# Patient Record
Sex: FEMALE | Race: WHITE | NOT HISPANIC OR LATINO | Employment: OTHER | URBAN - NONMETROPOLITAN AREA
[De-identification: names, ages, dates, MRNs, and addresses within clinical notes are randomized per-mention and may not be internally consistent; named-entity substitution may affect disease eponyms.]

---

## 2018-08-29 ENCOUNTER — APPOINTMENT (OUTPATIENT)
Dept: GENERAL RADIOLOGY | Facility: HOSPITAL | Age: 43
End: 2018-08-29

## 2018-08-29 ENCOUNTER — HOSPITAL ENCOUNTER (EMERGENCY)
Facility: HOSPITAL | Age: 43
Discharge: HOME | End: 2018-08-29
Attending: EMERGENCY MEDICINE | Admitting: NURSE PRACTITIONER

## 2018-08-29 ENCOUNTER — APPOINTMENT (OUTPATIENT)
Dept: CT IMAGING | Facility: HOSPITAL | Age: 43
End: 2018-08-29

## 2018-08-29 VITALS
BODY MASS INDEX: 43.71 KG/M2 | TEMPERATURE: 98.1 F | HEIGHT: 66 IN | RESPIRATION RATE: 16 BRPM | WEIGHT: 272 LBS | HEART RATE: 55 BPM | SYSTOLIC BLOOD PRESSURE: 103 MMHG | DIASTOLIC BLOOD PRESSURE: 55 MMHG | OXYGEN SATURATION: 97 %

## 2018-08-29 DIAGNOSIS — R56.9 SEIZURE (HCC): Primary | ICD-10-CM

## 2018-08-29 DIAGNOSIS — G43.909 MIGRAINE WITHOUT STATUS MIGRAINOSUS, NOT INTRACTABLE, UNSPECIFIED MIGRAINE TYPE: ICD-10-CM

## 2018-08-29 LAB
ALBUMIN SERPL-MCNC: 3.7 G/DL (ref 3.4–4.8)
ALBUMIN/GLOB SERPL: 1.1 G/DL (ref 1.1–1.8)
ALP SERPL-CCNC: 59 U/L (ref 38–126)
ALT SERPL W P-5'-P-CCNC: 26 U/L (ref 9–52)
AMPHET+METHAMPHET UR QL: NEGATIVE
ANION GAP SERPL CALCULATED.3IONS-SCNC: 6 MMOL/L (ref 5–15)
APAP SERPL-MCNC: <10 MCG/ML (ref 10–30)
AST SERPL-CCNC: 28 U/L (ref 14–36)
BACTERIA UR QL AUTO: ABNORMAL /HPF
BARBITURATES UR QL SCN: NEGATIVE
BASOPHILS # BLD AUTO: 0.01 10*3/MM3 (ref 0–0.2)
BASOPHILS NFR BLD AUTO: 0.1 % (ref 0–2)
BENZODIAZ UR QL SCN: NEGATIVE
BILIRUB SERPL-MCNC: 0.3 MG/DL (ref 0.2–1.3)
BILIRUB UR QL STRIP: NEGATIVE
BUN BLD-MCNC: 15 MG/DL (ref 7–21)
BUN/CREAT SERPL: 20.3 (ref 7–25)
CALCIUM SPEC-SCNC: 9.1 MG/DL (ref 8.4–10.2)
CANNABINOIDS SERPL QL: NEGATIVE
CHLORIDE SERPL-SCNC: 106 MMOL/L (ref 95–110)
CLARITY UR: CLEAR
CO2 SERPL-SCNC: 27 MMOL/L (ref 22–31)
COCAINE UR QL: NEGATIVE
COLOR UR: YELLOW
CREAT BLD-MCNC: 0.74 MG/DL (ref 0.5–1)
DEPRECATED RDW RBC AUTO: 44.5 FL (ref 36.4–46.3)
EOSINOPHIL # BLD AUTO: 0.24 10*3/MM3 (ref 0–0.7)
EOSINOPHIL NFR BLD AUTO: 3.2 % (ref 0–7)
ERYTHROCYTE [DISTWIDTH] IN BLOOD BY AUTOMATED COUNT: 14.3 % (ref 11.5–14.5)
GFR SERPL CREATININE-BSD FRML MDRD: 85 ML/MIN/1.73 (ref 58–135)
GLOBULIN UR ELPH-MCNC: 3.5 GM/DL (ref 2.3–3.5)
GLUCOSE BLD-MCNC: 113 MG/DL (ref 60–100)
GLUCOSE UR STRIP-MCNC: NEGATIVE MG/DL
HCT VFR BLD AUTO: 35.6 % (ref 35–45)
HGB BLD-MCNC: 11.9 G/DL (ref 12–15.5)
HGB UR QL STRIP.AUTO: NEGATIVE
HYALINE CASTS UR QL AUTO: ABNORMAL /LPF
IMM GRANULOCYTES # BLD: 0.02 10*3/MM3 (ref 0–0.02)
IMM GRANULOCYTES NFR BLD: 0.3 % (ref 0–0.5)
KETONES UR QL STRIP: NEGATIVE
LEUKOCYTE ESTERASE UR QL STRIP.AUTO: NEGATIVE
LYMPHOCYTES # BLD AUTO: 2.89 10*3/MM3 (ref 0.6–4.2)
LYMPHOCYTES NFR BLD AUTO: 38.5 % (ref 10–50)
MCH RBC QN AUTO: 28.5 PG (ref 26.5–34)
MCHC RBC AUTO-ENTMCNC: 33.4 G/DL (ref 31.4–36)
MCV RBC AUTO: 85.2 FL (ref 80–98)
METHADONE UR QL SCN: NEGATIVE
MONOCYTES # BLD AUTO: 0.37 10*3/MM3 (ref 0–0.9)
MONOCYTES NFR BLD AUTO: 4.9 % (ref 0–12)
NEUTROPHILS # BLD AUTO: 3.97 10*3/MM3 (ref 2–8.6)
NEUTROPHILS NFR BLD AUTO: 53 % (ref 37–80)
NITRITE UR QL STRIP: POSITIVE
OPIATES UR QL: NEGATIVE
OXYCODONE UR QL SCN: NEGATIVE
PH UR STRIP.AUTO: 6.5 [PH] (ref 5–9)
PLATELET # BLD AUTO: 158 10*3/MM3 (ref 150–450)
PMV BLD AUTO: 10.7 FL (ref 8–12)
POTASSIUM BLD-SCNC: 3.8 MMOL/L (ref 3.5–5.1)
PROT SERPL-MCNC: 7.2 G/DL (ref 6.3–8.6)
PROT UR QL STRIP: NEGATIVE
RBC # BLD AUTO: 4.18 10*6/MM3 (ref 3.77–5.16)
RBC # UR: ABNORMAL /HPF
REF LAB TEST METHOD: ABNORMAL
SALICYLATES SERPL-MCNC: <1 MG/DL (ref 10–20)
SODIUM BLD-SCNC: 139 MMOL/L (ref 137–145)
SP GR UR STRIP: 1.01 (ref 1–1.03)
SQUAMOUS #/AREA URNS HPF: ABNORMAL /HPF
UROBILINOGEN UR QL STRIP: ABNORMAL
WBC NRBC COR # BLD: 7.5 10*3/MM3 (ref 3.2–9.8)
WBC UR QL AUTO: ABNORMAL /HPF
WHOLE BLOOD HOLD SPECIMEN: NORMAL

## 2018-08-29 PROCEDURE — 80307 DRUG TEST PRSMV CHEM ANLYZR: CPT | Performed by: NURSE PRACTITIONER

## 2018-08-29 PROCEDURE — 25010000002 ONDANSETRON PER 1 MG: Performed by: NURSE PRACTITIONER

## 2018-08-29 PROCEDURE — 93010 ELECTROCARDIOGRAM REPORT: CPT | Performed by: INTERNAL MEDICINE

## 2018-08-29 PROCEDURE — 25010000002 DEXAMETHASONE PER 1 MG: Performed by: NURSE PRACTITIONER

## 2018-08-29 PROCEDURE — 71046 X-RAY EXAM CHEST 2 VIEWS: CPT

## 2018-08-29 PROCEDURE — 70450 CT HEAD/BRAIN W/O DYE: CPT

## 2018-08-29 PROCEDURE — 85025 COMPLETE CBC W/AUTO DIFF WBC: CPT | Performed by: NURSE PRACTITIONER

## 2018-08-29 PROCEDURE — 73080 X-RAY EXAM OF ELBOW: CPT

## 2018-08-29 PROCEDURE — 96365 THER/PROPH/DIAG IV INF INIT: CPT

## 2018-08-29 PROCEDURE — 93005 ELECTROCARDIOGRAM TRACING: CPT | Performed by: NURSE PRACTITIONER

## 2018-08-29 PROCEDURE — 80053 COMPREHEN METABOLIC PANEL: CPT | Performed by: NURSE PRACTITIONER

## 2018-08-29 PROCEDURE — 96375 TX/PRO/DX INJ NEW DRUG ADDON: CPT

## 2018-08-29 PROCEDURE — 25010000002 LEVETIRACETAM IN NACL 0.82% 500 MG/100ML SOLUTION: Performed by: NURSE PRACTITIONER

## 2018-08-29 PROCEDURE — 99285 EMERGENCY DEPT VISIT HI MDM: CPT

## 2018-08-29 PROCEDURE — 80177 DRUG SCRN QUAN LEVETIRACETAM: CPT | Performed by: NURSE PRACTITIONER

## 2018-08-29 PROCEDURE — 96366 THER/PROPH/DIAG IV INF ADDON: CPT

## 2018-08-29 PROCEDURE — 25010000002 KETOROLAC TROMETHAMINE PER 15 MG: Performed by: NURSE PRACTITIONER

## 2018-08-29 PROCEDURE — 81001 URINALYSIS AUTO W/SCOPE: CPT | Performed by: NURSE PRACTITIONER

## 2018-08-29 RX ORDER — DEXAMETHASONE SODIUM PHOSPHATE 10 MG/ML
10 INJECTION INTRAMUSCULAR; INTRAVENOUS ONCE
Status: COMPLETED | OUTPATIENT
Start: 2018-08-29 | End: 2018-08-29

## 2018-08-29 RX ORDER — HYDROCHLOROTHIAZIDE 25 MG/1
25 TABLET ORAL DAILY
COMMUNITY
End: 2018-09-19 | Stop reason: SDUPTHER

## 2018-08-29 RX ORDER — METOPROLOL TARTRATE 50 MG/1
50 TABLET, FILM COATED ORAL 2 TIMES DAILY
COMMUNITY
End: 2018-09-19 | Stop reason: SDUPTHER

## 2018-08-29 RX ORDER — LEVETIRACETAM 5 MG/ML
500 INJECTION INTRAVASCULAR ONCE
Status: COMPLETED | OUTPATIENT
Start: 2018-08-29 | End: 2018-08-29

## 2018-08-29 RX ORDER — ACETAMINOPHEN 325 MG/1
650 TABLET ORAL ONCE
Status: COMPLETED | OUTPATIENT
Start: 2018-08-29 | End: 2018-08-29

## 2018-08-29 RX ORDER — LEVETIRACETAM 100 MG/ML
500 SOLUTION ORAL ONCE
Status: DISCONTINUED | OUTPATIENT
Start: 2018-08-29 | End: 2018-08-29

## 2018-08-29 RX ORDER — ONDANSETRON 2 MG/ML
4 INJECTION INTRAMUSCULAR; INTRAVENOUS ONCE
Status: COMPLETED | OUTPATIENT
Start: 2018-08-29 | End: 2018-08-29

## 2018-08-29 RX ORDER — ONDANSETRON 4 MG/1
4 TABLET, ORALLY DISINTEGRATING ORAL EVERY 6 HOURS PRN
Qty: 30 TABLET | Refills: 0 | Status: SHIPPED | OUTPATIENT
Start: 2018-08-29 | End: 2018-09-19

## 2018-08-29 RX ORDER — KETOROLAC TROMETHAMINE 30 MG/ML
30 INJECTION, SOLUTION INTRAMUSCULAR; INTRAVENOUS ONCE
Status: COMPLETED | OUTPATIENT
Start: 2018-08-29 | End: 2018-08-29

## 2018-08-29 RX ORDER — LISINOPRIL 20 MG/1
20 TABLET ORAL DAILY
COMMUNITY
End: 2018-09-19 | Stop reason: SDUPTHER

## 2018-08-29 RX ORDER — LEVETIRACETAM 500 MG/1
500 TABLET ORAL 2 TIMES DAILY
COMMUNITY
End: 2018-09-19 | Stop reason: SDUPTHER

## 2018-08-29 RX ADMIN — SODIUM CHLORIDE 1000 ML: 900 INJECTION, SOLUTION INTRAVENOUS at 15:01

## 2018-08-29 RX ADMIN — ONDANSETRON 4 MG: 2 INJECTION INTRAMUSCULAR; INTRAVENOUS at 15:04

## 2018-08-29 RX ADMIN — LEVETIRACETAM 500 MG: 5 INJECTION INTRAVENOUS at 15:10

## 2018-08-29 RX ADMIN — DEXAMETHASONE SODIUM PHOSPHATE 10 MG: 10 INJECTION, SOLUTION INTRAMUSCULAR; INTRAVENOUS at 15:02

## 2018-08-29 RX ADMIN — KETOROLAC TROMETHAMINE 30 MG: 30 INJECTION, SOLUTION INTRAMUSCULAR; INTRAVENOUS at 16:56

## 2018-08-29 RX ADMIN — ACETAMINOPHEN 650 MG: 325 TABLET ORAL at 16:59

## 2018-09-04 LAB — LEVETIRACETAM SERPL-MCNC: 15.7 UG/ML (ref 10–40)

## 2018-09-18 NOTE — PROGRESS NOTES
Subjective   Sudhakar Saul is a 42 y.o. female.     Problem List  1. Seizure Disorder  2. History of drug use of crystal meth, opiod abuse ([place])   3. Morbid Obesity BMI >40  4. Essential hypertension   5. Tobacco user   6. CAD?   7. Hep C positive   8. Chronic back pain   9. Hiatal hernia   10. Memory problems/Neuropathy     PShx  -appendectomy  -cholecystectomy  -total hysterectomy secondary to endometrial carcinoma  -lymph gland removal in abdomen   - 9 lipomas removed.    -lumpectomy on left breast   -L4 dissectomy   -right hand carpal tunnel surgery   -teeth removal   -liver biopsy     SocialHx  -tobacco user-  Smoke 1-3 ppd. For >10 years   --no alcohol use  -history of illicit drug use. Has been clean for 4 months  -  -no children  -currently unemployed used to work for [employer] in [place], and [employer]  -mother - MI, obesity, Arthritis  -father -Serious bone and joint problems     Pt is 41 yo female who I am seeing for the first time. She is here to establish.  She has not seen a PCP in [facility]. She is orginally from [place] and moved here a few weeks ago. She has been living on and off in [facility]. She is a recovering drug addict and was doing crystal meth and opoids, and heroin She has been clean for almost 4 months.  She currently is part of an outpatient program for drug addiction. She meets with them 3 times a week. She has random drug screening. She sees counseling.  She lives with mother and currently she is unemployed and seeking disability.  She started having siezures about 2 years ago that became more frequent after she overdose on metamphetamine and heroin. She was a drug dealer   She was recently at [facility] ER on 8/29/18. Prior to ER visit she was having had blurred vision and then remembered being on the floor. Per witness she landed on right arm. She also had numbness in left fingers with tingling and carmpaing.  She was confused after episode.  She took lithium for seizure. She is seeing Neurologist in [place] and her last seizure was a gee ago.  She currently lives in a [facility]'s shelter with history of drug use. Labs were drawn and pts labs showed low levels of salicytlate and acetaminophen.  CMP showed normal renal and liver function with elevated glucose level. Levetiracetam level was normal. UA showed 4+ bacterial with 3-5 WBC. Urine drug screen was normal. Chest x-ray - CT of brain and elbow x-ray were all unremarkable.  Pt also has history of hypertension and takes lisinopril 20 mg PO q dominguez along with lopressor 50 mg PO BID and HCTZ 25 mg P Oq daily.  For seizures she is on keppra 500 mg PO BID. She has had extensive surgeries         Seizures    This is a chronic problem. The current episode started more than 1 week ago. Associated symptoms include sleepiness, confusion, headaches and visual disturbances. Pertinent negatives include no neck stiffness, no sore throat, no chest pain, no cough, no nausea, no vomiting, no diarrhea and no muscle weakness.   Obesity   This is a chronic problem. The current episode started more than 1 year ago. The problem occurs constantly. The problem has been unchanged. Associated symptoms include arthralgias, fatigue, headaches, numbness and weakness. Pertinent negatives include no abdominal pain, anorexia, change in bowel habit, chest pain, chills, congestion, coughing, fever, joint swelling, myalgias, nausea, neck pain, rash, sore throat, swollen glands, urinary symptoms, vertigo, visual change or vomiting. Nothing aggravates the symptoms. She has tried nothing for the symptoms.   Hypertension   This is a chronic problem. The current episode started more than 1 year ago. The problem is uncontrolled. Associated symptoms include headaches. Pertinent negatives include no anxiety, blurred vision, chest pain, malaise/fatigue, neck pain, orthopnea, palpitations, peripheral edema, PND or shortness of breath. Risk factors  for coronary artery disease include obesity, sedentary lifestyle, smoking/tobacco exposure and stress. Past treatments include ACE inhibitors, beta blockers and diuretics. There are no compliance problems.  There is no history of angina, kidney disease, CAD/MI, CVA, heart failure, left ventricular hypertrophy, PVD or retinopathy. There is no history of chronic renal disease, coarctation of the aorta, hyperaldosteronism, hypercortisolism, hyperparathyroidism, a hypertension causing med, pheochromocytoma, renovascular disease, sleep apnea or a thyroid problem.              The following portions of the patient's history were reviewed and updated as appropriate: allergies, current medications, past family history, past medical history, past social history, past surgical history and problem list.  Patient Active Problem List   Diagnosis   • Annual physical exam   • General medical examination   • Encounter for screening for diabetes mellitus   • Encounter for screening for cardiovascular disorders   • Encounter for screening for endocrine disorder   • Seizure disorder (CMS/HCC)   • Morbid obesity (CMS/HCC)   • Tobacco user   • Tobacco abuse counseling   • Multiple joint pain   • Dysuria   • Encounter for screening for HIV   • History of positive hepatitis C   • Essential hypertension     Current Outpatient Prescriptions on File Prior to Visit   Medication Sig Dispense Refill   • [DISCONTINUED] hydrochlorothiazide (HYDRODIURIL) 25 MG tablet Take 25 mg by mouth Daily.     • [DISCONTINUED] levETIRAcetam (KEPPRA) 500 MG tablet Take 500 mg by mouth 2 (Two) Times a Day.     • [DISCONTINUED] lisinopril (PRINIVIL,ZESTRIL) 20 MG tablet Take 20 mg by mouth Daily.     • [DISCONTINUED] metoprolol tartrate (LOPRESSOR) 50 MG tablet Take 50 mg by mouth 2 (Two) Times a Day.     • [DISCONTINUED] ondansetron ODT (ZOFRAN-ODT) 4 MG disintegrating tablet Take 1 tablet by mouth Every 6 (Six) Hours As Needed for Nausea or Vomiting. 30 tablet  "0     No current facility-administered medications on file prior to visit.        Review of Systems   Constitutional: Positive for activity change and fatigue. Negative for chills, fever and malaise/fatigue.   HENT: Negative for congestion and sore throat.    Eyes: Positive for visual disturbance. Negative for blurred vision.   Respiratory: Negative.  Negative for cough and shortness of breath.    Cardiovascular: Negative.  Negative for chest pain, palpitations, orthopnea and PND.   Gastrointestinal: Negative.  Negative for abdominal pain, anorexia, change in bowel habit, diarrhea, nausea and vomiting.   Endocrine: Negative.    Genitourinary: Positive for dysuria.   Musculoskeletal: Positive for arthralgias. Negative for joint swelling, myalgias and neck pain.   Skin: Negative.  Negative for rash.   Allergic/Immunologic: Negative.    Neurological: Positive for seizures, weakness, numbness and confusion. Negative for vertigo.   Hematological: Negative.    Psychiatric/Behavioral: Positive for decreased concentration. The patient is nervous/anxious.        Objective    /82   Pulse 86   Temp 99.7 °F (37.6 °C)   Ht 167.6 cm (66\")   Wt 119 kg (263 lb 6.4 oz)   SpO2 98%   BMI 42.51 kg/m²       Admission on 08/29/2018, Discharged on 08/29/2018   Component Date Value Ref Range Status   • Glucose 08/29/2018 113* 60 - 100 mg/dL Final   • BUN 08/29/2018 15  7 - 21 mg/dL Final   • Creatinine 08/29/2018 0.74  0.50 - 1.00 mg/dL Final   • Sodium 08/29/2018 139  137 - 145 mmol/L Final   • Potassium 08/29/2018 3.8  3.5 - 5.1 mmol/L Final   • Chloride 08/29/2018 106  95 - 110 mmol/L Final   • CO2 08/29/2018 27.0  22.0 - 31.0 mmol/L Final   • Calcium 08/29/2018 9.1  8.4 - 10.2 mg/dL Final   • Total Protein 08/29/2018 7.2  6.3 - 8.6 g/dL Final   • Albumin 08/29/2018 3.70  3.40 - 4.80 g/dL Final   • ALT (SGPT) 08/29/2018 26  9 - 52 U/L Final   • AST (SGOT) 08/29/2018 28  14 - 36 U/L Final   • Alkaline Phosphatase 08/29/2018 " 59  38 - 126 U/L Final   • Total Bilirubin 08/29/2018 0.3  0.2 - 1.3 mg/dL Final   • eGFR Non African Amer 08/29/2018 85  58 - 135 mL/min/1.73 Final   • Globulin 08/29/2018 3.5  2.3 - 3.5 gm/dL Final   • A/G Ratio 08/29/2018 1.1  1.1 - 1.8 g/dL Final   • BUN/Creatinine Ratio 08/29/2018 20.3  7.0 - 25.0 Final   • Anion Gap 08/29/2018 6.0  5.0 - 15.0 mmol/L Final   • Color, UA 08/29/2018 Yellow  Yellow, Straw, Dark Yellow, Cristina Final   • Appearance, UA 08/29/2018 Clear  Clear Final   • pH, UA 08/29/2018 6.5  5.0 - 9.0 Final   • Specific Gravity, UA 08/29/2018 1.010  1.003 - 1.030 Final   • Glucose, UA 08/29/2018 Negative  Negative Final   • Ketones, UA 08/29/2018 Negative  Negative Final   • Bilirubin, UA 08/29/2018 Negative  Negative Final   • Blood, UA 08/29/2018 Negative  Negative Final   • Protein, UA 08/29/2018 Negative  Negative Final   • Leuk Esterase, UA 08/29/2018 Negative  Negative Final   • Nitrite, UA 08/29/2018 Positive* Negative Final   • Urobilinogen, UA 08/29/2018 0.2 E.U./dL  0.2 - 1.0 E.U./dL Final   • Amphetamine Screen, Urine 08/29/2018 Negative  Negative Final   • Barbiturates Screen, Urine 08/29/2018 Negative  Negative Final   • Benzodiazepine Screen, Urine 08/29/2018 Negative  Negative Final   • Cocaine Screen, Urine 08/29/2018 Negative  Negative Final   • Methadone Screen, Urine 08/29/2018 Negative  Negative Final   • Opiate Screen 08/29/2018 Negative  Negative Final   • Oxycodone Screen, Urine 08/29/2018 Negative  Negative Final   • THC, Screen, Urine 08/29/2018 Negative  Negative Final   • Salicylate 08/29/2018 <1.0* 10.0 - 20.0 mg/dL Final   • Acetaminophen 08/29/2018 <10.0* 10.0 - 30.0 mcg/mL Final   • WBC 08/29/2018 7.50  3.20 - 9.80 10*3/mm3 Final   • RBC 08/29/2018 4.18  3.77 - 5.16 10*6/mm3 Final   • Hemoglobin 08/29/2018 11.9* 12.0 - 15.5 g/dL Final   • Hematocrit 08/29/2018 35.6  35.0 - 45.0 % Final   • MCV 08/29/2018 85.2  80.0 - 98.0 fL Final   • MCH 08/29/2018 28.5  26.5 - 34.0 pg  Final   • MCHC 08/29/2018 33.4  31.4 - 36.0 g/dL Final   • RDW 08/29/2018 14.3  11.5 - 14.5 % Final   • RDW-SD 08/29/2018 44.5  36.4 - 46.3 fl Final   • MPV 08/29/2018 10.7  8.0 - 12.0 fL Final   • Platelets 08/29/2018 158  150 - 450 10*3/mm3 Final   • Neutrophil % 08/29/2018 53.0  37.0 - 80.0 % Final   • Lymphocyte % 08/29/2018 38.5  10.0 - 50.0 % Final   • Monocyte % 08/29/2018 4.9  0.0 - 12.0 % Final   • Eosinophil % 08/29/2018 3.2  0.0 - 7.0 % Final   • Basophil % 08/29/2018 0.1  0.0 - 2.0 % Final   • Immature Grans % 08/29/2018 0.3  0.0 - 0.5 % Final   • Neutrophils, Absolute 08/29/2018 3.97  2.00 - 8.60 10*3/mm3 Final   • Lymphocytes, Absolute 08/29/2018 2.89  0.60 - 4.20 10*3/mm3 Final   • Monocytes, Absolute 08/29/2018 0.37  0.00 - 0.90 10*3/mm3 Final   • Eosinophils, Absolute 08/29/2018 0.24  0.00 - 0.70 10*3/mm3 Final   • Basophils, Absolute 08/29/2018 0.01  0.00 - 0.20 10*3/mm3 Final   • Immature Grans, Absolute 08/29/2018 0.02  0.00 - 0.02 10*3/mm3 Final   • Levetiracetam 08/29/2018 15.7  10.0 - 40.0 ug/mL Final   • RBC, UA 08/29/2018 None Seen  None Seen /HPF Final   • WBC, UA 08/29/2018 3-5  None Seen, 0-2, 3-5 /HPF Final   • Bacteria, UA 08/29/2018 4+* None Seen /HPF Final   • Squamous Epithelial Cells, UA 08/29/2018 3-5* None Seen, 0-2 /HPF Final   • Hyaline Casts, UA 08/29/2018 None Seen  None Seen /LPF Final   • Methodology 08/29/2018 Automated Microscopy   Final   • Extra Tube 08/29/2018 hold for add-on   Final    Auto resulted     Procedure: XR ELBOW 3+ VW RIGHT     Indication:  Persistent pain after recent fall       Technique: Three views .      Prior Relevant Exam: None     Small osteophyte tip of the olecranon. No acute bony abnormality.  Joint spaces intact. Mineralization normal.         IMPRESSION:  CONCLUSION:     1. Mild chronic changes without acute bony abnormality      Electronically signed by:  Finn Haynes MD  8/29/2018 4:41 PM CDT  Workstation: [id number]    Noncontrast CT  examination of the brain.     INDICATION:  Right lower extremity weakness. Seizure.      Technique: Axial, coronal, sagittal, without contrast. This exam  was performed according to our departmental dose-optimization  program which includes automated exposure control, adjustment of  the mA and/or kV according to patient size and/or use of  iterative reconstruction technique.        Prior relevant examination: None.        Limitations: None     Brain parenchyma appears within normal limits. Ventricles are  within normal limits in size. Normal gray-white matter  differentiation. No evidence of abnormal mass or calcification is  seen. No evidence of acute hemorrhage is noted.     The mastoid air cells are well aerated bilaterally.      The visualized paranasal sinuses appear well aerated bilaterally.     Calvarium intact.        IMPRESSION:  CONCLUSION:     1. Normal brain parenchyma for age    EXAM:          Radiograph(s), Chest   VIEWS:    PA / Lat ; 2       DATE/TIME:  8/29/2018 2:08 PM CDT                INDICATION:   seizure    COMPARISON:  CXR: none             FINDINGS:             - lines/tubes:    none     - cardiac:         size within normal limits         - mediastinum: contour within normal limits         - lungs:         no focal air space process, pulmonary  interstitial edema, nodule(s)/mass             - pleura:         no evidence of  fluid                  - osseous:         unremarkable for age                  - misc.:       IMPRESSION:  CONCLUSION:        1. No evidence of an active cardiopulmonary process.                                                  Electronically signed by:  SHIRLEY Tiawri MD  8/29/2018 2:09  PM CDT Workstation: [id number]    Status:  Final result   Visible to patient:  No (Not Released)    Ref Range & Units 3wk ago   Amphetamine Screen, Urine Negative Negative    Barbiturates Screen, Urine Negative Negative    Benzodiazepine Screen, Urine Negative Negative    Cocaine  Screen, Urine Negative Negative    Methadone Screen, Urine Negative Negative    Opiate Screen Negative Negative    Oxycodone Screen, Urine Negative Negative    THC, Screen, Urine Negative Negative    Resulting Agency  [facility]   Narrative     Negative Thresholds For Drugs Screened in Urine:     Amphetamines          500 ng/ml  Barbiturates          200 ng/ml  Benzodiazepines       200 ng/ml  Cocaine               150 ng/ml  Methadone             300 ng/mL  Opiates               300 ng/mL  Oxycodone             100 ng/mL  THC                   20 ng/mL    The normal value for all drugs tested is negative. This report includes final unconfirmed screening results.  A positive result by this assay can be, at your request, sent to the Reference Lab for confirmation by gas chromatography. Unconfirmed results must not be used for non-medical purposes, such as employment or legal testing. Clinical consideration should be applied to any drug of abuse test result, particularly when unconfirmed results are used.      Specimen Collected: 08/29/18 15:18 Last Resulted: 08/29/18 15:59                Physical Exam   Constitutional: She is oriented to person, place, and time. She appears well-developed and well-nourished. No distress.   HENT:   Head: Normocephalic and atraumatic.   Right Ear: External ear normal.   Left Ear: External ear normal.   Eyes: Conjunctivae and EOM are normal. Left eye exhibits no discharge. No scleral icterus.   Neck: Normal range of motion. Neck supple. No JVD present. No tracheal deviation present. No thyromegaly present.   Cardiovascular: Normal rate, regular rhythm and normal heart sounds.    Pulmonary/Chest: Effort normal. No respiratory distress. She has no wheezes.   Decrease breath sounds.     Abdominal: Soft. She exhibits no distension and no mass. There is no tenderness. There is no guarding.   Obese abdomen    Musculoskeletal: Normal range of motion. She exhibits tenderness. She exhibits no  edema or deformity.   Lymphadenopathy:     She has no cervical adenopathy.   Neurological: She is alert and oriented to person, place, and time. She displays normal reflexes. No cranial nerve deficit. Coordination normal.   Skin: Skin is warm and dry. No rash noted. She is not diaphoretic. No erythema. No pallor.   Psychiatric: She has a normal mood and affect. Her behavior is normal.   Nursing note and vitals reviewed.        Assessment/Plan   Problems Addressed this Visit        Cardiovascular and Mediastinum    Essential hypertension    Relevant Medications    lisinopril (PRINIVIL,ZESTRIL) 20 MG tablet    metoprolol tartrate (LOPRESSOR) 50 MG tablet    hydrochlorothiazide (HYDRODIURIL) 25 MG tablet    Other Relevant Orders    CBC Auto Differential    Comprehensive Metabolic Panel       Digestive    Morbid obesity (CMS/HCC)    History of positive hepatitis C    Relevant Orders    Hepatitis Panel, Acute    Hepatitis C RNA, Quantitative, PCR (graph)    Hepatitis C Antibody    Hepatitis C Genotype       Nervous and Auditory    Seizure disorder (CMS/HCC) - Primary    Relevant Medications    levETIRAcetam (KEPPRA) 500 MG tablet    Other Relevant Orders    Ambulatory Referral to Neurology (Completed)    Levetiracetam Level (Keppra)    Multiple joint pain    Dysuria    Relevant Orders    Urinalysis With Microscopic - Urine, Clean Catch       Other    Annual physical exam    General medical examination    Relevant Orders    CBC Auto Differential    Comprehensive Metabolic Panel    Hemoglobin A1c    Lipid Panel    TSH    T4, Free    T3, Free    Vitamin D 25 Hydroxy    Hepatitis Panel, Acute    Hepatitis C RNA, Quantitative, PCR (graph)    Hepatitis C Antibody    Hepatitis C Genotype    Encounter for screening for diabetes mellitus    Relevant Orders    Hemoglobin A1c    Vitamin D 25 Hydroxy    Encounter for screening for cardiovascular disorders    Relevant Orders    Lipid Panel    Encounter for screening for endocrine  disorder    Relevant Orders    TSH    T4, Free    T3, Free    Tobacco user    Tobacco abuse counseling    Encounter for screening for HIV    Relevant Orders    HIV-1 & HIV-2 Antibodies      -will get cbc, cmp, lipid panel ,hga1c, thyroid studies and Vitamin D levels  -history of Hep C will get Hepatitis panel Hep C RNA and genotype. Consider Gastroenterology referral if needed  -HIV screening test  -dysuria-  Has had history of UTI will get UA and if abnormal will start on abx.  Also get urine culture   -consider Cardiology referral  -consider PUlmonary referral   -multiple joint pain -- naproxen 500 mg PO BID   -discuss preventative health on next visit  -for multiple joint pain - pt cannot take opoids. Will start on naproxen 500 mg twice a day. Drug information provided  -morbid obesity -counseled on weight loss, diet and exercise. Diet information provided.  Also bariatric weight loss surgery information provided  -annual physical exam today  -seizure disorder -currently on Keppra  500 mg PO BID. Will refer to Dr. Contreras (Neurologist) in [place]   -counseled pt to quit smoking >5 minutes.  Gave tobacco cessation material   -hypertension - elevated today. Continue on lisinopril 20 mg Po q dominguez, metoprolol 50 mg PO BID and HCTZ 25 mg PO q daily.    -advised pt to be safe and call with any questions or concerns. All questions addressed today  -recheck in 2 weeks for BP recheck and labwork        This document has been electronically signed by Jermaine Ferro MD on September 19, 2018 8:41 AM

## 2018-09-19 ENCOUNTER — OFFICE VISIT (OUTPATIENT)
Dept: FAMILY MEDICINE CLINIC | Facility: CLINIC | Age: 43
End: 2018-09-19

## 2018-09-19 ENCOUNTER — LAB (OUTPATIENT)
Dept: LAB | Facility: HOSPITAL | Age: 43
End: 2018-09-19

## 2018-09-19 VITALS
WEIGHT: 263.4 LBS | TEMPERATURE: 99.7 F | HEART RATE: 86 BPM | OXYGEN SATURATION: 98 % | DIASTOLIC BLOOD PRESSURE: 82 MMHG | SYSTOLIC BLOOD PRESSURE: 136 MMHG | HEIGHT: 66 IN | BODY MASS INDEX: 42.33 KG/M2

## 2018-09-19 DIAGNOSIS — Z13.6 ENCOUNTER FOR SCREENING FOR CARDIOVASCULAR DISORDERS: ICD-10-CM

## 2018-09-19 DIAGNOSIS — G40.909 SEIZURE DISORDER (HCC): ICD-10-CM

## 2018-09-19 DIAGNOSIS — Z13.1 ENCOUNTER FOR SCREENING FOR DIABETES MELLITUS: ICD-10-CM

## 2018-09-19 DIAGNOSIS — I10 ESSENTIAL HYPERTENSION: ICD-10-CM

## 2018-09-19 DIAGNOSIS — Z86.19 HISTORY OF POSITIVE HEPATITIS C: ICD-10-CM

## 2018-09-19 DIAGNOSIS — Z00.00 GENERAL MEDICAL EXAMINATION: ICD-10-CM

## 2018-09-19 DIAGNOSIS — M25.50 MULTIPLE JOINT PAIN: ICD-10-CM

## 2018-09-19 DIAGNOSIS — Z13.29 ENCOUNTER FOR SCREENING FOR ENDOCRINE DISORDER: ICD-10-CM

## 2018-09-19 DIAGNOSIS — Z11.4 ENCOUNTER FOR SCREENING FOR HIV: ICD-10-CM

## 2018-09-19 DIAGNOSIS — R30.0 DYSURIA: ICD-10-CM

## 2018-09-19 DIAGNOSIS — Z00.00 ANNUAL PHYSICAL EXAM: ICD-10-CM

## 2018-09-19 DIAGNOSIS — E66.01 MORBID OBESITY (HCC): ICD-10-CM

## 2018-09-19 DIAGNOSIS — Z71.6 TOBACCO ABUSE COUNSELING: ICD-10-CM

## 2018-09-19 DIAGNOSIS — Z72.0 TOBACCO USER: ICD-10-CM

## 2018-09-19 DIAGNOSIS — R30.0 DYSURIA: Primary | ICD-10-CM

## 2018-09-19 LAB
25(OH)D3 SERPL-MCNC: 24.2 NG/ML (ref 30–100)
ALBUMIN SERPL-MCNC: 4 G/DL (ref 3.4–4.8)
ALBUMIN/GLOB SERPL: 1.1 G/DL (ref 1.1–1.8)
ALP SERPL-CCNC: 86 U/L (ref 38–126)
ALT SERPL W P-5'-P-CCNC: 33 U/L (ref 9–52)
ANION GAP SERPL CALCULATED.3IONS-SCNC: 11 MMOL/L (ref 5–15)
AST SERPL-CCNC: 29 U/L (ref 14–36)
BASOPHILS # BLD AUTO: 0.02 10*3/MM3 (ref 0–0.2)
BASOPHILS NFR BLD AUTO: 0.3 % (ref 0–2)
BILIRUB SERPL-MCNC: 0.3 MG/DL (ref 0.2–1.3)
BILIRUB UR QL STRIP: NEGATIVE
BUN BLD-MCNC: 12 MG/DL (ref 7–21)
BUN/CREAT SERPL: 22.2 (ref 7–25)
CALCIUM SPEC-SCNC: 9.4 MG/DL (ref 8.4–10.2)
CHLORIDE SERPL-SCNC: 106 MMOL/L (ref 95–110)
CLARITY UR: ABNORMAL
CO2 SERPL-SCNC: 20 MMOL/L (ref 22–31)
COLOR UR: YELLOW
CREAT BLD-MCNC: 0.54 MG/DL (ref 0.5–1)
DEPRECATED RDW RBC AUTO: 45.7 FL (ref 36.4–46.3)
EOSINOPHIL # BLD AUTO: 0.15 10*3/MM3 (ref 0–0.7)
EOSINOPHIL NFR BLD AUTO: 2.2 % (ref 0–7)
ERYTHROCYTE [DISTWIDTH] IN BLOOD BY AUTOMATED COUNT: 14.1 % (ref 11.5–14.5)
GFR SERPL CREATININE-BSD FRML MDRD: 123 ML/MIN/1.73 (ref 58–135)
GLOBULIN UR ELPH-MCNC: 3.5 GM/DL (ref 2.3–3.5)
GLUCOSE BLD-MCNC: 195 MG/DL (ref 60–100)
GLUCOSE UR STRIP-MCNC: NEGATIVE MG/DL
HBA1C MFR BLD: 7.1 % (ref 4–5.6)
HCT VFR BLD AUTO: 38.9 % (ref 35–45)
HGB BLD-MCNC: 12.7 G/DL (ref 12–15.5)
HGB UR QL STRIP.AUTO: NEGATIVE
HIV1+2 AB SER QL: NEGATIVE
IMM GRANULOCYTES # BLD: 0.02 10*3/MM3 (ref 0–0.02)
IMM GRANULOCYTES NFR BLD: 0.3 % (ref 0–0.5)
KETONES UR QL STRIP: ABNORMAL
LEUKOCYTE ESTERASE UR QL STRIP.AUTO: NEGATIVE
LYMPHOCYTES # BLD AUTO: 2.5 10*3/MM3 (ref 0.6–4.2)
LYMPHOCYTES NFR BLD AUTO: 37.3 % (ref 10–50)
MCH RBC QN AUTO: 28.8 PG (ref 26.5–34)
MCHC RBC AUTO-ENTMCNC: 32.6 G/DL (ref 31.4–36)
MCV RBC AUTO: 88.2 FL (ref 80–98)
MONOCYTES # BLD AUTO: 0.29 10*3/MM3 (ref 0–0.9)
MONOCYTES NFR BLD AUTO: 4.3 % (ref 0–12)
NEUTROPHILS # BLD AUTO: 3.72 10*3/MM3 (ref 2–8.6)
NEUTROPHILS NFR BLD AUTO: 55.6 % (ref 37–80)
NITRITE UR QL STRIP: NEGATIVE
PH UR STRIP.AUTO: 6 [PH] (ref 5–8)
PLATELET # BLD AUTO: 184 10*3/MM3 (ref 150–450)
PMV BLD AUTO: 11.3 FL (ref 8–12)
POTASSIUM BLD-SCNC: 4.4 MMOL/L (ref 3.5–5.1)
PROT SERPL-MCNC: 7.5 G/DL (ref 6.3–8.6)
PROT UR QL STRIP: ABNORMAL
RBC # BLD AUTO: 4.41 10*6/MM3 (ref 3.77–5.16)
SODIUM BLD-SCNC: 137 MMOL/L (ref 137–145)
SP GR UR STRIP: 1 (ref 1–1.03)
T4 FREE SERPL-MCNC: 1.16 NG/DL (ref 0.78–2.19)
TSH SERPL DL<=0.05 MIU/L-ACNC: 1.77 MIU/ML (ref 0.46–4.68)
UROBILINOGEN UR QL STRIP: ABNORMAL
WBC NRBC COR # BLD: 6.7 10*3/MM3 (ref 3.2–9.8)

## 2018-09-19 PROCEDURE — 84443 ASSAY THYROID STIM HORMONE: CPT

## 2018-09-19 PROCEDURE — 80307 DRUG TEST PRSMV CHEM ANLYZR: CPT

## 2018-09-19 PROCEDURE — 80053 COMPREHEN METABOLIC PANEL: CPT

## 2018-09-19 PROCEDURE — 81015 MICROSCOPIC EXAM OF URINE: CPT

## 2018-09-19 PROCEDURE — G0432 EIA HIV-1/HIV-2 SCREEN: HCPCS

## 2018-09-19 PROCEDURE — 80074 ACUTE HEPATITIS PANEL: CPT

## 2018-09-19 PROCEDURE — 82306 VITAMIN D 25 HYDROXY: CPT

## 2018-09-19 PROCEDURE — 84439 ASSAY OF FREE THYROXINE: CPT

## 2018-09-19 PROCEDURE — 83036 HEMOGLOBIN GLYCOSYLATED A1C: CPT

## 2018-09-19 PROCEDURE — 80177 DRUG SCRN QUAN LEVETIRACETAM: CPT

## 2018-09-19 PROCEDURE — 99214 OFFICE O/P EST MOD 30 MIN: CPT | Performed by: FAMILY MEDICINE

## 2018-09-19 PROCEDURE — 84481 FREE ASSAY (FT-3): CPT

## 2018-09-19 PROCEDURE — 87902 NFCT AGT GNTYP ALYS HEP C: CPT

## 2018-09-19 PROCEDURE — 87522 HEPATITIS C REVRS TRNSCRPJ: CPT

## 2018-09-19 PROCEDURE — 85025 COMPLETE CBC W/AUTO DIFF WBC: CPT

## 2018-09-19 RX ORDER — NAPROXEN 500 MG/1
500 TABLET ORAL 2 TIMES DAILY WITH MEALS
Qty: 60 TABLET | Refills: 3 | Status: SHIPPED | OUTPATIENT
Start: 2018-09-19 | End: 2018-12-13 | Stop reason: SDUPTHER

## 2018-09-19 RX ORDER — HYDROCHLOROTHIAZIDE 25 MG/1
25 TABLET ORAL DAILY
Qty: 30 TABLET | Refills: 3 | Status: SHIPPED | OUTPATIENT
Start: 2018-09-19 | End: 2018-12-13

## 2018-09-19 RX ORDER — LISINOPRIL 20 MG/1
20 TABLET ORAL DAILY
Qty: 30 TABLET | Refills: 3 | Status: SHIPPED | OUTPATIENT
Start: 2018-09-19 | End: 2018-12-13

## 2018-09-19 RX ORDER — METOPROLOL TARTRATE 50 MG/1
50 TABLET, FILM COATED ORAL EVERY 12 HOURS SCHEDULED
Qty: 60 TABLET | Refills: 3 | Status: SHIPPED | OUTPATIENT
Start: 2018-09-19 | End: 2018-12-13

## 2018-09-19 RX ORDER — LEVETIRACETAM 500 MG/1
500 TABLET ORAL EVERY 12 HOURS SCHEDULED
Qty: 60 TABLET | Refills: 3 | Status: SHIPPED | OUTPATIENT
Start: 2018-09-19 | End: 2018-12-13

## 2018-09-19 NOTE — PATIENT INSTRUCTIONS
Diet Following Bariatric Surgery  The bariatric diet is designed to provide fluids and nourishment while promoting weight loss and healing after bariatric surgery. The diet is divided into 3 stages. Progress to each stage of the diet with your health care provider’s approval.  What do I need to know about my diet following bariatric surgery?  Your surgeon may have individual guidelines for you about specific foods or the progression of your diet. Follow your surgeon's guidelines. You will follow these general guidelines during all stages of your diet:  · Eat at set times.  · Allow 30-45 minutes for each meal.  · Take small bites. Chew your food until it is almost a liquid before swallowing it. Try setting down your utensils between bites to help yourself eat slower or make an “eat slowly” reminder sign.  · Do not drink liquids for 30 minutes before meals and for 30 minutes after meals.  · Drink between meals.  · Stop eating when you are full. If you feel tightness or pressure in your chest, that means you are full. Wait 30 minutes before you try to eat again.  · Take a chewable multivitamin daily in addition to other supplements as directed by your health care provider.  · Sip at least 48-64 oz of liquid, preferably water, each day.  · Stay away from concentrated sweets with more than 10 g of sugar per serving.  · Protein is a very important part of the diet. Have protein with every meal when possible. Try eating your protein food first.    Stage 1 bariatric diet  Stage 1 will begin after surgery and last until about 2 weeks after surgery or as directed by your health care provider. You will be on clear liquids immediately after surgery. After your health care provider approves, you will move to full liquids. You will eat at scheduled times during the day (for example at 8 AM, 12 noon, or 5 PM). You will also take a liquid protein supplement as recommended by your dietitian. Your dietitian will let you know how much  and how often you can eat.  Diet Guidelines  · Limit intake to ¼ cup of solid foods and ½ cup of beverages per meal.  · You will need at least 60-80 g of protein daily or as determined by your dietitian. Guidelines for choosing a liquid protein supplement include:  ? At least 15 g of protein per 8 oz serving.  ? Less than 20 g total carbohydrate per 8 oz serving.  ? Less than 5 g fat per 8 oz serving.  · Drink at least 48 oz (1440 mL) of fluid daily, which includes your protein supplement.  · To get more protein, you can add 1 Tbsp non-fat dry milk powder to each ¼ cup skim milk.  · Avoid carbonated beverages, caffeine, alcohol, and concentrated sweets such as sugar, cakes, and cookies.  · Take a chewable multivitamin complete with iron. Discuss additional supplements with your health care provider or dietitian.  Beverages (½ cup total per meal)  · Decaffeinated coffee or tea.  · Drinks with less than 25 g of sugar per serving.  · Diet or sugar-free drinks.  · Powdered drinks.  · Sugar-free iced tea.  · Broth.  · Skim milk or lactose-free milk.  · Unsweetened, plain soy milk.  · Sugar-free gelatin dessert or frozen ice pops.  Full Liquid Foods (¼ cup total per meal)  · Protein-rich liquids (limit added protein powder to 25-30 g per serving).  · Low-fat cream soup or soup that has been blended.  · Artificially sweetened yogurt.  · Sugar-free pudding.  · Blended low-fat cottage cheese.  · Plain yogurt or Greek yogurt (low-fat).  · Unsweetened applesauce.  · Hot wheat cereal, cream of rice, grits.  Stage 2 bariatric diet (soft or pureed diet)  Stage 2 starts about 2 weeks after surgery and lasts until about 4 weeks after surgery. During this stage, you will eat soft, moist, ground, diced, or pureed foods in small meals, 3-6 times a day. Focus on protein-rich foods. You will also drink a liquid protein supplement between meals 2 times a day. After a week of soft protein foods, you can begin to add other soft foods in  addition to soft proteins. You should meet with your dietitian at this stage to begin preparation for Stage 3 of the bariatric diet.  Diet Guidelines  · Meals should not exceed ¾-1 cup total per meal.  · You will need to blend solid foods to the consistency of applesauce.  · Choose low-fat foods. Low-fat foods are foods with less than 5 g of fat per serving.  · Include a protein with every meal and snack. Eat the protein food first. Try to eat 60-80 g of protein per day when possible.  · Choose grains made from white or refined grain. Choices should have no more than 2 g of fiber per serving.  · Continue to eat mindfully and slowly and always listen to your body.  · Staying hydrated is very important during this stage. Full liquids from Stage 1 may be used for a meal or snack replacement.  · Slowly add other soft foods to your diet. Examples of soft foods that can be added to your diet are listed in the following section.  Soft Protein Foods  · Well-cooked beans and lentils.  · Eggs (scrambled, soft-boiled).  · Tofu and other soft soy products (tempeh or bean veggie burgers).  · Fish.  · Lean poultry, well cooked and soft. Can also try baby food chicken or turkey.  · Ground meats.  · Low-fat cottage cheese.  · Hummus.  · Fat-free or low-fat yogurt.  · Gravy and light mayonnaise (to help moisten).  Other Soft Foods  · Soft fruit. This includes soft canned fruit in light syrup or natural juice, bananas, melons, peaches, pears, and strawberries.  · Well-cooked vegetables.  · Toast or crackers. Make sure these become soft by chewing them at least 20 times.  · Hot wheat cereal.  · Unflavored oatmeal.  · Baby food or toddler fruits and vegetables.  · Chicken or vegetable broth.  · Blended fruit smoothies.  Stage 3 bariatric diet (regular diet)  This stage starts about 6-8 weeks after surgery and will continue to promote weight loss. You will be allowed to eat foods of various textures. Ask your dietitian to assist you in  meal planning and additional behavioral strategies to make this final stage a long-term success.  Diet Guidelines  · Meals should not exceed ¾-1 cup. As you heal and advance, you may be able to eat a little more with each meal. Always listen to your body.  · Your diet should include foods from all the food groups.  · Slowly add recommended foods to your diet. See the following section for a list of recommended foods.  · Eat only at your chosen meal times.  · When you feel full, stop eating.  · Carbohydrates should be limited. Eat no more than 30 g per meal or 130 g per day. There are about 15-20 g of carbohydrates in 1 piece of bread or a medium piece of fruit.  · Stay hydrated. Drink at least 48-64 oz of noncarbonated, zero-calorie fluid per day. Water is the best choice.  · At first, avoid hard-to-digest foods like popcorn, nuts, celery, seeds, and the white parts of citrus fruits. With time you may be able to eat these foods.  · Take any vitamin supplements as directed by your health care provider.  · Do not fast or skip meals. If you are having a hard time eating, talk to your health care provider or dietitian.  What foods can I eat in stage 3?  Grains  Choose whole grains when possible; aim to get half of your total grains as whole grains. These include whole wheat breads, crackers, and pastas. Cold or hot cereals with no added sugars. Rice (brown or white).  Vegetables  Choose a variety of vegetables. All are allowed.  Fruits  Choose a variety of fruits. All are allowed.  Meat and Other Protein Foods  Choose lean sources of protein such as poultry, fish, and eggs. You may need to cook meats to tender at first. Smooth nut butters. Beans.  Dairy  Choose low-fat or nonfat dairy items (such as cheese, milk, and yogurt).  Beverages  Decaffeinated coffee. Caffeine-free tea. Sugar-free soft drinks without caffeine. Limit alcohol.  Condiments  All are acceptable. Choose low-fat and low-sodium when possible.  Sweets  and Desserts  Low-fat, low-sugar options. As part of a healthy diet, everyone should limit added sugars.  Fat and Oil  Choose healthy fats such as olive oil, canola oil, and avocados.  The items listed above may not be a complete list of recommended foods or beverages. Contact your dietitian for more options.  This information is not intended to replace advice given to you by your health care provider. Make sure you discuss any questions you have with your health care provider.  Document Released: 06/23/2004 Document Revised: 05/25/2017 Document Reviewed: 10/22/2014  [employer] Interactive Patient Education © 2018 [employer]  Bariatric Surgery, Care After  Refer to this sheet in the next few weeks. These instructions provide you with information on caring for yourself after your procedure. Your health care provider may also give you more specific instructions. Your treatment has been planned according to current medical practices, but problems sometimes occur. Call your health care provider if you have any problems or questions after your procedure.  What can I expect after the procedure?  After your procedure, it is typical to have the following sensations:  · Soreness.  · Sluggishness.  · Tiredness.  · Moodiness.  · Chilliness.    It is not unusual to have dry skin and some hair loss after bariatric surgery.  Follow these instructions at home:  · Do not drink alcohol, use public transportation, or sign important papers for at least 1 day following surgery.  · Do not resume physical activities or drive until directed by your surgeon.  · Avoid lifting anything over 10 pounds (4.5 kg) for 6 weeks following surgery, or until approved by your surgeon.  · Only take over-the-counter or prescription medicines for pain, discomfort, or fever as directed by your surgeon.  · Resume your diet as directed by your surgeon. You will resume eating with liquids and pureed foods, then soft foods, and progress to a more normal diet  over time. Follow your surgeon or dietitian’s guidelines for what and how much to eat and drink. You will need to eat slowly, so as not to cause discomfort and vomiting.  · Use showers for bathing as directed by your surgeon.  · Change dressings as directed by your surgeon.  · Schedule a follow-up appointment with your surgeon as directed.  Where to find more information:  American Society for Bariatric Surgery: www.asbs.org  Weight-control Information Network (WIN): eva.niddk.nih.gov  Contact a health care provider if:  · There is redness, swelling, or increasing pain in the wound.  · There is pus coming from the wound.  · There is drainage from the wound lasting longer than 1 day.  · An unexplained oral temperature above 102°F (38.9°C) develops.  · You notice a foul smell coming from the wound or dressing.  · There is a breaking open of the wound (edges not staying together) after stitches have been removed.  · You notice increasing pain in the shoulder-strap areas.  · You develop episodes of dizziness or faint while standing.  · You develop shortness of breath.  · You develop persistent nausea or vomiting.  · Your soreness seems to be getting worse rather than better.  Get help right away if:  · You develop a rash.  · You have difficulty breathing.  · You develop, or feel you are developing, any reaction or side effects to medicines.  This information is not intended to replace advice given to you by your health care provider. Make sure you discuss any questions you have with your health care provider.  Document Released: 12/18/2006 Document Revised: 05/31/2017 Document Reviewed: 06/18/2014  [employer] Interactive Patient Education © 2017 [employer]    Bariatric Surgery Information  Bariatric surgery, also called weight loss surgery, is a procedure that helps you lose weight. You may consider or your health care provider may suggest bariatric surgery if:  · You are severely obese and have been unable to lose  weight through diet and exercise.  · You have health problems related to obesity, such as:  ? Type 2 diabetes.  ? Heart disease.  ? Lung disease.    How does bariatric surgery help me lose weight?  Bariatric surgery helps you lose weight by decreasing how much food your body absorbs. This is done by closing off part of your stomach to make it smaller. This restricts the amount of food your stomach can hold. Bariatric surgery can also change your body’s regular digestive process, so that food bypasses the parts of your body that absorb calories and nutrients.  If you decide to have bariatric surgery, it is important to continue to eat a healthy diet and exercise regularly after the surgery.  What are the different kinds of bariatric surgery?  There are two kinds of bariatric surgeries:  · Restrictive surgeries make your stomach smaller. They do not change your digestive process. The smaller the size of your new stomach, the less food you can eat. There are different types of restrictive surgeries.  · Malabsorptive surgeries both make your stomach smaller and alter your digestive process so that your body processes less calories and nutrients. These are the most common kind of bariatric surgery. There are different types of malabsorptive surgeries.    What are the different types of restrictive surgery?  Adjustable Gastric Banding  In this procedure, an inflatable band is placed around your stomach near the upper end. This makes the passageway for food into the rest of your stomach much smaller. The band can be adjusted, making it tighter or looser, by filling it with salt solution. Your surgeon can adjust the band based on how are you feeling and how much weight you are losing. The band can be removed in the future.  Vertical Banded Gastroplasty  In this procedure, staples are used to separate your stomach into two parts, a small upper pouch and a bigger lower pouch. This decreases how much food you can eat.  Sleeve  Gastrectomy  In this procedure, your stomach is made smaller. This is done by surgically removing a large part of your stomach. When your stomach is smaller, you feel full more quickly and reduce how much you eat.  What are the different types of malabsorptive surgery?  Grayson-en-Y Gastric Bypass (RGB)  This is the most common weight loss surgery. In this procedure, a small stomach pouch is created in the upper part of your stomach. Next, this small stomach pouch is attached directly to the middle part of your small intestine. The farther down your small intestine the new connection is made, the fewer calories and nutrients you will absorb.  Biliopancreatic Diversion with Duodenal Switch (BPD/DS)  This is a multi-step procedure. In this procedure, a large part of your stomach is removed, making your stomach smaller. Next, this smaller stomach is attached to the lower part of your small intestine. Like the RGB surgery, you absorb fewer calories and nutrients the farther down your small intestine the attachment is made.  What are the risks of bariatric surgery?  As with any surgical procedure, each type of bariatric surgery has its own risks. These risks also depend on your age, your overall health, and any other medical conditions you may have. When deciding on bariatric surgery, it is very important to:  · Talk to your health care provider and choose the surgery that is best for you.  · Ask your health care provider about specific risks for the surgery you choose.    Where to find more information:  · American Society for Metabolic & Bariatric Surgery: www.asmbs.org  · Weight-control Information Network (WIN): win.niddk.nih.gov  This information is not intended to replace advice given to you by your health care provider. Make sure you discuss any questions you have with your health care provider.  Document Released: 12/18/2006 Document Revised: 05/25/2017 Document Reviewed: 06/18/2014  [employer] Interactive Patient  Education © 2017 [employer]    Exercising to Lose Weight  Exercising can help you to lose weight. In order to lose weight through exercise, you need to do vigorous-intensity exercise. You can tell that you are exercising with vigorous intensity if you are breathing very hard and fast and cannot hold a conversation while exercising.  Moderate-intensity exercise helps to maintain your current weight. You can tell that you are exercising at a moderate level if you have a higher heart rate and faster breathing, but you are still able to hold a conversation.  How often should I exercise?  Choose an activity that you enjoy and set realistic goals. Your health care provider can help you to make an activity plan that works for you. Exercise regularly as directed by your health care provider. This may include:  · Doing resistance training twice each week, such as:  ? Push-ups.  ? Sit-ups.  ? Lifting weights.  ? Using resistance bands.  · Doing a given intensity of exercise for a given amount of time. Choose from these options:  ? 150 minutes of moderate-intensity exercise every week.  ? 75 minutes of vigorous-intensity exercise every week.  ? A mix of moderate-intensity and vigorous-intensity exercise every week.    Children, pregnant women, people who are out of shape, people who are overweight, and older adults may need to consult a health care provider for individual recommendations. If you have any sort of medical condition, be sure to consult your health care provider before starting a new exercise program.  What are some activities that can help me to lose weight?  · Walking at a rate of at least 4.5 miles an hour.  · Jogging or running at a rate of 5 miles per hour.  · Biking at a rate of at least 10 miles per hour.  · Lap swimming.  · Roller-skating or in-line skating.  · Cross-country skiing.  · Vigorous competitive sports, such as football, basketball, and soccer.  · Jumping rope.  · Aerobic dancing.  How can I  be more active in my day-to-day activities?  · Use the stairs instead of the elevator.  · Take a walk during your lunch break.  · If you drive, park your car farther away from work or school.  · If you take public transportation, get off one stop early and walk the rest of the way.  · Make all of your phone calls while standing up and walking around.  · Get up, stretch, and walk around every 30 minutes throughout the day.  What guidelines should I follow while exercising?  · Do not exercise so much that you hurt yourself, feel dizzy, or get very short of breath.  · Consult your health care provider prior to starting a new exercise program.  · Wear comfortable clothes and shoes with good support.  · Drink plenty of water while you exercise to prevent dehydration or heat stroke. Body water is lost during exercise and must be replaced.  · Work out until you breathe faster and your heart beats faster.  This information is not intended to replace advice given to you by your health care provider. Make sure you discuss any questions you have with your health care provider.  Document Released: 01/20/2012 Document Revised: 05/25/2017 Document Reviewed: 05/21/2015  [email or web address] Interactive Patient Education © 2018 [email or web address]    Steps to Quit Smoking  Smoking tobacco can be bad for your health. It can also affect almost every organ in your body. Smoking puts you and people around you at risk for many serious long-lasting (chronic) diseases. Quitting smoking is hard, but it is one of the best things that you can do for your health. It is never too late to quit.  What are the benefits of quitting smoking?  When you quit smoking, you lower your risk for getting serious diseases and conditions. They can include:  · Lung cancer or lung disease.  · Heart disease.  · Stroke.  · Heart attack.  · Not being able to have children (infertility).  · Weak bones (osteoporosis) and broken bones (fractures).    If you have coughing, wheezing,  and shortness of breath, those symptoms may get better when you quit. You may also get sick less often. If you are pregnant, quitting smoking can help to lower your chances of having a baby of low birth weight.  What can I do to help me quit smoking?  Talk with your doctor about what can help you quit smoking. Some things you can do (strategies) include:  · Quitting smoking totally, instead of slowly cutting back how much you smoke over a period of time.  · Going to in-person counseling. You are more likely to quit if you go to many counseling sessions.  · Using resources and support systems, such as:  ? Online chats with a counselor.  ? Phone quitlines.  ? Printed self-help materials.  ? Support groups or group counseling.  ? Text messaging programs.  ? Mobile phone apps or applications.  · Taking medicines. Some of these medicines may have nicotine in them. If you are pregnant or breastfeeding, do not take any medicines to quit smoking unless your doctor says it is okay. Talk with your doctor about counseling or other things that can help you.    Talk with your doctor about using more than one strategy at the same time, such as taking medicines while you are also going to in-person counseling. This can help make quitting easier.  What things can I do to make it easier to quit?  Quitting smoking might feel very hard at first, but there is a lot that you can do to make it easier. Take these steps:  · Talk to your family and friends. Ask them to support and encourage you.  · Call phone quitlines, reach out to support groups, or work with a counselor.  · Ask people who smoke to not smoke around you.  · Avoid places that make you want (trigger) to smoke, such as:  ? Bars.  ? Parties.  ? Smoke-break areas at work.  · Spend time with people who do not smoke.  · Lower the stress in your life. Stress can make you want to smoke. Try these things to help your stress:  ? Getting regular exercise.  ? Deep-breathing  exercises.  ? Yoga.  ? Meditating.  ? Doing a body scan. To do this, close your eyes, focus on one area of your body at a time from head to toe, and notice which parts of your body are tense. Try to relax the muscles in those areas.  · Download or buy apps on your mobile phone or tablet that can help you stick to your quit plan. There are many free apps, such as QuitGuide from the CDC (Centers for Disease Control and Prevention). You can find more support from smokefree.gov and other websites.    This information is not intended to replace advice given to you by your health care provider. Make sure you discuss any questions you have with your health care provider.  Document Released: 10/14/2010 Document Revised: 08/15/2017 Document Reviewed: 05/03/2016  [employer] Interactive Patient Education © 2018 [employer]

## 2018-09-20 LAB
AMPHET+METHAMPHET UR QL: NEGATIVE
BACTERIA UR QL AUTO: ABNORMAL /HPF
BARBITURATES UR QL SCN: NEGATIVE
BENZODIAZ UR QL SCN: NEGATIVE
CANNABINOIDS SERPL QL: NEGATIVE
COCAINE UR QL: NEGATIVE
HYALINE CASTS UR QL AUTO: ABNORMAL /LPF
METHADONE UR QL SCN: NEGATIVE
OPIATES UR QL: NEGATIVE
OXYCODONE UR QL SCN: NEGATIVE
RBC # UR: ABNORMAL /HPF
REF LAB TEST METHOD: ABNORMAL
SQUAMOUS #/AREA URNS HPF: ABNORMAL /HPF
WBC UR QL AUTO: ABNORMAL /HPF

## 2018-09-21 LAB
HAV IGM SERPL QL IA: NEGATIVE
HBV CORE IGM SERPL QL IA: NEGATIVE
HBV SURFACE AG SERPL QL IA: NEGATIVE
HCV AB SER DONR QL: REACTIVE
HCV RNA SERPL NAA+PROBE-ACNC: NORMAL IU/ML
T3FREE SERPL-MCNC: 3 PG/ML (ref 2–4.4)
TEST INFORMATION: NORMAL

## 2018-09-24 LAB
HCV GENTYP SERPL NAA+PROBE: NORMAL
LEVETIRACETAM SERPL-MCNC: NORMAL UG/ML (ref 10–40)
Lab: NORMAL

## 2018-09-25 ENCOUNTER — TELEPHONE (OUTPATIENT)
Dept: FAMILY MEDICINE CLINIC | Facility: CLINIC | Age: 43
End: 2018-09-25

## 2018-09-25 LAB
ARTICHOKE IGE QN: 99 MG/DL (ref 1–129)
CHOLEST SERPL-MCNC: 183 MG/DL (ref 0–199)
HDLC SERPL-MCNC: 72 MG/DL (ref 60–200)
LDLC/HDLC SERPL: 1.33 {RATIO} (ref 0–3.22)
TRIGL SERPL-MCNC: 76 MG/DL (ref 20–199)

## 2018-09-25 PROCEDURE — 80061 LIPID PANEL: CPT

## 2018-09-25 PROCEDURE — 36415 COLL VENOUS BLD VENIPUNCTURE: CPT

## 2018-09-25 RX ORDER — ERGOCALCIFEROL 1.25 MG/1
50000 CAPSULE ORAL WEEKLY
Qty: 4 CAPSULE | Refills: 3 | OUTPATIENT
Start: 2018-09-25

## 2018-10-01 NOTE — PROGRESS NOTES
Subjective   Sudhakar Saul is a 42 y.o. female.     Problem List  1. Seizure Disorder  2. History of drug use of crystal meth, opiod abuse ([place])   3. Morbid Obesity BMI >40  4. Essential hypertension   5. Tobacco user   6. CAD?   7. Hep C positive/chronic hep C  8. Chronic back pain   9. Hiatal hernia   10. Memory problems/Neuropathy   11. Diabetes type 2  12. Vitamin D deficiency  13. ASCVD risk high       PShx  -appendectomy  -cholecystectomy  -total hysterectomy secondary to endometrial carcinoma  -lymph gland removal in abdomen   - 9 lipomas removed.    -lumpectomy on left breast   -L4 dissectomy   -right hand carpal tunnel surgery   -teeth removal   -liver biopsy     SocialHx  -tobacco user-  Smoke 1-3 ppd. For >10 years   --no alcohol use  -history of illicit drug use. Has been clean for 4 months  -  -no children  -currently unemployed used to work for [employer] in [place], and [employer],      FamilyHx  -mother - MI, obesity, Arthritis  -father -Serious bone and joint problems     Pt is 43 yo female who I am seeing for the first time. She is here to establish.  She has not seen a PCP in [facility]. She is orginally from [place] and moved here a few weeks ago. She has been living on and off in [facility]. She is a recovering drug addict and was doing crystal meth and opoids, and heroin She has been clean for almost 4 months.  She currently is part of an outpatient program for drug addiction. She meets with them 3 times a week. She has random drug screening. She sees counseling.  She lives with mother and currently she is unemployed and seeking disability.  She started having siezures about 2 years ago that became more frequent after she overdose on metamphetamine and heroin. She was a drug dealer   She was recently at [facility] ER on 8/29/18. Prior to ER visit she was having had blurred vision and then remembered being on the floor. Per witness she landed on right arm. She also had  numbness in left fingers with tingling and carmpaing.  She was confused after episode. She took lithium for seizure. She is seeing Neurologist in [place] and her last seizure was a gee ago.  She currently lives in a women's shelter with history of drug use. Labs were drawn and pts labs showed low levels of salicytlate and acetaminophen.  CMP showed normal renal and liver function with elevated glucose level. Levetiracetam level was normal. UA showed 4+ bacterial with 3-5 WBC. Urine drug screen was normal. Chest x-ray - CT of brain and elbow x-ray were all unremarkable.  Pt also has history of hypertension and takes lisinopril 20 mg PO q dominguez along with lopressor 50 mg PO BID and HCTZ 25 mg P Oq daily.  For seizures she is on keppra 500 mg PO BID. She has had extensive surgeries     10/3/18 pt is here for recheck and followup. Since last visit pt has had labwork that showed hga1c at 7.1 and pt is no on medication. Thyroid studies were nornmal Vitamin D was low. Hepatitis panel showed antibodies for Hep C but Hep C RNA and genotype were negative. She also was negative for HIV 1-2. Her keppra level was not at goal CMP showed glucose at 195 with CR at 0.54 and GFR at 123. Liver enzymes were stable.. She was on metformin before but did not like side effects Her main issue today is smoking and dyspnea on exertion that is getting worse. She was smoking 3 ppd and is now down to 5-6 cigarettes a day. She is willing to try nicotine patch. She has not had PFTs. Or been diagnosed with COPD. Her other issue is she wants to have weight loss surgery. And would like a referral         Seizures    This is a chronic problem. The current episode started more than 1 week ago. Associated symptoms include sleepiness, confusion, headaches and visual disturbances. Pertinent negatives include no neck stiffness, no sore throat, no chest pain, no cough, no nausea, no vomiting, no diarrhea and no muscle weakness.   Obesity   This is a  chronic problem. The current episode started more than 1 year ago. The problem occurs constantly. The problem has been unchanged. Associated symptoms include arthralgias, fatigue, headaches, numbness and weakness. Pertinent negatives include no abdominal pain, anorexia, change in bowel habit, chest pain, chills, congestion, coughing, fever, joint swelling, myalgias, nausea, neck pain, rash, sore throat, swollen glands, urinary symptoms, vertigo, visual change or vomiting. Nothing aggravates the symptoms. She has tried nothing for the symptoms.   Hypertension   This is a chronic problem. The current episode started more than 1 year ago. The problem is uncontrolled. Associated symptoms include headaches. Pertinent negatives include no anxiety, blurred vision, chest pain, malaise/fatigue, neck pain, orthopnea, palpitations, peripheral edema, PND or shortness of breath. Risk factors for coronary artery disease include obesity, sedentary lifestyle, smoking/tobacco exposure and stress. Past treatments include ACE inhibitors, beta blockers and diuretics. There are no compliance problems.  There is no history of angina, kidney disease, CAD/MI, CVA, heart failure, left ventricular hypertrophy, PVD or retinopathy. There is no history of chronic renal disease, coarctation of the aorta, hyperaldosteronism, hypercortisolism, hyperparathyroidism, a hypertension causing med, pheochromocytoma, renovascular disease, sleep apnea or a thyroid problem.   Diabetes   She presents for her initial diabetic visit. She has type 2 diabetes mellitus. Her disease course has been stable. Hypoglycemia symptoms include confusion, headaches, nervousness/anxiousness, seizures and sleepiness. Associated symptoms include fatigue and weakness. Pertinent negatives for diabetes include no blurred vision, no chest pain and no visual change. Pertinent negatives for diabetic complications include no CVA, PVD or retinopathy. Risk factors for coronary artery  disease include dyslipidemia, obesity and post-menopausal. She has not had a previous visit with a dietitian. There is no change in her home blood glucose trend. An ACE inhibitor/angiotensin II receptor blocker is not being taken. She does not see a podiatrist.Eye exam is not current.   Shortness of Breath   This is a chronic problem. The current episode started more than 1 year ago. The problem occurs constantly. The problem has been gradually worsening. Associated symptoms include headaches. Pertinent negatives include no abdominal pain, chest pain, claudication, coryza, ear pain, fever, hemoptysis, leg pain, leg swelling, neck pain, orthopnea, PND, rash, sore throat, sputum production, swollen glands, syncope, vomiting or wheezing. Nothing aggravates the symptoms. The patient has no known risk factors for DVT/PE. She has tried nothing for the symptoms. The treatment provided no relief. There is no history of allergies, aspirin allergies, asthma, bronchiolitis, CAD, chronic lung disease, COPD, DVT, a heart failure, PE, pneumonia or a recent surgery.              The following portions of the patient's history were reviewed and updated as appropriate: allergies, current medications, past family history, past medical history, past social history, past surgical history and problem list.  Patient Active Problem List   Diagnosis   • Annual physical exam   • General medical examination   • Encounter for screening for diabetes mellitus   • Encounter for screening for cardiovascular disorders   • Encounter for screening for endocrine disorder   • Seizure disorder (CMS/HCC)   • Morbid obesity (CMS/HCC)   • Tobacco user   • Tobacco abuse counseling   • Multiple joint pain   • Dysuria   • Encounter for screening for HIV   • History of positive hepatitis C   • Essential hypertension   • Uncontrolled type 2 diabetes mellitus with hyperglycemia (CMS/[facility])   • Vitamin D deficiency   • Urinary tract infection without hematuria   •  "Chronic hepatitis C without hepatic coma (CMS/HCC)   • Dyspnea     Current Outpatient Prescriptions on File Prior to Visit   Medication Sig Dispense Refill   • hydrochlorothiazide (HYDRODIURIL) 25 MG tablet Take 1 tablet by mouth Daily. 30 tablet 3   • levETIRAcetam (KEPPRA) 500 MG tablet Take 1 tablet by mouth Every 12 (Twelve) Hours. 60 tablet 3   • lisinopril (PRINIVIL,ZESTRIL) 20 MG tablet Take 1 tablet by mouth Daily. 30 tablet 3   • metoprolol tartrate (LOPRESSOR) 50 MG tablet Take 1 tablet by mouth Every 12 (Twelve) Hours. 60 tablet 3   • naproxen (NAPROSYN) 500 MG tablet Take 1 tablet by mouth 2 (Two) Times a Day With Meals. 60 tablet 3     No current facility-administered medications on file prior to visit.        Review of Systems   Constitutional: Positive for activity change and fatigue. Negative for chills, fever and malaise/fatigue.   HENT: Negative for congestion, ear pain and sore throat.    Eyes: Positive for visual disturbance. Negative for blurred vision.   Respiratory: Negative.  Negative for cough, hemoptysis, sputum production, shortness of breath and wheezing.    Cardiovascular: Negative.  Negative for chest pain, palpitations, orthopnea, claudication, leg swelling, syncope and PND.   Gastrointestinal: Negative.  Negative for abdominal pain, anorexia, change in bowel habit, diarrhea, nausea and vomiting.   Endocrine: Negative.    Genitourinary: Positive for dysuria and frequency.   Musculoskeletal: Positive for arthralgias. Negative for joint swelling, myalgias and neck pain.   Skin: Negative.  Negative for rash.   Allergic/Immunologic: Negative.    Neurological: Positive for seizures, weakness, numbness and confusion. Negative for vertigo.   Hematological: Negative.    Psychiatric/Behavioral: Positive for decreased concentration. The patient is nervous/anxious.        Objective    /68   Pulse 73   Temp 99.5 °F (37.5 °C)   Ht 167.6 cm (66\")   Wt 125 kg (274 lb 12.8 oz)   SpO2 97%   " BMI 44.35 kg/m²         Lab on 09/19/2018   Component Date Value Ref Range Status   • WBC 09/19/2018 6.70  3.20 - 9.80 10*3/mm3 Final   • RBC 09/19/2018 4.41  3.77 - 5.16 10*6/mm3 Final   • Hemoglobin 09/19/2018 12.7  12.0 - 15.5 g/dL Final   • Hematocrit 09/19/2018 38.9  35.0 - 45.0 % Final   • MCV 09/19/2018 88.2  80.0 - 98.0 fL Final   • MCH 09/19/2018 28.8  26.5 - 34.0 pg Final   • MCHC 09/19/2018 32.6  31.4 - 36.0 g/dL Final   • RDW 09/19/2018 14.1  11.5 - 14.5 % Final   • RDW-SD 09/19/2018 45.7  36.4 - 46.3 fl Final   • MPV 09/19/2018 11.3  8.0 - 12.0 fL Final   • Platelets 09/19/2018 184  150 - 450 10*3/mm3 Final   • Neutrophil % 09/19/2018 55.6  37.0 - 80.0 % Final   • Lymphocyte % 09/19/2018 37.3  10.0 - 50.0 % Final   • Monocyte % 09/19/2018 4.3  0.0 - 12.0 % Final   • Eosinophil % 09/19/2018 2.2  0.0 - 7.0 % Final   • Basophil % 09/19/2018 0.3  0.0 - 2.0 % Final   • Immature Grans % 09/19/2018 0.3  0.0 - 0.5 % Final   • Neutrophils, Absolute 09/19/2018 3.72  2.00 - 8.60 10*3/mm3 Final   • Lymphocytes, Absolute 09/19/2018 2.50  0.60 - 4.20 10*3/mm3 Final   • Monocytes, Absolute 09/19/2018 0.29  0.00 - 0.90 10*3/mm3 Final   • Eosinophils, Absolute 09/19/2018 0.15  0.00 - 0.70 10*3/mm3 Final   • Basophils, Absolute 09/19/2018 0.02  0.00 - 0.20 10*3/mm3 Final   • Immature Grans, Absolute 09/19/2018 0.02  0.00 - 0.02 10*3/mm3 Final   • Glucose 09/19/2018 195* 60 - 100 mg/dL Final   • BUN 09/19/2018 12  7 - 21 mg/dL Final   • Creatinine 09/19/2018 0.54  0.50 - 1.00 mg/dL Final   • Sodium 09/19/2018 137  137 - 145 mmol/L Final   • Potassium 09/19/2018 4.4  3.5 - 5.1 mmol/L Final   • Chloride 09/19/2018 106  95 - 110 mmol/L Final   • CO2 09/19/2018 20.0* 22.0 - 31.0 mmol/L Final   • Calcium 09/19/2018 9.4  8.4 - 10.2 mg/dL Final   • Total Protein 09/19/2018 7.5  6.3 - 8.6 g/dL Final   • Albumin 09/19/2018 4.00  3.40 - 4.80 g/dL Final   • ALT (SGPT) 09/19/2018 33  9 - 52 U/L Final   • AST (SGOT) 09/19/2018 29  14  - 36 U/L Final   • Alkaline Phosphatase 09/19/2018 86  38 - 126 U/L Final   • Total Bilirubin 09/19/2018 0.3  0.2 - 1.3 mg/dL Final   • eGFR Non African Amer 09/19/2018 123  58 - 135 mL/min/1.73 Final   • Globulin 09/19/2018 3.5  2.3 - 3.5 gm/dL Final   • A/G Ratio 09/19/2018 1.1  1.1 - 1.8 g/dL Final   • BUN/Creatinine Ratio 09/19/2018 22.2  7.0 - 25.0 Final   • Anion Gap 09/19/2018 11.0  5.0 - 15.0 mmol/L Final   • Hemoglobin A1C 09/19/2018 7.1* 4 - 5.6 % Final   • TSH 09/19/2018 1.770  0.460 - 4.680 mIU/mL Final   • Free T4 09/19/2018 1.16  0.78 - 2.19 ng/dL Final   • T3, Free 09/19/2018 3.0  2.0 - 4.4 pg/mL Final   • 25 Hydroxy, Vitamin D 09/19/2018 24.2* 30.0 - 100.0 ng/ml Final   • Hepatitis C Ab 09/19/2018 Reactive* Negative Final   • Hep A IgM 09/19/2018 Negative  Negative Final   • Hep B C IgM 09/19/2018 Negative  Negative Final   • Hepatitis B Surface Ag 09/19/2018 Negative  Negative Final   • Hepatitis C Quantitation 09/19/2018 HCV Not Detected  IU/mL Final   • Test Information 09/19/2018 Comment   Final    The quantitative range of this assay is 15 IU/mL to 100 million IU/mL.   • Please note 09/19/2018 Comment   Final    This test was developed and its performance characteristics determined  by [facility].  It has not been cleared or approved by the U.S. Food and  Drug Administration.  The FDA has determined that such clearance or approval is not  necessary. This test is used for clinical purposes.  It should not be  regarded as investigational or for research.   • Hepatitis C Genotype 09/19/2018 Comment   Final    Specimen has insufficient hepatitis C virus RNA to obtain genotyping  results. This genotyping assay should only be used for known HCV  positive patients with HCV RNA levels above 1000 IU/mL.   • Levetiracetam 09/19/2018 None Detected  10.0 - 40.0 ug/mL Final   • Amphetamine Screen, Urine 09/19/2018 Negative  Negative Final   • Barbiturates Screen, Urine 09/19/2018 Negative  Negative Final   •  Benzodiazepine Screen, Urine 09/19/2018 Negative  Negative Final   • Cocaine Screen, Urine 09/19/2018 Negative  Negative Final   • Methadone Screen, Urine 09/19/2018 Negative  Negative Final   • Opiate Screen 09/19/2018 Negative  Negative Final   • Oxycodone Screen, Urine 09/19/2018 Negative  Negative Final   • THC, Screen, Urine 09/19/2018 Negative  Negative Final   • HIV-1/ HIV-2 09/19/2018 Negative  Negative Final   • Color, UA 09/19/2018 Yellow  Yellow, Straw Final   • Appearance, UA 09/19/2018 Cloudy* Clear Final   • pH, UA 09/19/2018 6.0  5.0 - 8.0 Final   • Specific Gravity, UA 09/19/2018 1.005  1.005 - 1.030 Final   • Glucose, UA 09/19/2018 Negative  Negative Final   • Ketones, UA 09/19/2018 Trace* Negative Final   • Bilirubin, UA 09/19/2018 Negative  Negative Final   • Blood, UA 09/19/2018 Negative  Negative Final   • Protein, UA 09/19/2018 30 mg/dL (1+)* Negative Final   • Leuk Esterase, UA 09/19/2018 Negative  Negative Final   • Nitrite, UA 09/19/2018 Negative  Negative Final   • Urobilinogen, UA 09/19/2018 0.2 E.U./dL  0.2 - 1.0 E.U./dL Final   • RBC, UA 09/19/2018 0-2* None Seen /HPF Final   • WBC, UA 09/19/2018 6-12* None Seen, 0-2, 3-5 /HPF Final   • Bacteria, UA 09/19/2018 4+* None Seen /HPF Final   • Squamous Epithelial Cells, UA 09/19/2018 None Seen  None Seen, 0-2 /HPF Final   • Hyaline Casts, UA 09/19/2018 None Seen  None Seen /LPF Final   • Methodology 09/19/2018 Automated Microscopy   Final   • Total Cholesterol 09/25/2018 183  0 - 199 mg/dL Final   • Triglycerides 09/25/2018 76  20 - 199 mg/dL Final   • HDL Cholesterol 09/25/2018 72  60 - 200 mg/dL Final   • LDL Cholesterol  09/25/2018 99  1 - 129 mg/dL Final   • LDL/HDL Ratio 09/25/2018 1.33  0.00 - 3.22 Final     Procedure: XR ELBOW 3+ VW RIGHT     Indication:  Persistent pain after recent fall       Technique: Three views .      Prior Relevant Exam: None     Small osteophyte tip of the olecranon. No acute bony abnormality.  Joint spaces  intact. Mineralization normal.         IMPRESSION:  CONCLUSION:     1. Mild chronic changes without acute bony abnormality      Electronically signed by:  Finn Haynes MD  8/29/2018 4:41 PM CDT  Workstation: [id number]    Noncontrast CT examination of the brain.     INDICATION:  Right lower extremity weakness. Seizure.      Technique: Axial, coronal, sagittal, without contrast. This exam  was performed according to our departmental dose-optimization  program which includes automated exposure control, adjustment of  the mA and/or kV according to patient size and/or use of  iterative reconstruction technique.        Prior relevant examination: None.        Limitations: None     Brain parenchyma appears within normal limits. Ventricles are  within normal limits in size. Normal gray-white matter  differentiation. No evidence of abnormal mass or calcification is  seen. No evidence of acute hemorrhage is noted.     The mastoid air cells are well aerated bilaterally.      The visualized paranasal sinuses appear well aerated bilaterally.     Calvarium intact.        IMPRESSION:  CONCLUSION:     1. Normal brain parenchyma for age    EXAM:          Radiograph(s), Chest   VIEWS:    PA / Lat ; 2       DATE/TIME:  8/29/2018 2:08 PM CDT                INDICATION:   seizure    COMPARISON:  CXR: none             FINDINGS:             - lines/tubes:    none     - cardiac:         size within normal limits         - mediastinum: contour within normal limits         - lungs:         no focal air space process, pulmonary  interstitial edema, nodule(s)/mass             - pleura:         no evidence of  fluid                  - osseous:         unremarkable for age                  - misc.:       IMPRESSION:  CONCLUSION:        1. No evidence of an active cardiopulmonary process.                                       Date: 9/19/2018 Department: [facility] DRAW STATION Released By: Kumar  Azra Aguero Authorizing: Jermaine Ferro MD   Procedure Abnormality Status   HIV-1 & HIV-2 Antibodies Normal Final result     Ref Range & Units 2wk ago    Hepatitis C Quantitation IU/mL HCV Not Detected    Test Information  Comment    Comment: The quantitative range of this assay is 15 IU/mL to 100 million IU/mL.   Resulting [facility]   Narrative     Performed at:  [address] [place] [address]  [id number]  : Travis Hart MD, Phone:  [phone]        Ref Range & Units 2wk ago    Hepatitis C Ab Negative Reactive     Hep A IgM Negative Negative    Hep B C IgM Negative Negative    Hepatitis B Surface Ag Negative Negative    Resulting Agency  [facility]          Hepatitis C Genotype   Order: [id number]   Status:  Final result   Visible to patient:  No (Not Released) Dx:  History of positive hepatitis C; Gene...   Component 2wk ago   Please note Comment    Comment: This test was developed and its performance characteristics determined   by [employer]  It has not been cleared or approved by the U.S. Food and   Drug Administration.   The FDA has determined that such clearance or approval is not   necessary. This test is used for clinical purposes.  It should not be   regarded as investigational or for research.   Hepatitis C Genotype Comment    Comment: Specimen has insufficient hepatitis C virus RNA to obtain genotyping   results. This genotyping assay should only be used for known HCV   positive patients with HCV RNA levels above 1000 IU/mL.   Resulting [facility]   Narrative     Performed at:  [address] [place] [address]  [id number]  : Travis Hart MD, Phone:  [phone]      Specimen Collected: 09/19/18 09:01 Last Resulted: 09/24/18 16:10                          Electronically signed by:  SHIRLEY Tiwari MD  8/29/2018 2:09  PM CDT Workstation: [id number]    Status:  Final result   Visible to patient:  No (Not Released)     Ref Range & Units 3wk ago   Amphetamine Screen, Urine Negative Negative    Barbiturates Screen, Urine Negative Negative    Benzodiazepine Screen, Urine Negative Negative    Cocaine Screen, Urine Negative Negative    Methadone Screen, Urine Negative Negative    Opiate Screen Negative Negative    Oxycodone Screen, Urine Negative Negative    THC, Screen, Urine Negative Negative    Resulting Agency  [facility]   Narrative     Negative Thresholds For Drugs Screened in Urine:     Amphetamines          500 ng/ml  Barbiturates          200 ng/ml  Benzodiazepines       200 ng/ml  Cocaine               150 ng/ml  Methadone             300 ng/mL  Opiates               300 ng/mL  Oxycodone             100 ng/mL  THC                   20 ng/mL    The normal value for all drugs tested is negative. This report includes final unconfirmed screening results.  A positive result by this assay can be, at your request, sent to the Reference Lab for confirmation by gas chromatography. Unconfirmed results must not be used for non-medical purposes, such as employment or legal testing. Clinical consideration should be applied to any drug of abuse test result, particularly when unconfirmed results are used.      Specimen Collected: 08/29/18 15:18 Last Resulted: 08/29/18 15:59              Study Result        EXAM:          Radiograph(s), Chest   VIEWS:    PA / Lat ; 2       DATE/TIME:  8/29/2018 2:08 PM CDT                INDICATION:   seizure    COMPARISON:  CXR: none             FINDINGS:             - lines/tubes:    none     - cardiac:         size within normal limits         - mediastinum: contour within normal limits         - lungs:         no focal air space process, pulmonary  interstitial edema, nodule(s)/mass             - pleura:         no evidence of  fluid                  - osseous:         unremarkable for age                  - misc.:       IMPRESSION:  CONCLUSION:        1. No evidence of an active cardiopulmonary process.                                                   Electronically signed by:  SHIRLEY Tiwari MD  8/29/2018 2:09  PM CDT Workstation: [id number]       Physical Exam   Constitutional: She is oriented to person, place, and time. She appears well-developed and well-nourished. No distress.   HENT:   Head: Normocephalic and atraumatic.   Right Ear: External ear normal.   Left Ear: External ear normal.   Eyes: Conjunctivae and EOM are normal. Left eye exhibits no discharge. No scleral icterus.   Neck: Normal range of motion. Neck supple. No JVD present. No tracheal deviation present. No thyromegaly present.   Cardiovascular: Normal rate, regular rhythm and normal heart sounds.    Pulmonary/Chest: Effort normal. No respiratory distress. She has no wheezes.   Decrease breath sounds.     Abdominal: Soft. She exhibits no distension and no mass. There is no tenderness. There is no guarding.   Obese abdomen    Musculoskeletal: Normal range of motion. She exhibits tenderness. She exhibits no edema or deformity.   Lymphadenopathy:     She has no cervical adenopathy.   Neurological: She is alert and oriented to person, place, and time. She displays normal reflexes. No cranial nerve deficit. Coordination normal.   Skin: Skin is warm and dry. No rash noted. She is not diaphoretic. No erythema. No pallor.   Psychiatric: She has a normal mood and affect. Her behavior is normal.   Nursing note and vitals reviewed.        Assessment/Plan   Problems Addressed this Visit        Cardiovascular and Mediastinum    Essential hypertension       Respiratory    Dyspnea    Relevant Orders    Ambulatory Referral to Pulmonology       Digestive    Morbid obesity (CMS/HCC)    Relevant Orders    Ambulatory Referral to Bariatric Surgery (Completed)    History of positive hepatitis C    Relevant Orders    Ambulatory Referral to Gastroenterology    Vitamin D deficiency    Chronic hepatitis C without hepatic coma (CMS/HCC)    Relevant Orders    Ambulatory  Referral to Gastroenterology       Endocrine    Uncontrolled type 2 diabetes mellitus with hyperglycemia (CMS/HCC) - Primary    Relevant Medications    SITagliptin (JANUVIA) 100 MG tablet    Other Relevant Orders    Ambulatory Referral to Bariatric Surgery (Completed)       Nervous and Auditory    Seizure disorder (CMS/HCC)    Dysuria       Genitourinary    Urinary tract infection without hematuria    Relevant Medications    nitrofurantoin, macrocrystal-monohydrate, (MACROBID) 100 MG capsule       Other    Tobacco user    Relevant Orders    Ambulatory Referral to Pulmonology    Tobacco abuse counseling      -went over test results today   -history of Hep C will get Hepatitis panel Hep C RNA and genotype negative .Gastroenterology referral. Consultation appreicated  -HIV screening test was negative  -dysuria-  Has had history of UTI will get UA and if abnormal will start on abx.  Also get urine culture   -for dsypnea- referral to Dr. Will (Pulmonologsit) for PFTs. Consultation appreciated. Recent chest x-ray negative. Will start on albuterol inhaler PRN.Advised pt to stop smoking. Suspect COPD   -multiple joint pain -- naproxen 500 mg PO BID   -for multiple joint pain - pt cannot take opoids. Will start on naproxen 500 mg twice a day. Drug information provided  -morbid obesity -counseled on weight loss, diet and exercise. Diet information provided.  Also bariatric weight loss surgery information provided. Will refer to Bariatric surgery. Dr. Benavides. Consultation appreciated. Pt may need to see [facility] and behavioral health and weight checks for 6 mnonths straight  -hyperlipdiemia- simvastatin 20 mg PO qhs. ASCVD risk high. Recommend to take with coenzyme Q10   -for UTI- macrobid 100 mg every 12 hours for 7 days.   -annual physical exam today  -seizure disorder -currently on Keppra  500 mg PO BID. Will refer to Dr. Contreras (Neurologist) in [place]   -counseled pt to quit smoking >5 minutes.  Gave tobacco  cessation material   -hypertension - at goal today Continue on lisinopril 20 mg Po q dominguez, metoprolol 50 mg PO BID and HCTZ 25 mg PO q daily.    -advised pt to be safe and call with any questions or concerns. All questions addressed today  -recheck in 2   weeks for BP recheck and labwork        This document has been electronically signed by Jermaine Ferro MD on October 3, 2018 3:40 PM

## 2018-10-03 ENCOUNTER — OFFICE VISIT (OUTPATIENT)
Dept: FAMILY MEDICINE CLINIC | Facility: CLINIC | Age: 43
End: 2018-10-03

## 2018-10-03 VITALS
DIASTOLIC BLOOD PRESSURE: 68 MMHG | TEMPERATURE: 99.5 F | SYSTOLIC BLOOD PRESSURE: 118 MMHG | OXYGEN SATURATION: 97 % | BODY MASS INDEX: 44.16 KG/M2 | HEART RATE: 73 BPM | WEIGHT: 274.8 LBS | HEIGHT: 66 IN

## 2018-10-03 DIAGNOSIS — Z86.19 HISTORY OF POSITIVE HEPATITIS C: ICD-10-CM

## 2018-10-03 DIAGNOSIS — I10 ESSENTIAL HYPERTENSION: ICD-10-CM

## 2018-10-03 DIAGNOSIS — B18.2 CHRONIC HEPATITIS C WITHOUT HEPATIC COMA (HCC): ICD-10-CM

## 2018-10-03 DIAGNOSIS — E66.01 MORBID OBESITY (HCC): ICD-10-CM

## 2018-10-03 DIAGNOSIS — G40.909 SEIZURE DISORDER (HCC): ICD-10-CM

## 2018-10-03 DIAGNOSIS — E55.9 VITAMIN D DEFICIENCY: ICD-10-CM

## 2018-10-03 DIAGNOSIS — N39.0 URINARY TRACT INFECTION WITHOUT HEMATURIA, SITE UNSPECIFIED: ICD-10-CM

## 2018-10-03 DIAGNOSIS — Z72.0 TOBACCO USER: ICD-10-CM

## 2018-10-03 DIAGNOSIS — R30.0 DYSURIA: ICD-10-CM

## 2018-10-03 DIAGNOSIS — Z71.6 TOBACCO ABUSE COUNSELING: ICD-10-CM

## 2018-10-03 DIAGNOSIS — E11.65 UNCONTROLLED TYPE 2 DIABETES MELLITUS WITH HYPERGLYCEMIA (HCC): Primary | ICD-10-CM

## 2018-10-03 DIAGNOSIS — R06.00 DYSPNEA, UNSPECIFIED TYPE: ICD-10-CM

## 2018-10-03 PROCEDURE — 99214 OFFICE O/P EST MOD 30 MIN: CPT | Performed by: FAMILY MEDICINE

## 2018-10-03 RX ORDER — SIMVASTATIN 20 MG
20 TABLET ORAL NIGHTLY
Qty: 30 TABLET | Refills: 3 | Status: SHIPPED | OUTPATIENT
Start: 2018-10-03 | End: 2018-12-13 | Stop reason: SDUPTHER

## 2018-10-03 RX ORDER — ERGOCALCIFEROL 1.25 MG/1
50000 CAPSULE ORAL
Qty: 4 CAPSULE | Refills: 3 | Status: SHIPPED | OUTPATIENT
Start: 2018-10-03 | End: 2018-12-13 | Stop reason: SDUPTHER

## 2018-10-03 RX ORDER — ALBUTEROL SULFATE 90 UG/1
2 AEROSOL, METERED RESPIRATORY (INHALATION) EVERY 6 HOURS PRN
Qty: 1 INHALER | Refills: 3 | Status: SHIPPED | OUTPATIENT
Start: 2018-10-03 | End: 2018-12-13 | Stop reason: SDUPTHER

## 2018-10-03 RX ORDER — NICOTINE 21 MG/24HR
1 PATCH, TRANSDERMAL 24 HOURS TRANSDERMAL EVERY 24 HOURS
Qty: 30 PATCH | Refills: 3 | Status: SHIPPED | OUTPATIENT
Start: 2018-10-03 | End: 2019-01-15

## 2018-10-03 RX ORDER — NITROFURANTOIN 25; 75 MG/1; MG/1
100 CAPSULE ORAL EVERY 12 HOURS SCHEDULED
Qty: 14 CAPSULE | Refills: 0 | Status: SHIPPED | OUTPATIENT
Start: 2018-10-03 | End: 2018-12-13

## 2018-10-03 NOTE — PATIENT INSTRUCTIONS
Start januvia 100 mg daily  Start vitanin D once a week  -start albuterol inhaler every 4 hours as needed. Will send to lung doctor  -will send to stomach doctor for possible EGD and hep C.  Start on prilosec 40 mg daily.  Take on empty stomach  -will send to weight loss surgeon  -recheck in 2 weeks   -start on statin medication simvastatin 20 mg at bedtime. Recommend taking coenzyme Q10 over the counter for heart support and reduce medication side effect      Metformin tablets  What is this medicine?  METFORMIN (met FOR min) is used to treat type 2 diabetes. It helps to control blood sugar. Treatment is combined with diet and exercise. This medicine can be used alone or with other medicines for diabetes.  This medicine may be used for other purposes; ask your health care provider or pharmacist if you have questions.  COMMON BRAND NAME(S): Glucophage  What should I tell my health care provider before I take this medicine?  They need to know if you have any of these conditions:  -anemia  -dehydration  -heart disease  -frequently drink alcohol-containing beverages  -kidney disease  -liver disease  -polycystic ovary syndrome  -serious infection or injury  -vomiting  -an unusual or allergic reaction to metformin, other medicines, foods, dyes, or preservatives  -pregnant or trying to get pregnant  -breast-feeding  How should I use this medicine?  Take this medicine by mouth with a glass of water. Follow the directions on the prescription label. Take this medicine with food. Take your medicine at regular intervals. Do not take your medicine more often than directed. Do not stop taking except on your doctor's advice.  Talk to your pediatrician regarding the use of this medicine in children. While this drug may be prescribed for children as young as 10 years of age for selected conditions, precautions do apply.  Overdosage: If you think you have taken too much of this medicine contact a poison control center or  emergency room at once.  NOTE: This medicine is only for you. Do not share this medicine with others.  What if I miss a dose?  If you miss a dose, take it as soon as you can. If it is almost time for your next dose, take only that dose. Do not take double or extra doses.  What may interact with this medicine?  Do not take this medicine with any of the following medications:  -dofetilide  -certain contrast medicines given before X-rays, CT scans, MRI, or other procedures  This medicine may also interact with the following medications:  -acetazolamide  -certain antiviral medicines for HIV or AIDS or for hepatitis, like adefovir, dolutegravir, emtricitabine, entecavir, lamivudine, paritaprevir, or tenofovir  -cimetidine  -cobicistat  -crizotinib  -dichlorphenamide  -digoxin  -diuretics  -female hormones, like estrogens or progestins and birth control pills  -glycopyrrolate  -isoniazid  -lamotrigine  -medicines for blood pressure, heart disease, irregular heart beat  -memantine  -midodrine  -methazolamide  -morphine  -niacin  -phenothiazines like chlorpromazine, mesoridazine, prochlorperazine, thioridazine  -phenytoin  -procainamide  -propantheline  -quinidine  -quinine  -ranitidine  -ranolazine  -steroid medicines like prednisone or cortisone  -stimulant medicines for attention disorders, weight loss, or to stay awake  -thyroid medicines  -topiramate  -trimethoprim  -trospium  -vancomycin  -vandetanib  -zonisamide  This list may not describe all possible interactions. Give your health care provider a list of all the medicines, herbs, non-prescription drugs, or dietary supplements you use. Also tell them if you smoke, drink alcohol, or use illegal drugs. Some items may interact with your medicine.  What should I watch for while using this medicine?  Visit your doctor or health care professional for regular checks on your progress.  A test called the HbA1C (A1C) will be monitored. This is a simple blood test. It measures  your blood sugar control over the last 2 to 3 months. You will receive this test every 3 to 6 months.  Learn how to check your blood sugar. Learn the symptoms of low and high blood sugar and how to manage them.  Always carry a quick-source of sugar with you in case you have symptoms of low blood sugar. Examples include hard sugar candy or glucose tablets. Make sure others know that you can choke if you eat or drink when you develop serious symptoms of low blood sugar, such as seizures or unconsciousness. They must get medical help at once.  Tell your doctor or health care professional if you have high blood sugar. You might need to change the dose of your medicine. If you are sick or exercising more than usual, you might need to change the dose of your medicine.  Do not skip meals. Ask your doctor or health care professional if you should avoid alcohol. Many nonprescription cough and cold products contain sugar or alcohol. These can affect blood sugar.  This medicine may cause ovulation in premenopausal women who do not have regular monthly periods. This may increase your chances of becoming pregnant. You should not take this medicine if you become pregnant or think you may be pregnant. Talk with your doctor or health care professional about your birth control options while taking this medicine. Contact your doctor or health care professional right away if think you are pregnant.  If you are going to need surgery, a MRI, CT scan, or other procedure, tell your doctor that you are taking this medicine. You may need to stop taking this medicine before the procedure.  Wear a medical ID bracelet or chain, and carry a card that describes your disease and details of your medicine and dosage times.  What side effects may I notice from receiving this medicine?  Side effects that you should report to your doctor or health care professional as soon as possible:  -allergic reactions like skin rash, itching or hives, swelling of  the face, lips, or tongue  -breathing problems  -feeling faint or lightheaded, falls  -muscle aches or pains  -signs and symptoms of low blood sugar such as feeling anxious, confusion, dizziness, increased hunger, unusually weak or tired, sweating, shakiness, cold, irritable, headache, blurred vision, fast heartbeat, loss of consciousness  -slow or irregular heartbeat  -unusual stomach pain or discomfort  -unusually tired or weak  Side effects that usually do not require medical attention (report to your doctor or health care professional if they continue or are bothersome):  -diarrhea  -headache  -heartburn  -metallic taste in mouth  -nausea  -stomach gas, upset  This list may not describe all possible side effects. Call your doctor for medical advice about side effects. You may report side effects to FDA at 3-752-FDA-9777.  Where should I keep my medicine?  Keep out of the reach of children.  Store at room temperature between 15 and 30 degrees C (59 and 86 degrees F). Protect from moisture and light. Throw away any unused medicine after the expiration date.  NOTE: This sheet is a summary. It may not cover all possible information. If you have questions about this medicine, talk to your doctor, pharmacist, or health care provider.  © 2018 [employer] (2017-06-28 15:34:19)    Diabetes Mellitus and Skin Care  Diabetes (diabetes mellitus) can lead to health problems over time, including skin problems. People with diabetes have a higher risk for many types of skin complications. This is because having poorly controlled blood sugar (glucose) levels can:  · Damage nerves and blood vessels. This can result in decreased feeling in your legs and feet, which means you may not notice minor skin injuries that could lead to serious problems.  · Reduce blood flow (circulation), which makes wounds heal more slowly and increases your risk of infection.  · Cause areas of skin to become thick or discolored.    What are  some common skin conditions that affect people with diabetes?  Diabetes often causes dry skin. It can also cause the skin on the feet to get thinner, break more easily, and heal more slowly. There are certain skin conditions that commonly affect people who have diabetes, such as:  · Bacterial skin infections, such as styes, boils, infected hair follicles, and infections of the skin around the nails.  · Fungal skin infections. These are most common in areas where skin rubs together, such as in the armpits or under the breasts.  · Open sores, especially on the feet.  · Tissue death (gangrene). This can happen on your feet if a serious infection does not heal properly. Gangrene can cause the need for a foot or leg to be surgically removed (amputated).    Diabetes can also cause the skin to change. You may develop:  · Dark, velvety markings on the skin that usually appear on the face, neck, armpits, inner thighs, and groin (acanthosis nigricans). This typically affects people of -American and American- descent.  · Red, raised, scar-like tissue that may itch, feel painful, or develop into a wound (necrobiosis lipoidica).  · Blisters on feet, toes, hands, or fingers.  · Thickened, wax-like areas of skin that usually occur on the hands, forehead, or toes (digital sclerosis).  · Brown or red ring-shaped or half-ring-shaped patches of skin on the ears or fingers (disseminated granuloma).  · Pea-shaped yellow bumps that may be itchy and surrounded by a red ring (eruptive xanthomatosis). This usually affects the arms, feet, buttocks, and the top of the hands.  · Round, discolored patches of tan skin that do not hurt or itch (diabetic dermopathy). These may look like age spots.    What do I need to know about itchy skin?  It is common for people with diabetes to have itchy skin caused by dryness. Frequent high blood glucose levels can cause itchiness, and poor circulation and certain skin infections can make dry,  itchy skin worse. If you have itchy skin that is red or covered in a rash, this could be a sign of an allergic reaction to a medicine.  If you have a rash or if your skin is very itchy, contact your health care provider. You may need help to manage your diabetes better, or you may need treatment for an infection.  How can I prevent skin breakdown?  When you have diabetes and you get a badly infected ulcer or sore that does not heal, your skin can break down, especially if you have poor circulation or are on bed rest. To prevent skin breakdown:  · Keep your skin clean and dry. Wash your skin often. Do not use hot water.  · Do not use any products that contain nicotine or tobacco, such as cigarettes and e-cigarettes. Smoking affects the body’s ability to heal. If you need help quitting, ask your health care provider.  · Check your skin every day for cuts, bruises, redness, blisters, or sores, especially on your feet. Tell your health care provider about any cuts, wounds, or sores you have, especially if they are healing slowly.  · If you are on bed rest, try to change positions often.    What else do I need to know about taking care of my skin?    · To relieve dry skin and itching:  ? Limit baths and showers to 5-10 minutes.  ? Bathe with lukewarm water instead of hot water.  ? Use mild soap and gentle skin cleansers. Do not use soap that is perfumed or harsh or dries your skin.  ? Put on lotion as soon as you finish bathing.  · Make sure that your health care provider performs a visual foot exam at every medical visit.  · Schedule a foot exam with your health care provider once every year. This exam includes an inspection of the structure and skin of your feet.  · If you get a skin injury, such as a cut, blister, or sore, check the area every day for signs of infection. Check for:  ? More redness, swelling, or pain.  ? More fluid or blood.  ? Warmth.  ? Pus or a bad smell.  Contact a health care provider if:  · You  develop a cut or sore, especially on your feet.  · You develop signs of infection after a skin injury.  · Your blood glucose level is higher than 240 mg/dL (13.3 mmol/L) for 2 days in a row.  · You have itchy skin that develops redness or a rash.  · You have discolored areas of skin.  · You have areas where your skin is changing, such as thickening or appearing shiny.  This information is not intended to replace advice given to you by your health care provider. Make sure you discuss any questions you have with your health care provider.  Document Released: 05/30/2017 Document Revised: 07/07/2017 Document Reviewed: 05/30/2017  [employer] Interactive Patient Education © 2018 [employer]    Diabetes Mellitus and Sick Day Management  Blood sugar (glucose) can be difficult to control when you are sick. Common illnesses that can cause problems for people with diabetes (diabetes mellitus) include colds, fever, flu (influenza), nausea, vomiting, and diarrhea. These illnesses can cause stress and loss of body fluids (dehydration), and those issues can cause blood glucose levels to increase. Because of this, it is very important to take your insulin and diabetes medicines and eat some form of carbohydrate when you are sick.  You should make a plan for days when you are sick (sick day plan) as part of your diabetes management plan. You and your health care provider should make this plan in advance. The following guidelines are intended to help you manage an illness that lasts for about 24 hours or less. Your health care provider may also give you more specific instructions.  What do I need to do to manage my blood glucose?  · Check your blood glucose every 2-4 hours, or as often as told by your health care provider.  · Know your sick day treatment goals. Your target blood glucose levels may be different when you are sick.  · If you use insulin, take your usual dose.  ? If your blood glucose continues to be too high, you may  need to take an additional insulin dose as told by your health care provider.  · If you use oral diabetes medicine, you may need to stop taking it if you are not able to eat or drink normally. Ask your health care provider about whether you need to stop taking these medicines while you are sick.  · If you use injectable hormone medicines other than insulin to control your diabetes, ask your health care provider about whether you need to stop taking these medicines while you are sick.  What else can I do to manage my diabetes when I am sick?  Check your ketones  · If you have type 1 diabetes, check your urine ketones every 4 hours.  · If you have type 2 diabetes, check your urine ketones as often as told by your health care provider.  Drink fluids  · Drink enough fluid to keep your urine clear or pale yellow. This is especially important if you have a fever, vomiting, or diarrhea. Those symptoms can lead to dehydration.  · Follow any instructions from your health care provider about beverages to avoid.  ? Do not drink alcohol, caffeine, or drinks that contain a lot of sugar.  Take medicines as directed  · Take-over-the-counter and prescription medicines only as told by your health care provider.  · Check medicine labels for added sugars. Some medicines may contain sugar or types of sugars that can raise your blood glucose level.  What foods can I eat when I am sick?  You need to eat some form of carbohydrates when you are sick. You should eat 45-50 grams (45-50 g) of carbohydrates every 3-4 hours until you feel better.  All of the food choices below contain about 15 g of carbohydrates. Plan ahead and keep some of these foods around so you have them if you get sick.  · 4-6 oz (120-177 mL) carbonated beverage that contains sugar, such as regular (not diet) soda. You may be able to drink carbonated beverages more easily if you open the beverage and let it sit at room temperature for a few minutes before drinking.  · ½  of a twin frozen ice pop.  · 4 oz (120 g) regular gelatin.  · 4 oz (120 mL) fruit juice.  · 4 oz (120 g) ice cream or frozen yogurt.  · 2 oz (60 g) sherbet.  · 8 oz (240 mL) clear broth or soup.  · 4 oz (120 g) regular custard.  · 4 oz (120 g) regular pudding.  · 8 oz (240 g) plain yogurt.  · 1 slice bread or toast.  · 6 saltine crackers.  · 5 vanilla wafers.    Questions to ask your health care provider  Consider asking the following questions so you know what to do on days when you are sick:  · Should I adjust my diabetes medicines?  · How often do I need to check my blood glucose?  · What supplies do I need to manage my diabetes at home when I am sick?  · What number can I call if I have questions?  · What foods and drinks should I avoid?    Contact a health care provider if:  · You develop symptoms of diabetic ketoacidosis, such as:  ? Fatigue.  ? Weight loss.  ? Excessive thirst.  ? Light-headedness.  ? Fruity or sweet-smelling breath.  ? Excessive urination.  ? Vision changes.  ? Confusion or irritability.  ? Nausea.  ? Vomiting.  ? Rapid breathing.  ? Pain in the abdomen.  ? Feeling flushed.  · You are unable to drink fluids without vomiting.  · You have any of the following for more than 6 hours:  ? Nausea.  ? Vomiting.  ? Diarrhea.  · Your blood glucose is at or above 240 mg/dL (13.3 mmol/L), even after you take an additional insulin dose.  · You have a change in how you think, feel, or act (mental status).  · You develop another serious illness.  · You have been sick or have had a fever for 2 days or longer and you are not getting better.  Get help right away if:  · Your blood glucose is lower than 54 mg/dL (3.0 mmol/L).  · You have difficulty breathing.  · You have moderate or high ketone levels in your urine.  · You used emergency glucagon to treat low blood glucose.  Summary  · Blood sugar (glucose) can be difficult to control when you are sick. Common illnesses that can cause problems for people with  diabetes (diabetes mellitus) include colds, fever, flu (influenza), nausea, vomiting, and diarrhea.  · Illnesses can cause stress and loss of body fluids (dehydration), and those issues can cause blood glucose levels to increase.  · Make a plan for days when you are sick (sick day plan) as part of your diabetes management plan. You and your health care provider should make this plan in advance.  · It is very important to take your insulin and diabetes medicines and to eat some form of carbohydrate when you are sick.  · Contact your health care provider if have problems managing your blood glucose levels when you are sick, or if you have been sick or had a fever for 2 days or longer and are not getting better.  This information is not intended to replace advice given to you by your health care provider. Make sure you discuss any questions you have with your health care provider.  Document Released: 12/20/2004 Document Revised: 09/15/2017 Document Reviewed: 09/15/2017  [employer] Interactive Patient Education © 2018 [employer]    Diabetes Mellitus and Nutrition  When you have diabetes (diabetes mellitus), it is very important to have healthy eating habits because your blood sugar (glucose) levels are greatly affected by what you eat and drink. Eating healthy foods in the appropriate amounts, at about the same times every day, can help you:  · Control your blood glucose.  · Lower your risk of heart disease.  · Improve your blood pressure.  · Reach or maintain a healthy weight.    Every person with diabetes is different, and each person has different needs for a meal plan. Your health care provider may recommend that you work with a diet and nutrition specialist (dietitian) to make a meal plan that is best for you. Your meal plan may vary depending on factors such as:  · The calories you need.  · The medicines you take.  · Your weight.  · Your blood glucose, blood pressure, and cholesterol levels.  · Your activity  level.  · Other health conditions you have, such as heart or kidney disease.    How do carbohydrates affect me?  Carbohydrates affect your blood glucose level more than any other type of food. Eating carbohydrates naturally increases the amount of glucose in your blood. Carbohydrate counting is a method for keeping track of how many carbohydrates you eat. Counting carbohydrates is important to keep your blood glucose at a healthy level, especially if you use insulin or take certain oral diabetes medicines.  It is important to know how many carbohydrates you can safely have in each meal. This is different for every person. Your dietitian can help you calculate how many carbohydrates you should have at each meal and for snack.  Foods that contain carbohydrates include:  · Bread, cereal, rice, pasta, and crackers.  · Potatoes and corn.  · Peas, beans, and lentils.  · Milk and yogurt.  · Fruit and juice.  · Desserts, such as cakes, cookies, ice cream, and candy.    How does alcohol affect me?  Alcohol can cause a sudden decrease in blood glucose (hypoglycemia), especially if you use insulin or take certain oral diabetes medicines. Hypoglycemia can be a life-threatening condition. Symptoms of hypoglycemia (sleepiness, dizziness, and confusion) are similar to symptoms of having too much alcohol.  If your health care provider says that alcohol is safe for you, follow these guidelines:  · Limit alcohol intake to no more than 1 drink per day for nonpregnant women and 2 drinks per day for men. One drink equals 12 oz of beer, 5 oz of wine, or 1½ oz of hard liquor.  · Do not drink on an empty stomach.  · Keep yourself hydrated with water, diet soda, or unsweetened iced tea.  · Keep in mind that regular soda, juice, and other mixers may contain a lot of sugar and must be counted as carbohydrates.    What are tips for following this plan?  Reading food labels  · Start by checking the serving size on the label. The amount of  "calories, carbohydrates, fats, and other nutrients listed on the label are based on one serving of the food. Many foods contain more than one serving per package.  · Check the total grams (g) of carbohydrates in one serving. You can calculate the number of servings of carbohydrates in one serving by dividing the total carbohydrates by 15. For example, if a food has 30 g of total carbohydrates, it would be equal to 2 servings of carbohydrates.  · Check the number of grams (g) of saturated and trans fats in one serving. Choose foods that have low or no amount of these fats.  · Check the number of milligrams (mg) of sodium in one serving. Most people should limit total sodium intake to less than 2,300 mg per day.  · Always check the nutrition information of foods labeled as \"low-fat\" or \"nonfat\". These foods may be higher in added sugar or refined carbohydrates and should be avoided.  · Talk to your dietitian to identify your daily goals for nutrients listed on the label.  Shopping  · Avoid buying canned, premade, or processed foods. These foods tend to be high in fat, sodium, and added sugar.  · Shop around the outside edge of the grocery store. This includes fresh fruits and vegetables, bulk grains, fresh meats, and fresh dairy.  Cooking  · Use low-heat cooking methods, such as baking, instead of high-heat cooking methods like deep frying.  · Cook using healthy oils, such as olive, canola, or sunflower oil.  · Avoid cooking with butter, cream, or high-fat meats.  Meal planning  · Eat meals and snacks regularly, preferably at the same times every day. Avoid going long periods of time without eating.  · Eat foods high in fiber, such as fresh fruits, vegetables, beans, and whole grains. Talk to your dietitian about how many servings of carbohydrates you can eat at each meal.  · Eat 4-6 ounces of lean protein each day, such as lean meat, chicken, fish, eggs, or tofu. 1 ounce is equal to 1 ounce of meat, chicken, or fish, " 1 egg, or 1/4 cup of tofu.  · Eat some foods each day that contain healthy fats, such as avocado, nuts, seeds, and fish.  Lifestyle    · Check your blood glucose regularly.  · Exercise at least 30 minutes 5 or more days each week, or as told by your health care provider.  · Take medicines as told by your health care provider.  · Do not use any products that contain nicotine or tobacco, such as cigarettes and e-cigarettes. If you need help quitting, ask your health care provider.  · Work with a counselor or diabetes educator to identify strategies to manage stress and any emotional and social challenges.  What are some questions to ask my health care provider?  · Do I need to meet with a diabetes educator?  · Do I need to meet with a dietitian?  · What number can I call if I have questions?  · When are the best times to check my blood glucose?  Where to find more information:  · American Diabetes Association: diabetes.org/food-and-fitness/food  · Academy of Nutrition and Dietetics: www.eatright.org/resources/health/diseases-and-conditions/diabetes  · National [place] of Diabetes and Digestive and Kidney Diseases (NIH): www.niddk.nih.gov/health-information/diabetes/overview/diet-eating-physical-activity  Summary  · A healthy meal plan will help you control your blood glucose and maintain a healthy lifestyle.  · Working with a diet and nutrition specialist (dietitian) can help you make a meal plan that is best for you.  · Keep in mind that carbohydrates and alcohol have immediate effects on your blood glucose levels. It is important to count carbohydrates and to use alcohol carefully.  This information is not intended to replace advice given to you by your health care provider. Make sure you discuss any questions you have with your health care provider.  Document Released: 09/14/2006 Document Revised: 01/22/2018 Document Reviewed: 01/22/2018  [employer] Interactive Patient Education © 2018 [employer]    Diabetes and  Foot Care  Diabetes may cause you to have problems because of poor blood supply (circulation) to your feet and legs. This may cause the skin on your feet to become thinner, break easier, and heal more slowly. Your skin may become dry, and the skin may peel and crack. You may also have nerve damage in your legs and feet causing decreased feeling in them. You may not notice minor injuries to your feet that could lead to infections or more serious problems. Taking care of your feet is one of the most important things you can do for yourself.  Follow these instructions at home:  · Wear shoes at all times, even in the house. Do not go barefoot. Bare feet are easily injured.  · Check your feet daily for blisters, cuts, and redness. If you cannot see the bottom of your feet, use a mirror or ask someone for help.  · Wash your feet with warm water (do not use hot water) and mild soap. Then pat your feet and the areas between your toes until they are completely dry. Do not soak your feet as this can dry your skin.  · Apply a moisturizing lotion or petroleum jelly (that does not contain alcohol and is unscented) to the skin on your feet and to dry, brittle toenails. Do not apply lotion between your toes.  · Trim your toenails straight across. Do not dig under them or around the cuticle. File the edges of your nails with an emery board or nail file.  · Do not cut corns or calluses or try to remove them with medicine.  · Wear clean socks or stockings every day. Make sure they are not too tight. Do not wear knee-high stockings since they may decrease blood flow to your legs.  · Wear shoes that fit properly and have enough cushioning. To break in new shoes, wear them for just a few hours a day. This prevents you from injuring your feet. Always look in your shoes before you put them on to be sure there are no objects inside.  · Do not cross your legs. This may decrease the blood flow to your feet.  · If you find a minor scrape,  cut, or break in the skin on your feet, keep it and the skin around it clean and dry. These areas may be cleansed with mild soap and water. Do not cleanse the area with peroxide, alcohol, or iodine.  · When you remove an adhesive bandage, be sure not to damage the skin around it.  · If you have a wound, look at it several times a day to make sure it is healing.  · Do not use heating pads or hot water bottles. They may burn your skin. If you have lost feeling in your feet or legs, you may not know it is happening until it is too late.  · Make sure your health care provider performs a complete foot exam at least annually or more often if you have foot problems. Report any cuts, sores, or bruises to your health care provider immediately.  Contact a health care provider if:  · You have an injury that is not healing.  · You have cuts or breaks in the skin.  · You have an ingrown nail.  · You notice redness on your legs or feet.  · You feel burning or tingling in your legs or feet.  · You have pain or cramps in your legs and feet.  · Your legs or feet are numb.  · Your feet always feel cold.  Get help right away if:  · There is increasing redness, swelling, or pain in or around a wound.  · There is a red line that goes up your leg.  · Pus is coming from a wound.  · You develop a fever or as directed by your health care provider.  · You notice a bad smell coming from an ulcer or wound.  This information is not intended to replace advice given to you by your health care provider. Make sure you discuss any questions you have with your health care provider.  Document Released: 12/15/2001 Document Revised: 05/25/2017 Document Reviewed: 05/27/2014  [employer] Interactive Patient Education © 2017 [employer]    Coping With Diabetes  Diabetes (type 1 diabetes mellitus or type 2 diabetes mellitus) is a condition in which the body does not have enough of a hormone called insulin, or the body does not respond properly to insulin.  "Normally, insulin allows sugars (glucose) to enter cells in the body. The cells use glucose for energy. With diabetes, extra glucose builds up in the blood instead of going into cells, which results in high blood glucose (hyperglycemia).  How to manage lifestyle changes  Managing diabetes includes medical treatments as well as lifestyle changes. If diabetes is not managed well, serious physical and emotional complications can occur. Taking good care of yourself means that you are responsible for:  · Monitoring glucose regularly.  · Eating a healthy diet.  · Exercising regularly.  · Meeting with health care providers.  · Taking medicines as directed.    Some people may feel a lot of stress about managing their diabetes. This is known as emotional distress, and it is very common. Living with diabetes can place you at risk for emotional distress, depression, or anxiety. These disorders can be confusing and can make diabetes management more difficult.  How to recognize stress  Emotional distress  Symptoms of emotional distress include:  · Anger about having a diagnosis of diabetes.  · Fear or frustration about your diagnosis and the changes you need to make to manage the condition.  · Being overly worried about the care that you need or the cost of the care you need.  · Feeling like you caused your condition by doing something wrong.  · Fear of unpredictable situations, like low or high blood glucose.  · Feeling judged by your health care providers.  · Feeling very alone with the disease.  · Getting too tired or \"burned out\" with the demands of daily care.    Depression  Having diabetes means that you are at a higher risk for depression. Having depression also means that you are at a higher risk for diabetes. Your health care provider may test (screen) you for symptoms of depression. It is important to recognize depression symptoms and to start treatment for it soon after it is diagnosed. The following are some symptoms " of depression:  · Loss of interest in things that you used to enjoy.  · Trouble sleeping, or often waking up early and not being able to get back to sleep.  · A change in appetite.  · Feeling tired most of the day.  · Feeling nervous and anxious.  · Feeling guilty and worrying that you are a burden to others.  · Feeling depressed more often than you do not feel that way.  · Thoughts of hurting yourself or feeling that you want to die.    If you have any of these symptoms for 2 weeks or longer, reach out to a health care provider.  Where to find support  · Ask your health care provider to recommend a therapist who understands both depression and diabetes.  · Search for information and support from the American Diabetes Association: www.diabetes.org  · Find a certified diabetes educator and make an appointment through American Association of Diabetes Educators: www.diabeteseducator.org  Follow these instructions at home:  Managing emotional distress  The following are some ways to manage emotional distress:  · Talk with your health care provider or certified diabetes educator. Consider working with a counselor or therapist.  · Learn as much as you can about diabetes and its treatment. Meet with a certified diabetes educator or take a class to learn how to manage your condition.  · Keep a journal of your thoughts and concerns.  · Accept that some things are out of your control.  · Talk with other people who have diabetes. It can help to talk with others about the emotional distress that you feel.  · Find ways to manage stress that work for you. These may include art or music therapy, exercise, meditation, and hobbies.  · Seek support from spiritual leaders, family, and friends.    General instructions  · Follow your diabetes management plan.  · Keep all follow-up visits as told by your health care provider. This is important.  Get help right away if:  · You have thoughts about hurting yourself or others.  If you ever  feel like you may hurt yourself or others, or have thoughts about taking your own life, get help right away. You can go to your nearest emergency department or call:  · Your local emergency services (911 in the [place]  · A suicide crisis helpline, such as the National Suicide Prevention Lifeline at 1-829.410.2751. This is open 24 hours a day.    Summary  · Diabetes (type 1 diabetes mellitus or type 2 diabetes mellitus) is a condition in which the body does not have enough of a hormone called insulin, or the body does not respond properly to insulin.  · Living with diabetes puts you at risk for medical issues, and it also puts you at risk for emotional issues such as emotional distress, depression, and anxiety.  · Recognizing the symptoms of emotional distress and depression may help you avoid problems with your diabetes control. It is important to start treatment for emotional distress and depression soon after they are diagnosed.  · Having diabetes means that you are at a higher risk for depression. Ask your health care provider to recommend a therapist who understands both depression and diabetes.  · If you experience symptoms of emotional distress or depression, it is important to discuss this with your health care provider, certified diabetes educator, or therapist.  This information is not intended to replace advice given to you by your health care provider. Make sure you discuss any questions you have with your health care provider.  Document Released: 05/03/2018 Document Revised: 05/03/2018 Document Reviewed: 05/03/2018  [employer] Interactive Patient Education © 2018 [employer]  Sitagliptin oral tablet  What is this medicine?  SITAGLIPTIN (sit a GLIP tin) helps to treat type 2 diabetes. It helps to control blood sugar. Treatment is combined with diet and exercise.  This medicine may be used for other purposes; ask your health care provider or pharmacist if you have questions.  COMMON BRAND NAME(S):  Januvia  What should I tell my health care provider before I take this medicine?  They need to know if you have any of these conditions:  -diabetic ketoacidosis  -kidney disease  -pancreatitis  -previous swelling of the tongue, face, or lips with difficulty breathing, difficulty swallowing, hoarseness, or tightening of the throat  -type 1 diabetes  -an unusual or allergic reaction to sitagliptin, other medicines, foods, dyes, or preservatives  -pregnant or trying to get pregnant  -breast-feeding  How should I use this medicine?  Take this medicine by mouth with a glass of water. Follow the directions on the prescription label. You can take it with or without food. Do not cut, crush or chew this medicine. Take your dose at the same time each day. Do not take more often than directed. Do not stop taking except on your doctor's advice.  A special MedGuide will be given to you by the pharmacist with each prescription and refill. Be sure to read this information carefully each time.  Talk to your pediatrician regarding the use of this medicine in children. Special care may be needed.  Overdosage: If you think you have taken too much of this medicine contact a poison control center or emergency room at once.  NOTE: This medicine is only for you. Do not share this medicine with others.  What if I miss a dose?  If you miss a dose, take it as soon as you can. If it is almost time for your next dose, take only that dose. Do not take double or extra doses.  What may interact with this medicine?  Do not take this medicine with any of the following medications:  -gatifloxacin  This medicine may also interact with the following medications:  -alcohol  -digoxin  -insulin  -sulfonylureas like glimepiride, glipizide, glyburide  This list may not describe all possible interactions. Give your health care provider a list of all the medicines, herbs, non-prescription drugs, or dietary supplements you use. Also tell them if you smoke,  drink alcohol, or use illegal drugs. Some items may interact with your medicine.  What should I watch for while using this medicine?  Visit your doctor or health care professional for regular checks on your progress.  A test called the HbA1C (A1C) will be monitored. This is a simple blood test. It measures your blood sugar control over the last 2 to 3 months. You will receive this test every 3 to 6 months.  Learn how to check your blood sugar. Learn the symptoms of low and high blood sugar and how to manage them.  Always carry a quick-source of sugar with you in case you have symptoms of low blood sugar. Examples include hard sugar candy or glucose tablets. Make sure others know that you can choke if you eat or drink when you develop serious symptoms of low blood sugar, such as seizures or unconsciousness. They must get medical help at once.  Tell your doctor or health care professional if you have high blood sugar. You might need to change the dose of your medicine. If you are sick or exercising more than usual, you might need to change the dose of your medicine.  Do not skip meals. Ask your doctor or health care professional if you should avoid alcohol. Many nonprescription cough and cold products contain sugar or alcohol. These can affect blood sugar.  Wear a medical ID bracelet or chain, and carry a card that describes your disease and details of your medicine and dosage times.  What side effects may I notice from receiving this medicine?  Side effects that you should report to your doctor or health care professional as soon as possible:  -allergic reactions like skin rash, itching or hives, swelling of the face, lips, or tongue  -breathing problems  -general ill feeling or flu-like symptoms  -joint pain  -loss of appetite  -redness, blistering, peeling or loosening of the skin, including inside the mouth  -signs and symptoms of low blood sugar such as feeling anxious, confusion, dizziness, increased hunger,  unusually weak or tired, sweating, shakiness, cold, irritable, headache, blurred vision, fast heartbeat, loss of consciousness  -unusual stomach pain or discomfort  -vomiting  Side effects that usually do not require medical attention (report to your doctor or health care professional if they continue or are bothersome):  -diarrhea  -headache  -sore throat  -stomach upset  -stuffy or runny nose  This list may not describe all possible side effects. Call your doctor for medical advice about side effects. You may report side effects to FDA at 0-430-JYC-6371.  Where should I keep my medicine?  Keep out of the reach of children.  Store at room temperature between 15 and 30 degrees C (59 and 86 degrees F). Throw away any unused medicine after the expiration date.  NOTE: This sheet is a summary. It may not cover all possible information. If you have questions about this medicine, talk to your doctor, pharmacist, or health care provider.  © 2018 [employer] (2017-06-16 14:23:55)  Bariatric Surgery, Care After  Refer to this sheet in the next few weeks. These instructions provide you with information on caring for yourself after your procedure. Your health care provider may also give you more specific instructions. Your treatment has been planned according to current medical practices, but problems sometimes occur. Call your health care provider if you have any problems or questions after your procedure.  What can I expect after the procedure?  After your procedure, it is typical to have the following sensations:  · Soreness.  · Sluggishness.  · Tiredness.  · Moodiness.  · Chilliness.    It is not unusual to have dry skin and some hair loss after bariatric surgery.  Follow these instructions at home:  · Do not drink alcohol, use public transportation, or sign important papers for at least 1 day following surgery.  · Do not resume physical activities or drive until directed by your surgeon.  · Avoid lifting  anything over 10 pounds (4.5 kg) for 6 weeks following surgery, or until approved by your surgeon.  · Only take over-the-counter or prescription medicines for pain, discomfort, or fever as directed by your surgeon.  · Resume your diet as directed by your surgeon. You will resume eating with liquids and pureed foods, then soft foods, and progress to a more normal diet over time. Follow your surgeon or dietitian’s guidelines for what and how much to eat and drink. You will need to eat slowly, so as not to cause discomfort and vomiting.  · Use showers for bathing as directed by your surgeon.  · Change dressings as directed by your surgeon.  · Schedule a follow-up appointment with your surgeon as directed.  Where to find more information:  American Society for Bariatric Surgery: www.asbs.org  Weight-control Information Network (WIN): eva.niddk.nih.gov  Contact a health care provider if:  · There is redness, swelling, or increasing pain in the wound.  · There is pus coming from the wound.  · There is drainage from the wound lasting longer than 1 day.  · An unexplained oral temperature above 102°F (38.9°C) develops.  · You notice a foul smell coming from the wound or dressing.  · There is a breaking open of the wound (edges not staying together) after stitches have been removed.  · You notice increasing pain in the shoulder-strap areas.  · You develop episodes of dizziness or faint while standing.  · You develop shortness of breath.  · You develop persistent nausea or vomiting.  · Your soreness seems to be getting worse rather than better.  Get help right away if:  · You develop a rash.  · You have difficulty breathing.  · You develop, or feel you are developing, any reaction or side effects to medicines.  This information is not intended to replace advice given to you by your health care provider. Make sure you discuss any questions you have with your health care provider.  Document Released: 12/18/2006 Document Revised:  05/31/2017 Document Reviewed: 06/18/2014  [employer] Interactive Patient Education © 2017 [employer]  Diet Following Bariatric Surgery  The bariatric diet is designed to provide fluids and nourishment while promoting weight loss and healing after bariatric surgery. The diet is divided into 3 stages. Progress to each stage of the diet with your health care provider’s approval.  What do I need to know about my diet following bariatric surgery?  Your surgeon may have individual guidelines for you about specific foods or the progression of your diet. Follow your surgeon's guidelines. You will follow these general guidelines during all stages of your diet:  · Eat at set times.  · Allow 30-45 minutes for each meal.  · Take small bites. Chew your food until it is almost a liquid before swallowing it. Try setting down your utensils between bites to help yourself eat slower or make an “eat slowly” reminder sign.  · Do not drink liquids for 30 minutes before meals and for 30 minutes after meals.  · Drink between meals.  · Stop eating when you are full. If you feel tightness or pressure in your chest, that means you are full. Wait 30 minutes before you try to eat again.  · Take a chewable multivitamin daily in addition to other supplements as directed by your health care provider.  · Sip at least 48-64 oz of liquid, preferably water, each day.  · Stay away from concentrated sweets with more than 10 g of sugar per serving.  · Protein is a very important part of the diet. Have protein with every meal when possible. Try eating your protein food first.    Stage 1 bariatric diet  Stage 1 will begin after surgery and last until about 2 weeks after surgery or as directed by your health care provider. You will be on clear liquids immediately after surgery. After your health care provider approves, you will move to full liquids. You will eat at scheduled times during the day (for example at 8 AM, 12 noon, or 5 PM). You will also take  a liquid protein supplement as recommended by your dietitian. Your dietitian will let you know how much and how often you can eat.  Diet Guidelines  · Limit intake to ¼ cup of solid foods and ½ cup of beverages per meal.  · You will need at least 60-80 g of protein daily or as determined by your dietitian. Guidelines for choosing a liquid protein supplement include:  ? At least 15 g of protein per 8 oz serving.  ? Less than 20 g total carbohydrate per 8 oz serving.  ? Less than 5 g fat per 8 oz serving.  · Drink at least 48 oz (1440 mL) of fluid daily, which includes your protein supplement.  · To get more protein, you can add 1 Tbsp non-fat dry milk powder to each ¼ cup skim milk.  · Avoid carbonated beverages, caffeine, alcohol, and concentrated sweets such as sugar, cakes, and cookies.  · Take a chewable multivitamin complete with iron. Discuss additional supplements with your health care provider or dietitian.  Beverages (½ cup total per meal)  · Decaffeinated coffee or tea.  · Drinks with less than 25 g of sugar per serving.  · Diet or sugar-free drinks.  · Powdered drinks.  · Sugar-free iced tea.  · Broth.  · Skim milk or lactose-free milk.  · Unsweetened, plain soy milk.  · Sugar-free gelatin dessert or frozen ice pops.  Full Liquid Foods (¼ cup total per meal)  · Protein-rich liquids (limit added protein powder to 25-30 g per serving).  · Low-fat cream soup or soup that has been blended.  · Artificially sweetened yogurt.  · Sugar-free pudding.  · Blended low-fat cottage cheese.  · Plain yogurt or Greek yogurt (low-fat).  · Unsweetened applesauce.  · Hot wheat cereal, cream of rice, grits.  Stage 2 bariatric diet (soft or pureed diet)  Stage 2 starts about 2 weeks after surgery and lasts until about 4 weeks after surgery. During this stage, you will eat soft, moist, ground, diced, or pureed foods in small meals, 3-6 times a day. Focus on protein-rich foods. You will also drink a liquid protein supplement  between meals 2 times a day. After a week of soft protein foods, you can begin to add other soft foods in addition to soft proteins. You should meet with your dietitian at this stage to begin preparation for Stage 3 of the bariatric diet.  Diet Guidelines  · Meals should not exceed ¾-1 cup total per meal.  · You will need to blend solid foods to the consistency of applesauce.  · Choose low-fat foods. Low-fat foods are foods with less than 5 g of fat per serving.  · Include a protein with every meal and snack. Eat the protein food first. Try to eat 60-80 g of protein per day when possible.  · Choose grains made from white or refined grain. Choices should have no more than 2 g of fiber per serving.  · Continue to eat mindfully and slowly and always listen to your body.  · Staying hydrated is very important during this stage. Full liquids from Stage 1 may be used for a meal or snack replacement.  · Slowly add other soft foods to your diet. Examples of soft foods that can be added to your diet are listed in the following section.  Soft Protein Foods  · Well-cooked beans and lentils.  · Eggs (scrambled, soft-boiled).  · Tofu and other soft soy products (tempeh or bean veggie burgers).  · Fish.  · Lean poultry, well cooked and soft. Can also try baby food chicken or turkey.  · Ground meats.  · Low-fat cottage cheese.  · Hummus.  · Fat-free or low-fat yogurt.  · Gravy and light mayonnaise (to help moisten).  Other Soft Foods  · Soft fruit. This includes soft canned fruit in light syrup or natural juice, bananas, melons, peaches, pears, and strawberries.  · Well-cooked vegetables.  · Toast or crackers. Make sure these become soft by chewing them at least 20 times.  · Hot wheat cereal.  · Unflavored oatmeal.  · Baby food or toddler fruits and vegetables.  · Chicken or vegetable broth.  · Blended fruit smoothies.  Stage 3 bariatric diet (regular diet)  This stage starts about 6-8 weeks after surgery and will continue to  promote weight loss. You will be allowed to eat foods of various textures. Ask your dietitian to assist you in meal planning and additional behavioral strategies to make this final stage a long-term success.  Diet Guidelines  · Meals should not exceed ¾-1 cup. As you heal and advance, you may be able to eat a little more with each meal. Always listen to your body.  · Your diet should include foods from all the food groups.  · Slowly add recommended foods to your diet. See the following section for a list of recommended foods.  · Eat only at your chosen meal times.  · When you feel full, stop eating.  · Carbohydrates should be limited. Eat no more than 30 g per meal or 130 g per day. There are about 15-20 g of carbohydrates in 1 piece of bread or a medium piece of fruit.  · Stay hydrated. Drink at least 48-64 oz of noncarbonated, zero-calorie fluid per day. Water is the best choice.  · At first, avoid hard-to-digest foods like popcorn, nuts, celery, seeds, and the white parts of citrus fruits. With time you may be able to eat these foods.  · Take any vitamin supplements as directed by your health care provider.  · Do not fast or skip meals. If you are having a hard time eating, talk to your health care provider or dietitian.  What foods can I eat in stage 3?  Grains  Choose whole grains when possible; aim to get half of your total grains as whole grains. These include whole wheat breads, crackers, and pastas. Cold or hot cereals with no added sugars. Rice (brown or white).  Vegetables  Choose a variety of vegetables. All are allowed.  Fruits  Choose a variety of fruits. All are allowed.  Meat and Other Protein Foods  Choose lean sources of protein such as poultry, fish, and eggs. You may need to cook meats to tender at first. Smooth nut butters. Beans.  Dairy  Choose low-fat or nonfat dairy items (such as cheese, milk, and yogurt).  Beverages  Decaffeinated coffee. Caffeine-free tea. Sugar-free soft drinks without  caffeine. Limit alcohol.  Condiments  All are acceptable. Choose low-fat and low-sodium when possible.  Sweets and Desserts  Low-fat, low-sugar options. As part of a healthy diet, everyone should limit added sugars.  Fat and Oil  Choose healthy fats such as olive oil, canola oil, and avocados.  The items listed above may not be a complete list of recommended foods or beverages. Contact your dietitian for more options.  This information is not intended to replace advice given to you by your health care provider. Make sure you discuss any questions you have with your health care provider.  Document Released: 06/23/2004 Document Revised: 05/25/2017 Document Reviewed: 10/22/2014  [employer] Interactive Patient Education © 2018 Elsevier Inc.    Bariatric Surgery Information  Bariatric surgery, also called weight loss surgery, is a procedure that helps you lose weight. You may consider or your health care provider may suggest bariatric surgery if:  · You are severely obese and have been unable to lose weight through diet and exercise.  · You have health problems related to obesity, such as:  ? Type 2 diabetes.  ? Heart disease.  ? Lung disease.    How does bariatric surgery help me lose weight?  Bariatric surgery helps you lose weight by decreasing how much food your body absorbs. This is done by closing off part of your stomach to make it smaller. This restricts the amount of food your stomach can hold. Bariatric surgery can also change your body’s regular digestive process, so that food bypasses the parts of your body that absorb calories and nutrients.  If you decide to have bariatric surgery, it is important to continue to eat a healthy diet and exercise regularly after the surgery.  What are the different kinds of bariatric surgery?  There are two kinds of bariatric surgeries:  · Restrictive surgeries make your stomach smaller. They do not change your digestive process. The smaller the size of your new stomach, the  less food you can eat. There are different types of restrictive surgeries.  · Malabsorptive surgeries both make your stomach smaller and alter your digestive process so that your body processes less calories and nutrients. These are the most common kind of bariatric surgery. There are different types of malabsorptive surgeries.    What are the different types of restrictive surgery?  Adjustable Gastric Banding  In this procedure, an inflatable band is placed around your stomach near the upper end. This makes the passageway for food into the rest of your stomach much smaller. The band can be adjusted, making it tighter or looser, by filling it with salt solution. Your surgeon can adjust the band based on how are you feeling and how much weight you are losing. The band can be removed in the future.  Vertical Banded Gastroplasty  In this procedure, staples are used to separate your stomach into two parts, a small upper pouch and a bigger lower pouch. This decreases how much food you can eat.  Sleeve Gastrectomy  In this procedure, your stomach is made smaller. This is done by surgically removing a large part of your stomach. When your stomach is smaller, you feel full more quickly and reduce how much you eat.  What are the different types of malabsorptive surgery?  Grayson-en-Y Gastric Bypass (RGB)  This is the most common weight loss surgery. In this procedure, a small stomach pouch is created in the upper part of your stomach. Next, this small stomach pouch is attached directly to the middle part of your small intestine. The farther down your small intestine the new connection is made, the fewer calories and nutrients you will absorb.  Biliopancreatic Diversion with Duodenal Switch (BPD/DS)  This is a multi-step procedure. In this procedure, a large part of your stomach is removed, making your stomach smaller. Next, this smaller stomach is attached to the lower part of your small intestine. Like the RGB surgery, you  absorb fewer calories and nutrients the farther down your small intestine the attachment is made.  What are the risks of bariatric surgery?  As with any surgical procedure, each type of bariatric surgery has its own risks. These risks also depend on your age, your overall health, and any other medical conditions you may have. When deciding on bariatric surgery, it is very important to:  · Talk to your health care provider and choose the surgery that is best for you.  · Ask your health care provider about specific risks for the surgery you choose.    Where to find more information:  · American Society for Metabolic & Bariatric Surgery: www.asmbs.org  · Weight-control Information Network (WIN): win.niddk.nih.gov  This information is not intended to replace advice given to you by your health care provider. Make sure you discuss any questions you have with your health care provider.  Document Released: 12/18/2006 Document Revised: 05/25/2017 Document Reviewed: 06/18/2014  [employer] Interactive Patient Education © 2017 Elsevier Inc.  Nitrofurantoin tablets or capsules  What is this medicine?  NITROFURANTOIN (shad powers) is an antibiotic. It is used to treat urinary tract infections.  This medicine may be used for other purposes; ask your health care provider or pharmacist if you have questions.  COMMON BRAND NAME(S): Macrobid, Macrodantin, Urotoin  What should I tell my health care provider before I take this medicine?  They need to know if you have any of these conditions:  -anemia  -diabetes  -glucose-6-phosphate dehydrogenase deficiency  -kidney disease  -liver disease  -lung disease  -other chronic illness  -an unusual or allergic reaction to nitrofurantoin, other antibiotics, other medicines, foods, dyes or preservatives  -pregnant or trying to get pregnant  -breast-feeding  How should I use this medicine?  Take this medicine by mouth with a glass of water. Follow the directions on the prescription label.  Take this medicine with food or milk. Take your doses at regular intervals. Do not take your medicine more often than directed. Do not stop taking except on your doctor's advice.  Talk to your pediatrician regarding the use of this medicine in children. While this drug may be prescribed for selected conditions, precautions do apply.  Overdosage: If you think you have taken too much of this medicine contact a poison control center or emergency room at once.  NOTE: This medicine is only for you. Do not share this medicine with others.  What if I miss a dose?  If you miss a dose, take it as soon as you can. If it is almost time for your next dose, take only that dose. Do not take double or extra doses.  What may interact with this medicine?  -antacids containing magnesium trisilicate  -probenecid  -quinolone antibiotics like ciprofloxacin, lomefloxacin, norfloxacin and ofloxacin  -sulfinpyrazone  This list may not describe all possible interactions. Give your health care provider a list of all the medicines, herbs, non-prescription drugs, or dietary supplements you use. Also tell them if you smoke, drink alcohol, or use illegal drugs. Some items may interact with your medicine.  What should I watch for while using this medicine?  Tell your doctor or health care professional if your symptoms do not improve or if you get new symptoms. Drink several glasses of water a day. If you are taking this medicine for a long time, visit your doctor for regular checks on your progress.  If you are diabetic, you may get a false positive result for sugar in your urine with certain brands of urine tests. Check with your doctor.  What side effects may I notice from receiving this medicine?  Side effects that you should report to your doctor or health care professional as soon as possible:  -allergic reactions like skin rash or hives, swelling of the face, lips, or tongue  -chest pain  -cough  -difficulty breathing  -dizziness,  drowsiness  -fever or infection  -joint aches or pains  -pale or blue-tinted skin  -redness, blistering, peeling or loosening of the skin, including inside the mouth  -tingling, burning, pain, or numbness in hands or feet  -unusual bleeding or bruising  -unusually weak or tired  -yellowing of eyes or skin  Side effects that usually do not require medical attention (report to your doctor or health care professional if they continue or are bothersome):  -dark urine  -diarrhea  -headache  -loss of appetite  -nausea or vomiting  -temporary hair loss  This list may not describe all possible side effects. Call your doctor for medical advice about side effects. You may report side effects to FDA at 5-035-FDA-7152.  Where should I keep my medicine?  Keep out of the reach of children.  Store at room temperature between 15 and 30 degrees C (59 and 86 degrees F). Protect from light. Throw away any unused medicine after the expiration date.  NOTE: This sheet is a summary. It may not cover all possible information. If you have questions about this medicine, talk to your doctor, pharmacist, or health care provider.  © 2018 Elsevier/Gold Standard (2009-07-08 15:56:47)

## 2018-10-22 ENCOUNTER — PROCEDURE VISIT (OUTPATIENT)
Dept: PULMONOLOGY | Facility: CLINIC | Age: 43
End: 2018-10-22

## 2018-10-22 ENCOUNTER — OFFICE VISIT (OUTPATIENT)
Dept: PULMONOLOGY | Facility: CLINIC | Age: 43
End: 2018-10-22

## 2018-10-22 VITALS
HEIGHT: 66 IN | BODY MASS INDEX: 45.62 KG/M2 | WEIGHT: 283.9 LBS | DIASTOLIC BLOOD PRESSURE: 96 MMHG | HEART RATE: 73 BPM | SYSTOLIC BLOOD PRESSURE: 168 MMHG | OXYGEN SATURATION: 97 %

## 2018-10-22 DIAGNOSIS — E66.01 MORBID OBESITY (HCC): ICD-10-CM

## 2018-10-22 DIAGNOSIS — J44.9 CHRONIC OBSTRUCTIVE PULMONARY DISEASE, UNSPECIFIED COPD TYPE (HCC): ICD-10-CM

## 2018-10-22 DIAGNOSIS — F17.210 CIGARETTE NICOTINE DEPENDENCE, UNCOMPLICATED: ICD-10-CM

## 2018-10-22 DIAGNOSIS — G47.33 OSA (OBSTRUCTIVE SLEEP APNEA): ICD-10-CM

## 2018-10-22 DIAGNOSIS — R06.09 DYSPNEA ON EXERTION: Primary | ICD-10-CM

## 2018-10-22 PROBLEM — Z72.0 TOBACCO USER: Status: RESOLVED | Noted: 2018-09-19 | Resolved: 2018-10-22

## 2018-10-22 PROCEDURE — 99406 BEHAV CHNG SMOKING 3-10 MIN: CPT | Performed by: INTERNAL MEDICINE

## 2018-10-22 PROCEDURE — 94729 DIFFUSING CAPACITY: CPT | Performed by: INTERNAL MEDICINE

## 2018-10-22 PROCEDURE — 94060 EVALUATION OF WHEEZING: CPT | Performed by: INTERNAL MEDICINE

## 2018-10-22 PROCEDURE — 94727 GAS DIL/WSHOT DETER LNG VOL: CPT | Performed by: INTERNAL MEDICINE

## 2018-10-22 PROCEDURE — 99204 OFFICE O/P NEW MOD 45 MIN: CPT | Performed by: INTERNAL MEDICINE

## 2018-10-22 RX ORDER — SULFAMETHOXAZOLE AND TRIMETHOPRIM 800; 160 MG/1; MG/1
TABLET ORAL
COMMUNITY
Start: 2018-10-10 | End: 2018-12-13

## 2018-10-22 NOTE — PROGRESS NOTES
Pulmonary Consultation    Jermaine Ferro MD,    Thank you for asking me to see Sudhakar Saul for   Chief Complaint   Patient presents with   • Shortness of Breath     JOYCELYN MADISON   .    Subjective     History of Present Illness  Sudhakar Saul is a 42 y.o. female with a PMH significant for tobacco use, morbid obesity, JACQUE, HTN, T2DM, and chronic Hep C who presents for evaluation of dyspnea. Pt states she was referred by her PCP as he thought she may have COPD due to decreased BS, MONTES, and chest tightness. She also complains of cough which she attributes to smoking. Pt is trying to cut back on smoking and wants to try a NRT. 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 Pt states she gets dyspneic with minimal exertion and feels like she cannot exhale completely. She reports she was diagnosed with JACQUE 5yrs ago but she cannot tolerate a CPAP. Pt takes albuterol prn, and her use averages 3x/d with some benefit. She states she was on an inhaler when she was younger but she cannot recall the name. Pt reports she has always had an obesity problem, previously weighing over 600lbs but she lost into the 200s while incarcerated. She has gained over 80lbs back in the past 1.5yrs. Pt does admit to chest tightness in her lower ribs, mostly on the left. She also reports nonproductive cough and some tightness in her jaw which occurs after exertion. Pt is scheduled to see cardiology. She is smoking <0.5ppd down from 3ppd for the past 30yrs. 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 Her maternal grandparents had lung disease, and her paternal grandfather had lung cancer (smoker, ). Her mother has JACQUE, and she has several paternal aunts with COPD.     Review of Systems: History obtained from chart review and the patient.  Review of Systems   Constitutional: Positive for fatigue and unexpected weight change.         Snoring, witnessed apneas   HENT: Positive for congestion, hearing loss and postnasal drip.    Eyes: Positive for visual disturbance.   Respiratory: Positive for cough and shortness of breath. Negative for wheezing.    Cardiovascular: Positive for chest pain and leg swelling.   Gastrointestinal: Positive for abdominal pain and nausea.        Heartburn   Genitourinary: Positive for frequency.   Musculoskeletal: Positive for arthralgias and back pain.   Neurological: Positive for seizures, weakness and headaches.   Psychiatric/Behavioral: Positive for dysphoric mood. The patient is nervous/anxious.      As described in the HPI. Otherwise, remainder of ROS (14 systems) were negative.    Patient Active Problem List   Diagnosis   • Annual physical exam   • General medical examination   • Encounter for screening for diabetes mellitus   • Encounter for screening for cardiovascular disorders   • Encounter for screening for endocrine disorder   • Seizure disorder (CMS/HCC)   • Morbid obesity (CMS/HCC)   • Tobacco abuse counseling   • Multiple joint pain   • Dysuria   • Encounter for screening for HIV   • History of positive hepatitis C   • Essential hypertension   • Uncontrolled type 2 diabetes mellitus with hyperglycemia (CMS/HCC)   • Vitamin D deficiency   • Urinary tract infection without hematuria   • Chronic hepatitis C without hepatic coma (CMS/HCC)   • Dyspnea   • Cigarette nicotine dependence, uncomplicated   • Chronic obstructive pulmonary disease (CMS/HCC)   • JACQUE (obstructive sleep apnea)         Current Outpatient Prescriptions:   •  albuterol (PROVENTIL HFA;VENTOLIN HFA) 108 (90 Base) MCG/ACT inhaler, Inhale 2 puffs Every 6 (Six) Hours As Needed for Wheezing or Shortness of Air., Disp: 1 inhaler, Rfl: 3  •  hydrochlorothiazide (HYDRODIURIL) 25 MG tablet, Take 1 tablet by mouth Daily., Disp: 30 tablet, Rfl: 3  •  levETIRAcetam (KEPPRA) 500 MG tablet, Take 1 tablet by mouth Every 12 (Twelve) Hours., Disp: 60  tablet, Rfl: 3  •  lisinopril (PRINIVIL,ZESTRIL) 20 MG tablet, Take 1 tablet by mouth Daily., Disp: 30 tablet, Rfl: 3  •  metoprolol tartrate (LOPRESSOR) 50 MG tablet, Take 1 tablet by mouth Every 12 (Twelve) Hours., Disp: 60 tablet, Rfl: 3  •  naproxen (NAPROSYN) 500 MG tablet, Take 1 tablet by mouth 2 (Two) Times a Day With Meals., Disp: 60 tablet, Rfl: 3  •  simvastatin (ZOCOR) 20 MG tablet, Take 1 tablet by mouth Every Night., Disp: 30 tablet, Rfl: 3  •  sulfamethoxazole-trimethoprim (BACTRIM DS,SEPTRA DS) 800-160 MG per tablet, , Disp: , Rfl:   •  vitamin D (ERGOCALCIFEROL) 04845 units capsule capsule, Take 1 capsule by mouth Every 7 (Seven) Days., Disp: 4 capsule, Rfl: 3  •  nicotine (NICODERM CQ) 21 MG/24HR patch, Place 1 patch on the skin as directed by provider Daily., Disp: 30 patch, Rfl: 3  •  nitrofurantoin, macrocrystal-monohydrate, (MACROBID) 100 MG capsule, Take 1 capsule by mouth Every 12 (Twelve) Hours., Disp: 14 capsule, Rfl: 0  •  SITagliptin (JANUVIA) 100 MG tablet, Take 1 tablet by mouth Daily., Disp: 30 tablet, Rfl: 3  •  tiotropium bromide monohydrate (SPIRIVA RESPIMAT) 2.5 MCG/ACT aerosol solution inhaler, Inhale 2 puffs Daily., Disp: 1 inhaler, Rfl: 11    Past Medical History:   Diagnosis Date   • CAD (coronary artery disease)    • Degeneration macular    • Depression    • Diabetes mellitus (CMS/HCC)    • Hepatitis c    • Hypertension    • Migraine    • Multiple personality disorder (CMS/HCC)    • Neuropathy    • Paranoid schizophrenia (CMS/HCC)    • Seizures (CMS/HCC)      Past Surgical History:   Procedure Laterality Date   • APPENDECTOMY     • BACK SURGERY     • BREAST SURGERY     • CARPAL TUNNEL RELEASE      right hand   • CHOLECYSTECTOMY     • DENTAL PROCEDURE     • HYSTERECTOMY     • LIPOMA EXCISION      9   • LIVER BIOPSY       Social History     Social History   • Marital status:      Social History Main Topics   • Smoking status: Current Every Day Smoker     Packs/day: 0.50  "    Types: Cigarettes   • Alcohol use No   • Drug use: No     Other Topics Concern   • Not on file     History reviewed. No pertinent family history.       Objective     Blood pressure 168/96, pulse 73, height 167.6 cm (66\"), weight 129 kg (283 lb 14.4 oz), SpO2 97 %.  Physical Exam   Constitutional: She is oriented to person, place, and time. Vital signs are normal. She appears well-developed and well-nourished.   obese   HENT:   Head: Normocephalic and atraumatic.   Nose: Nose normal.   Mouth/Throat: Uvula is midline, oropharynx is clear and moist and mucous membranes are normal.   Mallampati 4   Eyes: Pupils are equal, round, and reactive to light. Conjunctivae, EOM and lids are normal.   Neck: Trachea normal and normal range of motion. No tracheal tenderness present. No thyroid mass present.   Cardiovascular: Normal rate, regular rhythm and normal heart sounds.  PMI is not displaced.  Exam reveals no gallop.    No murmur heard.  Pulmonary/Chest: Effort normal and breath sounds normal. No respiratory distress. She has no decreased breath sounds. She has no wheezes. She has no rhonchi. She exhibits no tenderness and no deformity.   Abdominal: Soft. Normal appearance and bowel sounds are normal. There is no hepatomegaly. There is tenderness in the left upper quadrant.   Musculoskeletal:   Normal gait, no extremity edema     Vascular Status -  Her right foot exhibits no edema. Her left foot exhibits no edema.  Lymphadenopathy:        Head (right side): No submandibular adenopathy present.        Head (left side): No submandibular adenopathy present.     She has no cervical adenopathy.        Right: No supraclavicular adenopathy present.        Left: No supraclavicular adenopathy present.   Neurological: She is alert and oriented to person, place, and time.   Skin: Skin is warm and dry. No rash noted. No cyanosis. Nails show no clubbing.   Psychiatric: She has a normal mood and affect. Her speech is normal and " behavior is normal. Judgment normal.   Nursing note and vitals reviewed.      PFTs: 10/22/18 (independently reviewed and interpreted by me)  Ratio 84  FVC 3.27/84%  FEV1 2.76/88%  TLC 4.47/83%  DLCO 24.32/90%  Variable effort.  Normal PFTs.  No bronchodilator response.  No comparative data available.    Radiology (independently reviewed and interpreted by me): CXR 8/29/18 showed NACPD       Assessment/Plan     Sudhakar was seen today for shortness of breath.    Diagnoses and all orders for this visit:    Dyspnea on exertion  -     Full Pulmonary Function Test With Bronchodilator    Chronic obstructive pulmonary disease, unspecified COPD type (CMS/[facility])  -     tiotropium bromide monohydrate (SPIRIVA RESPIMAT) 2.5 MCG/ACT aerosol solution inhaler; Inhale 2 puffs Daily.    JACQUE (obstructive sleep apnea)    Morbid obesity (CMS/[facility])    Cigarette nicotine dependence, uncomplicated         Discussion/ Recommendations:   While PFTs were suboptimal, there is no evidence of obstruction present.  Recent chest imaging was also unremarkable.  Given the patient's extensive smoking history, I think she likely does have some early COPD.  She is already on albuterol and using it daily with benefit so I think it is time to escalate to a long-acting bronchodilator as well.  Also, she does continue to smoke but has cut back to the family is committed to complete cessation.  I do think that some of her dyspnea is also due to her significant weight gain as well as deconditioning and counseled the patient that bronchodilators will not change her dyspnea from this affect. She does have several risk factors for cardiac disease and I think it is reasonable for her to continue with plans for a cardiology evaluation.  Unfortunately, she does not tolerate CPAP even though I think she would benefit from its use.    -Start Spiriva Respimat 2 puffs daily.  Sample provided and instructed on use.  -Continue albuterol as needed for dyspnea or wheeze.  -I  advised Sudahkar of the risks of continuing to use tobacco, and I provided her with tobacco cessation educational materials in the After Visit Summary.  Recommended that she try one of the nicotine replacement products over-the-counter.  During this visit, I spent 4 minutes counseling the patient regarding tobacco cessation.  -Consider repeat sleep apnea evaluation for PAP desensitization.  -Cardiology evaluation as planned.  -Recommended efforts at weight loss through diet and exercise.  -Encouraged her to get the flu vaccine soon.      Patient's Body mass index is 45.82 kg/m². BMI is above normal parameters. Recommendations include: exercise counseling.           Return in about 4 weeks (around 11/19/2018) for Recheck COPD.      Thank you for allowing me to participate in the care of Sudhakar Saul. Please do not hesitate to contact me with any questions.         This document has been electronically signed by Debo Will MD on October 22, 2018 11:26 AM      Dictated using Dragon

## 2018-12-13 ENCOUNTER — OFFICE VISIT (OUTPATIENT)
Dept: FAMILY MEDICINE CLINIC | Facility: CLINIC | Age: 43
End: 2018-12-13

## 2018-12-13 VITALS
HEART RATE: 110 BPM | TEMPERATURE: 99.1 F | OXYGEN SATURATION: 97 % | BODY MASS INDEX: 45.29 KG/M2 | SYSTOLIC BLOOD PRESSURE: 152 MMHG | HEIGHT: 66 IN | DIASTOLIC BLOOD PRESSURE: 100 MMHG | WEIGHT: 281.8 LBS

## 2018-12-13 DIAGNOSIS — M25.50 MULTIPLE JOINT PAIN: ICD-10-CM

## 2018-12-13 DIAGNOSIS — Z23 NEED FOR VACCINATION: Primary | ICD-10-CM

## 2018-12-13 DIAGNOSIS — Z71.6 TOBACCO ABUSE COUNSELING: ICD-10-CM

## 2018-12-13 DIAGNOSIS — E11.65 UNCONTROLLED TYPE 2 DIABETES MELLITUS WITH HYPERGLYCEMIA (HCC): ICD-10-CM

## 2018-12-13 DIAGNOSIS — Z01.00 ENCOUNTER FOR EYE EXAM: ICD-10-CM

## 2018-12-13 DIAGNOSIS — Z86.19 HISTORY OF POSITIVE HEPATITIS C: ICD-10-CM

## 2018-12-13 DIAGNOSIS — I10 ESSENTIAL HYPERTENSION: ICD-10-CM

## 2018-12-13 DIAGNOSIS — J44.9 CHRONIC OBSTRUCTIVE PULMONARY DISEASE, UNSPECIFIED COPD TYPE (HCC): ICD-10-CM

## 2018-12-13 DIAGNOSIS — B18.2 CHRONIC HEPATITIS C WITHOUT HEPATIC COMA (HCC): ICD-10-CM

## 2018-12-13 DIAGNOSIS — G40.909 SEIZURE DISORDER (HCC): ICD-10-CM

## 2018-12-13 PROCEDURE — 90471 IMMUNIZATION ADMIN: CPT | Performed by: FAMILY MEDICINE

## 2018-12-13 PROCEDURE — 99214 OFFICE O/P EST MOD 30 MIN: CPT | Performed by: FAMILY MEDICINE

## 2018-12-13 PROCEDURE — 90732 PPSV23 VACC 2 YRS+ SUBQ/IM: CPT | Performed by: FAMILY MEDICINE

## 2018-12-13 RX ORDER — OMEPRAZOLE 40 MG/1
40 CAPSULE, DELAYED RELEASE ORAL DAILY
Qty: 30 CAPSULE | Refills: 3 | Status: SHIPPED | OUTPATIENT
Start: 2018-12-13 | End: 2019-02-26

## 2018-12-13 RX ORDER — LEVETIRACETAM 1000 MG/1
1000 TABLET ORAL 2 TIMES DAILY
Qty: 60 TABLET | Refills: 3 | Status: SHIPPED | OUTPATIENT
Start: 2018-12-13 | End: 2019-07-07 | Stop reason: SDUPTHER

## 2018-12-13 RX ORDER — LISINOPRIL 40 MG/1
40 TABLET ORAL DAILY
Qty: 30 TABLET | Refills: 3 | Status: SHIPPED | OUTPATIENT
Start: 2018-12-13 | End: 2019-07-26 | Stop reason: SDUPTHER

## 2018-12-13 RX ORDER — SIMVASTATIN 20 MG
20 TABLET ORAL NIGHTLY
Qty: 30 TABLET | Refills: 3 | Status: SHIPPED | OUTPATIENT
Start: 2018-12-13 | End: 2019-07-26 | Stop reason: SDUPTHER

## 2018-12-13 RX ORDER — METOPROLOL TARTRATE 50 MG/1
50 TABLET, FILM COATED ORAL EVERY 12 HOURS SCHEDULED
Qty: 60 TABLET | Refills: 3 | Status: SHIPPED | OUTPATIENT
Start: 2018-12-13 | End: 2019-07-26 | Stop reason: SDUPTHER

## 2018-12-13 RX ORDER — ALBUTEROL SULFATE 90 UG/1
2 AEROSOL, METERED RESPIRATORY (INHALATION) EVERY 6 HOURS PRN
Qty: 1 INHALER | Refills: 3 | Status: SHIPPED | OUTPATIENT
Start: 2018-12-13 | End: 2019-07-26 | Stop reason: SDUPTHER

## 2018-12-13 RX ORDER — ERGOCALCIFEROL 1.25 MG/1
50000 CAPSULE ORAL
Qty: 4 CAPSULE | Refills: 3 | Status: SHIPPED | OUTPATIENT
Start: 2018-12-13 | End: 2019-07-26 | Stop reason: SDUPTHER

## 2018-12-13 RX ORDER — NAPROXEN 500 MG/1
500 TABLET ORAL 2 TIMES DAILY WITH MEALS
Qty: 60 TABLET | Refills: 3 | Status: SHIPPED | OUTPATIENT
Start: 2018-12-13 | End: 2019-07-26

## 2018-12-13 NOTE — PROGRESS NOTES
Subjective   Sudhakar Saul is a 43 y.o. female.     Problem List  1. Seizure Disorder  2. History of drug use of crystal meth, opiod abuse ([place])   3. Morbid Obesity BMI >40  4. Essential hypertension   5. Tobacco user   6. CAD?   7. Hep C positive/chronic hep C  8. Chronic back pain   9. Hiatal hernia   10. Memory problems/Neuropathy   11. Diabetes type 2  12. Vitamin D deficiency  13. ASCVD risk high  14 .Early COPD       PShx  -appendectomy  -cholecystectomy  -total hysterectomy secondary to endometrial carcinoma  -lymph gland removal in abdomen   - 9 lipomas removed.    -lumpectomy on left breast   -L4 dissectomy   -right hand carpal tunnel surgery   -teeth removal   -liver biopsy     SocialHx  -tobacco user-  Smoke 1-3 ppd. For >10 years   --no alcohol use  -history of illicit drug use. Has been clean for 4 months  -  -no children  -currently unemployed used to work for [employer], [employer] in [place], and [employer],      FamilyHx  -mother - MI, obesity, Arthritis  -father -Serious bone and joint problems     Pt is 41 yo female who I am seeing for the first time. She is here to establish.  She has not seen a PCP in [facility]. She is orginally from [place] and moved here a few weeks ago. She has been living on and off in [facility]. She is a recovering drug addict and was doing crystal meth and opoids, and heroin She has been clean for almost 4 months.  She currently is part of an outpatient program for drug addiction. She meets with them 3 times a week. She has random drug screening. She sees counseling.  She lives with mother and currently she is unemployed and seeking disability.  She started having siezures about 2 years ago that became more frequent after she overdose on metamphetamine and heroin. She was a drug dealer   She was recently at [facility] ER on 8/29/18. Prior to ER visit she was having had blurred vision and then remembered being on the floor. Per witness she landed on right arm.  She also had numbness in left fingers with tingling and carmpaing.  She was confused after episode. She took lithium for seizure. She is seeing Neurologist in [place] and her last seizure was a gee ago.  She currently lives in a women's shelter with history of drug use. Labs were drawn and pts labs showed low levels of salicytlate and acetaminophen.  CMP showed normal renal and liver function with elevated glucose level. Levetiracetam level was normal. UA showed 4+ bacterial with 3-5 WBC. Urine drug screen was normal. Chest x-ray - CT of brain and elbow x-ray were all unremarkable.  Pt also has history of hypertension and takes lisinopril 20 mg PO q dominguez along with lopressor 50 mg PO BID and HCTZ 25 mg P Oq daily.  For seizures she is on keppra 500 mg PO BID. She has had extensive surgeries     10/3/18 pt is here for recheck and followup. Since last visit pt has had labwork that showed hga1c at 7.1 and pt is no on medication. Thyroid studies were nornmal Vitamin D was low. Hepatitis panel showed antibodies for Hep C but Hep C RNA and genotype were negative. She also was negative for HIV 1-2. Her keppra level was not at goal CMP showed glucose at 195 with CR at 0.54 and GFR at 123. Liver enzymes were stable.. She was on metformin before but did not like side effects Her main issue today is smoking and dyspnea on exertion that is getting worse. She was smoking 3 ppd and is now down to 5-6 cigarettes a day. She is willing to try nicotine patch. She has not had PFTs. Or been diagnosed with COPD. Her other issue is she wants to have weight loss surgery. And would like a referral     12/13/18 pt is here for recheck and followup. She was recently at [facility] For headaches and trauma and CT of head was negative after suffereing a seizure for a couple of minutes at home. It was witnessed She was found to jerking and eyes were rolling  She also had x-ray of left hand and wrist which was negative. Her sugar was found to be >500.  She also has a history of DM type 2, Chronic hepatitis C. She has been stressed lately due to recent death in the family .She has not followed up with her Consultations. She was referred to Gastroenterologist for chronic Hep C. Neurologist for Seizure disorder. She has seen Pulmonologist for early stages of COPD and she is on spiriva which is helipng with breathing. ? She is down on smoking to 1/2 ppd.   She has only had 1 seizure since last episode.  Her BP is elevated today. She noticed her sugars started getting high in the 500s for the past few weeks. She states she has not been eating much carbs.  Her last hga1c was 7.1. She has not been taking her metformin and her januvia due to insurance issues         Seizures    This is a chronic problem. The current episode started more than 1 week ago. The problem has been gradually worsening. Associated symptoms include sleepiness, confusion, headaches and visual disturbances. Pertinent negatives include no neck stiffness, no sore throat, no chest pain, no cough, no nausea, no vomiting, no diarrhea and no muscle weakness.   Obesity   This is a chronic problem. The current episode started more than 1 year ago. The problem occurs constantly. The problem has been unchanged. Associated symptoms include arthralgias, fatigue, headaches, numbness and weakness. Pertinent negatives include no abdominal pain, anorexia, change in bowel habit, chest pain, chills, congestion, coughing, fever, joint swelling, myalgias, nausea, neck pain, rash, sore throat, swollen glands, urinary symptoms, vertigo, visual change or vomiting. Nothing aggravates the symptoms. She has tried nothing for the symptoms.   Hypertension   This is a chronic problem. The current episode started more than 1 year ago. The problem is uncontrolled. Associated symptoms include headaches. Pertinent negatives include no anxiety, blurred vision, chest pain, malaise/fatigue, neck pain, orthopnea, palpitations,  peripheral edema, PND or shortness of breath. Risk factors for coronary artery disease include obesity, sedentary lifestyle, smoking/tobacco exposure and stress. Past treatments include ACE inhibitors, beta blockers and diuretics. There are no compliance problems.  There is no history of angina, kidney disease, CAD/MI, CVA, heart failure, left ventricular hypertrophy, PVD or retinopathy. There is no history of chronic renal disease, coarctation of the aorta, hyperaldosteronism, hypercortisolism, hyperparathyroidism, a hypertension causing med, pheochromocytoma, renovascular disease, sleep apnea or a thyroid problem.   Diabetes   She presents for her initial diabetic visit. She has type 2 diabetes mellitus. Her disease course has been worsening. Hypoglycemia symptoms include confusion, headaches, nervousness/anxiousness, seizures and sleepiness. Associated symptoms include fatigue and weakness. Pertinent negatives for diabetes include no blurred vision, no chest pain and no visual change. Pertinent negatives for diabetic complications include no CVA, PVD or retinopathy. Risk factors for coronary artery disease include dyslipidemia, obesity and post-menopausal. She has not had a previous visit with a dietitian. There is no change in her home blood glucose trend. An ACE inhibitor/angiotensin II receptor blocker is not being taken. She does not see a podiatrist.Eye exam is not current.   Shortness of Breath   This is a chronic problem. The current episode started more than 1 year ago. The problem occurs constantly. The problem has been gradually worsening. Associated symptoms include headaches. Pertinent negatives include no abdominal pain, chest pain, claudication, coryza, ear pain, fever, hemoptysis, leg pain, leg swelling, neck pain, orthopnea, PND, rash, sore throat, sputum production, swollen glands, syncope, vomiting or wheezing. Nothing aggravates the symptoms. The patient has no known risk factors for DVT/PE.  She has tried nothing for the symptoms. The treatment provided no relief. There is no history of allergies, aspirin allergies, asthma, bronchiolitis, CAD, chronic lung disease, COPD, DVT, a heart failure, PE, pneumonia or a recent surgery.              The following portions of the patient's history were reviewed and updated as appropriate: allergies, current medications, past family history, past medical history, past social history, past surgical history and problem list.  Patient Active Problem List   Diagnosis   • Annual physical exam   • General medical examination   • Encounter for screening for diabetes mellitus   • Encounter for screening for cardiovascular disorders   • Encounter for screening for endocrine disorder   • Seizure disorder (CMS/HCC)   • Morbid obesity (CMS/[facility])   • Tobacco abuse counseling   • Multiple joint pain   • Dysuria   • Encounter for screening for HIV   • History of positive hepatitis C   • Essential hypertension   • Uncontrolled type 2 diabetes mellitus with hyperglycemia (CMS/[facility])   • Vitamin D deficiency   • Urinary tract infection without hematuria   • Chronic hepatitis C without hepatic coma (CMS/[facility])   • Dyspnea   • Cigarette nicotine dependence, uncomplicated   • Chronic obstructive pulmonary disease (CMS/[facility])   • JACQUE (obstructive sleep apnea)     Current Outpatient Medications on File Prior to Visit   Medication Sig Dispense Refill   • nicotine (NICODERM CQ) 21 MG/24HR patch Place 1 patch on the skin as directed by provider Daily. 30 patch 3   • [DISCONTINUED] albuterol (PROVENTIL HFA;VENTOLIN HFA) 108 (90 Base) MCG/ACT inhaler Inhale 2 puffs Every 6 (Six) Hours As Needed for Wheezing or Shortness of Air. 1 inhaler 3   • [DISCONTINUED] hydrochlorothiazide (HYDRODIURIL) 25 MG tablet Take 1 tablet by mouth Daily. 30 tablet 3   • [DISCONTINUED] levETIRAcetam (KEPPRA) 500 MG tablet Take 1 tablet by mouth Every 12 (Twelve) Hours. 60 tablet 3   • [DISCONTINUED] lisinopril  (PRINIVIL,ZESTRIL) 20 MG tablet Take 1 tablet by mouth Daily. 30 tablet 3   • [DISCONTINUED] metoprolol tartrate (LOPRESSOR) 50 MG tablet Take 1 tablet by mouth Every 12 (Twelve) Hours. 60 tablet 3   • [DISCONTINUED] naproxen (NAPROSYN) 500 MG tablet Take 1 tablet by mouth 2 (Two) Times a Day With Meals. 60 tablet 3   • [DISCONTINUED] nitrofurantoin, macrocrystal-monohydrate, (MACROBID) 100 MG capsule Take 1 capsule by mouth Every 12 (Twelve) Hours. 14 capsule 0   • [DISCONTINUED] simvastatin (ZOCOR) 20 MG tablet Take 1 tablet by mouth Every Night. 30 tablet 3   • [DISCONTINUED] SITagliptin (JANUVIA) 100 MG tablet Take 1 tablet by mouth Daily. 30 tablet 3   • [DISCONTINUED] sulfamethoxazole-trimethoprim (BACTRIM DS,SEPTRA DS) 800-160 MG per tablet      • [DISCONTINUED] tiotropium bromide monohydrate (SPIRIVA RESPIMAT) 2.5 MCG/ACT aerosol solution inhaler Inhale 2 puffs Daily. 1 inhaler 11   • [DISCONTINUED] vitamin D (ERGOCALCIFEROL) 90800 units capsule capsule Take 1 capsule by mouth Every 7 (Seven) Days. 4 capsule 3     No current facility-administered medications on file prior to visit.        Review of Systems   Constitutional: Positive for activity change and fatigue. Negative for chills, fever and malaise/fatigue.   HENT: Negative for congestion, ear pain and sore throat.    Eyes: Positive for visual disturbance. Negative for blurred vision.   Respiratory: Negative.  Negative for cough, hemoptysis, sputum production, shortness of breath and wheezing.    Cardiovascular: Negative.  Negative for chest pain, palpitations, orthopnea, claudication, leg swelling, syncope and PND.   Gastrointestinal: Negative.  Negative for abdominal pain, anorexia, change in bowel habit, diarrhea, nausea and vomiting.   Endocrine: Negative.    Genitourinary: Positive for dysuria and frequency.   Musculoskeletal: Positive for arthralgias. Negative for joint swelling, myalgias and neck pain.   Skin: Negative.  Negative for rash.  "  Allergic/Immunologic: Negative.    Neurological: Positive for seizures, weakness, numbness and confusion. Negative for vertigo.   Hematological: Negative.    Psychiatric/Behavioral: Positive for decreased concentration. The patient is nervous/anxious.        Objective    /100   Pulse 110   Temp 99.1 °F (37.3 °C)   Ht 167.6 cm (66\")   Wt 128 kg (281 lb 12.8 oz)   SpO2 97%   BMI 45.48 kg/m²      /100   Pulse 110   Temp 99.1 °F (37.3 °C)   Ht 167.6 cm (66\")   Wt 128 kg (281 lb 12.8 oz)   SpO2 97%   BMI 45.48 kg/m²         Lab on 09/19/2018   Component Date Value Ref Range Status   • WBC 09/19/2018 6.70  3.20 - 9.80 10*3/mm3 Final   • RBC 09/19/2018 4.41  3.77 - 5.16 10*6/mm3 Final   • Hemoglobin 09/19/2018 12.7  12.0 - 15.5 g/dL Final   • Hematocrit 09/19/2018 38.9  35.0 - 45.0 % Final   • MCV 09/19/2018 88.2  80.0 - 98.0 fL Final   • MCH 09/19/2018 28.8  26.5 - 34.0 pg Final   • MCHC 09/19/2018 32.6  31.4 - 36.0 g/dL Final   • RDW 09/19/2018 14.1  11.5 - 14.5 % Final   • RDW-SD 09/19/2018 45.7  36.4 - 46.3 fl Final   • MPV 09/19/2018 11.3  8.0 - 12.0 fL Final   • Platelets 09/19/2018 184  150 - 450 10*3/mm3 Final   • Neutrophil % 09/19/2018 55.6  37.0 - 80.0 % Final   • Lymphocyte % 09/19/2018 37.3  10.0 - 50.0 % Final   • Monocyte % 09/19/2018 4.3  0.0 - 12.0 % Final   • Eosinophil % 09/19/2018 2.2  0.0 - 7.0 % Final   • Basophil % 09/19/2018 0.3  0.0 - 2.0 % Final   • Immature Grans % 09/19/2018 0.3  0.0 - 0.5 % Final   • Neutrophils, Absolute 09/19/2018 3.72  2.00 - 8.60 10*3/mm3 Final   • Lymphocytes, Absolute 09/19/2018 2.50  0.60 - 4.20 10*3/mm3 Final   • Monocytes, Absolute 09/19/2018 0.29  0.00 - 0.90 10*3/mm3 Final   • Eosinophils, Absolute 09/19/2018 0.15  0.00 - 0.70 10*3/mm3 Final   • Basophils, Absolute 09/19/2018 0.02  0.00 - 0.20 10*3/mm3 Final   • Immature Grans, Absolute 09/19/2018 0.02  0.00 - 0.02 10*3/mm3 Final   • Glucose 09/19/2018 195* 60 - 100 mg/dL Final   • BUN " 09/19/2018 12  7 - 21 mg/dL Final   • Creatinine 09/19/2018 0.54  0.50 - 1.00 mg/dL Final   • Sodium 09/19/2018 137  137 - 145 mmol/L Final   • Potassium 09/19/2018 4.4  3.5 - 5.1 mmol/L Final   • Chloride 09/19/2018 106  95 - 110 mmol/L Final   • CO2 09/19/2018 20.0* 22.0 - 31.0 mmol/L Final   • Calcium 09/19/2018 9.4  8.4 - 10.2 mg/dL Final   • Total Protein 09/19/2018 7.5  6.3 - 8.6 g/dL Final   • Albumin 09/19/2018 4.00  3.40 - 4.80 g/dL Final   • ALT (SGPT) 09/19/2018 33  9 - 52 U/L Final   • AST (SGOT) 09/19/2018 29  14 - 36 U/L Final   • Alkaline Phosphatase 09/19/2018 86  38 - 126 U/L Final   • Total Bilirubin 09/19/2018 0.3  0.2 - 1.3 mg/dL Final   • eGFR Non African Amer 09/19/2018 123  58 - 135 mL/min/1.73 Final   • Globulin 09/19/2018 3.5  2.3 - 3.5 gm/dL Final   • A/G Ratio 09/19/2018 1.1  1.1 - 1.8 g/dL Final   • BUN/Creatinine Ratio 09/19/2018 22.2  7.0 - 25.0 Final   • Anion Gap 09/19/2018 11.0  5.0 - 15.0 mmol/L Final   • Hemoglobin A1C 09/19/2018 7.1* 4 - 5.6 % Final   • TSH 09/19/2018 1.770  0.460 - 4.680 mIU/mL Final   • Free T4 09/19/2018 1.16  0.78 - 2.19 ng/dL Final   • T3, Free 09/19/2018 3.0  2.0 - 4.4 pg/mL Final   • 25 Hydroxy, Vitamin D 09/19/2018 24.2* 30.0 - 100.0 ng/ml Final   • Hepatitis C Ab 09/19/2018 Reactive* Negative Final   • Hep A IgM 09/19/2018 Negative  Negative Final   • Hep B C IgM 09/19/2018 Negative  Negative Final   • Hepatitis B Surface Ag 09/19/2018 Negative  Negative Final   • Hepatitis C Quantitation 09/19/2018 HCV Not Detected  IU/mL Final   • Test Information 09/19/2018 Comment   Final    The quantitative range of this assay is 15 IU/mL to 100 million IU/mL.   • Please note 09/19/2018 Comment   Final    This test was developed and its performance characteristics determined  by [facility].  It has not been cleared or approved by the U.S. Food and  Drug Administration.  The FDA has determined that such clearance or approval is not  necessary. This test is used for  clinical purposes.  It should not be  regarded as investigational or for research.   • Hepatitis C Genotype 09/19/2018 Comment   Final    Specimen has insufficient hepatitis C virus RNA to obtain genotyping  results. This genotyping assay should only be used for known HCV  positive patients with HCV RNA levels above 1000 IU/mL.   • Levetiracetam 09/19/2018 None Detected  10.0 - 40.0 ug/mL Final   • Amphetamine Screen, Urine 09/19/2018 Negative  Negative Final   • Barbiturates Screen, Urine 09/19/2018 Negative  Negative Final   • Benzodiazepine Screen, Urine 09/19/2018 Negative  Negative Final   • Cocaine Screen, Urine 09/19/2018 Negative  Negative Final   • Methadone Screen, Urine 09/19/2018 Negative  Negative Final   • Opiate Screen 09/19/2018 Negative  Negative Final   • Oxycodone Screen, Urine 09/19/2018 Negative  Negative Final   • THC, Screen, Urine 09/19/2018 Negative  Negative Final   • HIV-1/ HIV-2 09/19/2018 Negative  Negative Final   • Color, UA 09/19/2018 Yellow  Yellow, Straw Final   • Appearance, UA 09/19/2018 Cloudy* Clear Final   • pH, UA 09/19/2018 6.0  5.0 - 8.0 Final   • Specific Gravity, UA 09/19/2018 1.005  1.005 - 1.030 Final   • Glucose, UA 09/19/2018 Negative  Negative Final   • Ketones, UA 09/19/2018 Trace* Negative Final   • Bilirubin, UA 09/19/2018 Negative  Negative Final   • Blood, UA 09/19/2018 Negative  Negative Final   • Protein, UA 09/19/2018 30 mg/dL (1+)* Negative Final   • Leuk Esterase, UA 09/19/2018 Negative  Negative Final   • Nitrite, UA 09/19/2018 Negative  Negative Final   • Urobilinogen, UA 09/19/2018 0.2 E.U./dL  0.2 - 1.0 E.U./dL Final   • RBC, UA 09/19/2018 0-2* None Seen /HPF Final   • WBC, UA 09/19/2018 6-12* None Seen, 0-2, 3-5 /HPF Final   • Bacteria, UA 09/19/2018 4+* None Seen /HPF Final   • Squamous Epithelial Cells, UA 09/19/2018 None Seen  None Seen, 0-2 /HPF Final   • Hyaline Casts, UA 09/19/2018 None Seen  None Seen /LPF Final   • Methodology 09/19/2018  Automated Microscopy   Final   • Total Cholesterol 09/25/2018 183  0 - 199 mg/dL Final   • Triglycerides 09/25/2018 76  20 - 199 mg/dL Final   • HDL Cholesterol 09/25/2018 72  60 - 200 mg/dL Final   • LDL Cholesterol  09/25/2018 99  1 - 129 mg/dL Final   • LDL/HDL Ratio 09/25/2018 1.33  0.00 - 3.22 Final     Procedure: XR ELBOW 3+ VW RIGHT     Indication:  Persistent pain after recent fall       Technique: Three views .      Prior Relevant Exam: None     Small osteophyte tip of the olecranon. No acute bony abnormality.  Joint spaces intact. Mineralization normal.         IMPRESSION:  CONCLUSION:     1. Mild chronic changes without acute bony abnormality      Electronically signed by:  Finn Haynes MD  8/29/2018 4:41 PM CDT  Workstation: [id number]    Noncontrast CT examination of the brain.     INDICATION:  Right lower extremity weakness. Seizure.      Technique: Axial, coronal, sagittal, without contrast. This exam  was performed according to our departmental dose-optimization  program which includes automated exposure control, adjustment of  the mA and/or kV according to patient size and/or use of  iterative reconstruction technique.        Prior relevant examination: None.        Limitations: None     Brain parenchyma appears within normal limits. Ventricles are  within normal limits in size. Normal gray-white matter  differentiation. No evidence of abnormal mass or calcification is  seen. No evidence of acute hemorrhage is noted.     The mastoid air cells are well aerated bilaterally.      The visualized paranasal sinuses appear well aerated bilaterally.     Calvarium intact.        IMPRESSION:  CONCLUSION:     1. Normal brain parenchyma for age    EXAM:          Radiograph(s), Chest   VIEWS:    PA / Lat ; 2       DATE/TIME:  8/29/2018 2:08 PM CDT                INDICATION:   seizure    COMPARISON:  CXR: none             FINDINGS:             - lines/tubes:    none     - cardiac:         size within normal  limits         - mediastinum: contour within normal limits         - lungs:         no focal air space process, pulmonary  interstitial edema, nodule(s)/mass             - pleura:         no evidence of  fluid                  - osseous:         unremarkable for age                  - misc.:       IMPRESSION:  CONCLUSION:        1. No evidence of an active cardiopulmonary process.                                       Date: 9/19/2018 Department: [facility] DRAW STATION Released By: Azra Heath Authorizing: Jermaine Ferro MD   Procedure Abnormality Status   HIV-1 & HIV-2 Antibodies Normal Final result     Ref Range & Units 2wk ago    Hepatitis C Quantitation IU/mL HCV Not Detected    Test Information  Comment    Comment: The quantitative range of this assay is 15 IU/mL to 100 million IU/mL.   Resulting Agency  [facility]   Narrative     Performed at:   - [address]  [id number]  : Travis Hart MD, Phone:  [phone]        Ref Range & Units 2wk ago    Hepatitis C Ab Negative Reactive     Hep A IgM Negative Negative    Hep B C IgM Negative Negative    Hepatitis B Surface Ag Negative Negative    Resulting [facility]          Hepatitis C Genotype   Order: [id number]   Status:  Final result   Visible to patient:  No (Not Released) Dx:  History of positive hepatitis C; Gene...   Component 2wk ago   Please note Comment    Comment: This test was developed and its performance characteristics determined   by [facility].  It has not been cleared or approved by the U.S. Food and   Drug Administration.   The FDA has determined that such clearance or approval is not   necessary. This test is used for clinical purposes.  It should not be   regarded as investigational or for research.   Hepatitis C Genotype Comment    Comment: Specimen has insufficient hepatitis C virus RNA to obtain genotyping   results. This genotyping  assay should only be used for known HCV   positive patients with HCV RNA levels above 1000 IU/mL.   Resulting Agency [email or web address]   Narrative     Performed at:  01 - [facility] [address]  [id number]  : Travis Hart MD, Phone:  [phone]      Specimen Collected: 09/19/18 09:01 Last Resulted: 09/24/18 16:10                          Electronically signed by:  SHIRLEY Tiwari MD  8/29/2018 2:09  PM CDT Workstation: [id number]    Status:  Final result   Visible to patient:  No (Not Released)    Ref Range & Units 3wk ago   Amphetamine Screen, Urine Negative Negative    Barbiturates Screen, Urine Negative Negative    Benzodiazepine Screen, Urine Negative Negative    Cocaine Screen, Urine Negative Negative    Methadone Screen, Urine Negative Negative    Opiate Screen Negative Negative    Oxycodone Screen, Urine Negative Negative    THC, Screen, Urine Negative Negative    Resulting [facility]   Narrative     Negative Thresholds For Drugs Screened in Urine:     Amphetamines          500 ng/ml  Barbiturates          200 ng/ml  Benzodiazepines       200 ng/ml  Cocaine               150 ng/ml  Methadone             300 ng/mL  Opiates               300 ng/mL  Oxycodone             100 ng/mL  THC                   20 ng/mL    The normal value for all drugs tested is negative. This report includes final unconfirmed screening results.  A positive result by this assay can be, at your request, sent to the Reference Lab for confirmation by gas chromatography. Unconfirmed results must not be used for non-medical purposes, such as employment or legal testing. Clinical consideration should be applied to any drug of abuse test result, particularly when unconfirmed results are used.      Specimen Collected: 08/29/18 15:18 Last Resulted: 08/29/18 15:59              Study Result        EXAM:          Radiograph(s), Chest   VIEWS:    PA / Lat ; 2       DATE/TIME:  8/29/2018 2:08 PM CDT                 INDICATION:   seizure    COMPARISON:  CXR: none             FINDINGS:             - lines/tubes:    none     - cardiac:         size within normal limits         - mediastinum: contour within normal limits         - lungs:         no focal air space process, pulmonary  interstitial edema, nodule(s)/mass             - pleura:         no evidence of  fluid                  - osseous:         unremarkable for age                  - misc.:       IMPRESSION:  CONCLUSION:        1. No evidence of an active cardiopulmonary process.                                                  Electronically signed by:  SHIRLEY Tiwari MD  8/29/2018 2:09  PM CDT Workstation: [id number]       Physical Exam   Constitutional: She is oriented to person, place, and time. She appears well-developed and well-nourished. No distress.   HENT:   Head: Normocephalic and atraumatic.   Right Ear: External ear normal.   Left Ear: External ear normal.   Eyes: Conjunctivae and EOM are normal. Left eye exhibits no discharge. No scleral icterus.   Neck: Normal range of motion. Neck supple. No JVD present. No tracheal deviation present. No thyromegaly present.   Cardiovascular: Normal rate, regular rhythm and normal heart sounds.   Pulmonary/Chest: Effort normal. No respiratory distress. She has no wheezes.   Decrease breath sounds.     Abdominal: Soft. She exhibits no distension and no mass. There is no tenderness. There is no guarding.   Obese abdomen    Musculoskeletal: Normal range of motion. She exhibits tenderness. She exhibits no edema or deformity.   Lymphadenopathy:     She has no cervical adenopathy.   Neurological: She is alert and oriented to person, place, and time. She displays normal reflexes. No cranial nerve deficit. Coordination normal.   Skin: Skin is warm and dry. No rash noted. She is not diaphoretic. No erythema. No pallor.   Psychiatric: She has a normal mood and affect. Her behavior is normal.   Nursing note and vitals  reviewed.        Assessment/Plan   Problems Addressed this Visit        Cardiovascular and Mediastinum    Essential hypertension    Relevant Medications    lisinopril (PRINIVIL,ZESTRIL) 40 MG tablet    metoprolol tartrate (LOPRESSOR) 50 MG tablet       Respiratory    Chronic obstructive pulmonary disease (CMS/HCC)    Relevant Medications    albuterol 108 (90 Base) MCG/ACT inhaler    tiotropium bromide monohydrate (SPIRIVA RESPIMAT) 2.5 MCG/ACT aerosol solution inhaler       Digestive    History of positive hepatitis C    Chronic hepatitis C without hepatic coma (CMS/HCC)       Endocrine    Uncontrolled type 2 diabetes mellitus with hyperglycemia (CMS/HCC)    Relevant Medications    Insulin Glargine (LANTUS SOLOSTAR) 100 UNIT/ML injection pen    metFORMIN (GLUCOPHAGE) 500 MG tablet    insulin aspart (NOVOLOG FLEXPEN) 100 UNIT/ML solution pen-injector sc pen       Nervous and Auditory    Seizure disorder (CMS/HCC) - Primary    Relevant Medications    levETIRAcetam (KEPPRA) 1000 MG tablet    Multiple joint pain       Other    Tobacco abuse counseling      -went over test results today   -history of Hep C will get Hepatitis panel Hep C RNA and genotype negative .Gastroenterology referral. Consultation appreiciated appt coming up soon   -pneumonia 23 vaccination today   -HIV screening test was negative  -for dsypnea- referred to Dr. Will (Pulmonologsit) for PFTs. Consultation appreciated. Recent chest x-ray negative. Will start on albuterol inhaler PRN.Advised pt to stop smoking. She is on spirva now for early COPD  -multiple joint pain -- naproxen 500 mg PO BID   -for multiple joint pain - pt cannot take opoids. Will start on naproxen 500 mg twice a day. Drug information provided  -morbid obesity -counseled on weight loss, diet and exercise. Diet information provided.  Also bariatric weight loss surgery information provided. Will refer to Bariatric surgery. Dr. Benavides. Consultation appreciated. Pt may need to see  CArdiology and behavioral health and weight checks for 6 mnonths straight  -hyperlipdiemia- simvastatin 20 mg PO qhs. ASCVD risk high. Recommend to take with coenzyme Q10   -for DM type 2- will restart metformin 500 mg twice a day. Pt will go back on Lantus 20 units at bedtime. She will try tapering up dose by 2-3 units every 3 days until morning sugars at goal. At 80 -130.  Also will start on novolog short acting. Will try 2 units per 15 garms of carbs.  Gave carb counting book information today.  Consider jardiance or rulicity depending on hga1c.  Wrote precription for testing strips and lancets today   -pt has labwork pending  -pnuemonia 23 vaccination   -annual physical exam today  -seizure disorder -currently on Keppra  500 mg PO BID. Will refer to Dr. Okeefe.  Services appreciated. She missed appt.  Will go up on Keppra from 500 to 1000 mg twice a day   -counseled pt to quit smoking >5 minutes.  Gave tobacco cessation material   -hypertension - at goal today Continue on lisinopril but will go up on dosage from 20 to 40 mg dialy.  mg Po q dominguez, metoprolol 50 mg PO BID . Stop HCTZ for now. Consider Norvasc or calcium channel blocker if not improving   -advised pt to be safe and call with any questions or concerns. All questions addressed today  -recheck in 2   weeks for BP recheck and labwork  -will fill out disability form today.          This document has been electronically signed by Jermaine Ferro MD on December 13, 2018 2:57 PM

## 2018-12-13 NOTE — PATIENT INSTRUCTIONS
Start on Lanuts 25 units at bedtime.      Can go up by Lantus 2-3 units every 3 days until morning sugars at goal Goal in morning .  2 hours after meal <180  Record and keep track    Try novolog or humalog short acting insulin before meals. Try 1 units per 7 grams of carbs or 2 units per 15 games of carbs. For example if you eat 60 grams of carbs in a meal  -  60/15 = 4 x 2 units = 8 units.      Stop januvia    Go back on metfromin 500 mg twice a day.      Recheck with another PCP in about 2-4 weeks    Consider Jardiance or trulicity or similar if medication is not helping and hga1c is out of control     Go up on lisionpril from 20 to 40 mg daily    Continue lopressor 50 mg twice a day    Go up on keppra from 500 to 1000 mg twice a day    Call Dr. Okeefe (Neurologist) for appt     Recheck with another PCP in 2-4 weeks     Get labwork soon

## 2018-12-14 RX ORDER — INSULIN GLARGINE 100 [IU]/ML
INJECTION, SOLUTION SUBCUTANEOUS
Qty: 3 ML | Refills: 6 | Status: SHIPPED | OUTPATIENT
Start: 2018-12-14 | End: 2019-01-15 | Stop reason: SDUPTHER

## 2019-01-15 ENCOUNTER — LAB (OUTPATIENT)
Dept: LAB | Facility: HOSPITAL | Age: 44
End: 2019-01-15

## 2019-01-15 ENCOUNTER — OFFICE VISIT (OUTPATIENT)
Dept: GASTROENTEROLOGY | Facility: CLINIC | Age: 44
End: 2019-01-15

## 2019-01-15 ENCOUNTER — OFFICE VISIT (OUTPATIENT)
Dept: FAMILY MEDICINE CLINIC | Facility: CLINIC | Age: 44
End: 2019-01-15

## 2019-01-15 VITALS
HEIGHT: 66 IN | HEART RATE: 75 BPM | WEIGHT: 278 LBS | DIASTOLIC BLOOD PRESSURE: 70 MMHG | BODY MASS INDEX: 44.68 KG/M2 | SYSTOLIC BLOOD PRESSURE: 128 MMHG

## 2019-01-15 VITALS
TEMPERATURE: 98.6 F | DIASTOLIC BLOOD PRESSURE: 70 MMHG | BODY MASS INDEX: 44.86 KG/M2 | HEIGHT: 66 IN | HEART RATE: 82 BPM | OXYGEN SATURATION: 98 % | WEIGHT: 279.1 LBS | SYSTOLIC BLOOD PRESSURE: 122 MMHG

## 2019-01-15 DIAGNOSIS — I10 ESSENTIAL HYPERTENSION: Primary | ICD-10-CM

## 2019-01-15 DIAGNOSIS — E11.65 UNCONTROLLED TYPE 2 DIABETES MELLITUS WITH HYPERGLYCEMIA (HCC): ICD-10-CM

## 2019-01-15 DIAGNOSIS — R10.12 LEFT UPPER QUADRANT PAIN: ICD-10-CM

## 2019-01-15 DIAGNOSIS — R63.5 WEIGHT GAIN, ABNORMAL: ICD-10-CM

## 2019-01-15 DIAGNOSIS — E55.9 VITAMIN D DEFICIENCY: ICD-10-CM

## 2019-01-15 DIAGNOSIS — I10 ESSENTIAL HYPERTENSION: ICD-10-CM

## 2019-01-15 DIAGNOSIS — K74.00 HEPATIC FIBROSIS: ICD-10-CM

## 2019-01-15 DIAGNOSIS — K21.00 GASTROESOPHAGEAL REFLUX DISEASE WITH ESOPHAGITIS: Primary | ICD-10-CM

## 2019-01-15 DIAGNOSIS — J01.41 ACUTE RECURRENT PANSINUSITIS: ICD-10-CM

## 2019-01-15 DIAGNOSIS — R11.2 NAUSEA AND VOMITING, INTRACTABILITY OF VOMITING NOT SPECIFIED, UNSPECIFIED VOMITING TYPE: ICD-10-CM

## 2019-01-15 DIAGNOSIS — R19.4 CHANGE IN BOWEL HABITS: ICD-10-CM

## 2019-01-15 LAB
ALBUMIN SERPL-MCNC: 4.1 G/DL (ref 3.4–4.8)
ALBUMIN/GLOB SERPL: 1.4 G/DL (ref 1.1–1.8)
ALP SERPL-CCNC: 101 U/L (ref 38–126)
ALT SERPL W P-5'-P-CCNC: 26 U/L (ref 9–52)
ANION GAP SERPL CALCULATED.3IONS-SCNC: 12 MMOL/L (ref 5–15)
AST SERPL-CCNC: 25 U/L (ref 14–36)
BASOPHILS # BLD AUTO: 0.04 10*3/MM3 (ref 0–0.2)
BASOPHILS NFR BLD AUTO: 0.5 % (ref 0–2)
BILIRUB SERPL-MCNC: 0.3 MG/DL (ref 0.2–1.3)
BUN BLD-MCNC: 16 MG/DL (ref 7–21)
BUN/CREAT SERPL: 24.6 (ref 7–25)
CALCIUM SPEC-SCNC: 9.5 MG/DL (ref 8.4–10.2)
CHLORIDE SERPL-SCNC: 99 MMOL/L (ref 95–110)
CO2 SERPL-SCNC: 19 MMOL/L (ref 22–31)
CREAT BLD-MCNC: 0.65 MG/DL (ref 0.5–1)
DEPRECATED RDW RBC AUTO: 40.1 FL (ref 36.4–46.3)
EOSINOPHIL # BLD AUTO: 0.14 10*3/MM3 (ref 0–0.7)
EOSINOPHIL NFR BLD AUTO: 1.6 % (ref 0–7)
ERYTHROCYTE [DISTWIDTH] IN BLOOD BY AUTOMATED COUNT: 12.6 % (ref 11.5–14.5)
GFR SERPL CREATININE-BSD FRML MDRD: 99 ML/MIN/1.73 (ref 58–135)
GLOBULIN UR ELPH-MCNC: 3 GM/DL (ref 2.3–3.5)
GLUCOSE BLD-MCNC: 571 MG/DL (ref 60–100)
HCT VFR BLD AUTO: 43.5 % (ref 35–45)
HGB BLD-MCNC: 14.5 G/DL (ref 12–15.5)
IMM GRANULOCYTES # BLD AUTO: 0.04 10*3/MM3 (ref 0–0.02)
IMM GRANULOCYTES NFR BLD AUTO: 0.5 % (ref 0–0.5)
LYMPHOCYTES # BLD AUTO: 3.08 10*3/MM3 (ref 0.6–4.2)
LYMPHOCYTES NFR BLD AUTO: 35 % (ref 10–50)
MCH RBC QN AUTO: 28.5 PG (ref 26.5–34)
MCHC RBC AUTO-ENTMCNC: 33.3 G/DL (ref 31.4–36)
MCV RBC AUTO: 85.6 FL (ref 80–98)
MONOCYTES # BLD AUTO: 0.43 10*3/MM3 (ref 0–0.9)
MONOCYTES NFR BLD AUTO: 4.9 % (ref 0–12)
NEUTROPHILS # BLD AUTO: 5.06 10*3/MM3 (ref 2–8.6)
NEUTROPHILS NFR BLD AUTO: 57.5 % (ref 37–80)
PLATELET # BLD AUTO: 204 10*3/MM3 (ref 150–450)
PMV BLD AUTO: 10.6 FL (ref 8–12)
POTASSIUM BLD-SCNC: 4.7 MMOL/L (ref 3.5–5.1)
PROT SERPL-MCNC: 7.1 G/DL (ref 6.3–8.6)
RBC # BLD AUTO: 5.08 10*6/MM3 (ref 3.77–5.16)
SODIUM BLD-SCNC: 130 MMOL/L (ref 137–145)
WBC NRBC COR # BLD: 8.79 10*3/MM3 (ref 3.2–9.8)

## 2019-01-15 PROCEDURE — 83036 HEMOGLOBIN GLYCOSYLATED A1C: CPT

## 2019-01-15 PROCEDURE — 80053 COMPREHEN METABOLIC PANEL: CPT

## 2019-01-15 PROCEDURE — 99214 OFFICE O/P EST MOD 30 MIN: CPT | Performed by: FAMILY MEDICINE

## 2019-01-15 PROCEDURE — 99203 OFFICE O/P NEW LOW 30 MIN: CPT | Performed by: PHYSICIAN ASSISTANT

## 2019-01-15 PROCEDURE — 85025 COMPLETE CBC W/AUTO DIFF WBC: CPT

## 2019-01-15 PROCEDURE — 82306 VITAMIN D 25 HYDROXY: CPT

## 2019-01-15 RX ORDER — DEXTROSE AND SODIUM CHLORIDE 5; .45 G/100ML; G/100ML
30 INJECTION, SOLUTION INTRAVENOUS CONTINUOUS PRN
Status: CANCELLED | OUTPATIENT
Start: 2019-02-08

## 2019-01-15 RX ORDER — SIMETHICONE 80 MG
80 TABLET,CHEWABLE ORAL EVERY 6 HOURS PRN
Qty: 60 TABLET | Refills: 1 | Status: SHIPPED | OUTPATIENT
Start: 2019-01-15 | End: 2019-07-26

## 2019-01-15 RX ORDER — CEPHALEXIN 500 MG/1
500 CAPSULE ORAL 3 TIMES DAILY
Qty: 21 CAPSULE | Refills: 0 | Status: SHIPPED | OUTPATIENT
Start: 2019-01-15 | End: 2019-01-29

## 2019-01-15 RX ORDER — PREDNISONE 10 MG/1
10 TABLET ORAL SEE ADMIN INSTRUCTIONS
Qty: 17 TABLET | Refills: 0 | Status: SHIPPED | OUTPATIENT
Start: 2019-01-15 | End: 2019-01-29

## 2019-01-15 RX ORDER — FLUCONAZOLE 100 MG/1
100 TABLET ORAL EVERY OTHER DAY
Qty: 3 TABLET | Refills: 1 | Status: SHIPPED | OUTPATIENT
Start: 2019-01-15 | End: 2019-01-29

## 2019-01-15 RX ORDER — INSULIN GLARGINE 100 [IU]/ML
INJECTION, SOLUTION SUBCUTANEOUS
Qty: 18 ML | Refills: 6 | Status: SHIPPED | OUTPATIENT
Start: 2019-01-15 | End: 2019-05-17 | Stop reason: SDUPTHER

## 2019-01-15 NOTE — PATIENT INSTRUCTIONS

## 2019-01-15 NOTE — PROGRESS NOTES
Subjective Pt of Dr. Ferro covering for vacation.  Sudhakar Saul is a 43 y.o. morbidly obese white female with Sz disorder, ID DMII, Schizophrenia, MPD, CAD, Pt presents for exam, eval and tx of acute health problem.     ' Have sinus infection x 2 week, headache, sinus pressure, low grade fever.  Blood sugars have been up, out of Insulin as only received 1 insulin pen, usually using 15 u with meals. PP above 200. Up to 38 on LA, but have not been taking daily as only received 1 pen of it also'.    Seizures    This is a chronic problem. The current episode started more than 1 week ago. The problem has been gradually worsening. Associated symptoms include sleepiness, confusion, headaches and visual disturbances. Pertinent negatives include no neck stiffness, no sore throat, no chest pain, no cough, no nausea, no vomiting, no diarrhea and no muscle weakness.   Obesity   This is a chronic problem. The current episode started more than 1 year ago. The problem occurs constantly. The problem has been unchanged. Associated symptoms include arthralgias, fatigue, headaches, numbness and weakness. Pertinent negatives include no abdominal pain, anorexia, change in bowel habit, chest pain, chills, congestion, coughing, fever, joint swelling, myalgias, nausea, neck pain, rash, sore throat, swollen glands, urinary symptoms, vertigo, visual change or vomiting. Nothing aggravates the symptoms. She has tried nothing for the symptoms.   Hypertension   This is a chronic problem. The current episode started more than 1 year ago. The problem is uncontrolled. Associated symptoms include headaches. Pertinent negatives include no anxiety, blurred vision, chest pain, malaise/fatigue, neck pain, orthopnea, palpitations, peripheral edema, PND or shortness of breath. Risk factors for coronary artery disease include obesity, sedentary lifestyle, smoking/tobacco exposure and stress. Past treatments include ACE inhibitors, beta blockers and  diuretics. There are no compliance problems.  There is no history of angina, kidney disease, CAD/MI, CVA, heart failure, left ventricular hypertrophy, PVD or retinopathy. There is no history of chronic renal disease, coarctation of the aorta, hyperaldosteronism, hypercortisolism, hyperparathyroidism, a hypertension causing med, pheochromocytoma, renovascular disease, sleep apnea or a thyroid problem.   Diabetes   She presents for her initial diabetic visit. She has type 2 diabetes mellitus. Her disease course has been worsening. Hypoglycemia symptoms include confusion, headaches, nervousness/anxiousness, seizures and sleepiness. Associated symptoms include fatigue and weakness. Pertinent negatives for diabetes include no blurred vision, no chest pain and no visual change. Pertinent negatives for diabetic complications include no CVA, PVD or retinopathy. Risk factors for coronary artery disease include dyslipidemia, obesity and post-menopausal. She has not had a previous visit with a dietitian. There is no change in her home blood glucose trend. An ACE inhibitor/angiotensin II receptor blocker is not being taken. She does not see a podiatrist.Eye exam is not current.   Shortness of Breath   This is a chronic problem. The current episode started more than 1 year ago. The problem occurs constantly. The problem has been gradually worsening. Associated symptoms include headaches. Pertinent negatives include no abdominal pain, chest pain, claudication, coryza, ear pain, fever, hemoptysis, leg pain, leg swelling, neck pain, orthopnea, PND, rash, sore throat, sputum production, swollen glands, syncope, vomiting or wheezing. Nothing aggravates the symptoms. The patient has no known risk factors for DVT/PE. She has tried nothing for the symptoms. The treatment provided no relief. There is no history of allergies, aspirin allergies, asthma, bronchiolitis, CAD, chronic lung disease, COPD, DVT, a heart failure, PE, pneumonia or  a recent surgery.   Sinusitis   This is a recurrent problem. The current episode started 1 to 4 weeks ago. The problem has been gradually worsening since onset. The maximum temperature recorded prior to her arrival was 100.4 - 100.9 F. The fever has been present for 3 to 4 days. Her pain is at a severity of 6/10. The pain is moderate. Associated symptoms include headaches and sinus pressure. Pertinent negatives include no chills, congestion, coughing, ear pain, neck pain, shortness of breath, sore throat or swollen glands. Past treatments include acetaminophen. The treatment provided no relief.        The following portions of the patient's history were reviewed and updated as appropriate: allergies, current medications, past family history, past medical history, past social history, past surgical history and problem list.    Review of Systems   Constitutional: Positive for fatigue. Negative for chills, fever and malaise/fatigue.   HENT: Positive for sinus pressure. Negative for congestion, ear pain and sore throat.    Eyes: Positive for visual disturbance. Negative for blurred vision.   Respiratory: Negative for cough, hemoptysis, sputum production, shortness of breath and wheezing.    Cardiovascular: Negative for chest pain, palpitations, orthopnea, claudication, leg swelling, syncope and PND.   Gastrointestinal: Negative for abdominal pain, anorexia, change in bowel habit, diarrhea, nausea and vomiting.   Musculoskeletal: Positive for arthralgias. Negative for joint swelling, myalgias and neck pain.   Skin: Negative for rash.   Neurological: Positive for seizures, weakness, numbness and headaches. Negative for vertigo.   Psychiatric/Behavioral: Positive for confusion. The patient is nervous/anxious.        Objective   Physical Exam   Constitutional: She is oriented to person, place, and time. She appears well-developed and well-nourished.   HENT:   Head: Normocephalic and atraumatic.   Right Ear: External ear  normal.   Left Ear: External ear normal.   Nose: Nose normal.   Mouth/Throat: Oropharynx is clear and moist.   Tan PND   Eyes: Conjunctivae and EOM are normal. Pupils are equal, round, and reactive to light.   Neck: Normal range of motion. Neck supple.   Cardiovascular: Normal rate, regular rhythm and normal heart sounds.   No murmur heard.  Pulmonary/Chest: Effort normal and breath sounds normal. She has no wheezes.   Abdominal: Soft. Bowel sounds are normal. She exhibits no distension. There is no tenderness.   Musculoskeletal: Normal range of motion.   Neurological: She is alert and oriented to person, place, and time. She displays normal reflexes. No cranial nerve deficit or sensory deficit. She exhibits normal muscle tone. Coordination normal.   Skin: Skin is warm and dry.   Psychiatric: She has a normal mood and affect. Her speech is normal and behavior is normal. Cognition and memory are normal.   Nursing note and vitals reviewed.      Assessment/Plan   Sudhakar was seen today for diabetes, abdominal pain and wrist pain.    Diagnoses and all orders for this visit:    Essential hypertension    Uncontrolled type 2 diabetes mellitus with hyperglycemia (CMS/HCC)    Acute recurrent pansinusitis    Other orders  -     cephalexin (KEFLEX) 500 MG capsule; Take 1 capsule by mouth 3 (Three) Times a Day.  -     predniSONE (DELTASONE) 10 MG tablet; Take 1 tablet by mouth See Admin Instructions. Take 1 Tab Tid x3 days, Then 1 Tab Bid x 2 days Then 1 Tab QD x4 days,then D/C  -     fluconazole (DIFLUCAN) 100 MG tablet; Take 1 tablet by mouth Every Other Day.  -     simethicone (GAS-X) 80 MG chewable tablet; Chew 1 tablet Every 6 (Six) Hours As Needed for Flatulence.  -     Insulin Lispro (HUMALOG) 100 UNIT/ML solution pen-injector; Inject 30 Units under the skin into the appropriate area as directed 3 (Three) Times a Day. 10 to 30 u with meals counting carbs.  -     Insulin Glargine (BASAGLAR KWIKPEN) 100 UNIT/ML injection  pen; Titrate up by 2 u each day until a.m. FSBS 100-150 at 38 may go up to 60      Discussed exam, health problems, meds, indications, tx plan Sinusitis. Discussed Tx plan uncontrolled DM. Insulin Rx sent to Pharmacy Pt will  today. Pt will have labs drawn.        This document has been electronically signed by Gary Sanchez MD   1615 1-15 -2019  Late entry  Lab called with Blood alert 571.  Called pt with lab 571, Pt has picked up SA & LA Insulin, checked FSBS, taking Insulin, and FSBS is already coming down. Will continue to monitor. Expect increase insulin need  with infection ,and Steroid burst. Pt voices understanding , will call, F/U prn.          This document has been electronically signed by Gary Sanchez MD

## 2019-01-15 NOTE — PROGRESS NOTES
Chief Complaint   Patient presents with   • Hepatitis C     Ref. Dr. Kasie LAN PROCEDURE ORDERED: EGDw/dilation, COLON gerd, n/v, +FH colon cancer, +FH polyps    Subjective    Sudhakar Saul is a 43 y.o. female. she is being seen for consultation today at the request of Dr. Ferro.    History of Present Illness    This 43-year-old disabled female was sent for consultation for hepatitis C, abdominal pain, history of hiatal hernia by Dr. Ferro who saw the patient on 10/03/2018 with seizure disorder and recent drug treatment. The patient states she was told many years ago that she had hepatitis C after donating blood. She had had a cholecystectomy in the past. She also states she had a liver biopsy done at [facility]. She is not certain of the results. After the patient left we were able to get a copy of her pathology dated 03/17/2000. She had a cholecystectomy with multiple stones. Liver biopsy showed rare focal portal inflammation and 1+ steatosis. She thinks she had a colonoscopy more than 10 years ago and had polyps removed. She could not give me more details about that.     The patient presents with 3 out of 10 left upper quadrant pain. She states that she has constant heartburn despite taking Prilosec 40 mg daily. She has nausea, but no vomiting. She has occasional dysphagia with food seeming to hang substernally. Anytime she eats she has swelling and bloating. Lately she has been more constipated. She has a sharp cramping pain in the left upper quadrant and a tearing ripping pain in the abdomen. She denies blood or mucus in her stool. No unusual colors. She states she had weighed up to 506 pounds. She had lost down to 195 pounds using drugs. She went to rehab and [facility] and gained back from a poor diet over the past 6 months to her current weight.    Laboratories on 09/19/2018, urine drug screen was negative, Keppra level was not detected. Negative HIV. HCV genotype was insufficient.  HCV RNA by PCR quantitative was not detected. Hepatitis diagnostic panel showed positive hepatitis C antibody. The rest of the panel was negative. Vitamin D was 24.2, normal TSH, T3 and T4. A1c was 7.1%. CMP showed a glucose of 195, CO2 of 20, otherwise normal. CBC normal. Urinalysis showed trace abnormalities. Cholesterol panel was fairly was fairly normal on 09/25/2018.    The patient has been a 3-pack a day smoker for over 10 years, strongly encouraged to quit. She denied significant alcohol use. She admits to illicit substance use in the past. She had an overdose in 2015. She has had issues with mental health, neuropathy, degenerative bone disease, high blood pressure, seizure disorder, diabetes ,heart disease, kidney problems, anxiety. She has had an appendectomy, cholecystectomy, liver biopsy, hysterectomy, lipomas removed, dental surgery, carpal tunnel release on the right, lumbar discectomy.    Family history of diabetes, heart disease, ulcers, unknown cancer, stroke, gallstones, hypertension, kidney disease, nervous problems, and allergies. Grandfather had colon cancer. Father age 68 in poor health. Mother age 67 in fair health. She does have Malone's esophagus. She is . She was previously  2 times. No siblings. She lists no children.    ASSESSMENT/PLAN: Patient with nausea, vomiting, dysphagia, constipation with abdominal pain with apparent history of polyps, recent studies do not show hepatitis C, but I did suggest repeating her studies. Suspect she does at least have a fatty liver and I suggested an HCV RNA by PCR quantitative, LFTs, ALFREDO FibroSure. I would get an ultrasound of the abdomen to look at the liver and kidneys. Recommend EGD with possible dilatation for possible peptic stricture with her nausea and severe GERD. I also recommend a colonoscopy for the abdominal pain and history of colon polyps. We will see her in followup after the above, further pending clinical course and the  results of the above.    Thank you very much, Dr. Ferro, for this consultation and for allowing us to participate in the care of your patient. We will keep you informed.       The following portions of the patient's history were reviewed and updated as appropriate:   Past Medical History:   Diagnosis Date   • CAD (coronary artery disease)    • Degeneration macular    • Depression    • Diabetes mellitus (CMS/HCC)    • Hepatitis c    • Hypertension    • Migraine    • Multiple personality disorder (CMS/HCC)    • Neuropathy    • Paranoid schizophrenia (CMS/HCC)    • Seizures (CMS/HCC)      Past Surgical History:   Procedure Laterality Date   • APPENDECTOMY     • BACK SURGERY     • BREAST SURGERY     • CARPAL TUNNEL RELEASE      right hand   • CHOLECYSTECTOMY     • COLONOSCOPY     • DENTAL PROCEDURE     • HYSTERECTOMY     • LIPOMA EXCISION      9   • LIVER BIOPSY     • UPPER GASTROINTESTINAL ENDOSCOPY       Family History   Problem Relation Age of Onset   • Hypertension Other    • Diabetes Other    • Stroke Other    • Cancer Other    • Kidney disease Other    • Lung disease Other    • Other Other    • Malone's esophagus Other    • Colon polyps Other    • Heart disease Other    • Ulcers Other    • Cholelithiasis Other    • Allergy (severe) Other      OB History     No data available        Allergies   Allergen Reactions   • Betadine [Povidone Iodine] Anaphylaxis   • Contrast Dye Anaphylaxis   • Iodine Anaphylaxis   • Shellfish-Derived Products Anaphylaxis   • Adhesive Tape Unknown (See Comments)     Blister     • Altace [Ramipril] Unknown (See Comments)     Sleepy     • Lipitor [Atorvastatin] Unknown (See Comments)     Flu like symptoms     • Procardia [Nifedipine] Unknown (See Comments)     Syncope       Social History     Socioeconomic History   • Marital status:      Spouse name: Not on file   • Number of children: Not on file   • Years of education: Not on file   • Highest education level: Not on file    Tobacco Use   • Smoking status: Current Every Day Smoker     Packs/day: 0.25     Types: Cigarettes   • Smokeless tobacco: Never Used   Substance and Sexual Activity   • Alcohol use: No   • Drug use: No       Current Outpatient Medications:   •  albuterol 108 (90 Base) MCG/ACT inhaler, Inhale 2 puffs Every 6 (Six) Hours As Needed for Wheezing or Shortness of Air., Disp: 1 inhaler, Rfl: 3  •  cephalexin (KEFLEX) 500 MG capsule, Take 1 capsule by mouth 3 (Three) Times a Day., Disp: 21 capsule, Rfl: 0  •  fluconazole (DIFLUCAN) 100 MG tablet, Take 1 tablet by mouth Every Other Day., Disp: 3 tablet, Rfl: 1  •  Insulin Glargine (BASAGLAR KWIKPEN) 100 UNIT/ML injection pen, Titrate up by 2 u each day until a.m. FSBS 100-150 at 38 may go up to 60, Disp: 18 mL, Rfl: 6  •  Insulin Lispro (HUMALOG) 100 UNIT/ML solution pen-injector, Inject 30 Units under the skin into the appropriate area as directed 3 (Three) Times a Day. 10 to 30 u with meals counting carbs., Disp: 15 mL, Rfl: 6  •  levETIRAcetam (KEPPRA) 1000 MG tablet, Take 1 tablet by mouth 2 (Two) Times a Day., Disp: 60 tablet, Rfl: 3  •  lisinopril (PRINIVIL,ZESTRIL) 40 MG tablet, Take 1 tablet by mouth Daily., Disp: 30 tablet, Rfl: 3  •  metFORMIN (GLUCOPHAGE) 500 MG tablet, Take 1 tablet by mouth 2 (Two) Times a Day With Meals., Disp: 60 tablet, Rfl: 3  •  metoprolol tartrate (LOPRESSOR) 50 MG tablet, Take 1 tablet by mouth Every 12 (Twelve) Hours., Disp: 60 tablet, Rfl: 3  •  naproxen (NAPROSYN) 500 MG tablet, Take 1 tablet by mouth 2 (Two) Times a Day With Meals., Disp: 60 tablet, Rfl: 3  •  omeprazole (priLOSEC) 40 MG capsule, Take 1 capsule by mouth Daily., Disp: 30 capsule, Rfl: 3  •  predniSONE (DELTASONE) 10 MG tablet, Take 1 tablet by mouth See Admin Instructions. Take 1 Tab Tid x3 days, Then 1 Tab Bid x 2 days Then 1 Tab QD x4 days,then D/C, Disp: 17 tablet, Rfl: 0  •  simethicone (GAS-X) 80 MG chewable tablet, Chew 1 tablet Every 6 (Six) Hours As Needed  "for Flatulence., Disp: 60 tablet, Rfl: 1  •  simvastatin (ZOCOR) 20 MG tablet, Take 1 tablet by mouth Every Night., Disp: 30 tablet, Rfl: 3  •  tiotropium bromide monohydrate (SPIRIVA RESPIMAT) 2.5 MCG/ACT aerosol solution inhaler, Inhale 2 puffs Daily., Disp: 1 inhaler, Rfl: 11  •  vitamin D (ERGOCALCIFEROL) 18005 units capsule capsule, Take 1 capsule by mouth Every 7 (Seven) Days., Disp: 4 capsule, Rfl: 3  •  polyethylene glycol (GoLYTELY) 236 g solution, As directed, Disp: 4000 mL, Rfl: 0  Review of Systems  Review of Systems   Constitutional: Positive for unexpected weight change.   HENT: Positive for trouble swallowing.    Gastrointestinal: Positive for abdominal distention, abdominal pain, constipation, diarrhea, nausea and vomiting. Negative for anal bleeding, blood in stool and rectal pain.   Musculoskeletal: Positive for arthralgias and gait problem.   All other systems reviewed and are negative.         Objective    /70 (BP Location: Right arm)   Pulse 75   Ht 167.6 cm (66\")   Wt 126 kg (278 lb)   BMI 44.87 kg/m²   Physical Exam   Constitutional: She is oriented to person, place, and time. She appears well-developed and well-nourished. No distress.   HENT:   Head: Normocephalic and atraumatic.   Eyes: EOM are normal. Pupils are equal, round, and reactive to light.   Neck: Normal range of motion.   Cardiovascular: Normal rate, regular rhythm and normal heart sounds.   Pulmonary/Chest: Effort normal and breath sounds normal.   Abdominal: Soft. Bowel sounds are normal. She exhibits distension. She exhibits no shifting dullness, no abdominal bruit, no ascites and no mass. There is no hepatosplenomegaly. There is tenderness. There is no rigidity, no rebound, no guarding and no CVA tenderness. No hernia. Hernia confirmed negative in the ventral area.   Obese, scar, mild   Musculoskeletal: Normal range of motion.   Neurological: She is alert and oriented to person, place, and time.   Skin: Skin is warm " and dry.   Psychiatric: She has a normal mood and affect. Her behavior is normal. Judgment and thought content normal.   Nursing note and vitals reviewed.    Assessment/Plan      1. Gastroesophageal reflux disease with esophagitis    2. Left upper quadrant pain    3. Nausea and vomiting, intractability of vomiting not specified, unspecified vomiting type    4. Weight gain, abnormal    5. Change in bowel habits    6. Hepatic fibrosis    .   Sudhakar was seen today for hepatitis c.    Diagnoses and all orders for this visit:    Gastroesophageal reflux disease with esophagitis  -     Case Request; Standing  -     dextrose 5 % and sodium chloride 0.45 % infusion; Infuse 30 mL/hr into a venous catheter Continuous As Needed (Start Prior to Procedure).  -     Case Request  -     Hepatitis C RNA, Quantitative, PCR (graph)  -     Hepatic Function Panel  -     ALFREDO Fibrosure    Left upper quadrant pain  -     Case Request; Standing  -     dextrose 5 % and sodium chloride 0.45 % infusion; Infuse 30 mL/hr into a venous catheter Continuous As Needed (Start Prior to Procedure).  -     Case Request  -     Hepatitis C RNA, Quantitative, PCR (graph)  -     Hepatic Function Panel  -     ALFREDO Fibrosure    Nausea and vomiting, intractability of vomiting not specified, unspecified vomiting type  -     Case Request; Standing  -     dextrose 5 % and sodium chloride 0.45 % infusion; Infuse 30 mL/hr into a venous catheter Continuous As Needed (Start Prior to Procedure).  -     Case Request  -     Hepatitis C RNA, Quantitative, PCR (graph)  -     Hepatic Function Panel  -     ALFREDO Fibrosure    Weight gain, abnormal  -     Case Request; Standing  -     dextrose 5 % and sodium chloride 0.45 % infusion; Infuse 30 mL/hr into a venous catheter Continuous As Needed (Start Prior to Procedure).  -     Case Request  -     Hepatitis C RNA, Quantitative, PCR (graph)  -     Hepatic Function Panel  -     ALFREDO Fibrosure    Change in bowel habits  -     Case  Request; Standing  -     dextrose 5 % and sodium chloride 0.45 % infusion; Infuse 30 mL/hr into a venous catheter Continuous As Needed (Start Prior to Procedure).  -     Case Request  -     Hepatitis C RNA, Quantitative, PCR (graph)  -     Hepatic Function Panel  -     ALFREDO Fibrosure    Hepatic fibrosis  -     Case Request; Standing  -     dextrose 5 % and sodium chloride 0.45 % infusion; Infuse 30 mL/hr into a venous catheter Continuous As Needed (Start Prior to Procedure).  -     Case Request  -     Hepatitis C RNA, Quantitative, PCR (graph)  -     Hepatic Function Panel  -     ALFREDO Fibrosure    Other orders  -     Follow Anesthesia Guidelines / Standing Orders; Future  -     Obtain Informed Consent; Standing  -     POC Glucose Once; Standing  -     polyethylene glycol (GoLYTELY) 236 g solution; As directed        Orders placed during this encounter include:  Orders Placed This Encounter   Procedures   • Hepatitis C RNA, Quantitative, PCR (graph)   • Hepatic Function Panel   • ALFREDO Fibrosure   • Follow Anesthesia Guidelines / Standing Orders     Standing Status:   Future       Medications prescribed:  New Medications Ordered This Visit   Medications   • polyethylene glycol (GoLYTELY) 236 g solution     Sig: As directed     Dispense:  4000 mL     Refill:  0       Requested Prescriptions     Signed Prescriptions Disp Refills   • polyethylene glycol (GoLYTELY) 236 g solution 4000 mL 0     Sig: As directed       Review and/or summary of lab tests, radiology, procedures, medications. Review and summary of old records and obtaining of history. The risks and benefits of my recommendations, as well as other treatment options were discussed with the patient today. Questions were answered.    Follow-up: Return if symptoms worsen or fail to improve, for After the above.     ESOPHAGOGASTRODUODENOSCOPY--poss dilation (N/A), COLONOSCOPY (N/A)      This document has been electronically signed by Jermaine Menendez PA-C on January  21, 2019 4:18 PM      Results for orders placed or performed in visit on 01/18/19   MicroAlbumin, Urine, Random - Urine, Clean Catch   Result Value Ref Range    Microalbumin, Urine 3.0 mg/L   Lipid Panel   Result Value Ref Range    Total Cholesterol 173 0 - 199 mg/dL    Triglycerides 164 20 - 199 mg/dL    HDL Cholesterol 46 (L) 60 - 200 mg/dL    LDL Cholesterol  109 1 - 129 mg/dL    LDL/HDL Ratio 2.05 0.00 - 3.22   Results for orders placed or performed in visit on 01/15/19   Hepatitis C RNA, Quantitative, PCR (graph)   Result Value Ref Range    Hepatitis C Quantitation HCV Not Detected IU/mL    Test Information Comment    Hepatic Function Panel   Result Value Ref Range    Total Protein 6.9 6.3 - 8.6 g/dL    Albumin 4.00 3.40 - 4.80 g/dL    ALT (SGPT) 31 9 - 52 U/L    AST (SGOT) 25 14 - 36 U/L    Alkaline Phosphatase 91 38 - 126 U/L    Total Bilirubin 0.3 0.2 - 1.3 mg/dL    Bilirubin, Direct 0.0 0.0 - 0.3 mg/dL    Bilirubin, Indirect 0.4 0.0 - 1.1 mg/dL   Results for orders placed or performed in visit on 01/15/19   CBC Auto Differential   Result Value Ref Range    WBC 8.79 3.20 - 9.80 10*3/mm3    RBC 5.08 3.77 - 5.16 10*6/mm3    Hemoglobin 14.5 12.0 - 15.5 g/dL    Hematocrit 43.5 35.0 - 45.0 %    MCV 85.6 80.0 - 98.0 fL    MCH 28.5 26.5 - 34.0 pg    MCHC 33.3 31.4 - 36.0 g/dL    RDW 12.6 11.5 - 14.5 %    RDW-SD 40.1 36.4 - 46.3 fl    MPV 10.6 8.0 - 12.0 fL    Platelets 204 150 - 450 10*3/mm3    Neutrophil % 57.5 37.0 - 80.0 %    Lymphocyte % 35.0 10.0 - 50.0 %    Monocyte % 4.9 0.0 - 12.0 %    Eosinophil % 1.6 0.0 - 7.0 %    Basophil % 0.5 0.0 - 2.0 %    Immature Grans % 0.5 0.0 - 0.5 %    Neutrophils, Absolute 5.06 2.00 - 8.60 10*3/mm3    Lymphocytes, Absolute 3.08 0.60 - 4.20 10*3/mm3    Monocytes, Absolute 0.43 0.00 - 0.90 10*3/mm3    Eosinophils, Absolute 0.14 0.00 - 0.70 10*3/mm3    Basophils, Absolute 0.04 0.00 - 0.20 10*3/mm3    Immature Grans, Absolute 0.04 (H) 0.00 - 0.02 10*3/mm3   Vitamin D 25 Hydroxy    Result Value Ref Range    25 Hydroxy, Vitamin D 38.5 30.0 - 100.0 ng/ml   Hemoglobin A1c   Result Value Ref Range    Hemoglobin A1C 14.3 (H) 4 - 5.6 %   Comprehensive Metabolic Panel   Result Value Ref Range    Glucose 571 (C) 60 - 100 mg/dL    BUN 16 7 - 21 mg/dL    Creatinine 0.65 0.50 - 1.00 mg/dL    Sodium 130 (L) 137 - 145 mmol/L    Potassium 4.7 3.5 - 5.1 mmol/L    Chloride 99 95 - 110 mmol/L    CO2 19.0 (L) 22.0 - 31.0 mmol/L    Calcium 9.5 8.4 - 10.2 mg/dL    Total Protein 7.1 6.3 - 8.6 g/dL    Albumin 4.10 3.40 - 4.80 g/dL    ALT (SGPT) 26 9 - 52 U/L    AST (SGOT) 25 14 - 36 U/L    Alkaline Phosphatase 101 38 - 126 U/L    Total Bilirubin 0.3 0.2 - 1.3 mg/dL    eGFR Non  Amer 99 58 - 135 mL/min/1.73    Globulin 3.0 2.3 - 3.5 gm/dL    A/G Ratio 1.4 1.1 - 1.8 g/dL    BUN/Creatinine Ratio 24.6 7.0 - 25.0    Anion Gap 12.0 5.0 - 15.0 mmol/L   Results for orders placed or performed in visit on 09/19/18   HIV-1 & HIV-2 Antibodies   Result Value Ref Range    HIV-1/ HIV-2 Negative Negative   Urinalysis, Microscopic Only - Urine, Clean Catch   Result Value Ref Range    RBC, UA 0-2 (A) None Seen /HPF    WBC, UA 6-12 (A) None Seen, 0-2, 3-5 /HPF    Bacteria, UA 4+ (A) None Seen /HPF    Squamous Epithelial Cells, UA None Seen None Seen, 0-2 /HPF    Hyaline Casts, UA None Seen None Seen /LPF    Methodology Automated Microscopy    CBC Auto Differential   Result Value Ref Range    WBC 6.70 3.20 - 9.80 10*3/mm3    RBC 4.41 3.77 - 5.16 10*6/mm3    Hemoglobin 12.7 12.0 - 15.5 g/dL    Hematocrit 38.9 35.0 - 45.0 %    MCV 88.2 80.0 - 98.0 fL    MCH 28.8 26.5 - 34.0 pg    MCHC 32.6 31.4 - 36.0 g/dL    RDW 14.1 11.5 - 14.5 %    RDW-SD 45.7 36.4 - 46.3 fl    MPV 11.3 8.0 - 12.0 fL    Platelets 184 150 - 450 10*3/mm3    Neutrophil % 55.6 37.0 - 80.0 %    Lymphocyte % 37.3 10.0 - 50.0 %    Monocyte % 4.3 0.0 - 12.0 %    Eosinophil % 2.2 0.0 - 7.0 %    Basophil % 0.3 0.0 - 2.0 %    Immature Grans % 0.3 0.0 - 0.5 %     Neutrophils, Absolute 3.72 2.00 - 8.60 10*3/mm3    Lymphocytes, Absolute 2.50 0.60 - 4.20 10*3/mm3    Monocytes, Absolute 0.29 0.00 - 0.90 10*3/mm3    Eosinophils, Absolute 0.15 0.00 - 0.70 10*3/mm3    Basophils, Absolute 0.02 0.00 - 0.20 10*3/mm3    Immature Grans, Absolute 0.02 0.00 - 0.02 10*3/mm3   Urine Drug Screen - Urine, Clean Catch   Result Value Ref Range    Amphetamine Screen, Urine Negative Negative    Barbiturates Screen, Urine Negative Negative    Benzodiazepine Screen, Urine Negative Negative    Cocaine Screen, Urine Negative Negative    Methadone Screen, Urine Negative Negative    Opiate Screen Negative Negative    Oxycodone Screen, Urine Negative Negative    THC, Screen, Urine Negative Negative   Hepatitis Panel, Acute   Result Value Ref Range    Hepatitis C Ab Reactive (A) Negative    Hep A IgM Negative Negative    Hep B C IgM Negative Negative    Hepatitis B Surface Ag Negative Negative     *Note: Due to a large number of results and/or encounters for the requested time period, some results have not been displayed. A complete set of results can be found in Results Review.       Some portions of this note have been dictated using voice recognition software and may contain errors and/or omissions.

## 2019-01-16 LAB
25(OH)D3 SERPL-MCNC: 38.5 NG/ML (ref 30–100)
HBA1C MFR BLD: 14.3 % (ref 4–5.6)

## 2019-01-18 ENCOUNTER — LAB (OUTPATIENT)
Dept: LAB | Facility: HOSPITAL | Age: 44
End: 2019-01-18

## 2019-01-18 DIAGNOSIS — E78.5 HYPERLIPIDEMIA, UNSPECIFIED HYPERLIPIDEMIA TYPE: ICD-10-CM

## 2019-01-18 DIAGNOSIS — E11.65 UNCONTROLLED TYPE 2 DIABETES MELLITUS WITH HYPERGLYCEMIA (HCC): ICD-10-CM

## 2019-01-18 LAB
ALBUMIN SERPL-MCNC: 4 G/DL (ref 3.4–4.8)
ALBUMIN UR-MCNC: 3 MG/L
ALP SERPL-CCNC: 91 U/L (ref 38–126)
ALT SERPL W P-5'-P-CCNC: 31 U/L (ref 9–52)
ARTICHOKE IGE QN: 109 MG/DL (ref 1–129)
AST SERPL-CCNC: 25 U/L (ref 14–36)
BILIRUB CONJ SERPL-MCNC: 0 MG/DL (ref 0–0.3)
BILIRUB INDIRECT SERPL-MCNC: 0.4 MG/DL (ref 0–1.1)
BILIRUB SERPL-MCNC: 0.3 MG/DL (ref 0.2–1.3)
CHOLEST SERPL-MCNC: 173 MG/DL (ref 0–199)
HDLC SERPL-MCNC: 46 MG/DL (ref 60–200)
LDLC/HDLC SERPL: 2.05 {RATIO} (ref 0–3.22)
PROT SERPL-MCNC: 6.9 G/DL (ref 6.3–8.6)
TRIGL SERPL-MCNC: 164 MG/DL (ref 20–199)

## 2019-01-18 PROCEDURE — 83010 ASSAY OF HAPTOGLOBIN QUANT: CPT | Performed by: PHYSICIAN ASSISTANT

## 2019-01-18 PROCEDURE — 82172 ASSAY OF APOLIPOPROTEIN: CPT | Performed by: PHYSICIAN ASSISTANT

## 2019-01-18 PROCEDURE — 80061 LIPID PANEL: CPT

## 2019-01-18 PROCEDURE — 84450 TRANSFERASE (AST) (SGOT): CPT | Performed by: PHYSICIAN ASSISTANT

## 2019-01-18 PROCEDURE — 80076 HEPATIC FUNCTION PANEL: CPT | Performed by: PHYSICIAN ASSISTANT

## 2019-01-18 PROCEDURE — 83883 ASSAY NEPHELOMETRY NOT SPEC: CPT | Performed by: PHYSICIAN ASSISTANT

## 2019-01-18 PROCEDURE — 84478 ASSAY OF TRIGLYCERIDES: CPT | Performed by: PHYSICIAN ASSISTANT

## 2019-01-18 PROCEDURE — 82977 ASSAY OF GGT: CPT | Performed by: PHYSICIAN ASSISTANT

## 2019-01-18 PROCEDURE — 87522 HEPATITIS C REVRS TRNSCRPJ: CPT | Performed by: PHYSICIAN ASSISTANT

## 2019-01-18 PROCEDURE — 82043 UR ALBUMIN QUANTITATIVE: CPT

## 2019-01-18 PROCEDURE — 82947 ASSAY GLUCOSE BLOOD QUANT: CPT | Performed by: PHYSICIAN ASSISTANT

## 2019-01-18 PROCEDURE — 84460 ALANINE AMINO (ALT) (SGPT): CPT | Performed by: PHYSICIAN ASSISTANT

## 2019-01-18 PROCEDURE — 82247 BILIRUBIN TOTAL: CPT | Performed by: PHYSICIAN ASSISTANT

## 2019-01-18 PROCEDURE — 82465 ASSAY BLD/SERUM CHOLESTEROL: CPT | Performed by: PHYSICIAN ASSISTANT

## 2019-01-21 LAB
HCV RNA SERPL NAA+PROBE-ACNC: NORMAL IU/ML
TEST INFORMATION: NORMAL

## 2019-01-22 LAB
A2 MACROGLOB SERPL-MCNC: 244 MG/DL (ref 110–276)
ALT SERPL W P-5'-P-CCNC: 28 IU/L (ref 0–40)
APO A-I SERPL-MCNC: 140 MG/DL (ref 116–209)
AST SERPL W P-5'-P-CCNC: 26 IU/L (ref 0–40)
BILIRUB SERPL-MCNC: 0.2 MG/DL (ref 0–1.2)
CHOLEST SERPL-MCNC: 171 MG/DL (ref 100–199)
FIBROSIS SCORING:: ABNORMAL
FIBROSIS STAGE SERPL QL: ABNORMAL
GGT SERPL-CCNC: 36 IU/L (ref 0–60)
GLUCOSE SERPL-MCNC: 337 MG/DL (ref 65–99)
HAPTOGLOB SERPL-MCNC: 326 MG/DL (ref 34–200)
INTERPRETATIONS: (REFERENCE): ABNORMAL
LABORATORY COMMENT REPORT: ABNORMAL
LIMITATIONS: (REFERENCE): ABNORMAL
LIVER FIBR SCORE SERPL CALC.FIBROSURE: 0.08 (ref 0–0.21)
NASH GRADE (REFERENCE): ABNORMAL
NASH SCORE (REFERENCE): 0.75
NASH SCORING (REFERENCE): ABNORMAL
STEATOSIS GRADE (REFERENCE): ABNORMAL
STEATOSIS GRADING (REFERENCE): ABNORMAL
STEATOSIS SCORE (REFERENCE): 0.78 (ref 0–0.3)
TRIGL SERPL-MCNC: 174 MG/DL (ref 0–149)
WEIGHT: (REFERENCE): 279 LBS

## 2019-01-29 ENCOUNTER — OFFICE VISIT (OUTPATIENT)
Dept: FAMILY MEDICINE CLINIC | Facility: CLINIC | Age: 44
End: 2019-01-29

## 2019-01-29 VITALS
HEART RATE: 115 BPM | BODY MASS INDEX: 45.58 KG/M2 | TEMPERATURE: 98.6 F | WEIGHT: 283.6 LBS | DIASTOLIC BLOOD PRESSURE: 80 MMHG | SYSTOLIC BLOOD PRESSURE: 138 MMHG | OXYGEN SATURATION: 97 % | HEIGHT: 66 IN

## 2019-01-29 DIAGNOSIS — Z91.199 NON-COMPLIANCE: ICD-10-CM

## 2019-01-29 DIAGNOSIS — E11.65 UNCONTROLLED TYPE 2 DIABETES MELLITUS WITH HYPERGLYCEMIA (HCC): ICD-10-CM

## 2019-01-29 DIAGNOSIS — G40.909 SEIZURE DISORDER (HCC): ICD-10-CM

## 2019-01-29 DIAGNOSIS — E66.01 MORBID OBESITY (HCC): ICD-10-CM

## 2019-01-29 DIAGNOSIS — I10 ESSENTIAL HYPERTENSION: Primary | ICD-10-CM

## 2019-01-29 PROCEDURE — 99214 OFFICE O/P EST MOD 30 MIN: CPT | Performed by: FAMILY MEDICINE

## 2019-02-02 PROBLEM — Z91.199 NON-COMPLIANCE: Status: ACTIVE | Noted: 2019-02-02

## 2019-02-08 ENCOUNTER — HOSPITAL ENCOUNTER (OUTPATIENT)
Facility: HOSPITAL | Age: 44
Setting detail: HOSPITAL OUTPATIENT SURGERY
Discharge: HOME | End: 2019-02-08
Attending: INTERNAL MEDICINE | Admitting: INTERNAL MEDICINE

## 2019-02-08 ENCOUNTER — ANESTHESIA (OUTPATIENT)
Dept: GASTROENTEROLOGY | Facility: HOSPITAL | Age: 44
End: 2019-02-08

## 2019-02-08 ENCOUNTER — ANESTHESIA EVENT (OUTPATIENT)
Dept: GASTROENTEROLOGY | Facility: HOSPITAL | Age: 44
End: 2019-02-08

## 2019-02-08 VITALS
HEART RATE: 77 BPM | RESPIRATION RATE: 18 BRPM | TEMPERATURE: 96.7 F | OXYGEN SATURATION: 95 % | HEIGHT: 66 IN | DIASTOLIC BLOOD PRESSURE: 78 MMHG | SYSTOLIC BLOOD PRESSURE: 125 MMHG | BODY MASS INDEX: 45.64 KG/M2 | WEIGHT: 284 LBS

## 2019-02-08 DIAGNOSIS — R10.12 LEFT UPPER QUADRANT PAIN: ICD-10-CM

## 2019-02-08 DIAGNOSIS — R63.5 WEIGHT GAIN, ABNORMAL: ICD-10-CM

## 2019-02-08 DIAGNOSIS — K74.00 HEPATIC FIBROSIS: ICD-10-CM

## 2019-02-08 DIAGNOSIS — K21.00 GASTROESOPHAGEAL REFLUX DISEASE WITH ESOPHAGITIS: ICD-10-CM

## 2019-02-08 DIAGNOSIS — R19.4 CHANGE IN BOWEL HABITS: ICD-10-CM

## 2019-02-08 DIAGNOSIS — R11.2 NAUSEA AND VOMITING, INTRACTABILITY OF VOMITING NOT SPECIFIED, UNSPECIFIED VOMITING TYPE: ICD-10-CM

## 2019-02-08 LAB — GLUCOSE BLDC GLUCOMTR-MCNC: 260 MG/DL (ref 70–130)

## 2019-02-08 PROCEDURE — 88305 TISSUE EXAM BY PATHOLOGIST: CPT | Performed by: PATHOLOGY

## 2019-02-08 PROCEDURE — 45385 COLONOSCOPY W/LESION REMOVAL: CPT | Performed by: INTERNAL MEDICINE

## 2019-02-08 PROCEDURE — 88305 TISSUE EXAM BY PATHOLOGIST: CPT | Performed by: INTERNAL MEDICINE

## 2019-02-08 PROCEDURE — 82962 GLUCOSE BLOOD TEST: CPT

## 2019-02-08 PROCEDURE — 25010000002 MIDAZOLAM PER 1 MG: Performed by: NURSE ANESTHETIST, CERTIFIED REGISTERED

## 2019-02-08 PROCEDURE — 43239 EGD BIOPSY SINGLE/MULTIPLE: CPT | Performed by: INTERNAL MEDICINE

## 2019-02-08 PROCEDURE — 25010000002 PROPOFOL 10 MG/ML EMULSION: Performed by: NURSE ANESTHETIST, CERTIFIED REGISTERED

## 2019-02-08 RX ORDER — PROPOFOL 10 MG/ML
VIAL (ML) INTRAVENOUS AS NEEDED
Status: DISCONTINUED | OUTPATIENT
Start: 2019-02-08 | End: 2019-02-08 | Stop reason: SURG

## 2019-02-08 RX ORDER — SODIUM CHLORIDE 9 MG/ML
20 INJECTION, SOLUTION INTRAVENOUS CONTINUOUS
Status: DISCONTINUED | OUTPATIENT
Start: 2019-02-08 | End: 2019-02-08 | Stop reason: HOSPADM

## 2019-02-08 RX ORDER — DEXTROSE AND SODIUM CHLORIDE 5; .45 G/100ML; G/100ML
30 INJECTION, SOLUTION INTRAVENOUS CONTINUOUS PRN
Status: DISCONTINUED | OUTPATIENT
Start: 2019-02-08 | End: 2019-02-08

## 2019-02-08 RX ORDER — MIDAZOLAM HYDROCHLORIDE 1 MG/ML
INJECTION INTRAMUSCULAR; INTRAVENOUS AS NEEDED
Status: DISCONTINUED | OUTPATIENT
Start: 2019-02-08 | End: 2019-02-08 | Stop reason: SURG

## 2019-02-08 RX ORDER — LIDOCAINE HYDROCHLORIDE 10 MG/ML
INJECTION, SOLUTION INFILTRATION; PERINEURAL AS NEEDED
Status: DISCONTINUED | OUTPATIENT
Start: 2019-02-08 | End: 2019-02-08 | Stop reason: SURG

## 2019-02-08 RX ADMIN — PROPOFOL 150 MG: 10 INJECTION, EMULSION INTRAVENOUS at 13:54

## 2019-02-08 RX ADMIN — PROPOFOL 50 MG: 10 INJECTION, EMULSION INTRAVENOUS at 13:57

## 2019-02-08 RX ADMIN — SODIUM CHLORIDE 20 ML/HR: 9 INJECTION, SOLUTION INTRAVENOUS at 12:44

## 2019-02-08 RX ADMIN — LIDOCAINE HYDROCHLORIDE 100 MG: 10 INJECTION, SOLUTION INFILTRATION; PERINEURAL at 13:54

## 2019-02-08 RX ADMIN — PROPOFOL 50 MG: 10 INJECTION, EMULSION INTRAVENOUS at 14:09

## 2019-02-08 RX ADMIN — PROPOFOL 30 MG: 10 INJECTION, EMULSION INTRAVENOUS at 14:13

## 2019-02-08 RX ADMIN — MIDAZOLAM HYDROCHLORIDE 2 MG: 2 INJECTION, SOLUTION INTRAMUSCULAR; INTRAVENOUS at 13:48

## 2019-02-08 RX ADMIN — PROPOFOL 40 MG: 10 INJECTION, EMULSION INTRAVENOUS at 14:01

## 2019-02-08 RX ADMIN — PROPOFOL 40 MG: 10 INJECTION, EMULSION INTRAVENOUS at 14:06

## 2019-02-08 NOTE — ANESTHESIA POSTPROCEDURE EVALUATION
Patient: Sudhakar Saul    Procedure Summary     Date:  02/08/19 Room / Location:  [facility] ENDOSCOPY 1 / [facility] ENDOSCOPY    Anesthesia Start:  1348 Anesthesia Stop:  1417    Procedures:       ESOPHAGOGASTRODUODENOSCOPY--poss dilation (N/A )      COLONOSCOPY (N/A ) Diagnosis:       Gastroesophageal reflux disease with esophagitis      Left upper quadrant pain      Nausea and vomiting, intractability of vomiting not specified, unspecified vomiting type      Weight gain, abnormal      Change in bowel habits      Hepatic fibrosis      (Gastroesophageal reflux disease with esophagitis [K21.0])      (Left upper quadrant pain [R10.12])      (Nausea and vomiting, intractability of vomiting not specified, unspecified vomiting type [R11.2])      (Weight gain, abnormal [R63.5])      (Change in bowel habits [R19.4])      (Hepatic fibrosis [K74.0])    Surgeon:  Joni Delacruz MD Provider:  Tylor Perales CRNA    Anesthesia Type:  MAC ASA Status:  3          Anesthesia Type: MAC  Last vitals  BP   166/94 (02/08/19 1236)   Temp   96.7 °F (35.9 °C) (02/08/19 1236)   Pulse   100 (02/08/19 1236)   Resp   18 (02/08/19 1236)     SpO2   96 % (02/08/19 1236)     Post Anesthesia Care and Evaluation    Patient location during evaluation: bedside  Patient participation: complete - patient participated  Level of consciousness: awake and alert  Pain management: adequate  Airway patency: patent  Anesthetic complications: No anesthetic complications  PONV Status: none  Cardiovascular status: acceptable  Respiratory status: acceptable  Hydration status: acceptable

## 2019-02-08 NOTE — ANESTHESIA PREPROCEDURE EVALUATION
Anesthesia Evaluation     NPO Solid Status: > 8 hours  NPO Liquid Status: > 8 hours           Airway   Mallampati: III  Possible difficult intubation  Dental    (+) edentulous    Pulmonary    (+) COPD, shortness of breath, sleep apnea, decreased breath sounds,   Cardiovascular - normal exam    (+) hypertension, CAD,       Neuro/Psych  (+) seizures, headaches, psychiatric history,     GI/Hepatic/Renal/Endo    (+) morbid obesity,  hepatitis, liver disease, diabetes mellitus,     Musculoskeletal     Abdominal    Substance History      OB/GYN          Other                      Anesthesia Plan    ASA 3     MAC     intravenous induction   Anesthetic plan, all risks, benefits, and alternatives have been provided, discussed and informed consent has been obtained with: patient.

## 2019-02-12 LAB
LAB AP CASE REPORT: NORMAL
PATH REPORT.FINAL DX SPEC: NORMAL
PATH REPORT.GROSS SPEC: NORMAL

## 2019-02-26 ENCOUNTER — OFFICE VISIT (OUTPATIENT)
Dept: FAMILY MEDICINE CLINIC | Facility: CLINIC | Age: 44
End: 2019-02-26

## 2019-02-26 ENCOUNTER — OFFICE VISIT (OUTPATIENT)
Dept: GASTROENTEROLOGY | Facility: CLINIC | Age: 44
End: 2019-02-26

## 2019-02-26 VITALS
DIASTOLIC BLOOD PRESSURE: 88 MMHG | HEART RATE: 89 BPM | SYSTOLIC BLOOD PRESSURE: 144 MMHG | TEMPERATURE: 98.6 F | BODY MASS INDEX: 45.66 KG/M2 | WEIGHT: 284.1 LBS | HEIGHT: 66 IN | OXYGEN SATURATION: 99 %

## 2019-02-26 VITALS
SYSTOLIC BLOOD PRESSURE: 156 MMHG | BODY MASS INDEX: 45.48 KG/M2 | HEART RATE: 94 BPM | HEIGHT: 66 IN | WEIGHT: 283 LBS | DIASTOLIC BLOOD PRESSURE: 88 MMHG

## 2019-02-26 DIAGNOSIS — K21.00 GASTROESOPHAGEAL REFLUX DISEASE WITH ESOPHAGITIS: Primary | ICD-10-CM

## 2019-02-26 DIAGNOSIS — K76.0 FATTY LIVER: ICD-10-CM

## 2019-02-26 DIAGNOSIS — G40.909 SEIZURE DISORDER (HCC): ICD-10-CM

## 2019-02-26 DIAGNOSIS — E66.01 MORBID OBESITY (HCC): ICD-10-CM

## 2019-02-26 DIAGNOSIS — R10.12 LEFT UPPER QUADRANT PAIN: ICD-10-CM

## 2019-02-26 DIAGNOSIS — F19.11 H/O: SUBSTANCE ABUSE (HCC): ICD-10-CM

## 2019-02-26 DIAGNOSIS — J44.9 CHRONIC OBSTRUCTIVE PULMONARY DISEASE, UNSPECIFIED COPD TYPE (HCC): ICD-10-CM

## 2019-02-26 DIAGNOSIS — G47.33 OSA (OBSTRUCTIVE SLEEP APNEA): ICD-10-CM

## 2019-02-26 DIAGNOSIS — R11.2 NAUSEA AND VOMITING, INTRACTABILITY OF VOMITING NOT SPECIFIED, UNSPECIFIED VOMITING TYPE: ICD-10-CM

## 2019-02-26 DIAGNOSIS — K21.00 GASTROESOPHAGEAL REFLUX DISEASE WITH ESOPHAGITIS: ICD-10-CM

## 2019-02-26 DIAGNOSIS — I10 ESSENTIAL HYPERTENSION: ICD-10-CM

## 2019-02-26 DIAGNOSIS — K63.5 POLYP OF COLON, UNSPECIFIED PART OF COLON, UNSPECIFIED TYPE: ICD-10-CM

## 2019-02-26 DIAGNOSIS — Z91.199 NON-COMPLIANCE: ICD-10-CM

## 2019-02-26 DIAGNOSIS — F32.A DEPRESSION, UNSPECIFIED DEPRESSION TYPE: ICD-10-CM

## 2019-02-26 DIAGNOSIS — E11.65 UNCONTROLLED TYPE 2 DIABETES MELLITUS WITH HYPERGLYCEMIA (HCC): Primary | ICD-10-CM

## 2019-02-26 DIAGNOSIS — Z86.19 HISTORY OF POSITIVE HEPATITIS C: ICD-10-CM

## 2019-02-26 PROCEDURE — 99214 OFFICE O/P EST MOD 30 MIN: CPT | Performed by: PHYSICIAN ASSISTANT

## 2019-02-26 PROCEDURE — 99214 OFFICE O/P EST MOD 30 MIN: CPT | Performed by: FAMILY MEDICINE

## 2019-02-26 RX ORDER — DICYCLOMINE HCL 20 MG
20 TABLET ORAL 2 TIMES DAILY
Qty: 60 TABLET | Refills: 3 | Status: SHIPPED | OUTPATIENT
Start: 2019-02-26 | End: 2019-07-26 | Stop reason: SDUPTHER

## 2019-02-26 RX ORDER — PANTOPRAZOLE SODIUM 40 MG/1
40 TABLET, DELAYED RELEASE ORAL DAILY
Qty: 30 TABLET | Refills: 3 | Status: SHIPPED | OUTPATIENT
Start: 2019-02-26 | End: 2019-07-26 | Stop reason: SDUPTHER

## 2019-02-26 RX ORDER — FLUOXETINE HYDROCHLORIDE 40 MG/1
40 CAPSULE ORAL DAILY
Qty: 30 CAPSULE | Refills: 1 | Status: SHIPPED | OUTPATIENT
Start: 2019-02-26 | End: 2019-05-03

## 2019-02-26 NOTE — PROGRESS NOTES
Chief Complaint   Patient presents with   • Heartburn   • Nausea   • Vomiting   • Hepatic Fibrosis       ENDO PROCEDURE ORDERED:    Subjective    Sudhakar Saul is a 43 y.o. female. she is here today for follow-up.    History of Present Illness    Patient seen on a recheck of her GERD, nausea, vomiting, hepatic fibrosis, abdominal pain. Last seen 01/15/2019. Laboratories at that time showed normal vitamin D, CBC. A1c 14.3%. CMP showed a glucose 571, sodium 130, CO2 19, otherwise normal.     Laboratories on 01/18/2019 cholesterol panel noted. HCV RNA by PCR quantitative is not detected. LFT's were normal. ALFREDO FibroSure 0.08/F0, steatosis 0.78/S3, 0.75/N2. Glucose 337, triglycerides 174.     Patient underwent EGD/colonoscopy on 02/08/2019 showed esophagitis, diffuse gastritis. Distal esophageal biopsy showed chronic esophagitis, antral biopsy showed reactive gastropathy. Colonoscopy showed a polyp in the sigmoid colon, internal, external hemorrhoids, adequate prep. This was hyperplastic polyp. Recommended repeat colonoscopy in no greater than 5 years.        Patient states she is still having 5 out of 10 left upper quadrant pain. She is having reflux despite taking the Prilosec.Normally she has been a little more constipated but she has had increasing bloating, no blood in her stool. Weight is up 5 pounds since last visit.      A/P: Patient with significant esophagitis and gastritis not well controlled on the Prilosec. Will discontinue and try switching her to Protonix 40 mg daily. She was encouraged to avoid gastric irritants. Encouraged dietary modification and significant  weight loss given her steatosis. Encouraged high fiber diet. Will give a trial on Bentyl  20 mg BID for abdominal pain and bloating. I have asked her to follow-up in 4-6 weeks, further pending clinical course and the results of the above.              The following portions of the patient's history were reviewed and updated as appropriate:    Past Medical History:   Diagnosis Date   • CAD (coronary artery disease)    • Degeneration macular    • Depression    • Diabetes mellitus (CMS/HCC)    • Hepatitis c    • Hypertension    • Migraine    • Multiple personality disorder (CMS/HCC)    • Neuropathy    • Paranoid schizophrenia (CMS/HCC)    • Seizures (CMS/HCC)      Past Surgical History:   Procedure Laterality Date   • APPENDECTOMY     • BACK SURGERY     • BREAST SURGERY     • CARPAL TUNNEL RELEASE      right hand   • CHOLECYSTECTOMY     • COLONOSCOPY     • COLONOSCOPY N/A 2/8/2019    Procedure: COLONOSCOPY;  Surgeon: Joni Delacruz MD;  Location: [facility] ENDOSCOPY;  Service: Gastroenterology   • DENTAL PROCEDURE     • ENDOSCOPY N/A 2/8/2019    Procedure: ESOPHAGOGASTRODUODENOSCOPY--poss dilation;  Surgeon: Joni Delacruz MD;  Location: [facility] ENDOSCOPY;  Service: Gastroenterology   • HYSTERECTOMY     • LIPOMA EXCISION      9   • LIVER BIOPSY     • UPPER GASTROINTESTINAL ENDOSCOPY  02/08/2019     Family History   Problem Relation Age of Onset   • Hypertension Other    • Diabetes Other    • Stroke Other    • Cancer Other    • Kidney disease Other    • Lung disease Other    • Other Other    • Malone's esophagus Other    • Colon polyps Other    • Heart disease Other    • Ulcers Other    • Cholelithiasis Other    • Allergy (severe) Other      OB History     No data available        Allergies   Allergen Reactions   • Betadine [Povidone Iodine] Anaphylaxis   • Contrast Dye Anaphylaxis   • Iodine Anaphylaxis   • Shellfish-Derived Products Anaphylaxis   • Adhesive Tape Unknown (See Comments)     Blister     • Altace [Ramipril] Unknown (See Comments)     Sleepy     • Lipitor [Atorvastatin] Unknown (See Comments)     Flu like symptoms     • Procardia [Nifedipine] Unknown (See Comments)     Syncope       Social History     Socioeconomic History   • Marital status:      Spouse name: Not on file   • Number of children: Not on file   • Years of education:  Not on file   • Highest education level: Not on file   Tobacco Use   • Smoking status: Current Every Day Smoker     Packs/day: 0.25     Types: Cigarettes   • Smokeless tobacco: Never Used   Substance and Sexual Activity   • Alcohol use: No   • Drug use: No       Current Outpatient Medications:   •  albuterol 108 (90 Base) MCG/ACT inhaler, Inhale 2 puffs Every 6 (Six) Hours As Needed for Wheezing or Shortness of Air., Disp: 1 inhaler, Rfl: 3  •  FLUoxetine (PROZAC) 40 MG capsule, Take 1 capsule by mouth Daily., Disp: 30 capsule, Rfl: 1  •  Insulin Glargine (BASAGLAR KWIKPEN) 100 UNIT/ML injection pen, Titrate up by 2 u each day until a.m. FSBS 100-150 at 38 may go up to 60, Disp: 18 mL, Rfl: 6  •  Insulin Lispro (HUMALOG) 100 UNIT/ML solution pen-injector, Inject 30 Units under the skin into the appropriate area as directed 3 (Three) Times a Day. 10 to 30 u with meals counting carbs., Disp: 15 mL, Rfl: 6  •  levETIRAcetam (KEPPRA) 1000 MG tablet, Take 1 tablet by mouth 2 (Two) Times a Day., Disp: 60 tablet, Rfl: 3  •  lisinopril (PRINIVIL,ZESTRIL) 40 MG tablet, Take 1 tablet by mouth Daily., Disp: 30 tablet, Rfl: 3  •  metFORMIN (GLUCOPHAGE) 500 MG tablet, Take 1 tablet by mouth 2 (Two) Times a Day With Meals., Disp: 60 tablet, Rfl: 3  •  metoprolol tartrate (LOPRESSOR) 50 MG tablet, Take 1 tablet by mouth Every 12 (Twelve) Hours., Disp: 60 tablet, Rfl: 3  •  naproxen (NAPROSYN) 500 MG tablet, Take 1 tablet by mouth 2 (Two) Times a Day With Meals., Disp: 60 tablet, Rfl: 3  •  simethicone (GAS-X) 80 MG chewable tablet, Chew 1 tablet Every 6 (Six) Hours As Needed for Flatulence., Disp: 60 tablet, Rfl: 1  •  simvastatin (ZOCOR) 20 MG tablet, Take 1 tablet by mouth Every Night., Disp: 30 tablet, Rfl: 3  •  tiotropium bromide monohydrate (SPIRIVA RESPIMAT) 2.5 MCG/ACT aerosol solution inhaler, Inhale 2 puffs Daily., Disp: 1 inhaler, Rfl: 11  •  vitamin D (ERGOCALCIFEROL) 42889 units capsule capsule, Take 1 capsule by  "mouth Every 7 (Seven) Days., Disp: 4 capsule, Rfl: 3  •  dicyclomine (BENTYL) 20 MG tablet, Take 1 tablet by mouth 2 (Two) Times a Day., Disp: 60 tablet, Rfl: 3  •  pantoprazole (PROTONIX) 40 MG EC tablet, Take 1 tablet by mouth Daily., Disp: 30 tablet, Rfl: 3  Review of Systems  Review of Systems       Objective    /88 (BP Location: Right arm)   Pulse 94   Ht 167.6 cm (66\")   Wt 128 kg (283 lb)   BMI 45.68 kg/m²   Physical Exam   Constitutional: She is oriented to person, place, and time. She appears well-developed and well-nourished. No distress.   HENT:   Head: Normocephalic and atraumatic.   Eyes: EOM are normal. Pupils are equal, round, and reactive to light.   Neck: Normal range of motion.   Cardiovascular: Normal rate, regular rhythm and normal heart sounds.   Pulmonary/Chest: Effort normal and breath sounds normal.   Abdominal: Soft. Bowel sounds are normal. She exhibits distension. She exhibits no shifting dullness, no abdominal bruit, no ascites and no mass. There is no hepatosplenomegaly. There is tenderness. There is no rigidity, no rebound, no guarding and no CVA tenderness. No hernia. Hernia confirmed negative in the ventral area.   Obese, scar, mild   Musculoskeletal: Normal range of motion.   Neurological: She is alert and oriented to person, place, and time.   Skin: Skin is warm and dry.   Psychiatric: She has a normal mood and affect. Her behavior is normal. Judgment and thought content normal.   Nursing note and vitals reviewed.    Assessment/Plan      1. Gastroesophageal reflux disease with esophagitis    2. Left upper quadrant pain    3. Nausea and vomiting, intractability of vomiting not specified, unspecified vomiting type    4. Polyp of colon, unspecified part of colon, unspecified type    5. Fatty liver    .   Sudhakar was seen today for heartburn, nausea, vomiting and hepatic fibrosis.    Diagnoses and all orders for this visit:    Gastroesophageal reflux disease with " esophagitis    Left upper quadrant pain    Nausea and vomiting, intractability of vomiting not specified, unspecified vomiting type    Polyp of colon, unspecified part of colon, unspecified type    Fatty liver  Comments:  F0/S3/N2    Other orders  -     pantoprazole (PROTONIX) 40 MG EC tablet; Take 1 tablet by mouth Daily.  -     dicyclomine (BENTYL) 20 MG tablet; Take 1 tablet by mouth 2 (Two) Times a Day.        Orders placed during this encounter include:  No orders of the defined types were placed in this encounter.      Medications prescribed:  New Medications Ordered This Visit   Medications   • pantoprazole (PROTONIX) 40 MG EC tablet     Sig: Take 1 tablet by mouth Daily.     Dispense:  30 tablet     Refill:  3   • dicyclomine (BENTYL) 20 MG tablet     Sig: Take 1 tablet by mouth 2 (Two) Times a Day.     Dispense:  60 tablet     Refill:  3       Requested Prescriptions     Signed Prescriptions Disp Refills   • pantoprazole (PROTONIX) 40 MG EC tablet 30 tablet 3     Sig: Take 1 tablet by mouth Daily.   • dicyclomine (BENTYL) 20 MG tablet 60 tablet 3     Sig: Take 1 tablet by mouth 2 (Two) Times a Day.       Review and/or summary of lab tests, radiology, procedures, medications. Review and summary of old records and obtaining of history. The risks and benefits of my recommendations, as well as other treatment options were discussed with the patient today. Questions were answered.    Follow-up: Return in about 7 weeks (around 4/16/2019), or if symptoms worsen or fail to improve.     * Surgery not found *      This document has been electronically signed by Jermaine Menendez PA-C on February 27, 2019 6:27 PM      Results for orders placed or performed during the hospital encounter of 02/08/19   Tissue Pathology Exam   Result Value Ref Range    Case Report       Surgical Pathology Report                         Case: [id number]                                  Authorizing Provider:  Joni Delacruz MD         Collected:           02/08/2019 02:00 PM          Ordering Location:     [facility]             Received:            02/08/2019 03:05 PM                                 [facility]                                                     Pathologist:           Alexis Chaudhari MD                                                           Specimens:   1) - Gastric, Antrum, antrum bx                                                                     2) - Esophagus, Distal, distal esophagus bx                                                         3) - Large Intestine, Sigmoid Colon, sigmoid colon polyp  (hot snare)                      Final Diagnosis       1.  GASTRIC ANTRUM, BIOPSY:  REACTIVE GASTROPATHY.    2.  DISTAL ESOPHAGUS, BIOPSY:  CHRONIC ESOPHAGITIS.    3.  SIGMOID COLON, POLYPECTOMY:  HYPERPLASTIC POLYP.      Gross Description       1.  Two tan fragments measure 0.8 x 0.5 x 0.2 cm together.  Totally submitted.    2.  Two gray fragments measure 0.4 x 0.4 x 0.2 cm together.  Totally submitted.    3.  A reddish tan polyp measures 0.6 x 0.4 x 0.3 cm.  Totally submitted.     POC Glucose Once   Result Value Ref Range    Glucose 260 (H) 70 - 130 mg/dL   Results for orders placed or performed in visit on 01/18/19   MicroAlbumin, Urine, Random - Urine, Clean Catch   Result Value Ref Range    Microalbumin, Urine 3.0 mg/L   Lipid Panel   Result Value Ref Range    Total Cholesterol 173 0 - 199 mg/dL    Triglycerides 164 20 - 199 mg/dL    HDL Cholesterol 46 (L) 60 - 200 mg/dL    LDL Cholesterol  109 1 - 129 mg/dL    LDL/HDL Ratio 2.05 0.00 - 3.22   Results for orders placed or performed in visit on 01/15/19   ALFREDO Fibrosure   Result Value Ref Range    Fibrosis Score (References) 0.08 0.00 - 0.21    Fibrosis Stage (Reference) Comment     Steatosis Score (Reference) 0.78 (H) 0.00 - 0.30    Steatosis Grade (Reference) Comment     ALFREDO Score (Reference) 0.75 (H) 0.25    Alfredo Grade (Reference) Comment      Height: (Reference) 66 in    Weight: (Reference) 279 LBS    Alpha 2-Macroglobulins, Qn 244 110 - 276 mg/dL    Haptoglobin 326 (H) 34 - 200 mg/dL    Apolipoprotein A-1 140 116 - 209 mg/dL    Total Bilirubin 0.2 0.0 - 1.2 mg/dL    GGT 36 0 - 60 IU/L    ALT (SGPT) 28 0 - 40 IU/L    AST (SGOT) P5P (Reference) 26 0 - 40 IU/L    Cholesterol, Total (Reference) 171 100 - 199 mg/dL    Glucose, Serum (Reference) 337 (H) 65 - 99 mg/dL    Triglycerides 174 (H) 0 - 149 mg/dL    Interpretations: (Reference) Comment     Fibrosis Scoring: Comment     Steatosis Grading (Reference) Comment     Orozco Scoring (Reference) Comment     Limitations: (Reference) Comment     Comment (Reference) Comment    Hepatitis C RNA, Quantitative, PCR (graph)   Result Value Ref Range    Hepatitis C Quantitation HCV Not Detected IU/mL    Test Information Comment    Hepatic Function Panel   Result Value Ref Range    Total Protein 6.9 6.3 - 8.6 g/dL    Albumin 4.00 3.40 - 4.80 g/dL    ALT (SGPT) 31 9 - 52 U/L    AST (SGOT) 25 14 - 36 U/L    Alkaline Phosphatase 91 38 - 126 U/L    Total Bilirubin 0.3 0.2 - 1.3 mg/dL    Bilirubin, Direct 0.0 0.0 - 0.3 mg/dL    Bilirubin, Indirect 0.4 0.0 - 1.1 mg/dL   Results for orders placed or performed in visit on 01/15/19   CBC Auto Differential   Result Value Ref Range    WBC 8.79 3.20 - 9.80 10*3/mm3    RBC 5.08 3.77 - 5.16 10*6/mm3    Hemoglobin 14.5 12.0 - 15.5 g/dL    Hematocrit 43.5 35.0 - 45.0 %    MCV 85.6 80.0 - 98.0 fL    MCH 28.5 26.5 - 34.0 pg    MCHC 33.3 31.4 - 36.0 g/dL    RDW 12.6 11.5 - 14.5 %    RDW-SD 40.1 36.4 - 46.3 fl    MPV 10.6 8.0 - 12.0 fL    Platelets 204 150 - 450 10*3/mm3    Neutrophil % 57.5 37.0 - 80.0 %    Lymphocyte % 35.0 10.0 - 50.0 %    Monocyte % 4.9 0.0 - 12.0 %    Eosinophil % 1.6 0.0 - 7.0 %    Basophil % 0.5 0.0 - 2.0 %    Immature Grans % 0.5 0.0 - 0.5 %    Neutrophils, Absolute 5.06 2.00 - 8.60 10*3/mm3    Lymphocytes, Absolute 3.08 0.60 - 4.20 10*3/mm3    Monocytes, Absolute  0.43 0.00 - 0.90 10*3/mm3    Eosinophils, Absolute 0.14 0.00 - 0.70 10*3/mm3    Basophils, Absolute 0.04 0.00 - 0.20 10*3/mm3    Immature Grans, Absolute 0.04 (H) 0.00 - 0.02 10*3/mm3   Vitamin D 25 Hydroxy   Result Value Ref Range    25 Hydroxy, Vitamin D 38.5 30.0 - 100.0 ng/ml   Hemoglobin A1c   Result Value Ref Range    Hemoglobin A1C 14.3 (H) 4 - 5.6 %   Comprehensive Metabolic Panel   Result Value Ref Range    Glucose 571 (C) 60 - 100 mg/dL    BUN 16 7 - 21 mg/dL    Creatinine 0.65 0.50 - 1.00 mg/dL    Sodium 130 (L) 137 - 145 mmol/L    Potassium 4.7 3.5 - 5.1 mmol/L    Chloride 99 95 - 110 mmol/L    CO2 19.0 (L) 22.0 - 31.0 mmol/L    Calcium 9.5 8.4 - 10.2 mg/dL    Total Protein 7.1 6.3 - 8.6 g/dL    Albumin 4.10 3.40 - 4.80 g/dL    ALT (SGPT) 26 9 - 52 U/L    AST (SGOT) 25 14 - 36 U/L    Alkaline Phosphatase 101 38 - 126 U/L    Total Bilirubin 0.3 0.2 - 1.3 mg/dL    eGFR Non  Amer 99 58 - 135 mL/min/1.73    Globulin 3.0 2.3 - 3.5 gm/dL    A/G Ratio 1.4 1.1 - 1.8 g/dL    BUN/Creatinine Ratio 24.6 7.0 - 25.0    Anion Gap 12.0 5.0 - 15.0 mmol/L   Results for orders placed or performed in visit on 09/19/18   HIV-1 & HIV-2 Antibodies   Result Value Ref Range    HIV-1/ HIV-2 Negative Negative   Urinalysis, Microscopic Only - Urine, Clean Catch   Result Value Ref Range    RBC, UA 0-2 (A) None Seen /HPF    WBC, UA 6-12 (A) None Seen, 0-2, 3-5 /HPF    Bacteria, UA 4+ (A) None Seen /HPF    Squamous Epithelial Cells, UA None Seen None Seen, 0-2 /HPF    Hyaline Casts, UA None Seen None Seen /LPF    Methodology Automated Microscopy    CBC Auto Differential   Result Value Ref Range    WBC 6.70 3.20 - 9.80 10*3/mm3    RBC 4.41 3.77 - 5.16 10*6/mm3    Hemoglobin 12.7 12.0 - 15.5 g/dL    Hematocrit 38.9 35.0 - 45.0 %    MCV 88.2 80.0 - 98.0 fL    MCH 28.8 26.5 - 34.0 pg    MCHC 32.6 31.4 - 36.0 g/dL    RDW 14.1 11.5 - 14.5 %    RDW-SD 45.7 36.4 - 46.3 fl    MPV 11.3 8.0 - 12.0 fL    Platelets 184 150 - 450 10*3/mm3     Neutrophil % 55.6 37.0 - 80.0 %    Lymphocyte % 37.3 10.0 - 50.0 %    Monocyte % 4.3 0.0 - 12.0 %    Eosinophil % 2.2 0.0 - 7.0 %    Basophil % 0.3 0.0 - 2.0 %    Immature Grans % 0.3 0.0 - 0.5 %    Neutrophils, Absolute 3.72 2.00 - 8.60 10*3/mm3    Lymphocytes, Absolute 2.50 0.60 - 4.20 10*3/mm3    Monocytes, Absolute 0.29 0.00 - 0.90 10*3/mm3    Eosinophils, Absolute 0.15 0.00 - 0.70 10*3/mm3    Basophils, Absolute 0.02 0.00 - 0.20 10*3/mm3    Immature Grans, Absolute 0.02 0.00 - 0.02 10*3/mm3     *Note: Due to a large number of results and/or encounters for the requested time period, some results have not been displayed. A complete set of results can be found in Results Review.       Some portions of this note have been dictated using voice recognition software and may contain errors and/or omissions.

## 2019-02-26 NOTE — PATIENT INSTRUCTIONS

## 2019-03-03 PROBLEM — F20.0 PARANOID SCHIZOPHRENIA: Status: ACTIVE | Noted: 2019-03-03

## 2019-04-26 PROBLEM — Z00.00 GENERAL MEDICAL EXAMINATION: Status: RESOLVED | Noted: 2018-09-19 | Resolved: 2019-04-26

## 2019-04-26 PROBLEM — Z00.00 ANNUAL PHYSICAL EXAM: Status: RESOLVED | Noted: 2018-09-19 | Resolved: 2019-04-26

## 2019-04-26 PROBLEM — Z13.29 ENCOUNTER FOR SCREENING FOR ENDOCRINE DISORDER: Status: RESOLVED | Noted: 2018-09-19 | Resolved: 2019-04-26

## 2019-04-26 PROBLEM — K29.70 GASTRITIS WITHOUT BLEEDING: Status: ACTIVE | Noted: 2019-04-26

## 2019-04-26 PROBLEM — Z13.6 ENCOUNTER FOR SCREENING FOR CARDIOVASCULAR DISORDERS: Status: RESOLVED | Noted: 2018-09-19 | Resolved: 2019-04-26

## 2019-04-26 PROBLEM — Z11.4 ENCOUNTER FOR SCREENING FOR HIV: Status: RESOLVED | Noted: 2018-09-19 | Resolved: 2019-04-26

## 2019-04-26 PROBLEM — R06.00 DYSPNEA: Status: RESOLVED | Noted: 2018-10-03 | Resolved: 2019-04-26

## 2019-04-26 PROBLEM — Z13.1 ENCOUNTER FOR SCREENING FOR DIABETES MELLITUS: Status: RESOLVED | Noted: 2018-09-19 | Resolved: 2019-04-26

## 2019-05-02 PROBLEM — Z72.0 TOBACCO USER: Status: ACTIVE | Noted: 2019-05-02

## 2019-05-02 PROBLEM — Z01.00 ENCOUNTER FOR EYE EXAM: Status: ACTIVE | Noted: 2019-05-02

## 2019-05-03 ENCOUNTER — OFFICE VISIT (OUTPATIENT)
Dept: FAMILY MEDICINE CLINIC | Facility: CLINIC | Age: 44
End: 2019-05-03

## 2019-05-03 VITALS
BODY MASS INDEX: 47.09 KG/M2 | DIASTOLIC BLOOD PRESSURE: 82 MMHG | HEART RATE: 69 BPM | TEMPERATURE: 100.1 F | OXYGEN SATURATION: 97 % | HEIGHT: 66 IN | SYSTOLIC BLOOD PRESSURE: 138 MMHG | WEIGHT: 293 LBS

## 2019-05-03 DIAGNOSIS — Z71.6 TOBACCO ABUSE COUNSELING: ICD-10-CM

## 2019-05-03 DIAGNOSIS — Z86.19 HISTORY OF POSITIVE HEPATITIS C: ICD-10-CM

## 2019-05-03 DIAGNOSIS — G40.909 SEIZURE DISORDER (HCC): ICD-10-CM

## 2019-05-03 DIAGNOSIS — E55.9 VITAMIN D DEFICIENCY: ICD-10-CM

## 2019-05-03 DIAGNOSIS — E11.65 UNCONTROLLED TYPE 2 DIABETES MELLITUS WITH HYPERGLYCEMIA (HCC): ICD-10-CM

## 2019-05-03 DIAGNOSIS — K29.70 GASTRITIS WITHOUT BLEEDING, UNSPECIFIED CHRONICITY, UNSPECIFIED GASTRITIS TYPE: ICD-10-CM

## 2019-05-03 DIAGNOSIS — K21.00 GASTROESOPHAGEAL REFLUX DISEASE WITH ESOPHAGITIS: ICD-10-CM

## 2019-05-03 DIAGNOSIS — F20.0 PARANOID SCHIZOPHRENIA (HCC): ICD-10-CM

## 2019-05-03 DIAGNOSIS — F32.A DEPRESSION, UNSPECIFIED DEPRESSION TYPE: Primary | ICD-10-CM

## 2019-05-03 DIAGNOSIS — G47.33 OSA (OBSTRUCTIVE SLEEP APNEA): ICD-10-CM

## 2019-05-03 DIAGNOSIS — F43.10 PTSD (POST-TRAUMATIC STRESS DISORDER): ICD-10-CM

## 2019-05-03 DIAGNOSIS — Z01.00 ENCOUNTER FOR EYE EXAM: ICD-10-CM

## 2019-05-03 DIAGNOSIS — Z12.31 SCREENING MAMMOGRAM, ENCOUNTER FOR: ICD-10-CM

## 2019-05-03 DIAGNOSIS — J44.9 CHRONIC OBSTRUCTIVE PULMONARY DISEASE, UNSPECIFIED COPD TYPE (HCC): ICD-10-CM

## 2019-05-03 DIAGNOSIS — Z72.0 TOBACCO USER: ICD-10-CM

## 2019-05-03 DIAGNOSIS — B18.2 CHRONIC HEPATITIS C WITHOUT HEPATIC COMA (HCC): ICD-10-CM

## 2019-05-03 DIAGNOSIS — E66.01 MORBID OBESITY (HCC): ICD-10-CM

## 2019-05-03 PROCEDURE — 99214 OFFICE O/P EST MOD 30 MIN: CPT | Performed by: FAMILY MEDICINE

## 2019-05-03 RX ORDER — RISPERIDONE 1 MG/1
1 TABLET ORAL 2 TIMES DAILY
Qty: 60 TABLET | Refills: 3 | Status: SHIPPED | OUTPATIENT
Start: 2019-05-03 | End: 2019-07-26 | Stop reason: SDUPTHER

## 2019-05-03 RX ORDER — FLUCONAZOLE 150 MG/1
TABLET ORAL
Qty: 2 TABLET | Refills: 0 | Status: SHIPPED | OUTPATIENT
Start: 2019-05-03 | End: 2019-05-14 | Stop reason: SDUPTHER

## 2019-05-03 RX ORDER — VILAZODONE HYDROCHLORIDE 20 MG/1
20 TABLET ORAL DAILY
Qty: 30 TABLET | Refills: 3 | Status: SHIPPED | OUTPATIENT
Start: 2019-05-03 | End: 2019-05-17 | Stop reason: CLARIF

## 2019-05-03 NOTE — PROGRESS NOTES
Subjective:  Sudhakar Saul is a 43 y.o. female who presents for       Patient Active Problem List   Diagnosis   • Seizure disorder (CMS/HCC)   • Morbid obesity (CMS/HCC)   • Tobacco abuse counseling   • Multiple joint pain   • Dysuria   • History of positive hepatitis C   • Essential hypertension   • Uncontrolled type 2 diabetes mellitus with hyperglycemia (CMS/HCC)   • Vitamin D deficiency   • Urinary tract infection without hematuria   • Chronic hepatitis C without hepatic coma (CMS/HCC)   • Cigarette nicotine dependence, uncomplicated   • Chronic obstructive pulmonary disease (CMS/HCC)   • JACQUE (obstructive sleep apnea)   • Gastroesophageal reflux disease with esophagitis   • Left upper quadrant pain   • Nausea and vomiting   • Weight gain, abnormal   • Change in bowel habits   • Hepatic fibrosis   • Non-compliance   • Depression   • Paranoid schizophrenia (CMS/HCC)   • Gastritis without bleeding   • Tobacco user   • Encounter for eye exam           Current Outpatient Medications:   •  albuterol 108 (90 Base) MCG/ACT inhaler, Inhale 2 puffs Every 6 (Six) Hours As Needed for Wheezing or Shortness of Air., Disp: 1 inhaler, Rfl: 3  •  dicyclomine (BENTYL) 20 MG tablet, Take 1 tablet by mouth 2 (Two) Times a Day., Disp: 60 tablet, Rfl: 3  •  FLUoxetine (PROZAC) 40 MG capsule, Take 1 capsule by mouth Daily., Disp: 30 capsule, Rfl: 1  •  Insulin Glargine (BASAGLAR KWIKPEN) 100 UNIT/ML injection pen, Titrate up by 2 u each day until a.m. FSBS 100-150 at 38 may go up to 60, Disp: 18 mL, Rfl: 6  •  Insulin Lispro (HUMALOG) 100 UNIT/ML solution pen-injector, Inject 30 Units under the skin into the appropriate area as directed 3 (Three) Times a Day. 10 to 30 u with meals counting carbs., Disp: 15 mL, Rfl: 6  •  levETIRAcetam (KEPPRA) 1000 MG tablet, Take 1 tablet by mouth 2 (Two) Times a Day., Disp: 60 tablet, Rfl: 3  •  lisinopril (PRINIVIL,ZESTRIL) 40 MG tablet, Take 1 tablet by mouth Daily., Disp: 30 tablet, Rfl:  3  •  metFORMIN (GLUCOPHAGE) 500 MG tablet, Take 1 tablet by mouth 2 (Two) Times a Day With Meals., Disp: 60 tablet, Rfl: 3  •  metoprolol tartrate (LOPRESSOR) 50 MG tablet, Take 1 tablet by mouth Every 12 (Twelve) Hours., Disp: 60 tablet, Rfl: 3  •  naproxen (NAPROSYN) 500 MG tablet, Take 1 tablet by mouth 2 (Two) Times a Day With Meals., Disp: 60 tablet, Rfl: 3  •  pantoprazole (PROTONIX) 40 MG EC tablet, Take 1 tablet by mouth Daily., Disp: 30 tablet, Rfl: 3  •  simethicone (GAS-X) 80 MG chewable tablet, Chew 1 tablet Every 6 (Six) Hours As Needed for Flatulence., Disp: 60 tablet, Rfl: 1  •  simvastatin (ZOCOR) 20 MG tablet, Take 1 tablet by mouth Every Night., Disp: 30 tablet, Rfl: 3  •  tiotropium bromide monohydrate (SPIRIVA RESPIMAT) 2.5 MCG/ACT aerosol solution inhaler, Inhale 2 puffs Daily., Disp: 1 inhaler, Rfl: 11  •  vitamin D (ERGOCALCIFEROL) 50822 units capsule capsule, Take 1 capsule by mouth Every 7 (Seven) Days., Disp: 4 capsule, Rfl: 3    PT is 44 yo female with history of morbid obesity, HTN, GERD, history of drug use, Vitamin D deficiency,  HLP, seizure disorder, IBS, depression, fatty liver disease  insulin dependent DM type 2, Chronic hepatitis C, COPD, JACQUE, gastritis. PT was seeing Dr. Sanchez for checkups. She was started on insulin Basaglar. She is due for diabetic eye exam and labwork. Her last hga1c was >14.0 about 3 months ago.  She is taking humalog 30 units before meals along with Basaglar units 60 units at bedtime. She is due for new labwork. She has yet to see neurologist.  She has not had a seizures since last visit of last year. As far as  Smoking she has cut back to 1/4 of ppd. She is willing try nicotine patch.     She also has history of depression and paranoid schizophrenia.  She was on respideral and is not on it there are negative feeeling. She has memories from childhood that are traumatic. She also has history of suicidal thoughts and PTSD. She has a history of alcohol and  drug use.  She has been drug and alcohol free for almost a year         Mental Health Problem   The primary symptoms include delusions, hallucinations and negative symptoms. The primary symptoms do not include dysphoric mood, bizarre behavior, disorganized speech or somatic symptoms. This is a chronic problem.   The onset of the illness is precipitated by a stressful event, emotional stress, alcohol abuse and drug abuse. The degree of incapacity that she is experiencing as a consequence of her illness is severe. Sequelae of the illness include an inability to work, an inability to care for self and harmed interpersonal relations. Additional symptoms of the illness include fatigue, agitation and seizures. Additional symptoms of the illness do not include anhedonia, insomnia, hypersomnia, appetite change, unexpected weight change, psychomotor retardation, feelings of worthlessness, attention impairment, increased goal-directed activity, flight of ideas, inflated self-esteem, decreased need for sleep, distractible, poor judgment, visual change, headaches or abdominal pain. She admits to suicidal ideas. She does not have a plan to commit suicide. She does not contemplate harming herself. She has not already injured self. She does not contemplate injuring another person. Risk factors that are present for mental illness include a history of mental illness, substance abuse, a history of suicide attempts, family violence and epilepsy.   Seizures    This is a chronic problem. The current episode started more than 1 week ago. The problem has been gradually worsening. Associated symptoms include sleepiness, confusion, headaches and visual disturbances. Pertinent negatives include no neck stiffness, no sore throat, no chest pain, no cough, no nausea, no vomiting, no diarrhea and no muscle weakness.   Obesity   This is a chronic problem. The current episode started more than 1 year ago. The problem occurs constantly. The problem  has been unchanged. Associated symptoms include arthralgias, fatigue, headaches, numbness and weakness. Pertinent negatives include no abdominal pain, anorexia, change in bowel habit, chest pain, chills, congestion, coughing, fever, joint swelling, myalgias, nausea, neck pain, rash, sore throat, swollen glands, urinary symptoms, vertigo, visual change or vomiting. Nothing aggravates the symptoms. She has tried nothing for the symptoms.   Hypertension   This is a chronic problem. The current episode started more than 1 year ago. The problem is uncontrolled. Associated symptoms include headaches. Pertinent negatives include no anxiety, blurred vision, chest pain, malaise/fatigue, neck pain, orthopnea, palpitations, peripheral edema, PND or shortness of breath. Risk factors for coronary artery disease include obesity, sedentary lifestyle, smoking/tobacco exposure and stress. Past treatments include ACE inhibitors, beta blockers and diuretics. There are no compliance problems.  There is no history of angina, kidney disease, CAD/MI, CVA, heart failure, left ventricular hypertrophy, PVD or retinopathy. There is no history of chronic renal disease, coarctation of the aorta, hyperaldosteronism, hypercortisolism, hyperparathyroidism, a hypertension causing med, pheochromocytoma, renovascular disease, sleep apnea or a thyroid problem.   Diabetes   She presents for her initial diabetic visit. She has type 2 diabetes mellitus. Her disease course has been worsening. Hypoglycemia symptoms include confusion, headaches, nervousness/anxiousness, seizures and sleepiness. Associated symptoms include fatigue and weakness. Pertinent negatives for diabetes include no blurred vision, no chest pain and no visual change. Pertinent negatives for diabetic complications include no CVA, PVD or retinopathy. Risk factors for coronary artery disease include dyslipidemia, obesity and post-menopausal. She has not had a previous visit with a  dietitian. There is no change in her home blood glucose trend. An ACE inhibitor/angiotensin II receptor blocker is not being taken. She does not see a podiatrist.Eye exam is not current.   Shortness of Breath   This is a chronic problem. The current episode started more than 1 year ago. The problem occurs constantly. The problem has been gradually worsening. Associated symptoms include headaches. Pertinent negatives include no abdominal pain, chest pain, claudication, coryza, ear pain, fever, hemoptysis, leg pain, leg swelling, neck pain, orthopnea, PND, rash, sore throat, sputum production, swollen glands, syncope, vomiting or wheezing. Nothing aggravates the symptoms. The patient has no known risk factors for DVT/PE. She has tried nothing for the symptoms. The treatment provided no relief. There is no history of allergies, aspirin allergies, asthma, bronchiolitis, CAD, chronic lung disease, COPD, DVT, a heart failure, PE, pneumonia or a recent surgery.           Review of Systems  Review of Systems   Constitutional: Positive for activity change and fatigue. Negative for appetite change, chills, diaphoresis, fever and unexpected weight change.   HENT: Negative for congestion, postnasal drip, rhinorrhea, sinus pressure, sinus pain, sneezing, sore throat, trouble swallowing and voice change.    Respiratory: Positive for shortness of breath. Negative for cough, choking, chest tightness, wheezing and stridor.    Cardiovascular: Negative for chest pain.   Gastrointestinal: Negative for abdominal pain, diarrhea, nausea and vomiting.   Neurological: Positive for seizures and weakness. Negative for headaches.   Psychiatric/Behavioral: Positive for agitation, decreased concentration and hallucinations. Negative for dysphoric mood. The patient does not have insomnia.        Patient Active Problem List   Diagnosis   • Seizure disorder (CMS/HCC)   • Morbid obesity (CMS/HCC)   • Tobacco abuse counseling   • Multiple joint pain    • Dysuria   • History of positive hepatitis C   • Essential hypertension   • Uncontrolled type 2 diabetes mellitus with hyperglycemia (CMS/HCC)   • Vitamin D deficiency   • Urinary tract infection without hematuria   • Chronic hepatitis C without hepatic coma (CMS/HCC)   • Cigarette nicotine dependence, uncomplicated   • Chronic obstructive pulmonary disease (CMS/HCC)   • JACQUE (obstructive sleep apnea)   • Gastroesophageal reflux disease with esophagitis   • Left upper quadrant pain   • Nausea and vomiting   • Weight gain, abnormal   • Change in bowel habits   • Hepatic fibrosis   • Non-compliance   • Depression   • Paranoid schizophrenia (CMS/HCC)   • Gastritis without bleeding   • Tobacco user   • Encounter for eye exam     Past Surgical History:   Procedure Laterality Date   • APPENDECTOMY     • BACK SURGERY     • BREAST SURGERY     • CARPAL TUNNEL RELEASE      right hand   • CHOLECYSTECTOMY     • COLONOSCOPY     • COLONOSCOPY N/A 2/8/2019    Procedure: COLONOSCOPY;  Surgeon: Joni Delacruz MD;  Location: [facility] ENDOSCOPY;  Service: Gastroenterology   • DENTAL PROCEDURE     • ENDOSCOPY N/A 2/8/2019    Procedure: ESOPHAGOGASTRODUODENOSCOPY--poss dilation;  Surgeon: Joni Delacruz MD;  Location: [facility] ENDOSCOPY;  Service: Gastroenterology   • HYSTERECTOMY     • LIPOMA EXCISION      9   • LIVER BIOPSY     • UPPER GASTROINTESTINAL ENDOSCOPY  02/08/2019     Social History     Socioeconomic History   • Marital status:      Spouse name: Not on file   • Number of children: Not on file   • Years of education: Not on file   • Highest education level: Not on file   Tobacco Use   • Smoking status: Current Every Day Smoker     Packs/day: 0.25     Types: Cigarettes   • Smokeless tobacco: Never Used   Substance and Sexual Activity   • Alcohol use: No   • Drug use: No     Family History   Problem Relation Age of Onset   • Hypertension Other    • Diabetes Other    • Stroke Other    • Cancer Other    • Kidney disease  Other    • Lung disease Other    • Other Other    • Malone's esophagus Other    • Colon polyps Other    • Heart disease Other    • Ulcers Other    • Cholelithiasis Other    • Allergy (severe) Other      Admission on 02/08/2019, Discharged on 02/08/2019   Component Date Value Ref Range Status   • Glucose 02/08/2019 260* 70 - 130 mg/dL Final    : [id number] JACKELINE Teeer ID: [id number]   • Case Report 02/08/2019    Final                    Value:Surgical Pathology Report                         Case: [id number]                                  Authorizing Provider:  Joni Delacruz MD        Collected:           02/08/2019 02:00 PM          Ordering Location:     [facility]             Received:            02/08/2019 03:05 PM                                 [facility]                                                     Pathologist:           Alexis Chaudhari MD                                                           Specimens:   1) - Gastric, Antrum, antrum bx                                                                     2) - Esophagus, Distal, distal esophagus bx                                                         3) - Large Intestine, Sigmoid Colon, sigmoid colon polyp  (hot snare)                     • Final Diagnosis 02/08/2019    Final                    Value:This result contains rich text formatting which cannot be displayed here.   • Gross Description 02/08/2019    Final                    Value:This result contains rich text formatting which cannot be displayed here.   Lab on 01/18/2019   Component Date Value Ref Range Status   • Total Cholesterol 01/18/2019 173  0 - 199 mg/dL Final   • Triglycerides 01/18/2019 164  20 - 199 mg/dL Final   • HDL Cholesterol 01/18/2019 46* 60 - 200 mg/dL Final   • LDL Cholesterol  01/18/2019 109  1 - 129 mg/dL Final   • LDL/HDL Ratio 01/18/2019 2.05  0.00 - 3.22 Final   • Microalbumin, Urine 01/18/2019 3.0  mg/L Final   Office  Visit on 01/15/2019   Component Date Value Ref Range Status   • Hepatitis C Quantitation 01/18/2019 HCV Not Detected  IU/mL Final   • Test Information 01/18/2019 Comment   Final    The quantitative range of this assay is 15 IU/mL to 100 million IU/mL.   • Total Protein 01/18/2019 6.9  6.3 - 8.6 g/dL Final   • Albumin 01/18/2019 4.00  3.40 - 4.80 g/dL Final   • ALT (SGPT) 01/18/2019 31  9 - 52 U/L Final   • AST (SGOT) 01/18/2019 25  14 - 36 U/L Final   • Alkaline Phosphatase 01/18/2019 91  38 - 126 U/L Final   • Total Bilirubin 01/18/2019 0.3  0.2 - 1.3 mg/dL Final   • Bilirubin, Direct 01/18/2019 0.0  0.0 - 0.3 mg/dL Final   • Bilirubin, Indirect 01/18/2019 0.4  0.0 - 1.1 mg/dL Final   • Fibrosis Score (References) 01/18/2019 0.08  0.00 - 0.21 Final   • Fibrosis Stage (Reference) 01/18/2019 Comment   Final                       F0 - No fibrosis   • Steatosis Score (Reference) 01/18/2019 0.78* 0.00 - 0.30 Final   • Steatosis Grade (Reference) 01/18/2019 Comment   Final                S3 - Marked or Severe Steatosis   • ALFREDO Score (Reference) 01/18/2019 0.75* 0.25 Final   • Alfredo Grade (Reference) 01/18/2019 Comment   Final                           N2 - ALFREDO   • Height: (Reference) 01/18/2019 66  in Final   • Weight: (Reference) 01/18/2019 279  LBS Final   • Alpha 2-Macroglobulins, Qn 01/18/2019 244  110 - 276 mg/dL Final   • Haptoglobin 01/18/2019 326* 34 - 200 mg/dL Final   • Apolipoprotein A-1 01/18/2019 140  116 - 209 mg/dL Final   • Total Bilirubin 01/18/2019 0.2  0.0 - 1.2 mg/dL Final   • GGT 01/18/2019 36  0 - 60 IU/L Final   • ALT (SGPT) 01/18/2019 28  0 - 40 IU/L Final   • AST (SGOT) P5P (Reference) 01/18/2019 26  0 - 40 IU/L Final   • Cholesterol, Total (Reference) 01/18/2019 171  100 - 199 mg/dL Final   • Glucose, Serum (Reference) 01/18/2019 337* 65 - 99 mg/dL Final   • Triglycerides 01/18/2019 174* 0 - 149 mg/dL Final   • Interpretations: (Reference) 01/18/2019 Comment   Final    Comment: Quantitative  results of 10 biochemicals in combination with  age, gender, height, and weight, are analyzed using a  computational algorithm to provide a quantitative surrogate  marker (0.0-1.0) of liver fibrosis (Metavir F0-F4), hepatic  steatosis (0.0-1.0, S0-S3), and Non-Alcoholic Steato-  Hepatitis (ALFREDO) (0.0-0.75, N0-N2). The absence of steatosis  (S<0.38) precludes the diagnosis of ALFREDO.  Fibrosis marker:  In a study of 171 Non-Alcoholic Fatty  Liver Disease (NAFLD) patients where 23% had significant  NAFLD fibrosis (Metavir F2-F4) and 11% had cirrhosis by  liver biopsy, a fibrosis result of >0.3 yielded a  sensitivity of 83% and a specificity of 78% for the  detection of significant fibrosis(1).  Steatosis Marker:  In a population of 744 patients (583 HCV,  18 HBV, 69 NAFLD, and 74 alcoholic disease patients), where  36% had significant steatosis (>5%) on a liver biopsy, a  steatosis score >0.5 had a sensitivity of 71% and a  specificity of 72% for identification of                            significant  steatosis(2).  ALFREDO marker:  In a population of 257 NAFLD patients, where  62% had at least some ALFREDO by liver biopsy, a prediction of  ALFREDO had a sensitivity of 88% for identifying ALFREDO and a  specificity of 50%(3).   • Fibrosis Scorin2019 Comment   Final          <0.21 = Stage F0 - No fibrosis  0.21 - 0.27 = Stage F0 - F1  0.27 - 0.31 = Stage F1 - Portal fibrosis  0.31 - 0.48 = Stage F1 - F2  0.48 - 0.58 = Stage F2 - Bridging fibrosis with few septa  0.58 - 0.72 = Stage F3 - Bridging fibrosis with many septa  0.72 - 0.74 = Stage F3 - F4        >0.74 = Stage F4 - Cirrhosis   • Steatosis Grading (Reference) 2019 Comment   Final          < 0.30 = S0 - No Steatosis  0.30 to 0.38 = S0 - S1  0.38 to 0.48 = S1 - Minimal Steatosis  0.48 to 0.57 = S1 - S2  0.57 to 0.67 = S2 - Moderate Steatosis  0.67 to 0.69 = S2 - S3        > 0.69 = S3 - Marked or Severe Steatosis   • Alfredo Scoring (Reference) 2019 Comment    Final    0.25 = N0 - Not ALFREDO  0.50 = N1 - Borderline or probable ALFREDO  0.75 = N2 - ALFREDO   • Limitations: (Reference) 01/18/2019 Comment   Final    ALFREDO FibroSure is recommended for patients with suspected non-  alcoholic fatty liver disease.  It is not recommended for patients  with other liver diseases.  It is also not recommended in patients  with Gilbert Disease, acute hemolysis, acute viral hepatitis, drug  induced hepatitis, genetic liver disease, autoimmune hepatitis and/or  extra-hepatic cholestasis.  Any of these clinical situations may lead  to inaccurate quantitative predictions of fibrosis.   • Comment (Reference) 01/18/2019 Comment   Final    This test was developed and its performance characteristics determined  by [facility].  It has not been cleared or approved by the Food and Drug  Administration.  The FDA has determined that such clearance or  approval is not necessary.  For questions regarding this report please contact  customer service at [phone].  References:  1. Emile CAZARES et al. Diagnostic Value of Biochemical Markers     (FibroTest) for the prediction of Liver Fibrosis in     patients with Non-Alcoholic Fatty Liver Disease. [facility] 2006; 6:6.  2. NEPTALI Power. et al. The Diagnostic Value of Biomarkers     (Steato Test) for the Prediction of Liver Steatosis.     Comparative Hepatol. 2005; 4:10.  3. NEPTALI Power, Jami Baptiste, et al. Diagnostic value     of biochemical markers (ALFREDO TEST) for the prediction of     non alcohol steato hepatitis in patients with non-     alcoholic fatty liver disease. [facility] 2006;     6:34 doi:10.1186/8670-356Y-2-34.   Lab on 01/15/2019   Component Date Value Ref Range Status   • WBC 01/15/2019 8.79  3.20 - 9.80 10*3/mm3 Final   • RBC 01/15/2019 5.08  3.77 - 5.16 10*6/mm3 Final   • Hemoglobin 01/15/2019 14.5  12.0 - 15.5 g/dL Final   • Hematocrit 01/15/2019 43.5  35.0 - 45.0 % Final   • MCV 01/15/2019 85.6  80.0 - 98.0 fL  Final   • MCH 01/15/2019 28.5  26.5 - 34.0 pg Final   • MCHC 01/15/2019 33.3  31.4 - 36.0 g/dL Final   • RDW 01/15/2019 12.6  11.5 - 14.5 % Final   • RDW-SD 01/15/2019 40.1  36.4 - 46.3 fl Final   • MPV 01/15/2019 10.6  8.0 - 12.0 fL Final   • Platelets 01/15/2019 204  150 - 450 10*3/mm3 Final   • Neutrophil % 01/15/2019 57.5  37.0 - 80.0 % Final   • Lymphocyte % 01/15/2019 35.0  10.0 - 50.0 % Final   • Monocyte % 01/15/2019 4.9  0.0 - 12.0 % Final   • Eosinophil % 01/15/2019 1.6  0.0 - 7.0 % Final   • Basophil % 01/15/2019 0.5  0.0 - 2.0 % Final   • Immature Grans % 01/15/2019 0.5  0.0 - 0.5 % Final   • Neutrophils, Absolute 01/15/2019 5.06  2.00 - 8.60 10*3/mm3 Final   • Lymphocytes, Absolute 01/15/2019 3.08  0.60 - 4.20 10*3/mm3 Final   • Monocytes, Absolute 01/15/2019 0.43  0.00 - 0.90 10*3/mm3 Final   • Eosinophils, Absolute 01/15/2019 0.14  0.00 - 0.70 10*3/mm3 Final   • Basophils, Absolute 01/15/2019 0.04  0.00 - 0.20 10*3/mm3 Final   • Immature Grans, Absolute 01/15/2019 0.04* 0.00 - 0.02 10*3/mm3 Final   • Glucose 01/15/2019 571* 60 - 100 mg/dL Final   • BUN 01/15/2019 16  7 - 21 mg/dL Final   • Creatinine 01/15/2019 0.65  0.50 - 1.00 mg/dL Final   • Sodium 01/15/2019 130* 137 - 145 mmol/L Final   • Potassium 01/15/2019 4.7  3.5 - 5.1 mmol/L Final   • Chloride 01/15/2019 99  95 - 110 mmol/L Final   • CO2 01/15/2019 19.0* 22.0 - 31.0 mmol/L Final   • Calcium 01/15/2019 9.5  8.4 - 10.2 mg/dL Final   • Total Protein 01/15/2019 7.1  6.3 - 8.6 g/dL Final   • Albumin 01/15/2019 4.10  3.40 - 4.80 g/dL Final   • ALT (SGPT) 01/15/2019 26  9 - 52 U/L Final   • AST (SGOT) 01/15/2019 25  14 - 36 U/L Final   • Alkaline Phosphatase 01/15/2019 101  38 - 126 U/L Final   • Total Bilirubin 01/15/2019 0.3  0.2 - 1.3 mg/dL Final   • eGFR Non African Amer 01/15/2019 99  58 - 135 mL/min/1.73 Final   • Globulin 01/15/2019 3.0  2.3 - 3.5 gm/dL Final   • A/G Ratio 01/15/2019 1.4  1.1 - 1.8 g/dL Final   • BUN/Creatinine Ratio  01/15/2019 24.6  7.0 - 25.0 Final   • Anion Gap 01/15/2019 12.0  5.0 - 15.0 mmol/L Final   • Hemoglobin A1C 01/15/2019 14.3* 4 - 5.6 % Final   • 25 Hydroxy, Vitamin D 01/15/2019 38.5  30.0 - 100.0 ng/ml Final      XR Elbow 3+ View Right  Narrative: Procedure: XR ELBOW 3+ VW RIGHT    Indication:  Persistent pain after recent fall      Technique: Three views .     Prior Relevant Exam: None    Small osteophyte tip of the olecranon. No acute bony abnormality.  Joint spaces intact. Mineralization normal.   Impression: CONCLUSION:    1. Mild chronic changes without acute bony abnormality     Electronically signed by:  Finn Haynes MD  8/29/2018 4:41 PM CDT  Workstation: [email or web address]  CT Head Without Contrast  Narrative: Noncontrast CT examination of the brain.    INDICATION:  Right lower extremity weakness. Seizure.     Technique: Axial, coronal, sagittal, without contrast. This exam  was performed according to our departmental dose-optimization  program which includes automated exposure control, adjustment of  the mA and/or kV according to patient size and/or use of  iterative reconstruction technique.    Prior relevant examination: None.    Limitations: None    Brain parenchyma appears within normal limits. Ventricles are  within normal limits in size. Normal gray-white matter  differentiation. No evidence of abnormal mass or calcification is  seen. No evidence of acute hemorrhage is noted.    The mastoid air cells are well aerated bilaterally.     The visualized paranasal sinuses appear well aerated bilaterally.    Calvarium intact.  Impression: CONCLUSION:    1. Normal brain parenchyma for age    Electronically signed by:  Finn Haynes MD  8/29/2018 2:37 PM CDT  Workstation: [email or web address]  XR Chest 2 View  Narrative: EXAM:          Radiograph(s), Chest   VIEWS:    PA / Lat ; 2       DATE/TIME:  8/29/2018 2:08 PM CDT                INDICATION:   seizure    COMPARISON:  CXR: none             FINDINGS:             -  "lines/tubes:    none     - cardiac:         size within normal limits         - mediastinum: contour within normal limits         - lungs:         no focal air space process, pulmonary  interstitial edema, nodule(s)/mass             - pleura:         no evidence of  fluid                  - osseous:         unremarkable for age                  - misc.:       Impression: CONCLUSION:        1. No evidence of an active cardiopulmonary process.                                                Electronically signed by:  SHIRLEY Tiwari MD  8/29/2018 2:09  PM CDT Workstation: [id number]    [email or web address]  Immunization History   Administered Date(s) Administered   • Pneumococcal Polysaccharide (PPSV23) 12/13/2018       The following portions of the patient's history were reviewed and updated as appropriate: allergies, current medications, past family history, past medical history, past social history, past surgical history and problem list.        Physical Exam  /82   Pulse 69   Temp 100.1 °F (37.8 °C)   Ht 167.6 cm (66\")   Wt 133 kg (293 lb 3.2 oz)   SpO2 97%   BMI 47.32 kg/m²       Physical Exam   Constitutional: She is oriented to person, place, and time. She appears well-developed and well-nourished.   HENT:   Head: Normocephalic and atraumatic.   Right Ear: External ear normal.   Eyes: Conjunctivae and EOM are normal. Pupils are equal, round, and reactive to light.   Neck: Normal range of motion. Neck supple.   Cardiovascular: Normal rate, regular rhythm and normal heart sounds.   No murmur heard.  Pulmonary/Chest: Effort normal and breath sounds normal. No respiratory distress.   Abdominal: Soft. Bowel sounds are normal. She exhibits no distension. There is no tenderness.   Obese abdomen    Musculoskeletal: Normal range of motion. She exhibits no edema or deformity.   Neurological: She is alert and oriented to person, place, and time. No cranial nerve deficit.   Skin: Skin is warm. No rash noted. She is not " diaphoretic. No erythema. No pallor.   Psychiatric: She has a normal mood and affect. Her behavior is normal.   Nursing note and vitals reviewed.      Assessment/Plan   Problems Addressed this Visit        Respiratory    Chronic obstructive pulmonary disease (CMS/HCC)    JACQUE (obstructive sleep apnea)       Digestive    Morbid obesity (CMS/HCC)    History of positive hepatitis C    Vitamin D deficiency    Chronic hepatitis C without hepatic coma (CMS/HCC)    Gastroesophageal reflux disease with esophagitis    Gastritis without bleeding       Endocrine    Uncontrolled type 2 diabetes mellitus with hyperglycemia (CMS/HCC) - Primary    Relevant Orders    CBC Auto Differential    Comprehensive Metabolic Panel    Hemoglobin A1c    Lipid Panel    TSH    T4, Free    T3, Free    Vitamin D 25 Hydroxy    Vitamin B12    Microalbumin / Creatinine Urine Ratio - Urine, Clean Catch       Nervous and Auditory    Seizure disorder (CMS/HCC)       Other    Tobacco abuse counseling    Depression    Paranoid schizophrenia (CMS/HCC)    Tobacco user    Encounter for eye exam              -will get labwork   -chronic hep C - Gastroenterology following   -for dsypnea- referred to Dr. Will (Pulmonologsit) for PFTs. Consultation appreciated. Recent chest x-ray negative. Will start on albuterol inhaler PRN.Advised pt to stop smoking. She is on spirva now for early COPD  -multiple joint pain -- naproxen 500 mg PO BID. Pt advised to hold naproxen unless needed while taking viibryd  -for multiple joint pain - pt cannot take opoids. Will start on naproxen 500 mg twi ce a day. Drug information provided  -morbid obesity -counseled on weight loss, diet and exercise. Diet information provided.  -hyperlipdiemia- simvastatin 20 mg PO qhs. ASCVD risk high. Recommend to take with coenzyme Q10   -for DM type 2- metformin 500 mg pO BID,  Short acting insulin 30 units before meals  along with basaglar insulin 60 units at bedtime  at bedtime. Start on  steglatro 15 mg daily. Samples provdied today  -mammogram screening  -depression - stop prozac start on viibryd 20 mg daily. Samples provided today. Referral to [facility] with behavioral health for paranoid schizophrenia/PTSD. Start on resperdal 1 mg PO BID   -seizure disorder -currently on Keppra  500 mg PO BID. Referred to Neurologist  Services appreciated. She missed appt.  Will go up on Keppra from 500 to 1000 mg twice a day refer back to Dr. Fry  -counseled pt to quit smoking >5 minutes.  Gave tobacco cessation material   -hypertension - at goal today Continue on lisinopril but will go up on dosage from 20 to 40 mg dialy.  mg Po q dominguez, metoprolol 50 mg PO BID . Stop HCTZ for now. Consider Norvasc or calcium channel blocker if not improving   -advised pt to be safe and call with any questions or concerns. All questions addressed today  -recheck in 2   weeks for BP recheck and labwork  -recommend diabetic eye exam         This document has been electronically signed by Jermaine Ferro MD on May 3, 2019 1:15 PM                      This document has been electronically signed by Jermaine Ferro MD on May 3, 2019 1:15 PM

## 2019-05-03 NOTE — PATIENT INSTRUCTIONS
Get labwork    Will refer to Neurology     [facility]  steglatro 15 mg daily.  Take with a lot of water  Diflucan 150 mg on onset of symptoms and in 72 hours  risperdal 1 mg twice a day  Stop prozac start on viibryd starting pack take with meals.  Hold naproxen if possible    Mammogram screening    Recheck in 2 weeks

## 2019-05-14 RX ORDER — FLUCONAZOLE 150 MG/1
TABLET ORAL
Qty: 2 TABLET | Refills: 0 | Status: SHIPPED | OUTPATIENT
Start: 2019-05-14 | End: 2019-07-26

## 2019-05-14 RX ORDER — DULOXETIN HYDROCHLORIDE 30 MG/1
30 CAPSULE, DELAYED RELEASE ORAL DAILY
Qty: 30 CAPSULE | Refills: 3 | Status: SHIPPED | OUTPATIENT
Start: 2019-05-14 | End: 2019-07-26 | Stop reason: SDUPTHER

## 2019-05-16 NOTE — PROGRESS NOTES
Subjective:  Sudhakar Saul is a 43 y.o. female who presents for       Patient Active Problem List   Diagnosis   • Seizure disorder (CMS/HCC)   • Morbid obesity (CMS/HCC)   • Tobacco abuse counseling   • Multiple joint pain   • Dysuria   • History of positive hepatitis C   • Essential hypertension   • Uncontrolled type 2 diabetes mellitus with hyperglycemia (CMS/HCC)   • Vitamin D deficiency   • Urinary tract infection without hematuria   • Chronic hepatitis C without hepatic coma (CMS/HCC)   • Cigarette nicotine dependence, uncomplicated   • Chronic obstructive pulmonary disease (CMS/HCC)   • JACQUE (obstructive sleep apnea)   • Gastroesophageal reflux disease with esophagitis   • Left upper quadrant pain   • Nausea and vomiting   • Weight gain, abnormal   • Change in bowel habits   • Hepatic fibrosis   • Non-compliance   • Depression   • Paranoid schizophrenia (CMS/HCC)   • Gastritis without bleeding   • Tobacco user   • Encounter for eye exam           Current Outpatient Medications:   •  albuterol 108 (90 Base) MCG/ACT inhaler, Inhale 2 puffs Every 6 (Six) Hours As Needed for Wheezing or Shortness of Air., Disp: 1 inhaler, Rfl: 3  •  dicyclomine (BENTYL) 20 MG tablet, Take 1 tablet by mouth 2 (Two) Times a Day., Disp: 60 tablet, Rfl: 3  •  DULoxetine (CYMBALTA) 30 MG capsule, Take 1 capsule by mouth Daily., Disp: 30 capsule, Rfl: 3  •  Ertugliflozin L-PyroglutamicAc (STEGLATRO) 15 MG tablet, Take 1 tablet by mouth Every Morning., Disp: 30 tablet, Rfl: 3  •  Ertugliflozin L-PyroglutamicAc (STEGLATRO) 15 MG tablet, Take 1 tablet by mouth Every Morning., Disp: 7 tablet, Rfl: 0  •  fluconazole (DIFLUCAN) 150 MG tablet, Take on onset of symptoms and in 72 hours, Disp: 2 tablet, Rfl: 0  •  Insulin Glargine (BASAGLAR KWIKPEN) 100 UNIT/ML injection pen, Titrate up by 2 u each day until a.m. FSBS 100-150 at 38 may go up to 60, Disp: 18 mL, Rfl: 6  •  Insulin Lispro (HUMALOG) 100 UNIT/ML solution pen-injector, Inject  30 Units under the skin into the appropriate area as directed 3 (Three) Times a Day. 10 to 30 u with meals counting carbs., Disp: 15 mL, Rfl: 6  •  levETIRAcetam (KEPPRA) 1000 MG tablet, Take 1 tablet by mouth 2 (Two) Times a Day., Disp: 60 tablet, Rfl: 3  •  lisinopril (PRINIVIL,ZESTRIL) 40 MG tablet, Take 1 tablet by mouth Daily., Disp: 30 tablet, Rfl: 3  •  metFORMIN (GLUCOPHAGE) 500 MG tablet, Take 1 tablet by mouth 2 (Two) Times a Day With Meals., Disp: 60 tablet, Rfl: 3  •  metoprolol tartrate (LOPRESSOR) 50 MG tablet, Take 1 tablet by mouth Every 12 (Twelve) Hours., Disp: 60 tablet, Rfl: 3  •  naproxen (NAPROSYN) 500 MG tablet, Take 1 tablet by mouth 2 (Two) Times a Day With Meals., Disp: 60 tablet, Rfl: 3  •  nicotine polacrilex (NICORETTE) 4 MG gum, Chew 1 each As Needed for Smoking Cessation., Disp: 60 each, Rfl: 3  •  pantoprazole (PROTONIX) 40 MG EC tablet, Take 1 tablet by mouth Daily., Disp: 30 tablet, Rfl: 3  •  risperiDONE (RISPERDAL) 1 MG tablet, Take 1 tablet by mouth 2 (Two) Times a Day., Disp: 60 tablet, Rfl: 3  •  simethicone (GAS-X) 80 MG chewable tablet, Chew 1 tablet Every 6 (Six) Hours As Needed for Flatulence., Disp: 60 tablet, Rfl: 1  •  simvastatin (ZOCOR) 20 MG tablet, Take 1 tablet by mouth Every Night., Disp: 30 tablet, Rfl: 3  •  tiotropium bromide monohydrate (SPIRIVA RESPIMAT) 2.5 MCG/ACT aerosol solution inhaler, Inhale 2 puffs Daily., Disp: 1 inhaler, Rfl: 11  •  vilazodone (VIIBRYD) 20 MG tablet tablet, Take 1 tablet by mouth Daily., Disp: 30 tablet, Rfl: 3  •  Vilazodone HCl (VIIBRYD STARTER PACK) 10 & 20 MG kit, Take 40 mg by mouth Take As Directed. Take as directed, Disp: 1 kit, Rfl: 0  •  vitamin D (ERGOCALCIFEROL) 67246 units capsule capsule, Take 1 capsule by mouth Every 7 (Seven) Days., Disp: 4 capsule, Rfl: 3    HPI     5/17/19 PT is 44 yo female with history of morbid obesity, HTN, GERD, history of drug use, Vitamin D deficiency,  HLP, seizure disorder, IBS, depression,  fatty liver disease  insulin dependent DM type 2, Chronic hepatitis C, COPD, JACQUE, gastritis. PT was seeing Dr. Sanchez for checkups. She was started on insulin Basaglar. She is due for diabetic eye exam and labwork. Her last hga1c was >14.0 about 3 months ago.  She is taking humalog 30 units before meals along with Basaglar units 60 units at bedtime. She is due for new labwork. She has yet to see neurologist.  She has not had a seizures since last visit of last year. As far as  Smoking she has cut back to 1/4 of ppd. She is willing try nicotine patch.      She also has history of depression and paranoid schizophrenia.  She was on respideral and is not on it there are negative feeeling. She has memories from childhood that are traumatic. She also has history of suicidal thoughts and PTSD. She has a history of alcohol and drug use.  She has been drug and alcohol free for almost a year. She got labwork that is pending.  She tried viibryd but insurance would not cover. She was started on cymbalta 30 mg daily. Her sugars have improved with steglatro and she is now on Basaglar 60 units.  She does note today that she hears voices that are degrading that interfere with her trying to learn something new            Mental Health Problem   The primary symptoms include delusions, hallucinations and negative symptoms. The primary symptoms do not include dysphoric mood, bizarre behavior, disorganized speech or somatic symptoms. This is a chronic problem.   The onset of the illness is precipitated by a stressful event, emotional stress, alcohol abuse and drug abuse. The degree of incapacity that she is experiencing as a consequence of her illness is severe. Sequelae of the illness include an inability to work, an inability to care for self and harmed interpersonal relations. Additional symptoms of the illness include fatigue, agitation and seizures. Additional symptoms of the illness do not include anhedonia, insomnia,  hypersomnia, appetite change, unexpected weight change, psychomotor retardation, feelings of worthlessness, attention impairment, increased goal-directed activity, flight of ideas, inflated self-esteem, decreased need for sleep, distractible, poor judgment, visual change, headaches or abdominal pain. She admits to suicidal ideas. She does not have a plan to commit suicide. She does not contemplate harming herself. She has not already injured self. She does not contemplate injuring another person. Risk factors that are present for mental illness include a history of mental illness, substance abuse, a history of suicide attempts, family violence and epilepsy.   Seizures    This is a chronic problem. The current episode started more than 1 week ago. The problem has been gradually worsening. Associated symptoms include sleepiness, confusion, headaches and visual disturbances. Pertinent negatives include no neck stiffness, no sore throat, no chest pain, no cough, no nausea, no vomiting, no diarrhea and no muscle weakness.   Obesity   This is a chronic problem. The current episode started more than 1 year ago. The problem occurs constantly. The problem has been unchanged. Associated symptoms include arthralgias, fatigue, headaches, numbness and weakness. Pertinent negatives include no abdominal pain, anorexia, change in bowel habit, chest pain, chills, congestion, coughing, fever, joint swelling, myalgias, nausea, neck pain, rash, sore throat, swollen glands, urinary symptoms, vertigo, visual change or vomiting. Nothing aggravates the symptoms. She has tried nothing for the symptoms.   Hypertension   This is a chronic problem. The current episode started more than 1 year ago. The problem is uncontrolled. Associated symptoms include headaches. Pertinent negatives include no anxiety, blurred vision, chest pain, malaise/fatigue, neck pain, orthopnea, palpitations, peripheral edema, PND or shortness of breath. Risk factors  for coronary artery disease include obesity, sedentary lifestyle, smoking/tobacco exposure and stress. Past treatments include ACE inhibitors, beta blockers and diuretics. There are no compliance problems.  There is no history of angina, kidney disease, CAD/MI, CVA, heart failure, left ventricular hypertrophy, PVD or retinopathy. There is no history of chronic renal disease, coarctation of the aorta, hyperaldosteronism, hypercortisolism, hyperparathyroidism, a hypertension causing med, pheochromocytoma, renovascular disease, sleep apnea or a thyroid problem.   Diabetes   She presents for her initial diabetic visit. She has type 2 diabetes mellitus. Her disease course has been worsening. Hypoglycemia symptoms include confusion, headaches, nervousness/anxiousness, seizures and sleepiness. Associated symptoms include fatigue and weakness. Pertinent negatives for diabetes include no blurred vision, no chest pain and no visual change. Pertinent negatives for diabetic complications include no CVA, PVD or retinopathy. Risk factors for coronary artery disease include dyslipidemia, obesity and post-menopausal. She has not had a previous visit with a dietitian. There is no change in her home blood glucose trend. An ACE inhibitor/angiotensin II receptor blocker is not being taken. She does not see a podiatrist.Eye exam is not current.   Shortness of Breath   This is a chronic problem. The current episode started more than 1 year ago. The problem occurs constantly. The problem has been gradually worsening. Associated symptoms include headaches. Pertinent negatives include no abdominal pain, chest pain, claudication, coryza, ear pain, fever, hemoptysis, leg pain, leg swelling, neck pain, orthopnea, PND, rash, sore throat, sputum production, swollen glands, syncope, vomiting or wheezing. Nothing aggravates the symptoms. The patient has no known risk factors for DVT/PE. She has tried nothing for the symptoms. The treatment  provided no relief. There is no history of allergies, aspirin allergies, asthma, bronchiolitis, CAD, chronic lung disease, COPD, DVT, a heart failure, PE, pneumonia or a recent surgery.     Review of Systems  Review of Systems   Constitutional: Positive for activity change and fatigue. Negative for appetite change, chills, diaphoresis and fever.   HENT: Negative for congestion, postnasal drip, rhinorrhea, sinus pressure, sinus pain, sneezing, sore throat, trouble swallowing and voice change.    Respiratory: Positive for shortness of breath. Negative for cough, choking, chest tightness, wheezing and stridor.    Cardiovascular: Negative for chest pain.   Gastrointestinal: Negative for diarrhea, nausea and vomiting.   Musculoskeletal: Positive for arthralgias, back pain, gait problem, joint swelling, myalgias and neck pain.   Neurological: Positive for numbness. Negative for weakness and headaches.   Psychiatric/Behavioral: Positive for agitation.       Patient Active Problem List   Diagnosis   • Seizure disorder (CMS/HCC)   • Morbid obesity (CMS/HCC)   • Tobacco abuse counseling   • Multiple joint pain   • Dysuria   • History of positive hepatitis C   • Essential hypertension   • Uncontrolled type 2 diabetes mellitus with hyperglycemia (CMS/HCC)   • Vitamin D deficiency   • Urinary tract infection without hematuria   • Chronic hepatitis C without hepatic coma (CMS/HCC)   • Cigarette nicotine dependence, uncomplicated   • Chronic obstructive pulmonary disease (CMS/HCC)   • JACQUE (obstructive sleep apnea)   • Gastroesophageal reflux disease with esophagitis   • Left upper quadrant pain   • Nausea and vomiting   • Weight gain, abnormal   • Change in bowel habits   • Hepatic fibrosis   • Non-compliance   • Depression   • Paranoid schizophrenia (CMS/HCC)   • Gastritis without bleeding   • Tobacco user   • Encounter for eye exam     Past Surgical History:   Procedure Laterality Date   • APPENDECTOMY     • BACK SURGERY     •  BREAST SURGERY     • CARPAL TUNNEL RELEASE      right hand   • CHOLECYSTECTOMY     • COLONOSCOPY     • COLONOSCOPY N/A 2/8/2019    Procedure: COLONOSCOPY;  Surgeon: Joni Delacruz MD;  Location: [facility] ENDOSCOPY;  Service: Gastroenterology   • DENTAL PROCEDURE     • ENDOSCOPY N/A 2/8/2019    Procedure: ESOPHAGOGASTRODUODENOSCOPY--poss dilation;  Surgeon: Joni Delacruz MD;  Location: [facility] ENDOSCOPY;  Service: Gastroenterology   • HYSTERECTOMY     • LIPOMA EXCISION      9   • LIVER BIOPSY     • UPPER GASTROINTESTINAL ENDOSCOPY  02/08/2019     Social History     Socioeconomic History   • Marital status:      Spouse name: Not on file   • Number of children: Not on file   • Years of education: Not on file   • Highest education level: Not on file   Tobacco Use   • Smoking status: Current Every Day Smoker     Packs/day: 0.25     Types: Cigarettes   • Smokeless tobacco: Never Used   Substance and Sexual Activity   • Alcohol use: No   • Drug use: No     Family History   Problem Relation Age of Onset   • Hypertension Other    • Diabetes Other    • Stroke Other    • Cancer Other    • Kidney disease Other    • Lung disease Other    • Other Other    • Malone's esophagus Other    • Colon polyps Other    • Heart disease Other    • Ulcers Other    • Cholelithiasis Other    • Allergy (severe) Other      Admission on 02/08/2019, Discharged on 02/08/2019   Component Date Value Ref Range Status   • Glucose 02/08/2019 260* 70 - 130 mg/dL Final    : [id number] J[facility]AVal[facility]Care [facility] CenterMeter ID: [id number]   • Case Report 02/08/2019    Final                    Value:Surgical Pathology Report                         Case: [id number]                                  Authorizing Provider:  Joni Delacruz MD        Collected:           02/08/2019 02:00 PM          Ordering Location:     [facility]             Received:            02/08/2019 03:05 PM                                 [facility]                                                      Pathologist:           Alexis Chaudhari MD                                                           Specimens:   1) - Gastric, Antrum, antrum bx                                                                     2) - Esophagus, Distal, distal esophagus bx                                                         3) - Large Intestine, Sigmoid Colon, sigmoid colon polyp  (hot snare)                     • Final Diagnosis 02/08/2019    Final                    Value:This result contains rich text formatting which cannot be displayed here.   • Gross Description 02/08/2019    Final                    Value:This result contains rich text formatting which cannot be displayed here.   Lab on 01/18/2019   Component Date Value Ref Range Status   • Total Cholesterol 01/18/2019 173  0 - 199 mg/dL Final   • Triglycerides 01/18/2019 164  20 - 199 mg/dL Final   • HDL Cholesterol 01/18/2019 46* 60 - 200 mg/dL Final   • LDL Cholesterol  01/18/2019 109  1 - 129 mg/dL Final   • LDL/HDL Ratio 01/18/2019 2.05  0.00 - 3.22 Final   • Microalbumin, Urine 01/18/2019 3.0  mg/L Final   Office Visit on 01/15/2019   Component Date Value Ref Range Status   • Hepatitis C Quantitation 01/18/2019 HCV Not Detected  IU/mL Final   • Test Information 01/18/2019 Comment   Final    The quantitative range of this assay is 15 IU/mL to 100 million IU/mL.   • Total Protein 01/18/2019 6.9  6.3 - 8.6 g/dL Final   • Albumin 01/18/2019 4.00  3.40 - 4.80 g/dL Final   • ALT (SGPT) 01/18/2019 31  9 - 52 U/L Final   • AST (SGOT) 01/18/2019 25  14 - 36 U/L Final   • Alkaline Phosphatase 01/18/2019 91  38 - 126 U/L Final   • Total Bilirubin 01/18/2019 0.3  0.2 - 1.3 mg/dL Final   • Bilirubin, Direct 01/18/2019 0.0  0.0 - 0.3 mg/dL Final   • Bilirubin, Indirect 01/18/2019 0.4  0.0 - 1.1 mg/dL Final   • Fibrosis Score (References) 01/18/2019 0.08  0.00 - 0.21 Final   • Fibrosis Stage (Reference) 01/18/2019 Comment   Final                        F0 - No fibrosis   • Steatosis Score (Reference) 01/18/2019 0.78* 0.00 - 0.30 Final   • Steatosis Grade (Reference) 01/18/2019 Comment   Final                S3 - Marked or Severe Steatosis   • ALFREDO Score (Reference) 01/18/2019 0.75* 0.25 Final   • Alfredo Grade (Reference) 01/18/2019 Comment   Final                           N2 - ALFREDO   • Height: (Reference) 01/18/2019 66  in Final   • Weight: (Reference) 01/18/2019 279  LBS Final   • Alpha 2-Macroglobulins, Qn 01/18/2019 244  110 - 276 mg/dL Final   • Haptoglobin 01/18/2019 326* 34 - 200 mg/dL Final   • Apolipoprotein A-1 01/18/2019 140  116 - 209 mg/dL Final   • Total Bilirubin 01/18/2019 0.2  0.0 - 1.2 mg/dL Final   • GGT 01/18/2019 36  0 - 60 IU/L Final   • ALT (SGPT) 01/18/2019 28  0 - 40 IU/L Final   • AST (SGOT) P5P (Reference) 01/18/2019 26  0 - 40 IU/L Final   • Cholesterol, Total (Reference) 01/18/2019 171  100 - 199 mg/dL Final   • Glucose, Serum (Reference) 01/18/2019 337* 65 - 99 mg/dL Final   • Triglycerides 01/18/2019 174* 0 - 149 mg/dL Final   • Interpretations: (Reference) 01/18/2019 Comment   Final    Comment: Quantitative results of 10 biochemicals in combination with  age, gender, height, and weight, are analyzed using a  computational algorithm to provide a quantitative surrogate  marker (0.0-1.0) of liver fibrosis (Metavir F0-F4), hepatic  steatosis (0.0-1.0, S0-S3), and Non-Alcoholic Steato-  Hepatitis (ALFREDO) (0.0-0.75, N0-N2). The absence of steatosis  (S<0.38) precludes the diagnosis of ALFREDO.  Fibrosis marker:  In a study of 171 Non-Alcoholic Fatty  Liver Disease (NAFLD) patients where 23% had significant  NAFLD fibrosis (Metavir F2-F4) and 11% had cirrhosis by  liver biopsy, a fibrosis result of >0.3 yielded a  sensitivity of 83% and a specificity of 78% for the  detection of significant fibrosis(1).  Steatosis Marker:  In a population of 744 patients (583 HCV,  18 HBV, 69 NAFLD, and 74 alcoholic disease patients), where  36% had  significant steatosis (>5%) on a liver biopsy, a  steatosis score >0.5 had a sensitivity of 71% and a  specificity of 72% for identification of                            significant  steatosis(2).  ALFREDO marker:  In a population of 257 NAFLD patients, where  62% had at least some ALFREDO by liver biopsy, a prediction of  ALFREDO had a sensitivity of 88% for identifying ALFREDO and a  specificity of 50%(3).   • Fibrosis Scorin2019 Comment   Final          <0.21 = Stage F0 - No fibrosis  0.21 - 0.27 = Stage F0 - F1  0.27 - 0.31 = Stage F1 - Portal fibrosis  0.31 - 0.48 = Stage F1 - F2  0.48 - 0.58 = Stage F2 - Bridging fibrosis with few septa  0.58 - 0.72 = Stage F3 - Bridging fibrosis with many septa  0.72 - 0.74 = Stage F3 - F4        >0.74 = Stage F4 - Cirrhosis   • Steatosis Grading (Reference) 2019 Comment   Final          < 0.30 = S0 - No Steatosis  0.30 to 0.38 = S0 - S1  0.38 to 0.48 = S1 - Minimal Steatosis  0.48 to 0.57 = S1 - S2  0.57 to 0.67 = S2 - Moderate Steatosis  0.67 to 0.69 = S2 - S3        > 0.69 = S3 - Marked or Severe Steatosis   • Alfredo Scoring (Reference) 2019 Comment   Final    0.25 = N0 - Not ALFREDO  0.50 = N1 - Borderline or probable ALFREDO  0.75 = N2 - ALFREDO   • Limitations: (Reference) 2019 Comment   Final    ALFREDO FibroSure is recommended for patients with suspected non-  alcoholic fatty liver disease.  It is not recommended for patients  with other liver diseases.  It is also not recommended in patients  with Gilbert Disease, acute hemolysis, acute viral hepatitis, drug  induced hepatitis, genetic liver disease, autoimmune hepatitis and/or  extra-hepatic cholestasis.  Any of these clinical situations may lead  to inaccurate quantitative predictions of fibrosis.   • Comment (Reference) 2019 Comment   Final    This test was developed and its performance characteristics determined  by [employer].  It has not been cleared or approved by the Food and Drug  Administration.  The  FDA has determined that such clearance or  approval is not necessary.  For questions regarding this report please contact  customer service at [phone].  References:  1. Emile CUEVAS. et al. Diagnostic Value of Biochemical Markers     (FibroTest) for the prediction of Liver Fibrosis in     patients with Non-Alcoholic Fatty Liver Disease. [facility] 2006; 6:6.  2. NEPTALI Power. et al. The Diagnostic Value of Biomarkers     (Steato Test) for the Prediction of Liver Steatosis.     Comparative Hepatol. 2005; 4:10.  3. NEPTALI Power, Jami Baptiste, et al. Diagnostic value     of biochemical markers (ALFREDO TEST) for the prediction of     non alcohol steato hepatitis in patients with non-     alcoholic fatty liver disease. [facility] 2006;     6:34 doi:10.1186/0320-182F-2-34.   Lab on 01/15/2019   Component Date Value Ref Range Status   • WBC 01/15/2019 8.79  3.20 - 9.80 10*3/mm3 Final   • RBC 01/15/2019 5.08  3.77 - 5.16 10*6/mm3 Final   • Hemoglobin 01/15/2019 14.5  12.0 - 15.5 g/dL Final   • Hematocrit 01/15/2019 43.5  35.0 - 45.0 % Final   • MCV 01/15/2019 85.6  80.0 - 98.0 fL Final   • MCH 01/15/2019 28.5  26.5 - 34.0 pg Final   • MCHC 01/15/2019 33.3  31.4 - 36.0 g/dL Final   • RDW 01/15/2019 12.6  11.5 - 14.5 % Final   • RDW-SD 01/15/2019 40.1  36.4 - 46.3 fl Final   • MPV 01/15/2019 10.6  8.0 - 12.0 fL Final   • Platelets 01/15/2019 204  150 - 450 10*3/mm3 Final   • Neutrophil % 01/15/2019 57.5  37.0 - 80.0 % Final   • Lymphocyte % 01/15/2019 35.0  10.0 - 50.0 % Final   • Monocyte % 01/15/2019 4.9  0.0 - 12.0 % Final   • Eosinophil % 01/15/2019 1.6  0.0 - 7.0 % Final   • Basophil % 01/15/2019 0.5  0.0 - 2.0 % Final   • Immature Grans % 01/15/2019 0.5  0.0 - 0.5 % Final   • Neutrophils, Absolute 01/15/2019 5.06  2.00 - 8.60 10*3/mm3 Final   • Lymphocytes, Absolute 01/15/2019 3.08  0.60 - 4.20 10*3/mm3 Final   • Monocytes, Absolute 01/15/2019 0.43  0.00 - 0.90 10*3/mm3 Final   • Eosinophils,  Absolute 01/15/2019 0.14  0.00 - 0.70 10*3/mm3 Final   • Basophils, Absolute 01/15/2019 0.04  0.00 - 0.20 10*3/mm3 Final   • Immature Grans, Absolute 01/15/2019 0.04* 0.00 - 0.02 10*3/mm3 Final   • Glucose 01/15/2019 571* 60 - 100 mg/dL Final   • BUN 01/15/2019 16  7 - 21 mg/dL Final   • Creatinine 01/15/2019 0.65  0.50 - 1.00 mg/dL Final   • Sodium 01/15/2019 130* 137 - 145 mmol/L Final   • Potassium 01/15/2019 4.7  3.5 - 5.1 mmol/L Final   • Chloride 01/15/2019 99  95 - 110 mmol/L Final   • CO2 01/15/2019 19.0* 22.0 - 31.0 mmol/L Final   • Calcium 01/15/2019 9.5  8.4 - 10.2 mg/dL Final   • Total Protein 01/15/2019 7.1  6.3 - 8.6 g/dL Final   • Albumin 01/15/2019 4.10  3.40 - 4.80 g/dL Final   • ALT (SGPT) 01/15/2019 26  9 - 52 U/L Final   • AST (SGOT) 01/15/2019 25  14 - 36 U/L Final   • Alkaline Phosphatase 01/15/2019 101  38 - 126 U/L Final   • Total Bilirubin 01/15/2019 0.3  0.2 - 1.3 mg/dL Final   • eGFR Non African Amer 01/15/2019 99  58 - 135 mL/min/1.73 Final   • Globulin 01/15/2019 3.0  2.3 - 3.5 gm/dL Final   • A/G Ratio 01/15/2019 1.4  1.1 - 1.8 g/dL Final   • BUN/Creatinine Ratio 01/15/2019 24.6  7.0 - 25.0 Final   • Anion Gap 01/15/2019 12.0  5.0 - 15.0 mmol/L Final   • Hemoglobin A1C 01/15/2019 14.3* 4 - 5.6 % Final   • 25 Hydroxy, Vitamin D 01/15/2019 38.5  30.0 - 100.0 ng/ml Final      XR Elbow 3+ View Right  Narrative: Procedure: XR ELBOW 3+ VW RIGHT    Indication:  Persistent pain after recent fall      Technique: Three views .     Prior Relevant Exam: None    Small osteophyte tip of the olecranon. No acute bony abnormality.  Joint spaces intact. Mineralization normal.   Impression: CONCLUSION:    1. Mild chronic changes without acute bony abnormality     Electronically signed by:  Finn Haynes MD  8/29/2018 4:41 PM CDT  Workstation: [place]  CT Head Without Contrast  Narrative: Noncontrast CT examination of the brain.    INDICATION:  Right lower extremity weakness. Seizure.     Technique:  Axial, coronal, sagittal, without contrast. This exam  was performed according to our departmental dose-optimization  program which includes automated exposure control, adjustment of  the mA and/or kV according to patient size and/or use of  iterative reconstruction technique.    Prior relevant examination: None.    Limitations: None    Brain parenchyma appears within normal limits. Ventricles are  within normal limits in size. Normal gray-white matter  differentiation. No evidence of abnormal mass or calcification is  seen. No evidence of acute hemorrhage is noted.    The mastoid air cells are well aerated bilaterally.     The visualized paranasal sinuses appear well aerated bilaterally.    Calvarium intact.  Impression: CONCLUSION:    1. Normal brain parenchyma for age    Electronically signed by:  Finn Haynes MD  8/29/2018 2:37 PM CDT  Workstation: [employer] In[employer] Chest 2 View  Narrative: EXAM:          Radiograph(s), Chest   VIEWS:    PA / Lat ; 2       DATE/TIME:  8/29/2018 2:08 PM CDT                INDICATION:   seizure    COMPARISON:  CXR: none             FINDINGS:             - lines/tubes:    none     - cardiac:         size within normal limits         - mediastinum: contour within normal limits         - lungs:         no focal air space process, pulmonary  interstitial edema, nodule(s)/mass             - pleura:         no evidence of  fluid                  - osseous:         unremarkable for age                  - misc.:       Impression: CONCLUSION:        1. No evidence of an active cardiopulmonary process.                                                Electronically signed by:  SHIRLEY Tiwari MD  8/29/2018 2:09  PM CDT Workstation: [id number]    @[email or web address]  Immunization History   Administered Date(s) Administered   • Pneumococcal Polysaccharide (PPSV23) 12/13/2018       The following portions of the patient's history were reviewed and updated as appropriate: allergies, current  "medications, past family history, past medical history, past social history, past surgical history and problem list.        Physical Exam  /80   Pulse 82   Temp 100 °F (37.8 °C)   Ht 167.6 cm (66\")   Wt 133 kg (293 lb)   SpO2 96%   BMI 47.29 kg/m²     Physical Exam   Constitutional: She is oriented to person, place, and time. She appears well-developed and well-nourished.   HENT:   Head: Normocephalic and atraumatic.   Right Ear: External ear normal.   Eyes: Conjunctivae and EOM are normal. Pupils are equal, round, and reactive to light.   Neck: Normal range of motion. Neck supple.   Cardiovascular: Normal rate, regular rhythm and normal heart sounds.   No murmur heard.  Pulmonary/Chest: Effort normal and breath sounds normal. No respiratory distress.   Abdominal: Soft. Bowel sounds are normal. She exhibits no distension. There is no tenderness.   Obese abdomen    Musculoskeletal: Normal range of motion. She exhibits tenderness. She exhibits no edema or deformity.   Neurological: She is alert and oriented to person, place, and time. No cranial nerve deficit.   Skin: Skin is warm. No rash noted. She is not diaphoretic. No erythema. No pallor.   Psychiatric: She has a normal mood and affect. Her behavior is normal.   Nursing note and vitals reviewed.      Assessment/Plan   Problems Addressed this Visit        Cardiovascular and Mediastinum    Essential hypertension       Respiratory    Chronic obstructive pulmonary disease (CMS/HCC)    JACQUE (obstructive sleep apnea)       Digestive    Vitamin D deficiency - Primary    Chronic hepatitis C without hepatic coma (CMS/HCC)    Gastroesophageal reflux disease with esophagitis    Gastritis without bleeding       Endocrine    Uncontrolled type 2 diabetes mellitus with hyperglycemia (CMS/HCC)       Nervous and Auditory    Seizure disorder (CMS/HCC)       Other    Tobacco abuse counseling    Depression    Paranoid schizophrenia (CMS/HCC)    Tobacco user      Other " Visit Diagnoses     Hyperlipidemia, unspecified hyperlipidemia type              -advised pt to get labwork   -chronic hep C - Gastroenterology following   -for dsypnea- referred to Dr. Will (Pulmonologsit) for PFTs. Consultation appreciated. Recent chest x-ray negative. Will start on albuterol inhaler PRN.Advised pt to stop smoking. She is on spirva now for early COPD  -multiple joint pain -- naproxen 500 mg PO BID. Pt advised to hold naproxen unless needed while taking viibryd  -for multiple joint pain - pt cannot take opoids. Will start on naproxen 500 mg twi ce a day. Drug information provided  -morbid obesity -counseled on weight loss, diet and exercise. Diet information provided.  -hyperlipdiemia- simvastatin 20 mg PO qhs. ASCVD risk high. Recommend to take with coenzyme Q10   -for DM type 2- metformin 500 mg pO BID,  Short acting insulin 30 units before meals  along with basaglar insulin 60 units at bedtime  at bedtime. Start on steglatro 15 mg daily. Samples provdied today. Advised to taper up by 3 units every 3 days until sugars at goal. Also educated today on sliding scale insulin and counting carbs before meal.  She does have association with learning sometthing new with her schizophrenia.   -mammogram screening  -depression - stopped viibryd, start on cymbalta 30 mg dominguez.    -seizure disorder -currently on Keppra  500 mg PO BID. Referred to Neurologist  Services appreciated. She missed appt.  Will go up on Keppra from 500 to 1000 mg twice a day refer back to Dr. Fry  -counseled pt to quit smoking >5 minutes.  Gave tobacco cessation material   -hypertension - at goal today Continue on lisinopril but will go up on dosage from 20 to 40 mg dialy.  mg Po q dominguez, metoprolol 50 mg PO BID . Stop HCTZ for now. Consider Norvasc or calcium channel blocker if not improving   -advised pt to be safe and call with any questions or concerns. All questions addressed today  -recheck in 2   weeks for BP recheck and  labwork  -recommend diabetic eye exam         This document has been electronically signed by Jermaine Ferro MD on May 17, 2019 3:28 PM                    This document has been electronically signed by Jermaine Ferro MD on May 16, 2019 7:32 AM

## 2019-05-17 ENCOUNTER — OFFICE VISIT (OUTPATIENT)
Dept: FAMILY MEDICINE CLINIC | Facility: CLINIC | Age: 44
End: 2019-05-17

## 2019-05-17 ENCOUNTER — LAB (OUTPATIENT)
Dept: LAB | Facility: HOSPITAL | Age: 44
End: 2019-05-17

## 2019-05-17 VITALS
DIASTOLIC BLOOD PRESSURE: 80 MMHG | TEMPERATURE: 100 F | WEIGHT: 293 LBS | BODY MASS INDEX: 47.09 KG/M2 | SYSTOLIC BLOOD PRESSURE: 132 MMHG | OXYGEN SATURATION: 96 % | HEIGHT: 66 IN | HEART RATE: 82 BPM

## 2019-05-17 DIAGNOSIS — K29.70 GASTRITIS WITHOUT BLEEDING, UNSPECIFIED CHRONICITY, UNSPECIFIED GASTRITIS TYPE: ICD-10-CM

## 2019-05-17 DIAGNOSIS — G47.33 OSA (OBSTRUCTIVE SLEEP APNEA): ICD-10-CM

## 2019-05-17 DIAGNOSIS — F20.0 PARANOID SCHIZOPHRENIA (HCC): ICD-10-CM

## 2019-05-17 DIAGNOSIS — E55.9 VITAMIN D DEFICIENCY: Primary | ICD-10-CM

## 2019-05-17 DIAGNOSIS — J44.9 CHRONIC OBSTRUCTIVE PULMONARY DISEASE, UNSPECIFIED COPD TYPE (HCC): ICD-10-CM

## 2019-05-17 DIAGNOSIS — B18.2 CHRONIC HEPATITIS C WITHOUT HEPATIC COMA (HCC): ICD-10-CM

## 2019-05-17 DIAGNOSIS — K21.00 GASTROESOPHAGEAL REFLUX DISEASE WITH ESOPHAGITIS: ICD-10-CM

## 2019-05-17 DIAGNOSIS — F33.9 EPISODE OF RECURRENT MAJOR DEPRESSIVE DISORDER, UNSPECIFIED DEPRESSION EPISODE SEVERITY (HCC): ICD-10-CM

## 2019-05-17 DIAGNOSIS — I10 ESSENTIAL HYPERTENSION: ICD-10-CM

## 2019-05-17 DIAGNOSIS — Z72.0 TOBACCO USER: ICD-10-CM

## 2019-05-17 DIAGNOSIS — E11.65 UNCONTROLLED TYPE 2 DIABETES MELLITUS WITH HYPERGLYCEMIA (HCC): ICD-10-CM

## 2019-05-17 DIAGNOSIS — E78.5 HYPERLIPIDEMIA, UNSPECIFIED HYPERLIPIDEMIA TYPE: ICD-10-CM

## 2019-05-17 DIAGNOSIS — Z71.6 TOBACCO ABUSE COUNSELING: ICD-10-CM

## 2019-05-17 DIAGNOSIS — G40.909 SEIZURE DISORDER (HCC): ICD-10-CM

## 2019-05-17 PROCEDURE — 82570 ASSAY OF URINE CREATININE: CPT

## 2019-05-17 PROCEDURE — 99214 OFFICE O/P EST MOD 30 MIN: CPT | Performed by: FAMILY MEDICINE

## 2019-05-17 PROCEDURE — 82306 VITAMIN D 25 HYDROXY: CPT

## 2019-05-17 PROCEDURE — 84439 ASSAY OF FREE THYROXINE: CPT

## 2019-05-17 PROCEDURE — 82607 VITAMIN B-12: CPT

## 2019-05-17 PROCEDURE — 80053 COMPREHEN METABOLIC PANEL: CPT

## 2019-05-17 PROCEDURE — 84443 ASSAY THYROID STIM HORMONE: CPT

## 2019-05-17 PROCEDURE — 83036 HEMOGLOBIN GLYCOSYLATED A1C: CPT

## 2019-05-17 PROCEDURE — 82043 UR ALBUMIN QUANTITATIVE: CPT

## 2019-05-17 PROCEDURE — 85025 COMPLETE CBC W/AUTO DIFF WBC: CPT

## 2019-05-17 PROCEDURE — 80061 LIPID PANEL: CPT

## 2019-05-17 PROCEDURE — 84481 FREE ASSAY (FT-3): CPT

## 2019-05-17 RX ORDER — INSULIN GLARGINE 100 [IU]/ML
INJECTION, SOLUTION SUBCUTANEOUS
Qty: 18 ML | Refills: 6 | Status: SHIPPED | OUTPATIENT
Start: 2019-05-17 | End: 2019-05-21 | Stop reason: SDUPTHER

## 2019-05-17 NOTE — PATIENT INSTRUCTIONS
For carb counting     Every 15 grams of carbs give 2 units of insulin     For example you eat 60 grams of carbs  60/15 = 4 x 2 = 8 units    or 90 units/15 = 6 x 2 = 12 units   Sliding scale   For example if before lunch or dinner you check sugar and is is 250  Give 1 unit per 50 glucose above 150     150 + 50 (1 unit) + 50 ( 1 unit) = 250  =  2 units     Take 2 units + 8 units = 10 units before meal

## 2019-05-18 LAB
25(OH)D3 SERPL-MCNC: 22.6 NG/ML (ref 30–100)
ALBUMIN SERPL-MCNC: 3.6 G/DL (ref 3.5–5.2)
ALBUMIN UR-MCNC: <1.2 MG/L
ALBUMIN/GLOB SERPL: 1 G/DL
ALP SERPL-CCNC: 68 U/L (ref 39–117)
ALT SERPL W P-5'-P-CCNC: 21 U/L (ref 1–33)
ANION GAP SERPL CALCULATED.3IONS-SCNC: 17.2 MMOL/L
AST SERPL-CCNC: 21 U/L (ref 1–32)
BASOPHILS # BLD AUTO: 0.04 10*3/MM3 (ref 0–0.2)
BASOPHILS NFR BLD AUTO: 0.4 % (ref 0–1.5)
BILIRUB SERPL-MCNC: <0.2 MG/DL (ref 0.2–1.2)
BUN BLD-MCNC: 21 MG/DL (ref 6–20)
BUN/CREAT SERPL: 28 (ref 7–25)
CALCIUM SPEC-SCNC: 9.9 MG/DL (ref 8.6–10.5)
CHLORIDE SERPL-SCNC: 101 MMOL/L (ref 98–107)
CHOLEST SERPL-MCNC: 161 MG/DL (ref 0–200)
CO2 SERPL-SCNC: 17.8 MMOL/L (ref 22–29)
CREAT BLD-MCNC: 0.75 MG/DL (ref 0.57–1)
CREAT UR-MCNC: 34.8 MG/DL
DEPRECATED RDW RBC AUTO: 43.8 FL (ref 37–54)
EOSINOPHIL # BLD AUTO: 0.18 10*3/MM3 (ref 0–0.4)
EOSINOPHIL NFR BLD AUTO: 1.9 % (ref 0.3–6.2)
ERYTHROCYTE [DISTWIDTH] IN BLOOD BY AUTOMATED COUNT: 13.8 % (ref 12.3–15.4)
GFR SERPL CREATININE-BSD FRML MDRD: 84 ML/MIN/1.73
GLOBULIN UR ELPH-MCNC: 3.7 GM/DL
GLUCOSE BLD-MCNC: 269 MG/DL (ref 65–99)
HBA1C MFR BLD: 13.51 % (ref 4.8–5.6)
HCT VFR BLD AUTO: 42.5 % (ref 34–46.6)
HDLC SERPL-MCNC: 35 MG/DL (ref 40–60)
HGB BLD-MCNC: 13.7 G/DL (ref 12–15.9)
IMM GRANULOCYTES # BLD AUTO: 0.06 10*3/MM3 (ref 0–0.05)
IMM GRANULOCYTES NFR BLD AUTO: 0.6 % (ref 0–0.5)
LDLC SERPL CALC-MCNC: 70 MG/DL (ref 0–100)
LDLC/HDLC SERPL: 2.01 {RATIO}
LYMPHOCYTES # BLD AUTO: 3.19 10*3/MM3 (ref 0.7–3.1)
LYMPHOCYTES NFR BLD AUTO: 34.2 % (ref 19.6–45.3)
MCH RBC QN AUTO: 28.3 PG (ref 26.6–33)
MCHC RBC AUTO-ENTMCNC: 32.2 G/DL (ref 31.5–35.7)
MCV RBC AUTO: 87.8 FL (ref 79–97)
MICROALBUMIN/CREAT UR: NORMAL MG/G
MONOCYTES # BLD AUTO: 0.4 10*3/MM3 (ref 0.1–0.9)
MONOCYTES NFR BLD AUTO: 4.3 % (ref 5–12)
NEUTROPHILS # BLD AUTO: 5.47 10*3/MM3 (ref 1.7–7)
NEUTROPHILS NFR BLD AUTO: 58.6 % (ref 42.7–76)
NRBC BLD AUTO-RTO: 0.1 /100 WBC (ref 0–0.2)
PLATELET # BLD AUTO: 161 10*3/MM3 (ref 140–450)
PMV BLD AUTO: 12.5 FL (ref 6–12)
POTASSIUM BLD-SCNC: 4.7 MMOL/L (ref 3.5–5.2)
PROT SERPL-MCNC: 7.3 G/DL (ref 6–8.5)
RBC # BLD AUTO: 4.84 10*6/MM3 (ref 3.77–5.28)
SODIUM BLD-SCNC: 136 MMOL/L (ref 136–145)
T3FREE SERPL-MCNC: 2.5 PG/ML (ref 2–4.4)
T4 FREE SERPL-MCNC: 1.21 NG/DL (ref 0.93–1.7)
TRIGL SERPL-MCNC: 278 MG/DL (ref 0–150)
TSH SERPL DL<=0.05 MIU/L-ACNC: 2.52 MIU/ML (ref 0.27–4.2)
VIT B12 BLD-MCNC: 565 PG/ML (ref 211–946)
VLDLC SERPL-MCNC: 55.6 MG/DL (ref 5–40)
WBC NRBC COR # BLD: 9.34 10*3/MM3 (ref 3.4–10.8)

## 2019-05-20 ENCOUNTER — TELEPHONE (OUTPATIENT)
Dept: FAMILY MEDICINE CLINIC | Facility: CLINIC | Age: 44
End: 2019-05-20

## 2019-05-21 RX ORDER — INSULIN GLARGINE 100 [IU]/ML
INJECTION, SOLUTION SUBCUTANEOUS
Qty: 18 ML | Refills: 6 | Status: SHIPPED | OUTPATIENT
Start: 2019-05-21 | End: 2019-07-26 | Stop reason: SDUPTHER

## 2019-05-24 ENCOUNTER — TELEPHONE (OUTPATIENT)
Dept: FAMILY MEDICINE CLINIC | Facility: CLINIC | Age: 44
End: 2019-05-24

## 2019-05-24 NOTE — TELEPHONE ENCOUNTER
----- Message from Jermaine Ferro MD sent at 5/24/2019  7:25 AM CDT -----  Call pt     Pt vitamin b12 normal    Thyroid studies normal     Vitamin D is lwo and make sure pt is taking vitamin D once a week    hga1c >13.0 and slight improvement from last check. Important pt take her insulin and diabetic medications. Better sugar control. Have her bring sugar readings on next visit     On lipid panel triglycerides high but likely due to elevated sugars. Make sure she is taking her statin medication    Renal function slightly down from last check and advised pt to drink more water. Derrek cormier     Cbc showed normal hemoglobin    Recheck on next visit. Thanks  Called pt

## 2019-07-03 ENCOUNTER — TELEPHONE (OUTPATIENT)
Dept: FAMILY MEDICINE CLINIC | Facility: CLINIC | Age: 44
End: 2019-07-03

## 2019-07-03 NOTE — TELEPHONE ENCOUNTER
I have tried to contact the patient several times with no answer as well. I will attempt to call again this afternoon. I am unsure if I have mentioned it to , but will let him know now.

## 2019-07-03 NOTE — TELEPHONE ENCOUNTER
Communications     Comments (most recent listed first): Called to let patient know regarding referral, no answer and it was a voice mail by another name so did not leave message. I left message with referral provider that we suggested patient stay with present Neurologist or go to a [facility] for [facility]., Called Dr. Ferro's office and spoke w/someone to let them know regarding referral.  She transferred me to Ref. Coord. and I left VM that Sukh felt patient was an complex case and should stay w/present Neurologist or go to a [facility] to be followed., Per Sukh Alvarado PA-c extremely complex case, should stay with current Neurologist or refer to [facility]           I found this in the referral box.  Dr Fernández office routed it back to us. Is Dr Ferro aware of this or has it been taken care of so we can get it completed?

## 2019-07-06 NOTE — TELEPHONE ENCOUNTER
Keep call pt please     If you do in contact, she will need referral to [place] or  for Neurologist. Let me know. Thanks

## 2019-07-08 RX ORDER — LEVETIRACETAM 1000 MG/1
TABLET ORAL
Qty: 60 TABLET | Refills: 0 | Status: SHIPPED | OUTPATIENT
Start: 2019-07-08 | End: 2019-07-26 | Stop reason: SDUPTHER

## 2019-07-24 NOTE — PROGRESS NOTES
Subjective:  Sudhakar Saul is a 43 y.o. female who presents for       Patient Active Problem List   Diagnosis   • Seizure disorder (CMS/HCC)   • Morbid obesity (CMS/HCC)   • Tobacco abuse counseling   • Multiple joint pain   • Dysuria   • History of positive hepatitis C   • Essential hypertension   • Uncontrolled type 2 diabetes mellitus with hyperglycemia (CMS/HCC)   • Vitamin D deficiency   • Urinary tract infection without hematuria   • Chronic hepatitis C without hepatic coma (CMS/HCC)   • Cigarette nicotine dependence, uncomplicated   • Chronic obstructive pulmonary disease (CMS/HCC)   • JACQUE (obstructive sleep apnea)   • Gastroesophageal reflux disease with esophagitis   • Left upper quadrant pain   • Nausea and vomiting   • Weight gain, abnormal   • Change in bowel habits   • Hepatic fibrosis   • Non-compliance   • Depression   • Paranoid schizophrenia (CMS/HCC)   • Gastritis without bleeding   • Tobacco user   • Encounter for eye exam           Current Outpatient Medications:   •  albuterol 108 (90 Base) MCG/ACT inhaler, Inhale 2 puffs Every 6 (Six) Hours As Needed for Wheezing or Shortness of Air., Disp: 1 inhaler, Rfl: 3  •  dicyclomine (BENTYL) 20 MG tablet, Take 1 tablet by mouth 2 (Two) Times a Day., Disp: 60 tablet, Rfl: 3  •  DULoxetine (CYMBALTA) 30 MG capsule, Take 1 capsule by mouth Daily., Disp: 30 capsule, Rfl: 3  •  Ertugliflozin L-PyroglutamicAc (STEGLATRO) 15 MG tablet, Take 1 tablet by mouth Every Morning., Disp: 7 tablet, Rfl: 0  •  fluconazole (DIFLUCAN) 150 MG tablet, Take on onset of symptoms and in 72 hours, Disp: 2 tablet, Rfl: 0  •  Insulin Glargine (BASAGLAR KWIKPEN) 100 UNIT/ML injection pen, Start 60 units at bedtime Titrate up by 3 units every 3 days until a.m. FSBS , Disp: 18 mL, Rfl: 6  •  Insulin Lispro (HUMALOG) 100 UNIT/ML solution pen-injector, Inject 30 Units under the skin into the appropriate area as directed 3 (Three) Times a Day. 10 to 30 u with meals  counting carbs., Disp: 15 mL, Rfl: 6  •  levETIRAcetam (KEPPRA) 1000 MG tablet, TAKE 1 TABLET BY MOUTH TWICE DAILY, Disp: 60 tablet, Rfl: 0  •  lisinopril (PRINIVIL,ZESTRIL) 40 MG tablet, Take 1 tablet by mouth Daily., Disp: 30 tablet, Rfl: 3  •  metFORMIN (GLUCOPHAGE) 500 MG tablet, Take 1 tablet by mouth 2 (Two) Times a Day With Meals., Disp: 60 tablet, Rfl: 3  •  metoprolol tartrate (LOPRESSOR) 50 MG tablet, Take 1 tablet by mouth Every 12 (Twelve) Hours., Disp: 60 tablet, Rfl: 3  •  naproxen (NAPROSYN) 500 MG tablet, Take 1 tablet by mouth 2 (Two) Times a Day With Meals., Disp: 60 tablet, Rfl: 3  •  nicotine polacrilex (NICORETTE) 4 MG gum, Chew 1 each As Needed for Smoking Cessation., Disp: 60 each, Rfl: 3  •  pantoprazole (PROTONIX) 40 MG EC tablet, Take 1 tablet by mouth Daily., Disp: 30 tablet, Rfl: 3  •  risperiDONE (RISPERDAL) 1 MG tablet, Take 1 tablet by mouth 2 (Two) Times a Day., Disp: 60 tablet, Rfl: 3  •  simethicone (GAS-X) 80 MG chewable tablet, Chew 1 tablet Every 6 (Six) Hours As Needed for Flatulence., Disp: 60 tablet, Rfl: 1  •  simvastatin (ZOCOR) 20 MG tablet, Take 1 tablet by mouth Every Night., Disp: 30 tablet, Rfl: 3  •  tiotropium bromide monohydrate (SPIRIVA RESPIMAT) 2.5 MCG/ACT aerosol solution inhaler, Inhale 2 puffs Daily., Disp: 1 inhaler, Rfl: 11  •  vitamin D (ERGOCALCIFEROL) 52055 units capsule capsule, Take 1 capsule by mouth Every 7 (Seven) Days., Disp: 4 capsule, Rfl: 3      5/17/19 PT is 42 yo female with history of morbid obesity, HTN, GERD, history of drug use, Vitamin D deficiency,  HLP, seizure disorder, IBS, depression, fatty liver disease  insulin dependent DM type 2, Chronic hepatitis C, COPD, JACQUE, gastritis. PT was seeing Dr. Sanchez for checkups. She was started on insulin Basaglar. She is due for diabetic eye exam and labwork. Her last hga1c was >14.0 about 3 months ago.  She is taking humalog 30 units before meals along with Basaglar units 60 units at bedtime. She  is due for new labwork. She has yet to see neurologist.  She has not had a seizures since last visit of last year. As far as  Smoking she has cut back to 1/4 of ppd. She is willing try nicotine patch.      She also has history of depression and paranoid schizophrenia.  She was on respideral and is not on it there are negative feeeling. She has memories from childhood that are traumatic. She also has history of suicidal thoughts and PTSD. She has a history of alcohol and drug use.  She has been drug and alcohol free for almost a year. She got labwork that is pending.  She tried viibryd but insurance would not cover. She was started on cymbalta 30 mg daily. Her sugars have improved with steglatro and she is now on Basaglar 60 units.  She does note today that she hears voices that are degrading that interfere with her trying to learn something new    7/26/19 pt is here for recheck.  She is doing well in regards to sugars and is taking basaglar 60 units at bedtime along with humalog 30 units before meals with sliding scale. Her sugars average in mornings 120s. Also takes steglatro and that is helping. She has been stressed lately due to father having surgery. Her main issues today is   Back pain in lower back she has had history of lumbar dissectomy on L4.   In 2011 at [place] with Dr. Hurley. She has history of drug abuse with crystal meth and heroin.  She was in pain management  In [place] for her back issues and was getting epidural injeciions in the past.  She then was incarcerated a few times for substance abuse and illiict drug use and has not been back to pain management since. Pain is 7/10 on severity. She was also on morphine, percocet and lidocaine patches.  Pain radiates down both legs.           Back Pain   This is a chronic problem. The current episode started more than 1 year ago. The problem occurs constantly. The problem is unchanged. The pain is present in the lumbar spine, sacro-iliac and  gluteal. The quality of the pain is described as aching. The pain radiates to the left thigh. The pain is at a severity of 7/10. The pain is moderate. The pain is the same all the time. The symptoms are aggravated by bending, lying down, position, sitting and standing. Stiffness is present all day. Associated symptoms include numbness, paresis, tingling and weakness. Pertinent negatives include no abdominal pain, bladder incontinence, bowel incontinence, chest pain, dysuria, fever, headaches, leg pain, paresthesias, pelvic pain or weight loss. She has tried muscle relaxant and NSAIDs (pain management/ norco percocet back surgery ) for the symptoms. The treatment provided no relief.    Mental Health Problem   The primary symptoms include delusions, hallucinations and negative symptoms. The primary symptoms do not include dysphoric mood, bizarre behavior, disorganized speech or somatic symptoms. This is a chronic problem.   The onset of the illness is precipitated by a stressful event, emotional stress, alcohol abuse and drug abuse. The degree of incapacity that she is experiencing as a consequence of her illness is severe. Sequelae of the illness include an inability to work, an inability to care for self and harmed interpersonal relations. Additional symptoms of the illness include fatigue, agitation and seizures. Additional symptoms of the illness do not include anhedonia, insomnia, hypersomnia, appetite change, unexpected weight change, psychomotor retardation, feelings of worthlessness, attention impairment, increased goal-directed activity, flight of ideas, inflated self-esteem, decreased need for sleep, distractible, poor judgment, visual change, headaches or abdominal pain. She admits to suicidal ideas. She does not have a plan to commit suicide. She does not contemplate harming herself. She has not already injured self. She does not contemplate injuring another person. Risk factors that are present for mental  illness include a history of mental illness, substance abuse, a history of suicide attempts, family violence and epilepsy.   Seizures    This is a chronic problem. The current episode started more than 1 week ago. The problem has been gradually worsening. Associated symptoms include sleepiness, confusion, headaches and visual disturbances. Pertinent negatives include no neck stiffness, no sore throat, no chest pain, no cough, no nausea, no vomiting, no diarrhea and no muscle weakness.   Obesity   This is a chronic problem. The current episode started more than 1 year ago. The problem occurs constantly. The problem has been unchanged. Associated symptoms include arthralgias, fatigue, headaches, numbness and weakness. Pertinent negatives include no abdominal pain, anorexia, change in bowel habit, chest pain, chills, congestion, coughing, fever, joint swelling, myalgias, nausea, neck pain, rash, sore throat, swollen glands, urinary symptoms, vertigo, visual change or vomiting. Nothing aggravates the symptoms. She has tried nothing for the symptoms.   Hypertension   This is a chronic problem. The current episode started more than 1 year ago. The problem is uncontrolled. Associated symptoms include headaches. Pertinent negatives include no anxiety, blurred vision, chest pain, malaise/fatigue, neck pain, orthopnea, palpitations, peripheral edema, PND or shortness of breath. Risk factors for coronary artery disease include obesity, sedentary lifestyle, smoking/tobacco exposure and stress. Past treatments include ACE inhibitors, beta blockers and diuretics. There are no compliance problems.  There is no history of angina, kidney disease, CAD/MI, CVA, heart failure, left ventricular hypertrophy, PVD or retinopathy. There is no history of chronic renal disease, coarctation of the aorta, hyperaldosteronism, hypercortisolism, hyperparathyroidism, a hypertension causing med, pheochromocytoma, renovascular disease, sleep apnea or  a thyroid problem.   Diabetes   She presents for her initial diabetic visit. She has type 2 diabetes mellitus. Her disease course has been worsening. Hypoglycemia symptoms include confusion, headaches, nervousness/anxiousness, seizures and sleepiness. Associated symptoms include fatigue and weakness. Pertinent negatives for diabetes include no blurred vision, no chest pain and no visual change. Pertinent negatives for diabetic complications include no CVA, PVD or retinopathy. Risk factors for coronary artery disease include dyslipidemia, obesity and post-menopausal. She has not had a previous visit with a dietitian. There is no change in her home blood glucose trend. An ACE inhibitor/angiotensin II receptor blocker is not being taken. She does not see a podiatrist.Eye exam is not current.   Shortness of Breath   This is a chronic problem. The current episode started more than 1 year ago. The problem occurs constantly. The problem has been gradually worsening. Associated symptoms include headaches. Pertinent negatives include no abdominal pain, chest pain, claudication, coryza, ear pain, fever, hemoptysis, leg pain, leg swelling, neck pain, orthopnea, PND, rash, sore throat, sputum production, swollen glands, syncope, vomiting or wheezing. Nothing aggravates the symptoms. The patient has no known risk factors for DVT/PE. She has tried nothing for the symptoms. The treatment provided no relief. There is no history of allergies, aspirin allergies, asthma, bronchiolitis, CAD, chronic lung disease, COPD, DVT, a heart failure, PE, pneumonia or a recent surgery.        Review of Systems  Review of Systems   Constitutional: Positive for activity change and fatigue. Negative for appetite change, chills, diaphoresis, fever and weight loss.   HENT: Negative for congestion, postnasal drip, rhinorrhea, sinus pressure, sinus pain, sneezing, sore throat, trouble swallowing and voice change.    Respiratory: Positive for shortness  of breath. Negative for cough, choking, chest tightness, wheezing and stridor.    Cardiovascular: Negative for chest pain.   Gastrointestinal: Negative for abdominal pain, bowel incontinence, diarrhea, nausea and vomiting.   Genitourinary: Negative for bladder incontinence, dysuria and pelvic pain.   Musculoskeletal: Positive for arthralgias, back pain, gait problem, joint swelling, myalgias and neck pain.   Neurological: Positive for tingling, weakness and numbness. Negative for headaches and paresthesias.   Psychiatric/Behavioral: Positive for agitation, behavioral problems and decreased concentration.        Depressed mood        Patient Active Problem List   Diagnosis   • Seizure disorder (CMS/HCC)   • Morbid obesity (CMS/HCC)   • Tobacco abuse counseling   • Multiple joint pain   • Dysuria   • History of positive hepatitis C   • Essential hypertension   • Uncontrolled type 2 diabetes mellitus with hyperglycemia (CMS/HCC)   • Vitamin D deficiency   • Urinary tract infection without hematuria   • Chronic hepatitis C without hepatic coma (CMS/HCC)   • Cigarette nicotine dependence, uncomplicated   • Chronic obstructive pulmonary disease (CMS/HCC)   • JACQUE (obstructive sleep apnea)   • Gastroesophageal reflux disease with esophagitis   • Left upper quadrant pain   • Nausea and vomiting   • Weight gain, abnormal   • Change in bowel habits   • Hepatic fibrosis   • Non-compliance   • Depression   • Paranoid schizophrenia (CMS/HCC)   • Gastritis without bleeding   • Tobacco user   • Encounter for eye exam     Past Surgical History:   Procedure Laterality Date   • APPENDECTOMY     • BACK SURGERY     • BREAST SURGERY     • CARPAL TUNNEL RELEASE      right hand   • CHOLECYSTECTOMY     • COLONOSCOPY     • COLONOSCOPY N/A 2/8/2019    Procedure: COLONOSCOPY;  Surgeon: Joni Delacruz MD;  Location: [facility] ENDOSCOPY;  Service: Gastroenterology   • DENTAL PROCEDURE     • ENDOSCOPY N/A 2/8/2019    Procedure:  ESOPHAGOGASTRODUODENOSCOPY--poss dilation;  Surgeon: Joni Delacruz MD;  Location: [facility] ENDOSCOPY;  Service: Gastroenterology   • HYSTERECTOMY     • LIPOMA EXCISION      9   • LIVER BIOPSY     • UPPER GASTROINTESTINAL ENDOSCOPY  02/08/2019     Social History     Socioeconomic History   • Marital status:      Spouse name: Not on file   • Number of children: Not on file   • Years of education: Not on file   • Highest education level: Not on file   Tobacco Use   • Smoking status: Current Every Day Smoker     Packs/day: 0.25     Types: Cigarettes   • Smokeless tobacco: Never Used   Substance and Sexual Activity   • Alcohol use: No   • Drug use: No     Family History   Problem Relation Age of Onset   • Hypertension Other    • Diabetes Other    • Stroke Other    • Cancer Other    • Kidney disease Other    • Lung disease Other    • Other Other    • Malone's esophagus Other    • Colon polyps Other    • Heart disease Other    • Ulcers Other    • Cholelithiasis Other    • Allergy (severe) Other      Lab on 05/17/2019   Component Date Value Ref Range Status   • Hemoglobin A1C 05/17/2019 13.51* 4.80 - 5.60 % Final   • WBC 05/17/2019 9.34  3.40 - 10.80 10*3/mm3 Final   • RBC 05/17/2019 4.84  3.77 - 5.28 10*6/mm3 Final   • Hemoglobin 05/17/2019 13.7  12.0 - 15.9 g/dL Final   • Hematocrit 05/17/2019 42.5  34.0 - 46.6 % Final   • MCV 05/17/2019 87.8  79.0 - 97.0 fL Final   • MCH 05/17/2019 28.3  26.6 - 33.0 pg Final   • MCHC 05/17/2019 32.2  31.5 - 35.7 g/dL Final   • RDW 05/17/2019 13.8  12.3 - 15.4 % Final   • RDW-SD 05/17/2019 43.8  37.0 - 54.0 fl Final   • MPV 05/17/2019 12.5* 6.0 - 12.0 fL Final   • Platelets 05/17/2019 161  140 - 450 10*3/mm3 Final   • Neutrophil % 05/17/2019 58.6  42.7 - 76.0 % Final   • Lymphocyte % 05/17/2019 34.2  19.6 - 45.3 % Final   • Monocyte % 05/17/2019 4.3* 5.0 - 12.0 % Final   • Eosinophil % 05/17/2019 1.9  0.3 - 6.2 % Final   • Basophil % 05/17/2019 0.4  0.0 - 1.5 % Final   •  Immature Grans % 05/17/2019 0.6* 0.0 - 0.5 % Final   • Neutrophils, Absolute 05/17/2019 5.47  1.70 - 7.00 10*3/mm3 Final   • Lymphocytes, Absolute 05/17/2019 3.19* 0.70 - 3.10 10*3/mm3 Final   • Monocytes, Absolute 05/17/2019 0.40  0.10 - 0.90 10*3/mm3 Final   • Eosinophils, Absolute 05/17/2019 0.18  0.00 - 0.40 10*3/mm3 Final   • Basophils, Absolute 05/17/2019 0.04  0.00 - 0.20 10*3/mm3 Final   • Immature Grans, Absolute 05/17/2019 0.06* 0.00 - 0.05 10*3/mm3 Final   • nRBC 05/17/2019 0.1  0.0 - 0.2 /100 WBC Final   • Glucose 05/17/2019 269* 65 - 99 mg/dL Final   • BUN 05/17/2019 21* 6 - 20 mg/dL Final   • Creatinine 05/17/2019 0.75  0.57 - 1.00 mg/dL Final   • Sodium 05/17/2019 136  136 - 145 mmol/L Final   • Potassium 05/17/2019 4.7  3.5 - 5.2 mmol/L Final   • Chloride 05/17/2019 101  98 - 107 mmol/L Final   • CO2 05/17/2019 17.8* 22.0 - 29.0 mmol/L Final   • Calcium 05/17/2019 9.9  8.6 - 10.5 mg/dL Final   • Total Protein 05/17/2019 7.3  6.0 - 8.5 g/dL Final   • Albumin 05/17/2019 3.60  3.50 - 5.20 g/dL Final   • ALT (SGPT) 05/17/2019 21  1 - 33 U/L Final   • AST (SGOT) 05/17/2019 21  1 - 32 U/L Final   • Alkaline Phosphatase 05/17/2019 68  39 - 117 U/L Final   • Total Bilirubin 05/17/2019 <0.2* 0.2 - 1.2 mg/dL Final   • eGFR Non  Amer 05/17/2019 84  >60 mL/min/1.73 Final   • Globulin 05/17/2019 3.7  gm/dL Final   • A/G Ratio 05/17/2019 1.0  g/dL Final   • BUN/Creatinine Ratio 05/17/2019 28.0* 7.0 - 25.0 Final   • Anion Gap 05/17/2019 17.2  mmol/L Final   • Total Cholesterol 05/17/2019 161  0 - 200 mg/dL Final   • Triglycerides 05/17/2019 278* 0 - 150 mg/dL Final   • HDL Cholesterol 05/17/2019 35* 40 - 60 mg/dL Final   • LDL Cholesterol  05/17/2019 70  0 - 100 mg/dL Final   • VLDL Cholesterol 05/17/2019 55.6* 5 - 40 mg/dL Final   • LDL/HDL Ratio 05/17/2019 2.01   Final   • TSH 05/17/2019 2.520  0.270 - 4.200 mIU/mL Final   • Free T4 05/17/2019 1.21  0.93 - 1.70 ng/dL Final   • T3, Free 05/17/2019 2.50   2.00 - 4.40 pg/mL Final   • 25 Hydroxy, Vitamin D 05/17/2019 22.6* 30.0 - 100.0 ng/ml Final   • Vitamin B-12 05/17/2019 565  211 - 946 pg/mL Final   • Microalbumin/Creatinine Ratio 05/17/2019   mg/g Final    Unable to calculate   • Creatinine, Urine 05/17/2019 34.8  mg/dL Final   • Microalbumin, Urine 05/17/2019 <1.2  mg/L Final   Admission on 02/08/2019, Discharged on 02/08/2019   Component Date Value Ref Range Status   • Glucose 02/08/2019 260* 70 - 130 mg/dL Final    : [id number] [facility]: [id number]   • Case Report 02/08/2019    Final                    Value:Surgical Pathology Report                         Case: [id number]                                  Authorizing Provider:  Joni Delacruz MD        Collected:           02/08/2019 02:00 PM          Ordering Location:     [facility]             Received:            02/08/2019 03:05 PM                                 [place] [facility]                                                     Pathologist:           Alexis Chaudhari MD                                                           Specimens:   1) - Gastric, Antrum, antrum bx                                                                     2) - Esophagus, Distal, distal esophagus bx                                                         3) - Large Intestine, Sigmoid Colon, sigmoid colon polyp  (hot snare)                     • Final Diagnosis 02/08/2019    Final                    Value:This result contains rich text formatting which cannot be displayed here.   • Gross Description 02/08/2019    Final                    Value:This result contains rich text formatting which cannot be displayed here.      XR Elbow 3+ View Right  Narrative: Procedure: XR ELBOW 3+ VW RIGHT    Indication:  Persistent pain after recent fall      Technique: Three views .     Prior Relevant Exam: None    Small osteophyte tip of the olecranon. No acute bony abnormality.  Joint spaces intact.  Mineralization normal.   Impression: CONCLUSION:    1. Mild chronic changes without acute bony abnormality     Electronically signed by:  Finn Haynes MD  8/29/2018 4:41 PM CDT  Workstation: [id number]  CT Head Without Contrast  Narrative: Noncontrast CT examination of the brain.    INDICATION:  Right lower extremity weakness. Seizure.     Technique: Axial, coronal, sagittal, without contrast. This exam  was performed according to our departmental dose-optimization  program which includes automated exposure control, adjustment of  the mA and/or kV according to patient size and/or use of  iterative reconstruction technique.    Prior relevant examination: None.    Limitations: None    Brain parenchyma appears within normal limits. Ventricles are  within normal limits in size. Normal gray-white matter  differentiation. No evidence of abnormal mass or calcification is  seen. No evidence of acute hemorrhage is noted.    The mastoid air cells are well aerated bilaterally.     The visualized paranasal sinuses appear well aerated bilaterally.    Calvarium intact.  Impression: CONCLUSION:    1. Normal brain parenchyma for age    Electronically signed by:  Finn Haynes MD  8/29/2018 2:37 PM CDT  Workstation: [id number]  XR Chest 2 View  Narrative: EXAM:          Radiograph(s), Chest   VIEWS:    PA / Lat ; 2       DATE/TIME:  8/29/2018 2:08 PM CDT                INDICATION:   seizure    COMPARISON:  CXR: none             FINDINGS:             - lines/tubes:    none     - cardiac:         size within normal limits         - mediastinum: contour within normal limits         - lungs:         no focal air space process, pulmonary  interstitial edema, nodule(s)/mass             - pleura:         no evidence of  fluid                  - osseous:         unremarkable for age                  - misc.:       Impression: CONCLUSION:        1. No evidence of an active cardiopulmonary process.                                      "           Electronically signed by:  SHIRLEY Tiwari MD  8/29/2018 2:09  PM CDT Workstation: [id number]    [email or web address]  Immunization History   Administered Date(s) Administered   • Pneumococcal Polysaccharide (PPSV23) 12/13/2018       The following portions of the patient's history were reviewed and updated as appropriate: allergies, current medications, past family history, past medical history, past social history, past surgical history and problem list.        Physical Exam  /80   Pulse 82   Temp 99.6 °F (37.6 °C)   Ht 167.6 cm (66\")   Wt 135 kg (297 lb 12.8 oz)   SpO2 97%   BMI 48.07 kg/m²     Physical Exam   Constitutional: She is oriented to person, place, and time. She appears well-developed and well-nourished.   HENT:   Head: Normocephalic and atraumatic.   Right Ear: External ear normal.   Eyes: Conjunctivae and EOM are normal. Pupils are equal, round, and reactive to light.   Neck: Normal range of motion. Neck supple.   Cardiovascular: Normal rate, regular rhythm and normal heart sounds.   No murmur heard.  Pulmonary/Chest: Effort normal and breath sounds normal. No respiratory distress.   Abdominal: Soft. Bowel sounds are normal. She exhibits no distension. There is no tenderness.   Obese abdomen    Musculoskeletal: She exhibits tenderness. She exhibits no edema or deformity.        Lumbar back: She exhibits decreased range of motion, tenderness, bony tenderness, pain and spasm.        Back:    Neurological: She is alert and oriented to person, place, and time. No cranial nerve deficit.   Skin: Skin is warm. No rash noted. She is not diaphoretic. No erythema. No pallor.   Psychiatric: She has a normal mood and affect. Her behavior is normal.   Nursing note and vitals reviewed.      Assessment/Plan    Diagnosis Plan   1. Vitamin D deficiency     2. Uncontrolled type 2 diabetes mellitus with hyperglycemia (CMS/[facility])     3. Tobacco user     4. Tobacco abuse counseling     5. Seizure disorder " (CMS/HCC)     6. Paranoid schizophrenia (CMS/HCC)     7. JACQUE (obstructive sleep apnea)     8. Non-compliance     9. History of positive hepatitis C     10. Chronic hepatitis C without hepatic coma (CMS/HCC)     11. Chronic obstructive pulmonary disease, unspecified COPD type (CMS/HCC)     12. Gastritis without bleeding, unspecified chronicity, unspecified gastritis type     13. Hepatic fibrosis     14. Episode of recurrent major depressive disorder, unspecified depression episode severity (CMS/HCC)             -advised pt to get labwork   -for back pain - history of back surgery - referral to Pain Management  X-rays of lumbar spine.  Start on neurontin 300 mg PO TID.gave 90 pills and 3 refills. Will get NERY. UDS today. Pt declined PT/OT for now.   -chronic hep C - Gastroenterology following   -for dsypnea- referred to Dr. Will (Pulmonologsit) for PFTs. Consultation appreciated. Recent chest x-ray negative. Will start on albuterol inhaler PRN.Advised pt to stop smoking. She is on spirva now for early COPD  -multiple joint pain -- naproxen 500 mg PO BID. Pt advised to hold naproxen unless needed while taking viibryd  -for multiple joint pain - pt cannot take opoids. Will start on naproxen 500 mg twi ce a day. Drug information provided  -morbid obesity -counseled on weight loss, diet and exercise. Diet information provided.  -hyperlipdiemia- simvastatin 20 mg PO qhs. ASCVD risk high. Recommend to take with coenzyme Q10   -for DM type 2- metformin 500 mg pO BID,  Short acting insulin 30 units before meals  along with basaglar insulin 60 units at bedtime  at bedtime. Start on steglatro 15 mg daily. Samples provdied today. Advised to taper up by 3 units every 3 days until sugars at goal. Also educated today on sliding scale insulin and counting carbs before meal.  She does have association with learning sometthing new with her schizophrenia.   -mammogram screening  -depression - stopped viibryd, start on cymbalta 30  mg dominguez.    -seizure disorder -currently on Keppra  500 mg PO BID. Referred to Neurologist  Services appreciated. She missed appt.  Will go up on Keppra from 500 to 1000 mg twice a day refer back to Dr. Fry  -counseled pt to quit smoking >5 minutes.  Gave tobacco cessation material   -hypertension - at goal today Continue on lisinopril but will go up on dosage from 20 to 40 mg dialy.  mg Po q dominguez, metoprolol 50 mg PO BID . Stop HCTZ for now. Consider Norvasc or calcium channel blocker if not improving   -advised pt to be safe and call with any questions or concerns. All questions addressed today  -recheck in 2   weeks for BP recheck and labwork  -recommend diabetic eye exam         This document has been electronically signed by Jermaine Ferro MD on July 26, 2019 7:43 AM                    This document has been electronically signed by Jermaine Ferro MD on July 24, 2019 1:11 PM

## 2019-07-26 ENCOUNTER — LAB (OUTPATIENT)
Dept: LAB | Facility: HOSPITAL | Age: 44
End: 2019-07-26

## 2019-07-26 ENCOUNTER — OFFICE VISIT (OUTPATIENT)
Dept: FAMILY MEDICINE CLINIC | Facility: CLINIC | Age: 44
End: 2019-07-26

## 2019-07-26 VITALS
HEIGHT: 66 IN | BODY MASS INDEX: 47.09 KG/M2 | WEIGHT: 293 LBS | HEART RATE: 82 BPM | OXYGEN SATURATION: 97 % | SYSTOLIC BLOOD PRESSURE: 130 MMHG | TEMPERATURE: 99.6 F | DIASTOLIC BLOOD PRESSURE: 80 MMHG

## 2019-07-26 DIAGNOSIS — B18.2 CHRONIC HEPATITIS C WITHOUT HEPATIC COMA (HCC): ICD-10-CM

## 2019-07-26 DIAGNOSIS — M54.42 BILATERAL LOW BACK PAIN WITH BILATERAL SCIATICA, UNSPECIFIED CHRONICITY: Primary | ICD-10-CM

## 2019-07-26 DIAGNOSIS — F20.0 PARANOID SCHIZOPHRENIA (HCC): ICD-10-CM

## 2019-07-26 DIAGNOSIS — K29.70 GASTRITIS WITHOUT BLEEDING, UNSPECIFIED CHRONICITY, UNSPECIFIED GASTRITIS TYPE: ICD-10-CM

## 2019-07-26 DIAGNOSIS — Z98.890 HISTORY OF BACK SURGERY: ICD-10-CM

## 2019-07-26 DIAGNOSIS — E11.65 UNCONTROLLED TYPE 2 DIABETES MELLITUS WITH HYPERGLYCEMIA (HCC): ICD-10-CM

## 2019-07-26 DIAGNOSIS — J44.9 CHRONIC OBSTRUCTIVE PULMONARY DISEASE, UNSPECIFIED COPD TYPE (HCC): ICD-10-CM

## 2019-07-26 DIAGNOSIS — Z02.83 ENCOUNTER FOR DRUG SCREENING: ICD-10-CM

## 2019-07-26 DIAGNOSIS — K74.00 HEPATIC FIBROSIS: ICD-10-CM

## 2019-07-26 DIAGNOSIS — M54.41 BILATERAL LOW BACK PAIN WITH BILATERAL SCIATICA, UNSPECIFIED CHRONICITY: Primary | ICD-10-CM

## 2019-07-26 DIAGNOSIS — Z71.6 TOBACCO ABUSE COUNSELING: ICD-10-CM

## 2019-07-26 DIAGNOSIS — Z72.0 TOBACCO USER: ICD-10-CM

## 2019-07-26 DIAGNOSIS — G40.909 SEIZURE DISORDER (HCC): ICD-10-CM

## 2019-07-26 DIAGNOSIS — Z91.199 NON-COMPLIANCE: ICD-10-CM

## 2019-07-26 DIAGNOSIS — G47.33 OSA (OBSTRUCTIVE SLEEP APNEA): ICD-10-CM

## 2019-07-26 DIAGNOSIS — Z86.19 HISTORY OF POSITIVE HEPATITIS C: ICD-10-CM

## 2019-07-26 DIAGNOSIS — F33.9 EPISODE OF RECURRENT MAJOR DEPRESSIVE DISORDER, UNSPECIFIED DEPRESSION EPISODE SEVERITY (HCC): ICD-10-CM

## 2019-07-26 DIAGNOSIS — E55.9 VITAMIN D DEFICIENCY: ICD-10-CM

## 2019-07-26 PROCEDURE — 82043 UR ALBUMIN QUANTITATIVE: CPT

## 2019-07-26 PROCEDURE — 80307 DRUG TEST PRSMV CHEM ANLYZR: CPT

## 2019-07-26 PROCEDURE — 82570 ASSAY OF URINE CREATININE: CPT

## 2019-07-26 PROCEDURE — 99214 OFFICE O/P EST MOD 30 MIN: CPT | Performed by: FAMILY MEDICINE

## 2019-07-26 RX ORDER — CYCLOBENZAPRINE HCL 5 MG
5 TABLET ORAL 3 TIMES DAILY PRN
Qty: 90 TABLET | Refills: 3 | Status: SHIPPED | OUTPATIENT
Start: 2019-07-26 | End: 2019-12-09 | Stop reason: SDUPTHER

## 2019-07-26 RX ORDER — INSULIN GLARGINE 100 [IU]/ML
INJECTION, SOLUTION SUBCUTANEOUS
Qty: 18 ML | Refills: 6 | Status: SHIPPED | OUTPATIENT
Start: 2019-07-26 | End: 2019-10-09 | Stop reason: SDUPTHER

## 2019-07-26 RX ORDER — RISPERIDONE 1 MG/1
1 TABLET ORAL 2 TIMES DAILY
Qty: 60 TABLET | Refills: 3 | Status: SHIPPED | OUTPATIENT
Start: 2019-07-26 | End: 2019-10-09 | Stop reason: SDUPTHER

## 2019-07-26 RX ORDER — ALBUTEROL SULFATE 90 UG/1
2 AEROSOL, METERED RESPIRATORY (INHALATION) EVERY 6 HOURS PRN
Qty: 1 INHALER | Refills: 3 | Status: SHIPPED | OUTPATIENT
Start: 2019-07-26 | End: 2020-05-29 | Stop reason: SDUPTHER

## 2019-07-26 RX ORDER — LEVETIRACETAM 1000 MG/1
1000 TABLET ORAL 2 TIMES DAILY
Qty: 60 TABLET | Refills: 3 | Status: SHIPPED | OUTPATIENT
Start: 2019-07-26 | End: 2019-10-09 | Stop reason: SDUPTHER

## 2019-07-26 RX ORDER — PANTOPRAZOLE SODIUM 40 MG/1
40 TABLET, DELAYED RELEASE ORAL DAILY
Qty: 30 TABLET | Refills: 3 | Status: SHIPPED | OUTPATIENT
Start: 2019-07-26 | End: 2020-04-15

## 2019-07-26 RX ORDER — DULOXETIN HYDROCHLORIDE 30 MG/1
30 CAPSULE, DELAYED RELEASE ORAL DAILY
Qty: 30 CAPSULE | Refills: 3 | Status: SHIPPED | OUTPATIENT
Start: 2019-07-26 | End: 2019-08-28

## 2019-07-26 RX ORDER — PEN NEEDLE, DIABETIC 32GX 5/32"
NEEDLE, DISPOSABLE MISCELLANEOUS
Refills: 3 | COMMUNITY
Start: 2019-06-04 | End: 2021-07-28

## 2019-07-26 RX ORDER — DICYCLOMINE HCL 20 MG
20 TABLET ORAL 2 TIMES DAILY
Qty: 60 TABLET | Refills: 3 | Status: SHIPPED | OUTPATIENT
Start: 2019-07-26 | End: 2020-04-15

## 2019-07-26 RX ORDER — ERGOCALCIFEROL 1.25 MG/1
50000 CAPSULE ORAL
Qty: 4 CAPSULE | Refills: 3 | Status: SHIPPED | OUTPATIENT
Start: 2019-07-26 | End: 2019-08-27 | Stop reason: SDUPTHER

## 2019-07-26 RX ORDER — LISINOPRIL 40 MG/1
40 TABLET ORAL DAILY
Qty: 30 TABLET | Refills: 3 | Status: SHIPPED | OUTPATIENT
Start: 2019-07-26 | End: 2019-10-09 | Stop reason: SDUPTHER

## 2019-07-26 RX ORDER — METOPROLOL TARTRATE 50 MG/1
50 TABLET, FILM COATED ORAL EVERY 12 HOURS SCHEDULED
Qty: 60 TABLET | Refills: 3 | Status: SHIPPED | OUTPATIENT
Start: 2019-07-26 | End: 2019-10-09 | Stop reason: SDUPTHER

## 2019-07-26 RX ORDER — SIMVASTATIN 20 MG
20 TABLET ORAL NIGHTLY
Qty: 30 TABLET | Refills: 3 | Status: SHIPPED | OUTPATIENT
Start: 2019-07-26 | End: 2019-08-28

## 2019-07-26 RX ORDER — GABAPENTIN 300 MG/1
300 CAPSULE ORAL 3 TIMES DAILY
Qty: 90 CAPSULE | Refills: 2 | Status: SHIPPED | OUTPATIENT
Start: 2019-07-26 | End: 2022-11-22 | Stop reason: ALTCHOICE

## 2019-07-26 NOTE — PATIENT INSTRUCTIONS
Tumeric powder in pill form at [facility].  Gabapentin capsules or tablets  What is this medicine?  GABAPENTIN (GA ba pen tin) is used to control partial seizures in adults with epilepsy. It is also used to treat certain types of nerve pain.  This medicine may be used for other purposes; ask your health care provider or pharmacist if you have questions.  COMMON BRAND NAME(S): Active-PAC with Gabapentin, Gabarone, Neurontin  What should I tell my health care provider before I take this medicine?  They need to know if you have any of these conditions:  -kidney disease  -suicidal thoughts, plans, or attempt; a previous suicide attempt by you or a family member  -an unusual or allergic reaction to gabapentin, other medicines, foods, dyes, or preservatives  -pregnant or trying to get pregnant  -breast-feeding  How should I use this medicine?  Take this medicine by mouth with a glass of water. Follow the directions on the prescription label. You can take it with or without food. If it upsets your stomach, take it with food.Take your medicine at regular intervals. Do not take it more often than directed. Do not stop taking except on your doctor's advice.  If you are directed to break the 600 or 800 mg tablets in half as part of your dose, the extra half tablet should be used for the next dose. If you have not used the extra half tablet within 28 days, it should be thrown away.  A special MedGuide will be given to you by the pharmacist with each prescription and refill. Be sure to read this information carefully each time.  Talk to your pediatrician regarding the use of this medicine in children. Special care may be needed.  Overdosage: If you think you have taken too much of this medicine contact a poison control center or emergency room at once.  NOTE: This medicine is only for you. Do not share this medicine with others.  What if I miss a dose?  If you miss a dose, take it as soon as you can. If it is almost time for your next  dose, take only that dose. Do not take double or extra doses.  What may interact with this medicine?  Do not take this medicine with any of the following medications:  -other gabapentin products  This medicine may also interact with the following medications:  -alcohol  -antacids  -antihistamines for allergy, cough and cold  -certain medicines for anxiety or sleep  -certain medicines for depression or psychotic disturbances  -homatropine; hydrocodone  -naproxen  -narcotic medicines (opiates) for pain  -phenothiazines like chlorpromazine, mesoridazine, prochlorperazine, thioridazine  This list may not describe all possible interactions. Give your health care provider a list of all the medicines, herbs, non-prescription drugs, or dietary supplements you use. Also tell them if you smoke, drink alcohol, or use illegal drugs. Some items may interact with your medicine.  What should I watch for while using this medicine?  Visit your doctor or health care professional for regular checks on your progress. You may want to keep a record at home of how you feel your condition is responding to treatment. You may want to share this information with your doctor or health care professional at each visit. You should contact your doctor or health care professional if your seizures get worse or if you have any new types of seizures. Do not stop taking this medicine or any of your seizure medicines unless instructed by your doctor or health care professional. Stopping your medicine suddenly can increase your seizures or their severity.  Wear a medical identification bracelet or chain if you are taking this medicine for seizures, and carry a card that lists all your medications.  You may get drowsy, dizzy, or have blurred vision. Do not drive, use machinery, or do anything that needs mental alertness until you know how this medicine affects you. To reduce dizzy or fainting spells, do not sit or stand up quickly, especially if you are  an older patient. Alcohol can increase drowsiness and dizziness. Avoid alcoholic drinks.  Your mouth may get dry. Chewing sugarless gum or sucking hard candy, and drinking plenty of water will help.  The use of this medicine may increase the chance of suicidal thoughts or actions. Pay special attention to how you are responding while on this medicine. Any worsening of mood, or thoughts of suicide or dying should be reported to your health care professional right away.  Women who become pregnant while using this medicine may enroll in the [place] Antiepileptic Drug Pregnancy Registry by calling 1-424.733.7863. This registry collects information about the safety of antiepileptic drug use during pregnancy.  What side effects may I notice from receiving this medicine?  Side effects that you should report to your doctor or health care professional as soon as possible:  -allergic reactions like skin rash, itching or hives, swelling of the face, lips, or tongue  -worsening of mood, thoughts or actions of suicide or dying  Side effects that usually do not require medical attention (report to your doctor or health care professional if they continue or are bothersome):  -constipation  -difficulty walking or controlling muscle movements  -dizziness  -nausea  -slurred speech  -tiredness  -tremors  -weight gain  This list may not describe all possible side effects. Call your doctor for medical advice about side effects. You may report side effects to FDA at 2-899-FDA-8890.  Where should I keep my medicine?  Keep out of reach of children.  This medicine may cause accidental overdose and death if it taken by other adults, children, or pets. Mix any unused medicine with a substance like cat litter or coffee grounds. Then throw the medicine away in a sealed container like a sealed bag or a coffee can with a lid. Do not use the medicine after the expiration date.  Store at room temperature between 15 and 30 degrees C (59 and  86 degrees F).  NOTE: This sheet is a summary. It may not cover all possible information. If you have questions about this medicine, talk to your doctor, pharmacist, or health care provider.  © 2019 [employer] (2015-02-13 15:26:50)

## 2019-07-27 LAB
ALBUMIN UR-MCNC: <1.2 MG/L
AMPHET+METHAMPHET UR QL: NEGATIVE
BARBITURATES UR QL SCN: NEGATIVE
BENZODIAZ UR QL SCN: NEGATIVE
CANNABINOIDS SERPL QL: NEGATIVE
COCAINE UR QL: NEGATIVE
CREAT UR-MCNC: 38.1 MG/DL
METHADONE UR QL SCN: NEGATIVE
MICROALBUMIN/CREAT UR: NORMAL MG/G
OPIATES UR QL: NEGATIVE
OXYCODONE UR QL SCN: NEGATIVE

## 2019-07-29 ENCOUNTER — TELEPHONE (OUTPATIENT)
Dept: FAMILY MEDICINE CLINIC | Facility: CLINIC | Age: 44
End: 2019-07-29

## 2019-08-07 ENCOUNTER — TELEPHONE (OUTPATIENT)
Dept: FAMILY MEDICINE CLINIC | Facility: CLINIC | Age: 44
End: 2019-08-07

## 2019-08-07 NOTE — TELEPHONE ENCOUNTER
Pt left message stating her insurance will not cover her humalog or januvia. Pt is out of humalog. Wanted to now if medications should be changed or what she should do?

## 2019-08-08 ENCOUNTER — LAB (OUTPATIENT)
Dept: LAB | Facility: HOSPITAL | Age: 44
End: 2019-08-08

## 2019-08-08 ENCOUNTER — TELEPHONE (OUTPATIENT)
Dept: FAMILY MEDICINE CLINIC | Facility: CLINIC | Age: 44
End: 2019-08-08

## 2019-08-08 DIAGNOSIS — E11.65 UNCONTROLLED TYPE 2 DIABETES MELLITUS WITH HYPERGLYCEMIA (HCC): ICD-10-CM

## 2019-08-08 LAB
25(OH)D3 SERPL-MCNC: 25.1 NG/ML (ref 30–100)
ALBUMIN SERPL-MCNC: 4.2 G/DL (ref 3.5–5.2)
ALBUMIN/GLOB SERPL: 1.3 G/DL
ALP SERPL-CCNC: 79 U/L (ref 39–117)
ALT SERPL W P-5'-P-CCNC: 27 U/L (ref 1–33)
ANION GAP SERPL CALCULATED.3IONS-SCNC: 16 MMOL/L (ref 5–15)
AST SERPL-CCNC: 21 U/L (ref 1–32)
BASOPHILS # BLD AUTO: 0.05 10*3/MM3 (ref 0–0.2)
BASOPHILS NFR BLD AUTO: 0.6 % (ref 0–1.5)
BILIRUB SERPL-MCNC: 0.2 MG/DL (ref 0.2–1.2)
BUN BLD-MCNC: 15 MG/DL (ref 6–20)
BUN/CREAT SERPL: 21.1 (ref 7–25)
CALCIUM SPEC-SCNC: 10 MG/DL (ref 8.6–10.5)
CHLORIDE SERPL-SCNC: 100 MMOL/L (ref 98–107)
CHOLEST SERPL-MCNC: 129 MG/DL (ref 0–200)
CO2 SERPL-SCNC: 21 MMOL/L (ref 22–29)
CREAT BLD-MCNC: 0.71 MG/DL (ref 0.57–1)
DEPRECATED RDW RBC AUTO: 43.9 FL (ref 37–54)
EOSINOPHIL # BLD AUTO: 0.17 10*3/MM3 (ref 0–0.4)
EOSINOPHIL NFR BLD AUTO: 1.9 % (ref 0.3–6.2)
ERYTHROCYTE [DISTWIDTH] IN BLOOD BY AUTOMATED COUNT: 13.4 % (ref 12.3–15.4)
GFR SERPL CREATININE-BSD FRML MDRD: 89 ML/MIN/1.73
GLOBULIN UR ELPH-MCNC: 3.3 GM/DL
GLUCOSE BLD-MCNC: 189 MG/DL (ref 65–99)
HBA1C MFR BLD: 9.83 % (ref 4.8–5.6)
HCT VFR BLD AUTO: 47.9 % (ref 34–46.6)
HDLC SERPL-MCNC: 33 MG/DL (ref 40–60)
HGB BLD-MCNC: 14.9 G/DL (ref 12–15.9)
IMM GRANULOCYTES # BLD AUTO: 0.06 10*3/MM3 (ref 0–0.05)
IMM GRANULOCYTES NFR BLD AUTO: 0.7 % (ref 0–0.5)
LDLC SERPL CALC-MCNC: 42 MG/DL (ref 0–100)
LDLC/HDLC SERPL: 1.28 {RATIO}
LYMPHOCYTES # BLD AUTO: 2.68 10*3/MM3 (ref 0.7–3.1)
LYMPHOCYTES NFR BLD AUTO: 30.2 % (ref 19.6–45.3)
MCH RBC QN AUTO: 28 PG (ref 26.6–33)
MCHC RBC AUTO-ENTMCNC: 31.1 G/DL (ref 31.5–35.7)
MCV RBC AUTO: 89.9 FL (ref 79–97)
MONOCYTES # BLD AUTO: 0.42 10*3/MM3 (ref 0.1–0.9)
MONOCYTES NFR BLD AUTO: 4.7 % (ref 5–12)
NEUTROPHILS # BLD AUTO: 5.5 10*3/MM3 (ref 1.7–7)
NEUTROPHILS NFR BLD AUTO: 61.9 % (ref 42.7–76)
NRBC BLD AUTO-RTO: 0 /100 WBC (ref 0–0.2)
PLATELET # BLD AUTO: 167 10*3/MM3 (ref 140–450)
PMV BLD AUTO: 12.6 FL (ref 6–12)
POTASSIUM BLD-SCNC: 4.6 MMOL/L (ref 3.5–5.2)
PROT SERPL-MCNC: 7.5 G/DL (ref 6–8.5)
RBC # BLD AUTO: 5.33 10*6/MM3 (ref 3.77–5.28)
SODIUM BLD-SCNC: 137 MMOL/L (ref 136–145)
TRIGL SERPL-MCNC: 268 MG/DL (ref 0–150)
VIT B12 BLD-MCNC: 680 PG/ML (ref 211–946)
VLDLC SERPL-MCNC: 53.6 MG/DL (ref 5–40)
WBC NRBC COR # BLD: 8.88 10*3/MM3 (ref 3.4–10.8)

## 2019-08-08 PROCEDURE — 83036 HEMOGLOBIN GLYCOSYLATED A1C: CPT

## 2019-08-08 PROCEDURE — 87522 HEPATITIS C REVRS TRNSCRPJ: CPT

## 2019-08-08 PROCEDURE — 80061 LIPID PANEL: CPT

## 2019-08-08 PROCEDURE — 85025 COMPLETE CBC W/AUTO DIFF WBC: CPT

## 2019-08-08 PROCEDURE — 82607 VITAMIN B-12: CPT

## 2019-08-08 PROCEDURE — 80053 COMPREHEN METABOLIC PANEL: CPT

## 2019-08-08 PROCEDURE — 82306 VITAMIN D 25 HYDROXY: CPT

## 2019-08-09 ENCOUNTER — TELEPHONE (OUTPATIENT)
Dept: FAMILY MEDICINE CLINIC | Facility: CLINIC | Age: 44
End: 2019-08-09

## 2019-08-09 NOTE — TELEPHONE ENCOUNTER
----- Message from Jermaine Ferro MD sent at 8/9/2019  8:04 AM CDT -----  Call pt    Vitamin B12 levels normal    hga1c improved from 13.   To 9.83 but goal <7.0.  Have pt continue januvia, insulin,  May need to start on bydureon on trulicity. Will discuss on next visit     Vitamin D is low and if pt truly taking Vitamin D3 50,000 units once a week Give 100,000 units a week with 8 pills and 3 refills. Pt to take 2 pills a week     On CBC.  Hematocrit slightly high and recommend pt quit smoking and also drink more fluids and water  Will monitor    On CMP kidney function shows GFR at 89. Pt likely has CKD stage 2 due to diabetes, hypertension. Will continue to monitor. Continue with BP control and sugar management     On lipid panel   HDL at goal but triglycerides high due to sugars. Stop simvastatin and start on lipitor 20 mg PO qhs Give 30 pills and 3 refills.      Awaiting hepatitis C RNa.    Recheck on next visit. Thanks  Pt can not take lipitor,show as allergie.  Sent letter about labs

## 2019-08-09 NOTE — TELEPHONE ENCOUNTER
----- Message from Jermaine Ferro MD sent at 8/9/2019  8:00 AM CDT -----  Call pt    Has mild scoliosis    alos has moderate arthrtiis of lumbar spine along with DDD. Continue followup.      Recheck on next visit

## 2019-08-12 ENCOUNTER — TELEPHONE (OUTPATIENT)
Dept: FAMILY MEDICINE CLINIC | Facility: CLINIC | Age: 44
End: 2019-08-12

## 2019-08-12 LAB
HCV RNA SERPL NAA+PROBE-ACNC: NORMAL IU/ML
TEST INFORMATION: NORMAL

## 2019-08-23 NOTE — PROGRESS NOTES
Subjective:  Sudhakar Saul is a 43 y.o. female who presents for       Patient Active Problem List   Diagnosis   • Seizure disorder (CMS/HCC)   • Morbid obesity (CMS/HCC)   • Tobacco abuse counseling   • Multiple joint pain   • Dysuria   • History of positive hepatitis C   • Essential hypertension   • Uncontrolled type 2 diabetes mellitus with hyperglycemia (CMS/HCC)   • Vitamin D deficiency   • Urinary tract infection without hematuria   • Chronic hepatitis C without hepatic coma (CMS/HCC)   • Cigarette nicotine dependence, uncomplicated   • Chronic obstructive pulmonary disease (CMS/HCC)   • JACQUE (obstructive sleep apnea)   • Gastroesophageal reflux disease with esophagitis   • Left upper quadrant pain   • Nausea and vomiting   • Weight gain, abnormal   • Change in bowel habits   • Hepatic fibrosis   • Non-compliance   • Depression   • Paranoid schizophrenia (CMS/HCC)   • Gastritis without bleeding   • Tobacco user   • Encounter for eye exam   • Scoliosis           Current Outpatient Medications:   •  ADMELOG 100 UNIT/ML injection, Inject 30 Units under the skin into the appropriate area as directed 3 (Three) Times a Day Before Meals., Disp: 30 mL, Rfl: 3  •  albuterol sulfate  (90 Base) MCG/ACT inhaler, Inhale 2 puffs Every 6 (Six) Hours As Needed for Wheezing or Shortness of Air., Disp: 1 inhaler, Rfl: 3  •  cyclobenzaprine (FLEXERIL) 5 MG tablet, Take 1 tablet by mouth 3 (Three) Times a Day As Needed for Muscle Spasms., Disp: 90 tablet, Rfl: 3  •  dicyclomine (BENTYL) 20 MG tablet, Take 1 tablet by mouth 2 (Two) Times a Day., Disp: 60 tablet, Rfl: 3  •  DULoxetine (CYMBALTA) 60 MG capsule, Take 1 tablet daily, Disp: 30 capsule, Rfl: 3  •  Ertugliflozin L-PyroglutamicAc (STEGLATRO) 15 MG tablet, Take 1 tablet by mouth Every Morning., Disp: 30 tablet, Rfl: 3  •  gabapentin (NEURONTIN) 300 MG capsule, Take 1 capsule by mouth 3 (Three) Times a Day., Disp: 90 capsule, Rfl: 2  •  icosapent ethyl  (VASCEPA) 1 g capsule capsule, Take 2 g by mouth 2 (Two) Times a Day With Meals., Disp: 120 capsule, Rfl: 3  •  Insulin Glargine (BASAGLAR KWIKPEN) 100 UNIT/ML injection pen, Start 60 units at bedtime Titrate up by 3 units every 3 days until a.m. FSBS , Disp: 18 mL, Rfl: 6  •  levETIRAcetam (KEPPRA) 1000 MG tablet, Take 1 tablet by mouth 2 (Two) Times a Day., Disp: 60 tablet, Rfl: 3  •  lisinopril (PRINIVIL,ZESTRIL) 40 MG tablet, Take 1 tablet by mouth Daily., Disp: 30 tablet, Rfl: 3  •  metFORMIN (GLUCOPHAGE) 500 MG tablet, Take 1 tablet by mouth 2 (Two) Times a Day With Meals., Disp: 60 tablet, Rfl: 3  •  metoprolol tartrate (LOPRESSOR) 50 MG tablet, Take 1 tablet by mouth Every 12 (Twelve) Hours., Disp: 60 tablet, Rfl: 3  •  nicotine polacrilex (NICORETTE) 4 MG gum, Chew 1 each As Needed for Smoking Cessation., Disp: 60 each, Rfl: 3  •  pantoprazole (PROTONIX) 40 MG EC tablet, Take 1 tablet by mouth Daily., Disp: 30 tablet, Rfl: 3  •  RELION PEN NEEDLES 31G X 6 MM misc, USE 1 NEEDLE 3 TO 4 TIMES DAILY, Disp: , Rfl: 3  •  risperiDONE (RISPERDAL) 1 MG tablet, Take 1 tablet by mouth 2 (Two) Times a Day., Disp: 60 tablet, Rfl: 3  •  simvastatin (ZOCOR) 40 MG tablet, Take 1 tablet by mouth Every Night., Disp: 30 tablet, Rfl: 3  •  SITagliptin (JANUVIA) 100 MG tablet, Take 1 tablet by mouth Daily., Disp: 30 tablet, Rfl: 3  •  tiotropium bromide monohydrate (SPIRIVA RESPIMAT) 2.5 MCG/ACT aerosol solution inhaler, Inhale 2 puffs Daily., Disp: 1 inhaler, Rfl: 11  •  vitamin D (ERGOCALCIFEROL) 72623 units capsule capsule, Take 2 caps weekly, Disp: 8 capsule, Rfl: 3    HPI     5/17/19 PT is 42 yo female with history of morbid obesity, HTN, GERD, history of drug use, Vitamin D deficiency,  HLP, seizure disorder, IBS, depression, fatty liver disease  insulin dependent DM type 2, Chronic hepatitis C, COPD, JACQUE, gastritis. PT was seeing Dr. Sanchez for checkups. She was started on insulin Basaglar. She is due for diabetic  eye exam and labwork. Her last hga1c was >14.0 about 3 months ago.  She is taking humalog 30 units before meals along with Basaglar units 60 units at bedtime. She is due for new labwork. She has yet to see neurologist.  She has not had a seizures since last visit of last year. As far as  Smoking she has cut back to 1/4 of ppd. She is willing try nicotine patch.      She also has history of depression and paranoid schizophrenia.  She was on respideral and is not on it there are negative feeeling. She has memories from childhood that are traumatic. She also has history of suicidal thoughts and PTSD. She has a history of alcohol and drug use.  She has been drug and alcohol free for almost a year. She got labwork that is pending.  She tried viibryd but insurance would not cover. She was started on cymbalta 30 mg daily. Her sugars have improved with steglatro and she is now on Basaglar 60 units.  She does note today that she hears voices that are degrading that interfere with her trying to learn something new     7/26/19 pt is here for recheck.  She is doing well in regards to sugars and is taking basaglar 60 units at bedtime along with humalog 30 units before meals with sliding scale. Her sugars average in mornings 120s. Also takes steglatro and that is helping. She has been stressed lately due to father having surgery. Her main issues today is   Back pain in lower back she has had history of lumbar dissectomy on L4.   In 2011 at [place] with Dr. Hurley. She has history of drug abuse with crystal meth and heroin.  She was in pain management  In [place] for her back issues and was getting epidural injeciions in the past.  She then was incarcerated a few times for substance abuse and illiict drug use and has not been back to pain management since. Pain is 7/10 on severity. She was also on morphine, percocet and lidocaine patches.  Pain radiates down both legs.      8/28/19 pt is here for recheck she is doing  better. Had labwork on 8/8/19 that showed hepatitis RNA negative.  Her vitamin D was low. b12 was normal lipid panel  Showed LDL at 42 but triglycerides are high .she was on simvastatin and she was switched to lipitro but could not tolerate it. She is back on simvastatin. HD lwas low.  Hga1c improved from 13. To 9.83.  CMP showed GFR at 89. CBC showed elevation of RBC and HCT.    She also stste her back pain and leg pain improved with neurontin 300 mg PO TID along with flexeril. She has appt with pain Management next month in [place].   X-ray of lumbar spine has arthritis lumbar spine. Along with mild scoliosis. Her schizophrenia has improved.   Sugars have improved and with admelog.  She is on basaglar 60 units at bedtime . Her meal sugars could improve. She does check her carb counting as well as it could.  She continues on januvia            Back Pain   This is a chronic problem. The current episode started more than 1 year ago. The problem occurs constantly. The problem is unchanged. The pain is present in the lumbar spine, sacro-iliac and gluteal. The quality of the pain is described as aching. The pain radiates to the left thigh. The pain is at a severity of 7/10. The pain is moderate. The pain is the same all the time. The symptoms are aggravated by bending, lying down, position, sitting and standing. Stiffness is present all day. Associated symptoms include numbness, paresis, tingling and weakness. Pertinent negatives include no abdominal pain, bladder incontinence, bowel incontinence, chest pain, dysuria, fever, headaches, leg pain, paresthesias, pelvic pain or weight loss. She has tried muscle relaxant and NSAIDs (pain management/ norco percocet back surgery ) for the symptoms. The treatment provided no relief.    Mental Health Problem   The primary symptoms include delusions, hallucinations and negative symptoms. The primary symptoms do not include dysphoric mood, bizarre behavior, disorganized  speech or somatic symptoms. This is a chronic problem.   The onset of the illness is precipitated by a stressful event, emotional stress, alcohol abuse and drug abuse. The degree of incapacity that she is experiencing as a consequence of her illness is severe. Sequelae of the illness include an inability to work, an inability to care for self and harmed interpersonal relations. Additional symptoms of the illness include fatigue, agitation and seizures. Additional symptoms of the illness do not include anhedonia, insomnia, hypersomnia, appetite change, unexpected weight change, psychomotor retardation, feelings of worthlessness, attention impairment, increased goal-directed activity, flight of ideas, inflated self-esteem, decreased need for sleep, distractible, poor judgment, visual change, headaches or abdominal pain. She admits to suicidal ideas. She does not have a plan to commit suicide. She does not contemplate harming herself. She has not already injured self. She does not contemplate injuring another person. Risk factors that are present for mental illness include a history of mental illness, substance abuse, a history of suicide attempts, family violence and epilepsy.   Seizures    This is a chronic problem. The current episode started more than 1 week ago. The problem has been gradually worsening. Associated symptoms include sleepiness, confusion, headaches and visual disturbances. Pertinent negatives include no neck stiffness, no sore throat, no chest pain, no cough, no nausea, no vomiting, no diarrhea and no muscle weakness.   Obesity   This is a chronic problem. The current episode started more than 1 year ago. The problem occurs constantly. The problem has been unchanged. Associated symptoms include arthralgias, fatigue, headaches, numbness and weakness. Pertinent negatives include no abdominal pain, anorexia, change in bowel habit, chest pain, chills, congestion, coughing, fever, joint  swelling, myalgias, nausea, neck pain, rash, sore throat, swollen glands, urinary symptoms, vertigo, visual change or vomiting. Nothing aggravates the symptoms. She has tried nothing for the symptoms.   Hypertension   This is a chronic problem. The current episode started more than 1 year ago. The problem is uncontrolled. Associated symptoms include headaches. Pertinent negatives include no anxiety, blurred vision, chest pain, malaise/fatigue, neck pain, orthopnea, palpitations, peripheral edema, PND or shortness of breath. Risk factors for coronary artery disease include obesity, sedentary lifestyle, smoking/tobacco exposure and stress. Past treatments include ACE inhibitors, beta blockers and diuretics. There are no compliance problems.  There is no history of angina, kidney disease, CAD/MI, CVA, heart failure, left ventricular hypertrophy, PVD or retinopathy. There is no history of chronic renal disease, coarctation of the aorta, hyperaldosteronism, hypercortisolism, hyperparathyroidism, a hypertension causing med, pheochromocytoma, renovascular disease, sleep apnea or a thyroid problem.   Diabetes   She presents for her initial diabetic visit. She has type 2 diabetes mellitus. Her disease course has been worsening. Hypoglycemia symptoms include confusion, headaches, nervousness/anxiousness, seizures and sleepiness. Associated symptoms include fatigue and weakness. Pertinent negatives for diabetes include no blurred vision, no chest pain and no visual change. Pertinent negatives for diabetic complications include no CVA, PVD or retinopathy. Risk factors for coronary artery disease include dyslipidemia, obesity and post-menopausal. She has not had a previous visit with a dietitian. There is no change in her home blood glucose trend. An ACE inhibitor/angiotensin II receptor blocker is not being taken. She does not see a podiatrist.Eye exam is not current.   Shortness of Breath   This is a chronic problem. The  current episode started more than 1 year ago. The problem occurs constantly. The problem has been gradually worsening. Associated symptoms include headaches. Pertinent negatives include no abdominal pain, chest pain, claudication, coryza, ear pain, fever, hemoptysis, leg pain, leg swelling, neck pain, orthopnea, PND, rash, sore throat, sputum production, swollen glands, syncope, vomiting or wheezing. Nothing aggravates the symptoms. The patient has no known risk factors for DVT/PE. She has tried nothing for the symptoms. The treatment provided no relief. There is no history of allergies, aspirin allergies, asthma, bronchiolitis, CAD, chronic lung disease, COPD, DVT, a heart failure, PE, pneumonia or a recent surgery.     Review of Systems  Review of Systems   Constitutional: Positive for activity change and fatigue. Negative for appetite change, chills, diaphoresis and fever.   HENT: Negative for congestion, postnasal drip, rhinorrhea, sinus pressure, sinus pain, sneezing, sore throat, trouble swallowing and voice change.    Respiratory: Negative for cough, choking, chest tightness, shortness of breath, wheezing and stridor.    Cardiovascular: Negative for chest pain.   Gastrointestinal: Negative for diarrhea, nausea and vomiting.   Musculoskeletal: Positive for arthralgias, back pain and gait problem.   Neurological: Negative for weakness and headaches.   Psychiatric/Behavioral: Positive for agitation, behavioral problems and decreased concentration.       Patient Active Problem List   Diagnosis   • Seizure disorder (CMS/HCC)   • Morbid obesity (CMS/HCC)   • Tobacco abuse counseling   • Multiple joint pain   • Dysuria   • History of positive hepatitis C   • Essential hypertension   • Uncontrolled type 2 diabetes mellitus with hyperglycemia (CMS/HCC)   • Vitamin D deficiency   • Urinary tract infection without hematuria   • Chronic hepatitis C without hepatic coma (CMS/HCC)   • Cigarette nicotine dependence,  uncomplicated   • Chronic obstructive pulmonary disease (CMS/HCC)   • JACQUE (obstructive sleep apnea)   • Gastroesophageal reflux disease with esophagitis   • Left upper quadrant pain   • Nausea and vomiting   • Weight gain, abnormal   • Change in bowel habits   • Hepatic fibrosis   • Non-compliance   • Depression   • Paranoid schizophrenia (CMS/HCC)   • Gastritis without bleeding   • Tobacco user   • Encounter for eye exam   • Scoliosis     Past Surgical History:   Procedure Laterality Date   • APPENDECTOMY     • BACK SURGERY     • BREAST SURGERY     • CARPAL TUNNEL RELEASE      right hand   • CHOLECYSTECTOMY     • COLONOSCOPY     • COLONOSCOPY N/A 2/8/2019    Procedure: COLONOSCOPY;  Surgeon: Joni Delacruz MD;  Location: [facility] ENDOSCOPY;  Service: Gastroenterology   • DENTAL PROCEDURE     • ENDOSCOPY N/A 2/8/2019    Procedure: ESOPHAGOGASTRODUODENOSCOPY--poss dilation;  Surgeon: Joni Delacruz MD;  Location: [facility] ENDOSCOPY;  Service: Gastroenterology   • HYSTERECTOMY     • LIPOMA EXCISION      9   • LIVER BIOPSY     • UPPER GASTROINTESTINAL ENDOSCOPY  02/08/2019     Social History     Socioeconomic History   • Marital status:      Spouse name: Not on file   • Number of children: Not on file   • Years of education: Not on file   • Highest education level: Not on file   Tobacco Use   • Smoking status: Current Every Day Smoker     Packs/day: 0.25     Types: Cigarettes   • Smokeless tobacco: Never Used   Substance and Sexual Activity   • Alcohol use: No   • Drug use: No     Family History   Problem Relation Age of Onset   • Hypertension Other    • Diabetes Other    • Stroke Other    • Cancer Other    • Kidney disease Other    • Lung disease Other    • Other Other    • Malone's esophagus Other    • Colon polyps Other    • Heart disease Other    • Ulcers Other    • Cholelithiasis Other    • Allergy (severe) Other      Lab on 08/08/2019   Component Date Value Ref Range Status   • WBC 08/08/2019 8.88  3.40  - 10.80 10*3/mm3 Final   • RBC 08/08/2019 5.33* 3.77 - 5.28 10*6/mm3 Final   • Hemoglobin 08/08/2019 14.9  12.0 - 15.9 g/dL Final   • Hematocrit 08/08/2019 47.9* 34.0 - 46.6 % Final   • MCV 08/08/2019 89.9  79.0 - 97.0 fL Final   • MCH 08/08/2019 28.0  26.6 - 33.0 pg Final   • MCHC 08/08/2019 31.1* 31.5 - 35.7 g/dL Final   • RDW 08/08/2019 13.4  12.3 - 15.4 % Final   • RDW-SD 08/08/2019 43.9  37.0 - 54.0 fl Final   • MPV 08/08/2019 12.6* 6.0 - 12.0 fL Final   • Platelets 08/08/2019 167  140 - 450 10*3/mm3 Final   • Neutrophil % 08/08/2019 61.9  42.7 - 76.0 % Final   • Lymphocyte % 08/08/2019 30.2  19.6 - 45.3 % Final   • Monocyte % 08/08/2019 4.7* 5.0 - 12.0 % Final   • Eosinophil % 08/08/2019 1.9  0.3 - 6.2 % Final   • Basophil % 08/08/2019 0.6  0.0 - 1.5 % Final   • Immature Grans % 08/08/2019 0.7* 0.0 - 0.5 % Final   • Neutrophils, Absolute 08/08/2019 5.50  1.70 - 7.00 10*3/mm3 Final   • Lymphocytes, Absolute 08/08/2019 2.68  0.70 - 3.10 10*3/mm3 Final   • Monocytes, Absolute 08/08/2019 0.42  0.10 - 0.90 10*3/mm3 Final   • Eosinophils, Absolute 08/08/2019 0.17  0.00 - 0.40 10*3/mm3 Final   • Basophils, Absolute 08/08/2019 0.05  0.00 - 0.20 10*3/mm3 Final   • Immature Grans, Absolute 08/08/2019 0.06* 0.00 - 0.05 10*3/mm3 Final   • nRBC 08/08/2019 0.0  0.0 - 0.2 /100 WBC Final   • Glucose 08/08/2019 189* 65 - 99 mg/dL Final   • BUN 08/08/2019 15  6 - 20 mg/dL Final   • Creatinine 08/08/2019 0.71  0.57 - 1.00 mg/dL Final   • Sodium 08/08/2019 137  136 - 145 mmol/L Final   • Potassium 08/08/2019 4.6  3.5 - 5.2 mmol/L Final   • Chloride 08/08/2019 100  98 - 107 mmol/L Final   • CO2 08/08/2019 21.0* 22.0 - 29.0 mmol/L Final   • Calcium 08/08/2019 10.0  8.6 - 10.5 mg/dL Final   • Total Protein 08/08/2019 7.5  6.0 - 8.5 g/dL Final   • Albumin 08/08/2019 4.20  3.50 - 5.20 g/dL Final   • ALT (SGPT) 08/08/2019 27  1 - 33 U/L Final   • AST (SGOT) 08/08/2019 21  1 - 32 U/L Final   • Alkaline Phosphatase 08/08/2019 79  39 -  117 U/L Final   • Total Bilirubin 08/08/2019 0.2  0.2 - 1.2 mg/dL Final   • eGFR Non  Amer 08/08/2019 89  >60 mL/min/1.73 Final   • Globulin 08/08/2019 3.3  gm/dL Final   • A/G Ratio 08/08/2019 1.3  g/dL Final   • BUN/Creatinine Ratio 08/08/2019 21.1  7.0 - 25.0 Final   • Anion Gap 08/08/2019 16.0* 5.0 - 15.0 mmol/L Final   • Hemoglobin A1C 08/08/2019 9.83* 4.80 - 5.60 % Final   • Total Cholesterol 08/08/2019 129  0 - 200 mg/dL Final   • Triglycerides 08/08/2019 268* 0 - 150 mg/dL Final   • HDL Cholesterol 08/08/2019 33* 40 - 60 mg/dL Final   • LDL Cholesterol  08/08/2019 42  0 - 100 mg/dL Final   • VLDL Cholesterol 08/08/2019 53.6* 5 - 40 mg/dL Final   • LDL/HDL Ratio 08/08/2019 1.28   Final   • 25 Hydroxy, Vitamin D 08/08/2019 25.1* 30.0 - 100.0 ng/ml Final   • Vitamin B-12 08/08/2019 680  211 - 946 pg/mL Final   • Hepatitis C Quantitation 08/08/2019 HCV Not Detected  IU/mL Final   • Test Information 08/08/2019 Comment   Final    The quantitative range of this assay is 15 IU/mL to 100 million IU/mL.   Lab on 07/26/2019   Component Date Value Ref Range Status   • Amphet/Methamphet, Screen 07/26/2019 Negative  Negative Final   • Barbiturates Screen, Urine 07/26/2019 Negative  Negative Final   • Benzodiazepine Screen, Urine 07/26/2019 Negative  Negative Final   • Cocaine Screen, Urine 07/26/2019 Negative  Negative Final   • Opiate Screen 07/26/2019 Negative  Negative Final   • THC, Screen, Urine 07/26/2019 Negative  Negative Final   • Methadone Screen, Urine 07/26/2019 Negative  Negative Final   • Oxycodone Screen, Urine 07/26/2019 Negative  Negative Final   • Microalbumin/Creatinine Ratio 07/26/2019   mg/g Final    Unable to calculate   • Creatinine, Urine 07/26/2019 38.1  mg/dL Final   • Microalbumin, Urine 07/26/2019 <1.2  mg/L Final   Lab on 05/17/2019   Component Date Value Ref Range Status   • Hemoglobin A1C 05/17/2019 13.51* 4.80 - 5.60 % Final   • WBC 05/17/2019 9.34  3.40 - 10.80 10*3/mm3 Final   •  RBC 05/17/2019 4.84  3.77 - 5.28 10*6/mm3 Final   • Hemoglobin 05/17/2019 13.7  12.0 - 15.9 g/dL Final   • Hematocrit 05/17/2019 42.5  34.0 - 46.6 % Final   • MCV 05/17/2019 87.8  79.0 - 97.0 fL Final   • MCH 05/17/2019 28.3  26.6 - 33.0 pg Final   • MCHC 05/17/2019 32.2  31.5 - 35.7 g/dL Final   • RDW 05/17/2019 13.8  12.3 - 15.4 % Final   • RDW-SD 05/17/2019 43.8  37.0 - 54.0 fl Final   • MPV 05/17/2019 12.5* 6.0 - 12.0 fL Final   • Platelets 05/17/2019 161  140 - 450 10*3/mm3 Final   • Neutrophil % 05/17/2019 58.6  42.7 - 76.0 % Final   • Lymphocyte % 05/17/2019 34.2  19.6 - 45.3 % Final   • Monocyte % 05/17/2019 4.3* 5.0 - 12.0 % Final   • Eosinophil % 05/17/2019 1.9  0.3 - 6.2 % Final   • Basophil % 05/17/2019 0.4  0.0 - 1.5 % Final   • Immature Grans % 05/17/2019 0.6* 0.0 - 0.5 % Final   • Neutrophils, Absolute 05/17/2019 5.47  1.70 - 7.00 10*3/mm3 Final   • Lymphocytes, Absolute 05/17/2019 3.19* 0.70 - 3.10 10*3/mm3 Final   • Monocytes, Absolute 05/17/2019 0.40  0.10 - 0.90 10*3/mm3 Final   • Eosinophils, Absolute 05/17/2019 0.18  0.00 - 0.40 10*3/mm3 Final   • Basophils, Absolute 05/17/2019 0.04  0.00 - 0.20 10*3/mm3 Final   • Immature Grans, Absolute 05/17/2019 0.06* 0.00 - 0.05 10*3/mm3 Final   • nRBC 05/17/2019 0.1  0.0 - 0.2 /100 WBC Final   • Glucose 05/17/2019 269* 65 - 99 mg/dL Final   • BUN 05/17/2019 21* 6 - 20 mg/dL Final   • Creatinine 05/17/2019 0.75  0.57 - 1.00 mg/dL Final   • Sodium 05/17/2019 136  136 - 145 mmol/L Final   • Potassium 05/17/2019 4.7  3.5 - 5.2 mmol/L Final   • Chloride 05/17/2019 101  98 - 107 mmol/L Final   • CO2 05/17/2019 17.8* 22.0 - 29.0 mmol/L Final   • Calcium 05/17/2019 9.9  8.6 - 10.5 mg/dL Final   • Total Protein 05/17/2019 7.3  6.0 - 8.5 g/dL Final   • Albumin 05/17/2019 3.60  3.50 - 5.20 g/dL Final   • ALT (SGPT) 05/17/2019 21  1 - 33 U/L Final   • AST (SGOT) 05/17/2019 21  1 - 32 U/L Final   • Alkaline Phosphatase 05/17/2019 68  39 - 117 U/L Final   • Total  Bilirubin 05/17/2019 <0.2* 0.2 - 1.2 mg/dL Final   • eGFR Non  Amer 05/17/2019 84  >60 mL/min/1.73 Final   • Globulin 05/17/2019 3.7  gm/dL Final   • A/G Ratio 05/17/2019 1.0  g/dL Final   • BUN/Creatinine Ratio 05/17/2019 28.0* 7.0 - 25.0 Final   • Anion Gap 05/17/2019 17.2  mmol/L Final   • Total Cholesterol 05/17/2019 161  0 - 200 mg/dL Final   • Triglycerides 05/17/2019 278* 0 - 150 mg/dL Final   • HDL Cholesterol 05/17/2019 35* 40 - 60 mg/dL Final   • LDL Cholesterol  05/17/2019 70  0 - 100 mg/dL Final   • VLDL Cholesterol 05/17/2019 55.6* 5 - 40 mg/dL Final   • LDL/HDL Ratio 05/17/2019 2.01   Final   • TSH 05/17/2019 2.520  0.270 - 4.200 mIU/mL Final   • Free T4 05/17/2019 1.21  0.93 - 1.70 ng/dL Final   • T3, Free 05/17/2019 2.50  2.00 - 4.40 pg/mL Final   • 25 Hydroxy, Vitamin D 05/17/2019 22.6* 30.0 - 100.0 ng/ml Final   • Vitamin B-12 05/17/2019 565  211 - 946 pg/mL Final   • Microalbumin/Creatinine Ratio 05/17/2019   mg/g Final    Unable to calculate   • Creatinine, Urine 05/17/2019 34.8  mg/dL Final   • Microalbumin, Urine 05/17/2019 <1.2  mg/L Final      XR Spine Lumbar 4+ View  Narrative: PROCEDURE: Lumbar spine with obliques.    INDICATION: lower back pain, M54.42 Lumbago with sciatica, left  side M54.41 Lumbago with sciatica, right side.    COMPARISON: None.    AP, lateral, lateral lumbosacral spot film and oblique views.    Lumbar vertebrae are normal height and well aligned. There is a  mild lower lumbar levoscoliosis.    Moderate L4-5 and advanced L5-S1 degenerative disc changes with  disc space narrowing. Mild L4-5 and moderate L5-S1 bilateral  facet arthritic change.    Pedicles, spinous processes and transverse processes intact.  Impression: Moderate L4-5 and advanced L5-S1 degenerative disc  changes.    Mild bilateral L4-5 and moderate bilateral L5-S1 facet arthritic  change.    Mild levoscoliosis.    [id number]    Electronically signed by:  Sammy Priest MD  8/8/2019 2:55 PM  CDT  "Workstation: [id number]    @[email or web address]@  Immunization History   Administered Date(s) Administered   • Pneumococcal Polysaccharide (PPSV23) 12/13/2018       The following portions of the patient's history were reviewed and updated as appropriate: allergies, current medications, past family history, past medical history, past social history, past surgical history and problem list.        Physical Exam  /86   Pulse 94   Temp 99.7 °F (37.6 °C)   Ht 167.6 cm (66\")   Wt 136 kg (299 lb 4.8 oz)   SpO2 94%   BMI 48.31 kg/m²     Physical Exam   Constitutional: She is oriented to person, place, and time. She appears well-developed and well-nourished.   HENT:   Head: Normocephalic and atraumatic.   Right Ear: External ear normal.   Eyes: Conjunctivae and EOM are normal. Pupils are equal, round, and reactive to light.   Neck: Normal range of motion. Neck supple.   Cardiovascular: Normal rate, regular rhythm and normal heart sounds.   No murmur heard.  Pulmonary/Chest: Effort normal and breath sounds normal. No respiratory distress.   Abdominal: Soft. Bowel sounds are normal. She exhibits no distension. There is no tenderness.   Obese abdomen    Musculoskeletal: She exhibits tenderness. She exhibits no edema or deformity.        Lumbar back: She exhibits decreased range of motion, tenderness, bony tenderness, pain and spasm.   Neurological: She is alert and oriented to person, place, and time. No cranial nerve deficit.   Skin: Skin is warm. No rash noted. She is not diaphoretic. No erythema. No pallor.   Psychiatric: She has a normal mood and affect. Her behavior is normal.   Nursing note and vitals reviewed.      Assessment/Plan    Diagnosis Plan   1. Uncontrolled type 2 diabetes mellitus with hyperglycemia (CMS/[facility])     2. Vitamin D deficiency     3. Tobacco user     4. Tobacco abuse counseling     5. Paranoid schizophrenia (CMS/HCC)     6. Seizure disorder (CMS/HCC)     7. JACQUE (obstructive sleep apnea)     8. Morbid " obesity (CMS/HCC)     9. History of positive hepatitis C     10. Essential hypertension     11. Chronic hepatitis C without hepatic coma (CMS/HCC)     12. Episode of recurrent major depressive disorder, unspecified depression episode severity (CMS/HCC)     13. Gastritis without bleeding, unspecified chronicity, unspecified gastritis type     14. Arthritis of lumbar spine     15. Scoliosis, unspecified scoliosis type, unspecified spinal region          -went over labwork  -for back pain/arthritis of back/scoliosi  - history of back surgery - referral to Pain Management  X-rays of lumbar spine.  Start on neurontin 300 mg PO TID.gave 90 pills and 3 refills. Will get NERY. UDS today. Pt declined PT/OT for now.   -chronic hep C - Gastroenterology following   -for dsypnea- referred to Dr. Will (Pulmonologsit) for PFTs. Consultation appreciated. Recent chest x-ray negative. Will start on albuterol inhaler PRN.Advised pt to stop smoking. She is on spirva now for early COPD  -multiple joint pain -- naproxen 500 mg PO BID. Pt advised to hold naproxen unless needed while taking viibryd  -for multiple joint pain - pt cannot take opoids. Will start on naproxen 500 mg twi ce a day. Drug information provided  -morbid obesity -counseled on weight loss, diet and exercise. Diet information provided.  -hyperlipdiemia- simvastatin 20  To 40 mg PO qhs. ASCVD risk high. Recommend to take with coenzyme Q10  Start on vascepa 2 grams PO BID   -for DM type 2- metformin 500 mg pO BID,  Short acting insulin 30 units before meals  along with basaglar insulin 60 units at bedtime  at bedtime. Start on steglatro 15 mg daily. Samples provdied today. Advised to taper up by 3 units every 3 days until sugars at goal. Also educated today on sliding scale insulin and counting carbs before meal.  She does have association with learning sometthing new with her schizophrenia.   -mammogram screening  -depression - stopped viibryd, go up on cymbalta from  30 to 60 mg daily   -seizure disorder -currently on Keppra  500 mg PO BID. Referred to Neurologist  Services appreciated. She missed appt.  Will go up on Keppra from 500 to 1000 mg twice a day refer back to Dr. Fry  -counseled pt to quit smoking >5 minutes.  Gave tobacco cessation material   -hypertension - at goal today Continue on lisinopril but will go up on dosage from 20 to 40 mg dialy.  mg Po q dominguez, metoprolol 50 mg PO BID . Stop HCTZ for now. Consider Norvasc or calcium channel blocker if not improving   -advised pt to be safe and call with any questions or concerns. All questions addressed today  -recheck in 2   weeks for BP recheck and labwork  -recommend diabetic eye exam         This document has been electronically signed by Jermaine Ferro MD on August 28, 2019 4:46 PM

## 2019-08-27 RX ORDER — ERGOCALCIFEROL 1.25 MG/1
CAPSULE ORAL
Qty: 8 CAPSULE | Refills: 3 | Status: SHIPPED | OUTPATIENT
Start: 2019-08-27 | End: 2019-10-09 | Stop reason: SDUPTHER

## 2019-08-28 ENCOUNTER — OFFICE VISIT (OUTPATIENT)
Dept: FAMILY MEDICINE CLINIC | Facility: CLINIC | Age: 44
End: 2019-08-28

## 2019-08-28 VITALS
TEMPERATURE: 99.7 F | DIASTOLIC BLOOD PRESSURE: 86 MMHG | BODY MASS INDEX: 47.09 KG/M2 | SYSTOLIC BLOOD PRESSURE: 138 MMHG | HEIGHT: 66 IN | HEART RATE: 94 BPM | OXYGEN SATURATION: 94 % | WEIGHT: 293 LBS

## 2019-08-28 DIAGNOSIS — E11.65 UNCONTROLLED TYPE 2 DIABETES MELLITUS WITH HYPERGLYCEMIA (HCC): Primary | ICD-10-CM

## 2019-08-28 DIAGNOSIS — I10 ESSENTIAL HYPERTENSION: ICD-10-CM

## 2019-08-28 DIAGNOSIS — F20.0 PARANOID SCHIZOPHRENIA (HCC): ICD-10-CM

## 2019-08-28 DIAGNOSIS — F33.9 EPISODE OF RECURRENT MAJOR DEPRESSIVE DISORDER, UNSPECIFIED DEPRESSION EPISODE SEVERITY (HCC): ICD-10-CM

## 2019-08-28 DIAGNOSIS — F19.91 HISTORY OF DRUG USE: ICD-10-CM

## 2019-08-28 DIAGNOSIS — E66.01 MORBID OBESITY (HCC): ICD-10-CM

## 2019-08-28 DIAGNOSIS — G40.909 SEIZURE DISORDER (HCC): ICD-10-CM

## 2019-08-28 DIAGNOSIS — M47.816 ARTHRITIS OF LUMBAR SPINE: ICD-10-CM

## 2019-08-28 DIAGNOSIS — Z86.19 HISTORY OF POSITIVE HEPATITIS C: ICD-10-CM

## 2019-08-28 DIAGNOSIS — G47.33 OSA (OBSTRUCTIVE SLEEP APNEA): ICD-10-CM

## 2019-08-28 DIAGNOSIS — B18.2 CHRONIC HEPATITIS C WITHOUT HEPATIC COMA (HCC): ICD-10-CM

## 2019-08-28 DIAGNOSIS — Z71.6 TOBACCO ABUSE COUNSELING: ICD-10-CM

## 2019-08-28 DIAGNOSIS — E55.9 VITAMIN D DEFICIENCY: ICD-10-CM

## 2019-08-28 DIAGNOSIS — M41.9 SCOLIOSIS, UNSPECIFIED SCOLIOSIS TYPE, UNSPECIFIED SPINAL REGION: ICD-10-CM

## 2019-08-28 DIAGNOSIS — K29.70 GASTRITIS WITHOUT BLEEDING, UNSPECIFIED CHRONICITY, UNSPECIFIED GASTRITIS TYPE: ICD-10-CM

## 2019-08-28 DIAGNOSIS — Z72.0 TOBACCO USER: ICD-10-CM

## 2019-08-28 PROCEDURE — 99214 OFFICE O/P EST MOD 30 MIN: CPT | Performed by: FAMILY MEDICINE

## 2019-08-28 RX ORDER — ICOSAPENT ETHYL 1000 MG/1
2 CAPSULE ORAL 2 TIMES DAILY WITH MEALS
Qty: 120 CAPSULE | Refills: 3 | Status: SHIPPED | OUTPATIENT
Start: 2019-08-28 | End: 2019-12-09

## 2019-08-28 RX ORDER — DULOXETIN HYDROCHLORIDE 60 MG/1
CAPSULE, DELAYED RELEASE ORAL
Qty: 30 CAPSULE | Refills: 3 | Status: SHIPPED | OUTPATIENT
Start: 2019-08-28 | End: 2019-12-09

## 2019-08-28 RX ORDER — SIMVASTATIN 40 MG
40 TABLET ORAL NIGHTLY
Qty: 30 TABLET | Refills: 3 | Status: SHIPPED | OUTPATIENT
Start: 2019-08-28 | End: 2019-10-09 | Stop reason: SDUPTHER

## 2019-08-28 NOTE — PATIENT INSTRUCTIONS
Go up on simvastatin from 20 to 40 mg at bedtime     Go up on cymblata from 30 to 60 mg daily    Start on vascepa 2 grams twice a day for triglycerides

## 2019-09-10 RX ORDER — ERTUGLIFLOZIN 15 MG/1
TABLET, FILM COATED ORAL
Qty: 30 TABLET | Refills: 3 | Status: SHIPPED | OUTPATIENT
Start: 2019-09-10 | End: 2019-10-09 | Stop reason: SDUPTHER

## 2019-10-02 NOTE — PROGRESS NOTES
Subjective:  Sudhakar Saul is a 43 y.o. female who presents for       Patient Active Problem List   Diagnosis   • Seizure disorder (CMS/HCC)   • Morbid obesity (CMS/HCC)   • Tobacco abuse counseling   • Multiple joint pain   • Dysuria   • History of positive hepatitis C   • Essential hypertension   • Uncontrolled type 2 diabetes mellitus with hyperglycemia (CMS/HCC)   • Vitamin D deficiency   • Urinary tract infection without hematuria   • Chronic hepatitis C without hepatic coma (CMS/HCC)   • Cigarette nicotine dependence, uncomplicated   • Chronic obstructive pulmonary disease (CMS/HCC)   • JACQUE (obstructive sleep apnea)   • Gastroesophageal reflux disease with esophagitis   • Left upper quadrant pain   • Nausea and vomiting   • Weight gain, abnormal   • Change in bowel habits   • Hepatic fibrosis   • Non-compliance   • Depression   • Paranoid schizophrenia (CMS/HCC)   • Gastritis without bleeding   • Tobacco user   • Encounter for eye exam   • Scoliosis   • History of drug use           Current Outpatient Medications:   •  ADMELOG 100 UNIT/ML injection, Inject 30 Units under the skin into the appropriate area as directed 3 (Three) Times a Day Before Meals., Disp: 30 mL, Rfl: 3  •  albuterol sulfate  (90 Base) MCG/ACT inhaler, Inhale 2 puffs Every 6 (Six) Hours As Needed for Wheezing or Shortness of Air., Disp: 1 inhaler, Rfl: 3  •  cyclobenzaprine (FLEXERIL) 5 MG tablet, Take 1 tablet by mouth 3 (Three) Times a Day As Needed for Muscle Spasms., Disp: 90 tablet, Rfl: 3  •  dicyclomine (BENTYL) 20 MG tablet, Take 1 tablet by mouth 2 (Two) Times a Day., Disp: 60 tablet, Rfl: 3  •  DULoxetine (CYMBALTA) 60 MG capsule, Take 1 tablet daily, Disp: 30 capsule, Rfl: 3  •  gabapentin (NEURONTIN) 300 MG capsule, Take 1 capsule by mouth 3 (Three) Times a Day., Disp: 90 capsule, Rfl: 2  •  icosapent ethyl (VASCEPA) 1 g capsule capsule, Take 2 g by mouth 2 (Two) Times a Day With Meals., Disp: 120 capsule, Rfl:  3  •  Insulin Glargine (BASAGLAR KWIKPEN) 100 UNIT/ML injection pen, Start 60 units at bedtime Titrate up by 3 units every 3 days until a.m. FSBS , Disp: 18 mL, Rfl: 6  •  levETIRAcetam (KEPPRA) 1000 MG tablet, Take 1 tablet by mouth 2 (Two) Times a Day., Disp: 60 tablet, Rfl: 3  •  lisinopril (PRINIVIL,ZESTRIL) 40 MG tablet, Take 1 tablet by mouth Daily., Disp: 30 tablet, Rfl: 3  •  metFORMIN (GLUCOPHAGE) 500 MG tablet, Take 1 tablet by mouth 2 (Two) Times a Day With Meals., Disp: 60 tablet, Rfl: 3  •  metoprolol tartrate (LOPRESSOR) 50 MG tablet, Take 1 tablet by mouth Every 12 (Twelve) Hours., Disp: 60 tablet, Rfl: 3  •  nicotine polacrilex (NICORETTE) 4 MG gum, Chew 1 each As Needed for Smoking Cessation., Disp: 60 each, Rfl: 3  •  pantoprazole (PROTONIX) 40 MG EC tablet, Take 1 tablet by mouth Daily., Disp: 30 tablet, Rfl: 3  •  RELION PEN NEEDLES 31G X 6 MM misc, USE 1 NEEDLE 3 TO 4 TIMES DAILY, Disp: , Rfl: 3  •  risperiDONE (RISPERDAL) 1 MG tablet, Take 1 tablet by mouth 2 (Two) Times a Day., Disp: 60 tablet, Rfl: 3  •  simvastatin (ZOCOR) 40 MG tablet, Take 1 tablet by mouth Every Night., Disp: 30 tablet, Rfl: 3  •  SITagliptin (JANUVIA) 100 MG tablet, Take 1 tablet by mouth Daily., Disp: 30 tablet, Rfl: 3  •  STEGLATRO 15 MG tablet, TAKE 1 TABLET BY MOUTH ONCE DAILY IN THE MORNING, Disp: 30 tablet, Rfl: 3  •  tiotropium bromide monohydrate (SPIRIVA RESPIMAT) 2.5 MCG/ACT aerosol solution inhaler, Inhale 2 puffs Daily., Disp: 1 inhaler, Rfl: 11  •  vitamin D (ERGOCALCIFEROL) 80828 units capsule capsule, Take 2 caps weekly, Disp: 8 capsule, Rfl: 3    HPI     Pt is 44 yo female with CMH of morbid obesity, IDDM Type 2, HTN, HLP, vitamin D deficiency, tobacco user, GERD, gastritis, esophagitis, diverticulosis, colonic polyp in sigmoid colon,  paranoid schizophrenia,  Seizure disorder, history of  Illicit drug abuse, COPD, JACQUE, chronic hepatitis C , major depression, JD, sp appendectomy, sp cholecystectomy  sp right carpal tunnel release, sp back surgery, sp breast surgery, sp lipoma excision, chronic back pain (moderate L4-L5 and L5-S1 DDD changes, mild bilateral L4-L5 moderate bilateral L5-S1 facet arthritic change, mild leveoscoliosis)         8/28/19 pt is here for recheck she is doing better. Had labwork on 8/8/19 that showed hepatitis RNA negative.  Her vitamin D was low. b12 was normal lipid panel  Showed LDL at 42 but triglycerides are high .she was on simvastatin and she was switched to lipitro but could not tolerate it. She is back on simvastatin. HD lwas low.  Hga1c improved from 13. To 9.83.  CMP showed GFR at 89. CBC showed elevation of RBC and HCT.   She also stste her back pain and leg pain improved with neurontin 300 mg PO TID along with flexeril. She has appt with pain Management next month in [place].   X-ray of lumbar spine has arthritis lumbar spine. Along with mild scoliosis. Her schizophrenia has improved.   Sugars have improved and with admelog.  She is on basaglar 60 units at bedtime . Her meal sugars could improve. She does check her carb counting as well as it could.  She continues on januvia     10/9/19 pt is here for recheck and followup  She is doing well. Her sugars have been better controlled she is taking. 60 units of Basgaglar at bedtime. Her am sugars have been .   She averages about 14-20 units of admelog TID before meals.  She lost 19 lbs since last visit by [facility].  She is working out more and exercise. She also continues to smoke . She conitnues to have back pain and is taking neurontin 300 mg PO TID.  She is now with Pain Management in [place]. She is now on neurontin 300 mg PO TID. She is pending steroid injections in her back            Back Pain   This is a chronic problem. The current episode started more than 1 year ago. The problem occurs constantly. The problem is unchanged. The pain is present in the lumbar spine, sacro-iliac and gluteal. The quality  of the pain is described as aching. The pain radiates to the left thigh. The pain is at a severity of 7/10. The pain is moderate. The pain is the same all the time. The symptoms are aggravated by bending, lying down, position, sitting and standing. Stiffness is present all day. Associated symptoms include numbness, paresis, tingling and weakness. Pertinent negatives include no abdominal pain, bladder incontinence, bowel incontinence, chest pain, dysuria, fever, headaches, leg pain, paresthesias, pelvic pain or weight loss. She has tried muscle relaxant and NSAIDs (pain management/ norco percocet back surgery ) for the symptoms. The treatment provided no relief.    Mental Health Problem   The primary symptoms include delusions, hallucinations and negative symptoms. The primary symptoms do not include dysphoric mood, bizarre behavior, disorganized speech or somatic symptoms. This is a chronic problem.   The onset of the illness is precipitated by a stressful event, emotional stress, alcohol abuse and drug abuse. The degree of incapacity that she is experiencing as a consequence of her illness is severe. Sequelae of the illness include an inability to work, an inability to care for self and harmed interpersonal relations. Additional symptoms of the illness include fatigue, agitation and seizures. Additional symptoms of the illness do not include anhedonia, insomnia, hypersomnia, appetite change, unexpected weight change, psychomotor retardation, feelings of worthlessness, attention impairment, increased goal-directed activity, flight of ideas, inflated self-esteem, decreased need for sleep, distractible, poor judgment, visual change, headaches or abdominal pain. She admits to suicidal ideas. She does not have a plan to commit suicide. She does not contemplate harming herself. She has not already injured self. She does not contemplate injuring another person. Risk factors that are present for mental illness include a  history of mental illness, substance abuse, a history of suicide attempts, family violence and epilepsy.   Seizures    This is a chronic problem. The current episode started more than 1 week ago. The problem has been gradually worsening. Associated symptoms include sleepiness, confusion, headaches and visual disturbances. Pertinent negatives include no neck stiffness, no sore throat, no chest pain, no cough, no nausea, no vomiting, no diarrhea and no muscle weakness.   Obesity   This is a chronic problem. The current episode started more than 1 year ago. The problem occurs constantly. The problem has been unchanged. Associated symptoms include arthralgias, fatigue, headaches, numbness and weakness. Pertinent negatives include no abdominal pain, anorexia, change in bowel habit, chest pain, chills, congestion, coughing, fever, joint swelling, myalgias, nausea, neck pain, rash, sore throat, swollen glands, urinary symptoms, vertigo, visual change or vomiting. Nothing aggravates the symptoms. She has tried nothing for the symptoms.   Hypertension   This is a chronic problem. The current episode started more than 1 year ago. The problem is uncontrolled. Associated symptoms include headaches. Pertinent negatives include no anxiety, blurred vision, chest pain, malaise/fatigue, neck pain, orthopnea, palpitations, peripheral edema, PND or shortness of breath. Risk factors for coronary artery disease include obesity, sedentary lifestyle, smoking/tobacco exposure and stress. Past treatments include ACE inhibitors, beta blockers and diuretics. There are no compliance problems.  There is no history of angina, kidney disease, CAD/MI, CVA, heart failure, left ventricular hypertrophy, PVD or retinopathy. There is no history of chronic renal disease, coarctation of the aorta, hyperaldosteronism, hypercortisolism, hyperparathyroidism, a hypertension causing med, pheochromocytoma, renovascular disease, sleep apnea or a thyroid  problem.   Diabetes   She presents for her initial diabetic visit. She has type 2 diabetes mellitus. Her disease course has been worsening. Hypoglycemia symptoms include confusion, headaches, nervousness/anxiousness, seizures and sleepiness. Associated symptoms include fatigue and weakness. Pertinent negatives for diabetes include no blurred vision, no chest pain and no visual change. Pertinent negatives for diabetic complications include no CVA, PVD or retinopathy. Risk factors for coronary artery disease include dyslipidemia, obesity and post-menopausal. She has not had a previous visit with a dietitian. There is no change in her home blood glucose trend. An ACE inhibitor/angiotensin II receptor blocker is not being taken. She does not see a podiatrist.Eye exam is not current.   Shortness of Breath   This is a chronic problem. The current episode started more than 1 year ago. The problem occurs constantly. The problem has been gradually worsening. Associated symptoms include headaches. Pertinent negatives include no abdominal pain, chest pain, claudication, coryza, ear pain, fever, hemoptysis, leg pain, leg swelling, neck pain, orthopnea, PND, rash, sore throat, sputum production, swollen glands, syncope, vomiting or wheezing. Nothing aggravates the symptoms. The patient has no known risk factors for DVT/PE. She has tried nothing for the symptoms. The treatment provided no relief. There is no history of allergies, aspirin allergies, asthma, bronchiolitis, CAD, chronic lung disease, COPD, DVT, a heart failure, PE, pneumonia or a recent surgery.        Review of Systems  Review of Systems   Constitutional: Positive for activity change and fatigue. Negative for appetite change, chills, diaphoresis and fever.   HENT: Negative for congestion, postnasal drip, rhinorrhea, sinus pressure, sinus pain, sneezing, sore throat, trouble swallowing and voice change.    Respiratory: Negative for cough, choking, chest tightness,  shortness of breath, wheezing and stridor.    Cardiovascular: Negative for chest pain.   Gastrointestinal: Negative for diarrhea, nausea and vomiting.   Musculoskeletal: Positive for arthralgias, back pain and gait problem.   Neurological: Positive for weakness, numbness and headaches.   Psychiatric/Behavioral: Positive for agitation, behavioral problems and decreased concentration. The patient is nervous/anxious.        Patient Active Problem List   Diagnosis   • Seizure disorder (CMS/HCC)   • Morbid obesity (CMS/HCC)   • Tobacco abuse counseling   • Multiple joint pain   • Dysuria   • History of positive hepatitis C   • Essential hypertension   • Uncontrolled type 2 diabetes mellitus with hyperglycemia (CMS/HCC)   • Vitamin D deficiency   • Urinary tract infection without hematuria   • Chronic hepatitis C without hepatic coma (CMS/HCC)   • Cigarette nicotine dependence, uncomplicated   • Chronic obstructive pulmonary disease (CMS/HCC)   • JACQUE (obstructive sleep apnea)   • Gastroesophageal reflux disease with esophagitis   • Left upper quadrant pain   • Nausea and vomiting   • Weight gain, abnormal   • Change in bowel habits   • Hepatic fibrosis   • Non-compliance   • Depression   • Paranoid schizophrenia (CMS/HCC)   • Gastritis without bleeding   • Tobacco user   • Encounter for eye exam   • Scoliosis   • History of drug use     Past Surgical History:   Procedure Laterality Date   • APPENDECTOMY     • BACK SURGERY     • BREAST SURGERY     • CARPAL TUNNEL RELEASE      right hand   • CHOLECYSTECTOMY     • COLONOSCOPY     • COLONOSCOPY N/A 2/8/2019    Procedure: COLONOSCOPY;  Surgeon: Joni Delacruz MD;  Location: [facility] ENDOSCOPY;  Service: Gastroenterology   • DENTAL PROCEDURE     • ENDOSCOPY N/A 2/8/2019    Procedure: ESOPHAGOGASTRODUODENOSCOPY--poss dilation;  Surgeon: Joni Delacruz MD;  Location: [facility] ENDOSCOPY;  Service: Gastroenterology   • HYSTERECTOMY     • LIPOMA EXCISION      9   • LIVER BIOPSY      • UPPER GASTROINTESTINAL ENDOSCOPY  02/08/2019     Social History     Socioeconomic History   • Marital status:      Spouse name: Not on file   • Number of children: Not on file   • Years of education: Not on file   • Highest education level: Not on file   Tobacco Use   • Smoking status: Current Every Day Smoker     Packs/day: 0.25     Types: Cigarettes   • Smokeless tobacco: Never Used   Substance and Sexual Activity   • Alcohol use: No   • Drug use: No     Family History   Problem Relation Age of Onset   • Hypertension Other    • Diabetes Other    • Stroke Other    • Cancer Other    • Kidney disease Other    • Lung disease Other    • Other Other    • Malone's esophagus Other    • Colon polyps Other    • Heart disease Other    • Ulcers Other    • Cholelithiasis Other    • Allergy (severe) Other      Lab on 08/08/2019   Component Date Value Ref Range Status   • WBC 08/08/2019 8.88  3.40 - 10.80 10*3/mm3 Final   • RBC 08/08/2019 5.33* 3.77 - 5.28 10*6/mm3 Final   • Hemoglobin 08/08/2019 14.9  12.0 - 15.9 g/dL Final   • Hematocrit 08/08/2019 47.9* 34.0 - 46.6 % Final   • MCV 08/08/2019 89.9  79.0 - 97.0 fL Final   • MCH 08/08/2019 28.0  26.6 - 33.0 pg Final   • MCHC 08/08/2019 31.1* 31.5 - 35.7 g/dL Final   • RDW 08/08/2019 13.4  12.3 - 15.4 % Final   • RDW-SD 08/08/2019 43.9  37.0 - 54.0 fl Final   • MPV 08/08/2019 12.6* 6.0 - 12.0 fL Final   • Platelets 08/08/2019 167  140 - 450 10*3/mm3 Final   • Neutrophil % 08/08/2019 61.9  42.7 - 76.0 % Final   • Lymphocyte % 08/08/2019 30.2  19.6 - 45.3 % Final   • Monocyte % 08/08/2019 4.7* 5.0 - 12.0 % Final   • Eosinophil % 08/08/2019 1.9  0.3 - 6.2 % Final   • Basophil % 08/08/2019 0.6  0.0 - 1.5 % Final   • Immature Grans % 08/08/2019 0.7* 0.0 - 0.5 % Final   • Neutrophils, Absolute 08/08/2019 5.50  1.70 - 7.00 10*3/mm3 Final   • Lymphocytes, Absolute 08/08/2019 2.68  0.70 - 3.10 10*3/mm3 Final   • Monocytes, Absolute 08/08/2019 0.42  0.10 - 0.90 10*3/mm3 Final    • Eosinophils, Absolute 08/08/2019 0.17  0.00 - 0.40 10*3/mm3 Final   • Basophils, Absolute 08/08/2019 0.05  0.00 - 0.20 10*3/mm3 Final   • Immature Grans, Absolute 08/08/2019 0.06* 0.00 - 0.05 10*3/mm3 Final   • nRBC 08/08/2019 0.0  0.0 - 0.2 /100 WBC Final   • Glucose 08/08/2019 189* 65 - 99 mg/dL Final   • BUN 08/08/2019 15  6 - 20 mg/dL Final   • Creatinine 08/08/2019 0.71  0.57 - 1.00 mg/dL Final   • Sodium 08/08/2019 137  136 - 145 mmol/L Final   • Potassium 08/08/2019 4.6  3.5 - 5.2 mmol/L Final   • Chloride 08/08/2019 100  98 - 107 mmol/L Final   • CO2 08/08/2019 21.0* 22.0 - 29.0 mmol/L Final   • Calcium 08/08/2019 10.0  8.6 - 10.5 mg/dL Final   • Total Protein 08/08/2019 7.5  6.0 - 8.5 g/dL Final   • Albumin 08/08/2019 4.20  3.50 - 5.20 g/dL Final   • ALT (SGPT) 08/08/2019 27  1 - 33 U/L Final   • AST (SGOT) 08/08/2019 21  1 - 32 U/L Final   • Alkaline Phosphatase 08/08/2019 79  39 - 117 U/L Final   • Total Bilirubin 08/08/2019 0.2  0.2 - 1.2 mg/dL Final   • eGFR Non  Amer 08/08/2019 89  >60 mL/min/1.73 Final   • Globulin 08/08/2019 3.3  gm/dL Final   • A/G Ratio 08/08/2019 1.3  g/dL Final   • BUN/Creatinine Ratio 08/08/2019 21.1  7.0 - 25.0 Final   • Anion Gap 08/08/2019 16.0* 5.0 - 15.0 mmol/L Final   • Hemoglobin A1C 08/08/2019 9.83* 4.80 - 5.60 % Final   • Total Cholesterol 08/08/2019 129  0 - 200 mg/dL Final   • Triglycerides 08/08/2019 268* 0 - 150 mg/dL Final   • HDL Cholesterol 08/08/2019 33* 40 - 60 mg/dL Final   • LDL Cholesterol  08/08/2019 42  0 - 100 mg/dL Final   • VLDL Cholesterol 08/08/2019 53.6* 5 - 40 mg/dL Final   • LDL/HDL Ratio 08/08/2019 1.28   Final   • 25 Hydroxy, Vitamin D 08/08/2019 25.1* 30.0 - 100.0 ng/ml Final   • Vitamin B-12 08/08/2019 680  211 - 946 pg/mL Final   • Hepatitis C Quantitation 08/08/2019 HCV Not Detected  IU/mL Final   • Test Information 08/08/2019 Comment   Final    The quantitative range of this assay is 15 IU/mL to 100 million IU/mL.   Lab on  07/26/2019   Component Date Value Ref Range Status   • Amphet/Methamphet, Screen 07/26/2019 Negative  Negative Final   • Barbiturates Screen, Urine 07/26/2019 Negative  Negative Final   • Benzodiazepine Screen, Urine 07/26/2019 Negative  Negative Final   • Cocaine Screen, Urine 07/26/2019 Negative  Negative Final   • Opiate Screen 07/26/2019 Negative  Negative Final   • THC, Screen, Urine 07/26/2019 Negative  Negative Final   • Methadone Screen, Urine 07/26/2019 Negative  Negative Final   • Oxycodone Screen, Urine 07/26/2019 Negative  Negative Final   • Microalbumin/Creatinine Ratio 07/26/2019   mg/g Final    Unable to calculate   • Creatinine, Urine 07/26/2019 38.1  mg/dL Final   • Microalbumin, Urine 07/26/2019 <1.2  mg/L Final   Lab on 05/17/2019   Component Date Value Ref Range Status   • Hemoglobin A1C 05/17/2019 13.51* 4.80 - 5.60 % Final   • WBC 05/17/2019 9.34  3.40 - 10.80 10*3/mm3 Final   • RBC 05/17/2019 4.84  3.77 - 5.28 10*6/mm3 Final   • Hemoglobin 05/17/2019 13.7  12.0 - 15.9 g/dL Final   • Hematocrit 05/17/2019 42.5  34.0 - 46.6 % Final   • MCV 05/17/2019 87.8  79.0 - 97.0 fL Final   • MCH 05/17/2019 28.3  26.6 - 33.0 pg Final   • MCHC 05/17/2019 32.2  31.5 - 35.7 g/dL Final   • RDW 05/17/2019 13.8  12.3 - 15.4 % Final   • RDW-SD 05/17/2019 43.8  37.0 - 54.0 fl Final   • MPV 05/17/2019 12.5* 6.0 - 12.0 fL Final   • Platelets 05/17/2019 161  140 - 450 10*3/mm3 Final   • Neutrophil % 05/17/2019 58.6  42.7 - 76.0 % Final   • Lymphocyte % 05/17/2019 34.2  19.6 - 45.3 % Final   • Monocyte % 05/17/2019 4.3* 5.0 - 12.0 % Final   • Eosinophil % 05/17/2019 1.9  0.3 - 6.2 % Final   • Basophil % 05/17/2019 0.4  0.0 - 1.5 % Final   • Immature Grans % 05/17/2019 0.6* 0.0 - 0.5 % Final   • Neutrophils, Absolute 05/17/2019 5.47  1.70 - 7.00 10*3/mm3 Final   • Lymphocytes, Absolute 05/17/2019 3.19* 0.70 - 3.10 10*3/mm3 Final   • Monocytes, Absolute 05/17/2019 0.40  0.10 - 0.90 10*3/mm3 Final   • Eosinophils,  Absolute 05/17/2019 0.18  0.00 - 0.40 10*3/mm3 Final   • Basophils, Absolute 05/17/2019 0.04  0.00 - 0.20 10*3/mm3 Final   • Immature Grans, Absolute 05/17/2019 0.06* 0.00 - 0.05 10*3/mm3 Final   • nRBC 05/17/2019 0.1  0.0 - 0.2 /100 WBC Final   • Glucose 05/17/2019 269* 65 - 99 mg/dL Final   • BUN 05/17/2019 21* 6 - 20 mg/dL Final   • Creatinine 05/17/2019 0.75  0.57 - 1.00 mg/dL Final   • Sodium 05/17/2019 136  136 - 145 mmol/L Final   • Potassium 05/17/2019 4.7  3.5 - 5.2 mmol/L Final   • Chloride 05/17/2019 101  98 - 107 mmol/L Final   • CO2 05/17/2019 17.8* 22.0 - 29.0 mmol/L Final   • Calcium 05/17/2019 9.9  8.6 - 10.5 mg/dL Final   • Total Protein 05/17/2019 7.3  6.0 - 8.5 g/dL Final   • Albumin 05/17/2019 3.60  3.50 - 5.20 g/dL Final   • ALT (SGPT) 05/17/2019 21  1 - 33 U/L Final   • AST (SGOT) 05/17/2019 21  1 - 32 U/L Final   • Alkaline Phosphatase 05/17/2019 68  39 - 117 U/L Final   • Total Bilirubin 05/17/2019 <0.2* 0.2 - 1.2 mg/dL Final   • eGFR Non  Amer 05/17/2019 84  >60 mL/min/1.73 Final   • Globulin 05/17/2019 3.7  gm/dL Final   • A/G Ratio 05/17/2019 1.0  g/dL Final   • BUN/Creatinine Ratio 05/17/2019 28.0* 7.0 - 25.0 Final   • Anion Gap 05/17/2019 17.2  mmol/L Final   • Total Cholesterol 05/17/2019 161  0 - 200 mg/dL Final   • Triglycerides 05/17/2019 278* 0 - 150 mg/dL Final   • HDL Cholesterol 05/17/2019 35* 40 - 60 mg/dL Final   • LDL Cholesterol  05/17/2019 70  0 - 100 mg/dL Final   • VLDL Cholesterol 05/17/2019 55.6* 5 - 40 mg/dL Final   • LDL/HDL Ratio 05/17/2019 2.01   Final   • TSH 05/17/2019 2.520  0.270 - 4.200 mIU/mL Final   • Free T4 05/17/2019 1.21  0.93 - 1.70 ng/dL Final   • T3, Free 05/17/2019 2.50  2.00 - 4.40 pg/mL Final   • 25 Hydroxy, Vitamin D 05/17/2019 22.6* 30.0 - 100.0 ng/ml Final   • Vitamin B-12 05/17/2019 565  211 - 946 pg/mL Final   • Microalbumin/Creatinine Ratio 05/17/2019   mg/g Final    Unable to calculate   • Creatinine, Urine 05/17/2019 34.8  mg/dL Final  "  • Microalbumin, Urine 05/17/2019 <1.2  mg/L Final      XR Spine Lumbar 4+ View  Narrative: PROCEDURE: Lumbar spine with obliques.    INDICATION: lower back pain, M54.42 Lumbago with sciatica, left  side M54.41 Lumbago with sciatica, right side.    COMPARISON: None.    AP, lateral, lateral lumbosacral spot film and oblique views.    Lumbar vertebrae are normal height and well aligned. There is a  mild lower lumbar levoscoliosis.    Moderate L4-5 and advanced L5-S1 degenerative disc changes with  disc space narrowing. Mild L4-5 and moderate L5-S1 bilateral  facet arthritic change.    Pedicles, spinous processes and transverse processes intact.  Impression: Moderate L4-5 and advanced L5-S1 degenerative disc  changes.    Mild bilateral L4-5 and moderate bilateral L5-S1 facet arthritic  change.    Mild levoscoliosis.    [id number]    Electronically signed by:  Sammy Priest MD  8/8/2019 2:55 PM  CDT Workstation: [id number]    @[employer]@  Immunization History   Administered Date(s) Administered   • Pneumococcal Polysaccharide (PPSV23) 12/13/2018       The following portions of the patient's history were reviewed and updated as appropriate: allergies, current medications, past family history, past medical history, past social history, past surgical history and problem list.        Physical Exam  /70 (BP Location: Left arm, Patient Position: Sitting)   Pulse 94   Ht 167.6 cm (66\")   Wt 127 kg (280 lb)   SpO2 97%   BMI 45.19 kg/m²     Physical Exam   Constitutional: She is oriented to person, place, and time. She appears well-developed and well-nourished.   HENT:   Head: Normocephalic and atraumatic.   Right Ear: External ear normal.   Eyes: Conjunctivae and EOM are normal. Pupils are equal, round, and reactive to light.   Neck: Normal range of motion. Neck supple.   Cardiovascular: Normal rate, regular rhythm and normal heart sounds.   No murmur heard.  Pulmonary/Chest: Effort normal and breath sounds normal. " No respiratory distress.   Abdominal: Soft. Bowel sounds are normal. She exhibits no distension. There is no tenderness.   Obese abdomen    Musculoskeletal: She exhibits tenderness. She exhibits no edema or deformity.        Lumbar back: She exhibits decreased range of motion, tenderness, bony tenderness and pain.   Neurological: She is alert and oriented to person, place, and time. No cranial nerve deficit.   Skin: Skin is warm. No rash noted. She is not diaphoretic. No erythema. No pallor.   Psychiatric: She has a normal mood and affect. Her behavior is normal.   Nursing note and vitals reviewed.      Assessment/Plan    Diagnosis Plan   1. Vitamin D deficiency     2. Uncontrolled type 2 diabetes mellitus with hyperglycemia (CMS/HCC)     3. Tobacco abuse counseling     4. Seizure disorder (CMS/HCC)     5. Paranoid schizophrenia (CMS/HCC)     6. JACQUE (obstructive sleep apnea)     7. History of drug use     8. Chronic hepatitis C without hepatic coma (CMS/HCC)     9. Chronic obstructive pulmonary disease, unspecified COPD type (CMS/HCC)     10. Gastritis without bleeding, unspecified chronicity, unspecified gastritis type     11. Gastroesophageal reflux disease with esophagitis     12. Hepatic fibrosis     13. Essential hypertension          -willl get labwork   -refer to [facility]  For Neurology and seizure disorder  -for back pain/arthritis of back/scoliosi  - history of back surgery - referral to Pain Management  X-rays of lumbar spine.  Start on neurontin 300 mg PO TID.gave 90 pills and 3 refills. Will get NERY. UDS today. Pt declined PT/OT for now. .   -chronic hep C - Gastroenterology following   -for dsypnea- referred to Dr. Will (Pulmonologsit) for PFTs. Consultation appreciated. Recent chest x-ray negative. Will start on albuterol inhaler PRN.Advised pt to stop smoking. She is on spirva now for early COPD  -multiple joint pain -- naproxen 500 mg PO BID. Pt advised to hold naproxen unless needed while  taking viibryd  -for multiple joint pain - pt cannot take opoids. Will start on naproxen 500 mg twi ce a day. Drug information provided  -morbid obesity -counseled on weight loss, diet and exercise. Diet information provided.  -hyperlipdiemia- simvastatin 20  To 40 mg PO qhs. ASCVD risk high. Recommend to take with coenzyme Q10  Start on vascepa 2 grams PO BID   -for DM type 2- metformin 500 mg pO BID,  Short acting insulin 30 units before meals  along with basaglar insulin 60 units at bedtime  at bedtime. Start on steglatro 15 mg daily. Samples provdied today. Advised to taper up by 3 units every 3 days until sugars at goal. Also educated today on sliding scale insulin and counting carbs before meal.  She does have association with learning sometthing new with her. If hhga1c still not at goal consider trulicity or bydureon and stop januvia  -lo schizophrenia. -depression - stopped viibryd, go up on cymbalta from 30 to 60 mg daily   -seizure disorder -currently on Keppra  500 mg PO BID. Referred to Neurologist  Services appreciated. She missed appt.  Will go up on Keppra from 500 to 1000 mg twice a day refer back to Dr. Fry  -counseled pt to quit smoking >5 minutes.  Gave tobacco cessation material   -hypertension - at goal today Continue on lisinopril but will go up on dosage from 20 to 40 mg dialy.  mg Po q dominguez, metoprolol 50 mg PO BID . Stop HCTZ for now. Consider Norvasc or calcium channel blocker if not improving   -advised pt to be safe and call with any questions or concerns. All questions addressed today  -recheck in months for BP recheck and labwork  -recommend diabetic eye exam         This document has been electronically signed by Jermaine Ferro MD on October 9, 2019 7:42 AM

## 2019-10-09 ENCOUNTER — OFFICE VISIT (OUTPATIENT)
Dept: FAMILY MEDICINE CLINIC | Facility: CLINIC | Age: 44
End: 2019-10-09

## 2019-10-09 VITALS
OXYGEN SATURATION: 97 % | HEIGHT: 66 IN | SYSTOLIC BLOOD PRESSURE: 128 MMHG | DIASTOLIC BLOOD PRESSURE: 70 MMHG | WEIGHT: 280 LBS | BODY MASS INDEX: 45 KG/M2 | HEART RATE: 94 BPM

## 2019-10-09 DIAGNOSIS — K29.70 GASTRITIS WITHOUT BLEEDING, UNSPECIFIED CHRONICITY, UNSPECIFIED GASTRITIS TYPE: ICD-10-CM

## 2019-10-09 DIAGNOSIS — K21.00 GASTROESOPHAGEAL REFLUX DISEASE WITH ESOPHAGITIS: ICD-10-CM

## 2019-10-09 DIAGNOSIS — E55.9 VITAMIN D DEFICIENCY: ICD-10-CM

## 2019-10-09 DIAGNOSIS — G47.33 OSA (OBSTRUCTIVE SLEEP APNEA): ICD-10-CM

## 2019-10-09 DIAGNOSIS — F19.91 HISTORY OF DRUG USE: ICD-10-CM

## 2019-10-09 DIAGNOSIS — I10 ESSENTIAL HYPERTENSION: ICD-10-CM

## 2019-10-09 DIAGNOSIS — E11.65 UNCONTROLLED TYPE 2 DIABETES MELLITUS WITH HYPERGLYCEMIA (HCC): ICD-10-CM

## 2019-10-09 DIAGNOSIS — F20.0 PARANOID SCHIZOPHRENIA (HCC): ICD-10-CM

## 2019-10-09 DIAGNOSIS — J44.9 CHRONIC OBSTRUCTIVE PULMONARY DISEASE, UNSPECIFIED COPD TYPE (HCC): ICD-10-CM

## 2019-10-09 DIAGNOSIS — Z71.6 TOBACCO ABUSE COUNSELING: ICD-10-CM

## 2019-10-09 DIAGNOSIS — B18.2 CHRONIC HEPATITIS C WITHOUT HEPATIC COMA (HCC): ICD-10-CM

## 2019-10-09 DIAGNOSIS — G40.909 SEIZURE DISORDER (HCC): Primary | ICD-10-CM

## 2019-10-09 DIAGNOSIS — Z02.83 ENCOUNTER FOR DRUG SCREENING: ICD-10-CM

## 2019-10-09 DIAGNOSIS — K74.00 HEPATIC FIBROSIS: ICD-10-CM

## 2019-10-09 PROCEDURE — 99214 OFFICE O/P EST MOD 30 MIN: CPT | Performed by: FAMILY MEDICINE

## 2019-10-09 RX ORDER — RISPERIDONE 1 MG/1
1 TABLET ORAL 2 TIMES DAILY
Qty: 60 TABLET | Refills: 3 | Status: SHIPPED | OUTPATIENT
Start: 2019-10-09 | End: 2020-04-15

## 2019-10-09 RX ORDER — ERGOCALCIFEROL 1.25 MG/1
CAPSULE ORAL
Qty: 8 CAPSULE | Refills: 3 | Status: SHIPPED | OUTPATIENT
Start: 2019-10-09 | End: 2020-06-30

## 2019-10-09 RX ORDER — METOPROLOL TARTRATE 50 MG/1
50 TABLET, FILM COATED ORAL EVERY 12 HOURS SCHEDULED
Qty: 60 TABLET | Refills: 3 | Status: SHIPPED | OUTPATIENT
Start: 2019-10-09 | End: 2020-03-27

## 2019-10-09 RX ORDER — POLYETHYLENE GLYCOL 3350 17 G
2 POWDER IN PACKET (EA) ORAL AS NEEDED
Qty: 108 LOZENGE | Refills: 3 | Status: SHIPPED | OUTPATIENT
Start: 2019-10-09 | End: 2020-04-15

## 2019-10-09 RX ORDER — LISINOPRIL 40 MG/1
40 TABLET ORAL DAILY
Qty: 30 TABLET | Refills: 3 | Status: SHIPPED | OUTPATIENT
Start: 2019-10-09 | End: 2020-03-27

## 2019-10-09 RX ORDER — SIMVASTATIN 40 MG
40 TABLET ORAL NIGHTLY
Qty: 30 TABLET | Refills: 3 | Status: SHIPPED | OUTPATIENT
Start: 2019-10-09 | End: 2020-05-26

## 2019-10-09 RX ORDER — LEVETIRACETAM 1000 MG/1
1000 TABLET ORAL 2 TIMES DAILY
Qty: 60 TABLET | Refills: 3 | Status: SHIPPED | OUTPATIENT
Start: 2019-10-09 | End: 2021-10-28 | Stop reason: SDUPTHER

## 2019-10-09 RX ORDER — INSULIN GLARGINE 100 [IU]/ML
INJECTION, SOLUTION SUBCUTANEOUS
Qty: 18 ML | Refills: 6 | Status: SHIPPED | OUTPATIENT
Start: 2019-10-09 | End: 2020-05-15 | Stop reason: SDUPTHER

## 2019-11-11 ENCOUNTER — LAB (OUTPATIENT)
Dept: LAB | Facility: HOSPITAL | Age: 44
End: 2019-11-11

## 2019-11-11 DIAGNOSIS — E11.65 UNCONTROLLED TYPE 2 DIABETES MELLITUS WITH HYPERGLYCEMIA (HCC): ICD-10-CM

## 2019-11-11 DIAGNOSIS — Z02.83 ENCOUNTER FOR DRUG SCREENING: ICD-10-CM

## 2019-11-11 LAB
25(OH)D3 SERPL-MCNC: 34 NG/ML (ref 30–100)
ALBUMIN SERPL-MCNC: 4 G/DL (ref 3.5–5.2)
ALBUMIN UR-MCNC: <1.2 MG/DL
ALBUMIN/GLOB SERPL: 1 G/DL
ALP SERPL-CCNC: 69 U/L (ref 39–117)
ALT SERPL W P-5'-P-CCNC: 19 U/L (ref 1–33)
AMPHET+METHAMPHET UR QL: NEGATIVE
AMPHETAMINES UR QL: NEGATIVE
ANION GAP SERPL CALCULATED.3IONS-SCNC: 13.9 MMOL/L (ref 5–15)
AST SERPL-CCNC: 17 U/L (ref 1–32)
BARBITURATES UR QL SCN: NEGATIVE
BASOPHILS # BLD AUTO: 0.05 10*3/MM3 (ref 0–0.2)
BASOPHILS NFR BLD AUTO: 0.5 % (ref 0–1.5)
BENZODIAZ UR QL SCN: NEGATIVE
BILIRUB SERPL-MCNC: 0.2 MG/DL (ref 0.2–1.2)
BUN BLD-MCNC: 15 MG/DL (ref 6–20)
BUN/CREAT SERPL: 18.8 (ref 7–25)
BUPRENORPHINE SERPL-MCNC: NEGATIVE NG/ML
CALCIUM SPEC-SCNC: 9.8 MG/DL (ref 8.6–10.5)
CANNABINOIDS SERPL QL: NEGATIVE
CHLORIDE SERPL-SCNC: 99 MMOL/L (ref 98–107)
CHOLEST SERPL-MCNC: 130 MG/DL (ref 0–200)
CO2 SERPL-SCNC: 23.1 MMOL/L (ref 22–29)
COCAINE UR QL: NEGATIVE
CREAT BLD-MCNC: 0.8 MG/DL (ref 0.57–1)
CREAT UR-MCNC: 31.5 MG/DL
DEPRECATED RDW RBC AUTO: 45 FL (ref 37–54)
EOSINOPHIL # BLD AUTO: 0.16 10*3/MM3 (ref 0–0.4)
EOSINOPHIL NFR BLD AUTO: 1.7 % (ref 0.3–6.2)
ERYTHROCYTE [DISTWIDTH] IN BLOOD BY AUTOMATED COUNT: 14.1 % (ref 12.3–15.4)
GFR SERPL CREATININE-BSD FRML MDRD: 78 ML/MIN/1.73
GLOBULIN UR ELPH-MCNC: 4 GM/DL
GLUCOSE BLD-MCNC: 270 MG/DL (ref 65–99)
HBA1C MFR BLD: 9.18 % (ref 4.8–5.6)
HCT VFR BLD AUTO: 44.3 % (ref 34–46.6)
HDLC SERPL-MCNC: 37 MG/DL (ref 40–60)
HGB BLD-MCNC: 14.5 G/DL (ref 12–15.9)
IMM GRANULOCYTES # BLD AUTO: 0.07 10*3/MM3 (ref 0–0.05)
IMM GRANULOCYTES NFR BLD AUTO: 0.7 % (ref 0–0.5)
LDLC SERPL CALC-MCNC: 38 MG/DL (ref 0–100)
LDLC/HDLC SERPL: 1.04 {RATIO}
LYMPHOCYTES # BLD AUTO: 2.44 10*3/MM3 (ref 0.7–3.1)
LYMPHOCYTES NFR BLD AUTO: 25.4 % (ref 19.6–45.3)
MCH RBC QN AUTO: 29.1 PG (ref 26.6–33)
MCHC RBC AUTO-ENTMCNC: 32.7 G/DL (ref 31.5–35.7)
MCV RBC AUTO: 88.8 FL (ref 79–97)
METHADONE UR QL SCN: NEGATIVE
MICROALBUMIN/CREAT UR: NORMAL MG/G{CREAT}
MONOCYTES # BLD AUTO: 0.41 10*3/MM3 (ref 0.1–0.9)
MONOCYTES NFR BLD AUTO: 4.3 % (ref 5–12)
NEUTROPHILS # BLD AUTO: 6.47 10*3/MM3 (ref 1.7–7)
NEUTROPHILS NFR BLD AUTO: 67.4 % (ref 42.7–76)
NRBC BLD AUTO-RTO: 0.1 /100 WBC (ref 0–0.2)
OPIATES UR QL: NEGATIVE
OXYCODONE UR QL SCN: NEGATIVE
PCP UR QL SCN: NEGATIVE
PLATELET # BLD AUTO: 151 10*3/MM3 (ref 140–450)
PMV BLD AUTO: 12 FL (ref 6–12)
POTASSIUM BLD-SCNC: 4.6 MMOL/L (ref 3.5–5.2)
PROPOXYPH UR QL: NEGATIVE
PROT SERPL-MCNC: 8 G/DL (ref 6–8.5)
RBC # BLD AUTO: 4.99 10*6/MM3 (ref 3.77–5.28)
SODIUM BLD-SCNC: 136 MMOL/L (ref 136–145)
TRICYCLICS UR QL SCN: NEGATIVE
TRIGL SERPL-MCNC: 273 MG/DL (ref 0–150)
VIT B12 BLD-MCNC: 547 PG/ML (ref 211–946)
VLDLC SERPL-MCNC: 54.6 MG/DL (ref 5–40)
WBC NRBC COR # BLD: 9.6 10*3/MM3 (ref 3.4–10.8)

## 2019-11-11 PROCEDURE — 82306 VITAMIN D 25 HYDROXY: CPT

## 2019-11-11 PROCEDURE — 82570 ASSAY OF URINE CREATININE: CPT

## 2019-11-11 PROCEDURE — 85025 COMPLETE CBC W/AUTO DIFF WBC: CPT

## 2019-11-11 PROCEDURE — 82607 VITAMIN B-12: CPT

## 2019-11-11 PROCEDURE — 82043 UR ALBUMIN QUANTITATIVE: CPT

## 2019-11-11 PROCEDURE — 83036 HEMOGLOBIN GLYCOSYLATED A1C: CPT

## 2019-11-11 PROCEDURE — 80053 COMPREHEN METABOLIC PANEL: CPT

## 2019-11-11 PROCEDURE — 80307 DRUG TEST PRSMV CHEM ANLYZR: CPT

## 2019-11-11 PROCEDURE — 80061 LIPID PANEL: CPT

## 2019-11-20 ENCOUNTER — TELEPHONE (OUTPATIENT)
Dept: FAMILY MEDICINE CLINIC | Facility: CLINIC | Age: 44
End: 2019-11-20

## 2019-11-25 ENCOUNTER — TELEPHONE (OUTPATIENT)
Dept: FAMILY MEDICINE CLINIC | Facility: CLINIC | Age: 44
End: 2019-11-25

## 2019-11-25 NOTE — TELEPHONE ENCOUNTER
----- Message from Scotty Dickinson LPN sent at 11/20/2019  8:37 AM CST -----      ----- Message -----  From: Jermaine Ferro MD  Sent: 11/12/2019  12:31 PM  To: Scotty Dickinson LPN    Please call pt     Pt still spilling protein in urine    Vitamin D improved    Vitamin B12 levels normal    On CMP HDL still low but slightly improved LDL at goal.  Triglycerides high make sure pt is taking Vascepa 2 grams PO BID    On hga1c it silightly improved from 9.83 to 9.13.  Consider stopping januvia and starting on trulicity or bydureon     CBC is normal     On CMP kidney function shows GFR it 78 from 89. Has CKD stage 2.  Need to monitor. Recommend more water intake    Recheck on next visit. Thanks

## 2019-12-02 ENCOUNTER — TELEPHONE (OUTPATIENT)
Dept: FAMILY MEDICINE CLINIC | Facility: CLINIC | Age: 44
End: 2019-12-02

## 2019-12-02 NOTE — PROGRESS NOTES
Subjective:  Sudhakar Saul is a 43 y.o. female who presents for       Patient Active Problem List   Diagnosis   • Seizure disorder (CMS/HCC)   • Morbid obesity (CMS/HCC)   • Tobacco abuse counseling   • Multiple joint pain   • Dysuria   • History of positive hepatitis C   • Essential hypertension   • Uncontrolled type 2 diabetes mellitus with hyperglycemia (CMS/HCC)   • Vitamin D deficiency   • Urinary tract infection without hematuria   • Chronic hepatitis C without hepatic coma (CMS/HCC)   • Cigarette nicotine dependence, uncomplicated   • Chronic obstructive pulmonary disease (CMS/HCC)   • JACQUE (obstructive sleep apnea)   • Gastroesophageal reflux disease with esophagitis   • Left upper quadrant pain   • Nausea and vomiting   • Weight gain, abnormal   • Change in bowel habits   • Hepatic fibrosis   • Non-compliance   • Depression   • Paranoid schizophrenia (CMS/HCC)   • Gastritis without bleeding   • Tobacco user   • Encounter for eye exam   • Scoliosis   • History of drug use           Current Outpatient Medications:   •  ADMELOG 100 UNIT/ML injection, Inject 30 Units under the skin into the appropriate area as directed 3 (Three) Times a Day Before Meals., Disp: 30 mL, Rfl: 3  •  albuterol sulfate  (90 Base) MCG/ACT inhaler, Inhale 2 puffs Every 6 (Six) Hours As Needed for Wheezing or Shortness of Air., Disp: 1 inhaler, Rfl: 3  •  cyclobenzaprine (FLEXERIL) 5 MG tablet, Take 1 tablet by mouth 3 (Three) Times a Day As Needed for Muscle Spasms., Disp: 90 tablet, Rfl: 3  •  dicyclomine (BENTYL) 20 MG tablet, Take 1 tablet by mouth 2 (Two) Times a Day., Disp: 60 tablet, Rfl: 3  •  DULoxetine (CYMBALTA) 60 MG capsule, Take 1 tablet daily, Disp: 30 capsule, Rfl: 3  •  Ertugliflozin L-PyroglutamicAc (STEGLATRO) 15 MG tablet, Take 1 tablet by mouth Every Morning., Disp: 30 tablet, Rfl: 3  •  gabapentin (NEURONTIN) 300 MG capsule, Take 1 capsule by mouth 3 (Three) Times a Day., Disp: 90 capsule, Rfl: 2  •   icosapent ethyl (VASCEPA) 1 g capsule capsule, Take 2 g by mouth 2 (Two) Times a Day With Meals., Disp: 120 capsule, Rfl: 3  •  Insulin Glargine (BASAGLAR KWIKPEN) 100 UNIT/ML injection pen, Start 60 units at bedtime Titrate up by 3 units every 3 days until a.m. FSBS , Disp: 18 mL, Rfl: 6  •  levETIRAcetam (KEPPRA) 1000 MG tablet, Take 1 tablet by mouth 2 (Two) Times a Day., Disp: 60 tablet, Rfl: 3  •  lisinopril (PRINIVIL,ZESTRIL) 40 MG tablet, Take 1 tablet by mouth Daily., Disp: 30 tablet, Rfl: 3  •  metFORMIN (GLUCOPHAGE) 500 MG tablet, Take 1 tablet by mouth 2 (Two) Times a Day With Meals., Disp: 60 tablet, Rfl: 3  •  metoprolol tartrate (LOPRESSOR) 50 MG tablet, Take 1 tablet by mouth Every 12 (Twelve) Hours., Disp: 60 tablet, Rfl: 3  •  nicotine polacrilex (COMMIT) 2 MG lozenge, Dissolve 1 lozenge in the mouth As Needed for Smoking Cessation., Disp: 108 lozenge, Rfl: 3  •  pantoprazole (PROTONIX) 40 MG EC tablet, Take 1 tablet by mouth Daily., Disp: 30 tablet, Rfl: 3  •  RELION PEN NEEDLES 31G X 6 MM misc, USE 1 NEEDLE 3 TO 4 TIMES DAILY, Disp: , Rfl: 3  •  risperiDONE (RISPERDAL) 1 MG tablet, Take 1 tablet by mouth 2 (Two) Times a Day., Disp: 60 tablet, Rfl: 3  •  simvastatin (ZOCOR) 40 MG tablet, Take 1 tablet by mouth Every Night., Disp: 30 tablet, Rfl: 3  •  SITagliptin (JANUVIA) 100 MG tablet, Take 1 tablet by mouth Daily., Disp: 30 tablet, Rfl: 3  •  tiotropium bromide monohydrate (SPIRIVA RESPIMAT) 2.5 MCG/ACT aerosol solution inhaler, Inhale 2 puffs Daily., Disp: 1 inhaler, Rfl: 11  •  vitamin D (ERGOCALCIFEROL) 13922 units capsule capsule, Take 2 caps weekly, Disp: 8 capsule, Rfl: 3    HPI       Pt is 44 yo female with management  of morbid obesity, IDDM Type 2, HTN, HLP, vitamin D deficiency, tobacco user, GERD, gastritis, esophagitis, diverticulosis, colonic polyp in sigmoid colon,  paranoid schizophrenia,  Seizure disorder, history of  Illicit drug abuse, COPD, JACQUE, chronic hepatitis C ,  major depression, JD, sp appendectomy, sp cholecystectomy sp right carpal tunnel release, sp back surgery, sp breast surgery, sp lipoma excision, chronic back pain (moderate L4-L5 and L5-S1 DDD changes, mild bilateral L4-L5 moderate bilateral L5-S1 facet arthritic change, mild leveoscoliosis)         8/28/19 pt is here for recheck she is doing better. Had labwork on 8/8/19 that showed hepatitis RNA negative.  Her vitamin D was low. b12 was normal lipid panel  Showed LDL at 42 but triglycerides are high .she was on simvastatin and she was switched to lipitro but could not tolerate it. She is back on simvastatin. HD lwas low.  Hga1c improved from 13. To 9.83.  CMP showed GFR at 89. CBC showed elevation of RBC and HCT.   She also stste her back pain and leg pain improved with neurontin 300 mg PO TID along with flexeril. She has appt with pain Management next month in [place].   X-ray of lumbar spine has arthritis lumbar spine. Along with mild scoliosis. Her schizophrenia has improved.   Sugars have improved and with admelog.  She is on basaglar 60 units at bedtime . Her meal sugars could improve. She does check her carb counting as well as it could.  She continues on januvia      10/9/19 pt is here for recheck and followup  She is doing well. Her sugars have been better controlled she is taking. 60 units of Basgaglar at bedtime. Her am sugars have been .   She averages about 14-20 units of admelog TID before meals.  She lost 19 lbs since last visit by [employer].  She is working out more and exercise. She also continues to smoke . She conitnues to have back pain and is taking neurontin 300 mg PO TID.  She is now with Pain Management in [place]. She is now on neurontin 300 mg PO TID. She is pending steroid injections in her back     12/9/19 pt is here for recheck. She is doing fair. Had labwork recently that showed hga1c at 9.18.  She states her morning sugars improved but postpandrial sugars is  elevated. She has been having issues with Pain Management.  She is taking neurontin which is helping with her legs. She also had injection in left SI joint. Vitamin D and b12 levels are normal. Lipid panel shows low HDL and elevated triglcyerides. She is on fish oil supplement. On CMP GFR at 78 from 89. CBC is normal. Also depression is worse along with back pain. Is on cymbalta 60 mg daily. And would llike dosage increased         Back Pain   This is a chronic problem. The current episode started more than 1 year ago. The problem occurs constantly. The problem is unchanged. The pain is present in the lumbar spine, sacro-iliac and gluteal. The quality of the pain is described as aching. The pain radiates to the left thigh. The pain is at a severity of 7/10. The pain is moderate. The pain is the same all the time. The symptoms are aggravated by bending, lying down, position, sitting and standing. Stiffness is present all day. Associated symptoms include numbness, paresis, tingling and weakness. Pertinent negatives include no abdominal pain, bladder incontinence, bowel incontinence, chest pain, dysuria, fever, headaches, leg pain, paresthesias, pelvic pain or weight loss. She has tried muscle relaxant and NSAIDs (pain management/ norco percocet back surgery ) for the symptoms. The treatment provided no relief.    Mental Health Problem   The primary symptoms include delusions, hallucinations and negative symptoms. The primary symptoms do not include dysphoric mood, bizarre behavior, disorganized speech or somatic symptoms. This is a chronic problem.   The onset of the illness is precipitated by a stressful event, emotional stress, alcohol abuse and drug abuse. The degree of incapacity that she is experiencing as a consequence of her illness is severe. Sequelae of the illness include an inability to work, an inability to care for self and harmed interpersonal relations. Additional symptoms of the illness  include fatigue, agitation and seizures. Additional symptoms of the illness do not include anhedonia, insomnia, hypersomnia, appetite change, unexpected weight change, psychomotor retardation, feelings of worthlessness, attention impairment, increased goal-directed activity, flight of ideas, inflated self-esteem, decreased need for sleep, distractible, poor judgment, visual change, headaches or abdominal pain. She admits to suicidal ideas. She does not have a plan to commit suicide. She does not contemplate harming herself. She has not already injured self. She does not contemplate injuring another person. Risk factors that are present for mental illness include a history of mental illness, substance abuse, a history of suicide attempts, family violence and epilepsy.   Seizures    This is a chronic problem. The current episode started more than 1 week ago. The problem has been gradually worsening. Associated symptoms include sleepiness, confusion, headaches and visual disturbances. Pertinent negatives include no neck stiffness, no sore throat, no chest pain, no cough, no nausea, no vomiting, no diarrhea and no muscle weakness.   Obesity   This is a chronic problem. The current episode started more than 1 year ago. The problem occurs constantly. The problem has been unchanged. Associated symptoms include arthralgias, fatigue, headaches, numbness and weakness. Pertinent negatives include no abdominal pain, anorexia, change in bowel habit, chest pain, chills, congestion, coughing, fever, joint swelling, myalgias, nausea, neck pain, rash, sore throat, swollen glands, urinary symptoms, vertigo, visual change or vomiting. Nothing aggravates the symptoms. She has tried nothing for the symptoms.   Hypertension   This is a chronic problem. The current episode started more than 1 year ago. The problem is uncontrolled. Associated symptoms include headaches. Pertinent negatives include no anxiety, blurred vision, chest  pain, malaise/fatigue, neck pain, orthopnea, palpitations, peripheral edema, PND or shortness of breath. Risk factors for coronary artery disease include obesity, sedentary lifestyle, smoking/tobacco exposure and stress. Past treatments include ACE inhibitors, beta blockers and diuretics. There are no compliance problems.  There is no history of angina, kidney disease, CAD/MI, CVA, heart failure, left ventricular hypertrophy, PVD or retinopathy. There is no history of chronic renal disease, coarctation of the aorta, hyperaldosteronism, hypercortisolism, hyperparathyroidism, a hypertension causing med, pheochromocytoma, renovascular disease, sleep apnea or a thyroid problem.   Diabetes   She presents for her initial diabetic visit. She has type 2 diabetes mellitus. Her disease course has been worsening. Hypoglycemia symptoms include confusion, headaches, nervousness/anxiousness, seizures and sleepiness. Associated symptoms include fatigue and weakness. Pertinent negatives for diabetes include no blurred vision, no chest pain and no visual change. Pertinent negatives for diabetic complications include no CVA, PVD or retinopathy. Risk factors for coronary artery disease include dyslipidemia, obesity and post-menopausal. She has not had a previous visit with a dietitian. There is no change in her home blood glucose trend. An ACE inhibitor/angiotensin II receptor blocker is not being taken. She does not see a podiatrist.Eye exam is not current.   Shortness of Breath   This is a chronic problem. The current episode started more than 1 year ago. The problem occurs constantly. The problem has been gradually worsening. Associated symptoms include headaches. Pertinent negatives include no abdominal pain, chest pain, claudication, coryza, ear pain, fever, hemoptysis, leg pain, leg swelling, neck pain, orthopnea, PND, rash, sore throat, sputum production, swollen glands, syncope, vomiting or wheezing. Nothing aggravates the  symptoms. The patient has no known risk factors for DVT/PE. She has tried nothing for the symptoms. The treatment provided no relief. There is no history of allergies, aspirin allergies, asthma, bronchiolitis, CAD, chronic lung disease, COPD, DVT, a heart failure, PE, pneumonia or a recent surgery.   Review of Systems  Review of Systems   Constitutional: Positive for activity change and fatigue. Negative for appetite change, chills, diaphoresis and fever.   HENT: Negative for congestion, postnasal drip, rhinorrhea, sinus pressure, sinus pain, sneezing, sore throat, trouble swallowing and voice change.    Respiratory: Positive for cough and shortness of breath. Negative for choking, chest tightness, wheezing and stridor.    Cardiovascular: Negative for chest pain.   Gastrointestinal: Negative for diarrhea, nausea and vomiting.   Musculoskeletal: Positive for arthralgias, back pain and gait problem.   Neurological: Positive for weakness and numbness. Negative for headaches.   Psychiatric/Behavioral: Positive for agitation and behavioral problems. The patient is nervous/anxious.        Patient Active Problem List   Diagnosis   • Seizure disorder (CMS/HCC)   • Morbid obesity (CMS/HCC)   • Tobacco abuse counseling   • Multiple joint pain   • Dysuria   • History of positive hepatitis C   • Essential hypertension   • Uncontrolled type 2 diabetes mellitus with hyperglycemia (CMS/HCC)   • Vitamin D deficiency   • Urinary tract infection without hematuria   • Chronic hepatitis C without hepatic coma (CMS/HCC)   • Cigarette nicotine dependence, uncomplicated   • Chronic obstructive pulmonary disease (CMS/HCC)   • JACQUE (obstructive sleep apnea)   • Gastroesophageal reflux disease with esophagitis   • Left upper quadrant pain   • Nausea and vomiting   • Weight gain, abnormal   • Change in bowel habits   • Hepatic fibrosis   • Non-compliance   • Depression   • Paranoid schizophrenia (CMS/HCC)   • Gastritis without bleeding   •  Tobacco user   • Encounter for eye exam   • Scoliosis   • History of drug use     Past Surgical History:   Procedure Laterality Date   • APPENDECTOMY     • BACK SURGERY     • BREAST SURGERY     • CARPAL TUNNEL RELEASE      right hand   • CHOLECYSTECTOMY     • COLONOSCOPY     • COLONOSCOPY N/A 2/8/2019    Procedure: COLONOSCOPY;  Surgeon: Joni Delacruz MD;  Location: [facility] ENDOSCOPY;  Service: Gastroenterology   • DENTAL PROCEDURE     • ENDOSCOPY N/A 2/8/2019    Procedure: ESOPHAGOGASTRODUODENOSCOPY--poss dilation;  Surgeon: Joni Delacruz MD;  Location: [facility] ENDOSCOPY;  Service: Gastroenterology   • HYSTERECTOMY     • LIPOMA EXCISION      9   • LIVER BIOPSY     • UPPER GASTROINTESTINAL ENDOSCOPY  02/08/2019     Social History     Socioeconomic History   • Marital status:      Spouse name: Not on file   • Number of children: Not on file   • Years of education: Not on file   • Highest education level: Not on file   Tobacco Use   • Smoking status: Current Every Day Smoker     Packs/day: 0.25     Types: Cigarettes   • Smokeless tobacco: Never Used   Substance and Sexual Activity   • Alcohol use: No   • Drug use: No   • Sexual activity: Defer     Family History   Problem Relation Age of Onset   • Hypertension Other    • Diabetes Other    • Stroke Other    • Cancer Other    • Kidney disease Other    • Lung disease Other    • Other Other    • Malone's esophagus Other    • Colon polyps Other    • Heart disease Other    • Ulcers Other    • Cholelithiasis Other    • Allergy (severe) Other      Lab on 11/11/2019   Component Date Value Ref Range Status   • WBC 11/11/2019 9.60  3.40 - 10.80 10*3/mm3 Final   • RBC 11/11/2019 4.99  3.77 - 5.28 10*6/mm3 Final   • Hemoglobin 11/11/2019 14.5  12.0 - 15.9 g/dL Final   • Hematocrit 11/11/2019 44.3  34.0 - 46.6 % Final   • MCV 11/11/2019 88.8  79.0 - 97.0 fL Final   • MCH 11/11/2019 29.1  26.6 - 33.0 pg Final   • MCHC 11/11/2019 32.7  31.5 - 35.7 g/dL Final   • RDW  11/11/2019 14.1  12.3 - 15.4 % Final   • RDW-SD 11/11/2019 45.0  37.0 - 54.0 fl Final   • MPV 11/11/2019 12.0  6.0 - 12.0 fL Final   • Platelets 11/11/2019 151  140 - 450 10*3/mm3 Final   • Neutrophil % 11/11/2019 67.4  42.7 - 76.0 % Final   • Lymphocyte % 11/11/2019 25.4  19.6 - 45.3 % Final   • Monocyte % 11/11/2019 4.3* 5.0 - 12.0 % Final   • Eosinophil % 11/11/2019 1.7  0.3 - 6.2 % Final   • Basophil % 11/11/2019 0.5  0.0 - 1.5 % Final   • Immature Grans % 11/11/2019 0.7* 0.0 - 0.5 % Final   • Neutrophils, Absolute 11/11/2019 6.47  1.70 - 7.00 10*3/mm3 Final   • Lymphocytes, Absolute 11/11/2019 2.44  0.70 - 3.10 10*3/mm3 Final   • Monocytes, Absolute 11/11/2019 0.41  0.10 - 0.90 10*3/mm3 Final   • Eosinophils, Absolute 11/11/2019 0.16  0.00 - 0.40 10*3/mm3 Final   • Basophils, Absolute 11/11/2019 0.05  0.00 - 0.20 10*3/mm3 Final   • Immature Grans, Absolute 11/11/2019 0.07* 0.00 - 0.05 10*3/mm3 Final   • nRBC 11/11/2019 0.1  0.0 - 0.2 /100 WBC Final   • Glucose 11/11/2019 270* 65 - 99 mg/dL Final   • BUN 11/11/2019 15  6 - 20 mg/dL Final   • Creatinine 11/11/2019 0.80  0.57 - 1.00 mg/dL Final   • Sodium 11/11/2019 136  136 - 145 mmol/L Final   • Potassium 11/11/2019 4.6  3.5 - 5.2 mmol/L Final   • Chloride 11/11/2019 99  98 - 107 mmol/L Final   • CO2 11/11/2019 23.1  22.0 - 29.0 mmol/L Final   • Calcium 11/11/2019 9.8  8.6 - 10.5 mg/dL Final   • Total Protein 11/11/2019 8.0  6.0 - 8.5 g/dL Final   • Albumin 11/11/2019 4.00  3.50 - 5.20 g/dL Final   • ALT (SGPT) 11/11/2019 19  1 - 33 U/L Final   • AST (SGOT) 11/11/2019 17  1 - 32 U/L Final   • Alkaline Phosphatase 11/11/2019 69  39 - 117 U/L Final   • Total Bilirubin 11/11/2019 0.2  0.2 - 1.2 mg/dL Final   • eGFR Non African Amer 11/11/2019 78  >60 mL/min/1.73 Final   • Globulin 11/11/2019 4.0  gm/dL Final   • A/G Ratio 11/11/2019 1.0  g/dL Final   • BUN/Creatinine Ratio 11/11/2019 18.8  7.0 - 25.0 Final   • Anion Gap 11/11/2019 13.9  5.0 - 15.0 mmol/L Final   •  Hemoglobin A1C 11/11/2019 9.18* 4.80 - 5.60 % Final   • Total Cholesterol 11/11/2019 130  0 - 200 mg/dL Final   • Triglycerides 11/11/2019 273* 0 - 150 mg/dL Final   • HDL Cholesterol 11/11/2019 37* 40 - 60 mg/dL Final   • LDL Cholesterol  11/11/2019 38  0 - 100 mg/dL Final   • VLDL Cholesterol 11/11/2019 54.6* 5 - 40 mg/dL Final   • LDL/HDL Ratio 11/11/2019 1.04   Final   • 25 Hydroxy, Vitamin D 11/11/2019 34.0  30.0 - 100.0 ng/ml Final   • Vitamin B-12 11/11/2019 547  211 - 946 pg/mL Final   • THC, Screen, Urine 11/11/2019 Negative  Negative Final   • Phencyclidine (PCP), Urine 11/11/2019 Negative  Negative Final   • Cocaine Screen, Urine 11/11/2019 Negative  Negative Final   • Methamphetamine, Ur 11/11/2019 Negative  Negative Final   • Opiate Screen 11/11/2019 Negative  Negative Final   • Amphetamine Screen, Urine 11/11/2019 Negative  Negative Final   • Benzodiazepine Screen, Urine 11/11/2019 Negative  Negative Final   • Tricyclic Antidepressants Screen 11/11/2019 Negative  Negative Final   • Methadone Screen, Urine 11/11/2019 Negative  Negative Final   • Barbiturates Screen, Urine 11/11/2019 Negative  Negative Final   • Oxycodone Screen, Urine 11/11/2019 Negative  Negative Final   • Propoxyphene Screen 11/11/2019 Negative  Negative Final   • Buprenorphine, Screen, Urine 11/11/2019 Negative  Negative Final   • Microalbumin/Creatinine Ratio 11/11/2019    Final    Unable to calculate   • Creatinine, Urine 11/11/2019 31.5  mg/dL Final   • Microalbumin, Urine 11/11/2019 <1.2  mg/dL Final   Lab on 08/08/2019   Component Date Value Ref Range Status   • WBC 08/08/2019 8.88  3.40 - 10.80 10*3/mm3 Final   • RBC 08/08/2019 5.33* 3.77 - 5.28 10*6/mm3 Final   • Hemoglobin 08/08/2019 14.9  12.0 - 15.9 g/dL Final   • Hematocrit 08/08/2019 47.9* 34.0 - 46.6 % Final   • MCV 08/08/2019 89.9  79.0 - 97.0 fL Final   • MCH 08/08/2019 28.0  26.6 - 33.0 pg Final   • MCHC 08/08/2019 31.1* 31.5 - 35.7 g/dL Final   • RDW 08/08/2019 13.4   12.3 - 15.4 % Final   • RDW-SD 08/08/2019 43.9  37.0 - 54.0 fl Final   • MPV 08/08/2019 12.6* 6.0 - 12.0 fL Final   • Platelets 08/08/2019 167  140 - 450 10*3/mm3 Final   • Neutrophil % 08/08/2019 61.9  42.7 - 76.0 % Final   • Lymphocyte % 08/08/2019 30.2  19.6 - 45.3 % Final   • Monocyte % 08/08/2019 4.7* 5.0 - 12.0 % Final   • Eosinophil % 08/08/2019 1.9  0.3 - 6.2 % Final   • Basophil % 08/08/2019 0.6  0.0 - 1.5 % Final   • Immature Grans % 08/08/2019 0.7* 0.0 - 0.5 % Final   • Neutrophils, Absolute 08/08/2019 5.50  1.70 - 7.00 10*3/mm3 Final   • Lymphocytes, Absolute 08/08/2019 2.68  0.70 - 3.10 10*3/mm3 Final   • Monocytes, Absolute 08/08/2019 0.42  0.10 - 0.90 10*3/mm3 Final   • Eosinophils, Absolute 08/08/2019 0.17  0.00 - 0.40 10*3/mm3 Final   • Basophils, Absolute 08/08/2019 0.05  0.00 - 0.20 10*3/mm3 Final   • Immature Grans, Absolute 08/08/2019 0.06* 0.00 - 0.05 10*3/mm3 Final   • nRBC 08/08/2019 0.0  0.0 - 0.2 /100 WBC Final   • Glucose 08/08/2019 189* 65 - 99 mg/dL Final   • BUN 08/08/2019 15  6 - 20 mg/dL Final   • Creatinine 08/08/2019 0.71  0.57 - 1.00 mg/dL Final   • Sodium 08/08/2019 137  136 - 145 mmol/L Final   • Potassium 08/08/2019 4.6  3.5 - 5.2 mmol/L Final   • Chloride 08/08/2019 100  98 - 107 mmol/L Final   • CO2 08/08/2019 21.0* 22.0 - 29.0 mmol/L Final   • Calcium 08/08/2019 10.0  8.6 - 10.5 mg/dL Final   • Total Protein 08/08/2019 7.5  6.0 - 8.5 g/dL Final   • Albumin 08/08/2019 4.20  3.50 - 5.20 g/dL Final   • ALT (SGPT) 08/08/2019 27  1 - 33 U/L Final   • AST (SGOT) 08/08/2019 21  1 - 32 U/L Final   • Alkaline Phosphatase 08/08/2019 79  39 - 117 U/L Final   • Total Bilirubin 08/08/2019 0.2  0.2 - 1.2 mg/dL Final   • eGFR Non  Amer 08/08/2019 89  >60 mL/min/1.73 Final   • Globulin 08/08/2019 3.3  gm/dL Final   • A/G Ratio 08/08/2019 1.3  g/dL Final   • BUN/Creatinine Ratio 08/08/2019 21.1  7.0 - 25.0 Final   • Anion Gap 08/08/2019 16.0* 5.0 - 15.0 mmol/L Final   • Hemoglobin A1C  08/08/2019 9.83* 4.80 - 5.60 % Final   • Total Cholesterol 08/08/2019 129  0 - 200 mg/dL Final   • Triglycerides 08/08/2019 268* 0 - 150 mg/dL Final   • HDL Cholesterol 08/08/2019 33* 40 - 60 mg/dL Final   • LDL Cholesterol  08/08/2019 42  0 - 100 mg/dL Final   • VLDL Cholesterol 08/08/2019 53.6* 5 - 40 mg/dL Final   • LDL/HDL Ratio 08/08/2019 1.28   Final   • 25 Hydroxy, Vitamin D 08/08/2019 25.1* 30.0 - 100.0 ng/ml Final   • Vitamin B-12 08/08/2019 680  211 - 946 pg/mL Final   • Hepatitis C Quantitation 08/08/2019 HCV Not Detected  IU/mL Final   • Test Information 08/08/2019 Comment   Final    The quantitative range of this assay is 15 IU/mL to 100 million IU/mL.   Lab on 07/26/2019   Component Date Value Ref Range Status   • Amphet/Methamphet, Screen 07/26/2019 Negative  Negative Final   • Barbiturates Screen, Urine 07/26/2019 Negative  Negative Final   • Benzodiazepine Screen, Urine 07/26/2019 Negative  Negative Final   • Cocaine Screen, Urine 07/26/2019 Negative  Negative Final   • Opiate Screen 07/26/2019 Negative  Negative Final   • THC, Screen, Urine 07/26/2019 Negative  Negative Final   • Methadone Screen, Urine 07/26/2019 Negative  Negative Final   • Oxycodone Screen, Urine 07/26/2019 Negative  Negative Final   • Microalbumin/Creatinine Ratio 07/26/2019   mg/g Final    Unable to calculate   • Creatinine, Urine 07/26/2019 38.1  mg/dL Final   • Microalbumin, Urine 07/26/2019 <1.2  mg/L Final      XR Spine Lumbar 4+ View  Narrative: PROCEDURE: Lumbar spine with obliques.    INDICATION: lower back pain, M54.42 Lumbago with sciatica, left  side M54.41 Lumbago with sciatica, right side.    COMPARISON: None.    AP, lateral, lateral lumbosacral spot film and oblique views.    Lumbar vertebrae are normal height and well aligned. There is a  mild lower lumbar levoscoliosis.    Moderate L4-5 and advanced L5-S1 degenerative disc changes with  disc space narrowing. Mild L4-5 and moderate L5-S1 bilateral  facet  "arthritic change.    Pedicles, spinous processes and transverse processes intact.  Impression: Moderate L4-5 and advanced L5-S1 degenerative disc  changes.    Mild bilateral L4-5 and moderate bilateral L5-S1 facet arthritic  change.    Mild levoscoliosis.    [id number]    Electronically signed by:  Sammy Priest MD  8/8/2019 2:55 PM  CDT Workstation: [id number]    @"[email or web address][email or web address]@  Immunization History   Administered Date(s) Administered   • Pneumococcal Polysaccharide (PPSV23) 12/13/2018       The following portions of the patient's history were reviewed and updated as appropriate: allergies, current medications, past family history, past medical history, past social history, past surgical history and problem list.        Physical Exam  /82 (BP Location: Left arm, Patient Position: Sitting, Cuff Size: Large Adult)   Pulse 116   Temp 98.3 °F (36.8 °C) (Oral)   Ht 167.6 cm (66\")   Wt 134 kg (295 lb 9.6 oz)   SpO2 99%   BMI 47.71 kg/m²     Physical Exam   Constitutional: She is oriented to person, place, and time. She appears well-developed and well-nourished.   HENT:   Head: Normocephalic and atraumatic.   Right Ear: External ear normal.   Eyes: Pupils are equal, round, and reactive to light. Conjunctivae and EOM are normal.   Neck: Normal range of motion. Neck supple.   Cardiovascular: Normal rate, regular rhythm and normal heart sounds.   No murmur heard.  Pulmonary/Chest: Effort normal and breath sounds normal. No respiratory distress.   Decreased breath sounds    Abdominal: Soft. Bowel sounds are normal. She exhibits no distension. There is no tenderness.   Obese abdomen    Musculoskeletal: Normal range of motion. She exhibits edema and tenderness. She exhibits no deformity.   Neurological: She is alert and oriented to person, place, and time. No cranial nerve deficit.   Skin: Skin is warm. No rash noted. She is not diaphoretic. No erythema. No pallor.   Psychiatric: She has a normal mood and affect. Her " behavior is normal.   Nursing note and vitals reviewed.      Assessment/Plan    Diagnosis Plan   1. Vitamin D deficiency     2. Uncontrolled type 2 diabetes mellitus with hyperglycemia (CMS/HCC)     3. Tobacco abuse counseling     4. Tobacco user     5. Paranoid schizophrenia (CMS/HCC)     6. JACQUE (obstructive sleep apnea)     7. Non-compliance     8. Morbid obesity (CMS/HCC)     9. Hepatic fibrosis     10. History of drug use     11. History of positive hepatitis C     12. Chronic hepatitis C without hepatic coma (CMS/HCC)     13. Chronic obstructive pulmonary disease, unspecified COPD type (CMS/HCC)     14. Episode of recurrent major depressive disorder, unspecified depression episode severity (CMS/HCC)     15. Essential hypertension     16. Gastritis without bleeding, unspecified chronicity, unspecified gastritis type     17. Gastroesophageal reflux disease with esophagitis            -went over labwork   -recommend influenza vaccination   -refer to [facility]i  For Neurology and seizure disorder  -for back pain/arthritis of back/scoliosi  - history of back surgery - referral to Pain Management  X-rays of lumbar spine.  Start on neurontin 300 mg PO TID.gave 90 pills and 3 refills. Will get NERY. UDS today. Pt declined PT/OT for now. .  also on cymbalta 60 mg will raise to 90 mg daily for depression   -chronic hep C - Gastroenterology following   -for dsypnea- referred to Dr. Will (Pulmonologsit) for PFTs. Consultation appreciated. Recent chest x-ray negative. Will start on albuterol inhaler PRN.Advised pt to stop smoking. She is on spirva now for early COPD  -multiple joint pain -- naproxen 500 mg PO BID. Pt advised to hold naproxen unless needed while taking viibryd  -for multiple joint pain - pt cannot take opoids. Will start on naproxen 500 mg twi ce a day. Drug information provided  -morbid obesity -counseled on weight loss, diet and exercise. Diet information provided. cousneled weight loss >5 minutes    -hyperlipdiemia- simvastatin 20  To 40 mg PO qhs. ASCVD risk high. Recommend to take with coenzyme Q10  Start on vascepa 2 grams PO BID   -for DM type 2- metformin 500 mg pO BID,  Short acting insulin 30 units before meals  along with basaglar insulin 60 units at bedtime  at bedtime. Start on steglatro 15 mg daily. Samples provdied today. Advised to taper up by 3 units every 3 days until sugars at goal. Also educated today on sliding scale insulin and counting carbs before meal.  She does have association with learning sometthing new with her.stop januvia and start trulicity 1.5 mg subq weeky.    -lo schizophrenia. -depression - stopped viibryd, go up on cymbalta from 30 to 60 mg daily   -seizure disorder -currently on Keppra  500 mg PO BID. Referred to Neurologist  Services appreciated. She missed appt.  Will go up on Keppra from 500 to 1000 mg twice a day refer back to Dr. Fry  -counseled pt to quit smoking >5 minutes.  Gave tobacco cessation material   -hypertension - at goal today Continue on lisinopril but will go up on dosage from 20 to 40 mg dialy.  mg Po q dominguez, metoprolol 50 mg PO BID . Stop HCTZ for now. Consider Norvasc or calcium channel blocker if not improving   -advised pt to be safe and call with any questions or concerns. All questions addressed today  -recheck in months for BP recheck and labwork  -recommend diabetic eye exam         This document has been electronically signed by Jermaine Ferro MD on December 9, 2019 7:28 AM

## 2019-12-04 ENCOUNTER — TELEPHONE (OUTPATIENT)
Dept: FAMILY MEDICINE CLINIC | Facility: CLINIC | Age: 44
End: 2019-12-04

## 2019-12-09 ENCOUNTER — OFFICE VISIT (OUTPATIENT)
Dept: FAMILY MEDICINE CLINIC | Facility: CLINIC | Age: 44
End: 2019-12-09

## 2019-12-09 VITALS
SYSTOLIC BLOOD PRESSURE: 112 MMHG | HEART RATE: 116 BPM | BODY MASS INDEX: 47.09 KG/M2 | DIASTOLIC BLOOD PRESSURE: 82 MMHG | HEIGHT: 66 IN | OXYGEN SATURATION: 99 % | WEIGHT: 293 LBS | TEMPERATURE: 98.3 F

## 2019-12-09 DIAGNOSIS — K21.00 GASTROESOPHAGEAL REFLUX DISEASE WITH ESOPHAGITIS: ICD-10-CM

## 2019-12-09 DIAGNOSIS — I10 ESSENTIAL HYPERTENSION: ICD-10-CM

## 2019-12-09 DIAGNOSIS — B18.2 CHRONIC HEPATITIS C WITHOUT HEPATIC COMA (HCC): ICD-10-CM

## 2019-12-09 DIAGNOSIS — J44.9 CHRONIC OBSTRUCTIVE PULMONARY DISEASE, UNSPECIFIED COPD TYPE (HCC): ICD-10-CM

## 2019-12-09 DIAGNOSIS — Z91.199 NON-COMPLIANCE: ICD-10-CM

## 2019-12-09 DIAGNOSIS — Z86.19 HISTORY OF POSITIVE HEPATITIS C: ICD-10-CM

## 2019-12-09 DIAGNOSIS — K29.70 GASTRITIS WITHOUT BLEEDING, UNSPECIFIED CHRONICITY, UNSPECIFIED GASTRITIS TYPE: ICD-10-CM

## 2019-12-09 DIAGNOSIS — Z72.0 TOBACCO USER: ICD-10-CM

## 2019-12-09 DIAGNOSIS — E55.9 VITAMIN D DEFICIENCY: Primary | ICD-10-CM

## 2019-12-09 DIAGNOSIS — Z71.6 TOBACCO ABUSE COUNSELING: ICD-10-CM

## 2019-12-09 DIAGNOSIS — F33.9 EPISODE OF RECURRENT MAJOR DEPRESSIVE DISORDER, UNSPECIFIED DEPRESSION EPISODE SEVERITY (HCC): ICD-10-CM

## 2019-12-09 DIAGNOSIS — E66.01 MORBID OBESITY (HCC): ICD-10-CM

## 2019-12-09 DIAGNOSIS — K74.00 HEPATIC FIBROSIS: ICD-10-CM

## 2019-12-09 DIAGNOSIS — G47.33 OSA (OBSTRUCTIVE SLEEP APNEA): ICD-10-CM

## 2019-12-09 DIAGNOSIS — E11.65 UNCONTROLLED TYPE 2 DIABETES MELLITUS WITH HYPERGLYCEMIA (HCC): ICD-10-CM

## 2019-12-09 DIAGNOSIS — F19.91 HISTORY OF DRUG USE: ICD-10-CM

## 2019-12-09 DIAGNOSIS — F20.0 PARANOID SCHIZOPHRENIA (HCC): ICD-10-CM

## 2019-12-09 PROCEDURE — 99214 OFFICE O/P EST MOD 30 MIN: CPT | Performed by: FAMILY MEDICINE

## 2019-12-09 RX ORDER — DULOXETIN HYDROCHLORIDE 30 MG/1
90 CAPSULE, DELAYED RELEASE ORAL DAILY
Qty: 90 CAPSULE | Refills: 3 | Status: SHIPPED | OUTPATIENT
Start: 2019-12-09 | End: 2020-01-06 | Stop reason: SDUPTHER

## 2019-12-09 RX ORDER — CYCLOBENZAPRINE HCL 5 MG
5 TABLET ORAL 3 TIMES DAILY PRN
Qty: 90 TABLET | Refills: 3 | Status: SHIPPED | OUTPATIENT
Start: 2019-12-09 | End: 2022-09-13 | Stop reason: DRUGHIGH

## 2019-12-09 NOTE — PATIENT INSTRUCTIONS
Stop Januvia 100 mg daily    And start on  trulicity 1.5 mg subq weekly.       Go up on cymbalta from 60 to 90 mg daily    Take 3 pills of cymbalta 30 mg daily.

## 2019-12-16 ENCOUNTER — PRIOR AUTHORIZATION (OUTPATIENT)
Dept: FAMILY MEDICINE CLINIC | Facility: CLINIC | Age: 44
End: 2019-12-16

## 2020-01-06 ENCOUNTER — OFFICE VISIT (OUTPATIENT)
Dept: FAMILY MEDICINE CLINIC | Facility: CLINIC | Age: 45
End: 2020-01-06

## 2020-01-06 VITALS
OXYGEN SATURATION: 98 % | HEART RATE: 112 BPM | TEMPERATURE: 98.5 F | HEIGHT: 66 IN | WEIGHT: 293 LBS | SYSTOLIC BLOOD PRESSURE: 118 MMHG | DIASTOLIC BLOOD PRESSURE: 78 MMHG | BODY MASS INDEX: 47.09 KG/M2

## 2020-01-06 DIAGNOSIS — Z72.0 TOBACCO USER: ICD-10-CM

## 2020-01-06 DIAGNOSIS — B18.2 CHRONIC HEPATITIS C WITHOUT HEPATIC COMA (HCC): ICD-10-CM

## 2020-01-06 DIAGNOSIS — E11.65 UNCONTROLLED TYPE 2 DIABETES MELLITUS WITH HYPERGLYCEMIA (HCC): Primary | ICD-10-CM

## 2020-01-06 DIAGNOSIS — J44.9 CHRONIC OBSTRUCTIVE PULMONARY DISEASE, UNSPECIFIED COPD TYPE (HCC): ICD-10-CM

## 2020-01-06 DIAGNOSIS — E55.9 VITAMIN D DEFICIENCY: ICD-10-CM

## 2020-01-06 DIAGNOSIS — F19.91 HISTORY OF DRUG USE: ICD-10-CM

## 2020-01-06 DIAGNOSIS — I10 ESSENTIAL HYPERTENSION: ICD-10-CM

## 2020-01-06 DIAGNOSIS — K74.00 HEPATIC FIBROSIS: ICD-10-CM

## 2020-01-06 DIAGNOSIS — G47.33 OSA (OBSTRUCTIVE SLEEP APNEA): ICD-10-CM

## 2020-01-06 DIAGNOSIS — F33.9 EPISODE OF RECURRENT MAJOR DEPRESSIVE DISORDER, UNSPECIFIED DEPRESSION EPISODE SEVERITY (HCC): ICD-10-CM

## 2020-01-06 DIAGNOSIS — F20.0 PARANOID SCHIZOPHRENIA (HCC): ICD-10-CM

## 2020-01-06 DIAGNOSIS — G40.909 SEIZURE DISORDER (HCC): ICD-10-CM

## 2020-01-06 PROCEDURE — 99214 OFFICE O/P EST MOD 30 MIN: CPT | Performed by: FAMILY MEDICINE

## 2020-01-06 RX ORDER — DULOXETIN HYDROCHLORIDE 30 MG/1
90 CAPSULE, DELAYED RELEASE ORAL DAILY
Qty: 90 CAPSULE | Refills: 3 | Status: SHIPPED | OUTPATIENT
Start: 2020-01-06 | End: 2020-09-09

## 2020-02-01 NOTE — PROGRESS NOTES
Subjective:  Sudhakar Saul is a 44 y.o. female who presents for       Patient Active Problem List   Diagnosis   • Seizure disorder (CMS/HCC)   • Morbid obesity (CMS/HCC)   • Tobacco abuse counseling   • Multiple joint pain   • Dysuria   • History of positive hepatitis C   • Essential hypertension   • Uncontrolled type 2 diabetes mellitus with hyperglycemia (CMS/HCC)   • Vitamin D deficiency   • Urinary tract infection without hematuria   • Chronic hepatitis C without hepatic coma (CMS/HCC)   • Cigarette nicotine dependence, uncomplicated   • Chronic obstructive pulmonary disease (CMS/HCC)   • JACQUE (obstructive sleep apnea)   • Gastroesophageal reflux disease with esophagitis   • Left upper quadrant pain   • Nausea and vomiting   • Weight gain, abnormal   • Change in bowel habits   • Hepatic fibrosis   • Non-compliance   • Depression   • Paranoid schizophrenia (CMS/HCC)   • Gastritis without bleeding   • Tobacco user   • Encounter for eye exam   • Scoliosis   • History of drug use           Current Outpatient Medications:   •  ADMELOG 100 UNIT/ML injection, Inject 30 Units under the skin into the appropriate area as directed 3 (Three) Times a Day Before Meals., Disp: 30 mL, Rfl: 3  •  albuterol sulfate  (90 Base) MCG/ACT inhaler, Inhale 2 puffs Every 6 (Six) Hours As Needed for Wheezing or Shortness of Air., Disp: 1 inhaler, Rfl: 3  •  cyclobenzaprine (FLEXERIL) 5 MG tablet, Take 1 tablet by mouth 3 (Three) Times a Day As Needed for Muscle Spasms., Disp: 90 tablet, Rfl: 3  •  dicyclomine (BENTYL) 20 MG tablet, Take 1 tablet by mouth 2 (Two) Times a Day., Disp: 60 tablet, Rfl: 3  •  Dulaglutide (TRULICITY) 1.5 MG/0.5ML solution pen-injector, Inject 1.5 mg under the skin into the appropriate area as directed 1 (One) Time Per Week., Disp: 4 pen, Rfl: 3  •  DULoxetine (CYMBALTA) 30 MG capsule, Take 3 capsules by mouth Daily., Disp: 90 capsule, Rfl: 3  •  Ertugliflozin L-PyroglutamicAc (STEGLATRO) 15 MG  tablet, Take 1 tablet by mouth Every Morning., Disp: 30 tablet, Rfl: 3  •  gabapentin (NEURONTIN) 300 MG capsule, Take 1 capsule by mouth 3 (Three) Times a Day., Disp: 90 capsule, Rfl: 2  •  Insulin Glargine (BASAGLAR KWIKPEN) 100 UNIT/ML injection pen, Start 60 units at bedtime Titrate up by 3 units every 3 days until a.m. FSBS , Disp: 18 mL, Rfl: 6  •  levETIRAcetam (KEPPRA) 1000 MG tablet, Take 1 tablet by mouth 2 (Two) Times a Day., Disp: 60 tablet, Rfl: 3  •  lisinopril (PRINIVIL,ZESTRIL) 40 MG tablet, Take 1 tablet by mouth Daily., Disp: 30 tablet, Rfl: 3  •  metFORMIN (GLUCOPHAGE) 500 MG tablet, Take 1 tablet by mouth 2 (Two) Times a Day With Meals., Disp: 60 tablet, Rfl: 3  •  metoprolol tartrate (LOPRESSOR) 50 MG tablet, Take 1 tablet by mouth Every 12 (Twelve) Hours., Disp: 60 tablet, Rfl: 3  •  nicotine polacrilex (COMMIT) 2 MG lozenge, Dissolve 1 lozenge in the mouth As Needed for Smoking Cessation., Disp: 108 lozenge, Rfl: 3  •  pantoprazole (PROTONIX) 40 MG EC tablet, Take 1 tablet by mouth Daily., Disp: 30 tablet, Rfl: 3  •  RELION PEN NEEDLES 31G X 6 MM misc, USE 1 NEEDLE 3 TO 4 TIMES DAILY, Disp: , Rfl: 3  •  risperiDONE (RISPERDAL) 1 MG tablet, Take 1 tablet by mouth 2 (Two) Times a Day., Disp: 60 tablet, Rfl: 3  •  simvastatin (ZOCOR) 40 MG tablet, Take 1 tablet by mouth Every Night., Disp: 30 tablet, Rfl: 3  •  tiotropium bromide monohydrate (SPIRIVA RESPIMAT) 2.5 MCG/ACT aerosol solution inhaler, Inhale 2 puffs Daily., Disp: 1 inhaler, Rfl: 11  •  vitamin D (ERGOCALCIFEROL) 76514 units capsule capsule, Take 2 caps weekly, Disp: 8 capsule, Rfl: 3    HPI     Pt is 42 yo female with management  of morbid obesity, IDDM Type 2, HTN, HLP, vitamin D deficiency, tobacco user, GERD, gastritis, esophagitis, diverticulosis, colonic polyp in sigmoid colon,  paranoid schizophrenia,  Seizure disorder, history of  Illicit drug abuse, COPD, JACQUE, chronic hepatitis C , major depression, JD, sp  appendectomy, sp cholecystectomy sp right carpal tunnel release, sp back surgery, sp breast surgery, sp lipoma excision, chronic back pain (moderate L4-L5 and L5-S1 DDD changes, mild bilateral L4-L5 moderate bilateral L5-S1 facet arthritic change, mild leveoscoliosis), CKD stage 2         10/9/19 pt is here for recheck and followup  She is doing well. Her sugars have been better controlled she is taking. 60 units of Basgaglar at bedtime. Her am sugars have been .   She averages about 14-20 units of admelog TID before meals.  She lost 19 lbs since last visit by [facility].  She is working out more and exercise. She also continues to smoke . She conitnues to have back pain and is taking neurontin 300 mg PO TID.  She is now with Pain Management in [place]. She is now on neurontin 300 mg PO TID. She is pending steroid injections in her back      12/9/19 pt is here for recheck. She is doing fair. Had labwork recently that showed hga1c at 9.18.  She states her morning sugars improved but postpandrial sugars is elevated. She has been having issues with Pain Management.  She is taking neurontin which is helping with her legs. She also had injection in left SI joint. Vitamin D and b12 levels are normal. Lipid panel shows low HDL and elevated triglcyerides. She is on fish oil supplement. On CMP GFR at 78 from 89. CBC is normal. Also depression is worse along with back pain. Is on cymbalta 60 mg daily. And would llike dosage increased      1/6/20 pt is here for recheck and followup. On last visit pt was started on trulicity 1.5 mg weekly.  She had issues with medication in beginning but has improved. Her sugars in morning run 110-130 and 2 hours afte rmea <180.  She continues to take steglastro along with Basaglar 60 units at bedtime. She takes novolog 18-20 before meals.      2/17/20 pt is here for recheck and followup. Had labwork done on 2/7/20 that showed normal Vitamin B12 and vitamin D. Lipid panel showed  improvement of trglycerides from 273 to 203 LDL stable. hga1c  Still uncontrolled at 9.80 from 9.18. CMP showed normal liver enzymes GFR is at 89 from 78.  CBC  Showed thad hemoglobin. She states her sugars havve been running 110 to 130 before breakfast and <180 2hours after meal. Has not had any seizure disorder and saw Neurologist in [facility] Keppra 1000 mg pO BID.   She also sees Pain Management and is now on Norco 7.5-325 mg BID PRN. She is still taking neurontin 300 mg PO TID. She has cut back on smoking to a few cigarettes a day. In regard to mental health she will make an appt soon. Schizophrenia is stable  She has seen an Endocrinologist before for her DM and that was several years ago in [place].          Back Pain   This is a chronic problem. The current episode started more than 1 year ago. The problem occurs constantly. The problem is unchanged. The pain is present in the lumbar spine, sacro-iliac and gluteal. The quality of the pain is described as aching. The pain radiates to the left thigh. The pain is at a severity of 7/10. The pain is moderate. The pain is the same all the time. The symptoms are aggravated by bending, lying down, position, sitting and standing. Stiffness is present all day. Associated symptoms include numbness, paresis, tingling and weakness. Pertinent negatives include no abdominal pain, bladder incontinence, bowel incontinence, chest pain, dysuria, fever, headaches, leg pain, paresthesias, pelvic pain or weight loss. She has tried muscle relaxant and NSAIDs (pain management/ norco percocet back surgery ) for the symptoms. The treatment provided no relief.    Mental Health Problem   The primary symptoms include delusions, hallucinations and negative symptoms. The primary symptoms do not include dysphoric mood, bizarre behavior, disorganized speech or somatic symptoms. This is a chronic problem.   The onset of the illness is precipitated by a stressful event,  emotional stress, alcohol abuse and drug abuse. The degree of incapacity that she is experiencing as a consequence of her illness is severe. Sequelae of the illness include an inability to work, an inability to care for self and harmed interpersonal relations. Additional symptoms of the illness include fatigue, agitation and seizures. Additional symptoms of the illness do not include anhedonia, insomnia, hypersomnia, appetite change, unexpected weight change, psychomotor retardation, feelings of worthlessness, attention impairment, increased goal-directed activity, flight of ideas, inflated self-esteem, decreased need for sleep, distractible, poor judgment, visual change, headaches or abdominal pain. She admits to suicidal ideas. She does not have a plan to commit suicide. She does not contemplate harming herself. She has not already injured self. She does not contemplate injuring another person. Risk factors that are present for mental illness include a history of mental illness, substance abuse, a history of suicide attempts, family violence and epilepsy.   Seizures    This is a chronic problem. The current episode started more than 1 week ago. The problem has been gradually worsening. Associated symptoms include sleepiness, confusion, headaches and visual disturbances. Pertinent negatives include no neck stiffness, no sore throat, no chest pain, no cough, no nausea, no vomiting, no diarrhea and no muscle weakness.   Obesity   This is a chronic problem. The current episode started more than 1 year ago. The problem occurs constantly. The problem has been unchanged. Associated symptoms include arthralgias, fatigue, headaches, numbness and weakness. Pertinent negatives include no abdominal pain, anorexia, change in bowel habit, chest pain, chills, congestion, coughing, fever, joint swelling, myalgias, nausea, neck pain, rash, sore throat, swollen glands, urinary symptoms, vertigo, visual change or  vomiting. Nothing aggravates the symptoms. She has tried nothing for the symptoms.   Hypertension   This is a chronic problem. The current episode started more than 1 year ago. The problem is uncontrolled. Associated symptoms include headaches. Pertinent negatives include no anxiety, blurred vision, chest pain, malaise/fatigue, neck pain, orthopnea, palpitations, peripheral edema, PND or shortness of breath. Risk factors for coronary artery disease include obesity, sedentary lifestyle, smoking/tobacco exposure and stress. Past treatments include ACE inhibitors, beta blockers and diuretics. There are no compliance problems.  There is no history of angina, kidney disease, CAD/MI, CVA, heart failure, left ventricular hypertrophy, PVD or retinopathy. There is no history of chronic renal disease, coarctation of the aorta, hyperaldosteronism, hypercortisolism, hyperparathyroidism, a hypertension causing med, pheochromocytoma, renovascular disease, sleep apnea or a thyroid problem.   Diabetes   She presents for her initial diabetic visit. She has type 2 diabetes mellitus. Her disease course has been worsening. Hypoglycemia symptoms include confusion, headaches, nervousness/anxiousness, seizures and sleepiness. Associated symptoms include fatigue and weakness. Pertinent negatives for diabetes include no blurred vision, no chest pain and no visual change. Pertinent negatives for diabetic complications include no CVA, PVD or retinopathy. Risk factors for coronary artery disease include dyslipidemia, obesity and post-menopausal. She has not had a previous visit with a dietitian. There is no change in her home blood glucose trend. An ACE inhibitor/angiotensin II receptor blocker is not being taken. She does not see a podiatrist.Eye exam is not current.   Shortness of Breath   This is a chronic problem. The current episode started more than 1 year ago. The problem occurs constantly. The problem has been gradually worsening.  Associated symptoms include headaches. Pertinent negatives include no abdominal pain, chest pain, claudication, coryza, ear pain, fever, hemoptysis, leg pain, leg swelling, neck pain, orthopnea, PND, rash, sore throat, sputum production, swollen glands, syncope, vomiting or wheezing. Nothing aggravates the symptoms. The patient has no known risk factors for DVT/PE. She has tried nothing for the symptoms. The treatment provided no relief. There is no history of allergies, aspirin allergies, asthma, bronchiolitis, CAD, chronic lung disease, COPD, DVT, a heart failure, PE, pneumonia or a recent surgery.        Review of Systems  Review of Systems   Constitutional: Positive for activity change and fatigue. Negative for appetite change, chills, diaphoresis and fever.   HENT: Negative for congestion, postnasal drip, rhinorrhea, sinus pressure, sinus pain, sneezing, sore throat, trouble swallowing and voice change.    Respiratory: Negative for cough, choking, chest tightness, shortness of breath, wheezing and stridor.    Cardiovascular: Negative for chest pain.   Gastrointestinal: Negative for diarrhea, nausea and vomiting.   Musculoskeletal: Positive for arthralgias, back pain, gait problem, joint swelling and myalgias.   Neurological: Positive for seizures, weakness and numbness. Negative for headaches.   Psychiatric/Behavioral: Positive for agitation and behavioral problems. The patient is nervous/anxious.         Depressed mood        Patient Active Problem List   Diagnosis   • Seizure disorder (CMS/HCC)   • Morbid obesity (CMS/HCC)   • Tobacco abuse counseling   • Multiple joint pain   • Dysuria   • History of positive hepatitis C   • Essential hypertension   • Uncontrolled type 2 diabetes mellitus with hyperglycemia (CMS/HCC)   • Vitamin D deficiency   • Urinary tract infection without hematuria   • Chronic hepatitis C without hepatic coma (CMS/HCC)   • Cigarette nicotine dependence, uncomplicated   • Chronic  obstructive pulmonary disease (CMS/HCC)   • JACQUE (obstructive sleep apnea)   • Gastroesophageal reflux disease with esophagitis   • Left upper quadrant pain   • Nausea and vomiting   • Weight gain, abnormal   • Change in bowel habits   • Hepatic fibrosis   • Non-compliance   • Depression   • Paranoid schizophrenia (CMS/HCC)   • Gastritis without bleeding   • Tobacco user   • Encounter for eye exam   • Scoliosis   • History of drug use     Past Surgical History:   Procedure Laterality Date   • APPENDECTOMY     • BACK SURGERY     • BREAST SURGERY     • CARPAL TUNNEL RELEASE      right hand   • CHOLECYSTECTOMY     • COLONOSCOPY     • COLONOSCOPY N/A 2/8/2019    Procedure: COLONOSCOPY;  Surgeon: Joni Delacruz MD;  Location: [facility] ENDOSCOPY;  Service: Gastroenterology   • DENTAL PROCEDURE     • ENDOSCOPY N/A 2/8/2019    Procedure: ESOPHAGOGASTRODUODENOSCOPY--poss dilation;  Surgeon: Join Delacruz MD;  Location: [facility] ENDOSCOPY;  Service: Gastroenterology   • HYSTERECTOMY     • LIPOMA EXCISION      9   • LIVER BIOPSY     • UPPER GASTROINTESTINAL ENDOSCOPY  02/08/2019     Social History     Socioeconomic History   • Marital status:      Spouse name: Not on file   • Number of children: Not on file   • Years of education: Not on file   • Highest education level: Not on file   Tobacco Use   • Smoking status: Current Every Day Smoker     Packs/day: 0.25     Types: Cigarettes   • Smokeless tobacco: Never Used   Substance and Sexual Activity   • Alcohol use: No   • Drug use: No   • Sexual activity: Defer     Family History   Problem Relation Age of Onset   • Hypertension Other    • Diabetes Other    • Stroke Other    • Cancer Other    • Kidney disease Other    • Lung disease Other    • Other Other    • Malone's esophagus Other    • Colon polyps Other    • Heart disease Other    • Ulcers Other    • Cholelithiasis Other    • Allergy (severe) Other      Lab on 11/11/2019   Component Date Value Ref Range Status   •  WBC 11/11/2019 9.60  3.40 - 10.80 10*3/mm3 Final   • RBC 11/11/2019 4.99  3.77 - 5.28 10*6/mm3 Final   • Hemoglobin 11/11/2019 14.5  12.0 - 15.9 g/dL Final   • Hematocrit 11/11/2019 44.3  34.0 - 46.6 % Final   • MCV 11/11/2019 88.8  79.0 - 97.0 fL Final   • MCH 11/11/2019 29.1  26.6 - 33.0 pg Final   • MCHC 11/11/2019 32.7  31.5 - 35.7 g/dL Final   • RDW 11/11/2019 14.1  12.3 - 15.4 % Final   • RDW-SD 11/11/2019 45.0  37.0 - 54.0 fl Final   • MPV 11/11/2019 12.0  6.0 - 12.0 fL Final   • Platelets 11/11/2019 151  140 - 450 10*3/mm3 Final   • Neutrophil % 11/11/2019 67.4  42.7 - 76.0 % Final   • Lymphocyte % 11/11/2019 25.4  19.6 - 45.3 % Final   • Monocyte % 11/11/2019 4.3* 5.0 - 12.0 % Final   • Eosinophil % 11/11/2019 1.7  0.3 - 6.2 % Final   • Basophil % 11/11/2019 0.5  0.0 - 1.5 % Final   • Immature Grans % 11/11/2019 0.7* 0.0 - 0.5 % Final   • Neutrophils, Absolute 11/11/2019 6.47  1.70 - 7.00 10*3/mm3 Final   • Lymphocytes, Absolute 11/11/2019 2.44  0.70 - 3.10 10*3/mm3 Final   • Monocytes, Absolute 11/11/2019 0.41  0.10 - 0.90 10*3/mm3 Final   • Eosinophils, Absolute 11/11/2019 0.16  0.00 - 0.40 10*3/mm3 Final   • Basophils, Absolute 11/11/2019 0.05  0.00 - 0.20 10*3/mm3 Final   • Immature Grans, Absolute 11/11/2019 0.07* 0.00 - 0.05 10*3/mm3 Final   • nRBC 11/11/2019 0.1  0.0 - 0.2 /100 WBC Final   • Glucose 11/11/2019 270* 65 - 99 mg/dL Final   • BUN 11/11/2019 15  6 - 20 mg/dL Final   • Creatinine 11/11/2019 0.80  0.57 - 1.00 mg/dL Final   • Sodium 11/11/2019 136  136 - 145 mmol/L Final   • Potassium 11/11/2019 4.6  3.5 - 5.2 mmol/L Final   • Chloride 11/11/2019 99  98 - 107 mmol/L Final   • CO2 11/11/2019 23.1  22.0 - 29.0 mmol/L Final   • Calcium 11/11/2019 9.8  8.6 - 10.5 mg/dL Final   • Total Protein 11/11/2019 8.0  6.0 - 8.5 g/dL Final   • Albumin 11/11/2019 4.00  3.50 - 5.20 g/dL Final   • ALT (SGPT) 11/11/2019 19  1 - 33 U/L Final   • AST (SGOT) 11/11/2019 17  1 - 32 U/L Final   • Alkaline  Phosphatase 11/11/2019 69  39 - 117 U/L Final   • Total Bilirubin 11/11/2019 0.2  0.2 - 1.2 mg/dL Final   • eGFR Non African Amer 11/11/2019 78  >60 mL/min/1.73 Final   • Globulin 11/11/2019 4.0  gm/dL Final   • A/G Ratio 11/11/2019 1.0  g/dL Final   • BUN/Creatinine Ratio 11/11/2019 18.8  7.0 - 25.0 Final   • Anion Gap 11/11/2019 13.9  5.0 - 15.0 mmol/L Final   • Hemoglobin A1C 11/11/2019 9.18* 4.80 - 5.60 % Final   • Total Cholesterol 11/11/2019 130  0 - 200 mg/dL Final   • Triglycerides 11/11/2019 273* 0 - 150 mg/dL Final   • HDL Cholesterol 11/11/2019 37* 40 - 60 mg/dL Final   • LDL Cholesterol  11/11/2019 38  0 - 100 mg/dL Final   • VLDL Cholesterol 11/11/2019 54.6* 5 - 40 mg/dL Final   • LDL/HDL Ratio 11/11/2019 1.04   Final   • 25 Hydroxy, Vitamin D 11/11/2019 34.0  30.0 - 100.0 ng/ml Final   • Vitamin B-12 11/11/2019 547  211 - 946 pg/mL Final   • THC, Screen, Urine 11/11/2019 Negative  Negative Final   • Phencyclidine (PCP), Urine 11/11/2019 Negative  Negative Final   • Cocaine Screen, Urine 11/11/2019 Negative  Negative Final   • Methamphetamine, Ur 11/11/2019 Negative  Negative Final   • Opiate Screen 11/11/2019 Negative  Negative Final   • Amphetamine Screen, Urine 11/11/2019 Negative  Negative Final   • Benzodiazepine Screen, Urine 11/11/2019 Negative  Negative Final   • Tricyclic Antidepressants Screen 11/11/2019 Negative  Negative Final   • Methadone Screen, Urine 11/11/2019 Negative  Negative Final   • Barbiturates Screen, Urine 11/11/2019 Negative  Negative Final   • Oxycodone Screen, Urine 11/11/2019 Negative  Negative Final   • Propoxyphene Screen 11/11/2019 Negative  Negative Final   • Buprenorphine, Screen, Urine 11/11/2019 Negative  Negative Final   • Microalbumin/Creatinine Ratio 11/11/2019    Final    Unable to calculate   • Creatinine, Urine 11/11/2019 31.5  mg/dL Final   • Microalbumin, Urine 11/11/2019 <1.2  mg/dL Final      XR Spine Lumbar 4+ View  Narrative: PROCEDURE: Lumbar spine with  "obliques.    INDICATION: lower back pain, M54.42 Lumbago with sciatica, left  side M54.41 Lumbago with sciatica, right side.    COMPARISON: None.    AP, lateral, lateral lumbosacral spot film and oblique views.    Lumbar vertebrae are normal height and well aligned. There is a  mild lower lumbar levoscoliosis.    Moderate L4-5 and advanced L5-S1 degenerative disc changes with  disc space narrowing. Mild L4-5 and moderate L5-S1 bilateral  facet arthritic change.    Pedicles, spinous processes and transverse processes intact.  Impression: Moderate L4-5 and advanced L5-S1 degenerative disc  changes.    Mild bilateral L4-5 and moderate bilateral L5-S1 facet arthritic  change.    Mild levoscoliosis.    [id number]    Electronically signed by:  Sammy Priest MD  8/8/2019 2:55 PM  CDT Workstation: [id number]    @[email or web address]@  Immunization History   Administered Date(s) Administered   • Pneumococcal Polysaccharide (PPSV23) 12/13/2018       The following portions of the patient's history were reviewed and updated as appropriate: allergies, current medications, past family history, past medical history, past social history, past surgical history and problem list.        Physical Exam  /78 (BP Location: Left arm, Patient Position: Sitting, Cuff Size: Large Adult)   Pulse 100   Temp 98.6 °F (37 °C) (Oral)   Ht 167.6 cm (66\")   Wt 135 kg (297 lb)   SpO2 99%   BMI 47.94 kg/m²       Physical Exam   Constitutional: She is oriented to person, place, and time. She appears well-developed and well-nourished.   HENT:   Head: Normocephalic and atraumatic.   Right Ear: External ear normal.   Eyes: Pupils are equal, round, and reactive to light. Conjunctivae and EOM are normal.   Neck: Normal range of motion. Neck supple.   Cardiovascular: Normal rate, regular rhythm and normal heart sounds.   No murmur heard.  Pulmonary/Chest: Effort normal and breath sounds normal. No respiratory distress.   Abdominal: Soft. Bowel sounds are " normal. She exhibits no distension. There is no tenderness.   Obese abdomen    Musculoskeletal: Normal range of motion. She exhibits tenderness. She exhibits no edema or deformity.   Neurological: She is alert and oriented to person, place, and time. No cranial nerve deficit.   Skin: Skin is warm. No rash noted. She is not diaphoretic. No erythema. No pallor.   Psychiatric: She has a normal mood and affect. Her behavior is normal.   Nursing note and vitals reviewed.      Assessment/Plan    Diagnosis Plan   1. Vitamin D deficiency     2. Uncontrolled type 2 diabetes mellitus with hyperglycemia (CMS/HCC)     3. Tobacco user     4. Seizure disorder (CMS/HCC)     5. Paranoid schizophrenia (CMS/HCC)     6. JACQUE (obstructive sleep apnea)     7. Non-compliance     8. Morbid obesity (CMS/HCC)     9. History of drug use     10. Hepatic fibrosis     11. Gastritis without bleeding, unspecified chronicity, unspecified gastritis type     12. Gastroesophageal reflux disease with esophagitis     13. Chronic hepatitis C without hepatic coma (CMS/HCC)     14. Chronic obstructive pulmonary disease, unspecified COPD type (CMS/HCC)     15. Essential hypertension     16. Episode of recurrent major depressive disorder, unspecified depression episode severity (CMS/HCC)        -went over labwork   -recommend influenza vaccination   -refer to [facility]  For Neurology and seizure disorder  -for back pain/arthritis of back/scoliosi  - history of back surgery - referral to Pain Management  X-rays of lumbar spine.  Start on neurontin 300 mg PO TID.gave 90 pills and 3 refills. Will get NERY. UDS today. Pt declined PT/OT for now. .  also on cymbalta 60 mg will raise to 90 mg daily for depression   -chronic hep C - Gastroenterology following   -for dsypnea- referred to Dr. Will (Pulmonologsit) for PFTs. Consultation appreciated. Recent chest x-ray negative. Will start on albuterol inhaler PRN.Advised pt to stop smoking. She is on spirva now  for early COPD  -multiple joint pain -- naproxen 500 mg PO BID. Pt advised to hold naproxen unless needed while taking viibryd  -for multiple joint pain - pt cannot take opoids. Will start on naproxen 500 mg twi ce a day. Drug information provided  -morbid obesity -counseled on weight loss, diet and exercise. Diet information provided. cousneled weight loss >5 minutes BMI at 47.61   -ckd stage 2 - continue to monitor. Stressed importance of diabetes control and BP control    -hyperlipdiemia- simvastatin 20  To 40 mg PO qhs. ASCVD risk high. Recommend to take with coenzyme Q10  Start on vascepa 2 grams PO BID   -for DM type 2- metformin 500 mg pO BID,  Short acting insulin 30 units before meals  along with basaglar insulin 60 units at bedtime  at bedtime.  on steglatro 15 mg daily.Advised to taper up by 3 units every 3 days until sugars at goal. Also educated today on sliding scale insulin and counting carbs before meal.  now on trulicity 1.5 mg weekly. .  recommend Endocrinology referral  Possibly may need to be on insulin pump   -schizophrenia. -depression - stopped viibryd, go up on cymbalta from 30 to 60 mg daily on risperdal 1 mg PO BID.    -seizure disorder -currently on Keppra  500 mg PO BID. Referred to Neurologist  Services appreciated. She missed appt.  Will go up on Keppra from 500 to 1000 mg twice a day refer back to Dr. Fry  -counseled pt to quit smoking >5 minutes.  Gave tobacco cessation material   -hypertension - at goal today Continue on lisinopril but will go up on dosage from 20 to 40 mg dialy.  mg Po q dominguez, metoprolol 50 mg PO BID . Stop HCTZ for now. Consider Norvasc or calcium channel blocker if not improving   -advised pt to be safe and call with any questions or concerns. All questions addressed today  -advised pt to followup with specialist and referrals  -advised pt to go to ER or call 911 if symptoms worrisome or severe   -recheck in 2 months         This document has been  electronically signed by Jermaine Ferro MD on February 17, 2020 7:32 AM

## 2020-02-06 ENCOUNTER — TRANSCRIBE ORDERS (OUTPATIENT)
Dept: PHYSICAL THERAPY | Facility: CLINIC | Age: 45
End: 2020-02-06

## 2020-02-06 DIAGNOSIS — M51.36 OTHER INTERVERTEBRAL DISC DEGENERATION, LUMBAR REGION: ICD-10-CM

## 2020-02-06 DIAGNOSIS — M54.16 RADICULOPATHY, LUMBAR REGION: Primary | ICD-10-CM

## 2020-02-07 ENCOUNTER — LAB (OUTPATIENT)
Dept: LAB | Facility: HOSPITAL | Age: 45
End: 2020-02-07

## 2020-02-07 DIAGNOSIS — E11.65 UNCONTROLLED TYPE 2 DIABETES MELLITUS WITH HYPERGLYCEMIA (HCC): ICD-10-CM

## 2020-02-07 DIAGNOSIS — I10 ESSENTIAL HYPERTENSION: ICD-10-CM

## 2020-02-07 LAB
BASOPHILS # BLD AUTO: 0.04 10*3/MM3 (ref 0–0.2)
BASOPHILS NFR BLD AUTO: 0.5 % (ref 0–1.5)
DEPRECATED RDW RBC AUTO: 41.2 FL (ref 37–54)
EOSINOPHIL # BLD AUTO: 0.1 10*3/MM3 (ref 0–0.4)
EOSINOPHIL NFR BLD AUTO: 1.2 % (ref 0.3–6.2)
ERYTHROCYTE [DISTWIDTH] IN BLOOD BY AUTOMATED COUNT: 13.2 % (ref 12.3–15.4)
HCT VFR BLD AUTO: 43.1 % (ref 34–46.6)
HGB BLD-MCNC: 14.7 G/DL (ref 12–15.9)
IMM GRANULOCYTES # BLD AUTO: 0.05 10*3/MM3 (ref 0–0.05)
IMM GRANULOCYTES NFR BLD AUTO: 0.6 % (ref 0–0.5)
LYMPHOCYTES # BLD AUTO: 2.21 10*3/MM3 (ref 0.7–3.1)
LYMPHOCYTES NFR BLD AUTO: 25.8 % (ref 19.6–45.3)
MCH RBC QN AUTO: 29.6 PG (ref 26.6–33)
MCHC RBC AUTO-ENTMCNC: 34.1 G/DL (ref 31.5–35.7)
MCV RBC AUTO: 86.9 FL (ref 79–97)
MONOCYTES # BLD AUTO: 0.45 10*3/MM3 (ref 0.1–0.9)
MONOCYTES NFR BLD AUTO: 5.3 % (ref 5–12)
NEUTROPHILS # BLD AUTO: 5.7 10*3/MM3 (ref 1.7–7)
NEUTROPHILS NFR BLD AUTO: 66.6 % (ref 42.7–76)
NRBC BLD AUTO-RTO: 0 /100 WBC (ref 0–0.2)
PLATELET # BLD AUTO: 152 10*3/MM3 (ref 140–450)
PMV BLD AUTO: 11.7 FL (ref 6–12)
RBC # BLD AUTO: 4.96 10*6/MM3 (ref 3.77–5.28)
WBC NRBC COR # BLD: 8.55 10*3/MM3 (ref 3.4–10.8)

## 2020-02-07 PROCEDURE — 83036 HEMOGLOBIN GLYCOSYLATED A1C: CPT

## 2020-02-07 PROCEDURE — 85025 COMPLETE CBC W/AUTO DIFF WBC: CPT

## 2020-02-07 PROCEDURE — 82607 VITAMIN B-12: CPT

## 2020-02-07 PROCEDURE — 80053 COMPREHEN METABOLIC PANEL: CPT

## 2020-02-07 PROCEDURE — 82306 VITAMIN D 25 HYDROXY: CPT

## 2020-02-07 PROCEDURE — 80061 LIPID PANEL: CPT

## 2020-02-08 LAB
25(OH)D3 SERPL-MCNC: 45.1 NG/ML (ref 30–100)
ALBUMIN SERPL-MCNC: 4 G/DL (ref 3.5–5.2)
ALBUMIN/GLOB SERPL: 1.1 G/DL
ALP SERPL-CCNC: 66 U/L (ref 39–117)
ALT SERPL W P-5'-P-CCNC: 22 U/L (ref 1–33)
ANION GAP SERPL CALCULATED.3IONS-SCNC: 18 MMOL/L (ref 5–15)
AST SERPL-CCNC: 24 U/L (ref 1–32)
BILIRUB SERPL-MCNC: 0.2 MG/DL (ref 0.2–1.2)
BUN BLD-MCNC: 13 MG/DL (ref 6–20)
BUN/CREAT SERPL: 18.3 (ref 7–25)
CALCIUM SPEC-SCNC: 9.7 MG/DL (ref 8.6–10.5)
CHLORIDE SERPL-SCNC: 100 MMOL/L (ref 98–107)
CHOLEST SERPL-MCNC: 121 MG/DL (ref 0–200)
CO2 SERPL-SCNC: 19 MMOL/L (ref 22–29)
CREAT BLD-MCNC: 0.71 MG/DL (ref 0.57–1)
GFR SERPL CREATININE-BSD FRML MDRD: 89 ML/MIN/1.73
GLOBULIN UR ELPH-MCNC: 3.6 GM/DL
GLUCOSE BLD-MCNC: 186 MG/DL (ref 65–99)
HBA1C MFR BLD: 9.8 % (ref 4.8–5.6)
HDLC SERPL-MCNC: 36 MG/DL (ref 40–60)
LDLC SERPL CALC-MCNC: 44 MG/DL (ref 0–100)
LDLC/HDLC SERPL: 1.23 {RATIO}
POTASSIUM BLD-SCNC: 4.7 MMOL/L (ref 3.5–5.2)
PROT SERPL-MCNC: 7.6 G/DL (ref 6–8.5)
SODIUM BLD-SCNC: 137 MMOL/L (ref 136–145)
TRIGL SERPL-MCNC: 203 MG/DL (ref 0–150)
VIT B12 BLD-MCNC: 599 PG/ML (ref 211–946)
VLDLC SERPL-MCNC: 40.6 MG/DL (ref 5–40)

## 2020-02-11 ENCOUNTER — TELEPHONE (OUTPATIENT)
Dept: FAMILY MEDICINE CLINIC | Facility: CLINIC | Age: 45
End: 2020-02-11

## 2020-02-17 ENCOUNTER — OFFICE VISIT (OUTPATIENT)
Dept: FAMILY MEDICINE CLINIC | Facility: CLINIC | Age: 45
End: 2020-02-17

## 2020-02-17 VITALS
HEIGHT: 66 IN | HEART RATE: 100 BPM | TEMPERATURE: 98.6 F | WEIGHT: 293 LBS | SYSTOLIC BLOOD PRESSURE: 112 MMHG | OXYGEN SATURATION: 99 % | BODY MASS INDEX: 47.09 KG/M2 | DIASTOLIC BLOOD PRESSURE: 78 MMHG

## 2020-02-17 DIAGNOSIS — F19.91 HISTORY OF DRUG USE: ICD-10-CM

## 2020-02-17 DIAGNOSIS — E66.01 MORBID OBESITY (HCC): ICD-10-CM

## 2020-02-17 DIAGNOSIS — E11.65 UNCONTROLLED TYPE 2 DIABETES MELLITUS WITH HYPERGLYCEMIA (HCC): Primary | ICD-10-CM

## 2020-02-17 DIAGNOSIS — K74.00 HEPATIC FIBROSIS: ICD-10-CM

## 2020-02-17 DIAGNOSIS — N18.2 STAGE 2 CHRONIC KIDNEY DISEASE: ICD-10-CM

## 2020-02-17 DIAGNOSIS — F33.9 EPISODE OF RECURRENT MAJOR DEPRESSIVE DISORDER, UNSPECIFIED DEPRESSION EPISODE SEVERITY (HCC): ICD-10-CM

## 2020-02-17 DIAGNOSIS — K29.70 GASTRITIS WITHOUT BLEEDING, UNSPECIFIED CHRONICITY, UNSPECIFIED GASTRITIS TYPE: ICD-10-CM

## 2020-02-17 DIAGNOSIS — J44.9 CHRONIC OBSTRUCTIVE PULMONARY DISEASE, UNSPECIFIED COPD TYPE (HCC): ICD-10-CM

## 2020-02-17 DIAGNOSIS — G47.33 OSA (OBSTRUCTIVE SLEEP APNEA): ICD-10-CM

## 2020-02-17 DIAGNOSIS — I10 ESSENTIAL HYPERTENSION: ICD-10-CM

## 2020-02-17 DIAGNOSIS — F20.0 PARANOID SCHIZOPHRENIA (HCC): ICD-10-CM

## 2020-02-17 DIAGNOSIS — Z91.199 NON-COMPLIANCE: ICD-10-CM

## 2020-02-17 DIAGNOSIS — E55.9 VITAMIN D DEFICIENCY: ICD-10-CM

## 2020-02-17 DIAGNOSIS — B18.2 CHRONIC HEPATITIS C WITHOUT HEPATIC COMA (HCC): ICD-10-CM

## 2020-02-17 DIAGNOSIS — G40.909 SEIZURE DISORDER (HCC): ICD-10-CM

## 2020-02-17 DIAGNOSIS — K21.00 GASTROESOPHAGEAL REFLUX DISEASE WITH ESOPHAGITIS: ICD-10-CM

## 2020-02-17 DIAGNOSIS — Z72.0 TOBACCO USER: ICD-10-CM

## 2020-02-17 PROCEDURE — 99214 OFFICE O/P EST MOD 30 MIN: CPT | Performed by: FAMILY MEDICINE

## 2020-02-17 RX ORDER — METFORMIN HYDROCHLORIDE 500 MG/1
1000 TABLET, EXTENDED RELEASE ORAL
Qty: 30 TABLET | Refills: 3 | Status: SHIPPED | OUTPATIENT
Start: 2020-02-17 | End: 2020-04-15

## 2020-02-17 RX ORDER — HYDROCODONE BITARTRATE AND ACETAMINOPHEN 7.5; 325 MG/1; MG/1
1 TABLET ORAL 3 TIMES DAILY
COMMUNITY
Start: 2020-02-06 | End: 2022-11-22 | Stop reason: ALTCHOICE

## 2020-02-17 RX ORDER — FOLIC ACID 1 MG/1
1000 TABLET ORAL DAILY
COMMUNITY
Start: 2020-01-29

## 2020-02-17 NOTE — PATIENT INSTRUCTIONS
Chronic Kidney Disease, Adult  Chronic kidney disease (CKD) occurs when the kidneys become damaged slowly over a long period of time. The kidneys are a pair of organs that do many important jobs in the body, including:  · Removing waste and extra fluid from the blood to make urine.  · Making hormones that maintain the amount of fluid in tissues and blood vessels.  · Maintaining the right amount of fluids and chemicals in the body.  A small amount of kidney damage may not cause problems, but a large amount of damage may make it hard or impossible for the kidneys to work the way they should. If steps are not taken to slow down kidney damage or to stop it from getting worse, the kidneys may stop working permanently (end-stage renal disease or ESRD). Most of the time, CKD does not go away, but it can often be controlled. People who have CKD are usually able to live normal lives.  What are the causes?  The most common causes of this condition are diabetes and high blood pressure (hypertension). Other causes include:  · Heart and blood vessel (cardiovascular) disease.  · Kidney diseases, such as:  ? Glomerulonephritis.  ? Interstitial nephritis.  ? Polycystic kidney disease.  ? Renal vascular disease.  · Diseases that affect the immune system.  · Genetic diseases.  · Medicines that damage the kidneys, such as anti-inflammatory medicines.  · Being around or being in contact with poisonous (toxic) substances.  · A kidney or urinary infection that occurs again and again (recurs).  · Vasculitis. This is swelling or inflammation of the blood vessels.  · A problem with urine flow that may be caused by:  ? Cancer.  ? Having kidney stones more than one time.  ? An enlarged prostate, in males.  What increases the risk?  You are more likely to develop this condition if you:  · Are older than age 60.  · Are female.  · Are -American, , , , or .  · Are a current or former  smoker.  · Are obese.  · Have a family history of kidney disease or failure.  · Often take medicines that are damaging to the kidneys.  What are the signs or symptoms?  Symptoms of this condition include:  · Swelling (edema) of the face, legs, ankles, or feet.  · Tiredness (lethargy) and having less energy.  · Nausea or vomiting.  · Confusion or trouble concentrating.  · Problems with urination, such as:  ? Painful or burning feeling during urination.  ? Decreased urine production.  ? Frequent urination, especially at night.  ? Bloody urine.  · Muscle twitches and cramps, especially in the legs.  · Shortness of breath.  · Weakness.  · Loss of appetite.  · Metallic taste in the mouth.  · Trouble sleeping.  · Dry, itchy skin.  · A low blood count (anemia).  · Pale lining of the eyelids and surface of the eye (conjunctiva).  Symptoms develop slowly and may not be obvious until the kidney damage becomes severe. It is possible to have kidney disease for years without having any symptoms.  How is this diagnosed?  This condition may be diagnosed based on:  · Blood tests.  · Urine tests.  · Imaging tests, such as an ultrasound or CT scan.  · A test in which a sample of tissue is removed from the kidneys to be examined under a microscope (kidney biopsy).  These test results will help your health care provider determine how serious the CKD is.  How is this treated?  There is no cure for most cases of this condition, but treatment usually relieves symptoms and prevents or slows the progression of the disease. Treatment may include:  · Making diet changes, which may require you to avoid alcohol, salty foods (sodium), and foods that are high in potassium, calcium, and protein.  · Medicines:  ? To lower blood pressure.  ? To control blood glucose.  ? To relieve anemia.  ? To relieve swelling.  ? To protect your bones.  ? To improve the balance of electrolytes in your blood.  · Removing toxic waste from the body through types of  dialysis, if the kidneys can no longer do their job (kidney failure).  · Managing any other conditions that are causing your CKD or making it worse.  Follow these instructions at home:  Medicines  · Take over-the-counter and prescription medicines only as told by your health care provider. The dose of some medicines that you take may need to be adjusted.  · Do not take any new medicines unless approved by your health care provider. Many medicines can worsen your kidney damage.  · Do not take any vitamin and mineral supplements unless approved by your health care provider. Many nutritional supplements can worsen your kidney damage.  General instructions  · Follow your prescribed diet as told by your health care provider.  · Do not use any products that contain nicotine or tobacco, such as cigarettes and e-cigarettes. If you need help quitting, ask your health care provider.  · Monitor and track your blood pressure at home. Report changes in your blood pressure as told by your health care provider.  · If you are being treated for diabetes, monitor and track your blood sugar (blood glucose) levels as told by your health care provider.  · Maintain a healthy weight. If you need help with this, ask your health care provider.  · Start or continue an exercise plan. Exercise at least 30 minutes a day, 5 days a week.  · Keep your immunizations up to date as told by your health care provider.  · Keep all follow-up visits as told by your health care provider. This is important.  Where to find more information  · American Association of Kidney Patients: www.aakp.org  · National Kidney Foundation: www.kidney.org  · [employer]: www.akfinc.org  · [facility]: www.lifeoptions.org and www.kidneyschool.org  Contact a health care provider if:  · Your symptoms get worse.  · You develop new symptoms.  Get help right away if:  · You develop symptoms of ESRD, which include:  ? Headaches.  ? Numbness in the  hands or feet.  ? Easy bruising.  ? Frequent hiccups.  ? Chest pain.  ? Shortness of breath.  ? Lack of menstruation, in women.  · You have a fever.  · You have decreased urine production.  · You have pain or bleeding when you urinate.  Summary  · Chronic kidney disease (CKD) occurs when the kidneys become damaged slowly over a long period of time.  · The most common causes of this condition are diabetes and high blood pressure (hypertension).  · There is no cure for most cases of this condition, but treatment usually relieves symptoms and prevents or slows the progression of the disease. Treatment may include a combination of medicines and lifestyle changes.  This information is not intended to replace advice given to you by your health care provider. Make sure you discuss any questions you have with your health care provider.  Document Released: 09/26/2009 Document Revised: 01/25/2018 Document Reviewed: 01/25/2018  [employer] Interactive Patient Education © 2019 [employer]

## 2020-03-27 RX ORDER — LISINOPRIL 40 MG/1
TABLET ORAL
Qty: 30 TABLET | Refills: 0 | Status: SHIPPED | OUTPATIENT
Start: 2020-03-27 | End: 2020-05-01

## 2020-03-27 RX ORDER — METOPROLOL TARTRATE 50 MG/1
TABLET, FILM COATED ORAL
Qty: 60 TABLET | Refills: 0 | Status: SHIPPED | OUTPATIENT
Start: 2020-03-27 | End: 2020-05-01

## 2020-04-09 RX ORDER — DULAGLUTIDE 1.5 MG/.5ML
INJECTION, SOLUTION SUBCUTANEOUS
Qty: 4 ML | Refills: 3 | Status: SHIPPED | OUTPATIENT
Start: 2020-04-09 | End: 2020-07-17 | Stop reason: SDUPTHER

## 2020-04-15 ENCOUNTER — OFFICE VISIT (OUTPATIENT)
Dept: FAMILY MEDICINE CLINIC | Facility: CLINIC | Age: 45
End: 2020-04-15

## 2020-04-15 VITALS — HEIGHT: 66 IN | WEIGHT: 293 LBS | BODY MASS INDEX: 47.09 KG/M2 | TEMPERATURE: 98.7 F

## 2020-04-15 DIAGNOSIS — E11.65 UNCONTROLLED TYPE 2 DIABETES MELLITUS WITH HYPERGLYCEMIA (HCC): Primary | ICD-10-CM

## 2020-04-15 DIAGNOSIS — G89.29 CHRONIC BILATERAL LOW BACK PAIN WITH BILATERAL SCIATICA: ICD-10-CM

## 2020-04-15 DIAGNOSIS — G40.909 SEIZURE DISORDER (HCC): ICD-10-CM

## 2020-04-15 DIAGNOSIS — K29.70 GASTRITIS WITHOUT BLEEDING, UNSPECIFIED CHRONICITY, UNSPECIFIED GASTRITIS TYPE: ICD-10-CM

## 2020-04-15 DIAGNOSIS — Z91.199 NON-COMPLIANCE: ICD-10-CM

## 2020-04-15 DIAGNOSIS — E55.9 VITAMIN D DEFICIENCY: ICD-10-CM

## 2020-04-15 DIAGNOSIS — Z72.0 TOBACCO USER: ICD-10-CM

## 2020-04-15 DIAGNOSIS — E66.01 MORBID OBESITY (HCC): ICD-10-CM

## 2020-04-15 DIAGNOSIS — K21.00 GASTROESOPHAGEAL REFLUX DISEASE WITH ESOPHAGITIS: ICD-10-CM

## 2020-04-15 DIAGNOSIS — F20.0 PARANOID SCHIZOPHRENIA (HCC): ICD-10-CM

## 2020-04-15 DIAGNOSIS — E11.49 OTHER DIABETIC NEUROLOGICAL COMPLICATION ASSOCIATED WITH TYPE 2 DIABETES MELLITUS (HCC): ICD-10-CM

## 2020-04-15 DIAGNOSIS — N18.2 STAGE 2 CHRONIC KIDNEY DISEASE: ICD-10-CM

## 2020-04-15 DIAGNOSIS — M54.42 CHRONIC BILATERAL LOW BACK PAIN WITH BILATERAL SCIATICA: ICD-10-CM

## 2020-04-15 DIAGNOSIS — E78.5 HYPERLIPIDEMIA, UNSPECIFIED HYPERLIPIDEMIA TYPE: ICD-10-CM

## 2020-04-15 DIAGNOSIS — M54.41 CHRONIC BILATERAL LOW BACK PAIN WITH BILATERAL SCIATICA: ICD-10-CM

## 2020-04-15 DIAGNOSIS — B18.2 CHRONIC HEPATITIS C WITHOUT HEPATIC COMA (HCC): ICD-10-CM

## 2020-04-15 DIAGNOSIS — M41.9 SCOLIOSIS, UNSPECIFIED SCOLIOSIS TYPE, UNSPECIFIED SPINAL REGION: ICD-10-CM

## 2020-04-15 DIAGNOSIS — I10 ESSENTIAL HYPERTENSION: ICD-10-CM

## 2020-04-15 DIAGNOSIS — G47.33 OSA (OBSTRUCTIVE SLEEP APNEA): ICD-10-CM

## 2020-04-15 DIAGNOSIS — F33.9 EPISODE OF RECURRENT MAJOR DEPRESSIVE DISORDER, UNSPECIFIED DEPRESSION EPISODE SEVERITY (HCC): ICD-10-CM

## 2020-04-15 PROBLEM — E11.40 DIABETIC NEUROPATHY: Status: ACTIVE | Noted: 2020-04-15

## 2020-04-15 PROCEDURE — 99214 OFFICE O/P EST MOD 30 MIN: CPT | Performed by: FAMILY MEDICINE

## 2020-04-15 RX ORDER — RISPERIDONE 2 MG/1
2 TABLET ORAL 2 TIMES DAILY
Qty: 60 TABLET | Refills: 3 | Status: SHIPPED | OUTPATIENT
Start: 2020-04-15 | End: 2020-07-17 | Stop reason: SDUPTHER

## 2020-04-26 NOTE — PROGRESS NOTES
Subjective:  Sudhakar Saul is a 44 y.o. female who presents for       Patient Active Problem List   Diagnosis   • Seizure disorder (CMS/HCC)   • Morbid obesity (CMS/HCC)   • Tobacco abuse counseling   • Multiple joint pain   • Dysuria   • History of positive hepatitis C   • Essential hypertension   • Uncontrolled type 2 diabetes mellitus with hyperglycemia (CMS/HCC)   • Vitamin D deficiency   • Urinary tract infection without hematuria   • Chronic hepatitis C without hepatic coma (CMS/HCC)   • Cigarette nicotine dependence, uncomplicated   • Chronic obstructive pulmonary disease (CMS/HCC)   • JACQUE (obstructive sleep apnea)   • Gastroesophageal reflux disease with esophagitis   • Left upper quadrant pain   • Nausea and vomiting   • Weight gain, abnormal   • Change in bowel habits   • Hepatic fibrosis   • Non-compliance   • Depression   • Paranoid schizophrenia (CMS/HCC)   • Gastritis without bleeding   • Tobacco user   • Encounter for eye exam   • Scoliosis   • History of drug use   • Stage 2 chronic kidney disease   • Diabetic neuropathy (CMS/HCC)   • Hyperlipidemia   • Chronic bilateral low back pain with bilateral sciatica           Current Outpatient Medications:   •  ADMELOG 100 UNIT/ML injection, Inject 30 Units under the skin into the appropriate area as directed 3 (Three) Times a Day Before Meals., Disp: 30 mL, Rfl: 3  •  albuterol sulfate  (90 Base) MCG/ACT inhaler, Inhale 2 puffs Every 6 (Six) Hours As Needed for Wheezing or Shortness of Air., Disp: 1 inhaler, Rfl: 3  •  cyclobenzaprine (FLEXERIL) 5 MG tablet, Take 1 tablet by mouth 3 (Three) Times a Day As Needed for Muscle Spasms., Disp: 90 tablet, Rfl: 3  •  DULoxetine (CYMBALTA) 30 MG capsule, Take 3 capsules by mouth Daily., Disp: 90 capsule, Rfl: 3  •  Ertugliflozin L-PyroglutamicAc (STEGLATRO) 15 MG tablet, Take 1 tablet by mouth Every Morning., Disp: 30 tablet, Rfl: 3  •  folic acid (FOLVITE) 1 MG tablet, Take 1,000 mcg by mouth  Daily., Disp: , Rfl:   •  gabapentin (NEURONTIN) 300 MG capsule, Take 1 capsule by mouth 3 (Three) Times a Day., Disp: 90 capsule, Rfl: 2  •  HYDROcodone-acetaminophen (NORCO) 7.5-325 MG per tablet, Take 1 tablet by mouth 2 (Two) Times a Day., Disp: , Rfl:   •  Insulin Glargine (BASAGLAR KWIKPEN) 100 UNIT/ML injection pen, Start 60 units at bedtime Titrate up by 3 units every 3 days until a.m. FSBS , Disp: 18 mL, Rfl: 6  •  levETIRAcetam (KEPPRA) 1000 MG tablet, Take 1 tablet by mouth 2 (Two) Times a Day., Disp: 60 tablet, Rfl: 3  •  lisinopril (PRINIVIL,ZESTRIL) 40 MG tablet, Take 1 tablet by mouth once daily, Disp: 30 tablet, Rfl: 0  •  metFORMIN (GLUCOPHAGE) 500 MG tablet, TAKE 1 TABLET BY MOUTH TWICE DAILY WITH MEALS, Disp: 60 tablet, Rfl: 0  •  metoprolol tartrate (LOPRESSOR) 50 MG tablet, TAKE 1 TABLET BY MOUTH EVERY 12 HOURS, Disp: 60 tablet, Rfl: 0  •  RELION PEN NEEDLES 31G X 6 MM misc, USE 1 NEEDLE 3 TO 4 TIMES DAILY, Disp: , Rfl: 3  •  risperiDONE (RisperDAL) 2 MG tablet, Take 1 tablet by mouth 2 (Two) Times a Day., Disp: 60 tablet, Rfl: 3  •  simvastatin (ZOCOR) 40 MG tablet, Take 1 tablet by mouth Every Night., Disp: 30 tablet, Rfl: 3  •  tiotropium bromide monohydrate (SPIRIVA RESPIMAT) 2.5 MCG/ACT aerosol solution inhaler, Inhale 2 puffs Daily., Disp: 1 inhaler, Rfl: 11  •  TRULICITY 1.5 MG/0.5ML solution pen-injector, INJECT 1 SYRINGE SUBCUTANEOUSLY ONCE A WEEK, Disp: 4 mL, Rfl: 3  •  vitamin D (ERGOCALCIFEROL) 79876 units capsule capsule, Take 2 caps weekly, Disp: 8 capsule, Rfl: 3    HPI        Pt is 44 yo female with management  of morbid obesity, IDDM Type 2, HTN, HLP, vitamin D deficiency, tobacco user, GERD, gastritis, esophagitis, diverticulosis, colonic polyp in sigmoid colon,  paranoid schizophrenia,  Seizure disorder, history of Illicit drug abuse, COPD, JACQUE, chronic hepatitis C , major depression, JD, sp appendectomy, sp cholecystectomy sp right carpal tunnel release, sp back  surgery, sp breast surgery, sp lipoma excision, chronic back pain (moderate L4-L5 and L5-S1 DDD changes, mild bilateral L4-L5 moderate bilateral L5-S1 facet arthritic change, mild leveoscoliosis), CKD stage 2,        2/17/20 pt is here for recheck and followup. Had labwork done on 2/7/20 that showed normal Vitamin B12 and vitamin D. Lipid panel showed improvement of trglycerides from 273 to 203 LDL stable. hga1c  Still uncontrolled at 9.80 from 9.18. CMP showed normal liver enzymes GFR is at 89 from 78.  CBC  Showed thad hemoglobin. She states her sugars havve been running 110 to 130 before breakfast and <180 2hours after meal. Has not had any seizure disorder and saw Neurologist in [facility] Keppra 1000 mg pO BID.   She also sees Pain Management and is now on Norco 7.5-325 mg BID PRN. She is still taking neurontin 300 mg PO TID. She has cut back on smoking to a few cigarettes a day. In regard to mental health she will make an appt soon. Schizophrenia is stable  She has seen an Endocrinologist before for her DM and that was several years ago in [place].       4/15/20 Telemedicine Visit today. Pt has history of uncontrolled DM type 2. She has been referred to Endocrinology and has upcoming appt on 5/6/20 with ROD Will. She is staying safe during COVID-19.  In regards to DM Type 2. She is taking basaglar 60 unites at bedtime along with admelog 30 units before meals.  Sugars running 120-130 in mornings. After meals it averages 180-200. :BP stable. She smokes curently 4-5 cigarettes a day. Seizures under control with keppra 1000 mg PO BID. in regards to schizophrenia. She still hears  Voices in head that belittles her. She is on risperdal 1 mg PO BID     4/29/20 Telemedicine Visit today for recheck on her bipolar disorder/schizophrenia.  Her risperdal 1 mg was increased to 2 mg daily. Since dosage change she has had improvement with auditory hallucinations. She still has them daily but is less  pronounced.   No suicidal or homicidal ideations currently. She has yet to make an appt with [facility] [place] [facility] for her behavioral health.  She has upcoming appt with Endocrinology soon regarding Diabetes she is now on Basaglar 62 units at bedtime.  Also on short acting insulin 30 units before meals   Along with steglaatro and trulicity.   BP has been stable.  On lisinopril and lopressor  50 mg PO BID  BID.   In regards to breathing she has yet to see her Pulmonologist for followup. Her last appt was in October 2018. She currently takes spiriva and albuterol inhaler as needed. She continues to smoke tobacco about 3-4 cigarettes a day      Back Pain   This is a chronic problem. The current episode started more than 1 year ago. The problem occurs constantly. The problem is unchanged. The pain is present in the lumbar spine, sacro-iliac and gluteal. The quality of the pain is described as aching. The pain radiates to the left thigh. The pain is at a severity of 7/10. The pain is moderate. The pain is the same all the time. The symptoms are aggravated by bending, lying down, position, sitting and standing. Stiffness is present all day. Associated symptoms include numbness, paresis, tingling and weakness. Pertinent negatives include no abdominal pain, bladder incontinence, bowel incontinence, chest pain, dysuria, fever, headaches, leg pain, paresthesias, pelvic pain or weight loss. She has tried muscle relaxant and NSAIDs (pain management/ norco percocet back surgery ) for the symptoms. The treatment provided no relief.    Mental Health Problem   The primary symptoms include delusions, hallucinations and negative symptoms. The primary symptoms do not include dysphoric mood, bizarre behavior, disorganized speech or somatic symptoms. This is a chronic problem.   The onset of the illness is precipitated by a stressful event, emotional stress, alcohol abuse and drug abuse. The degree of incapacity that she is experiencing as a consequence  of her illness is severe. Sequelae of the illness include an inability to work, an inability to care for self and harmed interpersonal relations. Additional symptoms of the illness include fatigue, agitation and seizures. Additional symptoms of the illness do not include anhedonia, insomnia, hypersomnia, appetite change, unexpected weight change, psychomotor retardation, feelings of worthlessness, attention impairment, increased goal-directed activity, flight of ideas, inflated self-esteem, decreased need for sleep, distractible, poor judgment, visual change, headaches or abdominal pain. She admits to suicidal ideas. She does not have a plan to commit suicide. She does not contemplate harming herself. She has not already injured self. She does not contemplate injuring another person. Risk factors that are present for mental illness include a history of mental illness, substance abuse, a history of suicide attempts, family violence and epilepsy.   Seizures    This is a chronic problem. The current episode started more than 1 week ago. The problem has been gradually worsening. Associated symptoms include sleepiness, confusion, headaches and visual disturbances. Pertinent negatives include no neck stiffness, no sore throat, no chest pain, no cough, no nausea, no vomiting, no diarrhea and no muscle weakness.   Obesity   This is a chronic problem. The current episode started more than 1 year ago. The problem occurs constantly. The problem has been unchanged. Associated symptoms include arthralgias, fatigue, headaches, numbness and weakness. Pertinent negatives include no abdominal pain, anorexia, change in bowel habit, chest pain, chills, congestion, coughing, fever, joint swelling, myalgias, nausea, neck pain, rash, sore throat, swollen glands, urinary symptoms, vertigo, visual change or vomiting. Nothing aggravates the symptoms. She has tried nothing for the symptoms.   Hypertension   This is a chronic problem. The  current episode started more than 1 year ago. The problem is uncontrolled. Associated symptoms include headaches. Pertinent negatives include no anxiety, blurred vision, chest pain, malaise/fatigue, neck pain, orthopnea, palpitations, peripheral edema, PND or shortness of breath. Risk factors for coronary artery disease include obesity, sedentary lifestyle, smoking/tobacco exposure and stress. Past treatments include ACE inhibitors, beta blockers and diuretics. There are no compliance problems.  There is no history of angina, kidney disease, CAD/MI, CVA, heart failure, left ventricular hypertrophy, PVD or retinopathy. There is no history of chronic renal disease, coarctation of the aorta, hyperaldosteronism, hypercortisolism, hyperparathyroidism, a hypertension causing med, pheochromocytoma, renovascular disease, sleep apnea or a thyroid problem.   Diabetes   She presents for her initial diabetic visit. She has type 2 diabetes mellitus. Her disease course has been worsening. Hypoglycemia symptoms include confusion, headaches, nervousness/anxiousness, seizures and sleepiness. Associated symptoms include fatigue and weakness. Pertinent negatives for diabetes include no blurred vision, no chest pain and no visual change. Pertinent negatives for diabetic complications include no CVA, PVD or retinopathy. Risk factors for coronary artery disease include dyslipidemia, obesity and post-menopausal. She has not had a previous visit with a dietitian. There is no change in her home blood glucose trend. An ACE inhibitor/angiotensin II receptor blocker is not being taken. She does not see a podiatrist.Eye exam is not current.   Shortness of Breath   This is a chronic problem. The current episode started more than 1 year ago. The problem occurs constantly. The problem has been gradually worsening. Associated symptoms include headaches. Pertinent negatives include no abdominal pain, chest pain, claudication, coryza, ear  pain, fever, hemoptysis, leg pain, leg swelling, neck pain, orthopnea, PND, rash, sore throat, sputum production, swollen glands, syncope, vomiting or wheezing. Nothing aggravates the symptoms. The patient has no known risk factors for DVT/PE. She has tried nothing for the symptoms. The treatment provided no relief. There is no history of allergies, aspirin allergies, asthma, bronchiolitis, CAD, chronic lung disease, COPD, DVT, a heart failure, PE, pneumonia or a recent surgery.     Review of Systems  Review of Systems   Constitutional: Positive for activity change and fatigue. Negative for appetite change, chills, diaphoresis and fever.   HENT: Negative for congestion, postnasal drip, rhinorrhea, sinus pressure, sinus pain, sneezing, sore throat, trouble swallowing and voice change.    Respiratory: Positive for cough and shortness of breath. Negative for choking, chest tightness, wheezing and stridor.    Cardiovascular: Negative for chest pain.   Gastrointestinal: Negative for diarrhea, nausea and vomiting.   Musculoskeletal: Positive for arthralgias, back pain and gait problem.   Neurological: Positive for seizures, weakness and numbness. Negative for headaches.   Psychiatric/Behavioral: Positive for agitation, behavioral problems and decreased concentration. The patient is nervous/anxious.         Depressed mood        Patient Active Problem List   Diagnosis   • Seizure disorder (CMS/HCC)   • Morbid obesity (CMS/HCC)   • Tobacco abuse counseling   • Multiple joint pain   • Dysuria   • History of positive hepatitis C   • Essential hypertension   • Uncontrolled type 2 diabetes mellitus with hyperglycemia (CMS/HCC)   • Vitamin D deficiency   • Urinary tract infection without hematuria   • Chronic hepatitis C without hepatic coma (CMS/HCC)   • Cigarette nicotine dependence, uncomplicated   • Chronic obstructive pulmonary disease (CMS/HCC)   • JACQUE (obstructive sleep apnea)   • Gastroesophageal reflux disease with  esophagitis   • Left upper quadrant pain   • Nausea and vomiting   • Weight gain, abnormal   • Change in bowel habits   • Hepatic fibrosis   • Non-compliance   • Depression   • Paranoid schizophrenia (CMS/HCC)   • Gastritis without bleeding   • Tobacco user   • Encounter for eye exam   • Scoliosis   • History of drug use   • Stage 2 chronic kidney disease   • Diabetic neuropathy (CMS/HCC)   • Hyperlipidemia   • Chronic bilateral low back pain with bilateral sciatica     Past Surgical History:   Procedure Laterality Date   • APPENDECTOMY     • BACK SURGERY     • BREAST SURGERY     • CARPAL TUNNEL RELEASE      right hand   • CHOLECYSTECTOMY     • COLONOSCOPY     • COLONOSCOPY N/A 2/8/2019    Procedure: COLONOSCOPY;  Surgeon: Joni Delacruz MD;  Location: [facility] ENDOSCOPY;  Service: Gastroenterology   • DENTAL PROCEDURE     • ENDOSCOPY N/A 2/8/2019    Procedure: ESOPHAGOGASTRODUODENOSCOPY--poss dilation;  Surgeon: Joni Delacruz MD;  Location: [facility] ENDOSCOPY;  Service: Gastroenterology   • HYSTERECTOMY     • LIPOMA EXCISION      9   • LIVER BIOPSY     • UPPER GASTROINTESTINAL ENDOSCOPY  02/08/2019     Social History     Socioeconomic History   • Marital status:      Spouse name: Not on file   • Number of children: Not on file   • Years of education: Not on file   • Highest education level: Not on file   Tobacco Use   • Smoking status: Current Every Day Smoker     Packs/day: 0.25     Types: Cigarettes   • Smokeless tobacco: Never Used   Substance and Sexual Activity   • Alcohol use: No   • Drug use: No   • Sexual activity: Defer     Family History   Problem Relation Age of Onset   • Hypertension Other    • Diabetes Other    • Stroke Other    • Cancer Other    • Kidney disease Other    • Lung disease Other    • Other Other    • Malone's esophagus Other    • Colon polyps Other    • Heart disease Other    • Ulcers Other    • Cholelithiasis Other    • Allergy (severe) Other      Lab on 02/07/2020   Component  Date Value Ref Range Status   • WBC 02/07/2020 8.55  3.40 - 10.80 10*3/mm3 Final   • RBC 02/07/2020 4.96  3.77 - 5.28 10*6/mm3 Final   • Hemoglobin 02/07/2020 14.7  12.0 - 15.9 g/dL Final   • Hematocrit 02/07/2020 43.1  34.0 - 46.6 % Final   • MCV 02/07/2020 86.9  79.0 - 97.0 fL Final   • MCH 02/07/2020 29.6  26.6 - 33.0 pg Final   • MCHC 02/07/2020 34.1  31.5 - 35.7 g/dL Final   • RDW 02/07/2020 13.2  12.3 - 15.4 % Final   • RDW-SD 02/07/2020 41.2  37.0 - 54.0 fl Final   • MPV 02/07/2020 11.7  6.0 - 12.0 fL Final   • Platelets 02/07/2020 152  140 - 450 10*3/mm3 Final   • Neutrophil % 02/07/2020 66.6  42.7 - 76.0 % Final   • Lymphocyte % 02/07/2020 25.8  19.6 - 45.3 % Final   • Monocyte % 02/07/2020 5.3  5.0 - 12.0 % Final   • Eosinophil % 02/07/2020 1.2  0.3 - 6.2 % Final   • Basophil % 02/07/2020 0.5  0.0 - 1.5 % Final   • Immature Grans % 02/07/2020 0.6* 0.0 - 0.5 % Final   • Neutrophils, Absolute 02/07/2020 5.70  1.70 - 7.00 10*3/mm3 Final   • Lymphocytes, Absolute 02/07/2020 2.21  0.70 - 3.10 10*3/mm3 Final   • Monocytes, Absolute 02/07/2020 0.45  0.10 - 0.90 10*3/mm3 Final   • Eosinophils, Absolute 02/07/2020 0.10  0.00 - 0.40 10*3/mm3 Final   • Basophils, Absolute 02/07/2020 0.04  0.00 - 0.20 10*3/mm3 Final   • Immature Grans, Absolute 02/07/2020 0.05  0.00 - 0.05 10*3/mm3 Final   • nRBC 02/07/2020 0.0  0.0 - 0.2 /100 WBC Final   • Glucose 02/07/2020 186* 65 - 99 mg/dL Final   • BUN 02/07/2020 13  6 - 20 mg/dL Final   • Creatinine 02/07/2020 0.71  0.57 - 1.00 mg/dL Final   • Sodium 02/07/2020 137  136 - 145 mmol/L Final   • Potassium 02/07/2020 4.7  3.5 - 5.2 mmol/L Final   • Chloride 02/07/2020 100  98 - 107 mmol/L Final   • CO2 02/07/2020 19.0* 22.0 - 29.0 mmol/L Final   • Calcium 02/07/2020 9.7  8.6 - 10.5 mg/dL Final   • Total Protein 02/07/2020 7.6  6.0 - 8.5 g/dL Final   • Albumin 02/07/2020 4.00  3.50 - 5.20 g/dL Final   • ALT (SGPT) 02/07/2020 22  1 - 33 U/L Final   • AST (SGOT) 02/07/2020 24  1 - 32  U/L Final   • Alkaline Phosphatase 02/07/2020 66  39 - 117 U/L Final   • Total Bilirubin 02/07/2020 0.2  0.2 - 1.2 mg/dL Final   • eGFR Non African Amer 02/07/2020 89  >60 mL/min/1.73 Final   • Globulin 02/07/2020 3.6  gm/dL Final   • A/G Ratio 02/07/2020 1.1  g/dL Final   • BUN/Creatinine Ratio 02/07/2020 18.3  7.0 - 25.0 Final   • Anion Gap 02/07/2020 18.0* 5.0 - 15.0 mmol/L Final   • Hemoglobin A1C 02/07/2020 9.80* 4.80 - 5.60 % Final   • Total Cholesterol 02/07/2020 121  0 - 200 mg/dL Final   • Triglycerides 02/07/2020 203* 0 - 150 mg/dL Final   • HDL Cholesterol 02/07/2020 36* 40 - 60 mg/dL Final   • LDL Cholesterol  02/07/2020 44  0 - 100 mg/dL Final   • VLDL Cholesterol 02/07/2020 40.6* 5 - 40 mg/dL Final   • LDL/HDL Ratio 02/07/2020 1.23   Final   • 25 Hydroxy, Vitamin D 02/07/2020 45.1  30.0 - 100.0 ng/ml Final   • Vitamin B-12 02/07/2020 599  211 - 946 pg/mL Final   Lab on 11/11/2019   Component Date Value Ref Range Status   • WBC 11/11/2019 9.60  3.40 - 10.80 10*3/mm3 Final   • RBC 11/11/2019 4.99  3.77 - 5.28 10*6/mm3 Final   • Hemoglobin 11/11/2019 14.5  12.0 - 15.9 g/dL Final   • Hematocrit 11/11/2019 44.3  34.0 - 46.6 % Final   • MCV 11/11/2019 88.8  79.0 - 97.0 fL Final   • MCH 11/11/2019 29.1  26.6 - 33.0 pg Final   • MCHC 11/11/2019 32.7  31.5 - 35.7 g/dL Final   • RDW 11/11/2019 14.1  12.3 - 15.4 % Final   • RDW-SD 11/11/2019 45.0  37.0 - 54.0 fl Final   • MPV 11/11/2019 12.0  6.0 - 12.0 fL Final   • Platelets 11/11/2019 151  140 - 450 10*3/mm3 Final   • Neutrophil % 11/11/2019 67.4  42.7 - 76.0 % Final   • Lymphocyte % 11/11/2019 25.4  19.6 - 45.3 % Final   • Monocyte % 11/11/2019 4.3* 5.0 - 12.0 % Final   • Eosinophil % 11/11/2019 1.7  0.3 - 6.2 % Final   • Basophil % 11/11/2019 0.5  0.0 - 1.5 % Final   • Immature Grans % 11/11/2019 0.7* 0.0 - 0.5 % Final   • Neutrophils, Absolute 11/11/2019 6.47  1.70 - 7.00 10*3/mm3 Final   • Lymphocytes, Absolute 11/11/2019 2.44  0.70 - 3.10 10*3/mm3 Final    • Monocytes, Absolute 11/11/2019 0.41  0.10 - 0.90 10*3/mm3 Final   • Eosinophils, Absolute 11/11/2019 0.16  0.00 - 0.40 10*3/mm3 Final   • Basophils, Absolute 11/11/2019 0.05  0.00 - 0.20 10*3/mm3 Final   • Immature Grans, Absolute 11/11/2019 0.07* 0.00 - 0.05 10*3/mm3 Final   • nRBC 11/11/2019 0.1  0.0 - 0.2 /100 WBC Final   • Glucose 11/11/2019 270* 65 - 99 mg/dL Final   • BUN 11/11/2019 15  6 - 20 mg/dL Final   • Creatinine 11/11/2019 0.80  0.57 - 1.00 mg/dL Final   • Sodium 11/11/2019 136  136 - 145 mmol/L Final   • Potassium 11/11/2019 4.6  3.5 - 5.2 mmol/L Final   • Chloride 11/11/2019 99  98 - 107 mmol/L Final   • CO2 11/11/2019 23.1  22.0 - 29.0 mmol/L Final   • Calcium 11/11/2019 9.8  8.6 - 10.5 mg/dL Final   • Total Protein 11/11/2019 8.0  6.0 - 8.5 g/dL Final   • Albumin 11/11/2019 4.00  3.50 - 5.20 g/dL Final   • ALT (SGPT) 11/11/2019 19  1 - 33 U/L Final   • AST (SGOT) 11/11/2019 17  1 - 32 U/L Final   • Alkaline Phosphatase 11/11/2019 69  39 - 117 U/L Final   • Total Bilirubin 11/11/2019 0.2  0.2 - 1.2 mg/dL Final   • eGFR Non African Amer 11/11/2019 78  >60 mL/min/1.73 Final   • Globulin 11/11/2019 4.0  gm/dL Final   • A/G Ratio 11/11/2019 1.0  g/dL Final   • BUN/Creatinine Ratio 11/11/2019 18.8  7.0 - 25.0 Final   • Anion Gap 11/11/2019 13.9  5.0 - 15.0 mmol/L Final   • Hemoglobin A1C 11/11/2019 9.18* 4.80 - 5.60 % Final   • Total Cholesterol 11/11/2019 130  0 - 200 mg/dL Final   • Triglycerides 11/11/2019 273* 0 - 150 mg/dL Final   • HDL Cholesterol 11/11/2019 37* 40 - 60 mg/dL Final   • LDL Cholesterol  11/11/2019 38  0 - 100 mg/dL Final   • VLDL Cholesterol 11/11/2019 54.6* 5 - 40 mg/dL Final   • LDL/HDL Ratio 11/11/2019 1.04   Final   • 25 Hydroxy, Vitamin D 11/11/2019 34.0  30.0 - 100.0 ng/ml Final   • Vitamin B-12 11/11/2019 547  211 - 946 pg/mL Final   • THC, Screen, Urine 11/11/2019 Negative  Negative Final   • Phencyclidine (PCP), Urine 11/11/2019 Negative  Negative Final   • Cocaine  Screen, Urine 11/11/2019 Negative  Negative Final   • Methamphetamine, Ur 11/11/2019 Negative  Negative Final   • Opiate Screen 11/11/2019 Negative  Negative Final   • Amphetamine Screen, Urine 11/11/2019 Negative  Negative Final   • Benzodiazepine Screen, Urine 11/11/2019 Negative  Negative Final   • Tricyclic Antidepressants Screen 11/11/2019 Negative  Negative Final   • Methadone Screen, Urine 11/11/2019 Negative  Negative Final   • Barbiturates Screen, Urine 11/11/2019 Negative  Negative Final   • Oxycodone Screen, Urine 11/11/2019 Negative  Negative Final   • Propoxyphene Screen 11/11/2019 Negative  Negative Final   • Buprenorphine, Screen, Urine 11/11/2019 Negative  Negative Final   • Microalbumin/Creatinine Ratio 11/11/2019    Final    Unable to calculate   • Creatinine, Urine 11/11/2019 31.5  mg/dL Final   • Microalbumin, Urine 11/11/2019 <1.2  mg/dL Final      XR Spine Lumbar 4+ View  Narrative: PROCEDURE: Lumbar spine with obliques.    INDICATION: lower back pain, M54.42 Lumbago with sciatica, left  side M54.41 Lumbago with sciatica, right side.    COMPARISON: None.    AP, lateral, lateral lumbosacral spot film and oblique views.    Lumbar vertebrae are normal height and well aligned. There is a  mild lower lumbar levoscoliosis.    Moderate L4-5 and advanced L5-S1 degenerative disc changes with  disc space narrowing. Mild L4-5 and moderate L5-S1 bilateral  facet arthritic change.    Pedicles, spinous processes and transverse processes intact.  Impression: Moderate L4-5 and advanced L5-S1 degenerative disc  changes.    Mild bilateral L4-5 and moderate bilateral L5-S1 facet arthritic  change.    Mild levoscoliosis.    [id number]    Electronically signed by:  Sammy Priest MD  8/8/2019 2:55 PM  CDT Workstation: [id number]    [unfilled]  Immunization History   Administered Date(s) Administered   • Pneumococcal Polysaccharide (PPSV23) 12/13/2018       The following portions of the patient's history were  reviewed and updated as appropriate: allergies, current medications, past family history, past medical history, past social history, past surgical history and problem list.        Physical Exam  Physical Exam   Constitutional: She is oriented to person, place, and time. She appears well-developed and well-nourished.   HENT:   Head: Normocephalic and atraumatic.   Right Ear: External ear normal.   Eyes: Pupils are equal, round, and reactive to light. Conjunctivae and EOM are normal.   Neck: Normal range of motion. Neck supple.   Cardiovascular: Normal rate, regular rhythm and normal heart sounds.   No murmur heard.  Pulmonary/Chest: Effort normal and breath sounds normal. No respiratory distress.   Abdominal: Soft. Bowel sounds are normal. She exhibits no distension. There is no tenderness.   Musculoskeletal: Normal range of motion. She exhibits no edema or deformity.   Neurological: She is alert and oriented to person, place, and time. No cranial nerve deficit.   Skin: Skin is warm. No rash noted. She is not diaphoretic. No erythema. No pallor.   Psychiatric: She has a normal mood and affect. Her behavior is normal.   Nursing note and vitals reviewed.      Assessment/Plan    Diagnosis Plan   1. Chronic obstructive pulmonary disease, unspecified COPD type (CMS/[facility])  Ambulatory Referral to Pulmonology   2. Vitamin D deficiency     3. Uncontrolled type 2 diabetes mellitus with hyperglycemia (CMS/[facility])     4. Tobacco user     5. Stage 2 chronic kidney disease     6. Seizure disorder (CMS/[facility])     7. Paranoid schizophrenia (CMS/[facility])     8. JACQUE (obstructive sleep apnea)     9. Morbid obesity (CMS/[facility])     10. Hyperlipidemia, unspecified hyperlipidemia type     11. Gastritis without bleeding, unspecified chronicity, unspecified gastritis type     12. Gastroesophageal reflux disease with esophagitis     13. Essential hypertension     14. Episode of recurrent major depressive disorder, unspecified depression episode severity  (CMS/HCC)     15. Chronic bilateral low back pain with bilateral sciatica     16. Dyspnea on exertion  Ambulatory Referral to Pulmonology        -went over labwork   -recommend influenza vaccination   -refer to [facility]eb[facility]i  For Neurology and seizure disorder  -for back pain/arthritis of back/scoliosis - history of back surgery - referral to Pain Management  X-rays of lumbar spine.  Start on neurontin 300 mg PO TID.gave 90 pills and 3 refills. Will get NERY. UDS today. Pt declined PT/OT for now. .  also on cymbalta 60 mg will raise to 90 mg daily for depression   -chronic hep C - Gastroenterology following   -for dsypnea- referred to Dr. Will (Pulmonologsit) for PFTs. Consultation appreciated. Recent chest x-ray negative. Will start on albuterol inhaler PRN.Advised pt to stop smoking. She is on spirva now for early COPD  -multiple joint pain -- naproxen 500 mg PO BID. Pt advised to hold naproxen unless needed while taking viibryd  -for multiple joint pain - pt cannot take opoids. Will start on naproxen 500 mg twi ce a day. Drug information provided  -morbid obesity -counseled on weight loss, diet and exercise. Diet information provided. cousneled weight loss >5 minutes BMI at 47.61   -tobacco user - Counseled quit smoking >5 minutes   -ckd stage 2 - continue to monitor. Stressed importance of diabetes control and BP control    -hyperlipdiemia- simvastatin 20  To 40 mg PO qhs. ASCVD risk high. Recommend to take with coenzyme Q10  Start on vascepa 2 grams PO BID   -dyspnea/early COPD - pt will need to make appt with Pulmonologist. Needs new PFTs. Urged smoking cessation >5 minutes.  Continue spiriva and albuterol inhaler. May have worsening COPD. Will start on Breo 100 mcg 1 puff daily to see if it improves symptoms.  -for DM type 2- metformin 500 mg pO BID,  Short acting insulin 30 units before meals  along with basaglar insulin 62 units at bedtime  at bedtime.  on steglatro 15 mg daily.Advised to taper up by 3  units every 3 days until sugars at goal. Also educated today on sliding scale insulin and counting carbs before meal.  now on trulicity 1.5 mg weekly. .  recommend Endocrinology referral  Possibly may need to be on insulin pump. Has upcoming appt sono   -schizophrenia. -depression - stopped viibryd, on  cymbalta 90 mg daily. Now on risperdal 2 mg PO q daily. On    -seizure disorder -currently on Keppra  500 mg PO BID. Referred to Neurologist  Services appreciated. She missed appt.  Will go up on Keppra from 500 to 1000 mg twice a day refer back to Dr. Fry  -counseled pt to quit smoking >5 minutes.  Gave tobacco cessation material   -hypertension - at goal today Continue on lisinopril but will go up on dosage from 20 to 40 mg dialy.  mg Po q dominguez, metoprolol 50 mg PO BID . Stop HCTZ for now. Consider Norvasc or calcium channel blocker if not improving   -advised pt to be safe and call with any questions or concerns. All questions addressed today  -advised pt to followup with specialist and referrals  -advised pt to go to ER or call 911 if symptoms worrisome or severe   -advised pt to be safe during COVID-19 pandemic  This visit has been rescheduled as a phone visit to comply with patient safety concerns in accordance with CDC recommendations. Total time of discussion was 25 minutes.  -recheck in 1 month         This document has been electronically signed by Jermaine Ferro MD on April 29, 2020 10:55

## 2020-04-29 ENCOUNTER — OFFICE VISIT (OUTPATIENT)
Dept: FAMILY MEDICINE CLINIC | Facility: CLINIC | Age: 45
End: 2020-04-29

## 2020-04-29 VITALS
HEIGHT: 66 IN | DIASTOLIC BLOOD PRESSURE: 84 MMHG | BODY MASS INDEX: 47.09 KG/M2 | HEART RATE: 96 BPM | SYSTOLIC BLOOD PRESSURE: 126 MMHG | WEIGHT: 293 LBS

## 2020-04-29 DIAGNOSIS — G47.33 OSA (OBSTRUCTIVE SLEEP APNEA): ICD-10-CM

## 2020-04-29 DIAGNOSIS — M54.41 CHRONIC BILATERAL LOW BACK PAIN WITH BILATERAL SCIATICA: ICD-10-CM

## 2020-04-29 DIAGNOSIS — Z72.0 TOBACCO USER: ICD-10-CM

## 2020-04-29 DIAGNOSIS — J44.9 CHRONIC OBSTRUCTIVE PULMONARY DISEASE, UNSPECIFIED COPD TYPE (HCC): Primary | ICD-10-CM

## 2020-04-29 DIAGNOSIS — F20.0 PARANOID SCHIZOPHRENIA (HCC): ICD-10-CM

## 2020-04-29 DIAGNOSIS — E11.65 UNCONTROLLED TYPE 2 DIABETES MELLITUS WITH HYPERGLYCEMIA (HCC): ICD-10-CM

## 2020-04-29 DIAGNOSIS — E78.5 HYPERLIPIDEMIA, UNSPECIFIED HYPERLIPIDEMIA TYPE: ICD-10-CM

## 2020-04-29 DIAGNOSIS — N18.2 STAGE 2 CHRONIC KIDNEY DISEASE: ICD-10-CM

## 2020-04-29 DIAGNOSIS — K21.00 GASTROESOPHAGEAL REFLUX DISEASE WITH ESOPHAGITIS: ICD-10-CM

## 2020-04-29 DIAGNOSIS — E55.9 VITAMIN D DEFICIENCY: ICD-10-CM

## 2020-04-29 DIAGNOSIS — G89.29 CHRONIC BILATERAL LOW BACK PAIN WITH BILATERAL SCIATICA: ICD-10-CM

## 2020-04-29 DIAGNOSIS — I10 ESSENTIAL HYPERTENSION: ICD-10-CM

## 2020-04-29 DIAGNOSIS — F33.9 EPISODE OF RECURRENT MAJOR DEPRESSIVE DISORDER, UNSPECIFIED DEPRESSION EPISODE SEVERITY (HCC): ICD-10-CM

## 2020-04-29 DIAGNOSIS — E66.01 MORBID OBESITY (HCC): ICD-10-CM

## 2020-04-29 DIAGNOSIS — R06.09 DYSPNEA ON EXERTION: ICD-10-CM

## 2020-04-29 DIAGNOSIS — K29.70 GASTRITIS WITHOUT BLEEDING, UNSPECIFIED CHRONICITY, UNSPECIFIED GASTRITIS TYPE: ICD-10-CM

## 2020-04-29 DIAGNOSIS — G40.909 SEIZURE DISORDER (HCC): ICD-10-CM

## 2020-04-29 DIAGNOSIS — M54.42 CHRONIC BILATERAL LOW BACK PAIN WITH BILATERAL SCIATICA: ICD-10-CM

## 2020-04-29 PROCEDURE — 99213 OFFICE O/P EST LOW 20 MIN: CPT | Performed by: FAMILY MEDICINE

## 2020-05-01 RX ORDER — METOPROLOL TARTRATE 50 MG/1
TABLET, FILM COATED ORAL
Qty: 60 TABLET | Refills: 3 | Status: SHIPPED | OUTPATIENT
Start: 2020-05-01 | End: 2020-07-17 | Stop reason: SDUPTHER

## 2020-05-01 RX ORDER — LISINOPRIL 40 MG/1
TABLET ORAL
Qty: 30 TABLET | Refills: 3 | Status: SHIPPED | OUTPATIENT
Start: 2020-05-01 | End: 2020-07-17 | Stop reason: SDUPTHER

## 2020-05-06 ENCOUNTER — OFFICE VISIT (OUTPATIENT)
Dept: ENDOCRINOLOGY | Facility: CLINIC | Age: 45
End: 2020-05-06

## 2020-05-06 VITALS
BODY MASS INDEX: 47.09 KG/M2 | SYSTOLIC BLOOD PRESSURE: 152 MMHG | DIASTOLIC BLOOD PRESSURE: 88 MMHG | HEIGHT: 66 IN | WEIGHT: 293 LBS

## 2020-05-06 DIAGNOSIS — E78.2 MIXED HYPERLIPIDEMIA: ICD-10-CM

## 2020-05-06 DIAGNOSIS — I10 ESSENTIAL HYPERTENSION: Primary | ICD-10-CM

## 2020-05-06 DIAGNOSIS — E11.65 UNCONTROLLED TYPE 2 DIABETES MELLITUS WITH HYPERGLYCEMIA (HCC): ICD-10-CM

## 2020-05-06 PROCEDURE — 95250 CONT GLUC MNTR PHYS/QHP EQP: CPT | Performed by: NURSE PRACTITIONER

## 2020-05-06 PROCEDURE — 99214 OFFICE O/P EST MOD 30 MIN: CPT | Performed by: NURSE PRACTITIONER

## 2020-05-06 NOTE — PROGRESS NOTES
Subjective    HPI     This is an office visit    Referring provider Jermaine Ferro MD    Reason - diabetes       Duration/Timing -diagnosed in her late 20s/constant       Quality -not controlled       Severity - high due to complications    Complications, neuropathy, hyperglycemia       Severity (Complication/Hospitalizations)        Secondary Macrovascular-- no CAD, no PAD, no CVA          Secondary Microvascular--- neuropathy, mild diabetic retinopathy , CKD stage II    COPD , hepatitis C     Context--- found during pre-op evaluation bg was 300          Diabetes Regimen         Lab Results   Component Value Date    HGBA1C 9.80 (H) 02/07/2020           Blood Glucose Readings    Checking 4 times daily     Am fasting 110 up to 130     During the day up to 200     occasional low       Diet-       Trying to watch carbs     Exercises    Trying to walking         Associated Signs/Symptoms         Hyperglycemic Symptoms:        Hypoglycemic Episodes:       Review of Systems  Review of Systems   Constitutional: Negative for activity change, appetite change, chills, diaphoresis, fatigue, unexpected weight gain and unexpected weight loss.   HENT: Negative for congestion, dental problem, drooling, ear discharge, sore throat, swollen glands, tinnitus, trouble swallowing and voice change.    Eyes: Negative for blurred vision, double vision, photophobia, pain, discharge, redness, itching and visual disturbance.   Respiratory: Negative for apnea, cough, choking, chest tightness and shortness of breath.    Cardiovascular: Negative for chest pain, palpitations and leg swelling.   Gastrointestinal: Negative for abdominal distention, abdominal pain, blood in stool, constipation, diarrhea, nausea and vomiting.   Endocrine: Negative for cold intolerance, heat intolerance, polydipsia, polyphagia and polyuria.   Genitourinary: Negative for decreased libido, decreased urine volume, difficulty urinating, dysuria, frequency and urgency.      Musculoskeletal: Negative for arthralgias, back pain, gait problem, joint swelling, myalgias, neck pain, neck stiffness and bursitis.   Skin: Negative for color change, dry skin, pallor, rash and bruise.   Allergic/Immunologic: Negative for environmental allergies, food allergies and immunocompromised state.   Neurological: Negative for dizziness, tremors, syncope, facial asymmetry, weakness, numbness, memory problem and confusion.   Hematological: Negative for adenopathy. Does not bruise/bleed easily.   Psychiatric/Behavioral: Negative for agitation, behavioral problems, decreased concentration, sleep disturbance, suicidal ideas, depressed mood and stress. The patient is not nervous/anxious.          Wt Readings from Last 3 Encounters:   05/06/20 133 kg (293 lb 9.6 oz)   04/29/20 136 kg (300 lb)   04/15/20 136 kg (300 lb)     Temp Readings from Last 3 Encounters:   04/15/20 98.7 °F (37.1 °C) (Oral)   02/17/20 98.6 °F (37 °C) (Oral)   01/06/20 98.5 °F (36.9 °C) (Oral)     BP Readings from Last 3 Encounters:   05/06/20 152/88   04/29/20 126/84   02/17/20 112/78     Pulse Readings from Last 3 Encounters:   04/29/20 96   02/17/20 100   01/06/20 112         Physical Exam  Physical Exam   Constitutional: She is oriented to person, place, and time. She appears well-developed and well-nourished. No distress.   HENT:   Head: Normocephalic and atraumatic.   Right Ear: External ear normal.   Left Ear: External ear normal.   Nose: Nose normal.   Eyes: Pupils are equal, round, and reactive to light. Conjunctivae and EOM are normal.   Neck: Normal range of motion. Neck supple. No tracheal deviation present. No thyromegaly present.   Cardiovascular: Normal rate, regular rhythm and normal heart sounds.   No murmur heard.  Pulmonary/Chest: Effort normal and breath sounds normal. No respiratory distress. She has no wheezes.   Abdominal: Soft. Bowel sounds are normal. There is no tenderness. There is no rebound and no guarding.    Musculoskeletal: Normal range of motion. She exhibits no edema, tenderness or deformity.   Neurological: She is alert and oriented to person, place, and time. No cranial nerve deficit.   Skin: Skin is warm and dry. No rash noted.   Psychiatric: She has a normal mood and affect. Her behavior is normal. Judgment and thought content normal.         Assessment/Plan       Diagnosis Plan   1. Essential hypertension     2. Uncontrolled type 2 diabetes mellitus with hyperglycemia (CMS/[facility])     3. Mixed hyperlipidemia           Glycemic Management    Diabetes mellitus type 2 not controlled       Lab Results   Component Value Date    HGBA1C 9.80 (H) 02/07/2020         basaglar taking 63 units       admelog taking 20 and 30 units TID     Change to carb count 4 units per 15 grams of CHO    Plus sliding scale    2 per 50 above 150       Adjust based on 2 hour post meal BG     trulicity 1.5 mg weekly    Metformin 1000 mg po BID     Steglatro 15 mg one daily           Aim for 45 grams of Carbohydrate for meals     Aim for 15 grams of Carbohydrate for snack       Uncontrolled diabetes  Hyperglycemia    Insertion of continuous glucose monitor to define patter    The continuous glucose monitor that was inserted is a tika glucose monitor       Approve for  Insulin pump and or Continuous Glucose Sensor      #1  Patient has diabetes mellitus, insulin-dependent.     #2 She performs blood glucose testing at least times daily and blood glucose log was brought to office with variability from .     #3  She is requiring  Basal insulin  and Prandial Insulin for a total of at least  4 injections per day and has been doing this for at least 6 months      #4 Patient tests blood sugars at least 4 times daily and makes frequent self-adjustments and patient is injecting insulin at least 4 times daily. She has been doing this for more than 6 months . She tests frequently due to hypoglycemia and hyperglycemia.      #5 I have personally seen  patient within the past 6 months     #6 We plan on seeing her every 2-3 months for continuous adjustment of her diabetes regimen      #7 patient has hypoglycemia with episodes of unawareness.     #8 patient has day-to-day variation in her mealtime which confounds the degree of insulin dosing with multiple daily injections.     #9 patient has completed diabetes education program with us.     #10 she has demonstrated the ability to self monitor her glucose.         #11 Patient is motivated in improving  diabetes control             Lipid Management        Simvastatin 40 mg     Lab Results   Component Value Date    CHOL 121 02/07/2020     Lab Results   Component Value Date    TRIG 203 (H) 02/07/2020     Lab Results   Component Value Date    HDL 36 (L) 02/07/2020     Lab Results   Component Value Date    LDL 44 02/07/2020     Lab Results   Component Value Date    VLDL 40.6 (H) 02/07/2020     Lab Results   Component Value Date    LDLHDL 1.23 02/07/2020       Blood Pressure Management      Lisinopril 40 mg daily           Microvascular Complication Monitoring      Last eye exam - April 2019 , overdue to postponed due to pandemic     Lab Results   Component Value Date    MICROALBUR <1.2 11/11/2019         Neuropathy      Gabapentin 300 mg tid       Bone Health      Component      Latest Ref Rng & Units 2/7/2020   25 Hydroxy, Vitamin D      30.0 - 100.0 ng/ml 45.1         Other Diabetes Related Aspects    Lab Results   Component Value Date    CVORRAVQ39 599 02/07/2020         Thyroid health       Lab Results   Component Value Date    TSH 2.520 05/17/2019         Return in about 1 week (around 5/13/2020).      Records from  received and reviewed from 2020  Thank you for this consultation               This document has been electronically signed by ROD Cahwla on May 6, 2020 13:25

## 2020-05-06 NOTE — PATIENT INSTRUCTIONS

## 2020-05-15 ENCOUNTER — OFFICE VISIT (OUTPATIENT)
Dept: ENDOCRINOLOGY | Facility: CLINIC | Age: 45
End: 2020-05-15

## 2020-05-15 ENCOUNTER — TELEPHONE (OUTPATIENT)
Dept: ENDOCRINOLOGY | Facility: CLINIC | Age: 45
End: 2020-05-15

## 2020-05-15 VITALS
HEIGHT: 66 IN | DIASTOLIC BLOOD PRESSURE: 86 MMHG | BODY MASS INDEX: 47.09 KG/M2 | SYSTOLIC BLOOD PRESSURE: 142 MMHG | RESPIRATION RATE: 22 BRPM | WEIGHT: 293 LBS

## 2020-05-15 DIAGNOSIS — Z79.4 TYPE 2 DIABETES MELLITUS WITH HYPERGLYCEMIA, WITH LONG-TERM CURRENT USE OF INSULIN (HCC): Primary | ICD-10-CM

## 2020-05-15 DIAGNOSIS — E11.65 TYPE 2 DIABETES MELLITUS WITH HYPERGLYCEMIA, WITH LONG-TERM CURRENT USE OF INSULIN (HCC): Primary | ICD-10-CM

## 2020-05-15 DIAGNOSIS — E78.2 MIXED HYPERLIPIDEMIA: ICD-10-CM

## 2020-05-15 DIAGNOSIS — E11.42 DIABETIC POLYNEUROPATHY ASSOCIATED WITH TYPE 2 DIABETES MELLITUS (HCC): ICD-10-CM

## 2020-05-15 DIAGNOSIS — I10 ESSENTIAL HYPERTENSION: ICD-10-CM

## 2020-05-15 PROCEDURE — 95251 CONT GLUC MNTR ANALYSIS I&R: CPT | Performed by: NURSE PRACTITIONER

## 2020-05-15 PROCEDURE — 99214 OFFICE O/P EST MOD 30 MIN: CPT | Performed by: NURSE PRACTITIONER

## 2020-05-15 RX ORDER — INSULIN GLARGINE 100 [IU]/ML
70 INJECTION, SOLUTION SUBCUTANEOUS DAILY
Qty: 7 PEN | Refills: 11 | Status: SHIPPED | OUTPATIENT
Start: 2020-05-15 | End: 2020-09-17

## 2020-05-15 RX ORDER — IBUPROFEN 200 MG
TABLET ORAL
Qty: 100 EACH | Refills: 11 | Status: SHIPPED | OUTPATIENT
Start: 2020-05-15 | End: 2021-07-28

## 2020-05-15 NOTE — PROGRESS NOTES
Subjective    Sudhakar Saul is a 44 y.o. female. she is here today for follow-up.    History of Present Illness       This is an office visit     Referring provider Jermaine Ferro MD     Reason - diabetes         Duration/Timing -diagnosed in her late 20s/constant         Quality -improved control         Severity - high due to complications     Complications, neuropathy, hyperglycemia         Severity (Complication/Hospitalizations)        Secondary Macrovascular-- no CAD, no PAD, no CVA          Secondary Microvascular--- neuropathy, mild diabetic retinopathy , CKD stage II     COPD , hepatitis C      Context--- found during pre-op evaluation bg was 300            Diabetes Regimen               Lab Results   Component Value Date     HGBA1C 9.80 (H) 02/07/2020               Blood Glucose Readings     Checking 4 times daily       [facility]ma office use     downloaded and reviewed     Dated from May 6 - May 14, 2020    Average      In target 66 %    1 % low    High 31%     Very high 2 %         Diet-        Trying to watch carbs      Exercises     Trying to walking            Associated Signs/Symptoms          Hyperglycemic Symptoms:        Hypoglycemic Episodes:       The following portions of the patient's history were reviewed and updated as appropriate:   Past Medical History:   Diagnosis Date   • CAD (coronary artery disease)    • Degeneration macular    • Depression    • Diabetes mellitus (CMS/HCC)    • Hepatitis c    • Hypertension    • Migraine    • Multiple personality disorder (CMS/HCC)    • Neuropathy    • Paranoid schizophrenia (CMS/HCC)    • Seizures (CMS/HCC)      Past Surgical History:   Procedure Laterality Date   • APPENDECTOMY     • BACK SURGERY     • BREAST SURGERY     • CARPAL TUNNEL RELEASE      right hand   • CHOLECYSTECTOMY     • COLONOSCOPY     • COLONOSCOPY N/A 2/8/2019    Procedure: COLONOSCOPY;  Surgeon: Joni Delacruz MD;  Location: [facility] ENDOSCOPY;  Service: Gastroenterology   •  DENTAL PROCEDURE     • ENDOSCOPY N/A 2/8/2019    Procedure: ESOPHAGOGASTRODUODENOSCOPY--poss dilation;  Surgeon: Joni Delacruz MD;  Location: [facility] ENDOSCOPY;  Service: Gastroenterology   • HYSTERECTOMY     • LIPOMA EXCISION      9   • LIVER BIOPSY     • UPPER GASTROINTESTINAL ENDOSCOPY  02/08/2019     Family History   Problem Relation Age of Onset   • Hypertension Other    • Diabetes Other    • Stroke Other    • Cancer Other    • Kidney disease Other    • Lung disease Other    • Other Other    • Malone's esophagus Other    • Colon polyps Other    • Heart disease Other    • Ulcers Other    • Cholelithiasis Other    • Allergy (severe) Other      OB History    None       Current Outpatient Medications   Medication Sig Dispense Refill   • ADMELOG 100 UNIT/ML injection Inject 40 Units under the skin into the appropriate area as directed 3 (Three) Times a Day Before Meals. 40 mL 11   • albuterol sulfate  (90 Base) MCG/ACT inhaler Inhale 2 puffs Every 6 (Six) Hours As Needed for Wheezing or Shortness of Air. 1 inhaler 3   • cyclobenzaprine (FLEXERIL) 5 MG tablet Take 1 tablet by mouth 3 (Three) Times a Day As Needed for Muscle Spasms. 90 tablet 3   • DULoxetine (CYMBALTA) 30 MG capsule Take 3 capsules by mouth Daily. 90 capsule 3   • Ertugliflozin L-PyroglutamicAc (STEGLATRO) 15 MG tablet Take 1 tablet by mouth Every Morning. 30 tablet 3   • Fluticasone Furoate-Vilanterol (Breo Ellipta) 100-25 MCG/INH inhaler Inhale 1 puff Daily. 28 each 3   • folic acid (FOLVITE) 1 MG tablet Take 1,000 mcg by mouth Daily.     • gabapentin (NEURONTIN) 300 MG capsule Take 1 capsule by mouth 3 (Three) Times a Day. 90 capsule 2   • HYDROcodone-acetaminophen (NORCO) 7.5-325 MG per tablet Take 1 tablet by mouth 2 (Two) Times a Day.     • Insulin Glargine (BASAGLAR KWIKPEN) 100 UNIT/ML injection pen Inject 70 Units under the skin into the appropriate area as directed Daily. 7 pen 11   • levETIRAcetam (KEPPRA) 1000 MG tablet Take 1  "tablet by mouth 2 (Two) Times a Day. 60 tablet 3   • lisinopril (PRINIVIL,ZESTRIL) 40 MG tablet Take 1 tablet by mouth once daily 30 tablet 3   • metFORMIN (GLUCOPHAGE) 500 MG tablet TAKE 1 TABLET BY MOUTH TWICE DAILY WITH MEALS 60 tablet 3   • metoprolol tartrate (LOPRESSOR) 50 MG tablet TAKE 1 TABLET BY MOUTH EVERY 12 HOURS 60 tablet 3   • RELION PEN NEEDLES 31G X 6 MM misc USE 1 NEEDLE 3 TO 4 TIMES DAILY  3   • risperiDONE (RisperDAL) 2 MG tablet Take 1 tablet by mouth 2 (Two) Times a Day. 60 tablet 3   • simvastatin (ZOCOR) 40 MG tablet Take 1 tablet by mouth Every Night. 30 tablet 3   • tiotropium bromide monohydrate (SPIRIVA RESPIMAT) 2.5 MCG/ACT aerosol solution inhaler Inhale 2 puffs Daily. 1 inhaler 11   • TRULICITY 1.5 MG/0.5ML solution pen-injector INJECT 1 SYRINGE SUBCUTANEOUSLY ONCE A WEEK 4 mL 3   • vitamin D (ERGOCALCIFEROL) 87500 units capsule capsule Take 2 caps weekly 8 capsule 3   • glucose blood test strip Testing 4 times daily, E11.9 120 each 12   • Insulin Syringe 29G X 1/2\" 0.5 ML misc Inject 3 times daily 100 each 11     No current facility-administered medications for this visit.      Allergies   Allergen Reactions   • Betadine [Povidone Iodine] Anaphylaxis   • Contrast Dye Anaphylaxis   • Iodine Anaphylaxis   • Lipitor  [Atorvastatin Calcium] Shortness Of Breath   • Nifedipine Unknown (See Comments) and Other (See Comments)     Syncope     • Shellfish-Derived Products Anaphylaxis   • Adhesive Tape Unknown (See Comments)     Blister     • Altace [Ramipril] Unknown (See Comments)     Sleepy     • Lipitor [Atorvastatin] Unknown (See Comments)     Flu like symptoms       Social History     Socioeconomic History   • Marital status:      Spouse name: Not on file   • Number of children: Not on file   • Years of education: Not on file   • Highest education level: Not on file   Tobacco Use   • Smoking status: Current Every Day Smoker     Packs/day: 0.25     Types: Cigarettes   • Smokeless " "tobacco: Never Used   • Tobacco comment: 05/06/2020 - Pt states today is her first day on nicotine patches   Substance and Sexual Activity   • Alcohol use: No   • Drug use: No   • Sexual activity: Defer       Review of Systems  Review of Systems   Constitutional: Negative for activity change, appetite change, diaphoresis and fatigue.   HENT: Negative for facial swelling, sneezing, sore throat, tinnitus, trouble swallowing and voice change.    Eyes: Negative for photophobia, pain, discharge, redness, itching and visual disturbance.   Respiratory: Negative for apnea, cough, choking, chest tightness and shortness of breath.    Cardiovascular: Negative for chest pain, palpitations and leg swelling.   Gastrointestinal: Negative for abdominal distention, abdominal pain, constipation, diarrhea, nausea and vomiting.   Endocrine: Negative for cold intolerance, heat intolerance, polydipsia, polyphagia and polyuria.   Genitourinary: Negative for difficulty urinating, dysuria, frequency, hematuria and urgency.   Musculoskeletal: Negative for arthralgias, back pain, gait problem, joint swelling, myalgias, neck pain and neck stiffness.   Skin: Negative for color change, pallor, rash and wound.   Neurological: Negative for dizziness, tremors, weakness, light-headedness, numbness and headaches.   Hematological: Negative for adenopathy. Does not bruise/bleed easily.   Psychiatric/Behavioral: Negative for behavioral problems, confusion and sleep disturbance.        Objective    /86 (BP Location: Left arm, Patient Position: Sitting, Cuff Size: Large Adult)   Resp 22   Ht 167.6 cm (66\")   Wt (!) 136 kg (300 lb 9.6 oz)   BMI 48.52 kg/m²   Physical Exam   Constitutional: She is oriented to person, place, and time. She appears well-developed and well-nourished. No distress.   HENT:   Head: Normocephalic and atraumatic.   Right Ear: External ear normal.   Left Ear: External ear normal.   Nose: Nose normal.   Eyes: Pupils are " equal, round, and reactive to light. Conjunctivae and EOM are normal.   Neck: Normal range of motion. Neck supple. No tracheal deviation present. No thyromegaly present.   Cardiovascular: Normal rate, regular rhythm and normal heart sounds.   No murmur heard.  Pulmonary/Chest: Effort normal and breath sounds normal. No respiratory distress. She has no wheezes.   Abdominal: Soft. Bowel sounds are normal. There is no tenderness. There is no rebound and no guarding.   Musculoskeletal: Normal range of motion. She exhibits no edema, tenderness or deformity.   Neurological: She is alert and oriented to person, place, and time. No cranial nerve deficit.   Skin: Skin is warm and dry. No rash noted.   Psychiatric: She has a normal mood and affect. Her behavior is normal. Judgment and thought content normal.       Lab Review  Glucose (mg/dL)   Date Value   02/07/2020 186 (H)   11/11/2019 270 (H)   08/08/2019 189 (H)     Sodium (mmol/L)   Date Value   02/07/2020 137   11/11/2019 136   08/08/2019 137     Potassium (mmol/L)   Date Value   02/07/2020 4.7   11/11/2019 4.6   08/08/2019 4.6     Chloride (mmol/L)   Date Value   02/07/2020 100   11/11/2019 99   08/08/2019 100     CO2 (mmol/L)   Date Value   02/07/2020 19.0 (L)   11/11/2019 23.1   08/08/2019 21.0 (L)     BUN (mg/dL)   Date Value   02/07/2020 13   11/11/2019 15   08/08/2019 15     Creatinine (mg/dL)   Date Value   02/07/2020 0.71   11/11/2019 0.80   08/08/2019 0.71     Hemoglobin A1C (%)   Date Value   02/07/2020 9.80 (H)   11/11/2019 9.18 (H)   08/08/2019 9.83 (H)     Triglycerides (mg/dL)   Date Value   02/07/2020 203 (H)   11/11/2019 273 (H)   08/08/2019 268 (H)     LDL Cholesterol  (mg/dL)   Date Value   02/07/2020 44   11/11/2019 38   08/08/2019 42       Assessment/Plan      1. Type 2 diabetes mellitus with hyperglycemia, with long-term current use of insulin (CMS/[facility])    2. Essential hypertension    3. Diabetic polyneuropathy associated with type 2 diabetes  mellitus (CMS/[facility])    4. Mixed hyperlipidemia    .    Medications prescribed:  Outpatient Encounter Medications as of 5/15/2020   Medication Sig Dispense Refill   • ADMELOG 100 UNIT/ML injection Inject 40 Units under the skin into the appropriate area as directed 3 (Three) Times a Day Before Meals. 40 mL 11   • albuterol sulfate  (90 Base) MCG/ACT inhaler Inhale 2 puffs Every 6 (Six) Hours As Needed for Wheezing or Shortness of Air. 1 inhaler 3   • cyclobenzaprine (FLEXERIL) 5 MG tablet Take 1 tablet by mouth 3 (Three) Times a Day As Needed for Muscle Spasms. 90 tablet 3   • DULoxetine (CYMBALTA) 30 MG capsule Take 3 capsules by mouth Daily. 90 capsule 3   • Ertugliflozin L-PyroglutamicAc (STEGLATRO) 15 MG tablet Take 1 tablet by mouth Every Morning. 30 tablet 3   • Fluticasone Furoate-Vilanterol (Breo Ellipta) 100-25 MCG/INH inhaler Inhale 1 puff Daily. 28 each 3   • folic acid (FOLVITE) 1 MG tablet Take 1,000 mcg by mouth Daily.     • gabapentin (NEURONTIN) 300 MG capsule Take 1 capsule by mouth 3 (Three) Times a Day. 90 capsule 2   • HYDROcodone-acetaminophen (NORCO) 7.5-325 MG per tablet Take 1 tablet by mouth 2 (Two) Times a Day.     • Insulin Glargine (BASAGLAR KWIKPEN) 100 UNIT/ML injection pen Inject 70 Units under the skin into the appropriate area as directed Daily. 7 pen 11   • levETIRAcetam (KEPPRA) 1000 MG tablet Take 1 tablet by mouth 2 (Two) Times a Day. 60 tablet 3   • lisinopril (PRINIVIL,ZESTRIL) 40 MG tablet Take 1 tablet by mouth once daily 30 tablet 3   • metFORMIN (GLUCOPHAGE) 500 MG tablet TAKE 1 TABLET BY MOUTH TWICE DAILY WITH MEALS 60 tablet 3   • metoprolol tartrate (LOPRESSOR) 50 MG tablet TAKE 1 TABLET BY MOUTH EVERY 12 HOURS 60 tablet 3   • RELION PEN NEEDLES 31G X 6 MM misc USE 1 NEEDLE 3 TO 4 TIMES DAILY  3   • risperiDONE (RisperDAL) 2 MG tablet Take 1 tablet by mouth 2 (Two) Times a Day. 60 tablet 3   • simvastatin (ZOCOR) 40 MG tablet Take 1 tablet by mouth Every Night. 30  "tablet 3   • tiotropium bromide monohydrate (SPIRIVA RESPIMAT) 2.5 MCG/ACT aerosol solution inhaler Inhale 2 puffs Daily. 1 inhaler 11   • TRULICITY 1.5 MG/0.5ML solution pen-injector INJECT 1 SYRINGE SUBCUTANEOUSLY ONCE A WEEK 4 mL 3   • vitamin D (ERGOCALCIFEROL) 57485 units capsule capsule Take 2 caps weekly 8 capsule 3   • [DISCONTINUED] ADMELOG 100 UNIT/ML injection Inject 30 Units under the skin into the appropriate area as directed 3 (Three) Times a Day Before Meals. 30 mL 3   • [DISCONTINUED] Insulin Glargine (BASAGLAR KWIKPEN) 100 UNIT/ML injection pen Start 60 units at bedtime Titrate up by 3 units every 3 days until a.m. FSBS  18 mL 6   • glucose blood test strip Testing 4 times daily, E11.9 120 each 12   • Insulin Syringe 29G X 1/2\" 0.5 ML misc Inject 3 times daily 100 each 11     No facility-administered encounter medications on file as of 5/15/2020.        Orders placed during this encounter include:  No orders of the defined types were placed in this encounter.    Glycemic Management     Diabetes mellitus type 2 not controlled               Lab Results   Component Value Date     HGBA1C 9.80 (H) 02/07/2020            basaglar taking 63 units --keep         admelog      carb count 4 units per 15 grams of CHO--increase to 5 units per 15 grams of CHO      Plus sliding scale     2 per 50 above 150         Adjust based on 2 hour post meal BG      trulicity 1.5 mg weekly     Metformin 1000 mg po BID      Steglatro 15 mg one daily               Aim for 45 grams of Carbohydrate for meals      Aim for 15 grams of Carbohydrate for snack                 [facility]ma office use     downloaded and reviewed     Dated from May 6 - May 14, 2020    Average      In target 66 %    1 % low    High 31%     Very high 2 %      approve dexcom       1. Patient is diabetic, insulin dependent  2. she checks his sugars 4 times daily with variability from 50 up to 400  3, requires basal insulin and prandial insulin 3 times " daily for a total of 4 injections per day  4. Based on glucose readings we make adjustments to the insulin  5. We have not achieved ideal outcomes with more information with a continuous  glucose sensor we should be able to do so  6. We see her every 2 to 3 months for diabetes management  7. Patient has hypoglycemia with unawareness  8. Patient has completed diabetes education  9. Patient has day to day variation in mealtime which confounds the degree of insulin dosing with multiple injections unless we have the CGMS that allows us to better  insulin dosing           Lipid Management           Simvastatin 40 mg            Lab Results   Component Value Date     CHOL 121 02/07/2020            Lab Results   Component Value Date     TRIG 203 (H) 02/07/2020            Lab Results   Component Value Date     HDL 36 (L) 02/07/2020            Lab Results   Component Value Date     LDL 44 02/07/2020            Lab Results   Component Value Date     VLDL 40.6 (H) 02/07/2020            Lab Results   Component Value Date     LDLHDL 1.23 02/07/2020         Blood Pressure Management        Lisinopril 40 mg daily               Microvascular Complication Monitoring        Last eye exam - April 2019 , overdue to postponed due to pandemic            Lab Results   Component Value Date     MICROALBUR <1.2 11/11/2019            Neuropathy        Gabapentin 300 mg tid         Bone Health        Component      Latest Ref Rng & Units 2/7/2020   25 Hydroxy, Vitamin D      30.0 - 100.0 ng/ml 45.1            Other Diabetes Related Aspects           Lab Results   Component Value Date     [id number] 599 02/07/2020            Thyroid health         Lab Results   Component Value Date     TSH 2.520 05/17/2019                4. Follow-up: Return in about 2 months (around 7/15/2020) for Recheck.                This document has been electronically signed by ROD Chawla on May 15, 2020 09:31

## 2020-05-19 NOTE — PROGRESS NOTES
"   Subjective:  Sudhakar Saul is a 44 y.o. female who presents for       Patient Active Problem List   Diagnosis   • Seizure disorder (CMS/HCC)   • Morbid obesity (CMS/HCC)   • Tobacco abuse counseling   • Multiple joint pain   • Dysuria   • History of positive hepatitis C   • Essential hypertension   • Uncontrolled type 2 diabetes mellitus with hyperglycemia (CMS/HCC)   • Vitamin D deficiency   • Urinary tract infection without hematuria   • Chronic hepatitis C without hepatic coma (CMS/HCC)   • Cigarette nicotine dependence, uncomplicated   • Chronic obstructive pulmonary disease (CMS/HCC)   • JACQUE (obstructive sleep apnea)   • Gastroesophageal reflux disease with esophagitis   • Left upper quadrant pain   • Nausea and vomiting   • Weight gain, abnormal   • Change in bowel habits   • Hepatic fibrosis   • Non-compliance   • Depression   • Paranoid schizophrenia (CMS/HCC)   • Gastritis without bleeding   • Tobacco user   • Encounter for eye exam   • Scoliosis   • History of drug use   • Stage 2 chronic kidney disease   • Diabetic neuropathy (CMS/HCC)   • Mixed hyperlipidemia   • Chronic bilateral low back pain with bilateral sciatica   • Type 2 diabetes mellitus with hyperglycemia, with long-term current use of insulin (CMS/HCC)   • Auditory hallucination           Current Outpatient Medications:   •  ADMELOG 100 UNIT/ML injection, Inject 40 Units under the skin into the appropriate area as directed 3 (Three) Times a Day Before Meals., Disp: 40 mL, Rfl: 11  •  albuterol sulfate  (90 Base) MCG/ACT inhaler, Inhale 2 puffs Every 6 (Six) Hours As Needed for Wheezing or Shortness of Air., Disp: 1 inhaler, Rfl: 3  •  B-D INS SYR ULTRAFINE 1CC/30G 30G X 1/2\" 1 ML misc, USE 1 SYRINGE THREE TIMES DAILY, Disp: , Rfl:   •  cyclobenzaprine (FLEXERIL) 5 MG tablet, Take 1 tablet by mouth 3 (Three) Times a Day As Needed for Muscle Spasms., Disp: 90 tablet, Rfl: 3  •  DULoxetine (CYMBALTA) 30 MG capsule, Take 3 capsules " "by mouth Daily., Disp: 90 capsule, Rfl: 3  •  Fluticasone Furoate-Vilanterol (Breo Ellipta) 100-25 MCG/INH inhaler, Inhale 1 puff Daily., Disp: 28 each, Rfl: 3  •  folic acid (FOLVITE) 1 MG tablet, Take 1,000 mcg by mouth Daily., Disp: , Rfl:   •  gabapentin (NEURONTIN) 300 MG capsule, Take 1 capsule by mouth 3 (Three) Times a Day., Disp: 90 capsule, Rfl: 2  •  glucose blood test strip, Testing 4 times daily, E11.9, Disp: 120 each, Rfl: 12  •  HYDROcodone-acetaminophen (NORCO) 7.5-325 MG per tablet, Take 1 tablet by mouth 2 (Two) Times a Day., Disp: , Rfl:   •  Insulin Glargine (BASAGLAR KWIKPEN) 100 UNIT/ML injection pen, Inject 70 Units under the skin into the appropriate area as directed Daily., Disp: 7 pen, Rfl: 11  •  Insulin Syringe 29G X 1/2\" 0.5 ML misc, Inject 3 times daily, Disp: 100 each, Rfl: 11  •  levETIRAcetam (KEPPRA) 1000 MG tablet, Take 1 tablet by mouth 2 (Two) Times a Day., Disp: 60 tablet, Rfl: 3  •  lisinopril (PRINIVIL,ZESTRIL) 40 MG tablet, Take 1 tablet by mouth once daily, Disp: 30 tablet, Rfl: 3  •  metFORMIN (GLUCOPHAGE) 500 MG tablet, TAKE 1 TABLET BY MOUTH TWICE DAILY WITH MEALS, Disp: 60 tablet, Rfl: 3  •  metoprolol tartrate (LOPRESSOR) 50 MG tablet, TAKE 1 TABLET BY MOUTH EVERY 12 HOURS, Disp: 60 tablet, Rfl: 3  •  RELION PEN NEEDLES 31G X 6 MM misc, USE 1 NEEDLE 3 TO 4 TIMES DAILY, Disp: , Rfl: 3  •  risperiDONE (RisperDAL) 2 MG tablet, Take 1 tablet by mouth 2 (Two) Times a Day., Disp: 60 tablet, Rfl: 3  •  simvastatin (ZOCOR) 40 MG tablet, TAKE 1 TABLET BY MOUTH ONCE DAILY AT NIGHT, Disp: 30 tablet, Rfl: 3  •  STEGLATRO 15 MG tablet, TAKE 1 TABLET BY MOUTH ONCE DAILY IN THE MORNING, Disp: 30 tablet, Rfl: 3  •  tiotropium bromide monohydrate (SPIRIVA RESPIMAT) 2.5 MCG/ACT aerosol solution inhaler, Inhale 2 puffs Daily., Disp: 1 inhaler, Rfl: 11  •  TRULICITY 1.5 MG/0.5ML solution pen-injector, INJECT 1 SYRINGE SUBCUTANEOUSLY ONCE A WEEK, Disp: 4 mL, Rfl: 3  •  vitamin D " (ERGOCALCIFEROL) 37435 units capsule capsule, Take 2 caps weekly, Disp: 8 capsule, Rfl: 3         Pt is 42 yo female with management  of morbid obesity, IDDM Type 2, HTN, HLP, vitamin D deficiency, tobacco user, GERD, gastritis, esophagitis, diverticulosis, colonic polyp in sigmoid colon,  paranoid schizophrenia,  Seizure disorder, history of Illicit drug abuse, COPD, JACQUE, chronic hepatitis C , major depression, JD, sp appendectomy, sp cholecystectomy sp right carpal tunnel release, sp back surgery, sp breast surgery, sp lipoma excision, chronic back pain (moderate L4-L5 and L5-S1 DDD changes, mild bilateral L4-L5 moderate bilateral L5-S1 facet arthritic change, mild leveoscoliosis), CKD stage 2,       4/29/20 Telemedicine Visit today for recheck on her bipolar disorder/schizophrenia.  Her risperdal 1 mg was increased to 2 mg daily. Since dosage change she has had improvement with auditory hallucinations. She still has them daily but is less pronounced.   No suicidal or homicidal ideations currently. She has yet to make an appt with [facility] [place] for [place] behavioral health.  She has upcoming appt with Endocrinology soon regarding Diabetes she is now on Basaglar 62 units at bedtime.  Also on short acting insulin 30 units before meals   Along with steglaatro and trulicity.   BP has been stable.  On lisinopril and lopressor  50 mg PO BID  BID.   In regards to breathing she has yet to see her Pulmonologist for followup. Her last appt was in October 2018. She currently takes spiriva and albuterol inhaler as needed. She continues to smoke tobacco about 3-4 cigarettes a day     5/29/20 in office visit today for recheck on IDDM type 2, HTN, COPD, HLP, schizophrenia.  Since last visit pt has seen Endocrinology with ROD Will on 5/15/20.  In regards to DM type 2.  She is to keep taking basgalar 63 unites her carb count insulin was increased from 4 to 5 units per 15 grams of CHO with sliding scale and 2 unit per 50 above 50.   She is to continue metformin 1000 mg PO BID along  With trulicity 1.5 mg weekly and steglatro. Her sugars have been better controlled since doing this. On chest pain no dizziness but has some fatigue. In regards to schizophrenia her auditory hallucinations are less frequent and takes risperdal 2 mg PO daily. It now occurs mutiple times a week. She is seeing counseling for this. Tobacco use has cut down. Seizure under control Breathing better with Breo     COPD   She complains of cough and shortness of breath. There is no chest tightness, difficulty breathing, frequent throat clearing, hemoptysis, hoarse voice, sputum production or wheezing. This is a new problem. The problem occurs constantly. Associated symptoms include sweats. Pertinent negatives include no appetite change, chest pain, dyspnea on exertion, ear congestion, ear pain, fever, headaches, heartburn, malaise/fatigue, myalgias, nasal congestion, orthopnea, PND, postnasal drip, rhinorrhea, sneezing, sore throat or trouble swallowing. Her symptoms are aggravated by strenuous activity. Her symptoms are alleviated by beta-agonist and steroid inhaler. She reports significant improvement on treatment. Her past medical history is significant for COPD. There is no history of asthma, bronchiectasis, bronchitis, emphysema or pneumonia.    Mental Health Problem   The primary symptoms include delusions, hallucinations and negative symptoms. The primary symptoms do not include dysphoric mood, bizarre behavior, disorganized speech or somatic symptoms. This is a chronic problem.   The onset of the illness is precipitated by a stressful event, emotional stress, alcohol abuse and drug abuse. The degree of incapacity that she is experiencing as a consequence of her illness is severe. Sequelae of the illness include an inability to work, an inability to care for self and harmed interpersonal relations. Additional symptoms of the illness include fatigue, agitation and  seizures. Additional symptoms of the illness do not include anhedonia, insomnia, hypersomnia, appetite change, unexpected weight change, psychomotor retardation, feelings of worthlessness, attention impairment, increased goal-directed activity, flight of ideas, inflated self-esteem, decreased need for sleep, distractible, poor judgment, visual change, headaches or abdominal pain. She admits to suicidal ideas. She does not have a plan to commit suicide. She does not contemplate harming herself. She has not already injured self. She does not contemplate injuring another person. Risk factors that are present for mental illness include a history of mental illness, substance abuse, a history of suicide attempts, family violence and epilepsy.   Seizures    This is a chronic problem. The current episode started more than 1 week ago. The problem has been gradually worsening. Associated symptoms include sleepiness, confusion, headaches and visual disturbances. Pertinent negatives include no neck stiffness, no sore throat, no chest pain, no cough, no nausea, no vomiting, no diarrhea and no muscle weakness.   Obesity   This is a chronic problem. The current episode started more than 1 year ago. The problem occurs constantly. The problem has been unchanged. Associated symptoms include arthralgias, fatigue, headaches, numbness and weakness. Pertinent negatives include no abdominal pain, anorexia, change in bowel habit, chest pain, chills, congestion, coughing, fever, joint swelling, myalgias, nausea, neck pain, rash, sore throat, swollen glands, urinary symptoms, vertigo, visual change or vomiting. Nothing aggravates the symptoms. She has tried nothing for the symptoms.   Hypertension   This is a chronic problem. The current episode started more than 1 year ago. The problem is uncontrolled. Associated symptoms include headaches. Pertinent negatives include no anxiety, blurred vision, chest pain, malaise/fatigue, neck  pain, orthopnea, palpitations, peripheral edema, PND or shortness of breath. Risk factors for coronary artery disease include obesity, sedentary lifestyle, smoking/tobacco exposure and stress. Past treatments include ACE inhibitors, beta blockers and diuretics. There are no compliance problems.  There is no history of angina, kidney disease, CAD/MI, CVA, heart failure, left ventricular hypertrophy, PVD or retinopathy. There is no history of chronic renal disease, coarctation of the aorta, hyperaldosteronism, hypercortisolism, hyperparathyroidism, a hypertension causing med, pheochromocytoma, renovascular disease, sleep apnea or a thyroid problem.   Diabetes   She presents for her initial diabetic visit. She has type 2 diabetes mellitus. Her disease course has been worsening. Hypoglycemia symptoms include confusion, headaches, nervousness/anxiousness, seizures and sleepiness. Associated symptoms include fatigue and weakness. Pertinent negatives for diabetes include no blurred vision, no chest pain and no visual change. Pertinent negatives for diabetic complications include no CVA, PVD or retinopathy. Risk factors for coronary artery disease include dyslipidemia, obesity and post-menopausal. She has not had a previous visit with a dietitian. There is no change in her home blood glucose trend. An ACE inhibitor/angiotensin II receptor blocker is not being taken. She does not see a podiatrist.Eye exam is not current.     Review of Systems  Review of Systems   Constitutional: Positive for activity change and fatigue. Negative for appetite change, chills, diaphoresis, fever and malaise/fatigue.   HENT: Negative for congestion, ear pain, hoarse voice, postnasal drip, rhinorrhea, sinus pressure, sinus pain, sneezing, sore throat, trouble swallowing and voice change.    Respiratory: Positive for cough and shortness of breath. Negative for hemoptysis, sputum production, choking, chest tightness, wheezing and stridor.       Cardiovascular: Negative for chest pain, dyspnea on exertion and PND.   Gastrointestinal: Negative for diarrhea, heartburn, nausea and vomiting.   Musculoskeletal: Positive for arthralgias, back pain and gait problem. Negative for myalgias.   Neurological: Positive for seizures, weakness and numbness. Negative for headaches.   Psychiatric/Behavioral: Positive for agitation, behavioral problems and hallucinations. The patient is nervous/anxious.         Depressed mood        Patient Active Problem List   Diagnosis   • Seizure disorder (CMS/HCC)   • Morbid obesity (CMS/HCC)   • Tobacco abuse counseling   • Multiple joint pain   • Dysuria   • History of positive hepatitis C   • Essential hypertension   • Uncontrolled type 2 diabetes mellitus with hyperglycemia (CMS/HCC)   • Vitamin D deficiency   • Urinary tract infection without hematuria   • Chronic hepatitis C without hepatic coma (CMS/HCC)   • Cigarette nicotine dependence, uncomplicated   • Chronic obstructive pulmonary disease (CMS/HCC)   • JACQUE (obstructive sleep apnea)   • Gastroesophageal reflux disease with esophagitis   • Left upper quadrant pain   • Nausea and vomiting   • Weight gain, abnormal   • Change in bowel habits   • Hepatic fibrosis   • Non-compliance   • Depression   • Paranoid schizophrenia (CMS/HCC)   • Gastritis without bleeding   • Tobacco user   • Encounter for eye exam   • Scoliosis   • History of drug use   • Stage 2 chronic kidney disease   • Diabetic neuropathy (CMS/HCC)   • Mixed hyperlipidemia   • Chronic bilateral low back pain with bilateral sciatica   • Type 2 diabetes mellitus with hyperglycemia, with long-term current use of insulin (CMS/HCC)   • Auditory hallucination     Past Surgical History:   Procedure Laterality Date   • APPENDECTOMY     • BACK SURGERY     • BREAST SURGERY     • CARPAL TUNNEL RELEASE      right hand   • CHOLECYSTECTOMY     • COLONOSCOPY     • COLONOSCOPY N/A 2/8/2019    Procedure: COLONOSCOPY;  Surgeon:  Joni Delacruz MD;  Location: [facility] ENDOSCOPY;  Service: Gastroenterology   • DENTAL PROCEDURE     • ENDOSCOPY N/A 2/8/2019    Procedure: ESOPHAGOGASTRODUODENOSCOPY--poss dilation;  Surgeon: Joni Delacruz MD;  Location: [facility] ENDOSCOPY;  Service: Gastroenterology   • HYSTERECTOMY     • LIPOMA EXCISION      9   • LIVER BIOPSY     • UPPER GASTROINTESTINAL ENDOSCOPY  02/08/2019     Social History     Socioeconomic History   • Marital status:      Spouse name: Not on file   • Number of children: Not on file   • Years of education: Not on file   • Highest education level: Not on file   Tobacco Use   • Smoking status: Current Every Day Smoker     Packs/day: 0.25     Types: Cigarettes   • Smokeless tobacco: Never Used   • Tobacco comment: 05/06/2020 - Pt states today is her first day on nicotine patches   Substance and Sexual Activity   • Alcohol use: No   • Drug use: No   • Sexual activity: Defer     Family History   Problem Relation Age of Onset   • Hypertension Other    • Diabetes Other    • Stroke Other    • Cancer Other    • Kidney disease Other    • Lung disease Other    • Other Other    • Malone's esophagus Other    • Colon polyps Other    • Heart disease Other    • Ulcers Other    • Cholelithiasis Other    • Allergy (severe) Other      Lab on 02/07/2020   Component Date Value Ref Range Status   • WBC 02/07/2020 8.55  3.40 - 10.80 10*3/mm3 Final   • RBC 02/07/2020 4.96  3.77 - 5.28 10*6/mm3 Final   • Hemoglobin 02/07/2020 14.7  12.0 - 15.9 g/dL Final   • Hematocrit 02/07/2020 43.1  34.0 - 46.6 % Final   • MCV 02/07/2020 86.9  79.0 - 97.0 fL Final   • MCH 02/07/2020 29.6  26.6 - 33.0 pg Final   • MCHC 02/07/2020 34.1  31.5 - 35.7 g/dL Final   • RDW 02/07/2020 13.2  12.3 - 15.4 % Final   • RDW-SD 02/07/2020 41.2  37.0 - 54.0 fl Final   • MPV 02/07/2020 11.7  6.0 - 12.0 fL Final   • Platelets 02/07/2020 152  140 - 450 10*3/mm3 Final   • Neutrophil % 02/07/2020 66.6  42.7 - 76.0 % Final   • Lymphocyte %  02/07/2020 25.8  19.6 - 45.3 % Final   • Monocyte % 02/07/2020 5.3  5.0 - 12.0 % Final   • Eosinophil % 02/07/2020 1.2  0.3 - 6.2 % Final   • Basophil % 02/07/2020 0.5  0.0 - 1.5 % Final   • Immature Grans % 02/07/2020 0.6* 0.0 - 0.5 % Final   • Neutrophils, Absolute 02/07/2020 5.70  1.70 - 7.00 10*3/mm3 Final   • Lymphocytes, Absolute 02/07/2020 2.21  0.70 - 3.10 10*3/mm3 Final   • Monocytes, Absolute 02/07/2020 0.45  0.10 - 0.90 10*3/mm3 Final   • Eosinophils, Absolute 02/07/2020 0.10  0.00 - 0.40 10*3/mm3 Final   • Basophils, Absolute 02/07/2020 0.04  0.00 - 0.20 10*3/mm3 Final   • Immature Grans, Absolute 02/07/2020 0.05  0.00 - 0.05 10*3/mm3 Final   • nRBC 02/07/2020 0.0  0.0 - 0.2 /100 WBC Final   • Glucose 02/07/2020 186* 65 - 99 mg/dL Final   • BUN 02/07/2020 13  6 - 20 mg/dL Final   • Creatinine 02/07/2020 0.71  0.57 - 1.00 mg/dL Final   • Sodium 02/07/2020 137  136 - 145 mmol/L Final   • Potassium 02/07/2020 4.7  3.5 - 5.2 mmol/L Final   • Chloride 02/07/2020 100  98 - 107 mmol/L Final   • CO2 02/07/2020 19.0* 22.0 - 29.0 mmol/L Final   • Calcium 02/07/2020 9.7  8.6 - 10.5 mg/dL Final   • Total Protein 02/07/2020 7.6  6.0 - 8.5 g/dL Final   • Albumin 02/07/2020 4.00  3.50 - 5.20 g/dL Final   • ALT (SGPT) 02/07/2020 22  1 - 33 U/L Final   • AST (SGOT) 02/07/2020 24  1 - 32 U/L Final   • Alkaline Phosphatase 02/07/2020 66  39 - 117 U/L Final   • Total Bilirubin 02/07/2020 0.2  0.2 - 1.2 mg/dL Final   • eGFR Non African Amer 02/07/2020 89  >60 mL/min/1.73 Final   • Globulin 02/07/2020 3.6  gm/dL Final   • A/G Ratio 02/07/2020 1.1  g/dL Final   • BUN/Creatinine Ratio 02/07/2020 18.3  7.0 - 25.0 Final   • Anion Gap 02/07/2020 18.0* 5.0 - 15.0 mmol/L Final   • Hemoglobin A1C 02/07/2020 9.80* 4.80 - 5.60 % Final   • Total Cholesterol 02/07/2020 121  0 - 200 mg/dL Final   • Triglycerides 02/07/2020 203* 0 - 150 mg/dL Final   • HDL Cholesterol 02/07/2020 36* 40 - 60 mg/dL Final   • LDL Cholesterol  02/07/2020 44  0  "- 100 mg/dL Final   • VLDL Cholesterol 02/07/2020 40.6* 5 - 40 mg/dL Final   • LDL/HDL Ratio 02/07/2020 1.23   Final   • 25 Hydroxy, Vitamin D 02/07/2020 45.1  30.0 - 100.0 ng/ml Final   • Vitamin B-12 02/07/2020 599  211 - 946 pg/mL Final      XR Spine Lumbar 4+ View  Narrative: PROCEDURE: Lumbar spine with obliques.    INDICATION: lower back pain, M54.42 Lumbago with sciatica, left  side M54.41 Lumbago with sciatica, right side.    COMPARISON: None.    AP, lateral, lateral lumbosacral spot film and oblique views.    Lumbar vertebrae are normal height and well aligned. There is a  mild lower lumbar levoscoliosis.    Moderate L4-5 and advanced L5-S1 degenerative disc changes with  disc space narrowing. Mild L4-5 and moderate L5-S1 bilateral  facet arthritic change.    Pedicles, spinous processes and transverse processes intact.  Impression: Moderate L4-5 and advanced L5-S1 degenerative disc  changes.    Mild bilateral L4-5 and moderate bilateral L5-S1 facet arthritic  change.    Mild levoscoliosis.    [id number]    Electronically signed by:  Sammy Priest MD  8/8/2019 2:55 PM  CDT Workstation: [id number]    @[email or web address]@  Immunization History   Administered Date(s) Administered   • Pneumococcal Polysaccharide (PPSV23) 12/13/2018       The following portions of the patient's history were reviewed and updated as appropriate: allergies, current medications, past family history, past medical history, past social history, past surgical history and problem list.        Physical Exam  /78 (BP Location: Left arm, Patient Position: Sitting, Cuff Size: Large Adult)   Pulse 88   Temp 97.8 °F (36.6 °C) (Temporal)   Ht 167.6 cm (66\")   Wt 135 kg (297 lb)   SpO2 98%   BMI 47.94 kg/m²     Physical Exam   Constitutional: She is oriented to person, place, and time. She appears well-developed and well-nourished.   HENT:   Head: Normocephalic and atraumatic.   Right Ear: External ear normal.   Eyes: Pupils are equal, round, " and reactive to light. Conjunctivae and EOM are normal.   Neck: Normal range of motion. Neck supple.   Cardiovascular: Normal rate, regular rhythm and normal heart sounds.   No murmur heard.  Pulmonary/Chest: Effort normal and breath sounds normal. No respiratory distress.   Abdominal: Soft. Bowel sounds are normal. She exhibits no distension. There is no tenderness.   Obese abdomen    Musculoskeletal: Normal range of motion. She exhibits tenderness. She exhibits no edema or deformity.   Neurological: She is alert and oriented to person, place, and time. No cranial nerve deficit.   Skin: Skin is warm. No rash noted. She is not diaphoretic. No erythema. No pallor.   Psychiatric: She has a normal mood and affect. Her behavior is normal.   Nursing note and vitals reviewed.      Assessment/Plan    Diagnosis Plan   1. Vitamin D deficiency     2. Uncontrolled type 2 diabetes mellitus with hyperglycemia (CMS/[facility])     3. Tobacco user     4. Seizure disorder (CMS/[facility])     5. Stage 2 chronic kidney disease     6. Paranoid schizophrenia (CMS/[facility])     7. JACQUE (obstructive sleep apnea)     8. Morbid obesity (CMS/[facility])     9. Mixed hyperlipidemia     10. Essential hypertension     11. Gastritis without bleeding, unspecified chronicity, unspecified gastritis type     12. Episode of recurrent major depressive disorder, unspecified depression episode severity (CMS/[facility])     13. Other diabetic neurological complication associated with type 2 diabetes mellitus (CMS/[facility])     14. Auditory hallucination               -went over labwork   -recommend influenza vaccination   -refer to [facility]i  For Neurology and seizure disorder  -for back pain/arthritis of back/scoliosis - history of back surgery - referral to Pain Management  X-rays of lumbar spine.  Start on neurontin 300 mg PO TID.gave 90 pills and 3 refills. Will get NERY. UDS today. Pt declined PT/OT for now. .  also on cymbalta 60 mg will raise to 90 mg daily for  depression   -chronic hep C - Gastroenterology following   -COPD -  referred to Dr. Will (Pulmonologsit) for PFTs. Consultation appreciated. Recent chest x-ray negative. Will start on albuterol inhaler PRN.Advised pt to stop smoking. She is on spirva now for early COPD  -multiple joint pain -- naproxen 500 mg PO BID. Pt advised to hold naproxen unless needed while taking viibryd  -for multiple joint pain - pt cannot take opoids. Will start on naproxen 500 mg twi ce a day. Drug information provided  -morbid obesity -counseled on weight loss, diet and exercise. Diet information provided. cousneled weight loss >5 minutes BMI at 47.61   -tobacco user - Counseled quit smoking >5 minutes.  Start back on nicotine lozenges   -ckd stage 2 - continue to monitor. Stressed importance of diabetes control and BP control    -hyperlipdiemia- simvastatin 20  To 40 mg PO qhs. ASCVD risk high. Recommend to take with coenzyme Q10  Start on vascepa 2 grams PO BID   -dyspnea/early COPD - pt will need to make appt with Pulmonologist. Needs new PFTs. Urged smoking cessation >5 minutes.  Continue spiriva and albuterol inhaler. May have worsening COPD. On Breo 100 mcg 1 puff daily.   -for DM type 2- Endocrinology following. Currently on metformin 1000 gm PO BID, steglasro 15 mg daily. trulicity 1.5 mg once a week, on Basaglar 70 units at bedtime and admelog 4 unites per  15 grams of carbs along with sliding scale 2 unites per 50 above 150.    -schizophrenia/major depression - stopped viibryd, on  cymbalta 90 mg daily. Now on risperdal 2 mg PO q daily. On    -seizure disorder -currently on Keppra  500 mg PO BID. Referred to Neurologist  Services appreciated. She missed appt.  Will go up on Keppra from 500 to 1000 mg twice a day. Sees Dr. Ritter in [place] has upcoming appt soon   -counseled pt to quit smoking >5 minutes.  Gave tobacco cessation material   -hypertension - at goal today Continue on lisinopril but will go up on dosage from  20 to 40 mg dialy.  mg Po q dominguez, metoprolol 50 mg PO BID . Stop HCTZ for now. Consider Norvasc or calcium channel blocker if not improving   -advised pt to be safe and call with any questions or concerns. All questions addressed today  -advised pt to followup with specialist and referrals  -advised pt to go to ER or call 911 if symptoms worrisome or severe   -advised pt to be safe during COVID-19 pandemic  -total time with pt >25 minutes   -recheck in 6 weeks         This document has been electronically signed by Jermaine Ferro MD on May 29, 2020 10:43

## 2020-05-26 RX ORDER — ERTUGLIFLOZIN 15 MG/1
TABLET, FILM COATED ORAL
Qty: 30 TABLET | Refills: 3 | Status: SHIPPED | OUTPATIENT
Start: 2020-05-26 | End: 2020-07-17 | Stop reason: SDUPTHER

## 2020-05-26 RX ORDER — SIMVASTATIN 40 MG
TABLET ORAL
Qty: 30 TABLET | Refills: 3 | Status: SHIPPED | OUTPATIENT
Start: 2020-05-26 | End: 2020-07-17 | Stop reason: SDUPTHER

## 2020-05-29 ENCOUNTER — OFFICE VISIT (OUTPATIENT)
Dept: FAMILY MEDICINE CLINIC | Facility: CLINIC | Age: 45
End: 2020-05-29

## 2020-05-29 VITALS
DIASTOLIC BLOOD PRESSURE: 78 MMHG | WEIGHT: 293 LBS | HEIGHT: 66 IN | OXYGEN SATURATION: 98 % | SYSTOLIC BLOOD PRESSURE: 138 MMHG | BODY MASS INDEX: 47.09 KG/M2 | HEART RATE: 88 BPM | TEMPERATURE: 97.8 F

## 2020-05-29 DIAGNOSIS — R44.0 AUDITORY HALLUCINATION: ICD-10-CM

## 2020-05-29 DIAGNOSIS — E11.65 UNCONTROLLED TYPE 2 DIABETES MELLITUS WITH HYPERGLYCEMIA (HCC): ICD-10-CM

## 2020-05-29 DIAGNOSIS — E11.49 OTHER DIABETIC NEUROLOGICAL COMPLICATION ASSOCIATED WITH TYPE 2 DIABETES MELLITUS (HCC): ICD-10-CM

## 2020-05-29 DIAGNOSIS — Z72.0 TOBACCO USER: ICD-10-CM

## 2020-05-29 DIAGNOSIS — N18.2 STAGE 2 CHRONIC KIDNEY DISEASE: ICD-10-CM

## 2020-05-29 DIAGNOSIS — F20.0 PARANOID SCHIZOPHRENIA (HCC): ICD-10-CM

## 2020-05-29 DIAGNOSIS — G47.33 OSA (OBSTRUCTIVE SLEEP APNEA): ICD-10-CM

## 2020-05-29 DIAGNOSIS — E55.9 VITAMIN D DEFICIENCY: ICD-10-CM

## 2020-05-29 DIAGNOSIS — K29.70 GASTRITIS WITHOUT BLEEDING, UNSPECIFIED CHRONICITY, UNSPECIFIED GASTRITIS TYPE: ICD-10-CM

## 2020-05-29 DIAGNOSIS — I10 ESSENTIAL HYPERTENSION: ICD-10-CM

## 2020-05-29 DIAGNOSIS — J44.9 CHRONIC OBSTRUCTIVE PULMONARY DISEASE, UNSPECIFIED COPD TYPE (HCC): ICD-10-CM

## 2020-05-29 DIAGNOSIS — E78.2 MIXED HYPERLIPIDEMIA: ICD-10-CM

## 2020-05-29 DIAGNOSIS — G40.909 SEIZURE DISORDER (HCC): ICD-10-CM

## 2020-05-29 DIAGNOSIS — F33.9 EPISODE OF RECURRENT MAJOR DEPRESSIVE DISORDER, UNSPECIFIED DEPRESSION EPISODE SEVERITY (HCC): ICD-10-CM

## 2020-05-29 DIAGNOSIS — E66.01 MORBID OBESITY (HCC): Primary | ICD-10-CM

## 2020-05-29 PROCEDURE — 99214 OFFICE O/P EST MOD 30 MIN: CPT | Performed by: FAMILY MEDICINE

## 2020-05-29 RX ORDER — POLYETHYLENE GLYCOL 3350 17 G
2 POWDER IN PACKET (EA) ORAL AS NEEDED
Qty: 168 LOZENGE | Refills: 3 | Status: SHIPPED | OUTPATIENT
Start: 2020-05-29 | End: 2020-12-17

## 2020-05-29 RX ORDER — ALBUTEROL SULFATE 90 UG/1
2 AEROSOL, METERED RESPIRATORY (INHALATION) EVERY 6 HOURS PRN
Qty: 1 INHALER | Refills: 3 | Status: SHIPPED | OUTPATIENT
Start: 2020-05-29 | End: 2020-05-29 | Stop reason: SDUPTHER

## 2020-05-29 RX ORDER — PEN NEEDLE, DIABETIC 29 G X1/2"
NEEDLE, DISPOSABLE MISCELLANEOUS
COMMUNITY
Start: 2020-05-17 | End: 2020-12-17 | Stop reason: SDUPTHER

## 2020-05-29 RX ORDER — ALBUTEROL SULFATE 90 UG/1
2 AEROSOL, METERED RESPIRATORY (INHALATION) EVERY 6 HOURS PRN
Qty: 1 INHALER | Refills: 3 | Status: SHIPPED | OUTPATIENT
Start: 2020-05-29 | End: 2021-10-28 | Stop reason: SDUPTHER

## 2020-06-03 ENCOUNTER — TELEPHONE (OUTPATIENT)
Dept: ENDOCRINOLOGY | Facility: CLINIC | Age: 45
End: 2020-06-03

## 2020-06-09 ENCOUNTER — TRANSCRIBE ORDERS (OUTPATIENT)
Dept: PHYSICAL THERAPY | Facility: CLINIC | Age: 45
End: 2020-06-09

## 2020-06-09 DIAGNOSIS — M51.36 OTHER INTERVERTEBRAL DISC DEGENERATION, LUMBAR REGION: ICD-10-CM

## 2020-06-09 DIAGNOSIS — G89.29 CHRONIC LOW BACK PAIN, UNSPECIFIED BACK PAIN LATERALITY, UNSPECIFIED WHETHER SCIATICA PRESENT: ICD-10-CM

## 2020-06-09 DIAGNOSIS — M54.16 RADICULOPATHY, LUMBAR REGION: Primary | ICD-10-CM

## 2020-06-09 DIAGNOSIS — M54.50 CHRONIC LOW BACK PAIN, UNSPECIFIED BACK PAIN LATERALITY, UNSPECIFIED WHETHER SCIATICA PRESENT: ICD-10-CM

## 2020-06-10 ENCOUNTER — LAB (OUTPATIENT)
Dept: LAB | Facility: HOSPITAL | Age: 45
End: 2020-06-10

## 2020-06-10 DIAGNOSIS — E66.01 MORBID OBESITY (HCC): ICD-10-CM

## 2020-06-10 DIAGNOSIS — E78.2 MIXED HYPERLIPIDEMIA: ICD-10-CM

## 2020-06-10 DIAGNOSIS — I10 ESSENTIAL HYPERTENSION: ICD-10-CM

## 2020-06-10 DIAGNOSIS — E11.65 UNCONTROLLED TYPE 2 DIABETES MELLITUS WITH HYPERGLYCEMIA (HCC): ICD-10-CM

## 2020-06-10 LAB
25(OH)D3 SERPL-MCNC: 44.3 NG/ML (ref 30–100)
ALBUMIN SERPL-MCNC: 4 G/DL (ref 3.5–5.2)
ALBUMIN/GLOB SERPL: 1.3 G/DL
ALP SERPL-CCNC: 60 U/L (ref 39–117)
ALT SERPL W P-5'-P-CCNC: 17 U/L (ref 1–33)
ANION GAP SERPL CALCULATED.3IONS-SCNC: 12.4 MMOL/L (ref 5–15)
AST SERPL-CCNC: 19 U/L (ref 1–32)
BASOPHILS # BLD AUTO: 0.07 10*3/MM3 (ref 0–0.2)
BASOPHILS NFR BLD AUTO: 0.8 % (ref 0–1.5)
BILIRUB SERPL-MCNC: 0.2 MG/DL (ref 0.2–1.2)
BUN BLD-MCNC: 17 MG/DL (ref 6–20)
BUN/CREAT SERPL: 21.8 (ref 7–25)
CALCIUM SPEC-SCNC: 9.4 MG/DL (ref 8.6–10.5)
CHLORIDE SERPL-SCNC: 103 MMOL/L (ref 98–107)
CHOLEST SERPL-MCNC: 115 MG/DL (ref 0–200)
CO2 SERPL-SCNC: 20.6 MMOL/L (ref 22–29)
CREAT BLD-MCNC: 0.78 MG/DL (ref 0.57–1)
DEPRECATED RDW RBC AUTO: 43.7 FL (ref 37–54)
EOSINOPHIL # BLD AUTO: 0.35 10*3/MM3 (ref 0–0.4)
EOSINOPHIL NFR BLD AUTO: 3.9 % (ref 0.3–6.2)
ERYTHROCYTE [DISTWIDTH] IN BLOOD BY AUTOMATED COUNT: 13.6 % (ref 12.3–15.4)
GFR SERPL CREATININE-BSD FRML MDRD: 80 ML/MIN/1.73
GLOBULIN UR ELPH-MCNC: 3.1 GM/DL
GLUCOSE BLD-MCNC: 219 MG/DL (ref 65–99)
HBA1C MFR BLD: 9.6 % (ref 4.8–5.6)
HCT VFR BLD AUTO: 42.8 % (ref 34–46.6)
HDLC SERPL-MCNC: 35 MG/DL (ref 40–60)
HGB BLD-MCNC: 14.1 G/DL (ref 12–15.9)
HOLD SPECIMEN: NORMAL
IMM GRANULOCYTES # BLD AUTO: 0.05 10*3/MM3 (ref 0–0.05)
IMM GRANULOCYTES NFR BLD AUTO: 0.6 % (ref 0–0.5)
LDLC SERPL CALC-MCNC: 46 MG/DL (ref 0–100)
LDLC/HDLC SERPL: 1.31 {RATIO}
LYMPHOCYTES # BLD AUTO: 2.35 10*3/MM3 (ref 0.7–3.1)
LYMPHOCYTES NFR BLD AUTO: 26.4 % (ref 19.6–45.3)
MCH RBC QN AUTO: 29.1 PG (ref 26.6–33)
MCHC RBC AUTO-ENTMCNC: 32.9 G/DL (ref 31.5–35.7)
MCV RBC AUTO: 88.2 FL (ref 79–97)
MONOCYTES # BLD AUTO: 0.4 10*3/MM3 (ref 0.1–0.9)
MONOCYTES NFR BLD AUTO: 4.5 % (ref 5–12)
NEUTROPHILS # BLD AUTO: 5.67 10*3/MM3 (ref 1.7–7)
NEUTROPHILS NFR BLD AUTO: 63.8 % (ref 42.7–76)
NRBC BLD AUTO-RTO: 0 /100 WBC (ref 0–0.2)
PLATELET # BLD AUTO: 157 10*3/MM3 (ref 140–450)
PMV BLD AUTO: 11.9 FL (ref 6–12)
POTASSIUM BLD-SCNC: 4.9 MMOL/L (ref 3.5–5.2)
PROT SERPL-MCNC: 7.1 G/DL (ref 6–8.5)
RBC # BLD AUTO: 4.85 10*6/MM3 (ref 3.77–5.28)
SODIUM BLD-SCNC: 136 MMOL/L (ref 136–145)
TRIGL SERPL-MCNC: 170 MG/DL (ref 0–150)
TSH SERPL DL<=0.05 MIU/L-ACNC: 2.55 UIU/ML (ref 0.27–4.2)
VIT B12 BLD-MCNC: 511 PG/ML (ref 211–946)
VLDLC SERPL-MCNC: 34 MG/DL (ref 5–40)
WBC NRBC COR # BLD: 8.89 10*3/MM3 (ref 3.4–10.8)

## 2020-06-10 PROCEDURE — 80061 LIPID PANEL: CPT

## 2020-06-10 PROCEDURE — 85025 COMPLETE CBC W/AUTO DIFF WBC: CPT

## 2020-06-10 PROCEDURE — 82306 VITAMIN D 25 HYDROXY: CPT

## 2020-06-10 PROCEDURE — 80074 ACUTE HEPATITIS PANEL: CPT

## 2020-06-10 PROCEDURE — 82607 VITAMIN B-12: CPT

## 2020-06-10 PROCEDURE — 84443 ASSAY THYROID STIM HORMONE: CPT

## 2020-06-10 PROCEDURE — 83036 HEMOGLOBIN GLYCOSYLATED A1C: CPT

## 2020-06-10 PROCEDURE — 80053 COMPREHEN METABOLIC PANEL: CPT

## 2020-06-11 LAB
HAV IGM SERPL QL IA: ABNORMAL
HBV CORE IGM SERPL QL IA: ABNORMAL
HBV SURFACE AG SERPL QL IA: ABNORMAL
HCV AB SER DONR QL: REACTIVE

## 2020-06-16 ENCOUNTER — LAB (OUTPATIENT)
Dept: LAB | Facility: HOSPITAL | Age: 45
End: 2020-06-16

## 2020-06-16 DIAGNOSIS — E78.2 MIXED HYPERLIPIDEMIA: ICD-10-CM

## 2020-06-16 DIAGNOSIS — I10 ESSENTIAL HYPERTENSION: ICD-10-CM

## 2020-06-16 DIAGNOSIS — E66.01 MORBID OBESITY (HCC): ICD-10-CM

## 2020-06-16 DIAGNOSIS — E11.65 UNCONTROLLED TYPE 2 DIABETES MELLITUS WITH HYPERGLYCEMIA (HCC): ICD-10-CM

## 2020-06-16 PROCEDURE — 82043 UR ALBUMIN QUANTITATIVE: CPT

## 2020-06-16 PROCEDURE — 82570 ASSAY OF URINE CREATININE: CPT

## 2020-06-17 LAB
ALBUMIN UR-MCNC: <1.2 MG/DL
CREAT UR-MCNC: 31.2 MG/DL
MICROALBUMIN/CREAT UR: NORMAL MG/G{CREAT}

## 2020-06-19 DIAGNOSIS — J44.9 CHRONIC OBSTRUCTIVE PULMONARY DISEASE, UNSPECIFIED COPD TYPE (HCC): ICD-10-CM

## 2020-06-19 DIAGNOSIS — R06.00 DYSPNEA, UNSPECIFIED TYPE: Primary | ICD-10-CM

## 2020-06-19 PROBLEM — E66.813 CLASS 3 SEVERE OBESITY DUE TO EXCESS CALORIES WITH SERIOUS COMORBIDITY AND BODY MASS INDEX (BMI) OF 45.0 TO 49.9 IN ADULT: Status: ACTIVE | Noted: 2020-06-19

## 2020-06-19 PROBLEM — E66.01 CLASS 3 SEVERE OBESITY DUE TO EXCESS CALORIES WITH SERIOUS COMORBIDITY AND BODY MASS INDEX (BMI) OF 45.0 TO 49.9 IN ADULT (HCC): Status: ACTIVE | Noted: 2020-06-19

## 2020-06-19 NOTE — PROGRESS NOTES
Pulmonary Office Follow-up    Subjective     Sudhakar Saul is seen today at the office for   Chief Complaint   Patient presents with   • Shortness of Breath       Subjective     History of Present Illness  Sudhakar Saul is a 44 y.o. female with a PMH significant for tobacco use, morbid obesity, JACQUE, HTN, T2DM, and chronic Hep C who presents for follow-up of COPD.    10/22/2018: I recommended starting Spiriva Respimat and counseled on the importance of complete tobacco cessation.  I also recommended that she consider repeat sleep apnea evaluation for CPAP desensitization.    6/22/2020: Pt states she could not get transportation so she was not able to follow-up. She did feel like the Spiriva helped but she did have to use her albuterol 1-2x/ week. Pt ran out of her Spiriva a month ago around the time Dr. Ferro gave her Breo. She does feel like the Breo helps and she has not needed to use her albuterol in the past 2 weeks.  She admits to some cough but it is mostly nonproductive and she has some wheeze on exertion. She has gained ~40lbs wt gain in the past year since she got out of long term.  She does continue to smoke but she is cut down to around 4 cigarettes a day.  She still has not gotten a CPAP and states that she cannot tolerate it.    Tobacco use history:  Type: cigarettes  Amount: 0.5-3 ppd  Duration: 32 years  Cessation: N/A   Willing to quit: Yes      Review of Systems: History obtained from chart review and the patient.  Review of Systems   Constitutional: Positive for unexpected weight change.   HENT: Positive for hearing loss.    Respiratory: Positive for cough, shortness of breath and wheezing.    Cardiovascular: Positive for leg swelling. Negative for chest pain.   Gastrointestinal:        Heartburn   Musculoskeletal: Positive for arthralgias and back pain.     As described in the HPI. Otherwise, remainder of ROS (14 systems) were negative.    Patient Active Problem List   Diagnosis   •  "Seizure disorder (CMS/[facility])   • Morbid obesity (CMS/[facility])   • Tobacco abuse counseling   • Multiple joint pain   • Dysuria   • History of positive hepatitis C   • Essential hypertension   • Uncontrolled type 2 diabetes mellitus with hyperglycemia (CMS/[facility])   • Vitamin D deficiency   • Urinary tract infection without hematuria   • Chronic hepatitis C without hepatic coma (CMS/[facility])   • Cigarette nicotine dependence, uncomplicated   • Chronic obstructive pulmonary disease (CMS/[facility])   • JACQUE (obstructive sleep apnea)   • Gastroesophageal reflux disease with esophagitis   • Left upper quadrant pain   • Nausea and vomiting   • Weight gain, abnormal   • Change in bowel habits   • Hepatic fibrosis   • Non-compliance   • Depression   • Paranoid schizophrenia (CMS/[facility])   • Gastritis without bleeding   • Tobacco user   • Encounter for eye exam   • Scoliosis   • History of drug use   • Stage 2 chronic kidney disease   • Diabetic neuropathy (CMS/[facility])   • Mixed hyperlipidemia   • Chronic bilateral low back pain with bilateral sciatica   • Type 2 diabetes mellitus with hyperglycemia, with long-term current use of insulin (CMS/[facility])   • Auditory hallucination   • Class 3 severe obesity due to excess calories with serious comorbidity and body mass index (BMI) of 45.0 to 49.9 in adult (CMS/[facility])         Current Outpatient Medications:   •  ADMELOG 100 UNIT/ML injection, Inject 40 Units under the skin into the appropriate area as directed 3 (Three) Times a Day Before Meals., Disp: 40 mL, Rfl: 11  •  albuterol sulfate  (90 Base) MCG/ACT inhaler, Inhale 2 puffs Every 6 (Six) Hours As Needed for Wheezing or Shortness of Air., Disp: 1 inhaler, Rfl: 3  •  B-D INS SYR ULTRAFINE 1CC/30G 30G X 1/2\" 1 ML misc, USE 1 SYRINGE THREE TIMES DAILY, Disp: , Rfl:   •  cyclobenzaprine (FLEXERIL) 5 MG tablet, Take 1 tablet by mouth 3 (Three) Times a Day As Needed for Muscle Spasms., Disp: 90 tablet, Rfl: 3  •  DULoxetine (CYMBALTA) 30 MG capsule, Take " "3 capsules by mouth Daily., Disp: 90 capsule, Rfl: 3  •  folic acid (FOLVITE) 1 MG tablet, Take 1,000 mcg by mouth Daily., Disp: , Rfl:   •  gabapentin (NEURONTIN) 300 MG capsule, Take 1 capsule by mouth 3 (Three) Times a Day., Disp: 90 capsule, Rfl: 2  •  glucose blood test strip, Testing 4 times daily, E11.9, Disp: 120 each, Rfl: 12  •  HYDROcodone-acetaminophen (NORCO) 7.5-325 MG per tablet, Take 1 tablet by mouth 2 (Two) Times a Day., Disp: , Rfl:   •  Insulin Glargine (BASAGLAR KWIKPEN) 100 UNIT/ML injection pen, Inject 70 Units under the skin into the appropriate area as directed Daily., Disp: 7 pen, Rfl: 11  •  Insulin Syringe 29G X 1/2\" 0.5 ML misc, Inject 3 times daily, Disp: 100 each, Rfl: 11  •  levETIRAcetam (KEPPRA) 1000 MG tablet, Take 1 tablet by mouth 2 (Two) Times a Day., Disp: 60 tablet, Rfl: 3  •  lisinopril (PRINIVIL,ZESTRIL) 40 MG tablet, Take 1 tablet by mouth once daily, Disp: 30 tablet, Rfl: 3  •  metFORMIN (GLUCOPHAGE) 500 MG tablet, TAKE 1 TABLET BY MOUTH TWICE DAILY WITH MEALS, Disp: 60 tablet, Rfl: 3  •  metoprolol tartrate (LOPRESSOR) 50 MG tablet, TAKE 1 TABLET BY MOUTH EVERY 12 HOURS, Disp: 60 tablet, Rfl: 3  •  nicotine polacrilex (COMMIT) 2 MG lozenge, Dissolve 1 lozenge in the mouth As Needed for Smoking Cessation., Disp: 168 lozenge, Rfl: 3  •  RELION PEN NEEDLES 31G X 6 MM misc, USE 1 NEEDLE 3 TO 4 TIMES DAILY, Disp: , Rfl: 3  •  risperiDONE (RisperDAL) 2 MG tablet, Take 1 tablet by mouth 2 (Two) Times a Day., Disp: 60 tablet, Rfl: 3  •  simvastatin (ZOCOR) 40 MG tablet, TAKE 1 TABLET BY MOUTH ONCE DAILY AT NIGHT, Disp: 30 tablet, Rfl: 3  •  STEGLATRO 15 MG tablet, TAKE 1 TABLET BY MOUTH ONCE DAILY IN THE MORNING, Disp: 30 tablet, Rfl: 3  •  TRULICITY 1.5 MG/0.5ML solution pen-injector, INJECT 1 SYRINGE SUBCUTANEOUSLY ONCE A WEEK, Disp: 4 mL, Rfl: 3  •  vitamin D (ERGOCALCIFEROL) 61667 units capsule capsule, Take 2 caps weekly, Disp: 8 capsule, Rfl: 3    Past Medical History:   " "  Diagnosis Date   • CAD (coronary artery disease)    • Degeneration macular    • Depression    • Diabetes mellitus (CMS/HCC)    • Hepatitis c    • Hypertension    • Migraine    • Multiple personality disorder (CMS/HCC)    • Neuropathy    • Paranoid schizophrenia (CMS/HCC)    • Seizures (CMS/HCC)      Past Surgical History:   Procedure Laterality Date   • APPENDECTOMY     • BACK SURGERY     • BREAST SURGERY     • CARPAL TUNNEL RELEASE      right hand   • CHOLECYSTECTOMY     • COLONOSCOPY     • COLONOSCOPY N/A 2/8/2019    Procedure: COLONOSCOPY;  Surgeon: Joni Delacruz MD;  Location: [facility] ENDOSCOPY;  Service: Gastroenterology   • DENTAL PROCEDURE     • ENDOSCOPY N/A 2/8/2019    Procedure: ESOPHAGOGASTRODUODENOSCOPY--poss dilation;  Surgeon: Joni Delacruz MD;  Location: [facility] ENDOSCOPY;  Service: Gastroenterology   • HYSTERECTOMY     • LIPOMA EXCISION      9   • LIVER BIOPSY     • UPPER GASTROINTESTINAL ENDOSCOPY  02/08/2019     Social History     Socioeconomic History   • Marital status:      Spouse name: Not on file   • Number of children: Not on file   • Years of education: Not on file   • Highest education level: Not on file   Tobacco Use   • Smoking status: Current Every Day Smoker     Packs/day: 0.25     Types: Cigarettes   • Smokeless tobacco: Never Used   • Tobacco comment: 05/06/2020 - Pt states today is her first day on nicotine patches   Substance and Sexual Activity   • Alcohol use: No   • Drug use: No   • Sexual activity: Defer     Family History   Problem Relation Age of Onset   • Hypertension Other    • Diabetes Other    • Stroke Other    • Cancer Other    • Kidney disease Other    • Lung disease Other    • Other Other    • Malone's esophagus Other    • Colon polyps Other    • Heart disease Other    • Ulcers Other    • Cholelithiasis Other    • Allergy (severe) Other           Objective     Blood pressure 142/80, pulse 85, height 167.6 cm (66\"), weight 135 kg (298 lb), SpO2 97 " %.  Physical Exam   Constitutional: She is oriented to person, place, and time. Vital signs are normal. She appears well-developed and well-nourished.   obese   HENT:   Head: Normocephalic and atraumatic.   Masked   Eyes: Pupils are equal, round, and reactive to light. Conjunctivae, EOM and lids are normal.   Neck: Trachea normal and normal range of motion. No tracheal tenderness present. No thyroid mass present.   Cardiovascular: Normal rate, regular rhythm and normal heart sounds. PMI is not displaced. Exam reveals no gallop.   No murmur heard.  Pulmonary/Chest: Effort normal and breath sounds normal. No respiratory distress. She has no decreased breath sounds. She has no wheezes. She has no rhonchi. She exhibits no tenderness.   Abdominal: Soft. Normal appearance and bowel sounds are normal. There is no hepatomegaly. There is no tenderness.   Musculoskeletal:   Normal gait, no extremity edema     Vascular Status -  Her right foot exhibits no edema. Her left foot exhibits no edema.  Lymphadenopathy:        Head (right side): No submandibular adenopathy present.        Head (left side): No submandibular adenopathy present.     She has no cervical adenopathy.        Right: No supraclavicular adenopathy present.        Left: No supraclavicular adenopathy present.   Neurological: She is alert and oriented to person, place, and time.   Skin: Skin is warm and dry. No rash noted. No cyanosis. Nails show no clubbing.   Psychiatric: She has a normal mood and affect. Her speech is normal and behavior is normal. Judgment normal.   Nursing note and vitals reviewed.      PFTs: 6/22/20 (independently reviewed and interpreted by me)  Ratio 52  FVC 3.61 (3.27)/ 94%  FEV1 1.9 (2.76)/ 61%  TLC (4.47)  DLCO (24.32)  Moderate obstruction.  Significant decrease in FEV1 compared to 10/22/18.    Radiology (independently reviewed and interpreted by me): CXR 6/22/20 showed NACPD       Assessment/Plan     Sudhakar was seen today for shortness  of breath.    Diagnoses and all orders for this visit:    Chronic obstructive pulmonary disease, unspecified COPD type (CMS/[facility])  -     Spirometry Without Bronchodilator    JACQUE (obstructive sleep apnea)    Cigarette nicotine dependence, uncomplicated    Class 3 severe obesity due to excess calories with serious comorbidity and body mass index (BMI) of 45.0 to 49.9 in adult (CMS/[facility])         Discussion/ Recommendations:   Spirometry shows moderate COPD which is a significant change compared to previous.  Chest x-ray was unremarkable.  She has benefited from dual bronchodilator, I think she would be better off without an ICS due to long-term risk.  I recommended stopping Breo and changing to Anoro.  Otherwise, I have stressed the importance of complete tobacco cessation and suggested that she pick a quit date relatively soon.  Have also counseled her on methods to help with weight loss which I think will help with her breathing as well.    -Stop Breo  -Start Anoro daily.  -Continue albuterol as needed for dyspnea or wheeze.  -Encouraged efforts at weight loss through diet and exercise.  Suggested that she start meal planning as well as looking into weight watchers to help with calorie restriction.  -Sudhakar Saul  reports that she has been smoking cigarettes. She has been smoking about 0.25 packs per day. She has never used smokeless tobacco.. I have educated her on the risk of diseases from using tobacco products such as cancer, COPD and heart diease. I advised her to quit and she is willing to quit. We have discussed the following method/s for tobacco cessation:  Education Material OTC Cessation Products.  Together we have set a quit date for 1 month from today.  She will follow up with me in 6 weeks or sooner to check on her progress. I spent 4 minutes counseling the patient.  -Refuses CPAP.  -Up-to-date with pneumococcal vaccine.  Encouraged annual influenza vaccination.      Patient's Body mass index is 48.1  kg/m². BMI is above normal parameters. Recommendations include: exercise counseling.           Return in about 6 weeks (around 8/3/2020) for Recheck COPD.      Thank you for allowing me to participate in the care of Sudhakar Saul. Please do not hesitate to contact me with any questions.         This document has been electronically signed by Debo Will MD on June 22, 2020 10:56      Dictated using Dragon

## 2020-06-22 ENCOUNTER — OFFICE VISIT (OUTPATIENT)
Dept: PULMONOLOGY | Facility: CLINIC | Age: 45
End: 2020-06-22

## 2020-06-22 ENCOUNTER — HOSPITAL ENCOUNTER (OUTPATIENT)
Dept: GENERAL RADIOLOGY | Facility: HOSPITAL | Age: 45
Discharge: HOME | End: 2020-06-22
Admitting: INTERNAL MEDICINE

## 2020-06-22 VITALS
HEART RATE: 85 BPM | DIASTOLIC BLOOD PRESSURE: 80 MMHG | BODY MASS INDEX: 47.09 KG/M2 | HEIGHT: 66 IN | OXYGEN SATURATION: 97 % | WEIGHT: 293 LBS | SYSTOLIC BLOOD PRESSURE: 142 MMHG

## 2020-06-22 DIAGNOSIS — G47.33 OSA (OBSTRUCTIVE SLEEP APNEA): ICD-10-CM

## 2020-06-22 DIAGNOSIS — F17.210 CIGARETTE NICOTINE DEPENDENCE, UNCOMPLICATED: ICD-10-CM

## 2020-06-22 DIAGNOSIS — E66.01 CLASS 3 SEVERE OBESITY DUE TO EXCESS CALORIES WITH SERIOUS COMORBIDITY AND BODY MASS INDEX (BMI) OF 45.0 TO 49.9 IN ADULT (HCC): ICD-10-CM

## 2020-06-22 DIAGNOSIS — J44.9 CHRONIC OBSTRUCTIVE PULMONARY DISEASE, UNSPECIFIED COPD TYPE (HCC): Primary | ICD-10-CM

## 2020-06-22 DIAGNOSIS — J44.9 CHRONIC OBSTRUCTIVE PULMONARY DISEASE, UNSPECIFIED COPD TYPE (HCC): ICD-10-CM

## 2020-06-22 DIAGNOSIS — R06.00 DYSPNEA, UNSPECIFIED TYPE: ICD-10-CM

## 2020-06-22 PROCEDURE — 71046 X-RAY EXAM CHEST 2 VIEWS: CPT

## 2020-06-22 PROCEDURE — 99214 OFFICE O/P EST MOD 30 MIN: CPT | Performed by: INTERNAL MEDICINE

## 2020-06-22 PROCEDURE — 99406 BEHAV CHNG SMOKING 3-10 MIN: CPT | Performed by: INTERNAL MEDICINE

## 2020-06-22 PROCEDURE — 94010 BREATHING CAPACITY TEST: CPT | Performed by: INTERNAL MEDICINE

## 2020-06-22 NOTE — PROCEDURES
Spirometry Without Bronchodilator  Performed by: Debo Will MD  Authorized by: Debo Will MD      Pre Drug    FVC: 94%   FEV1: 61%   FEV1/FVC: 52%    Interpretation   Spirometry   Spirometry shows moderate obstruction.   Overall comments: Significant decrease in FEV1 compared to previous.

## 2020-06-23 ENCOUNTER — DOCUMENTATION (OUTPATIENT)
Dept: ENDOCRINOLOGY | Facility: CLINIC | Age: 45
End: 2020-06-23

## 2020-06-26 ENCOUNTER — TELEPHONE (OUTPATIENT)
Dept: FAMILY MEDICINE CLINIC | Facility: CLINIC | Age: 45
End: 2020-06-26

## 2020-06-26 NOTE — TELEPHONE ENCOUNTER
Called to cancel July 10th appt as Dr. Ferro will be out of office until July 14th. Moved her appointment to July 17th at 9:30  Hub can read

## 2020-06-30 RX ORDER — ERGOCALCIFEROL 1.25 MG/1
CAPSULE ORAL
Qty: 8 CAPSULE | Refills: 3 | Status: SHIPPED | OUTPATIENT
Start: 2020-06-30 | End: 2020-10-30

## 2020-07-01 ENCOUNTER — TELEPHONE (OUTPATIENT)
Dept: FAMILY MEDICINE CLINIC | Facility: CLINIC | Age: 45
End: 2020-07-01

## 2020-07-01 ENCOUNTER — TREATMENT (OUTPATIENT)
Dept: PHYSICAL THERAPY | Facility: CLINIC | Age: 45
End: 2020-07-01

## 2020-07-01 DIAGNOSIS — M54.40 CHRONIC LOW BACK PAIN WITH SCIATICA, SCIATICA LATERALITY UNSPECIFIED, UNSPECIFIED BACK PAIN LATERALITY: Primary | ICD-10-CM

## 2020-07-01 DIAGNOSIS — G89.29 CHRONIC LOW BACK PAIN WITH SCIATICA, SCIATICA LATERALITY UNSPECIFIED, UNSPECIFIED BACK PAIN LATERALITY: Primary | ICD-10-CM

## 2020-07-01 PROCEDURE — 97162 PT EVAL MOD COMPLEX 30 MIN: CPT | Performed by: PHYSICAL THERAPIST

## 2020-07-01 NOTE — TELEPHONE ENCOUNTER
----- Message from Jermaine Ferro MD sent at 6/11/2020  7:29 AM CDT -----  Please call pt    Vitamin D is normal     Pt has chronic hep C. Hep C antibodies present.  Continue to monitor     b12 levels normal     Kidney function stable. Liver function stable    Triglycerides improved from 203 to 170 on lipidp salud HDL is low recommend healthy fat diet with more olive oil, fish, nuts/seeds     hga1c slightly improved to 9.60. From 9.80.  contineu with diabetic management and followup with Endocrinologist     CBC shows normal hemoglobin    Recheck on next visit. Thanks

## 2020-07-01 NOTE — PROGRESS NOTES
"  Physical Therapy Initial Evaluation and Plan of Care      Patient: Sudhakar Saul   : 1975  Diagnosis/ICD-10 Code:  Chronic low back pain with sciatica, sciatica laterality unspecified, unspecified back pain laterality [M54.40, G89.29]  Referring practitioner: ROD Delcid  Date of Initial Visit: 2020  Today's Date: 2020  Patient seen for 1 sessions    Next MD appt: 2020.  Recertification: 2020          Subjective Questionnaire: Oswestry: 25/50 = 50%      Subjective Evaluation    History of Present Illness  Onset date: Chronic, years.  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 She reprots she had an L4 disectomy in . She reports no previous back surgeries since then. She reports previous PT but it has been a long time ago. She reports she has B rad pain on/off L>R LE. She reports it goes all the way down the legs.      Patient Occupation: Unemployed. Pain  Current pain ratin  At best pain ratin  At worst pain ratin  Location: Low back  Quality: sharp and radiating  Relieving factors: rest  Aggravating factors: ambulation, lifting, movement and prolonged positioning  Progression: worsening    Social Support  Lives in: one-story house    Diagnostic Tests  X-ray: abnormal (\"Nothing ot note per patient, no changes.\"- see reportt in chart.)    Treatments  Previous treatment: physical therapy, injection treatment and medication  Current treatment: medication  Patient Goals  Patient goals for therapy: decreased pain  Patient goal: \"More mobility and some weight loss\"           Objective          Static Posture   General Observations  Guarded.     Head  Forward.    Shoulders  Rounded.    Thoracic Spine  Hyperkyphosis.    Comments  Pelvis appears to be level, no LLD to note.    Postural Observations  Seated posture: poor  Standing posture: poor  Correction of posture: has no consistent " effect        Palpation     Additional Palpation Details  Generalized hypersensitivity    Neurological Testing     Sensation     Lumbar   Left   Intact: light touch    Right   Intact: light touch    Reflexes   Left   Patellar (L4): normal (2+)    Right   Patellar (L4): normal (2+)    Active Range of Motion     Lumbar   Flexion: 60 degrees   Extension: 2 degrees   Left lateral flexion: Active left lumbar lateral flexion: 25% of range.   Right lateral flexion: Active right lumbar lateral flexion: 25% of range.   Left rotation: Active left lumbar rotation: 25% of range.   Right rotation: Active right lumbar rotation: 50% of range.     Strength/Myotome Testing     Lumbar   Left   Normal strength    Right   Normal strength    Tests       Thoracic   Negative slump.     Lumbar     Left   Negative crossed SLR, passive SLR, quadrant and valsalva.     Right   Negative crossed SLR, passive SLR, quadrant and valsalva.     Left Pelvic Girdle/Sacrum   Negative: sacrum compression, gapping and sacral spring.     Right Pelvic Girdle/Sacrum   Negative: sacrum compression, gapping and sacral spring.     Left Hip   Negative ANANT, FADIR, piriformis, SI compression and SI distraction.   SLR: Negative.     Right Hip   Negative ANANT, FADIR, piriformis, SI compression and SI distraction.   SLR: Negative.     Additional Tests Details  Jaquan's Symptoms  1) Tenderness- positive  2) Simulation- positive  3) Distraction- positive  4) Regional- negative  5) Overreaction- positive    Ambulation   Weight-Bearing Status   Assistive device used: none    Ambulation: Level Surfaces   Ambulation without assistive device: independent    Observational Gait   Gait: within functional limits   Decreased walking speed and stride length.   Base of support: increased    Additional Observational Gait Details  Waddling type gait due to obesity          Assessment & Plan     Assessment  Impairments: abnormal gait, abnormal or restricted ROM, activity  "intolerance, lacks appropriate home exercise program and pain with function  Assessment details: Patient presents with chronic low back pain, with previous PT years ago after surgery, but none recently. Patient has poor log roll technique and poor overall body mechanics. Patient's insurance requires authorization prior to treatment beginning. Small HEP was established.  Prognosis: fair  Prognosis details: Barriers to Rehab: Include significant or possible arthritic/degenerative changes that have occurred within the spine, The chronicity of this issue, The patient's obesity, The patient's generally deconditioned state.    Safety Issues: Seizure risk    Functional Limitations: carrying objects, lifting, sleeping, uncomfortable because of pain, sitting, standing and unable to perform repetitive tasks  Goals  Plan Goals: Short Term Goals:  1) I with HEP and have additions/changes by next recertification    2) Patient able to show proper log roll technique.    3) Patient to be more aware of posture and posture correction techniques    4) AROM for lumbar extension >= 20°.     5) AROM B Lumbar SB/ROT >= 75% of range.    6) Patient able to ambulate aquatically F/R/L 5 min each with no increase in pain.      Long Term Goals:  1) Patient to have AROM for the lumbar spine all WFL, no increase in pain.    2) Patient able to perform 20 Bridges with UE \"X\"  with no increase in pain.    3) Patient able to perform 20 reps of each aquatic there ex with no increase in pain.    4) Patient able to show proper lifting technique floor to waist with no increase in pain.    5) Patient able to show proper ergonomics for mopping/sweeping/vacumming.    6) I with final land and aquatics HEP.    7) D/C with a final HEP and free 30 day fitness formula membership.      Plan  Therapy options: will be seen for skilled physical therapy services  Planned modality interventions: cryotherapy, hydrotherapy, high voltage pulsed current (pain management) " and thermotherapy (hydrocollator packs)  Planned therapy interventions: abdominal trunk stabilization, body mechanics training, flexibility, functional ROM exercises, home exercise program, transfer training, therapeutic activities, stretching, strengthening, spinal/joint mobilization, soft tissue mobilization, postural training and manual therapy  Frequency: Land/Pool.  Duration in visits: 8  Treatment plan discussed with: patient  Plan details: Progress ROM, strength, core stab, body mechanics to an I HEP that the patient can continue for long term pain management and spinal protection.      Other therapeutic activities and/or exercises will be prescribed depending on the patients progress or lack there of.     Visit Diagnoses:    ICD-10-CM ICD-9-CM   1. Chronic low back pain with sciatica, sciatica laterality unspecified, unspecified back pain laterality M54.40 724.2    G89.29 724.3     338.29       Timed:  Manual Therapy:         mins  24165;  Therapeutic Exercise:         mins  71116;     Aquatic Ther Ex:         mins  27002;     Neuromuscular Sharan:        mins  91004;    Therapeutic Activity:          mins  25740;     Gait Training:           mins  62015;     Ultrasound:          mins  60309;    Paraffin:        mins  85502;  Electrical Stimulation:         mins  94599 ( );    Untimed:  Electrical Stimulation:         mins  38005 ( );  Mechanical Traction:         mins  36260;     Timed Treatment:      mins   Total Treatment:     30   mins    PT SIGNATURE: aCndy Menendez PT DPT, [facility]   DATE TREATMENT INITIATED: 7/1/2020    Initial Certification  Certification Period: 9/29/2020  I certify that the therapy services are furnished while this patient is under my care.  The services outlined above are required by this patient, and will be reviewed every 90 days.     PHYSICIAN: Dana Osman, APRN      DATE:     Please sign and return via fax to  .. Thank you, [facility]rapy.

## 2020-07-06 ENCOUNTER — TREATMENT (OUTPATIENT)
Dept: PHYSICAL THERAPY | Facility: CLINIC | Age: 45
End: 2020-07-06

## 2020-07-06 DIAGNOSIS — G89.29 CHRONIC LOW BACK PAIN WITH SCIATICA, SCIATICA LATERALITY UNSPECIFIED, UNSPECIFIED BACK PAIN LATERALITY: Primary | ICD-10-CM

## 2020-07-06 DIAGNOSIS — M54.40 CHRONIC LOW BACK PAIN WITH SCIATICA, SCIATICA LATERALITY UNSPECIFIED, UNSPECIFIED BACK PAIN LATERALITY: Primary | ICD-10-CM

## 2020-07-06 PROCEDURE — 97113 AQUATIC THERAPY/EXERCISES: CPT | Performed by: PHYSICAL THERAPIST

## 2020-07-06 NOTE — PROGRESS NOTES
"   Physical Therapy Daily Progress Note      Patient: Sudhakar Saul   : 1975  Referring practitioner: ROD Delcid  Date of Initial Visit: Type: THERAPY  Noted: 2020  Today's Date: 2020  Patient seen for 2 sessions    Next MD appt: 2020.  Recertification: 2020        Sudhakar Saul reports: no improvement yet        Subjective Evaluation    History of Present Illness    Subjective comment: pt states that she is doing fair. States that her R hip is bothering her quite a bit todayPain  Current pain ratin           Objective          Ambulation     Comments   Pt cannot swim and wants to hold onto to side of pull for ambulation      See Exercise, Manual, and Modality Logs for complete treatment.       Assessment & Plan     Assessment  Assessment details: Pt did well with first aquatic treatment. Does have some anxiety that therapy will hurt her. Gave education and rest breaks as needed. Slight increase in pain post treatment but pt is pleased with aquatic therapy.    Goals  Plan Goals: Short Term Goals:  1) I with HEP and have additions/changes by next recertification    2) Patient able to show proper log roll technique.    3) Patient to be more aware of posture and posture correction techniques    4) AROM for lumbar extension >= 20°.     5) AROM B Lumbar SB/ROT >= 75% of range.    6) Patient able to ambulate aquatically F/R/L 5 min each with no increase in pain.      Long Term Goals:  1) Patient to have AROM for the lumbar spine all WFL, no increase in pain.    2) Patient able to perform 20 Bridges with UE \"X\"  with no increase in pain.    3) Patient able to perform 20 reps of each aquatic there ex with no increase in pain.    4) Patient able to show proper lifting technique floor to waist with no increase in pain.    5) Patient able to show proper ergonomics for mopping/sweeping/vacumming.    6) I with final land and aquatics HEP.    7) D/C with a final HEP and free 30 day " fitness formula membership.          Plan  Duration in visits: 8        Visit Diagnoses:    ICD-10-CM ICD-9-CM   1. Chronic low back pain with sciatica, sciatica laterality unspecified, unspecified back pain laterality M54.40 724.2    G89.29 724.3     338.29       Progress per Plan of Care and Progress strengthening /stabilization /functional activity           Timed:  Manual Therapy:         mins  52806;  Therapeutic Exercise:         mins  89271;     Neuromuscular Sharan:        mins  55019;    Therapeutic Activity:          mins  25102;     Gait Training:           mins  19860;     Ultrasound:          mins  64434;    Electrical Stimulation:         mins  76753 ( );  Aquatic  45    mins  90164    Untimed:  Electrical Stimulation:         mins  76853 ( );  Mechanical Traction:         mins  73667;     Timed Treatment:  45    mins   Total Treatment:     45   mins  [facility]  Physical Therapist

## 2020-07-07 ENCOUNTER — TREATMENT (OUTPATIENT)
Dept: PHYSICAL THERAPY | Facility: CLINIC | Age: 45
End: 2020-07-07

## 2020-07-07 DIAGNOSIS — M54.40 CHRONIC LOW BACK PAIN WITH SCIATICA, SCIATICA LATERALITY UNSPECIFIED, UNSPECIFIED BACK PAIN LATERALITY: Primary | ICD-10-CM

## 2020-07-07 DIAGNOSIS — G89.29 CHRONIC LOW BACK PAIN WITH SCIATICA, SCIATICA LATERALITY UNSPECIFIED, UNSPECIFIED BACK PAIN LATERALITY: Primary | ICD-10-CM

## 2020-07-07 PROCEDURE — 97014 ELECTRIC STIMULATION THERAPY: CPT | Performed by: PHYSICAL THERAPIST

## 2020-07-07 PROCEDURE — 97140 MANUAL THERAPY 1/> REGIONS: CPT | Performed by: PHYSICAL THERAPIST

## 2020-07-07 PROCEDURE — 97110 THERAPEUTIC EXERCISES: CPT | Performed by: PHYSICAL THERAPIST

## 2020-07-07 NOTE — PROGRESS NOTES
"   Physical Therapy Daily Progress Note      Patient: Sudhakar Saul   : 1975  Referring practitioner: ROD Delcid  Date of Initial Visit: Type: THERAPY  Noted: 2020  Today's Date: 2020  Patient seen for 3 sessions    Next MD appt: 2020.  Recertification: 2020        Sudhakar Saul reports: no improvement yet        Subjective Evaluation    History of Present Illness    Subjective comment: pt states that she is sore from yesterdays treatment.Pain  Current pain ratin           Objective          Postural Observations    Additional Postural Observation Details  R IC up; Corrected with gentle long axis distraction      See Exercise, Manual, and Modality Logs for complete treatment.       Assessment & Plan     Assessment  Assessment details: Pt TTP to lumbar and L piriformis with manual. Mild malalignment at pelvis that corrected easily with gentle long axis distraction. Pt had increase pain with bridges- only able to preform x5. Fair TrA contraction- improved when paired with PPT. Established HEP and pt verbalized understanding. Positive response to IFC/Ice.    Goals  Plan Goals: Short Term Goals:  1) I with HEP and have additions/changes by next recertification    2) Patient able to show proper log roll technique.    3) Patient to be more aware of posture and posture correction techniques    4) AROM for lumbar extension >= 20°.     5) AROM B Lumbar SB/ROT >= 75% of range.    6) Patient able to ambulate aquatically F/R/L 5 min each with no increase in pain.      Long Term Goals:  1) Patient to have AROM for the lumbar spine all WFL, no increase in pain.    2) Patient able to perform 20 Bridges with UE \"X\"  with no increase in pain.    3) Patient able to perform 20 reps of each aquatic there ex with no increase in pain.    4) Patient able to show proper lifting technique floor to waist with no increase in pain.    5) Patient able to show proper ergonomics for " mopping/sweeping/vacumming.    6) I with final land and aquatics HEP.    7) D/C with a final HEP and free 30 day fitness formula membership.            Plan  Duration in visits: 8  Plan details: Monitor alignment. Cont manual interventions. Possible supine hip flexor S if able to tolerate. Try BKLL + TA        Visit Diagnoses:    ICD-10-CM ICD-9-CM   1. Chronic low back pain with sciatica, sciatica laterality unspecified, unspecified back pain laterality M54.40 724.2    G89.29 724.3     338.29       Progress per Plan of Care and Progress strengthening /stabilization /functional activity           Timed:  Manual Therapy:    15     mins  09956;  Therapeutic Exercise:    30     mins  92508;     Neuromuscular Sharan:        mins  17178;    Therapeutic Activity:          mins  62590;     Gait Training:           mins  57680;     Ultrasound:          mins  47558;    Electrical Stimulation:         mins  18140 ( );    Untimed:  Electrical Stimulation:    15     mins  97151 ( );  Mechanical Traction:         mins  90187;     Timed Treatment:   45   mins   Total Treatment:     90   mins  [facility]  Physical Therapist

## 2020-07-08 ENCOUNTER — OFFICE VISIT (OUTPATIENT)
Dept: ENDOCRINOLOGY | Facility: CLINIC | Age: 45
End: 2020-07-08

## 2020-07-08 VITALS
HEIGHT: 66 IN | BODY MASS INDEX: 47.09 KG/M2 | SYSTOLIC BLOOD PRESSURE: 138 MMHG | WEIGHT: 293 LBS | HEART RATE: 97 BPM | DIASTOLIC BLOOD PRESSURE: 74 MMHG | OXYGEN SATURATION: 99 %

## 2020-07-08 DIAGNOSIS — E11.65 TYPE 2 DIABETES MELLITUS WITH HYPERGLYCEMIA, WITH LONG-TERM CURRENT USE OF INSULIN (HCC): Primary | ICD-10-CM

## 2020-07-08 DIAGNOSIS — Z79.4 TYPE 2 DIABETES MELLITUS WITH HYPERGLYCEMIA, WITH LONG-TERM CURRENT USE OF INSULIN (HCC): Primary | ICD-10-CM

## 2020-07-08 DIAGNOSIS — E55.9 VITAMIN D DEFICIENCY: ICD-10-CM

## 2020-07-08 DIAGNOSIS — E78.2 MIXED HYPERLIPIDEMIA: ICD-10-CM

## 2020-07-08 PROCEDURE — 99214 OFFICE O/P EST MOD 30 MIN: CPT | Performed by: NURSE PRACTITIONER

## 2020-07-08 NOTE — PROGRESS NOTES
Subjective    Sudhakar Saul is a 44 y.o. female. she is here today for follow-up.    History of Present Illness         IN OFFICE VISIT      Referring provider Jermaine Ferro MD     Reason - diabetes         Duration/Timing -diagnosed in her late 20s/constant         Quality -improved control         Severity - high due to complications     Complications, neuropathy, hyperglycemia         Severity (Complication/Hospitalizations)        Secondary Macrovascular-- no CAD, no PAD, no CVA          Secondary Microvascular--- neuropathy, mild diabetic retinopathy , CKD stage II     COPD , hepatitis C      Context--- found during pre-op evaluation bg was 300            Diabetes Regimen      Lab Results   Component Value Date    HGBA1C 9.60 (H) 06/10/2020                   Blood Glucose Readings     Checking 4 times daily       am reading 180 s    During the day the highest is 230       havning lows when missing a meal           Diet-        Trying to watch carbs      Exercises     Trying to walking            Associated Signs/Symptoms          Hyperglycemic Symptoms: none       Hypoglycemic Episodes: when missing meals             The following portions of the patient's history were reviewed and updated as appropriate:   Past Medical History:   Diagnosis Date   • Allergic    • Anemia    • Angina pectoris (CMS/HCC)    • Anxiety    • Arthritis    • CAD (coronary artery disease)    • Cancer (CMS/HCC) 1997    endometrial- surgery, no chemo/radiation   • Chronic kidney disease    • COPD (chronic obstructive pulmonary disease) (CMS/HCC)    • Degeneration macular    • Depression    • Diabetes mellitus (CMS/HCC)    • Hepatitis c    • Hypertension    • Injury of back    • Injury of neck    • Migraine    • Multiple personality disorder (CMS/HCC)    • Neuropathy    • Paranoid schizophrenia (CMS/HCC)    • Seizures (CMS/HCC)     07/01/2020: last seizure was the first of the year   • Shortness of breath    • Substance abuse  "(CMS/HCC)    • Urinary tract infection      Past Surgical History:   Procedure Laterality Date   • APPENDECTOMY     • BACK SURGERY     • BREAST SURGERY     • CARPAL TUNNEL RELEASE      right hand   • CHOLECYSTECTOMY     • COLONOSCOPY     • COLONOSCOPY N/A 2/8/2019    Procedure: COLONOSCOPY;  Surgeon: Joni Delacruz MD;  Location: [facility] ENDOSCOPY;  Service: Gastroenterology   • DENTAL PROCEDURE     • ENDOSCOPY N/A 2/8/2019    Procedure: ESOPHAGOGASTRODUODENOSCOPY--poss dilation;  Surgeon: Joni Delacruz MD;  Location: [facility] ENDOSCOPY;  Service: Gastroenterology   • HYSTERECTOMY     • LIPOMA EXCISION      9   • LIVER BIOPSY     • UPPER GASTROINTESTINAL ENDOSCOPY  02/08/2019     Family History   Problem Relation Age of Onset   • Hypertension Other    • Diabetes Other    • Stroke Other    • Cancer Other    • Kidney disease Other    • Lung disease Other    • Other Other    • Malone's esophagus Other    • Colon polyps Other    • Heart disease Other    • Ulcers Other    • Cholelithiasis Other    • Allergy (severe) Other      OB History    None       Current Outpatient Medications   Medication Sig Dispense Refill   • ADMELOG 100 UNIT/ML injection Inject 40 Units under the skin into the appropriate area as directed 3 (Three) Times a Day Before Meals. 40 mL 11   • albuterol sulfate  (90 Base) MCG/ACT inhaler Inhale 2 puffs Every 6 (Six) Hours As Needed for Wheezing or Shortness of Air. 1 inhaler 3   • B-D INS SYR ULTRAFINE 1CC/30G 30G X 1/2\" 1 ML misc USE 1 SYRINGE THREE TIMES DAILY     • cyclobenzaprine (FLEXERIL) 5 MG tablet Take 1 tablet by mouth 3 (Three) Times a Day As Needed for Muscle Spasms. (Patient taking differently: Take 5 mg by mouth 3 (Three) Times a Day.) 90 tablet 3   • DULoxetine (CYMBALTA) 30 MG capsule Take 3 capsules by mouth Daily. 90 capsule 3   • folic acid (FOLVITE) 1 MG tablet Take 1,000 mcg by mouth Daily.     • gabapentin (NEURONTIN) 300 MG capsule Take 1 capsule by mouth 3 (Three) " "Times a Day. 90 capsule 2   • glucose blood test strip Testing 4 times daily, E11.9 120 each 12   • HYDROcodone-acetaminophen (NORCO) 7.5-325 MG per tablet Take 1 tablet by mouth 2 (Two) Times a Day.     • Insulin Glargine (BASAGLAR KWIKPEN) 100 UNIT/ML injection pen Inject 70 Units under the skin into the appropriate area as directed Daily. (Patient taking differently: Inject 63 Units under the skin into the appropriate area as directed Daily.) 7 pen 11   • Insulin Syringe 29G X 1/2\" 0.5 ML misc Inject 3 times daily 100 each 11   • levETIRAcetam (KEPPRA) 1000 MG tablet Take 1 tablet by mouth 2 (Two) Times a Day. 60 tablet 3   • lisinopril (PRINIVIL,ZESTRIL) 40 MG tablet Take 1 tablet by mouth once daily 30 tablet 3   • metFORMIN (GLUCOPHAGE) 500 MG tablet TAKE 1 TABLET BY MOUTH TWICE DAILY WITH MEALS 60 tablet 3   • metoprolol tartrate (LOPRESSOR) 50 MG tablet TAKE 1 TABLET BY MOUTH EVERY 12 HOURS 60 tablet 3   • nicotine polacrilex (COMMIT) 2 MG lozenge Dissolve 1 lozenge in the mouth As Needed for Smoking Cessation. 168 lozenge 3   • RELION PEN NEEDLES 31G X 6 MM misc USE 1 NEEDLE 3 TO 4 TIMES DAILY  3   • risperiDONE (RisperDAL) 2 MG tablet Take 1 tablet by mouth 2 (Two) Times a Day. 60 tablet 3   • simvastatin (ZOCOR) 40 MG tablet TAKE 1 TABLET BY MOUTH ONCE DAILY AT NIGHT 30 tablet 3   • STEGLATRO 15 MG tablet TAKE 1 TABLET BY MOUTH ONCE DAILY IN THE MORNING 30 tablet 3   • TRULICITY 1.5 MG/0.5ML solution pen-injector INJECT 1 SYRINGE SUBCUTANEOUSLY ONCE A WEEK 4 mL 3   • vitamin D (ERGOCALCIFEROL) 1.25 MG (20390 UT) capsule capsule TAKE 2 CAPSULES BY MOUTH ONCE A WEEK 8 capsule 3     No current facility-administered medications for this visit.      Allergies   Allergen Reactions   • Betadine [Povidone Iodine] Anaphylaxis   • Contrast Dye Anaphylaxis   • Iodine Anaphylaxis   • Lipitor  [Atorvastatin Calcium] Shortness Of Breath   • Nifedipine Unknown (See Comments) and Other (See Comments)     Syncope     • " Shellfish-Derived Products Anaphylaxis   • Adhesive Tape Unknown (See Comments)     Blister     • Altace [Ramipril] Unknown (See Comments)     Sleepy       Social History     Socioeconomic History   • Marital status:      Spouse name: Not on file   • Number of children: Not on file   • Years of education: Not on file   • Highest education level: Not on file   Tobacco Use   • Smoking status: Current Every Day Smoker     Packs/day: 0.25     Types: Cigarettes   • Smokeless tobacco: Never Used   • Tobacco comment: 05/06/2020 - Pt states today is her first day on nicotine patches   Substance and Sexual Activity   • Alcohol use: No   • Drug use: Not Currently     Types: Amphetamines, Heroin, Methamphetamines, Morphine, Oxycodone   • Sexual activity: Defer       Review of Systems  Review of Systems   Constitutional: Negative for activity change, appetite change, diaphoresis and fatigue.   HENT: Negative for facial swelling, sneezing, sore throat, tinnitus, trouble swallowing and voice change.    Eyes: Negative for photophobia, pain, discharge, redness, itching and visual disturbance.   Respiratory: Negative for apnea, cough, choking, chest tightness and shortness of breath.    Cardiovascular: Negative for chest pain, palpitations and leg swelling.   Gastrointestinal: Negative for abdominal distention, abdominal pain, constipation, diarrhea, nausea and vomiting.   Endocrine: Negative for cold intolerance, heat intolerance, polydipsia, polyphagia and polyuria.   Genitourinary: Negative for difficulty urinating, dysuria, frequency, hematuria and urgency.   Musculoskeletal: Negative for arthralgias, back pain, gait problem, joint swelling, myalgias, neck pain and neck stiffness.   Skin: Negative for color change, pallor, rash and wound.   Neurological: Negative for dizziness, tremors, weakness, light-headedness, numbness and headaches.   Hematological: Negative for adenopathy. Does not bruise/bleed easily.  "  Psychiatric/Behavioral: Negative for behavioral problems, confusion and sleep disturbance.        Objective    /74   Pulse 97   Ht 167.6 cm (66\")   Wt 136 kg (300 lb)   SpO2 99%   BMI 48.42 kg/m²   Physical Exam   Constitutional: She is oriented to person, place, and time. She appears well-developed and well-nourished. No distress.   HENT:   Head: Normocephalic and atraumatic.   Right Ear: External ear normal.   Left Ear: External ear normal.   Nose: Nose normal.   Eyes: Pupils are equal, round, and reactive to light. Conjunctivae and EOM are normal.   Neck: Normal range of motion. Neck supple. No tracheal deviation present. No thyromegaly present.   Cardiovascular: Normal rate, regular rhythm and normal heart sounds.   No murmur heard.  Pulmonary/Chest: Effort normal and breath sounds normal. No respiratory distress. She has no wheezes.   Abdominal: Soft. Bowel sounds are normal. There is no tenderness. There is no rebound and no guarding.   Musculoskeletal: Normal range of motion. She exhibits no edema, tenderness or deformity.   Neurological: She is alert and oriented to person, place, and time. No cranial nerve deficit.   Skin: Skin is warm and dry. No rash noted.   Psychiatric: She has a normal mood and affect. Her behavior is normal. Judgment and thought content normal.       Lab Review  Glucose (mg/dL)   Date Value   06/10/2020 219 (H)   02/07/2020 186 (H)   11/11/2019 270 (H)     Sodium (mmol/L)   Date Value   06/10/2020 136   02/07/2020 137   11/11/2019 136     Potassium (mmol/L)   Date Value   06/10/2020 4.9   02/07/2020 4.7   11/11/2019 4.6     Chloride (mmol/L)   Date Value   06/10/2020 103   02/07/2020 100   11/11/2019 99     CO2 (mmol/L)   Date Value   06/10/2020 20.6 (L)   02/07/2020 19.0 (L)   11/11/2019 23.1     BUN (mg/dL)   Date Value   06/10/2020 17   02/07/2020 13   11/11/2019 15     Creatinine (mg/dL)   Date Value   06/10/2020 0.78   02/07/2020 0.71   11/11/2019 0.80     Hemoglobin " "A1C (%)   Date Value   06/10/2020 9.60 (H)   02/07/2020 9.80 (H)   11/11/2019 9.18 (H)     Triglycerides (mg/dL)   Date Value   06/10/2020 170 (H)   02/07/2020 203 (H)   11/11/2019 273 (H)     LDL Cholesterol  (mg/dL)   Date Value   06/10/2020 46   02/07/2020 44   11/11/2019 38       Assessment/Plan      1. Type 2 diabetes mellitus with hyperglycemia, with long-term current use of insulin (CMS/[facility])    2. Vitamin D deficiency    3. Mixed hyperlipidemia    .    Medications prescribed:  Outpatient Encounter Medications as of 7/8/2020   Medication Sig Dispense Refill   • ADMELOG 100 UNIT/ML injection Inject 40 Units under the skin into the appropriate area as directed 3 (Three) Times a Day Before Meals. 40 mL 11   • albuterol sulfate  (90 Base) MCG/ACT inhaler Inhale 2 puffs Every 6 (Six) Hours As Needed for Wheezing or Shortness of Air. 1 inhaler 3   • B-D INS SYR ULTRAFINE 1CC/30G 30G X 1/2\" 1 ML misc USE 1 SYRINGE THREE TIMES DAILY     • cyclobenzaprine (FLEXERIL) 5 MG tablet Take 1 tablet by mouth 3 (Three) Times a Day As Needed for Muscle Spasms. (Patient taking differently: Take 5 mg by mouth 3 (Three) Times a Day.) 90 tablet 3   • DULoxetine (CYMBALTA) 30 MG capsule Take 3 capsules by mouth Daily. 90 capsule 3   • folic acid (FOLVITE) 1 MG tablet Take 1,000 mcg by mouth Daily.     • gabapentin (NEURONTIN) 300 MG capsule Take 1 capsule by mouth 3 (Three) Times a Day. 90 capsule 2   • glucose blood test strip Testing 4 times daily, E11.9 120 each 12   • HYDROcodone-acetaminophen (NORCO) 7.5-325 MG per tablet Take 1 tablet by mouth 2 (Two) Times a Day.     • Insulin Glargine (BASAGLAR KWIKPEN) 100 UNIT/ML injection pen Inject 70 Units under the skin into the appropriate area as directed Daily. (Patient taking differently: Inject 63 Units under the skin into the appropriate area as directed Daily.) 7 pen 11   • Insulin Syringe 29G X 1/2\" 0.5 ML misc Inject 3 times daily 100 each 11   • levETIRAcetam (KEPPRA) " 1000 MG tablet Take 1 tablet by mouth 2 (Two) Times a Day. 60 tablet 3   • lisinopril (PRINIVIL,ZESTRIL) 40 MG tablet Take 1 tablet by mouth once daily 30 tablet 3   • metFORMIN (GLUCOPHAGE) 500 MG tablet TAKE 1 TABLET BY MOUTH TWICE DAILY WITH MEALS 60 tablet 3   • metoprolol tartrate (LOPRESSOR) 50 MG tablet TAKE 1 TABLET BY MOUTH EVERY 12 HOURS 60 tablet 3   • nicotine polacrilex (COMMIT) 2 MG lozenge Dissolve 1 lozenge in the mouth As Needed for Smoking Cessation. 168 lozenge 3   • RELION PEN NEEDLES 31G X 6 MM misc USE 1 NEEDLE 3 TO 4 TIMES DAILY  3   • risperiDONE (RisperDAL) 2 MG tablet Take 1 tablet by mouth 2 (Two) Times a Day. 60 tablet 3   • simvastatin (ZOCOR) 40 MG tablet TAKE 1 TABLET BY MOUTH ONCE DAILY AT NIGHT 30 tablet 3   • STEGLATRO 15 MG tablet TAKE 1 TABLET BY MOUTH ONCE DAILY IN THE MORNING 30 tablet 3   • TRULICITY 1.5 MG/0.5ML solution pen-injector INJECT 1 SYRINGE SUBCUTANEOUSLY ONCE A WEEK 4 mL 3   • vitamin D (ERGOCALCIFEROL) 1.25 MG (44940 UT) capsule capsule TAKE 2 CAPSULES BY MOUTH ONCE A WEEK 8 capsule 3     No facility-administered encounter medications on file as of 7/8/2020.        Orders placed during this encounter include:  No orders of the defined types were placed in this encounter.    Glycemic Management     Diabetes mellitus type 2 not controlled      Lab Results   Component Value Date    HGBA1C 9.60 (H) 06/10/2020                basaglar taking 63 units ---decrease to 60 units         admelog     Taking about 30 units       Small meal keep 30 units     Medium meal give 34 units     Large meal give 38 units        Plus sliding scale     2 per 50 above 150         Adjust based on 2 hour post meal BG      trulicity 1.5 mg weekly     Metformin 1000 mg po BID      Steglatro 15 mg one daily               Aim for 45 grams of Carbohydrate for meals      Aim for 15 grams of Carbohydrate for snack                     approve dexcom         1. Patient is diabetic, insulin  dependent  2. she checks his sugars 4 times daily with variability from 50 up to 400  3, requires basal insulin and prandial insulin 3 times daily for a total of 4 injections per day  4. Based on glucose readings we make adjustments to the insulin  5. We have not achieved ideal outcomes with more information with a continuous  glucose sensor we should be able to do so  6. We see her every 2 to 3 months for diabetes management  7. Patient has hypoglycemia with unawareness  8. Patient has completed diabetes education  9. Patient has day to day variation in mealtime which confounds the degree of insulin dosing with multiple injections unless we have the CGMS that allows us to better  insulin dosing           Lipid Management           Simvastatin 40 mg      Component      Latest Ref Rng & Units 6/10/2020   Total Cholesterol      0 - 200 mg/dL 115   Triglycerides      0 - 150 mg/dL 170 (H)   HDL Cholesterol      40 - 60 mg/dL 35 (L)   LDL Cholesterol       0 - 100 mg/dL 46   VLDL Cholesterol      5 - 40 mg/dL 34   LDL/HDL Ratio       1.31           Blood Pressure Management        Lisinopril 40 mg daily               Microvascular Complication Monitoring        Last eye exam - April 2019 , overdue to postponed due to pandemic           Component      Latest Ref Rng & Units 6/16/2020   Microalbumin/Creatinine Ratio          Creatinine, Urine      mg/dL 31.2   Microalbumin, Urine      mg/dL <1.2        Neuropathy        Gabapentin 300 mg tid         Bone Health     Component      Latest Ref Rng & Units 6/10/2020   25 Hydroxy, Vitamin D      30.0 - 100.0 ng/ml 44.3              Other Diabetes Related Aspects      Lab Results   Component Value Date    [id number] 511 06/10/2020                Thyroid health      Lab Results   Component Value Date    TSH 2.550 06/10/2020                      4. Follow-up: Return in about 3 months (around 10/8/2020) for Recheck.

## 2020-07-10 NOTE — PROGRESS NOTES
"   Subjective:  Sudhakar Saul is a 44 y.o. female who presents for       Patient Active Problem List   Diagnosis   • Seizure disorder (CMS/HCC)   • Morbid obesity (CMS/HCC)   • Tobacco abuse counseling   • Multiple joint pain   • Dysuria   • History of positive hepatitis C   • Essential hypertension   • Uncontrolled type 2 diabetes mellitus with hyperglycemia (CMS/HCC)   • Vitamin D deficiency   • Urinary tract infection without hematuria   • Chronic hepatitis C without hepatic coma (CMS/HCC)   • Cigarette nicotine dependence, uncomplicated   • Chronic obstructive pulmonary disease (CMS/HCC)   • JACQUE (obstructive sleep apnea)   • Gastroesophageal reflux disease with esophagitis   • Left upper quadrant pain   • Nausea and vomiting   • Weight gain, abnormal   • Change in bowel habits   • Hepatic fibrosis   • Non-compliance   • Depression   • Paranoid schizophrenia (CMS/HCC)   • Gastritis without bleeding   • Tobacco user   • Encounter for eye exam   • Scoliosis   • History of drug use   • Stage 2 chronic kidney disease   • Diabetic neuropathy (CMS/HCC)   • Mixed hyperlipidemia   • Chronic bilateral low back pain with bilateral sciatica   • Type 2 diabetes mellitus with hyperglycemia, with long-term current use of insulin (CMS/HCC)   • Auditory hallucination   • Class 3 severe obesity due to excess calories with serious comorbidity and body mass index (BMI) of 45.0 to 49.9 in adult (CMS/[facility])           Current Outpatient Medications:   •  ADMELOG 100 UNIT/ML injection, Inject 40 Units under the skin into the appropriate area as directed 3 (Three) Times a Day Before Meals., Disp: 40 mL, Rfl: 11  •  albuterol sulfate  (90 Base) MCG/ACT inhaler, Inhale 2 puffs Every 6 (Six) Hours As Needed for Wheezing or Shortness of Air., Disp: 1 inhaler, Rfl: 3  •  B-D INS SYR ULTRAFINE 1CC/30G 30G X 1/2\" 1 ML misc, USE 1 SYRINGE THREE TIMES DAILY, Disp: , Rfl:   •  cyclobenzaprine (FLEXERIL) 5 MG tablet, Take 1 tablet by " "mouth 3 (Three) Times a Day As Needed for Muscle Spasms. (Patient taking differently: Take 5 mg by mouth 3 (Three) Times a Day.), Disp: 90 tablet, Rfl: 3  •  Dulaglutide (Trulicity) 1.5 MG/0.5ML solution pen-injector, Inject 1.5 mg under the skin into the appropriate area as directed 1 (One) Time Per Week., Disp: 4 pen, Rfl: 3  •  DULoxetine (CYMBALTA) 30 MG capsule, Take 3 capsules by mouth Daily., Disp: 90 capsule, Rfl: 3  •  Ertugliflozin L-PyroglutamicAc (Steglatro) 15 MG tablet, Take 1 tablet by mouth Every Morning., Disp: 30 tablet, Rfl: 3  •  folic acid (FOLVITE) 1 MG tablet, Take 1,000 mcg by mouth Daily., Disp: , Rfl:   •  gabapentin (NEURONTIN) 300 MG capsule, Take 1 capsule by mouth 3 (Three) Times a Day., Disp: 90 capsule, Rfl: 2  •  glucose blood test strip, Testing 4 times daily, E11.9, Disp: 120 each, Rfl: 12  •  HYDROcodone-acetaminophen (NORCO) 7.5-325 MG per tablet, Take 1 tablet by mouth 2 (Two) Times a Day., Disp: , Rfl:   •  Insulin Glargine (BASAGLAR KWIKPEN) 100 UNIT/ML injection pen, Inject 70 Units under the skin into the appropriate area as directed Daily. (Patient taking differently: Inject 63 Units under the skin into the appropriate area as directed Daily.), Disp: 7 pen, Rfl: 11  •  Insulin Syringe 29G X 1/2\" 0.5 ML misc, Inject 3 times daily, Disp: 100 each, Rfl: 11  •  levETIRAcetam (KEPPRA) 1000 MG tablet, Take 1 tablet by mouth 2 (Two) Times a Day., Disp: 60 tablet, Rfl: 3  •  lisinopril (PRINIVIL,ZESTRIL) 40 MG tablet, Take 1 tablet by mouth Daily., Disp: 30 tablet, Rfl: 3  •  metFORMIN (GLUCOPHAGE) 500 MG tablet, Take 1 tablet by mouth 2 (Two) Times a Day With Meals., Disp: 60 tablet, Rfl: 3  •  metoprolol tartrate (LOPRESSOR) 50 MG tablet, Take 1 tablet by mouth Every 12 (Twelve) Hours., Disp: 60 tablet, Rfl: 3  •  nicotine polacrilex (COMMIT) 2 MG lozenge, Dissolve 1 lozenge in the mouth As Needed for Smoking Cessation., Disp: 168 lozenge, Rfl: 3  •  RELION PEN NEEDLES 31G X 6 MM " misc, USE 1 NEEDLE 3 TO 4 TIMES DAILY, Disp: , Rfl: 3  •  risperiDONE (RisperDAL) 2 MG tablet, Take 1 tablet by mouth 2 (Two) Times a Day., Disp: 60 tablet, Rfl: 3  •  simvastatin (ZOCOR) 40 MG tablet, Take 1 tablet by mouth Every Night., Disp: 30 tablet, Rfl: 3  •  umeclidinium-vilanterol (Anoro Ellipta) 62.5-25 MCG/INH aerosol powder  inhaler, Inhale 1 puff Daily., Disp: , Rfl:   •  vitamin D (ERGOCALCIFEROL) 1.25 MG (23913 UT) capsule capsule, TAKE 2 CAPSULES BY MOUTH ONCE A WEEK, Disp: 8 capsule, Rfl: 3    HPI     Pt is 44 yo female with management  of morbid obesity, IDDM Type 2, HTN, HLP, vitamin D deficiency, tobacco user, GERD, gastritis, esophagitis, diverticulosis, colonic polyp in sigmoid colon,  paranoid schizophrenia,  Seizure disorder, history of Illicit drug abuse, COPD, JACQUE, chronic hepatitis C , major depression, JD, sp appendectomy, sp cholecystectomy sp right carpal tunnel release, sp back surgery, sp breast surgery, sp lipoma excision, chronic back pain (moderate L4-L5 and L5-S1 DDD changes, mild bilateral L4-L5 moderate bilateral L5-S1 facet arthritic change, mild leveoscoliosis), CKD stage 2,          5/29/20 in office visit today for recheck on IDDM type 2, HTN, COPD, HLP, schizophrenia.  Since last visit pt has seen Endocrinology with ROD Will on 5/15/20.  In regards to DM type 2.  She is to keep taking basgalar 63 unites her carb count insulin was increased from 4 to 5 units per 15 grams of CHO with sliding scale and 2 unit per 50 above 50.  She is to continue metformin 1000 mg PO BID along  With trulicity 1.5 mg weekly and steglatro. Her sugars have been better controlled since doing this. On chest pain no dizziness but has some fatigue. In regards to schizophrenia her auditory hallucinations are less frequent and takes risperdal 2 mg PO daily. It now occurs mutiple times a week. She is seeing counseling for this. Tobacco use has cut down. Seizure under control Breathing better  with Jeremías     7/17/20 in office visit for recheck on pt's history of IDDM type 2, HTN cOPD, HLP, schizophrenia. Pt had labwork done on 6/10/20 that showed hga1c at 9.60. Lipid panel  Showed improvement of triglclycerdies at 203 from 170. HDL at 35.  Vitamin D was normal TSH was normal Vitamin B12 levels normal hepatiits panel showed reactive Hep C antibody. CMP showed GFR at 80 from 89.  CBC showed normal hemoglobin. She has been approved for insulin pump and has to take a class soon. She is now taking 38 unites for large meals. Continues to do sliding scale. BP stable. She has noticed some twiching on her left side off face for past week. She states she has a history of Bell's Palsy, schizophrenia stable on rispderal.  She has less auditory hallucintations. They are less often       Mental Health Problem   The primary symptoms include delusions, hallucinations and negative symptoms. The primary symptoms do not include dysphoric mood, bizarre behavior, disorganized speech or somatic symptoms. This is a chronic problem.   The onset of the illness is precipitated by a stressful event, emotional stress, alcohol abuse and drug abuse. The degree of incapacity that she is experiencing as a consequence of her illness is severe. Sequelae of the illness include an inability to work, an inability to care for self and harmed interpersonal relations. Additional symptoms of the illness include fatigue, agitation and seizures. Additional symptoms of the illness do not include anhedonia, insomnia, hypersomnia, appetite change, unexpected weight change, psychomotor retardation, feelings of worthlessness, attention impairment, increased goal-directed activity, flight of ideas, inflated self-esteem, decreased need for sleep, distractible, poor judgment, visual change, headaches or abdominal pain. She admits to suicidal ideas. She does not have a plan to commit suicide. She does not contemplate harming herself. She has not already  injured self. She does not contemplate injuring another person. Risk factors that are present for mental illness include a history of mental illness, substance abuse, a history of suicide attempts, family violence and epilepsy. s.   Obesity   This is a chronic problem. The current episode started more than 1 year ago. The problem occurs constantly. The problem has been unchanged. Associated symptoms include arthralgias, fatigue, headaches, numbness and weakness. Pertinent negatives include no abdominal pain, anorexia, change in bowel habit, chest pain, chills, congestion, coughing, fever, joint swelling, myalgias, nausea, neck pain, rash, sore throat, swollen glands, urinary symptoms, vertigo, visual change or vomiting. Nothing aggravates the symptoms. She has tried nothing for the symptoms.   Hypertension   This is a chronic problem. The current episode started more than 1 year ago. The problem is controlled  Associated symptoms include headaches. Pertinent negatives include no anxiety, blurred vision, chest pain, malaise/fatigue, neck pain, orthopnea, palpitations, peripheral edema, PND or shortness of breath. Risk factors for coronary artery disease include obesity, sedentary lifestyle, smoking/tobacco exposure and stress. Past treatments include ACE inhibitors, beta blockers and diuretics. There are no compliance problems.  There is no history of angina, kidney disease, CAD/MI, CVA, heart failure, left ventricular hypertrophy, PVD or retinopathy. There is no history of chronic renal disease, coarctation of the aorta, hyperaldosteronism, hypercortisolism, hyperparathyroidism, a hypertension causing med, pheochromocytoma, renovascular disease, sleep apnea or a thyroid problem.   Diabetes   She presents for her initial diabetic visit. She has type 2 diabetes mellitus. Her disease course has been waxing and waning Hypoglycemia symptoms include confusion, headaches, nervousness/anxiousness, seizures and sleepiness.  Associated symptoms include fatigue and weakness. Pertinent negatives for diabetes include no blurred vision, no chest pain and no visual change. Pertinent negatives for diabetic complications include no CVA, PVD or retinopathy. Risk factors for coronary artery disease include dyslipidemia, obesity and post-menopausal. She has not had a previous visit with a dietitian. There is no change in her home blood glucose trend. An ACE inhibitor/angiotensin II receptor blocker is not being taken. She does not see a podiatrist.Eye exam is not current.     Review of Systems  Review of Systems   Constitutional: Positive for activity change and fatigue. Negative for appetite change, chills, diaphoresis and fever.   HENT: Negative for congestion, postnasal drip, rhinorrhea, sinus pressure, sinus pain, sneezing, sore throat, trouble swallowing and voice change.    Respiratory: Positive for shortness of breath. Negative for cough, choking, chest tightness, wheezing and stridor.    Cardiovascular: Negative for chest pain.   Gastrointestinal: Negative for diarrhea, nausea and vomiting.   Musculoskeletal: Positive for arthralgias and back pain.   Neurological: Positive for seizures, weakness and numbness. Negative for headaches.   Psychiatric/Behavioral: Positive for agitation and behavioral problems. The patient is nervous/anxious.         Depressed mood        Patient Active Problem List   Diagnosis   • Seizure disorder (CMS/HCC)   • Morbid obesity (CMS/HCC)   • Tobacco abuse counseling   • Multiple joint pain   • Dysuria   • History of positive hepatitis C   • Essential hypertension   • Uncontrolled type 2 diabetes mellitus with hyperglycemia (CMS/HCC)   • Vitamin D deficiency   • Urinary tract infection without hematuria   • Chronic hepatitis C without hepatic coma (CMS/HCC)   • Cigarette nicotine dependence, uncomplicated   • Chronic obstructive pulmonary disease (CMS/HCC)   • JAQCUE (obstructive sleep apnea)   • Gastroesophageal  reflux disease with esophagitis   • Left upper quadrant pain   • Nausea and vomiting   • Weight gain, abnormal   • Change in bowel habits   • Hepatic fibrosis   • Non-compliance   • Depression   • Paranoid schizophrenia (CMS/HCC)   • Gastritis without bleeding   • Tobacco user   • Encounter for eye exam   • Scoliosis   • History of drug use   • Stage 2 chronic kidney disease   • Diabetic neuropathy (CMS/HCC)   • Mixed hyperlipidemia   • Chronic bilateral low back pain with bilateral sciatica   • Type 2 diabetes mellitus with hyperglycemia, with long-term current use of insulin (CMS/HCC)   • Auditory hallucination   • Class 3 severe obesity due to excess calories with serious comorbidity and body mass index (BMI) of 45.0 to 49.9 in adult (CMS/HCC)     Past Surgical History:   Procedure Laterality Date   • APPENDECTOMY     • BACK SURGERY     • BREAST SURGERY     • CARPAL TUNNEL RELEASE      right hand   • CHOLECYSTECTOMY     • COLONOSCOPY     • COLONOSCOPY N/A 2/8/2019    Procedure: COLONOSCOPY;  Surgeon: Joni Delacruz MD;  Location: [facility] ENDOSCOPY;  Service: Gastroenterology   • DENTAL PROCEDURE     • ENDOSCOPY N/A 2/8/2019    Procedure: ESOPHAGOGASTRODUODENOSCOPY--poss dilation;  Surgeon: Joni Delacruz MD;  Location: [facility] ENDOSCOPY;  Service: Gastroenterology   • HYSTERECTOMY     • LIPOMA EXCISION      9   • LIVER BIOPSY     • UPPER GASTROINTESTINAL ENDOSCOPY  02/08/2019     Social History     Socioeconomic History   • Marital status:      Spouse name: Not on file   • Number of children: Not on file   • Years of education: Not on file   • Highest education level: Not on file   Tobacco Use   • Smoking status: Current Every Day Smoker     Packs/day: 0.25     Types: Cigarettes   • Smokeless tobacco: Never Used   • Tobacco comment: 05/06/2020 - Pt states today is her first day on nicotine patches   Substance and Sexual Activity   • Alcohol use: No   • Drug use: Not Currently     Types: Amphetamines,  Heroin, Methamphetamines, Morphine, Oxycodone   • Sexual activity: Defer     Family History   Problem Relation Age of Onset   • Hypertension Other    • Diabetes Other    • Stroke Other    • Cancer Other    • Kidney disease Other    • Lung disease Other    • Other Other    • Malone's esophagus Other    • Colon polyps Other    • Heart disease Other    • Ulcers Other    • Cholelithiasis Other    • Allergy (severe) Other      Lab on 06/16/2020   Component Date Value Ref Range Status   • Microalbumin/Creatinine Ratio 06/16/2020    Final    Unable to calculate   • Creatinine, Urine 06/16/2020 31.2  mg/dL Final   • Microalbumin, Urine 06/16/2020 <1.2  mg/dL Final   Lab on 06/10/2020   Component Date Value Ref Range Status   • WBC 06/10/2020 8.89  3.40 - 10.80 10*3/mm3 Final   • RBC 06/10/2020 4.85  3.77 - 5.28 10*6/mm3 Final   • Hemoglobin 06/10/2020 14.1  12.0 - 15.9 g/dL Final   • Hematocrit 06/10/2020 42.8  34.0 - 46.6 % Final   • MCV 06/10/2020 88.2  79.0 - 97.0 fL Final   • MCH 06/10/2020 29.1  26.6 - 33.0 pg Final   • MCHC 06/10/2020 32.9  31.5 - 35.7 g/dL Final   • RDW 06/10/2020 13.6  12.3 - 15.4 % Final   • RDW-SD 06/10/2020 43.7  37.0 - 54.0 fl Final   • MPV 06/10/2020 11.9  6.0 - 12.0 fL Final   • Platelets 06/10/2020 157  140 - 450 10*3/mm3 Final   • Neutrophil % 06/10/2020 63.8  42.7 - 76.0 % Final   • Lymphocyte % 06/10/2020 26.4  19.6 - 45.3 % Final   • Monocyte % 06/10/2020 4.5* 5.0 - 12.0 % Final   • Eosinophil % 06/10/2020 3.9  0.3 - 6.2 % Final   • Basophil % 06/10/2020 0.8  0.0 - 1.5 % Final   • Immature Grans % 06/10/2020 0.6* 0.0 - 0.5 % Final   • Neutrophils, Absolute 06/10/2020 5.67  1.70 - 7.00 10*3/mm3 Final   • Lymphocytes, Absolute 06/10/2020 2.35  0.70 - 3.10 10*3/mm3 Final   • Monocytes, Absolute 06/10/2020 0.40  0.10 - 0.90 10*3/mm3 Final   • Eosinophils, Absolute 06/10/2020 0.35  0.00 - 0.40 10*3/mm3 Final   • Basophils, Absolute 06/10/2020 0.07  0.00 - 0.20 10*3/mm3 Final   • Immature  Grans, Absolute 06/10/2020 0.05  0.00 - 0.05 10*3/mm3 Final   • nRBC 06/10/2020 0.0  0.0 - 0.2 /100 WBC Final   • Glucose 06/10/2020 219* 65 - 99 mg/dL Final   • BUN 06/10/2020 17  6 - 20 mg/dL Final   • Creatinine 06/10/2020 0.78  0.57 - 1.00 mg/dL Final   • Sodium 06/10/2020 136  136 - 145 mmol/L Final   • Potassium 06/10/2020 4.9  3.5 - 5.2 mmol/L Final   • Chloride 06/10/2020 103  98 - 107 mmol/L Final   • CO2 06/10/2020 20.6* 22.0 - 29.0 mmol/L Final   • Calcium 06/10/2020 9.4  8.6 - 10.5 mg/dL Final   • Total Protein 06/10/2020 7.1  6.0 - 8.5 g/dL Final   • Albumin 06/10/2020 4.00  3.50 - 5.20 g/dL Final   • ALT (SGPT) 06/10/2020 17  1 - 33 U/L Final   • AST (SGOT) 06/10/2020 19  1 - 32 U/L Final   • Alkaline Phosphatase 06/10/2020 60  39 - 117 U/L Final   • Total Bilirubin 06/10/2020 0.2  0.2 - 1.2 mg/dL Final   • eGFR Non African Amer 06/10/2020 80  >60 mL/min/1.73 Final   • Globulin 06/10/2020 3.1  gm/dL Final   • A/G Ratio 06/10/2020 1.3  g/dL Final   • BUN/Creatinine Ratio 06/10/2020 21.8  7.0 - 25.0 Final   • Anion Gap 06/10/2020 12.4  5.0 - 15.0 mmol/L Final   • Hemoglobin A1C 06/10/2020 9.60* 4.80 - 5.60 % Final   • Total Cholesterol 06/10/2020 115  0 - 200 mg/dL Final   • Triglycerides 06/10/2020 170* 0 - 150 mg/dL Final   • HDL Cholesterol 06/10/2020 35* 40 - 60 mg/dL Final   • LDL Cholesterol  06/10/2020 46  0 - 100 mg/dL Final   • VLDL Cholesterol 06/10/2020 34  5 - 40 mg/dL Final   • LDL/HDL Ratio 06/10/2020 1.31   Final   • 25 Hydroxy, Vitamin D 06/10/2020 44.3  30.0 - 100.0 ng/ml Final   • Vitamin B-12 06/10/2020 511  211 - 946 pg/mL Final   • TSH 06/10/2020 2.550  0.270 - 4.200 uIU/mL Final   • Hepatitis B Surface Ag 06/10/2020 Non-Reactive  Non-Reactive Final   • Hep A IgM 06/10/2020 Non-Reactive  Non-Reactive Final   • Hep B C IgM 06/10/2020 Non-Reactive  Non-Reactive Final   • Hepatitis C Ab 06/10/2020 Reactive* Non-Reactive Final   • Extra Tube 06/10/2020 Hold for add-ons.   Final    Auto  resulted.   Lab on 02/07/2020   Component Date Value Ref Range Status   • WBC 02/07/2020 8.55  3.40 - 10.80 10*3/mm3 Final   • RBC 02/07/2020 4.96  3.77 - 5.28 10*6/mm3 Final   • Hemoglobin 02/07/2020 14.7  12.0 - 15.9 g/dL Final   • Hematocrit 02/07/2020 43.1  34.0 - 46.6 % Final   • MCV 02/07/2020 86.9  79.0 - 97.0 fL Final   • MCH 02/07/2020 29.6  26.6 - 33.0 pg Final   • MCHC 02/07/2020 34.1  31.5 - 35.7 g/dL Final   • RDW 02/07/2020 13.2  12.3 - 15.4 % Final   • RDW-SD 02/07/2020 41.2  37.0 - 54.0 fl Final   • MPV 02/07/2020 11.7  6.0 - 12.0 fL Final   • Platelets 02/07/2020 152  140 - 450 10*3/mm3 Final   • Neutrophil % 02/07/2020 66.6  42.7 - 76.0 % Final   • Lymphocyte % 02/07/2020 25.8  19.6 - 45.3 % Final   • Monocyte % 02/07/2020 5.3  5.0 - 12.0 % Final   • Eosinophil % 02/07/2020 1.2  0.3 - 6.2 % Final   • Basophil % 02/07/2020 0.5  0.0 - 1.5 % Final   • Immature Grans % 02/07/2020 0.6* 0.0 - 0.5 % Final   • Neutrophils, Absolute 02/07/2020 5.70  1.70 - 7.00 10*3/mm3 Final   • Lymphocytes, Absolute 02/07/2020 2.21  0.70 - 3.10 10*3/mm3 Final   • Monocytes, Absolute 02/07/2020 0.45  0.10 - 0.90 10*3/mm3 Final   • Eosinophils, Absolute 02/07/2020 0.10  0.00 - 0.40 10*3/mm3 Final   • Basophils, Absolute 02/07/2020 0.04  0.00 - 0.20 10*3/mm3 Final   • Immature Grans, Absolute 02/07/2020 0.05  0.00 - 0.05 10*3/mm3 Final   • nRBC 02/07/2020 0.0  0.0 - 0.2 /100 WBC Final   • Glucose 02/07/2020 186* 65 - 99 mg/dL Final   • BUN 02/07/2020 13  6 - 20 mg/dL Final   • Creatinine 02/07/2020 0.71  0.57 - 1.00 mg/dL Final   • Sodium 02/07/2020 137  136 - 145 mmol/L Final   • Potassium 02/07/2020 4.7  3.5 - 5.2 mmol/L Final   • Chloride 02/07/2020 100  98 - 107 mmol/L Final   • CO2 02/07/2020 19.0* 22.0 - 29.0 mmol/L Final   • Calcium 02/07/2020 9.7  8.6 - 10.5 mg/dL Final   • Total Protein 02/07/2020 7.6  6.0 - 8.5 g/dL Final   • Albumin 02/07/2020 4.00  3.50 - 5.20 g/dL Final   • ALT (SGPT) 02/07/2020 22  1 - 33 U/L  Final   • AST (SGOT) 02/07/2020 24  1 - 32 U/L Final   • Alkaline Phosphatase 02/07/2020 66  39 - 117 U/L Final   • Total Bilirubin 02/07/2020 0.2  0.2 - 1.2 mg/dL Final   • eGFR Non African Amer 02/07/2020 89  >60 mL/min/1.73 Final   • Globulin 02/07/2020 3.6  gm/dL Final   • A/G Ratio 02/07/2020 1.1  g/dL Final   • BUN/Creatinine Ratio 02/07/2020 18.3  7.0 - 25.0 Final   • Anion Gap 02/07/2020 18.0* 5.0 - 15.0 mmol/L Final   • Hemoglobin A1C 02/07/2020 9.80* 4.80 - 5.60 % Final   • Total Cholesterol 02/07/2020 121  0 - 200 mg/dL Final   • Triglycerides 02/07/2020 203* 0 - 150 mg/dL Final   • HDL Cholesterol 02/07/2020 36* 40 - 60 mg/dL Final   • LDL Cholesterol  02/07/2020 44  0 - 100 mg/dL Final   • VLDL Cholesterol 02/07/2020 40.6* 5 - 40 mg/dL Final   • LDL/HDL Ratio 02/07/2020 1.23   Final   • 25 Hydroxy, Vitamin D 02/07/2020 45.1  30.0 - 100.0 ng/ml Final   • Vitamin B-12 02/07/2020 599  211 - 946 pg/mL Final      Spirometry Without Bronchodilator  Mariluz Will MD     6/22/2020  9:35 AM  Spirometry Without Bronchodilator  Performed by: Mariluz Will MD  Authorized by: Mariluz Will MD      Pre Drug    FVC: 94%   FEV1: 61%   FEV1/FVC: 52%    Interpretation   Spirometry   Spirometry shows moderate obstruction.   Overall comments: Significant decrease in FEV1 compared to previous.  XR Chest 2 View  Narrative: [facility] [place]    Patient Name: ARPAN ROSAS    ORDERING: MARILUZ WILL     ATTENDING:    REFERRING: MARILUZ WILL    -----------------------    PROCEDURE: Two-view chest    COMPARISON: 8/29/2018    HISTORY: dyspnea, R06.00 Dyspnea, unspecified J44.9 Chronic  obstructive pulmonary disease, unspecified    FINDINGS: Frontal and lateral views of the chest are obtained.     Devices: None    Lungs/Pleura: No consolidation, pleural effusion, or  pneumothorax.    Cardiomediastinal Structures: The heart size and mediastinal  contours are within limits of normal. The trachea  "is midline.    Skeletal Structures: No acute findings. Mild degenerative changes  within the spine. No free air beneath the diaphragm.  Impression: No acute pulmonary or pleural findings.    Electronically signed by:  Alfred Howe MD  6/22/2020 9:20 AM  CDT Workstation: [id number]    [unfilled]  Immunization History   Administered Date(s) Administered   • Hepatitis A 06/12/2018   • Pneumococcal Polysaccharide (PPSV23) 12/13/2018       The following portions of the patient's history were reviewed and updated as appropriate: allergies, current medications, past family history, past medical history, past social history, past surgical history and problem list.        Physical Exam  /70 (BP Location: Left arm, Patient Position: Sitting, Cuff Size: Large Adult)   Pulse 82   Temp 97.5 °F (36.4 °C) (Infrared)   Ht 167.6 cm (66\")   Wt 134 kg (294 lb 9.6 oz)   SpO2 96%   BMI 47.55 kg/m²     Physical Exam   Constitutional: She is oriented to person, place, and time. She appears well-developed and well-nourished.   HENT:   Head: Normocephalic and atraumatic.   Right Ear: External ear normal.   Eyes: Pupils are equal, round, and reactive to light. Conjunctivae and EOM are normal.   Neck: Normal range of motion. Neck supple.   Cardiovascular: Normal rate, regular rhythm and normal heart sounds.   No murmur heard.  Pulmonary/Chest: Effort normal and breath sounds normal. No respiratory distress.   Abdominal: Soft. Bowel sounds are normal. She exhibits no distension. There is no tenderness.   Obese abdomen    Musculoskeletal: Normal range of motion. She exhibits tenderness. She exhibits no edema or deformity.   Neurological: She is alert and oriented to person, place, and time. No cranial nerve deficit.   Skin: Skin is warm. No rash noted. She is not diaphoretic. No erythema. No pallor.   Psychiatric: She has a normal mood and affect. Her behavior is normal.   Nursing note and vitals reviewed.      Assessment/Plan    " Diagnosis Plan   1. Morbid obesity (CMS/HCC)     2. Essential hypertension     3. Uncontrolled type 2 diabetes mellitus with hyperglycemia (CMS/HCC)     4. Vitamin D deficiency     5. Chronic hepatitis C without hepatic coma (CMS/HCC)     6. Stage 2 chronic kidney disease     7. Mixed hyperlipidemia     8. Tobacco user             labwork before next visit   -refer to [facility]  For Neurology and seizure disorder  -face twitching - possible side effect of resperdal. If getting worse consider MRI of brai  -for back pain/arthritis of back/scoliosis/ history of back surgery - referral to Pain Management  X-rays of lumbar spine.  Start on neurontin 300 mg PO TID.gave 90 pills and 3 refills. Will get NERY. UDS today. Pt declined PT/OT for now. .  also on cymbalta 60 mg will raise to 90 mg daily for depression   -chronic hep C - Gastroenterology following   -COPD -  referred to Dr. Will (Pulmonologsit) for PFTs. Consultation appreciated. Recent chest x-ray negative. Will start on albuterol inhaler PRN.Advised pt to stop smoking. She is on spirva now for early COPD  -multiple joint pain -- naproxen 500 mg PO BID. -counseled on weight loss, diet and exercise. Diet information provided. cousneled weight loss >5 minutes BMI at 47.61   -tobacco user - Counseled quit smoking >5 minutes.  Start back on nicotine lozenges   -ckd stage 2 - continue to monitor. Stressed importance of diabetes control and BP control    -hyperlipdiemia- xpasiljacba42 mg PO qhs. ASCVD risk high. Recommend to take with coenzyme Q10  Start on vascepa 2 grams PO BID   -dyspnea/early COPD - pt will need to make appt with Pulmonologist. Needs new PFTs. Urged smoking cessation >5 minutes.  Continue spiriva and albuterol inhaler. May have worsening COPD. On Breo 100 mcg 1 puff daily.   -for DM type 2- Endocrinology following. Currently on metformin 1000 gm PO BID, steglasro 15 mg daily. trulicity 1.5 mg once a week, on Basaglar 70 units at bedtime and  admelog 4 unites per  15 grams of carbs along with sliding scale 2 unites per 50 above 150.    -schizophrenia/major depression - stopped viibryd, on  cymbalta 90 mg daily. Now on risperdal 2 mg PO q daily. On    -seizure disorder - Neurology following on Keppra 1000 mg Po BID   -Morbid obesity - counseled weight loss >5 minutes BMI at 47.55   -tobacco user counseled pt to quit smoking >5 minutes.  Gave tobacco cessation material   -hypertension - at goal today Continue on lisinopril but will go up on dosage from 20 to 40 mg dialy.  mg Po q dominguez, metoprolol 50 mg PO BID . Stop HCTZ for now. Consider Norvasc or calcium channel blocker if not improving   -advised pt to be safe and call with any questions or concerns. All questions addressed today  -advised pt to followup with specialist and referrals  -advised pt to go to ER or call 911 if symptoms worrisome or severe   -advised pt to be safe during COVID-19 pandemic  -total time with pt >25 minutes   -recheck in 2 months         This document has been electronically signed by Jermaine Ferro MD on July 17, 2020 09:53

## 2020-07-13 ENCOUNTER — TREATMENT (OUTPATIENT)
Dept: PHYSICAL THERAPY | Facility: CLINIC | Age: 45
End: 2020-07-13

## 2020-07-13 DIAGNOSIS — G89.29 CHRONIC LOW BACK PAIN WITH SCIATICA, SCIATICA LATERALITY UNSPECIFIED, UNSPECIFIED BACK PAIN LATERALITY: Primary | ICD-10-CM

## 2020-07-13 DIAGNOSIS — M54.40 CHRONIC LOW BACK PAIN WITH SCIATICA, SCIATICA LATERALITY UNSPECIFIED, UNSPECIFIED BACK PAIN LATERALITY: Primary | ICD-10-CM

## 2020-07-13 PROCEDURE — 97113 AQUATIC THERAPY/EXERCISES: CPT | Performed by: PHYSICAL THERAPIST

## 2020-07-13 NOTE — PROGRESS NOTES
"   Physical Therapy Daily Progress Note        Patient: Sudhakar Saul   : 1975  Diagnosis/ICD-10 Code:  Chronic low back pain with sciatica, sciatica laterality unspecified, unspecified back pain laterality [M54.40, G89.29]  Referring practitioner: ROD Delcid  Date of Initial Visit: Type: THERAPY  Noted: 2020  Today's Date: 2020  Patient seen for 4 sessions    Next MD appt: 2020.  Recertification: 2020        Sudhakar Saul reports:   Subjective Questionnaire:       Subjective Evaluation    History of Present Illness    Subjective comment: Pt states she is having some pain and tightness in her lower back and hips L>R todayPain  Current pain ratin           Objective   See Exercise, Manual, and Modality Logs for complete treatment.       Assessment & Plan     Assessment  Assessment details: Pt with mild complaints of hip pain and tightness during todays aquatic session.  Pain relieved with rest.  Pt education on proper body mechanics and not to push into pain.  Pt states her last PT visit was helpful.  Pt tolerated aquatics well with minimal anxiety only noted in deep end activity.      Goals  Plan Goals: Short Term Goals:  1) I with HEP and have additions/changes by next recertification    2) Patient able to show proper log roll technique.    3) Patient to be more aware of posture and posture correction techniques    4) AROM for lumbar extension >= 20°.     5) AROM B Lumbar SB/ROT >= 75% of range.    6) Patient able to ambulate aquatically F/R/L 5 min each with no increase in pain.      Long Term Goals:  1) Patient to have AROM for the lumbar spine all WFL, no increase in pain.    2) Patient able to perform 20 Bridges with UE \"X\"  with no increase in pain.    3) Patient able to perform 20 reps of each aquatic there ex with no increase in pain.    4) Patient able to show proper lifting technique floor to waist with no increase in pain.    5) Patient able to show proper " ergonomics for mopping/sweeping/vacumming.    6) I with final land and aquatics HEP.    7) D/C with a final HEP and free 30 day fitness formula membership.    Plan  Plan details: Next aquatic visit add cookie push/pull        Progress per Plan of Care and Progress strengthening /stabilization /functional activity           Manual Therapy:         mins  09599;  Aquatic Therapeutic Exercise:    45     mins  92390;     Neuromuscular Sharan:        mins  56726;    Therapeutic Activity:          mins  38374;     Gait Training:           mins  18226;     Ultrasound:          mins  58353;    Electrical Stimulation:         mins  48639 ( );  Dry Needling          mins self-pay    Timed Treatment:   45   mins   Total Treatment:     45   mins    Mihaela Meneses PTA  Physical Therapist Assistant

## 2020-07-15 ENCOUNTER — TREATMENT (OUTPATIENT)
Dept: PHYSICAL THERAPY | Facility: CLINIC | Age: 45
End: 2020-07-15

## 2020-07-15 DIAGNOSIS — M54.40 CHRONIC LOW BACK PAIN WITH SCIATICA, SCIATICA LATERALITY UNSPECIFIED, UNSPECIFIED BACK PAIN LATERALITY: Primary | ICD-10-CM

## 2020-07-15 DIAGNOSIS — G89.29 CHRONIC LOW BACK PAIN WITH SCIATICA, SCIATICA LATERALITY UNSPECIFIED, UNSPECIFIED BACK PAIN LATERALITY: Primary | ICD-10-CM

## 2020-07-15 PROCEDURE — 97110 THERAPEUTIC EXERCISES: CPT | Performed by: PHYSICAL THERAPIST

## 2020-07-15 NOTE — PROGRESS NOTES
"   Physical Therapy Daily Progress Note        Patient: Sudhakar Saul   : 1975  Diagnosis/ICD-10 Code:  Chronic low back pain with sciatica, sciatica laterality unspecified, unspecified back pain laterality [M54.40, G89.29]  Referring practitioner: ROD Delcid  Date of Initial Visit: Type: THERAPY  Noted: 2020  Today's Date: 7/15/2020  Patient seen for 5 sessions    Next MD appt: 2020.  Recertification: 2020          Sudhakar Saul reports:   Subjective Questionnaire:       Subjective     Objective   See Exercise, Manual, and Modality Logs for complete treatment.       Assessment & Plan     Assessment  Assessment details: Good effort with all exercises this date . Mild complaints of L side low back tightness.  Education on log roll technique with good carryover and technique.      Goals  Plan Goals: Short Term Goals:  1) I with HEP and have additions/changes by next recertification    2) Patient able to show proper log roll technique.    3) Patient to be more aware of posture and posture correction techniques    4) AROM for lumbar extension >= 20°.     5) AROM B Lumbar SB/ROT >= 75% of range.    6) Patient able to ambulate aquatically F/R/L 5 min each with no increase in pain.      Long Term Goals:  1) Patient to have AROM for the lumbar spine all WFL, no increase in pain.    2) Patient able to perform 20 Bridges with UE \"X\"  with no increase in pain.    3) Patient able to perform 20 reps of each aquatic there ex with no increase in pain.    4) Patient able to show proper lifting technique floor to waist with no increase in pain.    5) Patient able to show proper ergonomics for mopping/sweeping/vacumming.    6) I with final land and aquatics HEP.    7) D/C with a final HEP and free 30 day fitness formula membership.    Plan  Plan details: Progress repetitions of strengthening exercises if tolerated next land visit.          Progress per Plan of Care and Progress strengthening " /stabilization /functional activity           Manual Therapy:         mins  66385;  Therapeutic Exercise:    38     mins  39412;     Neuromuscular Sharan:        mins  71822;    Therapeutic Activity:          mins  58442;     Gait Training:           mins  29328;     Ultrasound:          mins  30212;    Electrical Stimulation:         mins  18717 ( );  Dry Needling          mins self-pay    Timed Treatment:   38   mins   Total Treatment:     38   mins    Mihaela Meneses PTA  Physical Therapist Assistant

## 2020-07-17 ENCOUNTER — OFFICE VISIT (OUTPATIENT)
Dept: FAMILY MEDICINE CLINIC | Facility: CLINIC | Age: 45
End: 2020-07-17

## 2020-07-17 VITALS
TEMPERATURE: 97.5 F | OXYGEN SATURATION: 96 % | DIASTOLIC BLOOD PRESSURE: 70 MMHG | SYSTOLIC BLOOD PRESSURE: 136 MMHG | BODY MASS INDEX: 47.09 KG/M2 | HEART RATE: 82 BPM | HEIGHT: 66 IN | WEIGHT: 293 LBS

## 2020-07-17 DIAGNOSIS — N18.2 STAGE 2 CHRONIC KIDNEY DISEASE: ICD-10-CM

## 2020-07-17 DIAGNOSIS — E11.65 UNCONTROLLED TYPE 2 DIABETES MELLITUS WITH HYPERGLYCEMIA (HCC): ICD-10-CM

## 2020-07-17 DIAGNOSIS — E66.01 MORBID OBESITY (HCC): Primary | ICD-10-CM

## 2020-07-17 DIAGNOSIS — E78.2 MIXED HYPERLIPIDEMIA: ICD-10-CM

## 2020-07-17 DIAGNOSIS — Z72.0 TOBACCO USER: ICD-10-CM

## 2020-07-17 DIAGNOSIS — I10 ESSENTIAL HYPERTENSION: ICD-10-CM

## 2020-07-17 DIAGNOSIS — F20.0 PARANOID SCHIZOPHRENIA (HCC): ICD-10-CM

## 2020-07-17 DIAGNOSIS — B18.2 CHRONIC HEPATITIS C WITHOUT HEPATIC COMA (HCC): ICD-10-CM

## 2020-07-17 DIAGNOSIS — E55.9 VITAMIN D DEFICIENCY: ICD-10-CM

## 2020-07-17 PROCEDURE — 99214 OFFICE O/P EST MOD 30 MIN: CPT | Performed by: FAMILY MEDICINE

## 2020-07-17 RX ORDER — SIMVASTATIN 40 MG
40 TABLET ORAL NIGHTLY
Qty: 30 TABLET | Refills: 3 | Status: SHIPPED | OUTPATIENT
Start: 2020-07-17 | End: 2020-12-17 | Stop reason: SDUPTHER

## 2020-07-17 RX ORDER — LISINOPRIL 40 MG/1
40 TABLET ORAL DAILY
Qty: 30 TABLET | Refills: 3 | Status: SHIPPED | OUTPATIENT
Start: 2020-07-17 | End: 2020-12-17 | Stop reason: SDUPTHER

## 2020-07-17 RX ORDER — METOPROLOL TARTRATE 50 MG/1
50 TABLET, FILM COATED ORAL EVERY 12 HOURS
Qty: 60 TABLET | Refills: 3 | Status: SHIPPED | OUTPATIENT
Start: 2020-07-17 | End: 2020-12-28 | Stop reason: SDUPTHER

## 2020-07-17 RX ORDER — RISPERIDONE 2 MG/1
2 TABLET ORAL 2 TIMES DAILY
Qty: 60 TABLET | Refills: 3 | Status: SHIPPED | OUTPATIENT
Start: 2020-07-17 | End: 2020-12-17 | Stop reason: SDUPTHER

## 2020-07-20 ENCOUNTER — TREATMENT (OUTPATIENT)
Dept: PHYSICAL THERAPY | Facility: CLINIC | Age: 45
End: 2020-07-20

## 2020-07-20 DIAGNOSIS — M54.40 CHRONIC LOW BACK PAIN WITH SCIATICA, SCIATICA LATERALITY UNSPECIFIED, UNSPECIFIED BACK PAIN LATERALITY: Primary | ICD-10-CM

## 2020-07-20 DIAGNOSIS — G89.29 CHRONIC LOW BACK PAIN WITH SCIATICA, SCIATICA LATERALITY UNSPECIFIED, UNSPECIFIED BACK PAIN LATERALITY: Primary | ICD-10-CM

## 2020-07-20 PROCEDURE — 97113 AQUATIC THERAPY/EXERCISES: CPT | Performed by: PHYSICAL THERAPIST

## 2020-07-20 NOTE — PROGRESS NOTES
"Physical Therapy Daily Progress Note    Patient: Sudhakar Saul   : 1975  Diagnosis/ICD-10 Code:     Diagnosis Plan   1. Chronic low back pain with sciatica, sciatica laterality unspecified, unspecified back pain laterality       Referring practitioner: ROD Delcid  Date of Initial Visit: Type: THERAPY  Noted: 2020  Today's Date: 2020  Patient seen for 6 sessions      PT Recheck Due: 2020  PT MD Visit: 2020       Sudhakar Saul      Subjective Evaluation    History of Present Illness    Subjective comment: patient is reporting back pain today. states has \"a little bit of pain\". reports that her pain is in her back and having sciatic pain with left side hurting worse than right in the hip. Pain  Current pain ratin             Objective   See Exercise, Manual, and Modality Logs for complete treatment.       Assessment & Plan     Assessment  Assessment details: Patient presents in no acute distress. While walking in the pool fwd/retro patient has begins holding her left low back. When questioned if she is having increased pain with it, she responds yes and patient is instructed to move deeper into the water to decrease pain. Once patient is chest level in the pool is able to resume fwd/retro walking with no further complaints of increasing pain. Patient performs exercises slowly but diligently and has good technique while performing. Patient requires little postural cueing and demonstrates decent posture throughout. Patient is able to exit pool with no significant increase in complaints of pain.    Goals  Plan Goals: Short Term Goals:  1) I with HEP and have additions/changes by next recertification    2) Patient able to show proper log roll technique.    3) Patient to be more aware of posture and posture correction techniques (met)    4) AROM for lumbar extension >= 20°.     5) AROM B Lumbar SB/ROT >= 75% of range.    6) Patient able to ambulate aquatically F/R/L 5 min " "each with no increase in pain.      Long Term Goals:  1) Patient to have AROM for the lumbar spine all WFL, no increase in pain.    2) Patient able to perform 20 Bridges with UE \"X\"  with no increase in pain.    3) Patient able to perform 20 reps of each aquatic there ex with no increase in pain.    4) Patient able to show proper lifting technique floor to waist with no increase in pain.    5) Patient able to show proper ergonomics for mopping/sweeping/vacumming.    6) I with final land and aquatics HEP.    7) D/C with a final HEP and free 30 day fitness formula membership.    Plan  Plan details: Next aquatics resume SLR fwd flexion      Progress per Plan of Care and Progress strengthening /stabilization /functional activity            Timed:  Manual Therapy:         mins  08829;  Therapeutic Exercise:         mins  72540;   Aquatic Therex :    43    mins  22509    Neuromuscular Sharan:        mins  77694;    Therapeutic Activity:          mins  70785;     Gait Training:           mins  20297;     Ultrasound:          mins  23257;    Electrical Stimulation:         mins  11827 ( );    Untimed:  Electrical Stimulation:         mins  98277 ( );  Mechanical Traction:         mins  81396;   Orthotic fit/train:         mins  05069    Timed Treatment:   43   mins   Total Treatment:     43   mins  Teresita Acuna PTA  Physical Therapist Assistant  "

## 2020-07-22 ENCOUNTER — TREATMENT (OUTPATIENT)
Dept: PHYSICAL THERAPY | Facility: CLINIC | Age: 45
End: 2020-07-22

## 2020-07-22 DIAGNOSIS — M54.40 CHRONIC LOW BACK PAIN WITH SCIATICA, SCIATICA LATERALITY UNSPECIFIED, UNSPECIFIED BACK PAIN LATERALITY: Primary | ICD-10-CM

## 2020-07-22 DIAGNOSIS — G89.29 CHRONIC LOW BACK PAIN WITH SCIATICA, SCIATICA LATERALITY UNSPECIFIED, UNSPECIFIED BACK PAIN LATERALITY: Primary | ICD-10-CM

## 2020-07-22 PROCEDURE — 97110 THERAPEUTIC EXERCISES: CPT | Performed by: PHYSICAL THERAPIST

## 2020-07-22 PROCEDURE — 97530 THERAPEUTIC ACTIVITIES: CPT | Performed by: PHYSICAL THERAPIST

## 2020-07-22 NOTE — PROGRESS NOTES
Re-Assessment / Re-Certification      Patient: Sudhakar Saul   : 1975  Diagnosis/ICD-10 Code:  Chronic low back pain with sciatica, sciatica laterality unspecified, unspecified back pain laterality [M54.40, G89.29]  Referring practitioner: ROD Delcid  Date of Initial Visit: Type: THERAPY  Noted: 2020  Today's Date: 2020  Patient seen for 7 sessions    Next MD appt: 2020.  Recertification: N/A     Subjective:   Sudhakar Saul reports: 23-30% improvement  Subjective Questionnaire: Oswestry: 25/50 = 50%  Clinical Progress: improved  Home Program Compliance: Yes  Treatment has included: therapeutic exercise, neuromuscular re-education and therapeutic activity    Subjective Evaluation    History of Present Illness    Subjective comment: Patient reportws she is feeling lightheaded today due to her medication change. She reports her back is mainly just sore.Pain  Current pain rating: 3         Objective          Static Posture     Head  Forward.    Shoulders  Rounded.    Thoracic Spine  Hyperkyphosis.    Postural Observations  Seated posture: poor  Standing posture: fair  Correction of posture: has no consistent effect        Neurological Testing     Sensation     Lumbar   Left   Intact: light touch    Right   Intact: light touch    Active Range of Motion     Lumbar   Flexion: 81 degrees   Extension: 19 degrees   Left lateral flexion: Active left lumbar lateral flexion: 50% of range.   Right lateral flexion: Active right lumbar lateral flexion: 75% of range.   Left rotation: Active left lumbar rotation: 75% of range.   Right rotation: WFL    Strength/Myotome Testing     Lumbar   Left   Normal strength    Right   Normal strength    Tests     Lumbar     Left   Negative valsalva.     Right   Negative valsalva.     Ambulation   Weight-Bearing Status   Assistive device used: none    Ambulation: Level Surfaces   Ambulation without assistive device: independent    Observational Gait   Gait:  "within functional limits   Stride length within functional limits. Decreased walking speed.   Base of support: increased    Additional Observational Gait Details  Waddling type gait due to obesity.  Slowed gait due to patient having some dizziness from her medication change.      Assessment & Plan     Assessment  Impairments: abnormal or restricted ROM, activity intolerance, lacks appropriate home exercise program and pain with function  Assessment details: Improvements in AROM and activity tolerance. Patient was instructed in proper ergonomics for housework and lifting and able ot demonstrate correctly with no cueing. Ran out of time to review land there ex today. Will use next week to review all aquatics and review land final HEP consisting mainly of stretches.  Prognosis: fair  Prognosis details: Barriers to Rehab: Include significant or possible arthritic/degenerative changes that have occurred within the spine, The chronicity of this issue, The patient's obesity, The patient's generally deconditioned state.    Safety Issues: Seizure risk    Functional Limitations: carrying objects, lifting, sleeping, uncomfortable because of pain, sitting, standing and unable to perform repetitive tasks  Goals  Plan Goals: Short Term Goals:  1) I with HEP and have additions/changes by next recertification. (met)    2) Patient able to show proper log roll technique.    3) Patient to be more aware of posture and posture correction techniques (met)    4) AROM for lumbar extension >= 20°. (met)    5) AROM B Lumbar SB/ROT >= 75% of range.    6) Patient able to ambulate aquatically F/R/L 5 min each with no increase in pain.      Long Term Goals:  1) Patient to have AROM for the lumbar spine all WFL, no increase in pain. (partially met/ongoing)    2) Patient able to perform 20 Bridges with UE \"X\"  with no increase in pain. (met)    3) Patient able to perform 20 reps of each aquatic there ex with no increase in pain. (met)    4) " Patient able to show proper lifting technique floor to waist with no increase in pain. (met)    5) Patient able to show proper ergonomics for mopping/sweeping/vacumming. (met)    6) I with final land and aquatics HEP. (partially met/ongoing)    7) D/C with a final HEP and free 30 day fitness formula membership.    Plan  Therapy options: will be seen for skilled physical therapy services  Planned modality interventions: cryotherapy, hydrotherapy, high voltage pulsed current (pain management) and thermotherapy (hydrocollator packs)  Planned therapy interventions: abdominal trunk stabilization, body mechanics training, flexibility, functional ROM exercises, home exercise program, transfer training, therapeutic activities, stretching, strengthening, spinal/joint mobilization, soft tissue mobilization, postural training and manual therapy  Frequency: Land/Pool.  Duration in visits: 2  Treatment plan discussed with: patient  Plan details: 1 more week to ensure I with both land and aquatic final HEP.      Other therapeutic activities and/or exercises will be prescribed depending on the patients progress or lack there of.     Visit Diagnoses:    ICD-10-CM ICD-9-CM   1. Chronic low back pain with sciatica, sciatica laterality unspecified, unspecified back pain laterality M54.40 724.2    G89.29 724.3     338.29       Progress toward previous goals: Partially Met        Recommendations: Continue with recommendations 1 more week, 1 visit of each land and pool  Timeframe: 1 week, 2 visits  Prognosis to achieve goals: fair    PT Signature: Candy Menendez, PT DPT, CSCS      Based upon review of the patient's progress and continued therapy plan, it is my medical opinion that Sudhakar Saul should continue physical therapy treatment at [facility]  [address] [place] [address] [phone].    Signature: __________________________________  Dana Osman,  APRN    Timed:  Manual Therapy:         mins  16366;  Therapeutic Exercise:    30     mins  47052;     Aquatic Ther Ex:         mins  51108;     Neuromuscular Sharan:        mins  75293;    Therapeutic Activity:     10     mins  65272;     Gait Training:           mins  17102;     Ultrasound:          mins  84148;    Paraffin:        mins  16329;  Electrical Stimulation:         mins  45380 ( );    Untimed:  Electrical Stimulation:         mins  94027 ( );  Mechanical Traction:         mins  69523;     Timed Treatment:   40   mins   Total Treatment:     40   mins

## 2020-07-27 ENCOUNTER — TREATMENT (OUTPATIENT)
Dept: PHYSICAL THERAPY | Facility: CLINIC | Age: 45
End: 2020-07-27

## 2020-07-27 DIAGNOSIS — M54.40 CHRONIC LOW BACK PAIN WITH SCIATICA, SCIATICA LATERALITY UNSPECIFIED, UNSPECIFIED BACK PAIN LATERALITY: Primary | ICD-10-CM

## 2020-07-27 DIAGNOSIS — G89.29 CHRONIC LOW BACK PAIN WITH SCIATICA, SCIATICA LATERALITY UNSPECIFIED, UNSPECIFIED BACK PAIN LATERALITY: Primary | ICD-10-CM

## 2020-07-27 PROCEDURE — 97113 AQUATIC THERAPY/EXERCISES: CPT | Performed by: PHYSICAL THERAPIST

## 2020-07-27 NOTE — PROGRESS NOTES
"Physical Therapy Daily Progress Note    Patient: Sudhakar Saul   : 1975  Diagnosis/ICD-10 Code:     Diagnosis Plan   1. Chronic low back pain with sciatica, sciatica laterality unspecified, unspecified back pain laterality       Referring practitioner: ROD Delcid  Date of Initial Visit: Type: THERAPY  Noted: 2020  Today's Date: 2020  Patient seen for 8 sessions      PT Recheck Due: 2020  PT MD Visit: 2020       Sudhakar Saul reports: 23-30% improvement per recent re-certification/recheck plan of care        Subjective Evaluation    History of Present Illness    Subjective comment: patient states \" I'm hurting today\" states that other than medication that the thereapy has helped to loosen up her muscles. still has trouble when she is on her feet and up moving a lot. difficulty with shopping with the starting and stopping walking. Pain  Current pain ratin             Objective   See Exercise, Manual, and Modality Logs for complete treatment.       Assessment & Plan     Assessment  Assessment details: Patient is able to tolerate ambulating with cookie resistance for 5 minutes each direction without verbalizing complaints of pain. Patient is able to complete 20 reps of all therex with no difficulty. Demonstrating the ability to complete aquatic therex independently with no difficulty.     Goals  Plan Goals: Short Term Goals:  1) I with HEP and have additions/changes by next recertification. (met)    2) Patient able to show proper log roll technique.    3) Patient to be more aware of posture and posture correction techniques (met)    4) AROM for lumbar extension >= 20°. (met)    5) AROM B Lumbar SB/ROT >= 75% of range.    6) Patient able to ambulate aquatically F/R/L 5 min each with no increase in pain. (met)      Long Term Goals:  1) Patient to have AROM for the lumbar spine all WFL, no increase in pain. (partially met/ongoing)    2) Patient able to perform 20 Bridges " "with UE \"X\"  with no increase in pain. (met)    3) Patient able to perform 20 reps of each aquatic there ex with no increase in pain. (met)    4) Patient able to show proper lifting technique floor to waist with no increase in pain. (met)    5) Patient able to show proper ergonomics for mopping/sweeping/vacumming. (met)    6) I with final land and aquatics HEP. (met)    7) D/C with a final HEP and free 30 day fitness formula membership.    Plan  Plan details: Finalize and review HEP for land next treatment and then discharge next treatment to independent HEP      Progress per Plan of Care and Progress strengthening /stabilization /functional activity            Timed:  Manual Therapy:         mins  92837;  Therapeutic Exercise:         mins  38094;   Aquatic Therex :    45    mins  96053    Neuromuscular Sharan:        mins  64245;    Therapeutic Activity:          mins  72066;     Gait Training:           mins  37392;     Ultrasound:          mins  07709;    Electrical Stimulation:         mins  99533 ( );    Untimed:  Electrical Stimulation:         mins  20885 ( );  Mechanical Traction:         mins  60788;   Orthotic fit/train:         mins  12396    Timed Treatment:   45   mins   Total Treatment:     45   mins  Teresita Acuna PTA  Physical Therapist Assistant  "

## 2020-07-30 ENCOUNTER — TREATMENT (OUTPATIENT)
Dept: PHYSICAL THERAPY | Facility: CLINIC | Age: 45
End: 2020-07-30

## 2020-07-30 DIAGNOSIS — G89.29 CHRONIC LOW BACK PAIN WITH SCIATICA, SCIATICA LATERALITY UNSPECIFIED, UNSPECIFIED BACK PAIN LATERALITY: Primary | ICD-10-CM

## 2020-07-30 DIAGNOSIS — M54.40 CHRONIC LOW BACK PAIN WITH SCIATICA, SCIATICA LATERALITY UNSPECIFIED, UNSPECIFIED BACK PAIN LATERALITY: Primary | ICD-10-CM

## 2020-07-30 PROCEDURE — 97110 THERAPEUTIC EXERCISES: CPT | Performed by: PHYSICAL THERAPIST

## 2020-07-30 NOTE — PROGRESS NOTES
Physical Therapy Daily Progress Note/Discharge    Patient: Sudhakar Saul   : 1975  Diagnosis/ICD-10 Code:     Diagnosis Plan   1. Chronic low back pain with sciatica, sciatica laterality unspecified, unspecified back pain laterality       Referring practitioner: ROD Delcid  Date of Initial Visit: Type: THERAPY  Noted: 2020  Today's Date: 2020  Patient seen for 9 sessions      PT Recheck Due: 2020  PT MD Visit: 2020       Sudhakar Saul reports: 23-30% improvement per recent re-certification/recheck plan of care        Subjective Evaluation    History of Present Illness    Subjective comment: stiff today. hasn't had a chance to loosen up any, saw the doctor yesterday. she is pleased with her progress. didn't hvae to increase any of her medications. knows she needs to continue on wiht her home exercises on her own. Pain  Current pain ratin             Objective          Active Range of Motion     Lumbar   Left lateral flexion: Active left lumbar lateral flexion: 50% of range.   Right lateral flexion: Active right lumbar lateral flexion: 50% of range.   Left rotation: Active left lumbar rotation: 75% of range.   Right rotation: Active right lumbar rotation: 75% of range.       See Exercise, Manual, and Modality Logs for complete treatment.       Assessment & Plan     Assessment  Assessment details: Patient has improved ROM overall. Good effort. Good knowledge of therex and HEP. Patient demonstrates independence overall.    Goals  Plan Goals: Short Term Goals:  1) I with HEP and have additions/changes by next recertification. (met)    2) Patient able to show proper log roll technique. (met)    3) Patient to be more aware of posture and posture correction techniques (met)    4) AROM for lumbar extension >= 20°. (met)    5) AROM B Lumbar SB/ROT >= 75% of range. (partially met)    6) Patient able to ambulate aquatically F/R/L 5 min each with no increase in pain.  "(met)      Long Term Goals:  1) Patient to have AROM for the lumbar spine all WFL, no increase in pain. (partially met/ongoing)    2) Patient able to perform 20 Bridges with UE \"X\"  with no increase in pain. (met)    3) Patient able to perform 20 reps of each aquatic there ex with no increase in pain. (met)    4) Patient able to show proper lifting technique floor to waist with no increase in pain. (met)    5) Patient able to show proper ergonomics for mopping/sweeping/vacumming. (met)    6) I with final land and aquatics HEP. (met)    7) D/C with a final HEP and free 30 day fitness formula membership. (met)    Plan  Plan details: Discharge per POC      Progress per Plan of Care and Progress strengthening /stabilization /functional activity            Timed:  Manual Therapy:         mins  44202;  Therapeutic Exercise:    40     mins  38113;   Aquatic Therex :        mins  35542    Neuromuscular Sharan:        mins  54318;    Therapeutic Activity:          mins  86987;     Gait Training:           mins  65190;     Ultrasound:          mins  88467;    Electrical Stimulation:         mins  08335 ( );    Untimed:  Electrical Stimulation:         mins  70036 ( );  Mechanical Traction:         mins  62861;   Orthotic fit/train:         mins  45789    Timed Treatment:   40   mins   Total Treatment:     40   mins  Teresita Acuna PTA  Physical Therapist Assistant  "

## 2020-08-03 ENCOUNTER — OFFICE VISIT (OUTPATIENT)
Dept: PULMONOLOGY | Facility: CLINIC | Age: 45
End: 2020-08-03

## 2020-08-03 VITALS
HEART RATE: 83 BPM | HEIGHT: 66 IN | BODY MASS INDEX: 47.09 KG/M2 | WEIGHT: 293 LBS | OXYGEN SATURATION: 96 % | SYSTOLIC BLOOD PRESSURE: 112 MMHG | DIASTOLIC BLOOD PRESSURE: 70 MMHG

## 2020-08-03 DIAGNOSIS — J44.9 CHRONIC OBSTRUCTIVE PULMONARY DISEASE, UNSPECIFIED COPD TYPE (HCC): Primary | ICD-10-CM

## 2020-08-03 DIAGNOSIS — F17.210 CIGARETTE NICOTINE DEPENDENCE, UNCOMPLICATED: ICD-10-CM

## 2020-08-03 DIAGNOSIS — E66.01 CLASS 3 SEVERE OBESITY DUE TO EXCESS CALORIES WITH SERIOUS COMORBIDITY AND BODY MASS INDEX (BMI) OF 45.0 TO 49.9 IN ADULT (HCC): ICD-10-CM

## 2020-08-03 DIAGNOSIS — G47.33 OSA (OBSTRUCTIVE SLEEP APNEA): ICD-10-CM

## 2020-08-03 PROCEDURE — 99214 OFFICE O/P EST MOD 30 MIN: CPT | Performed by: INTERNAL MEDICINE

## 2020-08-03 PROCEDURE — 99406 BEHAV CHNG SMOKING 3-10 MIN: CPT | Performed by: INTERNAL MEDICINE

## 2020-08-03 NOTE — PROGRESS NOTES
Pulmonary Office Follow-up    Subjective     Sudhakar Saul is seen today at the office for   Chief Complaint   Patient presents with   • COPD       Subjective     History of Present Illness  Sudhakar Saul is a 44 y.o. female with a PMH significant for tobacco use, morbid obesity, JACQUE, HTN, T2DM, and chronic Hep C who presents for follow-up of COPD.    10/22/2018: I recommended starting Spiriva Respimat and counseled on the importance of complete tobacco cessation.  I also recommended that she consider repeat sleep apnea evaluation for CPAP desensitization.    6/22/2020: She reestablish care and PFTs showed moderate obstruction which is a significant change compared to prior.  I recommend stopping Breo and start Anoro.  I also again stressed importance of complete tobacco cessation and she set a quit date for 1 month.  I also encouraged her to start efforts at weight loss through diet and exercise.    8/3/20: Unfortunately, there were issues with the patient's prescription and she has not received her Anoro yet.  She continued taking the Breo until it was completed and she has not been taking any maintenance bronchodilators in the past few days.  Patient has been able to cut back on her smoking and is committed to complete cessation within the next 2 weeks.  She also reports she has been undergoing physical therapy as part of her pain management and it seems to be helping.  She denies fever, chills, persistent cough, sputum, chest pain, or leg swelling.  Patient has not had any recent antibiotics or steroids.    Tobacco use history:  Type: cigarettes  Amount: 0.5-3 ppd  Duration: 32 years  Cessation: N/A   Willing to quit: Yes      Review of Systems: History obtained from chart review and the patient.  Review of Systems   Constitutional: Negative for fever and unexpected weight change.   HENT: Positive for hearing loss. Negative for congestion and postnasal drip.    Respiratory: Positive for cough and  shortness of breath. Negative for wheezing.    Cardiovascular: Negative for chest pain and leg swelling.   Gastrointestinal:        Heartburn   Musculoskeletal: Positive for arthralgias and back pain.     As described in the HPI. Otherwise, remainder of ROS (14 systems) were negative.    Patient Active Problem List   Diagnosis   • Seizure disorder (CMS/HCC)   • Morbid obesity (CMS/HCC)   • Tobacco abuse counseling   • Multiple joint pain   • Dysuria   • History of positive hepatitis C   • Essential hypertension   • Uncontrolled type 2 diabetes mellitus with hyperglycemia (CMS/[facility])   • Vitamin D deficiency   • Urinary tract infection without hematuria   • Chronic hepatitis C without hepatic coma (CMS/[facility])   • Cigarette nicotine dependence, uncomplicated   • Chronic obstructive pulmonary disease (CMS/[facility])   • JACQUE (obstructive sleep apnea)   • Gastroesophageal reflux disease with esophagitis   • Left upper quadrant pain   • Nausea and vomiting   • Weight gain, abnormal   • Change in bowel habits   • Hepatic fibrosis   • Non-compliance   • Depression   • Paranoid schizophrenia (CMS/[facility])   • Gastritis without bleeding   • Tobacco user   • Encounter for eye exam   • Scoliosis   • History of drug use   • Stage 2 chronic kidney disease   • Diabetic neuropathy (CMS/[facility])   • Mixed hyperlipidemia   • Chronic bilateral low back pain with bilateral sciatica   • Type 2 diabetes mellitus with hyperglycemia, with long-term current use of insulin (CMS/[facility])   • Auditory hallucination   • Class 3 severe obesity due to excess calories with serious comorbidity and body mass index (BMI) of 45.0 to 49.9 in adult (CMS/[facility])         Current Outpatient Medications:   •  ADMELOG 100 UNIT/ML injection, Inject 40 Units under the skin into the appropriate area as directed 3 (Three) Times a Day Before Meals., Disp: 40 mL, Rfl: 11  •  albuterol sulfate  (90 Base) MCG/ACT inhaler, Inhale 2 puffs Every 6 (Six) Hours As Needed for Wheezing or  "Shortness of Air., Disp: 1 inhaler, Rfl: 3  •  B-D INS SYR ULTRAFINE 1CC/30G 30G X 1/2\" 1 ML misc, USE 1 SYRINGE THREE TIMES DAILY, Disp: , Rfl:   •  cyclobenzaprine (FLEXERIL) 5 MG tablet, Take 1 tablet by mouth 3 (Three) Times a Day As Needed for Muscle Spasms. (Patient taking differently: Take 5 mg by mouth 3 (Three) Times a Day.), Disp: 90 tablet, Rfl: 3  •  Dulaglutide (Trulicity) 1.5 MG/0.5ML solution pen-injector, Inject 1.5 mg under the skin into the appropriate area as directed 1 (One) Time Per Week., Disp: 4 pen, Rfl: 3  •  DULoxetine (CYMBALTA) 30 MG capsule, Take 3 capsules by mouth Daily., Disp: 90 capsule, Rfl: 3  •  Ertugliflozin L-PyroglutamicAc (Steglatro) 15 MG tablet, Take 1 tablet by mouth Every Morning., Disp: 30 tablet, Rfl: 3  •  folic acid (FOLVITE) 1 MG tablet, Take 1,000 mcg by mouth Daily., Disp: , Rfl:   •  gabapentin (NEURONTIN) 300 MG capsule, Take 1 capsule by mouth 3 (Three) Times a Day., Disp: 90 capsule, Rfl: 2  •  glucose blood test strip, Testing 4 times daily, E11.9, Disp: 120 each, Rfl: 12  •  HYDROcodone-acetaminophen (NORCO) 7.5-325 MG per tablet, Take 1 tablet by mouth 2 (Two) Times a Day., Disp: , Rfl:   •  Insulin Glargine (BASAGLAR KWIKPEN) 100 UNIT/ML injection pen, Inject 70 Units under the skin into the appropriate area as directed Daily. (Patient taking differently: Inject 63 Units under the skin into the appropriate area as directed Daily.), Disp: 7 pen, Rfl: 11  •  Insulin Syringe 29G X 1/2\" 0.5 ML misc, Inject 3 times daily, Disp: 100 each, Rfl: 11  •  levETIRAcetam (KEPPRA) 1000 MG tablet, Take 1 tablet by mouth 2 (Two) Times a Day., Disp: 60 tablet, Rfl: 3  •  lisinopril (PRINIVIL,ZESTRIL) 40 MG tablet, Take 1 tablet by mouth Daily., Disp: 30 tablet, Rfl: 3  •  metFORMIN (GLUCOPHAGE) 500 MG tablet, Take 1 tablet by mouth 2 (Two) Times a Day With Meals., Disp: 60 tablet, Rfl: 3  •  metoprolol tartrate (LOPRESSOR) 50 MG tablet, Take 1 tablet by mouth Every 12 " (Twelve) Hours., Disp: 60 tablet, Rfl: 3  •  nicotine polacrilex (COMMIT) 2 MG lozenge, Dissolve 1 lozenge in the mouth As Needed for Smoking Cessation., Disp: 168 lozenge, Rfl: 3  •  RELION PEN NEEDLES 31G X 6 MM misc, USE 1 NEEDLE 3 TO 4 TIMES DAILY, Disp: , Rfl: 3  •  risperiDONE (RisperDAL) 2 MG tablet, Take 1 tablet by mouth 2 (Two) Times a Day., Disp: 60 tablet, Rfl: 3  •  simvastatin (ZOCOR) 40 MG tablet, Take 1 tablet by mouth Every Night., Disp: 30 tablet, Rfl: 3  •  umeclidinium-vilanterol (Anoro Ellipta) 62.5-25 MCG/INH aerosol powder  inhaler, Inhale 1 puff Daily., Disp: 1 each, Rfl: 3  •  vitamin D (ERGOCALCIFEROL) 1.25 MG (43181 UT) capsule capsule, TAKE 2 CAPSULES BY MOUTH ONCE A WEEK, Disp: 8 capsule, Rfl: 3    Past Medical History:   Diagnosis Date   • Allergic    • Anemia    • Angina pectoris (CMS/HCC)    • Anxiety    • Arthritis    • CAD (coronary artery disease)    • Cancer (CMS/HCC) 1997    endometrial- surgery, no chemo/radiation   • Chronic kidney disease    • COPD (chronic obstructive pulmonary disease) (CMS/HCC)    • Degeneration macular    • Depression    • Diabetes mellitus (CMS/HCC)    • Hepatitis c    • Hypertension    • Injury of back    • Injury of neck    • Migraine    • Multiple personality disorder (CMS/HCC)    • Neuropathy    • Paranoid schizophrenia (CMS/HCC)    • Seizures (CMS/HCC)     07/01/2020: last seizure was the first of the year   • Shortness of breath    • Substance abuse (CMS/HCC)    • Urinary tract infection      Past Surgical History:   Procedure Laterality Date   • APPENDECTOMY     • BACK SURGERY     • BREAST SURGERY     • CARPAL TUNNEL RELEASE      right hand   • CHOLECYSTECTOMY     • COLONOSCOPY     • COLONOSCOPY N/A 2/8/2019    Procedure: COLONOSCOPY;  Surgeon: Joni Delacruz MD;  Location: [facility] ENDOSCOPY;  Service: Gastroenterology   • DENTAL PROCEDURE     • ENDOSCOPY N/A 2/8/2019    Procedure: ESOPHAGOGASTRODUODENOSCOPY--poss dilation;  Surgeon: Joni Delacruz  "MD JOSSY;  Location: [facility] ENDOSCOPY;  Service: Gastroenterology   • HYSTERECTOMY     • LIPOMA EXCISION      9   • LIVER BIOPSY     • UPPER GASTROINTESTINAL ENDOSCOPY  02/08/2019     Social History     Socioeconomic History   • Marital status:      Spouse name: Not on file   • Number of children: Not on file   • Years of education: Not on file   • Highest education level: Not on file   Tobacco Use   • Smoking status: Current Every Day Smoker     Packs/day: 0.25     Types: Cigarettes   • Smokeless tobacco: Never Used   • Tobacco comment: 05/06/2020 - Pt states today is her first day on nicotine patches   Substance and Sexual Activity   • Alcohol use: No   • Drug use: Not Currently     Types: Amphetamines, Heroin, Methamphetamines, Morphine, Oxycodone   • Sexual activity: Defer     Family History   Problem Relation Age of Onset   • Hypertension Other    • Diabetes Other    • Stroke Other    • Cancer Other    • Kidney disease Other    • Lung disease Other    • Other Other    • Malone's esophagus Other    • Colon polyps Other    • Heart disease Other    • Ulcers Other    • Cholelithiasis Other    • Allergy (severe) Other           Objective     Blood pressure 112/70, pulse 83, height 167.6 cm (66\"), weight 134 kg (295 lb), SpO2 96 %.  Physical Exam   Constitutional: She is oriented to person, place, and time. Vital signs are normal. She appears well-developed and well-nourished.   obese   HENT:   Head: Normocephalic and atraumatic.   Masked   Eyes: Pupils are equal, round, and reactive to light. Conjunctivae, EOM and lids are normal.   Neck: Trachea normal and normal range of motion. No tracheal tenderness present. No thyroid mass present.   Cardiovascular: Normal rate, regular rhythm and normal heart sounds. PMI is not displaced. Exam reveals no gallop.   No murmur heard.  Pulmonary/Chest: Effort normal and breath sounds normal. No respiratory distress. She has no decreased breath sounds. She has no wheezes. She " has no rhonchi. She exhibits no tenderness.   Abdominal: Soft. Normal appearance and bowel sounds are normal. There is no hepatomegaly. There is no tenderness.   Musculoskeletal:   Normal gait, no extremity edema     Vascular Status -  Her right foot exhibits no edema. Her left foot exhibits no edema.  Lymphadenopathy:        Head (right side): No submandibular adenopathy present.        Head (left side): No submandibular adenopathy present.     She has no cervical adenopathy.        Right: No supraclavicular adenopathy present.        Left: No supraclavicular adenopathy present.   Neurological: She is alert and oriented to person, place, and time.   Skin: Skin is warm and dry. No rash noted. No cyanosis. Nails show no clubbing.   Psychiatric: She has a normal mood and affect. Her speech is normal and behavior is normal. Judgment normal.   Nursing note and vitals reviewed.      PFTs: 6/22/20 (independently reviewed and interpreted by me)  Ratio 52  FVC 3.61 (3.27)/ 94%  FEV1 1.9 (2.76)/ 61%  TLC (4.47)  DLCO (24.32)  Moderate obstruction.  Significant decrease in FEV1 compared to 10/22/18.    Radiology (independently reviewed and interpreted by me): CXR 6/22/20 showed NACPD       Assessment/Plan     Sudhakar was seen today for copd.    Diagnoses and all orders for this visit:    Chronic obstructive pulmonary disease, unspecified COPD type (CMS/HCC)    JACQUE (obstructive sleep apnea)    Class 3 severe obesity due to excess calories with serious comorbidity and body mass index (BMI) of 45.0 to 49.9 in adult (CMS/[facility])    Cigarette nicotine dependence, uncomplicated         Discussion/ Recommendations:   Unfortunately, there have been issues with the patient obtaining her Anoro.  I have provided her with a sample and prescription has been sent back into her pharmacy.  I do think she would benefit from complete cessation as planned as well as ongoing efforts with exercise.    -Start Anoro daily.  Sample provided.  -Continue  albuterol as needed for dyspnea or wheeze.  -Encouraged ongoing efforts at weight loss as well as regular, progressive exercise.  -Sudhakar Saul  reports that she has been smoking cigarettes. She has been smoking about 0.25 packs per day. She has never used smokeless tobacco.. I have educated her on the risk of diseases from using tobacco products such as cancer, COPD and heart diease. I advised her to quit and she is willing to quit. We have discussed the following method/s for tobacco cessation:  Education Material OTC Cessation Products.  Together we have set a quit date for 2 weeks from today.  She will follow up with me in 2 months or sooner to check on her progress. I spent 4 minutes counseling the patient.  -Refuses CPAP.  -Up-to-date with pneumococcal vaccine.  Recommend annual influenza vaccination.    Patient's Body mass index is 47.61 kg/m². BMI is above normal parameters. Recommendations include: exercise counseling.           Return for Recheck COPD.      Thank you for allowing me to participate in the care of Sudhakar Saul. Please do not hesitate to contact me with any questions.         This document has been electronically signed by Debo Will MD on August 3, 2020 10:58      Dictated using Dragon

## 2020-08-04 ENCOUNTER — TELEPHONE (OUTPATIENT)
Dept: ENDOCRINOLOGY | Facility: CLINIC | Age: 45
End: 2020-08-04

## 2020-08-04 NOTE — TELEPHONE ENCOUNTER
Pt met with [employer] for training and needs pods changed to every 2 days instead of 3 so needs new script sent to [facility] with 90 day supply   Thank You

## 2020-08-05 ENCOUNTER — TELEPHONE (OUTPATIENT)
Dept: ENDOCRINOLOGY | Facility: CLINIC | Age: 45
End: 2020-08-05

## 2020-08-18 NOTE — TELEPHONE ENCOUNTER
Pt stated she is out of pods. Stated [facility] never got fax. Refaxed 2 day site change to [facility]. Instructed pt to call in the morning to verify fax went through.

## 2020-09-04 NOTE — PROGRESS NOTES
"   Subjective:  Sudhakar Saul is a 44 y.o. female who presents for       Patient Active Problem List   Diagnosis   • Seizure disorder (CMS/HCC)   • Morbid obesity (CMS/HCC)   • Tobacco abuse counseling   • Multiple joint pain   • Dysuria   • History of positive hepatitis C   • Essential hypertension   • Uncontrolled type 2 diabetes mellitus with hyperglycemia (CMS/HCC)   • Vitamin D deficiency   • Urinary tract infection without hematuria   • Chronic hepatitis C without hepatic coma (CMS/HCC)   • Cigarette nicotine dependence, uncomplicated   • Chronic obstructive pulmonary disease (CMS/HCC)   • JACQUE (obstructive sleep apnea)   • Gastroesophageal reflux disease with esophagitis   • Left upper quadrant pain   • Nausea and vomiting   • Weight gain, abnormal   • Change in bowel habits   • Hepatic fibrosis   • Non-compliance   • Depression   • Paranoid schizophrenia (CMS/HCC)   • Gastritis without bleeding   • Tobacco user   • Encounter for eye exam   • Scoliosis   • History of drug use   • Stage 2 chronic kidney disease   • Diabetic neuropathy (CMS/HCC)   • Mixed hyperlipidemia   • Chronic bilateral low back pain with bilateral sciatica   • Type 2 diabetes mellitus with hyperglycemia, with long-term current use of insulin (CMS/HCC)   • Auditory hallucination   • Class 3 severe obesity due to excess calories with serious comorbidity and body mass index (BMI) of 45.0 to 49.9 in adult (CMS/[facility])           Current Outpatient Medications:   •  ADMELOG 100 UNIT/ML injection, Inject 40 Units under the skin into the appropriate area as directed 3 (Three) Times a Day Before Meals., Disp: 40 mL, Rfl: 11  •  albuterol sulfate  (90 Base) MCG/ACT inhaler, Inhale 2 puffs Every 6 (Six) Hours As Needed for Wheezing or Shortness of Air., Disp: 1 inhaler, Rfl: 3  •  B-D INS SYR ULTRAFINE 1CC/30G 30G X 1/2\" 1 ML misc, USE 1 SYRINGE THREE TIMES DAILY, Disp: , Rfl:   •  cyclobenzaprine (FLEXERIL) 5 MG tablet, Take 1 tablet by " "mouth 3 (Three) Times a Day As Needed for Muscle Spasms. (Patient taking differently: Take 5 mg by mouth 3 (Three) Times a Day.), Disp: 90 tablet, Rfl: 3  •  Dulaglutide (Trulicity) 1.5 MG/0.5ML solution pen-injector, Inject 1.5 mg under the skin into the appropriate area as directed 1 (One) Time Per Week., Disp: 4 pen, Rfl: 3  •  DULoxetine (CYMBALTA) 30 MG capsule, TAKE 3 CAPSULES BY MOUTH ONCE DAILY, Disp: 90 capsule, Rfl: 3  •  Ertugliflozin L-PyroglutamicAc (Steglatro) 15 MG tablet, Take 1 tablet by mouth Every Morning., Disp: 30 tablet, Rfl: 3  •  folic acid (FOLVITE) 1 MG tablet, Take 1,000 mcg by mouth Daily., Disp: , Rfl:   •  gabapentin (NEURONTIN) 300 MG capsule, Take 1 capsule by mouth 3 (Three) Times a Day., Disp: 90 capsule, Rfl: 2  •  glucose blood test strip, Testing 4 times daily, E11.9, Disp: 120 each, Rfl: 12  •  HYDROcodone-acetaminophen (NORCO) 7.5-325 MG per tablet, Take 1 tablet by mouth 2 (Two) Times a Day., Disp: , Rfl:   •  Insulin Syringe 29G X 1/2\" 0.5 ML misc, Inject 3 times daily, Disp: 100 each, Rfl: 11  •  levETIRAcetam (KEPPRA) 1000 MG tablet, Take 1 tablet by mouth 2 (Two) Times a Day., Disp: 60 tablet, Rfl: 3  •  lisinopril (PRINIVIL,ZESTRIL) 40 MG tablet, Take 1 tablet by mouth Daily., Disp: 30 tablet, Rfl: 3  •  metFORMIN (GLUCOPHAGE) 500 MG tablet, Take 1 tablet by mouth 2 (Two) Times a Day With Meals., Disp: 60 tablet, Rfl: 3  •  metoprolol tartrate (LOPRESSOR) 50 MG tablet, Take 1 tablet by mouth Every 12 (Twelve) Hours., Disp: 60 tablet, Rfl: 3  •  nicotine polacrilex (COMMIT) 2 MG lozenge, Dissolve 1 lozenge in the mouth As Needed for Smoking Cessation., Disp: 168 lozenge, Rfl: 3  •  RELION PEN NEEDLES 31G X 6 MM misc, USE 1 NEEDLE 3 TO 4 TIMES DAILY, Disp: , Rfl: 3  •  risperiDONE (RisperDAL) 2 MG tablet, Take 1 tablet by mouth 2 (Two) Times a Day., Disp: 60 tablet, Rfl: 3  •  simvastatin (ZOCOR) 40 MG tablet, Take 1 tablet by mouth Every Night., Disp: 30 tablet, Rfl: 3  •  " tiotropium bromide-olodaterol (STIOLTO RESPIMAT) 2.5-2.5 MCG/ACT aerosol solution inhaler, Inhale 2 puffs Daily., Disp: 1 inhaler, Rfl: 5  •  vitamin D (ERGOCALCIFEROL) 1.25 MG (32870 UT) capsule capsule, TAKE 2 CAPSULES BY MOUTH ONCE A WEEK, Disp: 8 capsule, Rfl: 3    HPI     Pt is 42 yo female with management  of morbid obesity, IDDM Type 2 now on insulin pump , HTN, HLP, vitamin D deficiency, tobacco user, GERD, gastritis, esophagitis, diverticulosis, colonic polyp in sigmoid colon,  paranoid schizophrenia,  Seizure disorder, history of Illicit drug abuse, COPD, JACQUE, chronic hepatitis C , major depression, JD, sp appendectomy, sp cholecystectomy sp right carpal tunnel release, sp back surgery, sp breast surgery, sp lipoma excision, chronic back pain (moderate L4-L5 and L5-S1 DDD changes, mild bilateral L4-L5 moderate bilateral L5-S1 facet arthritic change, mild leveoscoliosis), CKD stage 2,       7/17/20 in office visit for recheck on pt's history of IDDM type 2, HTN cOPD, HLP, schizophrenia. Pt had labwork done on 6/10/20 that showed hga1c at 9.60. Lipid panel  Showed improvement of triglclycerdies at 203 from 170. HDL at 35.  Vitamin D was normal TSH was normal Vitamin B12 levels normal hepatiits panel showed reactive Hep C antibody. CMP showed GFR at 80 from 89.  CBC showed normal hemoglobin. She has been approved for insulin pump and has to take a class soon. She is now taking 38 unites for large meals. Continues to do sliding scale. BP stable. She has noticed some twiching on her left side off face for past week. She states she has a history of Bell's Palsy, schizophrenia stable on rispderal.  She has less auditory hallucintations. They are less often     9/17/20 in office visit for recheck on pt's above medical issues. Since last visit pt has done PT/OT for her lower back. Saw Pulmonologist on 8/3/20 and Stiolotowas started for her COPD. Had labwork done on 9/9/20 that showed normal Vitamin B12, vitamin D  levels normal lipid panel showed triglycerides at 249 and HD Lat 37 LDL at 42. hga1c at 9.24 from 9.60.  CMP showed glucose at 159 and  GFR at 90 with normal liver enzymes. CBC shows normal hemoglobin.  She is now on insulin pump for about a month and sugar running  in mornings after she eats 180.  She is counting carb. Blood pressure. Psych issues are stable and continues to see [facility]. Breathing stable. She is no Seizures stable. She did have one episode about a month ago. She is seeing Neurologist..          Mental Health Problem   The primary symptoms include delusions, hallucinations and negative symptoms. The primary symptoms do not include dysphoric mood, bizarre behavior, disorganized speech or somatic symptoms. This is a chronic problem.   The onset of the illness is precipitated by a stressful event, emotional stress, alcohol abuse and drug abuse. The degree of incapacity that she is experiencing as a consequence of her illness is severe. Sequelae of the illness include an inability to work, an inability to care for self and harmed interpersonal relations. Additional symptoms of the illness include fatigue, agitation and seizures. Additional symptoms of the illness do not include anhedonia, insomnia, hypersomnia, appetite change, unexpected weight change, psychomotor retardation, feelings of worthlessness, attention impairment, increased goal-directed activity, flight of ideas, inflated self-esteem, decreased need for sleep, distractible, poor judgment, visual change, headaches or abdominal pain. She admits to suicidal ideas. She does not have a plan to commit suicide. She does not contemplate harming herself. She has not already injured self. She does not contemplate injuring another person. Risk factors that are present for mental illness include a history of mental illness, substance abuse, a history of suicide attempts, family violence and epilepsy. s.   Obesity   This is a chronic problem. The  current episode started more than 1 year ago. The problem occurs constantly. The problem has been unchanged. Associated symptoms include arthralgias, fatigue, headaches, numbness and weakness. Pertinent negatives include no abdominal pain, anorexia, change in bowel habit, chest pain, chills, congestion, coughing, fever, joint swelling, myalgias, nausea, neck pain, rash, sore throat, swollen glands, urinary symptoms, vertigo, visual change or vomiting. Nothing aggravates the symptoms. She has tried nothing for the symptoms.   Hypertension   This is a chronic problem. The current episode started more than 1 year ago. The problem is controlled  Associated symptoms include headaches. Pertinent negatives include no anxiety, blurred vision, chest pain, malaise/fatigue, neck pain, orthopnea, palpitations, peripheral edema, PND or shortness of breath. Risk factors for coronary artery disease include obesity, sedentary lifestyle, smoking/tobacco exposure and stress. Past treatments include ACE inhibitors, beta blockers and diuretics. There are no compliance problems.  There is no history of angina, kidney disease, CAD/MI, CVA, heart failure, left ventricular hypertrophy, PVD or retinopathy. There is no history of chronic renal disease, coarctation of the aorta, hyperaldosteronism, hypercortisolism, hyperparathyroidism, a hypertension causing med, pheochromocytoma, renovascular disease, sleep apnea or a thyroid problem.   Diabetes   She presents for her initial diabetic visit. She has type 2 diabetes mellitus. Her disease course has been waxing and waning Hypoglycemia symptoms include confusion, headaches, nervousness/anxiousness, seizures and sleepiness. Associated symptoms include fatigue and weakness. Pertinent negatives for diabetes include no blurred vision, no chest pain and no visual change. Pertinent negatives for diabetic complications include no CVA, PVD or retinopathy. Risk factors for coronary artery disease  include dyslipidemia, obesity and post-menopausal. She has not had a previous visit with a dietitian. There is no change in her home blood glucose trend. An ACE inhibitor/angiotensin II receptor blocker is not being taken. She does not see a podiatrist.Eye exam is not current.        Review of Systems  Review of Systems   Constitutional: Positive for activity change and fatigue. Negative for appetite change, chills, diaphoresis and fever.   HENT: Negative for congestion, postnasal drip, rhinorrhea, sinus pressure, sinus pain, sneezing, sore throat, trouble swallowing and voice change.    Respiratory: Positive for cough and shortness of breath. Negative for choking, chest tightness, wheezing and stridor.    Cardiovascular: Negative for chest pain.   Gastrointestinal: Positive for abdominal pain. Negative for diarrhea, nausea and vomiting.   Musculoskeletal: Positive for arthralgias and back pain.   Neurological: Positive for weakness and numbness. Negative for headaches.   Psychiatric/Behavioral: Positive for agitation and behavioral problems. The patient is nervous/anxious.         Depressed mood        Patient Active Problem List   Diagnosis   • Seizure disorder (CMS/HCC)   • Morbid obesity (CMS/HCC)   • Tobacco abuse counseling   • Multiple joint pain   • Dysuria   • History of positive hepatitis C   • Essential hypertension   • Uncontrolled type 2 diabetes mellitus with hyperglycemia (CMS/HCC)   • Vitamin D deficiency   • Urinary tract infection without hematuria   • Chronic hepatitis C without hepatic coma (CMS/HCC)   • Cigarette nicotine dependence, uncomplicated   • Chronic obstructive pulmonary disease (CMS/HCC)   • JACQUE (obstructive sleep apnea)   • Gastroesophageal reflux disease with esophagitis   • Left upper quadrant pain   • Nausea and vomiting   • Weight gain, abnormal   • Change in bowel habits   • Hepatic fibrosis   • Non-compliance   • Depression   • Paranoid schizophrenia (CMS/HCC)   • Gastritis  without bleeding   • Tobacco user   • Encounter for eye exam   • Scoliosis   • History of drug use   • Stage 2 chronic kidney disease   • Diabetic neuropathy (CMS/HCC)   • Mixed hyperlipidemia   • Chronic bilateral low back pain with bilateral sciatica   • Type 2 diabetes mellitus with hyperglycemia, with long-term current use of insulin (CMS/HCC)   • Auditory hallucination   • Class 3 severe obesity due to excess calories with serious comorbidity and body mass index (BMI) of 45.0 to 49.9 in adult (CMS/HCC)     Past Surgical History:   Procedure Laterality Date   • APPENDECTOMY     • BACK SURGERY     • BREAST SURGERY     • CARPAL TUNNEL RELEASE      right hand   • CHOLECYSTECTOMY     • COLONOSCOPY     • COLONOSCOPY N/A 2/8/2019    Procedure: COLONOSCOPY;  Surgeon: Joni Delacruz MD;  Location: [facility] ENDOSCOPY;  Service: Gastroenterology   • DENTAL PROCEDURE     • ENDOSCOPY N/A 2/8/2019    Procedure: ESOPHAGOGASTRODUODENOSCOPY--poss dilation;  Surgeon: Joni Delacruz MD;  Location: [facility] ENDOSCOPY;  Service: Gastroenterology   • HYSTERECTOMY     • LIPOMA EXCISION      9   • LIVER BIOPSY     • UPPER GASTROINTESTINAL ENDOSCOPY  02/08/2019     Social History     Socioeconomic History   • Marital status:      Spouse name: Not on file   • Number of children: Not on file   • Years of education: Not on file   • Highest education level: Not on file   Tobacco Use   • Smoking status: Current Every Day Smoker     Packs/day: 0.25     Types: Cigarettes   • Smokeless tobacco: Never Used   • Tobacco comment: 05/06/2020 - Pt states today is her first day on nicotine patches   Substance and Sexual Activity   • Alcohol use: No   • Drug use: Not Currently     Types: Amphetamines, Heroin, Methamphetamines, Morphine, Oxycodone   • Sexual activity: Defer     Family History   Problem Relation Age of Onset   • Hypertension Other    • Diabetes Other    • Stroke Other    • Cancer Other    • Kidney disease Other    • Lung disease  Other    • Other Other    • Malone's esophagus Other    • Colon polyps Other    • Heart disease Other    • Ulcers Other    • Cholelithiasis Other    • Allergy (severe) Other      Lab on 09/09/2020   Component Date Value Ref Range Status   • WBC 09/09/2020 9.63  3.40 - 10.80 10*3/mm3 Final   • RBC 09/09/2020 5.07  3.77 - 5.28 10*6/mm3 Final   • Hemoglobin 09/09/2020 14.9  12.0 - 15.9 g/dL Final   • Hematocrit 09/09/2020 44.3  34.0 - 46.6 % Final   • MCV 09/09/2020 87.4  79.0 - 97.0 fL Final   • MCH 09/09/2020 29.4  26.6 - 33.0 pg Final   • MCHC 09/09/2020 33.6  31.5 - 35.7 g/dL Final   • RDW 09/09/2020 13.3  12.3 - 15.4 % Final   • RDW-SD 09/09/2020 42.6  37.0 - 54.0 fl Final   • MPV 09/09/2020 11.4  6.0 - 12.0 fL Final   • Platelets 09/09/2020 180  140 - 450 10*3/mm3 Final   • Neutrophil % 09/09/2020 67.2  42.7 - 76.0 % Final   • Lymphocyte % 09/09/2020 21.6  19.6 - 45.3 % Final   • Monocyte % 09/09/2020 4.8* 5.0 - 12.0 % Final   • Eosinophil % 09/09/2020 5.3  0.3 - 6.2 % Final   • Basophil % 09/09/2020 0.6  0.0 - 1.5 % Final   • Immature Grans % 09/09/2020 0.5  0.0 - 0.5 % Final   • Neutrophils, Absolute 09/09/2020 6.47  1.70 - 7.00 10*3/mm3 Final   • Lymphocytes, Absolute 09/09/2020 2.08  0.70 - 3.10 10*3/mm3 Final   • Monocytes, Absolute 09/09/2020 0.46  0.10 - 0.90 10*3/mm3 Final   • Eosinophils, Absolute 09/09/2020 0.51* 0.00 - 0.40 10*3/mm3 Final   • Basophils, Absolute 09/09/2020 0.06  0.00 - 0.20 10*3/mm3 Final   • Immature Grans, Absolute 09/09/2020 0.05  0.00 - 0.05 10*3/mm3 Final   • nRBC 09/09/2020 0.0  0.0 - 0.2 /100 WBC Final   • Glucose 09/09/2020 159* 65 - 99 mg/dL Final   • BUN 09/09/2020 11  6 - 20 mg/dL Final   • Creatinine 09/09/2020 0.70  0.57 - 1.00 mg/dL Final   • Sodium 09/09/2020 137  136 - 145 mmol/L Final   • Potassium 09/09/2020 4.7  3.5 - 5.2 mmol/L Final   • Chloride 09/09/2020 103  98 - 107 mmol/L Final   • CO2 09/09/2020 22.3  22.0 - 29.0 mmol/L Final   • Calcium 09/09/2020 10.1  8.6  - 10.5 mg/dL Final   • Total Protein 09/09/2020 7.3  6.0 - 8.5 g/dL Final   • Albumin 09/09/2020 3.70  3.50 - 5.20 g/dL Final   • ALT (SGPT) 09/09/2020 21  1 - 33 U/L Final   • AST (SGOT) 09/09/2020 18  1 - 32 U/L Final   • Alkaline Phosphatase 09/09/2020 72  39 - 117 U/L Final   • Total Bilirubin 09/09/2020 <0.2  0.0 - 1.2 mg/dL Final   • eGFR Non African Amer 09/09/2020 90  >60 mL/min/1.73 Final   • Globulin 09/09/2020 3.6  gm/dL Final   • A/G Ratio 09/09/2020 1.0  g/dL Final   • BUN/Creatinine Ratio 09/09/2020 15.7  7.0 - 25.0 Final   • Anion Gap 09/09/2020 11.7  5.0 - 15.0 mmol/L Final   • Hemoglobin A1C 09/09/2020 9.24* 4.80 - 5.60 % Final   • Total Cholesterol 09/09/2020 129  0 - 200 mg/dL Final   • Triglycerides 09/09/2020 249* 0 - 150 mg/dL Final   • HDL Cholesterol 09/09/2020 37* 40 - 60 mg/dL Final   • LDL Cholesterol  09/09/2020 42  0 - 100 mg/dL Final   • VLDL Cholesterol 09/09/2020 49.8* 5 - 40 mg/dL Final   • LDL/HDL Ratio 09/09/2020 1.14   Final   • 25 Hydroxy, Vitamin D 09/09/2020 41.6  30.0 - 100.0 ng/ml Final   • Vitamin B-12 09/09/2020 546  211 - 946 pg/mL Final   • Microalbumin/Creatinine Ratio 09/09/2020    Final    Unable to calculate   • Creatinine, Urine 09/09/2020 52.8  mg/dL Final   • Microalbumin, Urine 09/09/2020 <1.2  mg/dL Final   Lab on 06/16/2020   Component Date Value Ref Range Status   • Microalbumin/Creatinine Ratio 06/16/2020    Final    Unable to calculate   • Creatinine, Urine 06/16/2020 31.2  mg/dL Final   • Microalbumin, Urine 06/16/2020 <1.2  mg/dL Final   Lab on 06/10/2020   Component Date Value Ref Range Status   • WBC 06/10/2020 8.89  3.40 - 10.80 10*3/mm3 Final   • RBC 06/10/2020 4.85  3.77 - 5.28 10*6/mm3 Final   • Hemoglobin 06/10/2020 14.1  12.0 - 15.9 g/dL Final   • Hematocrit 06/10/2020 42.8  34.0 - 46.6 % Final   • MCV 06/10/2020 88.2  79.0 - 97.0 fL Final   • MCH 06/10/2020 29.1  26.6 - 33.0 pg Final   • MCHC 06/10/2020 32.9  31.5 - 35.7 g/dL Final   • RDW  06/10/2020 13.6  12.3 - 15.4 % Final   • RDW-SD 06/10/2020 43.7  37.0 - 54.0 fl Final   • MPV 06/10/2020 11.9  6.0 - 12.0 fL Final   • Platelets 06/10/2020 157  140 - 450 10*3/mm3 Final   • Neutrophil % 06/10/2020 63.8  42.7 - 76.0 % Final   • Lymphocyte % 06/10/2020 26.4  19.6 - 45.3 % Final   • Monocyte % 06/10/2020 4.5* 5.0 - 12.0 % Final   • Eosinophil % 06/10/2020 3.9  0.3 - 6.2 % Final   • Basophil % 06/10/2020 0.8  0.0 - 1.5 % Final   • Immature Grans % 06/10/2020 0.6* 0.0 - 0.5 % Final   • Neutrophils, Absolute 06/10/2020 5.67  1.70 - 7.00 10*3/mm3 Final   • Lymphocytes, Absolute 06/10/2020 2.35  0.70 - 3.10 10*3/mm3 Final   • Monocytes, Absolute 06/10/2020 0.40  0.10 - 0.90 10*3/mm3 Final   • Eosinophils, Absolute 06/10/2020 0.35  0.00 - 0.40 10*3/mm3 Final   • Basophils, Absolute 06/10/2020 0.07  0.00 - 0.20 10*3/mm3 Final   • Immature Grans, Absolute 06/10/2020 0.05  0.00 - 0.05 10*3/mm3 Final   • nRBC 06/10/2020 0.0  0.0 - 0.2 /100 WBC Final   • Glucose 06/10/2020 219* 65 - 99 mg/dL Final   • BUN 06/10/2020 17  6 - 20 mg/dL Final   • Creatinine 06/10/2020 0.78  0.57 - 1.00 mg/dL Final   • Sodium 06/10/2020 136  136 - 145 mmol/L Final   • Potassium 06/10/2020 4.9  3.5 - 5.2 mmol/L Final   • Chloride 06/10/2020 103  98 - 107 mmol/L Final   • CO2 06/10/2020 20.6* 22.0 - 29.0 mmol/L Final   • Calcium 06/10/2020 9.4  8.6 - 10.5 mg/dL Final   • Total Protein 06/10/2020 7.1  6.0 - 8.5 g/dL Final   • Albumin 06/10/2020 4.00  3.50 - 5.20 g/dL Final   • ALT (SGPT) 06/10/2020 17  1 - 33 U/L Final   • AST (SGOT) 06/10/2020 19  1 - 32 U/L Final   • Alkaline Phosphatase 06/10/2020 60  39 - 117 U/L Final   • Total Bilirubin 06/10/2020 0.2  0.2 - 1.2 mg/dL Final   • eGFR Non African Amer 06/10/2020 80  >60 mL/min/1.73 Final   • Globulin 06/10/2020 3.1  gm/dL Final   • A/G Ratio 06/10/2020 1.3  g/dL Final   • BUN/Creatinine Ratio 06/10/2020 21.8  7.0 - 25.0 Final   • Anion Gap 06/10/2020 12.4  5.0 - 15.0 mmol/L Final   •  Hemoglobin A1C 06/10/2020 9.60* 4.80 - 5.60 % Final   • Total Cholesterol 06/10/2020 115  0 - 200 mg/dL Final   • Triglycerides 06/10/2020 170* 0 - 150 mg/dL Final   • HDL Cholesterol 06/10/2020 35* 40 - 60 mg/dL Final   • LDL Cholesterol  06/10/2020 46  0 - 100 mg/dL Final   • VLDL Cholesterol 06/10/2020 34  5 - 40 mg/dL Final   • LDL/HDL Ratio 06/10/2020 1.31   Final   • 25 Hydroxy, Vitamin D 06/10/2020 44.3  30.0 - 100.0 ng/ml Final   • Vitamin B-12 06/10/2020 511  211 - 946 pg/mL Final   • TSH 06/10/2020 2.550  0.270 - 4.200 uIU/mL Final   • Hepatitis B Surface Ag 06/10/2020 Non-Reactive  Non-Reactive Final   • Hep A IgM 06/10/2020 Non-Reactive  Non-Reactive Final   • Hep B C IgM 06/10/2020 Non-Reactive  Non-Reactive Final   • Hepatitis C Ab 06/10/2020 Reactive* Non-Reactive Final   • Extra Tube 06/10/2020 Hold for add-ons.   Final    Auto resulted.      Spirometry Without Bronchodilator  Mariluz Will MD     6/22/2020  9:35 AM  Spirometry Without Bronchodilator  Performed by: Mariluz Will MD  Authorized by: Mariluz Will MD      Pre Drug    FVC: 94%   FEV1: 61%   FEV1/FVC: 52%    Interpretation   Spirometry   Spirometry shows moderate obstruction.   Overall comments: Significant decrease in FEV1 compared to previous.  XR Chest 2 View  Narrative: [facility] [place]    Patient Name: ARPAN ROSAS    ORDERING: MARILUZ WILL     ATTENDING:    REFERRING: MARILUZ WILL    -----------------------    PROCEDURE: Two-view chest    COMPARISON: 8/29/2018    HISTORY: dyspnea, R06.00 Dyspnea, unspecified J44.9 Chronic  obstructive pulmonary disease, unspecified    FINDINGS: Frontal and lateral views of the chest are obtained.     Devices: None    Lungs/Pleura: No consolidation, pleural effusion, or  pneumothorax.    Cardiomediastinal Structures: The heart size and mediastinal  contours are within limits of normal. The trachea is midline.    Skeletal Structures: No acute findings. Mild  "degenerative changes  within the spine. No free air beneath the diaphragm.  Impression: No acute pulmonary or pleural findings.    Electronically signed by:  Alfred Howe MD  6/22/2020 9:20 AM  CDT Workstation: [id number]    [unfilled]  Immunization History   Administered Date(s) Administered   • Flulaval/Fluarix Quad 09/17/2020   • Hepatitis A 06/12/2018   • Pneumococcal Polysaccharide (PPSV23) 12/13/2018   • Tdap 09/17/2020       The following portions of the patient's history were reviewed and updated as appropriate: allergies, current medications, past family history, past medical history, past social history, past surgical history and problem list.        Physical Exam  /72 (BP Location: Left arm, Patient Position: Sitting, Cuff Size: Large Adult)   Pulse 110   Temp 96.6 °F (35.9 °C) (Infrared)   Ht 167.6 cm (66\")   Wt 134 kg (294 lb 12.8 oz)   SpO2 98%   BMI 47.58 kg/m²     Physical Exam   Constitutional: She is oriented to person, place, and time. She appears well-developed.   HENT:   Head: Normocephalic and atraumatic.   Right Ear: External ear normal.   Eyes: Pupils are equal, round, and reactive to light. Conjunctivae are normal.   Neck: Normal range of motion. Neck supple.   Cardiovascular: Normal rate, regular rhythm and normal heart sounds.   No murmur heard.  Pulmonary/Chest: Effort normal and breath sounds normal. No respiratory distress.   Abdominal: Soft. Bowel sounds are normal. She exhibits no distension. There is no abdominal tenderness.   Obese abdomen    Musculoskeletal: Normal range of motion. No deformity.   Neurological: She is alert and oriented to person, place, and time. No cranial nerve deficit.   Skin: Skin is warm. No rash noted. She is not diaphoretic. No erythema. No pallor.   Psychiatric: Her behavior is normal.   Nursing note and vitals reviewed.      Assessment/Plan    Diagnosis Plan   1. Seizure disorder (CMS/HCC)     2. Morbid obesity (CMS/HCC)     3. Essential " hypertension     4. Uncontrolled type 2 diabetes mellitus with hyperglycemia (CMS/HCC)     5. Vitamin D deficiency     6. Paranoid schizophrenia (CMS/[facility])     7. Tobacco user     8. Need for immunization against influenza  FluLaval Quad >6 Months ([id number])   9. Need for vaccination  Tdap Vaccine Greater Than or Equal To 8yo IM             labwork before next visit   -recommend tdap vaccination -given today.    -recommend influenza vaccination -given today   -recommend diabetic eye exam   -refer to Bebo Marti  For Neurology and seizure disorder  -face twitching - possible side effect of resperdal. If getting worse consider MRI of brai  -for back pain/arthritis of back/scoliosis/ history of back surgery - referral to Pain Management  X-rays of lumbar spine.  Start on neurontin 300 mg PO TID.gave 90 pills and 3 refills. Will get NERY. UDS today. Pt declined PT/OT for now. .  also on cymbalta 60 mg will raise to 90 mg daily for depression   -chronic hep C - Gastroenterology following   -COPD -  Pulmonology following. Now on Stiolot 2 puff daily.    -multiple joint pain -- naproxen 500 mg PO BID. -counseled on weight loss, diet and exercise. Diet information provided. cousneled weight loss >5 minutes BMI at 47.61   -tobacco user - Counseled quit smoking >5 minutes.  Start back on nicotine lozenges   -ckd stage 2 - continue to monitor. Stressed importance of diabetes control and BP control    -hyperlipdiemia- [id number] mg PO qhs. ASCVD risk high. Recommend to take with coenzyme Q10  Start on vascepa 2 grams PO BID   -dyspnea/early COPD - pt will need to make appt with Pulmonologist. Needs new PFTs. Urged smoking cessation >5 minutes.  Continue spiriva and albuterol inhaler. Now on Anoro daily.    -for DM type 2- Endocrinology following. Currently on metformin 1000 gm PO BID, steglasro 15 mg daily. trulicity 1.5 mg once a week, on Basaglar 70 units at bedtime and admelog 4 unites per  15 grams of carbs along  with sliding scale 2 unites per 50 above 150.  now on insulin pump   -schizophrenia/major depression - stopped viibryd, on  cymbalta 90 mg daily. Now on risperdal 2 mg PO q daily. On    -seizure disorder - Neurology following on Keppra 1000 mg Po BID   -Morbid obesity - counseled weight loss >5 minutes BMI at 47.58  -tobacco user counseled pt to quit smoking >5 minutes.  Gave tobacco cessation material   -hypertension - at goal today Continue on lisinopril but will go up on dosage from 20 to 40 mg dialy.  mg Po q odminguez, metoprolol 50 mg PO BID . Stop HCTZ for now. Consider Norvasc or calcium channel blocker if not improving   -advised pt to be safe and call with any questions or concerns. All questions addressed today  -advised pt to followup with specialist and referrals  -advised pt to go to ER or call 911 if symptoms worrisome or severe   -advised pt to be safe during COVID-19 pandemic  -total time with pt >25 minutes   -recheck in 3 months         This document has been electronically signed by Jermaine Ferro MD on September 17, 2020 15:27 CDT

## 2020-09-09 ENCOUNTER — LAB (OUTPATIENT)
Dept: LAB | Facility: HOSPITAL | Age: 45
End: 2020-09-09

## 2020-09-09 DIAGNOSIS — E11.65 UNCONTROLLED TYPE 2 DIABETES MELLITUS WITH HYPERGLYCEMIA (HCC): ICD-10-CM

## 2020-09-09 DIAGNOSIS — I10 ESSENTIAL HYPERTENSION: ICD-10-CM

## 2020-09-09 DIAGNOSIS — E66.01 MORBID OBESITY (HCC): ICD-10-CM

## 2020-09-09 LAB
25(OH)D3 SERPL-MCNC: 41.6 NG/ML (ref 30–100)
ALBUMIN SERPL-MCNC: 3.7 G/DL (ref 3.5–5.2)
ALBUMIN UR-MCNC: <1.2 MG/DL
ALBUMIN/GLOB SERPL: 1 G/DL
ALP SERPL-CCNC: 72 U/L (ref 39–117)
ALT SERPL W P-5'-P-CCNC: 21 U/L (ref 1–33)
ANION GAP SERPL CALCULATED.3IONS-SCNC: 11.7 MMOL/L (ref 5–15)
AST SERPL-CCNC: 18 U/L (ref 1–32)
BASOPHILS # BLD AUTO: 0.06 10*3/MM3 (ref 0–0.2)
BASOPHILS NFR BLD AUTO: 0.6 % (ref 0–1.5)
BILIRUB SERPL-MCNC: <0.2 MG/DL (ref 0–1.2)
BUN SERPL-MCNC: 11 MG/DL (ref 6–20)
BUN/CREAT SERPL: 15.7 (ref 7–25)
CALCIUM SPEC-SCNC: 10.1 MG/DL (ref 8.6–10.5)
CHLORIDE SERPL-SCNC: 103 MMOL/L (ref 98–107)
CHOLEST SERPL-MCNC: 129 MG/DL (ref 0–200)
CO2 SERPL-SCNC: 22.3 MMOL/L (ref 22–29)
CREAT SERPL-MCNC: 0.7 MG/DL (ref 0.57–1)
CREAT UR-MCNC: 52.8 MG/DL
DEPRECATED RDW RBC AUTO: 42.6 FL (ref 37–54)
EOSINOPHIL # BLD AUTO: 0.51 10*3/MM3 (ref 0–0.4)
EOSINOPHIL NFR BLD AUTO: 5.3 % (ref 0.3–6.2)
ERYTHROCYTE [DISTWIDTH] IN BLOOD BY AUTOMATED COUNT: 13.3 % (ref 12.3–15.4)
GFR SERPL CREATININE-BSD FRML MDRD: 90 ML/MIN/1.73
GLOBULIN UR ELPH-MCNC: 3.6 GM/DL
GLUCOSE SERPL-MCNC: 159 MG/DL (ref 65–99)
HBA1C MFR BLD: 9.24 % (ref 4.8–5.6)
HCT VFR BLD AUTO: 44.3 % (ref 34–46.6)
HDLC SERPL-MCNC: 37 MG/DL (ref 40–60)
HGB BLD-MCNC: 14.9 G/DL (ref 12–15.9)
IMM GRANULOCYTES # BLD AUTO: 0.05 10*3/MM3 (ref 0–0.05)
IMM GRANULOCYTES NFR BLD AUTO: 0.5 % (ref 0–0.5)
LDLC SERPL CALC-MCNC: 42 MG/DL (ref 0–100)
LDLC/HDLC SERPL: 1.14 {RATIO}
LYMPHOCYTES # BLD AUTO: 2.08 10*3/MM3 (ref 0.7–3.1)
LYMPHOCYTES NFR BLD AUTO: 21.6 % (ref 19.6–45.3)
MCH RBC QN AUTO: 29.4 PG (ref 26.6–33)
MCHC RBC AUTO-ENTMCNC: 33.6 G/DL (ref 31.5–35.7)
MCV RBC AUTO: 87.4 FL (ref 79–97)
MICROALBUMIN/CREAT UR: NORMAL MG/G{CREAT}
MONOCYTES # BLD AUTO: 0.46 10*3/MM3 (ref 0.1–0.9)
MONOCYTES NFR BLD AUTO: 4.8 % (ref 5–12)
NEUTROPHILS NFR BLD AUTO: 6.47 10*3/MM3 (ref 1.7–7)
NEUTROPHILS NFR BLD AUTO: 67.2 % (ref 42.7–76)
NRBC BLD AUTO-RTO: 0 /100 WBC (ref 0–0.2)
PLATELET # BLD AUTO: 180 10*3/MM3 (ref 140–450)
PMV BLD AUTO: 11.4 FL (ref 6–12)
POTASSIUM SERPL-SCNC: 4.7 MMOL/L (ref 3.5–5.2)
PROT SERPL-MCNC: 7.3 G/DL (ref 6–8.5)
RBC # BLD AUTO: 5.07 10*6/MM3 (ref 3.77–5.28)
SODIUM SERPL-SCNC: 137 MMOL/L (ref 136–145)
TRIGL SERPL-MCNC: 249 MG/DL (ref 0–150)
VIT B12 BLD-MCNC: 546 PG/ML (ref 211–946)
VLDLC SERPL-MCNC: 49.8 MG/DL (ref 5–40)
WBC # BLD AUTO: 9.63 10*3/MM3 (ref 3.4–10.8)

## 2020-09-09 PROCEDURE — 85025 COMPLETE CBC W/AUTO DIFF WBC: CPT

## 2020-09-09 PROCEDURE — 82570 ASSAY OF URINE CREATININE: CPT

## 2020-09-09 PROCEDURE — 82306 VITAMIN D 25 HYDROXY: CPT

## 2020-09-09 PROCEDURE — 80053 COMPREHEN METABOLIC PANEL: CPT

## 2020-09-09 PROCEDURE — 82607 VITAMIN B-12: CPT

## 2020-09-09 PROCEDURE — 82043 UR ALBUMIN QUANTITATIVE: CPT

## 2020-09-09 PROCEDURE — 80061 LIPID PANEL: CPT

## 2020-09-09 PROCEDURE — 83036 HEMOGLOBIN GLYCOSYLATED A1C: CPT

## 2020-09-09 RX ORDER — DULOXETIN HYDROCHLORIDE 30 MG/1
CAPSULE, DELAYED RELEASE ORAL
Qty: 90 CAPSULE | Refills: 3 | Status: SHIPPED | OUTPATIENT
Start: 2020-09-09 | End: 2020-12-17 | Stop reason: SDUPTHER

## 2020-09-11 ENCOUNTER — TELEPHONE (OUTPATIENT)
Dept: FAMILY MEDICINE CLINIC | Facility: CLINIC | Age: 45
End: 2020-09-11

## 2020-09-11 NOTE — TELEPHONE ENCOUNTER
----- Message from Jermaine Ferro MD sent at 9/10/2020  7:20 AM CDT -----  Please let pt know     labwork stable except on lipid panel triglcyerdies up from 170 to 249 likely due to uncontrolled sugars.  HDL at 37 and continue with healthy fat diet     hga1c at 9.24 from 9.60  Slightly improved    Recheck on next visit. Thanks

## 2020-09-17 ENCOUNTER — OFFICE VISIT (OUTPATIENT)
Dept: FAMILY MEDICINE CLINIC | Facility: CLINIC | Age: 45
End: 2020-09-17

## 2020-09-17 VITALS
BODY MASS INDEX: 47.09 KG/M2 | SYSTOLIC BLOOD PRESSURE: 120 MMHG | DIASTOLIC BLOOD PRESSURE: 72 MMHG | OXYGEN SATURATION: 98 % | HEART RATE: 110 BPM | WEIGHT: 293 LBS | TEMPERATURE: 96.6 F | HEIGHT: 66 IN

## 2020-09-17 DIAGNOSIS — I10 ESSENTIAL HYPERTENSION: ICD-10-CM

## 2020-09-17 DIAGNOSIS — Z23 NEED FOR VACCINATION: ICD-10-CM

## 2020-09-17 DIAGNOSIS — E55.9 VITAMIN D DEFICIENCY: ICD-10-CM

## 2020-09-17 DIAGNOSIS — Z23 NEED FOR IMMUNIZATION AGAINST INFLUENZA: ICD-10-CM

## 2020-09-17 DIAGNOSIS — G40.909 SEIZURE DISORDER (HCC): Primary | ICD-10-CM

## 2020-09-17 DIAGNOSIS — F20.0 PARANOID SCHIZOPHRENIA (HCC): ICD-10-CM

## 2020-09-17 DIAGNOSIS — E66.01 MORBID OBESITY (HCC): ICD-10-CM

## 2020-09-17 DIAGNOSIS — E11.65 UNCONTROLLED TYPE 2 DIABETES MELLITUS WITH HYPERGLYCEMIA (HCC): ICD-10-CM

## 2020-09-17 DIAGNOSIS — Z72.0 TOBACCO USER: ICD-10-CM

## 2020-09-17 PROCEDURE — 90715 TDAP VACCINE 7 YRS/> IM: CPT | Performed by: FAMILY MEDICINE

## 2020-09-17 PROCEDURE — 90471 IMMUNIZATION ADMIN: CPT | Performed by: FAMILY MEDICINE

## 2020-09-17 PROCEDURE — 99214 OFFICE O/P EST MOD 30 MIN: CPT | Performed by: FAMILY MEDICINE

## 2020-09-17 PROCEDURE — 90686 IIV4 VACC NO PRSV 0.5 ML IM: CPT | Performed by: FAMILY MEDICINE

## 2020-09-17 PROCEDURE — 90472 IMMUNIZATION ADMIN EACH ADD: CPT | Performed by: FAMILY MEDICINE

## 2020-10-05 ENCOUNTER — OFFICE VISIT (OUTPATIENT)
Dept: ENDOCRINOLOGY | Facility: CLINIC | Age: 45
End: 2020-10-05

## 2020-10-05 ENCOUNTER — TELEPHONE (OUTPATIENT)
Dept: ENDOCRINOLOGY | Facility: CLINIC | Age: 45
End: 2020-10-05

## 2020-10-05 VITALS
HEIGHT: 66 IN | DIASTOLIC BLOOD PRESSURE: 86 MMHG | OXYGEN SATURATION: 98 % | BODY MASS INDEX: 47.09 KG/M2 | SYSTOLIC BLOOD PRESSURE: 126 MMHG | HEART RATE: 85 BPM | WEIGHT: 293 LBS

## 2020-10-05 DIAGNOSIS — Z79.4 TYPE 2 DIABETES MELLITUS WITH HYPERGLYCEMIA, WITH LONG-TERM CURRENT USE OF INSULIN (HCC): Primary | ICD-10-CM

## 2020-10-05 DIAGNOSIS — E11.65 TYPE 2 DIABETES MELLITUS WITH HYPERGLYCEMIA, WITH LONG-TERM CURRENT USE OF INSULIN (HCC): Primary | ICD-10-CM

## 2020-10-05 DIAGNOSIS — I10 ESSENTIAL HYPERTENSION: ICD-10-CM

## 2020-10-05 DIAGNOSIS — E55.9 VITAMIN D DEFICIENCY: ICD-10-CM

## 2020-10-05 DIAGNOSIS — E11.49 OTHER DIABETIC NEUROLOGICAL COMPLICATION ASSOCIATED WITH TYPE 2 DIABETES MELLITUS (HCC): ICD-10-CM

## 2020-10-05 PROCEDURE — 99214 OFFICE O/P EST MOD 30 MIN: CPT | Performed by: NURSE PRACTITIONER

## 2020-10-05 NOTE — PROGRESS NOTES
Subjective    Sudhakar Saul is a 44 y.o. female. she is here today for follow-up.    History of Present Illness         IN OFFICE VISIT      Referring provider Jermaine Ferro MD     Reason - diabetes         Duration/Timing -diagnosed in her late 20s/constant         Quality -improved control         Severity - high due to complications     Complications, neuropathy, hyperglycemia         Severity (Complication/Hospitalizations)        Secondary Macrovascular-- no CAD, no PAD, no CVA          Secondary Microvascular--- neuropathy, mild diabetic retinopathy , CKD stage II     COPD , hepatitis C      Context--- found during pre-op evaluation bg was 300            Diabetes Regimen    Lab Results   Component Value Date    HGBA1C 9.24 (H) 09/09/2020             Blood Glucose Readings     Checking 4 times daily      Am 120 up to 140     During the day 100 up to 200     No low     Average 180         Wearing Omni Pod insulin pump         Diet-        Trying to watch carbs      Exercises     Trying to walking            Associated Signs/Symptoms          Hyperglycemic Symptoms: none       Hypoglycemic Episodes: when missing meals             The following portions of the patient's history were reviewed and updated as appropriate:   Past Medical History:   Diagnosis Date   • Allergic    • Anemia    • Angina pectoris (CMS/HCC)    • Anxiety    • Arthritis    • CAD (coronary artery disease)    • Cancer (CMS/HCC) 1997    endometrial- surgery, no chemo/radiation   • Chronic kidney disease    • COPD (chronic obstructive pulmonary disease) (CMS/HCC)    • Degeneration macular    • Depression    • Diabetes mellitus (CMS/HCC)    • Hepatitis c    • Hypertension    • Injury of back    • Injury of neck    • Migraine    • Multiple personality disorder (CMS/HCC)    • Neuropathy    • Paranoid schizophrenia (CMS/HCC)    • Seizures (CMS/HCC)     07/01/2020: last seizure was the first of the year   • Shortness of breath    • Substance  "abuse (CMS/HCC)    • Urinary tract infection      Past Surgical History:   Procedure Laterality Date   • APPENDECTOMY     • BACK SURGERY     • BREAST SURGERY     • CARPAL TUNNEL RELEASE      right hand   • CHOLECYSTECTOMY     • COLONOSCOPY     • COLONOSCOPY N/A 2/8/2019    Procedure: COLONOSCOPY;  Surgeon: Joni Delacruz MD;  Location: [facility] ENDOSCOPY;  Service: Gastroenterology   • DENTAL PROCEDURE     • ENDOSCOPY N/A 2/8/2019    Procedure: ESOPHAGOGASTRODUODENOSCOPY--poss dilation;  Surgeon: Joni Delacruz MD;  Location: [facility] ENDOSCOPY;  Service: Gastroenterology   • HYSTERECTOMY     • LIPOMA EXCISION      9   • LIVER BIOPSY     • UPPER GASTROINTESTINAL ENDOSCOPY  02/08/2019     Family History   Problem Relation Age of Onset   • Hypertension Other    • Diabetes Other    • Stroke Other    • Cancer Other    • Kidney disease Other    • Lung disease Other    • Other Other    • Malone's esophagus Other    • Colon polyps Other    • Heart disease Other    • Ulcers Other    • Cholelithiasis Other    • Allergy (severe) Other      OB History    No obstetric history on file.       Current Outpatient Medications   Medication Sig Dispense Refill   • ADMELOG 100 UNIT/ML injection Inject 40 Units under the skin into the appropriate area as directed 3 (Three) Times a Day Before Meals. (Patient taking differently: Inject 40 Units under the skin into the appropriate area as directed 3 (Three) Times a Day Before Meals. Through insulin pump) 40 mL 11   • albuterol sulfate  (90 Base) MCG/ACT inhaler Inhale 2 puffs Every 6 (Six) Hours As Needed for Wheezing or Shortness of Air. 1 inhaler 3   • B-D INS SYR ULTRAFINE 1CC/30G 30G X 1/2\" 1 ML misc USE 1 SYRINGE THREE TIMES DAILY     • cyclobenzaprine (FLEXERIL) 5 MG tablet Take 1 tablet by mouth 3 (Three) Times a Day As Needed for Muscle Spasms. (Patient taking differently: Take 5 mg by mouth 3 (Three) Times a Day.) 90 tablet 3   • Dulaglutide (Trulicity) 1.5 MG/0.5ML " "solution pen-injector Inject 1.5 mg under the skin into the appropriate area as directed 1 (One) Time Per Week. 4 pen 3   • DULoxetine (CYMBALTA) 30 MG capsule TAKE 3 CAPSULES BY MOUTH ONCE DAILY 90 capsule 3   • Ertugliflozin L-PyroglutamicAc (Steglatro) 15 MG tablet Take 1 tablet by mouth Every Morning. 30 tablet 3   • folic acid (FOLVITE) 1 MG tablet Take 1,000 mcg by mouth Daily.     • gabapentin (NEURONTIN) 300 MG capsule Take 1 capsule by mouth 3 (Three) Times a Day. 90 capsule 2   • glucose blood test strip Testing 4 times daily, E11.9 120 each 12   • HYDROcodone-acetaminophen (NORCO) 7.5-325 MG per tablet Take 1 tablet by mouth 2 (Two) Times a Day.     • Insulin Syringe 29G X 1/2\" 0.5 ML misc Inject 3 times daily 100 each 11   • levETIRAcetam (KEPPRA) 1000 MG tablet Take 1 tablet by mouth 2 (Two) Times a Day. 60 tablet 3   • lisinopril (PRINIVIL,ZESTRIL) 40 MG tablet Take 1 tablet by mouth Daily. 30 tablet 3   • metFORMIN (GLUCOPHAGE) 500 MG tablet Take 1 tablet by mouth 2 (Two) Times a Day With Meals. 60 tablet 3   • metoprolol tartrate (LOPRESSOR) 50 MG tablet Take 1 tablet by mouth Every 12 (Twelve) Hours. 60 tablet 3   • nicotine polacrilex (COMMIT) 2 MG lozenge Dissolve 1 lozenge in the mouth As Needed for Smoking Cessation. 168 lozenge 3   • RELION PEN NEEDLES 31G X 6 MM misc USE 1 NEEDLE 3 TO 4 TIMES DAILY  3   • risperiDONE (RisperDAL) 2 MG tablet Take 1 tablet by mouth 2 (Two) Times a Day. 60 tablet 3   • simvastatin (ZOCOR) 40 MG tablet Take 1 tablet by mouth Every Night. 30 tablet 3   • tiotropium bromide-olodaterol (STIOLTO RESPIMAT) 2.5-2.5 MCG/ACT aerosol solution inhaler Inhale 2 puffs Daily. 1 inhaler 5   • vitamin D (ERGOCALCIFEROL) 1.25 MG (15378 UT) capsule capsule TAKE 2 CAPSULES BY MOUTH ONCE A WEEK 8 capsule 3     No current facility-administered medications for this visit.      Allergies   Allergen Reactions   • Betadine [Povidone Iodine] Anaphylaxis   • Contrast Dye Anaphylaxis   • " Iodine Anaphylaxis   • Lipitor  [Atorvastatin Calcium] Shortness Of Breath   • Nifedipine Unknown (See Comments) and Other (See Comments)     Syncope     • Shellfish-Derived Products Anaphylaxis   • Adhesive Tape Unknown (See Comments)     Blister     • Altace [Ramipril] Unknown (See Comments)     Sleepy       Social History     Socioeconomic History   • Marital status:      Spouse name: Not on file   • Number of children: Not on file   • Years of education: Not on file   • Highest education level: Not on file   Tobacco Use   • Smoking status: Current Every Day Smoker     Packs/day: 0.25     Types: Cigarettes   • Smokeless tobacco: Never Used   • Tobacco comment: 05/06/2020 - Pt states today is her first day on nicotine patches   Substance and Sexual Activity   • Alcohol use: Defer   • Drug use: Defer     Types: Amphetamines, Heroin, Methamphetamines, Morphine, Oxycodone   • Sexual activity: Defer       Review of Systems  Review of Systems   Constitutional: Negative for activity change, appetite change, diaphoresis and fatigue.   HENT: Negative for facial swelling, sneezing, sore throat, tinnitus, trouble swallowing and voice change.    Eyes: Negative for photophobia, pain, discharge, redness, itching and visual disturbance.   Respiratory: Negative for apnea, cough, choking, chest tightness and shortness of breath.    Cardiovascular: Negative for chest pain, palpitations and leg swelling.   Gastrointestinal: Negative for abdominal distention, abdominal pain, constipation, diarrhea, nausea and vomiting.   Endocrine: Negative for cold intolerance, heat intolerance, polydipsia, polyphagia and polyuria.   Genitourinary: Negative for difficulty urinating, dysuria, frequency, hematuria and urgency.   Musculoskeletal: Negative for arthralgias, back pain, gait problem, joint swelling, myalgias, neck pain and neck stiffness.   Skin: Negative for color change, pallor, rash and wound.   Neurological: Negative for  "dizziness, tremors, weakness, light-headedness, numbness and headaches.   Hematological: Negative for adenopathy. Does not bruise/bleed easily.   Psychiatric/Behavioral: Negative for behavioral problems, confusion and sleep disturbance.        Objective    /86   Pulse 85   Ht 167.6 cm (66\")   Wt 134 kg (296 lb 3.2 oz)   SpO2 98%   BMI 47.81 kg/m²   Physical Exam  Constitutional:       General: She is not in acute distress.     Appearance: She is well-developed.   HENT:      Head: Normocephalic and atraumatic.      Right Ear: External ear normal.      Left Ear: External ear normal.      Nose: Nose normal.   Eyes:      Conjunctiva/sclera: Conjunctivae normal.      Pupils: Pupils are equal, round, and reactive to light.   Neck:      Musculoskeletal: Normal range of motion and neck supple.      Thyroid: No thyromegaly.      Trachea: No tracheal deviation.   Cardiovascular:      Rate and Rhythm: Normal rate and regular rhythm.      Heart sounds: Normal heart sounds. No murmur.   Pulmonary:      Effort: Pulmonary effort is normal. No respiratory distress.      Breath sounds: Normal breath sounds. No wheezing.   Abdominal:      General: Bowel sounds are normal.      Palpations: Abdomen is soft.      Tenderness: There is no abdominal tenderness. There is no guarding or rebound.   Musculoskeletal: Normal range of motion.         General: No tenderness or deformity.   Skin:     General: Skin is warm and dry.      Findings: No rash.   Neurological:      Mental Status: She is alert and oriented to person, place, and time.      Cranial Nerves: No cranial nerve deficit.   Psychiatric:         Behavior: Behavior normal.         Thought Content: Thought content normal.         Judgment: Judgment normal.         Lab Review  Glucose (mg/dL)   Date Value   09/09/2020 159 (H)   06/10/2020 219 (H)   02/07/2020 186 (H)     Sodium (mmol/L)   Date Value   09/09/2020 137   06/10/2020 136   02/07/2020 137     Potassium (mmol/L) " "  Date Value   09/09/2020 4.7   06/10/2020 4.9   02/07/2020 4.7     Chloride (mmol/L)   Date Value   09/09/2020 103   06/10/2020 103   02/07/2020 100     CO2 (mmol/L)   Date Value   09/09/2020 22.3   06/10/2020 20.6 (L)   02/07/2020 19.0 (L)     BUN (mg/dL)   Date Value   09/09/2020 11   06/10/2020 17   02/07/2020 13     Creatinine (mg/dL)   Date Value   09/09/2020 0.70   06/10/2020 0.78   02/07/2020 0.71     Hemoglobin A1C (%)   Date Value   09/09/2020 9.24 (H)   06/10/2020 9.60 (H)   02/07/2020 9.80 (H)     Triglycerides (mg/dL)   Date Value   09/09/2020 249 (H)   06/10/2020 170 (H)   02/07/2020 203 (H)     LDL Cholesterol  (mg/dL)   Date Value   09/09/2020 42   06/10/2020 46   02/07/2020 44       Assessment/Plan      1. Type 2 diabetes mellitus with hyperglycemia, with long-term current use of insulin (CMS/[facility])    2. Other diabetic neurological complication associated with type 2 diabetes mellitus (CMS/[facility])    3. Essential hypertension    4. Vitamin D deficiency    .    Medications prescribed:  Outpatient Encounter Medications as of 10/5/2020   Medication Sig Dispense Refill   • ADMELOG 100 UNIT/ML injection Inject 40 Units under the skin into the appropriate area as directed 3 (Three) Times a Day Before Meals. (Patient taking differently: Inject 40 Units under the skin into the appropriate area as directed 3 (Three) Times a Day Before Meals. Through insulin pump) 40 mL 11   • albuterol sulfate  (90 Base) MCG/ACT inhaler Inhale 2 puffs Every 6 (Six) Hours As Needed for Wheezing or Shortness of Air. 1 inhaler 3   • B-D INS SYR ULTRAFINE 1CC/30G 30G X 1/2\" 1 ML misc USE 1 SYRINGE THREE TIMES DAILY     • cyclobenzaprine (FLEXERIL) 5 MG tablet Take 1 tablet by mouth 3 (Three) Times a Day As Needed for Muscle Spasms. (Patient taking differently: Take 5 mg by mouth 3 (Three) Times a Day.) 90 tablet 3   • Dulaglutide (Trulicity) 1.5 MG/0.5ML solution pen-injector Inject 1.5 mg under the skin into the appropriate " "area as directed 1 (One) Time Per Week. 4 pen 3   • DULoxetine (CYMBALTA) 30 MG capsule TAKE 3 CAPSULES BY MOUTH ONCE DAILY 90 capsule 3   • Ertugliflozin L-PyroglutamicAc (Steglatro) 15 MG tablet Take 1 tablet by mouth Every Morning. 30 tablet 3   • folic acid (FOLVITE) 1 MG tablet Take 1,000 mcg by mouth Daily.     • gabapentin (NEURONTIN) 300 MG capsule Take 1 capsule by mouth 3 (Three) Times a Day. 90 capsule 2   • glucose blood test strip Testing 4 times daily, E11.9 120 each 12   • HYDROcodone-acetaminophen (NORCO) 7.5-325 MG per tablet Take 1 tablet by mouth 2 (Two) Times a Day.     • Insulin Syringe 29G X 1/2\" 0.5 ML misc Inject 3 times daily 100 each 11   • levETIRAcetam (KEPPRA) 1000 MG tablet Take 1 tablet by mouth 2 (Two) Times a Day. 60 tablet 3   • lisinopril (PRINIVIL,ZESTRIL) 40 MG tablet Take 1 tablet by mouth Daily. 30 tablet 3   • metFORMIN (GLUCOPHAGE) 500 MG tablet Take 1 tablet by mouth 2 (Two) Times a Day With Meals. 60 tablet 3   • metoprolol tartrate (LOPRESSOR) 50 MG tablet Take 1 tablet by mouth Every 12 (Twelve) Hours. 60 tablet 3   • nicotine polacrilex (COMMIT) 2 MG lozenge Dissolve 1 lozenge in the mouth As Needed for Smoking Cessation. 168 lozenge 3   • RELION PEN NEEDLES 31G X 6 MM misc USE 1 NEEDLE 3 TO 4 TIMES DAILY  3   • risperiDONE (RisperDAL) 2 MG tablet Take 1 tablet by mouth 2 (Two) Times a Day. 60 tablet 3   • simvastatin (ZOCOR) 40 MG tablet Take 1 tablet by mouth Every Night. 30 tablet 3   • tiotropium bromide-olodaterol (STIOLTO RESPIMAT) 2.5-2.5 MCG/ACT aerosol solution inhaler Inhale 2 puffs Daily. 1 inhaler 5   • vitamin D (ERGOCALCIFEROL) 1.25 MG (90049 UT) capsule capsule TAKE 2 CAPSULES BY MOUTH ONCE A WEEK 8 capsule 3     No facility-administered encounter medications on file as of 10/5/2020.        Orders placed during this encounter include:  No orders of the defined types were placed in this encounter.    Glycemic Management     Diabetes mellitus type 2 not " controlled      Lab Results   Component Value Date    HGBA1C 9.24 (H) 09/09/2020        Omni pod       Basal     MN - 2.55       Carb ratio     4     Correction       14    Average bg 120     Patient is having to change pods every 1 1/2 days due to high doses of insulin     Need new RX to change pod daily     [employer] is the distributor       trulicity 1.5 mg weekly     Metformin 1000 mg po BID      Steglatro 15 mg one daily        Previous regimen      basaglar taking 63 units ---stop        admelog--stop      Taking about 30 units           Aim for 45 grams of Carbohydrate for meals      Aim for 15 grams of Carbohydrate for snack                       Lipid Management           Simvastatin 40 mg         Component      Latest Ref Rng & Units 9/9/2020             Total Cholesterol      0 - 200 mg/dL 129   Triglycerides      0 - 150 mg/dL 249 (H)   HDL Cholesterol      40 - 60 mg/dL 37 (L)   LDL Cholesterol      0 - 100 mg/dL 42   VLDL Cholesterol      5 - 40 mg/dL 49.8 (H)   LDL/HDL Ratio       1.14        Blood Pressure Management        Lisinopril 40 mg daily               Microvascular Complication Monitoring        Last eye exam -due Ja. 2021         Component      Latest Ref Rng & Units 9/9/2020   Microalbumin/Creatinine Ratio          Creatinine, Urine      mg/dL 52.8   Microalbumin, Urine      mg/dL <1.2           Neuropathy        Gabapentin 300 mg tid         Bone Health             Component      Latest Ref Rng & Units 9/9/2020   25 Hydroxy, Vitamin D      30.0 - 100.0 ng/ml 41.6              Other Diabetes Related Aspects     Lab Results   Component Value Date    [id number] 09/09/2020       Thyroid health        Lab Results   Component Value Date    TSH 2.550 06/10/2020           4. Follow-up: No follow-ups on file.

## 2020-10-22 NOTE — PROGRESS NOTES
Pulmonary Office Follow-up    Subjective     Sudhakar Saul is seen today at the office for   Chief Complaint   Patient presents with   • Follow-up     recheck       Subjective     History of Present Illness  Sudhakar Saul is a 44 y.o. female with a PMH significant for tobacco use, morbid obesity, JACQUE, HTN, T2DM, and chronic Hep C who presents for follow-up of COPD.    10/22/2018: I recommended starting Spiriva Respimat and counseled on the importance of complete tobacco cessation.  I also recommended that she consider repeat sleep apnea evaluation for CPAP desensitization.    6/22/2020: She reestablish care and PFTs showed moderate obstruction which is a significant change compared to prior.  I recommend stopping Breo and start Anoro.  I also again stressed importance of complete tobacco cessation and she set a quit date for 1 month.  I also encouraged her to start efforts at weight loss through diet and exercise.    8/3/20: She had issues obtaining her Anoro so had not started it yet.  I did send the prescription in and provided with a sample and encouraged her to start on a daily basis.  I also again spent time counseling on importance of complete tobacco cessation she set a quit date for 2 weeks.  After her visit, were notified by insurance that Anoro was not covered so I sent in for Stiolto Respimat.    10/23/20: She states that she has been using Stiolto daily and feels like it is helping.  She has not had to use her albuterol in about 3 weeks.  She has had a few episodes when she could use her albuterol but she stop to catch her breath instead.  She has had some increased cough, primarily nights but admits to increase in her reflux symptoms as well.  Patient admits to regurgitation and feeling reflux when she lies down at night.  She will wake up coughing but does not have much cough during the day.  She was able to quit smoking briefly, but restarted due to the stress of her aunt becoming ill.   She is planning on quitting for good in the next few weeks.  Otherwise, she denies any recent exacerbations or steroid use.    Tobacco use history:  Type: cigarettes  Amount: 0.5-3 ppd  Duration: 32 years  Cessation: N/A   Willing to quit: Yes      Review of Systems: History obtained from chart review and the patient.  Review of Systems   Constitutional: Negative for fever and unexpected weight change.   HENT: Positive for hearing loss. Negative for congestion and postnasal drip.    Respiratory: Positive for cough and shortness of breath. Negative for wheezing.    Cardiovascular: Negative for chest pain and leg swelling.   Gastrointestinal: Positive for abdominal pain and vomiting.        Reflux   Musculoskeletal: Positive for arthralgias and back pain.     As described in the HPI. Otherwise, remainder of ROS (14 systems) were negative.    Patient Active Problem List   Diagnosis   • Seizure disorder (CMS/HCC)   • Morbid obesity (CMS/HCC)   • Tobacco abuse counseling   • Multiple joint pain   • Dysuria   • History of positive hepatitis C   • Essential hypertension   • Uncontrolled type 2 diabetes mellitus with hyperglycemia (CMS/HCC)   • Vitamin D deficiency   • Urinary tract infection without hematuria   • Chronic hepatitis C without hepatic coma (CMS/HCC)   • Cigarette nicotine dependence, uncomplicated   • Chronic obstructive pulmonary disease (CMS/HCC)   • JACQUE (obstructive sleep apnea)   • Gastroesophageal reflux disease with esophagitis   • Left upper quadrant pain   • Nausea and vomiting   • Weight gain, abnormal   • Change in bowel habits   • Hepatic fibrosis   • Non-compliance   • Depression   • Paranoid schizophrenia (CMS/HCC)   • Gastritis without bleeding   • Tobacco user   • Encounter for eye exam   • Scoliosis   • History of drug use   • Stage 2 chronic kidney disease   • Diabetic neuropathy (CMS/HCC)   • Mixed hyperlipidemia   • Chronic bilateral low back pain with bilateral sciatica   • Type 2 diabetes  "mellitus with hyperglycemia, with long-term current use of insulin (CMS/[facility])   • Auditory hallucination   • Class 3 severe obesity due to excess calories with serious comorbidity and body mass index (BMI) of 45.0 to 49.9 in adult (CMS/[facility])         Current Outpatient Medications:   •  ADMELOG 100 UNIT/ML injection, Inject 40 Units under the skin into the appropriate area as directed 3 (Three) Times a Day Before Meals. (Patient taking differently: Inject 40 Units under the skin into the appropriate area as directed 3 (Three) Times a Day Before Meals. Through insulin pump), Disp: 40 mL, Rfl: 11  •  albuterol sulfate  (90 Base) MCG/ACT inhaler, Inhale 2 puffs Every 6 (Six) Hours As Needed for Wheezing or Shortness of Air., Disp: 1 inhaler, Rfl: 3  •  B-D INS SYR ULTRAFINE 1CC/30G 30G X 1/2\" 1 ML misc, USE 1 SYRINGE THREE TIMES DAILY, Disp: , Rfl:   •  cyclobenzaprine (FLEXERIL) 5 MG tablet, Take 1 tablet by mouth 3 (Three) Times a Day As Needed for Muscle Spasms. (Patient taking differently: Take 5 mg by mouth 3 (Three) Times a Day.), Disp: 90 tablet, Rfl: 3  •  Dulaglutide (Trulicity) 1.5 MG/0.5ML solution pen-injector, Inject 1.5 mg under the skin into the appropriate area as directed 1 (One) Time Per Week., Disp: 4 pen, Rfl: 3  •  DULoxetine (CYMBALTA) 30 MG capsule, TAKE 3 CAPSULES BY MOUTH ONCE DAILY, Disp: 90 capsule, Rfl: 3  •  Ertugliflozin L-PyroglutamicAc (Steglatro) 15 MG tablet, Take 1 tablet by mouth Every Morning., Disp: 30 tablet, Rfl: 3  •  folic acid (FOLVITE) 1 MG tablet, Take 1,000 mcg by mouth Daily., Disp: , Rfl:   •  gabapentin (NEURONTIN) 300 MG capsule, Take 1 capsule by mouth 3 (Three) Times a Day., Disp: 90 capsule, Rfl: 2  •  glucose blood test strip, Testing 4 times daily, E11.9, Disp: 120 each, Rfl: 12  •  HYDROcodone-acetaminophen (NORCO) 7.5-325 MG per tablet, Take 1 tablet by mouth 2 (Two) Times a Day., Disp: , Rfl:   •  Insulin Syringe 29G X 1/2\" 0.5 ML misc, Inject 3 times daily, " Disp: 100 each, Rfl: 11  •  levETIRAcetam (KEPPRA) 1000 MG tablet, Take 1 tablet by mouth 2 (Two) Times a Day., Disp: 60 tablet, Rfl: 3  •  lisinopril (PRINIVIL,ZESTRIL) 40 MG tablet, Take 1 tablet by mouth Daily., Disp: 30 tablet, Rfl: 3  •  metFORMIN (GLUCOPHAGE) 500 MG tablet, Take 1 tablet by mouth 2 (Two) Times a Day With Meals., Disp: 60 tablet, Rfl: 3  •  metoprolol tartrate (LOPRESSOR) 50 MG tablet, Take 1 tablet by mouth Every 12 (Twelve) Hours., Disp: 60 tablet, Rfl: 3  •  nicotine polacrilex (COMMIT) 2 MG lozenge, Dissolve 1 lozenge in the mouth As Needed for Smoking Cessation., Disp: 168 lozenge, Rfl: 3  •  RELION PEN NEEDLES 31G X 6 MM misc, USE 1 NEEDLE 3 TO 4 TIMES DAILY, Disp: , Rfl: 3  •  risperiDONE (RisperDAL) 2 MG tablet, Take 1 tablet by mouth 2 (Two) Times a Day., Disp: 60 tablet, Rfl: 3  •  simvastatin (ZOCOR) 40 MG tablet, Take 1 tablet by mouth Every Night., Disp: 30 tablet, Rfl: 3  •  tiotropium bromide-olodaterol (STIOLTO RESPIMAT) 2.5-2.5 MCG/ACT aerosol solution inhaler, Inhale 2 puffs Daily., Disp: 1 inhaler, Rfl: 5  •  vitamin D (ERGOCALCIFEROL) 1.25 MG (21695 UT) capsule capsule, TAKE 2 CAPSULES BY MOUTH ONCE A WEEK, Disp: 8 capsule, Rfl: 3    Past Medical History:   Diagnosis Date   • Allergic    • Anemia    • Angina pectoris (CMS/HCC)    • Anxiety    • Arthritis    • CAD (coronary artery disease)    • Cancer (CMS/HCC) 1997    endometrial- surgery, no chemo/radiation   • Chronic kidney disease    • COPD (chronic obstructive pulmonary disease) (CMS/HCC)    • Degeneration macular    • Depression    • Diabetes mellitus (CMS/HCC)    • Hepatitis c    • Hypertension    • Injury of back    • Injury of neck    • Migraine    • Multiple personality disorder (CMS/HCC)    • Neuropathy    • Paranoid schizophrenia (CMS/HCC)    • Seizures (CMS/HCC)     07/01/2020: last seizure was the first of the year   • Shortness of breath    • Substance abuse (CMS/HCC)    • Urinary tract infection      Past  "Surgical History:   Procedure Laterality Date   • APPENDECTOMY     • BACK SURGERY     • BREAST SURGERY     • CARPAL TUNNEL RELEASE      right hand   • CHOLECYSTECTOMY     • COLONOSCOPY     • COLONOSCOPY N/A 2/8/2019    Procedure: COLONOSCOPY;  Surgeon: Joni Delacruz MD;  Location: [facility] ENDOSCOPY;  Service: Gastroenterology   • DENTAL PROCEDURE     • ENDOSCOPY N/A 2/8/2019    Procedure: ESOPHAGOGASTRODUODENOSCOPY--poss dilation;  Surgeon: Joni Delacruz MD;  Location: [facility] ENDOSCOPY;  Service: Gastroenterology   • HYSTERECTOMY     • LIPOMA EXCISION      9   • LIVER BIOPSY     • UPPER GASTROINTESTINAL ENDOSCOPY  02/08/2019     Social History     Socioeconomic History   • Marital status:      Spouse name: Not on file   • Number of children: Not on file   • Years of education: Not on file   • Highest education level: Not on file   Tobacco Use   • Smoking status: Current Every Day Smoker     Packs/day: 0.25     Types: Cigarettes   • Smokeless tobacco: Never Used   • Tobacco comment: 05/06/2020 - Pt states today is her first day on nicotine patches   Substance and Sexual Activity   • Alcohol use: Defer   • Drug use: Defer     Types: Amphetamines, Heroin, Methamphetamines, Morphine, Oxycodone   • Sexual activity: Defer     Family History   Problem Relation Age of Onset   • Hypertension Other    • Diabetes Other    • Stroke Other    • Cancer Other    • Kidney disease Other    • Lung disease Other    • Other Other    • Malone's esophagus Other    • Colon polyps Other    • Heart disease Other    • Ulcers Other    • Cholelithiasis Other    • Allergy (severe) Other           Objective     Blood pressure 116/88, pulse 94, height 167.6 cm (66\"), weight (!) 137 kg (302 lb 12.8 oz), SpO2 95 %.  Physical Exam   Constitutional: She is oriented to person, place, and time. She appears well-developed.   HENT:   Head: Normocephalic and atraumatic.   Eyes: Pupils are equal, round, and reactive to light. Conjunctivae and " lids are normal.   Neck: Trachea normal and normal range of motion. No tracheal tenderness present. No thyroid mass present.   Cardiovascular: Normal rate, regular rhythm and normal heart sounds. PMI is not displaced. Exam reveals no gallop.   No murmur heard.  Pulmonary/Chest: Effort normal and breath sounds normal. No respiratory distress. She has no decreased breath sounds. She has no wheezes. She has no rhonchi. She exhibits no tenderness.   Abdominal: Soft. Normal appearance and bowel sounds are normal. There is no hepatomegaly. There is no abdominal tenderness.   Musculoskeletal:      Comments: Normal gait, no extremity edema     Vascular Status -  Her right foot exhibits no edema. Her left foot exhibits no edema.  Lymphadenopathy:        Head (right side): No submandibular adenopathy present.        Head (left side): No submandibular adenopathy present.     She has no cervical adenopathy.        Right: No supraclavicular adenopathy present.        Left: No supraclavicular adenopathy present.   Neurological: She is alert and oriented to person, place, and time.   Skin: Skin is warm and dry. No rash noted. Nails show no clubbing.   Psychiatric: Her speech is normal and behavior is normal. Judgment normal.   Nursing note and vitals reviewed.      PFTs: 6/22/20 (independently reviewed and interpreted by me)  Ratio 52  FVC 3.61 (3.27)/ 94%  FEV1 1.9 (2.76)/ 61%  TLC (4.47)  DLCO (24.32)  Moderate obstruction.  Significant decrease in FEV1 compared to 10/22/18.    Radiology (independently reviewed and interpreted by me): CXR 6/22/20 showed NACPD       Assessment/Plan     Diagnoses and all orders for this visit:    1. Chronic obstructive pulmonary disease, unspecified COPD type (CMS/[facility]) (Primary)    2. JACQUE (obstructive sleep apnea)    3. Class 3 severe obesity due to excess calories with serious comorbidity and body mass index (BMI) of 45.0 to 49.9 in adult (CMS/[facility])    4. Cigarette nicotine dependence,  uncomplicated         Discussion/ Recommendations:   She is doing well on Stiolto so I encouraged her to continue with it.  I have counseled her on focusing on her other health issues, including complete tobacco cessation as well as weight loss with regular exercise.  I do think her cough is likely related to recurrent reflux and have recommended that she discuss this with her PCP.  I counseled the patient I think she would be a good candidate for bariatric surgery if she is unable to lose weight with conventional methods, and have encouraged her to discuss this further with her PCP if she is interested.    -Continue Stiolto Respimat 2 puffs daily.  -Use albuterol as needed for dyspnea or wheeze.  -Encouraged ongoing efforts at weight loss as well as regular, progressive exercise.  Consider bariatric surgery if unsuccessful with conventional methods of weight loss.  -Sudhakar Saul  reports that she has been smoking cigarettes. She has been smoking about 0.25 packs per day. She has never used smokeless tobacco.. I have educated her on the risk of diseases from using tobacco products such as cancer, COPD and heart diease. I advised her to quit and she is willing to quit. We have discussed the following method/s for tobacco cessation:  Education Material OTC Cessation Products.  Together we have set a quit date for 2 weeks from today.  She will follow up with me in 3 months or sooner to check on her progress. I spent 2 minutes counseling the patient.  -Refuses CPAP.  -Up-to-date with pneumococcal and influenza vaccinations.    Patient's Body mass index is 48.87 kg/m². BMI is above normal parameters. Recommendations include: exercise counseling.           Return in 3 months (on 1/23/2021) for Recheck COPD.      Thank you for allowing me to participate in the care of Sudhakar Saul. Please do not hesitate to contact me with any questions.         This document has been electronically signed by Debo Will  MD on October 23, 2020 11:16 CDT      Dictated using Dragon

## 2020-10-23 ENCOUNTER — OFFICE VISIT (OUTPATIENT)
Dept: PULMONOLOGY | Facility: CLINIC | Age: 45
End: 2020-10-23

## 2020-10-23 VITALS
HEIGHT: 66 IN | WEIGHT: 293 LBS | OXYGEN SATURATION: 95 % | SYSTOLIC BLOOD PRESSURE: 116 MMHG | HEART RATE: 94 BPM | BODY MASS INDEX: 47.09 KG/M2 | DIASTOLIC BLOOD PRESSURE: 88 MMHG

## 2020-10-23 DIAGNOSIS — E66.01 CLASS 3 SEVERE OBESITY DUE TO EXCESS CALORIES WITH SERIOUS COMORBIDITY AND BODY MASS INDEX (BMI) OF 45.0 TO 49.9 IN ADULT (HCC): ICD-10-CM

## 2020-10-23 DIAGNOSIS — G47.33 OSA (OBSTRUCTIVE SLEEP APNEA): ICD-10-CM

## 2020-10-23 DIAGNOSIS — F17.210 CIGARETTE NICOTINE DEPENDENCE, UNCOMPLICATED: ICD-10-CM

## 2020-10-23 DIAGNOSIS — J44.9 CHRONIC OBSTRUCTIVE PULMONARY DISEASE, UNSPECIFIED COPD TYPE (HCC): Primary | ICD-10-CM

## 2020-10-23 PROCEDURE — 99214 OFFICE O/P EST MOD 30 MIN: CPT | Performed by: INTERNAL MEDICINE

## 2020-10-30 RX ORDER — ERGOCALCIFEROL 1.25 MG/1
CAPSULE ORAL
Qty: 8 CAPSULE | Refills: 3 | Status: SHIPPED | OUTPATIENT
Start: 2020-10-30 | End: 2021-03-08

## 2020-12-02 NOTE — PROGRESS NOTES
Subjective:  Sudhakar Saul is a 45 y.o. female who presents for obesity       Patient Active Problem List   Diagnosis   • Seizure disorder (CMS/HCC)   • Morbid obesity (CMS/HCC)   • Tobacco abuse counseling   • Multiple joint pain   • Dysuria   • History of positive hepatitis C   • Essential hypertension   • Uncontrolled type 2 diabetes mellitus with hyperglycemia (CMS/HCC)   • Vitamin D deficiency   • Urinary tract infection without hematuria   • Chronic hepatitis C without hepatic coma (CMS/HCC)   • Cigarette nicotine dependence, uncomplicated   • Chronic obstructive pulmonary disease (CMS/HCC)   • JACQUE (obstructive sleep apnea)   • Gastroesophageal reflux disease with esophagitis   • Left upper quadrant pain   • Nausea and vomiting   • Weight gain, abnormal   • Change in bowel habits   • Hepatic fibrosis   • Non-compliance   • Depression   • Paranoid schizophrenia (CMS/HCC)   • Gastritis without bleeding   • Tobacco user   • Encounter for eye exam   • Scoliosis   • History of drug use   • Stage 2 chronic kidney disease   • Diabetic neuropathy (CMS/HCC)   • Mixed hyperlipidemia   • Chronic bilateral low back pain with bilateral sciatica   • Type 2 diabetes mellitus with hyperglycemia, with long-term current use of insulin (CMS/[facility])   • Auditory hallucination   • Class 3 severe obesity due to excess calories with serious comorbidity and body mass index (BMI) of 45.0 to 49.9 in adult (CMS/[facility])           Current Outpatient Medications:   •  ADMELOG 100 UNIT/ML injection, Inject 40 Units under the skin into the appropriate area as directed 3 (Three) Times a Day Before Meals. (Patient taking differently: Inject 40 Units under the skin into the appropriate area as directed 3 (Three) Times a Day Before Meals. Through insulin pump), Disp: 40 mL, Rfl: 11  •  albuterol sulfate  (90 Base) MCG/ACT inhaler, Inhale 2 puffs Every 6 (Six) Hours As Needed for Wheezing or Shortness of Air., Disp: 1 inhaler, Rfl: 3  •   "cyclobenzaprine (FLEXERIL) 5 MG tablet, Take 1 tablet by mouth 3 (Three) Times a Day As Needed for Muscle Spasms. (Patient taking differently: Take 5 mg by mouth 3 (Three) Times a Day.), Disp: 90 tablet, Rfl: 3  •  Dulaglutide (Trulicity) 1.5 MG/0.5ML solution pen-injector, Inject 1.5 mg under the skin into the appropriate area as directed 1 (One) Time Per Week., Disp: 4 pen, Rfl: 3  •  DULoxetine (CYMBALTA) 30 MG capsule, Take 3 capsules by mouth Daily., Disp: 90 capsule, Rfl: 3  •  Ertugliflozin L-PyroglutamicAc (Steglatro) 15 MG tablet, Take 1 tablet by mouth Every Morning., Disp: 90 tablet, Rfl: 3  •  folic acid (FOLVITE) 1 MG tablet, Take 1,000 mcg by mouth Daily., Disp: , Rfl:   •  gabapentin (NEURONTIN) 300 MG capsule, Take 1 capsule by mouth 3 (Three) Times a Day., Disp: 90 capsule, Rfl: 2  •  glucose blood test strip, Testing 4 times daily, E11.9, Disp: 120 each, Rfl: 12  •  HYDROcodone-acetaminophen (NORCO) 7.5-325 MG per tablet, Take 1 tablet by mouth 2 (Two) Times a Day., Disp: , Rfl:   •  Insulin Syringe 29G X 1/2\" 0.5 ML misc, Inject 3 times daily, Disp: 100 each, Rfl: 11  •  levETIRAcetam (KEPPRA) 1000 MG tablet, Take 1 tablet by mouth 2 (Two) Times a Day., Disp: 60 tablet, Rfl: 3  •  lisinopril (PRINIVIL,ZESTRIL) 40 MG tablet, Take 1 tablet by mouth Daily., Disp: 30 tablet, Rfl: 3  •  metFORMIN (GLUCOPHAGE) 500 MG tablet, Take 1 tablet by mouth 2 (Two) Times a Day With Meals., Disp: 60 tablet, Rfl: 3  •  metoprolol tartrate (LOPRESSOR) 50 MG tablet, Take 1 tablet by mouth Every 12 (Twelve) Hours., Disp: 60 tablet, Rfl: 3  •  RELION PEN NEEDLES 31G X 6 MM misc, USE 1 NEEDLE 3 TO 4 TIMES DAILY, Disp: , Rfl: 3  •  risperiDONE (RisperDAL) 2 MG tablet, Take 1 tablet by mouth 2 (Two) Times a Day., Disp: 60 tablet, Rfl: 3  •  simvastatin (ZOCOR) 40 MG tablet, Take 1 tablet by mouth Every Night., Disp: 30 tablet, Rfl: 3  •  tiotropium bromide-olodaterol (STIOLTO RESPIMAT) 2.5-2.5 MCG/ACT aerosol solution " inhaler, Inhale 2 puffs Daily., Disp: 1 inhaler, Rfl: 5  •  vitamin D (ERGOCALCIFEROL) 1.25 MG (31731 UT) capsule capsule, TAKE 2 CAPSULES BY MOUTH ONCE A WEEK, Disp: 8 capsule, Rfl: 3  •  nicotine (Nicoderm CQ) 21 MG/24HR patch, Place 1 patch on the skin as directed by provider Daily., Disp: 30 patch, Rfl: 3  •  nicotine polacrilex (COMMIT) 2 MG lozenge, Dissolve 1 lozenge in the mouth As Needed for Smoking Cessation., Disp: 168 lozenge, Rfl: 3    HPI     Pt is 46 yo female with management  of morbid obesity, IDDM Type 2 now on insulin pump , HTN, HLP, vitamin D deficiency, tobacco user, GERD, gastritis, esophagitis, diverticulosis, colonic polyp in sigmoid colon,  paranoid schizophrenia,  Seizure disorder, history of Illicit drug abuse, COPD, JACQUE, chronic hepatitis C , major depression, JD, sp appendectomy, sp cholecystectomy sp right carpal tunnel release, sp back surgery, sp breast surgery, sp lipoma excision, chronic back pain (moderate L4-L5 and L5-S1 DDD changes, mild bilateral L4-L5 moderate bilateral L5-S1 facet arthritic change, mild leveoscoliosis), CKD stage 2,        9/17/20 in office visit for recheck on pt's above medical issues. Since last visit pt has done PT/OT for her lower back. Saw Pulmonologist on 8/3/20 and Stiolotowas started for her COPD. Had labwork done on 9/9/20 that showed normal Vitamin B12, vitamin D levels normal lipid panel showed triglycerides at 249 and HD Lat 37 LDL at 42. hga1c at 9.24 from 9.60.  CMP showed glucose at 159 and  GFR at 90 with normal liver enzymes. CBC shows normal hemoglobin.  She is now on insulin pump for about a month and sugar running  in mornings after she eats 180.  She is counting carb. Blood pressure. Psych issues are stable and continues to see [facility] [place]. Breathing stable. She is no Seizures stable. She did have one episode about a month ago. She is seeing Neurologist..      12/17/20 in office visit for recheck on pt's above medical issues. Saw  Endocrionlogy on 10/5/20 and is now on inusulin pump.  She is to continue taking trulicity 1.5 mg weekly along with metformin 1000 mg PO BID and steglatro 15 mg daily.  She did see Pulmonology on 10/23/20 for her COPD and is on Stioloto and was encouraged to quit smoking.  She states that her sugars have been around 180 -200. She uses insulin pump 3 days a week and insulin injections once a week due to insurance not covering total cost of insulin pump.  Endocirnology following. BP stable. Pt continues to smoke  3/4 ppd.  She is willing to try nicotine patches again.  She still goes to see Neurology for her seizure disorder.  She has yet to talk to mental health. She still hear voicesi n head and is on risperdal no suicidal thoughs but has demeaning thouhgs          Mental Health Problem   The primary symptoms include delusions, hallucinations and negative symptoms. The primary symptoms do not include dysphoric mood, bizarre behavior, disorganized speech or somatic symptoms. This is a chronic problem.   The onset of the illness is precipitated by a stressful event, emotional stress, alcohol abuse and drug abuse. The degree of incapacity that she is experiencing as a consequence of her illness is severe. Sequelae of the illness include an inability to work, an inability to care for self and harmed interpersonal relations. Additional symptoms of the illness include fatigue, agitation and seizures. Additional symptoms of the illness do not include anhedonia, insomnia, hypersomnia, appetite change, unexpected weight change, psychomotor retardation, feelings of worthlessness, attention impairment, increased goal-directed activity, flight of ideas, inflated self-esteem, decreased need for sleep, distractible, poor judgment, visual change, headaches or abdominal pain. She admits to suicidal ideas. She does not have a plan to commit suicide. She does not contemplate harming herself. She has not already injured self. She does  not contemplate injuring another person. Risk factors that are present for mental illness include a history of mental illness, substance abuse, a history of suicide attempts, family violence and epilepsy. s.   Obesity   This is a chronic problem. The current episode started more than 1 year ago. The problem occurs constantly. The problem has been unchanged. Associated symptoms include arthralgias, fatigue, headaches, numbness and weakness. Pertinent negatives include no abdominal pain, anorexia, change in bowel habit, chest pain, chills, congestion, coughing, fever, joint swelling, myalgias, nausea, neck pain, rash, sore throat, swollen glands, urinary symptoms, vertigo, visual change or vomiting. Nothing aggravates the symptoms. She has tried nothing for the symptoms.   Hypertension   This is a chronic problem. The current episode started more than 1 year ago. The problem is controlled  Associated symptoms include headaches. Pertinent negatives include no anxiety, blurred vision, chest pain, malaise/fatigue, neck pain, orthopnea, palpitations, peripheral edema, PND or shortness of breath. Risk factors for coronary artery disease include obesity, sedentary lifestyle, smoking/tobacco exposure and stress. Past treatments include ACE inhibitors, beta blockers and diuretics. There are no compliance problems.  There is no history of angina, kidney disease, CAD/MI, CVA, heart failure, left ventricular hypertrophy, PVD or retinopathy. There is no history of chronic renal disease, coarctation of the aorta, hyperaldosteronism, hypercortisolism, hyperparathyroidism, a hypertension causing med, pheochromocytoma, renovascular disease, sleep apnea or a thyroid problem.   Diabetes   She presents for her initial diabetic visit. She has type 2 diabetes mellitus. Her disease course has been waxing and waning Hypoglycemia symptoms include confusion, headaches, nervousness/anxiousness, seizures and sleepiness. Associated symptoms  include fatigue and weakness. Pertinent negatives for diabetes include no blurred vision, no chest pain and no visual change. Pertinent negatives for diabetic complications include no CVA, PVD or retinopathy. Risk factors for coronary artery disease include dyslipidemia, obesity and post-menopausal. She has not had a previous visit with a dietitian. There is no change in her home blood glucose trend. An ACE inhibitor/angiotensin II receptor blocker is not being taken. She does not see a podiatrist.Eye exam is not current.     Review of Systems  Review of Systems   Constitutional: Positive for activity change and fatigue. Negative for appetite change, chills, diaphoresis and fever.   HENT: Negative for congestion, postnasal drip, rhinorrhea, sinus pressure, sinus pain, sneezing, sore throat, trouble swallowing and voice change.    Respiratory: Positive for cough and shortness of breath. Negative for choking, chest tightness, wheezing and stridor.    Cardiovascular: Negative for chest pain.   Gastrointestinal: Positive for abdominal pain. Negative for diarrhea, nausea and vomiting.   Musculoskeletal: Positive for arthralgias and back pain.   Neurological: Positive for weakness and numbness. Negative for headaches.   Psychiatric/Behavioral: Positive for agitation and behavioral problems. The patient is nervous/anxious.         Depressed mood        Patient Active Problem List   Diagnosis   • Seizure disorder (CMS/HCC)   • Morbid obesity (CMS/HCC)   • Tobacco abuse counseling   • Multiple joint pain   • Dysuria   • History of positive hepatitis C   • Essential hypertension   • Uncontrolled type 2 diabetes mellitus with hyperglycemia (CMS/HCC)   • Vitamin D deficiency   • Urinary tract infection without hematuria   • Chronic hepatitis C without hepatic coma (CMS/HCC)   • Cigarette nicotine dependence, uncomplicated   • Chronic obstructive pulmonary disease (CMS/HCC)   • JACQUE (obstructive sleep apnea)   • Gastroesophageal  reflux disease with esophagitis   • Left upper quadrant pain   • Nausea and vomiting   • Weight gain, abnormal   • Change in bowel habits   • Hepatic fibrosis   • Non-compliance   • Depression   • Paranoid schizophrenia (CMS/HCC)   • Gastritis without bleeding   • Tobacco user   • Encounter for eye exam   • Scoliosis   • History of drug use   • Stage 2 chronic kidney disease   • Diabetic neuropathy (CMS/HCC)   • Mixed hyperlipidemia   • Chronic bilateral low back pain with bilateral sciatica   • Type 2 diabetes mellitus with hyperglycemia, with long-term current use of insulin (CMS/HCC)   • Auditory hallucination   • Class 3 severe obesity due to excess calories with serious comorbidity and body mass index (BMI) of 45.0 to 49.9 in adult (CMS/HCC)     Past Surgical History:   Procedure Laterality Date   • APPENDECTOMY     • BACK SURGERY     • BREAST SURGERY     • CARPAL TUNNEL RELEASE      right hand   • CHOLECYSTECTOMY     • COLONOSCOPY     • COLONOSCOPY N/A 2/8/2019    Procedure: COLONOSCOPY;  Surgeon: Joni Delacruz MD;  Location: [facility] ENDOSCOPY;  Service: Gastroenterology   • DENTAL PROCEDURE     • ENDOSCOPY N/A 2/8/2019    Procedure: ESOPHAGOGASTRODUODENOSCOPY--poss dilation;  Surgeon: Joni Delacruz MD;  Location: [facility] ENDOSCOPY;  Service: Gastroenterology   • HYSTERECTOMY     • LIPOMA EXCISION      9   • LIVER BIOPSY     • UPPER GASTROINTESTINAL ENDOSCOPY  02/08/2019     Social History     Socioeconomic History   • Marital status:      Spouse name: Not on file   • Number of children: Not on file   • Years of education: Not on file   • Highest education level: Not on file   Tobacco Use   • Smoking status: Current Every Day Smoker     Packs/day: 0.25     Types: Cigarettes   • Smokeless tobacco: Never Used   • Tobacco comment: 05/06/2020 - Pt states today is her first day on nicotine patches   Substance and Sexual Activity   • Alcohol use: Defer   • Drug use: Defer     Types: Amphetamines, Heroin,  Methamphetamines, Morphine, Oxycodone   • Sexual activity: Defer     Family History   Problem Relation Age of Onset   • Hypertension Other    • Diabetes Other    • Stroke Other    • Cancer Other    • Kidney disease Other    • Lung disease Other    • Other Other    • Malone's esophagus Other    • Colon polyps Other    • Heart disease Other    • Ulcers Other    • Cholelithiasis Other    • Allergy (severe) Other      Lab on 09/09/2020   Component Date Value Ref Range Status   • WBC 09/09/2020 9.63  3.40 - 10.80 10*3/mm3 Final   • RBC 09/09/2020 5.07  3.77 - 5.28 10*6/mm3 Final   • Hemoglobin 09/09/2020 14.9  12.0 - 15.9 g/dL Final   • Hematocrit 09/09/2020 44.3  34.0 - 46.6 % Final   • MCV 09/09/2020 87.4  79.0 - 97.0 fL Final   • MCH 09/09/2020 29.4  26.6 - 33.0 pg Final   • MCHC 09/09/2020 33.6  31.5 - 35.7 g/dL Final   • RDW 09/09/2020 13.3  12.3 - 15.4 % Final   • RDW-SD 09/09/2020 42.6  37.0 - 54.0 fl Final   • MPV 09/09/2020 11.4  6.0 - 12.0 fL Final   • Platelets 09/09/2020 180  140 - 450 10*3/mm3 Final   • Neutrophil % 09/09/2020 67.2  42.7 - 76.0 % Final   • Lymphocyte % 09/09/2020 21.6  19.6 - 45.3 % Final   • Monocyte % 09/09/2020 4.8* 5.0 - 12.0 % Final   • Eosinophil % 09/09/2020 5.3  0.3 - 6.2 % Final   • Basophil % 09/09/2020 0.6  0.0 - 1.5 % Final   • Immature Grans % 09/09/2020 0.5  0.0 - 0.5 % Final   • Neutrophils, Absolute 09/09/2020 6.47  1.70 - 7.00 10*3/mm3 Final   • Lymphocytes, Absolute 09/09/2020 2.08  0.70 - 3.10 10*3/mm3 Final   • Monocytes, Absolute 09/09/2020 0.46  0.10 - 0.90 10*3/mm3 Final   • Eosinophils, Absolute 09/09/2020 0.51* 0.00 - 0.40 10*3/mm3 Final   • Basophils, Absolute 09/09/2020 0.06  0.00 - 0.20 10*3/mm3 Final   • Immature Grans, Absolute 09/09/2020 0.05  0.00 - 0.05 10*3/mm3 Final   • nRBC 09/09/2020 0.0  0.0 - 0.2 /100 WBC Final   • Glucose 09/09/2020 159* 65 - 99 mg/dL Final   • BUN 09/09/2020 11  6 - 20 mg/dL Final   • Creatinine 09/09/2020 0.70  0.57 - 1.00 mg/dL  Final   • Sodium 09/09/2020 137  136 - 145 mmol/L Final   • Potassium 09/09/2020 4.7  3.5 - 5.2 mmol/L Final   • Chloride 09/09/2020 103  98 - 107 mmol/L Final   • CO2 09/09/2020 22.3  22.0 - 29.0 mmol/L Final   • Calcium 09/09/2020 10.1  8.6 - 10.5 mg/dL Final   • Total Protein 09/09/2020 7.3  6.0 - 8.5 g/dL Final   • Albumin 09/09/2020 3.70  3.50 - 5.20 g/dL Final   • ALT (SGPT) 09/09/2020 21  1 - 33 U/L Final   • AST (SGOT) 09/09/2020 18  1 - 32 U/L Final   • Alkaline Phosphatase 09/09/2020 72  39 - 117 U/L Final   • Total Bilirubin 09/09/2020 <0.2  0.0 - 1.2 mg/dL Final   • eGFR Non African Amer 09/09/2020 90  >60 mL/min/1.73 Final   • Globulin 09/09/2020 3.6  gm/dL Final   • A/G Ratio 09/09/2020 1.0  g/dL Final   • BUN/Creatinine Ratio 09/09/2020 15.7  7.0 - 25.0 Final   • Anion Gap 09/09/2020 11.7  5.0 - 15.0 mmol/L Final   • Hemoglobin A1C 09/09/2020 9.24* 4.80 - 5.60 % Final   • Total Cholesterol 09/09/2020 129  0 - 200 mg/dL Final   • Triglycerides 09/09/2020 249* 0 - 150 mg/dL Final   • HDL Cholesterol 09/09/2020 37* 40 - 60 mg/dL Final   • LDL Cholesterol  09/09/2020 42  0 - 100 mg/dL Final   • VLDL Cholesterol 09/09/2020 49.8* 5 - 40 mg/dL Final   • LDL/HDL Ratio 09/09/2020 1.14   Final   • 25 Hydroxy, Vitamin D 09/09/2020 41.6  30.0 - 100.0 ng/ml Final   • Vitamin B-12 09/09/2020 546  211 - 946 pg/mL Final   • Microalbumin/Creatinine Ratio 09/09/2020    Final    Unable to calculate   • Creatinine, Urine 09/09/2020 52.8  mg/dL Final   • Microalbumin, Urine 09/09/2020 <1.2  mg/dL Final      Spirometry Without Bronchodilator  Debo Will MD     6/22/2020  9:35 AM  Spirometry Without Bronchodilator  Performed by: Debo Will MD  Authorized by: Debo Will MD      Pre Drug    FVC: 94%   FEV1: 61%   FEV1/FVC: 52%    Interpretation   Spirometry   Spirometry shows moderate obstruction.   Overall comments: Significant decrease in FEV1 compared to previous.  XR Chest 2 View  Narrative: Radiology  "[facility] [place]    Patient Name: ARPAN ROSAS    ORDERING: MARILUZ LLANOS     ATTENDING:    REFERRING: MARILUZ LLANOS    -----------------------    PROCEDURE: Two-view chest    COMPARISON: 8/29/2018    HISTORY: dyspnea, R06.00 Dyspnea, unspecified J44.9 Chronic  obstructive pulmonary disease, unspecified    FINDINGS: Frontal and lateral views of the chest are obtained.     Devices: None    Lungs/Pleura: No consolidation, pleural effusion, or  pneumothorax.    Cardiomediastinal Structures: The heart size and mediastinal  contours are within limits of normal. The trachea is midline.    Skeletal Structures: No acute findings. Mild degenerative changes  within the spine. No free air beneath the diaphragm.  Impression: No acute pulmonary or pleural findings.    Electronically signed by:  Alfred Howe MD  6/22/2020 9:20 AM  CDT Workstation: [id number]    [email or web address]@  Immunization History   Administered Date(s) Administered   • Flulaval/Fluarix/Fluzone Quad 09/17/2020   • Hepatitis A 06/12/2018   • Pneumococcal Polysaccharide (PPSV23) 12/13/2018   • Tdap 09/17/2020       The following portions of the patient's history were reviewed and updated as appropriate: allergies, current medications, past family history, past medical history, past social history, past surgical history and problem list.        Physical Exam  /70 (BP Location: Left arm, Patient Position: Sitting, Cuff Size: Large Adult)   Pulse 84   Temp 96.4 °F (35.8 °C)   Ht 167.6 cm (66\")   Wt (!) 139 kg (305 lb 12.8 oz)   SpO2 95%   BMI 49.36 kg/m²     Physical Exam  Vitals signs and nursing note reviewed.   Constitutional:       Appearance: She is well-developed. She is not diaphoretic.   HENT:      Head: Normocephalic and atraumatic.      Right Ear: External ear normal.   Eyes:      Conjunctiva/sclera: Conjunctivae normal.      Pupils: Pupils are equal, round, and reactive to light.   Neck:      Musculoskeletal: Normal range of " motion and neck supple.   Cardiovascular:      Rate and Rhythm: Normal rate and regular rhythm.      Heart sounds: Normal heart sounds. No murmur.   Pulmonary:      Effort: No respiratory distress.      Comments: Decreased breath sounds   Abdominal:      General: Bowel sounds are normal. There is no distension.      Palpations: Abdomen is soft.      Tenderness: There is no abdominal tenderness.      Comments: Obese abdomen    Musculoskeletal: Normal range of motion.         General: Tenderness present. No deformity.   Skin:     General: Skin is warm.      Coloration: Skin is not pale.      Findings: No erythema or rash.   Neurological:      Mental Status: She is alert and oriented to person, place, and time.      Cranial Nerves: No cranial nerve deficit.   Psychiatric:         Behavior: Behavior normal.         Assessment/Plan    Diagnosis Plan   1. Paranoid schizophrenia (CMS/HCC)  CBC Auto Differential    Comprehensive Metabolic Panel    Hemoglobin A1c    Lipid Panel    Vitamin D 25 Hydroxy    Vitamin B12    Hepatitis C RNA, Quantitative, PCR (graph)    Microalbumin / Creatinine Urine Ratio - Urine, Clean Catch    Ambulatory Referral to Behavioral Health   2. Morbid obesity (CMS/HCC)  CBC Auto Differential    Comprehensive Metabolic Panel    Hemoglobin A1c    Lipid Panel    Vitamin D 25 Hydroxy    Vitamin B12    Hepatitis C RNA, Quantitative, PCR (graph)    Microalbumin / Creatinine Urine Ratio - Urine, Clean Catch   3. Uncontrolled type 2 diabetes mellitus with hyperglycemia (CMS/HCC)  CBC Auto Differential    Comprehensive Metabolic Panel    Hemoglobin A1c    Lipid Panel    Vitamin D 25 Hydroxy    Vitamin B12    Hepatitis C RNA, Quantitative, PCR (graph)    Microalbumin / Creatinine Urine Ratio - Urine, Clean Catch   4. Vitamin D deficiency  CBC Auto Differential    Comprehensive Metabolic Panel    Hemoglobin A1c    Lipid Panel    Vitamin D 25 Hydroxy    Vitamin B12    Hepatitis C RNA, Quantitative, PCR (graph)      Microalbumin / Creatinine Urine Ratio - Urine, Clean Catch   5. Stage 2 chronic kidney disease  CBC Auto Differential    Comprehensive Metabolic Panel    Hemoglobin A1c    Lipid Panel    Vitamin D 25 Hydroxy    Vitamin B12    Hepatitis C RNA, Quantitative, PCR (graph)    Microalbumin / Creatinine Urine Ratio - Urine, Clean Catch   6. Mixed hyperlipidemia  CBC Auto Differential    Comprehensive Metabolic Panel    Hemoglobin A1c    Lipid Panel    Vitamin D 25 Hydroxy    Vitamin B12    Hepatitis C RNA, Quantitative, PCR (graph)    Microalbumin / Creatinine Urine Ratio - Urine, Clean Catch   7. Essential hypertension  CBC Auto Differential    Comprehensive Metabolic Panel    Hemoglobin A1c    Lipid Panel    Vitamin D 25 Hydroxy    Vitamin B12    Hepatitis C RNA, Quantitative, PCR (graph)    Microalbumin / Creatinine Urine Ratio - Urine, Clean Catch   8. Chronic hepatitis C without hepatic coma (CMS/HCC)  CBC Auto Differential    Comprehensive Metabolic Panel    Hemoglobin A1c    Lipid Panel    Vitamin D 25 Hydroxy    Vitamin B12    Hepatitis C RNA, Quantitative, PCR (graph)    Microalbumin / Creatinine Urine Ratio - Urine, Clean Catch   9. JACQUE (obstructive sleep apnea)  CBC Auto Differential    Comprehensive Metabolic Panel    Hemoglobin A1c    Lipid Panel    Vitamin D 25 Hydroxy    Vitamin B12    Hepatitis C RNA, Quantitative, PCR (graph)    Microalbumin / Creatinine Urine Ratio - Urine, Clean Catch   10. Chronic obstructive pulmonary disease, unspecified COPD type (CMS/HCC)  CBC Auto Differential    Comprehensive Metabolic Panel    Hemoglobin A1c    Lipid Panel    Vitamin D 25 Hydroxy    Vitamin B12    Hepatitis C RNA, Quantitative, PCR (graph)    Microalbumin / Creatinine Urine Ratio - Urine, Clean Catch   11. Tobacco user  CBC Auto Differential    Comprehensive Metabolic Panel    Hemoglobin A1c    Lipid Panel    Vitamin D 25 Hydroxy    Vitamin B12    Hepatitis C RNA, Quantitative, PCR (graph)    Microalbumin /  Creatinine Urine Ratio - Urine, Clean Catch   12. Moderate episode of recurrent major depressive disorder (CMS/HCC)  Ambulatory Referral to Behavioral Health   13. History of illicit drug use  Ambulatory Referral to Behavioral Health        labwork before next visit   -recommend tdap vaccination -given today.    -recommend influenza vaccination -given today   -recommend diabetic eye exam   -annual physcial eaxm today   -refer to [facility]  For Neurology and seizure disorder  -face twitching - possible side effect of resperdal. If getting worse consider MRI of brai  -for back pain/arthritis of back/scoliosis/ history of back surgery - referral to Pain Management  X-rays of lumbar spine.  Start on neurontin 300 mg PO TID.gave 90 pills and 3 refills. Will get NERY. UDS today. Pt declined PT/OT for now. .  also on cymbalta 60 mg will raise to 90 mg daily for depression   -chronic hep C - Gastroenterology following   -COPD -  Pulmonology following. Now on Stiolot 2 puff daily.    -multiple joint pain -- naproxen 500 mg PO BID. -counseled on weight loss, diet and exercise. Diet information provided. cousneled weight loss >5 minutes BMI at 47.61   -tobacco user - Counseled quit smoking >5 minutes.  Start back on nicotine lozenges   -ckd stage 2 - continue to monitor. Stressed importance of diabetes control and BP control    -hyperlipdiemia- [id number] mg PO qhs. ASCVD risk high. Recommend to take with coenzyme Q10  Start on vascepa 2 grams PO BID   -dyspnea/early COPD - pt will need to make appt with Pulmonologist. Needs new PFTs. Urged smoking cessation >5 minutes.  Continue spiriva and albuterol inhaler. Now on Anoro daily.    -for DM type 2- Endocrinology following. Currently on metformin 1000 gm PO BID, steglasro 15 mg daily. trulicity 1.5 mg now on insulin pump   -schizophrenia/major depression - stopped viibryd, on  cymbalta 90 mg daily. Now on risperdal 2 mg PO q daily. On    -seizure disorder - Neurology  following on Keppra 1000 mg Po BID   -Morbid obesity - counseled weight loss >5 minutes BMI at 49.36   -tobacco user counseled pt to quit smoking >5 minutes.  Gave tobacco cessation material   -hypertension - at goal today Continue on lisinopril b 40 mg dialy.  mg Po q dominguez, metoprolol 50 mg PO BID .   -advised pt to be safe and call with any questions or concerns. All questions addressed today  -advised pt to followup with specialist and referrals  -advised pt to go to ER or call 911 if symptoms worrisome or severe   -advised pt to be safe during COVID-19 pandemic  -total time with pt >25 minutes   -recheck in 6 weeks         This document has been electronically signed by Jermaine Ferro MD on December 17, 2020 08:24 CST

## 2020-12-09 RX ORDER — ERTUGLIFLOZIN 15 MG/1
TABLET, FILM COATED ORAL
Qty: 90 TABLET | Refills: 0 | Status: SHIPPED | OUTPATIENT
Start: 2020-12-09 | End: 2020-12-17 | Stop reason: SDUPTHER

## 2020-12-17 ENCOUNTER — OFFICE VISIT (OUTPATIENT)
Dept: FAMILY MEDICINE CLINIC | Facility: CLINIC | Age: 45
End: 2020-12-17

## 2020-12-17 VITALS
SYSTOLIC BLOOD PRESSURE: 110 MMHG | HEIGHT: 66 IN | HEART RATE: 84 BPM | TEMPERATURE: 96.4 F | WEIGHT: 293 LBS | BODY MASS INDEX: 47.09 KG/M2 | DIASTOLIC BLOOD PRESSURE: 70 MMHG | OXYGEN SATURATION: 95 %

## 2020-12-17 DIAGNOSIS — F19.91 HISTORY OF ILLICIT DRUG USE: ICD-10-CM

## 2020-12-17 DIAGNOSIS — J44.9 CHRONIC OBSTRUCTIVE PULMONARY DISEASE, UNSPECIFIED COPD TYPE (HCC): ICD-10-CM

## 2020-12-17 DIAGNOSIS — E55.9 VITAMIN D DEFICIENCY: ICD-10-CM

## 2020-12-17 DIAGNOSIS — F20.0 PARANOID SCHIZOPHRENIA (HCC): Primary | ICD-10-CM

## 2020-12-17 DIAGNOSIS — E11.65 UNCONTROLLED TYPE 2 DIABETES MELLITUS WITH HYPERGLYCEMIA (HCC): ICD-10-CM

## 2020-12-17 DIAGNOSIS — E78.2 MIXED HYPERLIPIDEMIA: ICD-10-CM

## 2020-12-17 DIAGNOSIS — F33.1 MODERATE EPISODE OF RECURRENT MAJOR DEPRESSIVE DISORDER (HCC): ICD-10-CM

## 2020-12-17 DIAGNOSIS — G47.33 OSA (OBSTRUCTIVE SLEEP APNEA): ICD-10-CM

## 2020-12-17 DIAGNOSIS — I10 ESSENTIAL HYPERTENSION: ICD-10-CM

## 2020-12-17 DIAGNOSIS — N18.2 STAGE 2 CHRONIC KIDNEY DISEASE: ICD-10-CM

## 2020-12-17 DIAGNOSIS — E66.01 MORBID OBESITY (HCC): ICD-10-CM

## 2020-12-17 DIAGNOSIS — B18.2 CHRONIC HEPATITIS C WITHOUT HEPATIC COMA (HCC): ICD-10-CM

## 2020-12-17 DIAGNOSIS — Z72.0 TOBACCO USER: ICD-10-CM

## 2020-12-17 PROCEDURE — 99214 OFFICE O/P EST MOD 30 MIN: CPT | Performed by: FAMILY MEDICINE

## 2020-12-17 RX ORDER — DULOXETIN HYDROCHLORIDE 30 MG/1
90 CAPSULE, DELAYED RELEASE ORAL DAILY
Qty: 90 CAPSULE | Refills: 3 | Status: SHIPPED | OUTPATIENT
Start: 2020-12-17 | End: 2021-03-04 | Stop reason: SDUPTHER

## 2020-12-17 RX ORDER — SIMVASTATIN 40 MG
40 TABLET ORAL NIGHTLY
Qty: 30 TABLET | Refills: 3 | Status: SHIPPED | OUTPATIENT
Start: 2020-12-17 | End: 2021-06-03

## 2020-12-17 RX ORDER — RISPERIDONE 2 MG/1
2 TABLET ORAL 2 TIMES DAILY
Qty: 60 TABLET | Refills: 3 | Status: SHIPPED | OUTPATIENT
Start: 2020-12-17 | End: 2021-02-17

## 2020-12-17 RX ORDER — NICOTINE 21 MG/24HR
1 PATCH, TRANSDERMAL 24 HOURS TRANSDERMAL EVERY 24 HOURS
Qty: 30 PATCH | Refills: 3 | Status: SHIPPED | OUTPATIENT
Start: 2020-12-17 | End: 2021-04-28 | Stop reason: SDUPTHER

## 2020-12-17 RX ORDER — POLYETHYLENE GLYCOL 3350 17 G
2 POWDER IN PACKET (EA) ORAL AS NEEDED
Qty: 168 LOZENGE | Refills: 3 | Status: SHIPPED | OUTPATIENT
Start: 2020-12-17 | End: 2021-02-04

## 2020-12-17 RX ORDER — LISINOPRIL 40 MG/1
40 TABLET ORAL DAILY
Qty: 30 TABLET | Refills: 3 | Status: SHIPPED | OUTPATIENT
Start: 2020-12-17 | End: 2020-12-28 | Stop reason: SDUPTHER

## 2020-12-28 RX ORDER — LISINOPRIL 40 MG/1
TABLET ORAL
Qty: 30 TABLET | Refills: 3 | Status: SHIPPED | OUTPATIENT
Start: 2020-12-28 | End: 2021-05-11

## 2020-12-28 RX ORDER — METOPROLOL TARTRATE 50 MG/1
TABLET, FILM COATED ORAL
Qty: 60 TABLET | Refills: 3 | Status: SHIPPED | OUTPATIENT
Start: 2020-12-28 | End: 2021-05-05

## 2020-12-28 RX ORDER — DULAGLUTIDE 1.5 MG/.5ML
INJECTION, SOLUTION SUBCUTANEOUS
Qty: 4 ML | Refills: 3 | Status: SHIPPED | OUTPATIENT
Start: 2020-12-28 | End: 2021-01-05 | Stop reason: SDUPTHER

## 2021-01-05 ENCOUNTER — OFFICE VISIT (OUTPATIENT)
Dept: ENDOCRINOLOGY | Facility: CLINIC | Age: 46
End: 2021-01-05

## 2021-01-05 VITALS
HEART RATE: 88 BPM | BODY MASS INDEX: 47.09 KG/M2 | WEIGHT: 293 LBS | HEIGHT: 66 IN | DIASTOLIC BLOOD PRESSURE: 72 MMHG | SYSTOLIC BLOOD PRESSURE: 116 MMHG

## 2021-01-05 DIAGNOSIS — I10 ESSENTIAL HYPERTENSION: Primary | ICD-10-CM

## 2021-01-05 DIAGNOSIS — E11.65 UNCONTROLLED TYPE 2 DIABETES MELLITUS WITH HYPERGLYCEMIA (HCC): ICD-10-CM

## 2021-01-05 DIAGNOSIS — E55.9 VITAMIN D DEFICIENCY: ICD-10-CM

## 2021-01-05 PROCEDURE — 99214 OFFICE O/P EST MOD 30 MIN: CPT | Performed by: NURSE PRACTITIONER

## 2021-01-05 RX ORDER — DULAGLUTIDE 1.5 MG/.5ML
1.5 INJECTION, SOLUTION SUBCUTANEOUS WEEKLY
Qty: 4 PEN | Refills: 11 | Status: SHIPPED | OUTPATIENT
Start: 2021-01-05 | End: 2021-01-29 | Stop reason: DRUGHIGH

## 2021-01-05 NOTE — PROGRESS NOTES
"Chief Complaint  Diabetes    Subjective          Sudhakar Saul presents to [facility] for   History of Present Illness           IN OFFICE VISIT     Primary provider Jermaine Ferro MD    45-year-old female presents for follow-up    Reason diabetes mellitus type 2    Duration diagnosed in her late 20s    Timing is constant    Quality is not controlled    Severity is high due to complications          Complications, neuropathy, hyperglycemia         Severity (Complication/Hospitalizations)        Secondary Macrovascular-- no CAD, no PAD, no CVA          Secondary Microvascular--- neuropathy, mild diabetic retinopathy , CKD stage II     COPD , hepatitis C      Context--- found during pre-op evaluation bg was 300            Diabetes Regimen           Lab Results   Component Value Date     HGBA1C 9.24 (H) 09/09/2020               Blood Glucose Readings     Checking 4 times daily      States variable from 90 up to 180    States staying under 180               Wearing Omni Pod insulin pump         Diet-   low-carb       Exercises walking                Associated Signs/Symptoms          Hyperglycemic Symptoms: none       Hypoglycemic Episodes:  None                Objective   Vital Signs:   /72   Pulse 88   Ht 167.6 cm (66\")   Wt (!) 140 kg (308 lb 6.4 oz)   BMI 49.78 kg/m²     Physical Exam  Constitutional:       Appearance: Normal appearance.   HENT:      Head: Normocephalic and atraumatic.   Eyes:      Conjunctiva/sclera: Conjunctivae normal.      Pupils: Pupils are equal, round, and reactive to light.   Neck:      Musculoskeletal: Normal range of motion.   Cardiovascular:      Rate and Rhythm: Normal rate and regular rhythm.      Pulses: Normal pulses.      Heart sounds: Normal heart sounds.   Pulmonary:      Effort: Pulmonary effort is normal.      Breath sounds: Normal breath sounds.   Musculoskeletal: Normal range of motion.   Skin:     Coloration: Skin is not pale. "   Neurological:      General: No focal deficit present.      Mental Status: She is alert and oriented to person, place, and time.   Psychiatric:         Mood and Affect: Mood normal.         Thought Content: Thought content normal.         Judgment: Judgment normal.        Result Review :   The following data was reviewed by: ROD Chawla on 01/05/2021:  CMP    CMP 2/7/20 6/10/20 9/9/20   BUN 13 17 11   Creatinine 0.71 0.78 0.70   eGFR Non African Am 89 80 90   Sodium 137 136 137   Potassium 4.7 4.9 4.7   Chloride 100 103 103   Calcium 9.7 9.4 10.1   Albumin 4.00 4.00 3.70   Total Bilirubin 0.2 0.2 <0.2   Alkaline Phosphatase 66 60 72   AST (SGOT) 24 19 18   ALT (SGPT) 22 17 21           CBC    CBC 2/7/20 6/10/20 9/9/20   WBC 8.55 8.89 9.63   RBC 4.96 4.85 5.07   Hemoglobin 14.7 14.1 14.9   Hematocrit 43.1 42.8 44.3   MCV 86.9 88.2 87.4   MCH 29.6 29.1 29.4   MCHC 34.1 32.9 33.6   RDW 13.2 13.6 13.3   Platelets 152 157 180           Lipid Panel    Lipid Panel 2/7/20 6/10/20 9/9/20   Triglycerides 203 (A) 170 (A) 249 (A)   HDL Cholesterol 36 (A) 35 (A) 37 (A)   VLDL Cholesterol 40.6 (A) 34 49.8 (A)   LDL Cholesterol  44 46 42   LDL/HDL Ratio 1.23 1.31 1.14   (A) Abnormal value            TSH    TSH 6/10/20   TSH 2.550           Most Recent A1C    HGBA1C Most Recent 9/9/20   Hemoglobin A1C 9.24 (A)   (A) Abnormal value            Microalbumin    Microalbumin 6/16/20 9/9/20   Microalbumin, Urine <1.2 <1.2                     Assessment and Plan    Problem List Items Addressed This Visit        Other    Essential hypertension - Primary    Relevant Orders    CBC & Differential    Comprehensive Metabolic Panel    Hemoglobin A1c    Lipid Panel    Microalbumin / Creatinine Urine Ratio - Urine, Clean Catch    Protein / Creatinine Ratio, Urine - Urine, Clean Catch    TSH    Vitamin B12    Vitamin D 25 Hydroxy    Uncontrolled type 2 diabetes mellitus with hyperglycemia (CMS/[facility])    Relevant Medications     Dulaglutide (Trulicity) 1.5 MG/0.5ML solution pen-injector    Other Relevant Orders    CBC & Differential    Comprehensive Metabolic Panel    Hemoglobin A1c    Lipid Panel    Microalbumin / Creatinine Urine Ratio - Urine, Clean Catch    Protein / Creatinine Ratio, Urine - Urine, Clean Catch    TSH    Vitamin B12    Vitamin D 25 Hydroxy    Vitamin D deficiency    Relevant Orders    CBC & Differential    Comprehensive Metabolic Panel    Hemoglobin A1c    Lipid Panel    Microalbumin / Creatinine Urine Ratio - Urine, Clean Catch    Protein / Creatinine Ratio, Urine - Urine, Clean Catch    TSH    Vitamin B12    Vitamin D 25 Hydroxy                Glycemic Management     Diabetes mellitus type 2 not controlled            Lab Results   Component Value Date     HGBA1C 9.24 (H) 09/09/2020          Omni pod         Basal      MN - 2.55 ----changed to 2.65         Carb ratio      4      Correction         14     Average bg 120              Taking trulicity 1.5 mg weekly refilled Rx today     Taking Metformin 1000 mg po BID      Taking Steglatro 15 mg one daily         Previous regimen      basaglar taking 63 units ---stop        admelog--stop      Taking about 30 units            Aim for 45 grams of Carbohydrate for meals      Aim for 15 grams of Carbohydrate for snack                        Lipid Management           Simvastatin 40 mg  taking        Component      Latest Ref Rng & Units 9/9/2020              Total Cholesterol      0 - 200 mg/dL 129   Triglycerides      0 - 150 mg/dL 249 (H)   HDL Cholesterol      40 - 60 mg/dL 37 (L)   LDL Cholesterol      0 - 100 mg/dL 42   VLDL Cholesterol      5 - 40 mg/dL 49.8 (H)   LDL/HDL Ratio       1.14         Blood Pressure Management        Taking   Lisinopril 40 mg daily               Microvascular Complication Monitoring        Last eye exam -due Jan. 2021         Component      Latest Ref Rng & Units 9/9/2020   Microalbumin/Creatinine Ratio           Creatinine, Urine      mg/dL  52.8   Microalbumin, Urine      mg/dL <1.2            Neuropathy        Gabapentin 300 mg tid  taking        Bone Health                 Component      Latest Ref Rng & Units 9/9/2020   25 Hydroxy, Vitamin D      30.0 - 100.0 ng/ml 41.6               Other Diabetes Related Aspects           Lab Results   Component Value Date     [id number] 09/09/2020         Thyroid health         Lab Results   Component Value Date    TSH 2.550 06/10/2020                                  Follow Up   Return in about 3 months (around 4/5/2021) for Recheck.  Patient was given instructions and counseling regarding her condition or for health maintenance advice. Please see specific information pulled into the AVS if appropriate.

## 2021-01-18 ENCOUNTER — TELEMEDICINE (OUTPATIENT)
Dept: PSYCHIATRY | Facility: CLINIC | Age: 46
End: 2021-01-18

## 2021-01-18 DIAGNOSIS — F51.5 NIGHTMARES: ICD-10-CM

## 2021-01-18 DIAGNOSIS — F25.1 SCHIZOAFFECTIVE DISORDER, DEPRESSIVE TYPE (HCC): Primary | Chronic | ICD-10-CM

## 2021-01-18 DIAGNOSIS — G47.9 SLEEPING DIFFICULTY: ICD-10-CM

## 2021-01-18 PROCEDURE — 90792 PSYCH DIAG EVAL W/MED SRVCS: CPT | Performed by: NURSE PRACTITIONER

## 2021-01-18 RX ORDER — PRAZOSIN HYDROCHLORIDE 1 MG/1
1 CAPSULE ORAL NIGHTLY
Qty: 30 CAPSULE | Refills: 0 | Status: SHIPPED | OUTPATIENT
Start: 2021-01-18 | End: 2021-03-04 | Stop reason: SDUPTHER

## 2021-01-18 RX ORDER — LAMOTRIGINE 25 MG/1
TABLET ORAL
Qty: 45 TABLET | Refills: 0 | Status: SHIPPED | OUTPATIENT
Start: 2021-01-18 | End: 2021-02-03 | Stop reason: SDUPTHER
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
## 2021-01-26 ENCOUNTER — LAB (OUTPATIENT)
Dept: LAB | Facility: HOSPITAL | Age: 46
End: 2021-01-26

## 2021-01-26 ENCOUNTER — DOCUMENTATION (OUTPATIENT)
Dept: ENDOCRINOLOGY | Facility: CLINIC | Age: 46
End: 2021-01-26

## 2021-01-26 DIAGNOSIS — J44.9 CHRONIC OBSTRUCTIVE PULMONARY DISEASE, UNSPECIFIED COPD TYPE (HCC): ICD-10-CM

## 2021-01-26 DIAGNOSIS — E66.01 MORBID OBESITY (HCC): ICD-10-CM

## 2021-01-26 DIAGNOSIS — E11.65 UNCONTROLLED TYPE 2 DIABETES MELLITUS WITH HYPERGLYCEMIA (HCC): ICD-10-CM

## 2021-01-26 DIAGNOSIS — B18.2 CHRONIC HEPATITIS C WITHOUT HEPATIC COMA (HCC): ICD-10-CM

## 2021-01-26 DIAGNOSIS — N18.2 STAGE 2 CHRONIC KIDNEY DISEASE: ICD-10-CM

## 2021-01-26 DIAGNOSIS — I10 ESSENTIAL HYPERTENSION: ICD-10-CM

## 2021-01-26 DIAGNOSIS — G47.33 OSA (OBSTRUCTIVE SLEEP APNEA): ICD-10-CM

## 2021-01-26 DIAGNOSIS — Z72.0 TOBACCO USER: ICD-10-CM

## 2021-01-26 DIAGNOSIS — F20.0 PARANOID SCHIZOPHRENIA (HCC): ICD-10-CM

## 2021-01-26 DIAGNOSIS — E55.9 VITAMIN D DEFICIENCY: ICD-10-CM

## 2021-01-26 DIAGNOSIS — E78.2 MIXED HYPERLIPIDEMIA: ICD-10-CM

## 2021-01-26 LAB
25(OH)D3 SERPL-MCNC: 49.8 NG/ML (ref 30–100)
ALBUMIN SERPL-MCNC: 3.9 G/DL (ref 3.5–5.2)
ALBUMIN UR-MCNC: <1.2 MG/DL
ALBUMIN/GLOB SERPL: 1.2 G/DL
ALP SERPL-CCNC: 67 U/L (ref 39–117)
ALT SERPL W P-5'-P-CCNC: 19 U/L (ref 1–33)
ANION GAP SERPL CALCULATED.3IONS-SCNC: 11.4 MMOL/L (ref 5–15)
AST SERPL-CCNC: 18 U/L (ref 1–32)
BILIRUB SERPL-MCNC: 0.2 MG/DL (ref 0–1.2)
BUN SERPL-MCNC: 15 MG/DL (ref 6–20)
BUN/CREAT SERPL: 20 (ref 7–25)
CALCIUM SPEC-SCNC: 9.7 MG/DL (ref 8.6–10.5)
CHLORIDE SERPL-SCNC: 104 MMOL/L (ref 98–107)
CHOLEST SERPL-MCNC: 114 MG/DL (ref 0–200)
CO2 SERPL-SCNC: 21.6 MMOL/L (ref 22–29)
CREAT SERPL-MCNC: 0.75 MG/DL (ref 0.57–1)
CREAT UR-MCNC: 56.2 MG/DL
CREAT UR-MCNC: 56.2 MG/DL
DEPRECATED RDW RBC AUTO: 41.9 FL (ref 37–54)
EOSINOPHIL # BLD MANUAL: 0.18 10*3/MM3 (ref 0–0.4)
EOSINOPHIL NFR BLD MANUAL: 2 % (ref 0.3–6.2)
ERYTHROCYTE [DISTWIDTH] IN BLOOD BY AUTOMATED COUNT: 13 % (ref 12.3–15.4)
GFR SERPL CREATININE-BSD FRML MDRD: 83 ML/MIN/1.73
GLOBULIN UR ELPH-MCNC: 3.2 GM/DL
GLUCOSE SERPL-MCNC: 187 MG/DL (ref 65–99)
HBA1C MFR BLD: 7.6 % (ref 4.8–5.6)
HCT VFR BLD AUTO: 45 % (ref 34–46.6)
HDLC SERPL-MCNC: 39 MG/DL (ref 40–60)
HGB BLD-MCNC: 14.4 G/DL (ref 12–15.9)
LDLC SERPL CALC-MCNC: 47 MG/DL (ref 0–100)
LDLC/HDLC SERPL: 1.06 {RATIO}
LYMPHOCYTES # BLD MANUAL: 3.16 10*3/MM3 (ref 0.7–3.1)
LYMPHOCYTES NFR BLD MANUAL: 35 % (ref 19.6–45.3)
LYMPHOCYTES NFR BLD MANUAL: 7 % (ref 5–12)
MCH RBC QN AUTO: 28.2 PG (ref 26.6–33)
MCHC RBC AUTO-ENTMCNC: 32 G/DL (ref 31.5–35.7)
MCV RBC AUTO: 88.2 FL (ref 79–97)
MICROALBUMIN/CREAT UR: NORMAL MG/G{CREAT}
MONOCYTES # BLD AUTO: 0.63 10*3/MM3 (ref 0.1–0.9)
NEUTROPHILS # BLD AUTO: 5.06 10*3/MM3 (ref 1.7–7)
NEUTROPHILS NFR BLD MANUAL: 56 % (ref 42.7–76)
NRBC BLD AUTO-RTO: 0 /100 WBC (ref 0–0.2)
PLAT MORPH BLD: NORMAL
PLATELET # BLD AUTO: 174 10*3/MM3 (ref 140–450)
PMV BLD AUTO: 11.2 FL (ref 6–12)
POTASSIUM SERPL-SCNC: 5 MMOL/L (ref 3.5–5.2)
PROT SERPL-MCNC: 7.1 G/DL (ref 6–8.5)
PROT UR-MCNC: 4 MG/DL
PROT/CREAT UR: 71.2 MG/G CREA (ref 0–200)
RBC # BLD AUTO: 5.1 10*6/MM3 (ref 3.77–5.28)
RBC MORPH BLD: NORMAL
SODIUM SERPL-SCNC: 137 MMOL/L (ref 136–145)
TRIGL SERPL-MCNC: 169 MG/DL (ref 0–150)
TSH SERPL DL<=0.05 MIU/L-ACNC: 1.61 UIU/ML (ref 0.27–4.2)
VIT B12 BLD-MCNC: 491 PG/ML (ref 211–946)
VLDLC SERPL-MCNC: 28 MG/DL (ref 5–40)
WBC # BLD AUTO: 9.04 10*3/MM3 (ref 3.4–10.8)
WBC MORPH BLD: NORMAL

## 2021-01-26 PROCEDURE — 82043 UR ALBUMIN QUANTITATIVE: CPT

## 2021-01-26 PROCEDURE — 85007 BL SMEAR W/DIFF WBC COUNT: CPT

## 2021-01-26 PROCEDURE — 80053 COMPREHEN METABOLIC PANEL: CPT

## 2021-01-26 PROCEDURE — 82570 ASSAY OF URINE CREATININE: CPT

## 2021-01-26 PROCEDURE — 82607 VITAMIN B-12: CPT

## 2021-01-26 PROCEDURE — 83036 HEMOGLOBIN GLYCOSYLATED A1C: CPT

## 2021-01-26 PROCEDURE — 80061 LIPID PANEL: CPT

## 2021-01-26 PROCEDURE — 85025 COMPLETE CBC W/AUTO DIFF WBC: CPT

## 2021-01-26 PROCEDURE — 87522 HEPATITIS C REVRS TRNSCRPJ: CPT

## 2021-01-26 PROCEDURE — 82306 VITAMIN D 25 HYDROXY: CPT

## 2021-01-26 PROCEDURE — 84443 ASSAY THYROID STIM HORMONE: CPT

## 2021-01-26 PROCEDURE — 84156 ASSAY OF PROTEIN URINE: CPT

## 2021-01-27 ENCOUNTER — TELEPHONE (OUTPATIENT)
Dept: FAMILY MEDICINE CLINIC | Facility: CLINIC | Age: 46
End: 2021-01-27

## 2021-01-27 NOTE — TELEPHONE ENCOUNTER
----- Message from Jermaine Ferro MD sent at 1/26/2021 10:15 PM CST -----  Please let pt know labwork more stable in regards to hga1c down from  9.24 to 7.60 continue good work with diabetic medications     If pt agreeable have her go up on trulicity from 1.5 to 3.0 mg sub q weekly give 4 pens and 3 refills     On lipid panel triglycerides down from 249 to 169. Recommend vascepa and if insurance is willing to cover give 2 grams pO BID give 60 pills and 3 refills. If not she is to start taking over the counter fish oil supplement with DHA and EPA and omega 3 fatty acids    Recheck on next visit. Thanks        No acute events during shift. Pt awaiting placement to SNF. TB test to be read @4215. NS gtt DC. Electrolytes replaced. Pt walked to restroom with RN assistance. Possible DC tomorrow. Pt remains free from injury/falls for shift. Educated Pt on meds no questions at this time. VSS.  No complaints of CP, SOB, pain/discomfort  Bed locked in lowest position, call bell within reach. All questions addressed.  Will continue to monitor.

## 2021-01-28 LAB
HCV RNA SERPL NAA+PROBE-ACNC: NORMAL IU/ML
TEST INFORMATION: NORMAL

## 2021-01-29 ENCOUNTER — TELEPHONE (OUTPATIENT)
Dept: FAMILY MEDICINE CLINIC | Facility: CLINIC | Age: 46
End: 2021-01-29

## 2021-01-29 NOTE — TELEPHONE ENCOUNTER
Made pt aware and she is agreeable to increase in Trulicity. Pt stated insurance will not cover Vascepa so she will get OTC Fish Oil.                     wanted to let you know your labwork is more stable in regards to hga1c. It is down from  9.24 to 7.60 continue good work with diabetic medications     If you are agreeable, he would like to go up on trulicity from 1.5 to 3.0 mg weekly. Please let me know and he will send new prescription to the [facility].     On lipid panel triglycerides down from 249 to 169. He does recommend a medication called vascepa to help lower. If you are agreeable he will send to the pharmacy. If not then you can start taking over the counter fish oil supplement with DHA and EPA and omega 3 fatty acids.     Thank You

## 2021-02-02 ENCOUNTER — TELEPHONE (OUTPATIENT)
Dept: FAMILY MEDICINE CLINIC | Facility: CLINIC | Age: 46
End: 2021-02-02

## 2021-02-03 ENCOUNTER — PRIOR AUTHORIZATION (OUTPATIENT)
Dept: PSYCHIATRY | Facility: CLINIC | Age: 46
End: 2021-02-03

## 2021-02-03 ENCOUNTER — TELEMEDICINE (OUTPATIENT)
Dept: PSYCHIATRY | Facility: CLINIC | Age: 46
End: 2021-02-03

## 2021-02-03 DIAGNOSIS — G47.9 SLEEPING DIFFICULTY: ICD-10-CM

## 2021-02-03 DIAGNOSIS — F25.1 SCHIZOAFFECTIVE DISORDER, DEPRESSIVE TYPE (HCC): Primary | Chronic | ICD-10-CM

## 2021-02-03 DIAGNOSIS — F51.5 NIGHTMARES: ICD-10-CM

## 2021-02-03 PROCEDURE — 99214 OFFICE O/P EST MOD 30 MIN: CPT | Performed by: NURSE PRACTITIONER

## 2021-02-03 RX ORDER — LAMOTRIGINE 100 MG/1
TABLET ORAL
Qty: 30 TABLET | Refills: 0 | Status: SHIPPED | OUTPATIENT
Start: 2021-02-03 | End: 2021-03-04 | Stop reason: SDUPTHER

## 2021-02-03 NOTE — PROGRESS NOTES
"This provider is located at [facility], [address] [place] [address].The Patient is seen remotely at home, [address] [place] [address] via [employer].  Patient is being seen via telehealth and confirm that they are in a secure environment for this session. The patient's condition being diagnosed/treated is appropriate for telemedicine. The provider identified himself/herself: herself as well as her credentials.   The patient gave consent to be seen remotely, and when consent is given they understand that the consent allows for patient identifiable information to be sent to a third party as needed.   They may refuse to be seen remotely at any time. The electronic data is encrypted and password protected, and the patient has been advised of the potential risks to privacy not withstanding such measures.    You have chosen to receive care through a telehealth visit.  Do you consent to use a video/audio connection for your medical care today? Yes      Chief Complaint  Schizophrenia, Depression, and Anxiety    Subjective          Sudhakar Aguero Kg presents to [facility] for medication management.   History of Present Illness  Patient is seen today reporting that she has noticed a difference with the lamotrigine as her mood swings have not been as significant and she is not Mary \"irrational bili upset\".  She states that her depression is still present as there is been family stress this past couple of weeks but otherwise she is doing okay.  Patient states that she always has a helpless feeling daily.  She also notes that she has a lot of guilt from her past for her past mistakes.  Patient states the Minipress has been helpful for her nightmares that she is sleeping better at least 4 to 5 hours at night but only wakes up due to pain issues.  Patient states that her anxiety is still an issue as she is always worrying on edge and worrying about " the future and what will happen and thinks she knows she cannot control.  Patient notes that she is still having auditory and visual hallucinations.  Patient states that she had shallow movement the past couple of nights.  And she also notes that she has been hearing a lot of's negative self talk as well as a child's voice.  Patient reports her appetite has been good but she does notice that increased with risperidone.  Patient denies any side effects or rash to the medications.  Patient denies any SI or HI.  Patient denies that the auditory visual hallucinations tell her to harm herself or others.      Objective   Vital Signs:   There were no vitals taken for this visit.  Due to the remote nature of this encounter (virtual encounter), vitals were unable to be obtained.  Height stated at 66 inches.  Weight stated at 308 pounds.        PHQ-9 Score:   PHQ-9 Total Score: 11   Patient screened positive for depression based on a PHQ-9 score of 11 on 2/3/2021. Follow-up recommendations include: Patient has schizophrenia and is currently being managed with medication..    Mental Status Exam:   Hygiene:   good  Cooperation:  Cooperative  Eye Contact:  Good  Psychomotor Behavior:  Appropriate  Affect:  Appropriate  Mood: sad  Speech:  Normal  Thought Process:  Goal directed and Linear  Thought Content:  Normal  Suicidal:  None  Homicidal:  None  Hallucinations:  Auditory and Visual  Delusion:  None  Memory:  Intact  Orientation:  Person, Place, Time and Situation  Reliability:  good  Insight:  Fair  Judgement:  Fair  Impulse Control:  Fair  Physical/Medical Issues:  Yes Dm. HTN, CKD stage 2, hep C+, JACQUE, hx substance abuse     Current Medications:   Current Outpatient Medications   Medication Sig Dispense Refill   • ADMELOG 100 UNIT/ML injection Inject 40 Units under the skin into the appropriate area as directed 3 (Three) Times a Day Before Meals. (Patient taking differently: Inject 40 Units under the skin into the  "appropriate area as directed 3 (Three) Times a Day Before Meals. Through insulin pump) 40 mL 11   • albuterol sulfate  (90 Base) MCG/ACT inhaler Inhale 2 puffs Every 6 (Six) Hours As Needed for Wheezing or Shortness of Air. 1 inhaler 3   • Cariprazine HCl (Vraylar) 1.5 MG capsule capsule Take 1 capsule by mouth Daily. 30 capsule 0   • cyclobenzaprine (FLEXERIL) 5 MG tablet Take 1 tablet by mouth 3 (Three) Times a Day As Needed for Muscle Spasms. (Patient taking differently: Take 5 mg by mouth 3 (Three) Times a Day.) 90 tablet 3   • Dulaglutide 3 MG/0.5ML solution pen-injector Inject 3 mg under the skin into the appropriate area as directed 1 (One) Time Per Week. 4 pen 3   • DULoxetine (CYMBALTA) 30 MG capsule Take 3 capsules by mouth Daily. 90 capsule 3   • Ertugliflozin L-PyroglutamicAc (Steglatro) 15 MG tablet Take 1 tablet by mouth Every Morning. 90 tablet 3   • folic acid (FOLVITE) 1 MG tablet Take 1,000 mcg by mouth Daily.     • gabapentin (NEURONTIN) 300 MG capsule Take 1 capsule by mouth 3 (Three) Times a Day. 90 capsule 2   • glucose blood test strip Testing 4 times daily, E11.9 120 each 12   • glucose blood test strip Freestyle test strips, test 4 times daily, E11.9 120 each 12   • HYDROcodone-acetaminophen (NORCO) 7.5-325 MG per tablet Take 1 tablet by mouth 2 (Two) Times a Day.     • Insulin Syringe 29G X 1/2\" 0.5 ML misc Inject 3 times daily 100 each 11   • lamoTRIgine (LaMICtal) 100 MG tablet Start after take 50mg for the 2 weeks if no side effects or rash. 30 tablet 0   • levETIRAcetam (KEPPRA) 1000 MG tablet Take 1 tablet by mouth 2 (Two) Times a Day. 60 tablet 3   • lisinopril (PRINIVIL,ZESTRIL) 40 MG tablet Take 1 tablet by mouth once daily 30 tablet 3   • metFORMIN (GLUCOPHAGE) 500 MG tablet TAKE 1 TABLET BY MOUTH TWICE DAILY WITH MEALS 60 tablet 3   • metoprolol tartrate (LOPRESSOR) 50 MG tablet TAKE 1 TABLET BY MOUTH EVERY 12 HOURS 60 tablet 3   • nicotine (Nicoderm CQ) 21 MG/24HR patch " Place 1 patch on the skin as directed by provider Daily. 30 patch 3   • nicotine polacrilex (COMMIT) 2 MG lozenge Dissolve 1 lozenge in the mouth As Needed for Smoking Cessation. 168 lozenge 3   • prazosin (Minipress) 1 MG capsule Take 1 capsule by mouth Every Night. 30 capsule 0   • RELION PEN NEEDLES 31G X 6 MM misc USE 1 NEEDLE 3 TO 4 TIMES DAILY  3   • risperiDONE (RisperDAL) 2 MG tablet Take 1 tablet by mouth 2 (Two) Times a Day. 60 tablet 3   • simvastatin (ZOCOR) 40 MG tablet Take 1 tablet by mouth Every Night. 30 tablet 3   • tiotropium bromide-olodaterol (STIOLTO RESPIMAT) 2.5-2.5 MCG/ACT aerosol solution inhaler Inhale 2 puffs Daily. 1 inhaler 5   • vitamin D (ERGOCALCIFEROL) 1.25 MG (42490 UT) capsule capsule TAKE 2 CAPSULES BY MOUTH ONCE A WEEK 8 capsule 3     No current facility-administered medications for this visit.        Physical Exam  Nursing note reviewed.   Constitutional:       Appearance: Normal appearance.   Neurological:      Mental Status: She is alert.   Psychiatric:         Attention and Perception: Attention and perception normal.         Mood and Affect: Mood is anxious and depressed.         Speech: Speech normal.         Behavior: Behavior is cooperative.         Cognition and Memory: Cognition and memory normal.        Result Review :     The following data was reviewed by: ROD Spencer on 02/03/2021:  Common labs    Common Labsle 6/16/20 9/9/20 9/9/20 9/9/20 9/9/20 9/9/20 1/26/21 1/26/21 1/26/21 1/26/21 1/26/21     0820 0820 0820 0820 0820 0804 0804 0804 0804 0804   BUN    11      15    Creatinine    0.70      0.75    eGFR Non African Am    90      83    Sodium    137      137    Potassium    4.7      5.0    Chloride    103      104    Calcium    10.1      9.7    Albumin    3.70      3.90    Total Bilirubin    <0.2      0.2    Alkaline Phosphatase    72      67    AST (SGOT)    18      18    ALT (SGPT)    21      19    WBC  9.63     9.04       Hemoglobin  14.9     14.4        Hematocrit  44.3     45.0       Platelets  180     174       Triglycerides     249 (A)      169 (A)   HDL Cholesterol     37 (A)      39 (A)   LDL Cholesterol      42      47   Hemoglobin A1C   9.24 (A)      7.60 (A)     Microalbumin, Urine <1.2     <1.2  <1.2      (A) Abnormal value            CMP    CMP 6/10/20 9/9/20 1/26/21   BUN 17 11 15   Creatinine 0.78 0.70 0.75   eGFR Non African Am 80 90 83   Sodium 136 137 137   Potassium 4.9 4.7 5.0   Chloride 103 103 104   Calcium 9.4 10.1 9.7   Albumin 4.00 3.70 3.90   Total Bilirubin 0.2 <0.2 0.2   Alkaline Phosphatase 60 72 67   AST (SGOT) 19 18 18   ALT (SGPT) 17 21 19           CBC    CBC 6/10/20 9/9/20 1/26/21   WBC 8.89 9.63 9.04   RBC 4.85 5.07 5.10   Hemoglobin 14.1 14.9 14.4   Hematocrit 42.8 44.3 45.0   MCV 88.2 87.4 88.2   MCH 29.1 29.4 28.2   MCHC 32.9 33.6 32.0   RDW 13.6 13.3 13.0   Platelets 157 180 174           CBC w/diff    CBC w/Diff 6/10/20 9/9/20 1/26/21   WBC 8.89 9.63 9.04   RBC 4.85 5.07 5.10   Hemoglobin 14.1 14.9 14.4   Hematocrit 42.8 44.3 45.0   MCV 88.2 87.4 88.2   MCH 29.1 29.4 28.2   MCHC 32.9 33.6 32.0   RDW 13.6 13.3 13.0   Platelets 157 180 174   Neutrophil Rel % 63.8 67.2    Immature Granulocyte Rel % 0.6 (A) 0.5    Lymphocyte Rel % 26.4 21.6    Monocyte Rel % 4.5 (A) 4.8 (A)    Eosinophil Rel % 3.9 5.3    Basophil Rel % 0.8 0.6    (A) Abnormal value            Lipid Panel    Lipid Panel 6/10/20 9/9/20 1/26/21   Triglycerides 170 (A) 249 (A) 169 (A)   HDL Cholesterol 35 (A) 37 (A) 39 (A)   VLDL Cholesterol 34 49.8 (A) 28   LDL Cholesterol  46 42 47   LDL/HDL Ratio 1.31 1.14 1.06   (A) Abnormal value            TSH    TSH 6/10/20 1/26/21   TSH 2.550 1.610           BMP    BMP 6/10/20 9/9/20 1/26/21   BUN 17 11 15   Creatinine 0.78 0.70 0.75   Sodium 136 137 137   Potassium 4.9 4.7 5.0   Chloride 103 103 104   CO2 20.6 (A) 22.3 21.6 (A)   Calcium 9.4 10.1 9.7   (A) Abnormal value            HgB    HGB 6/10/20 9/9/20 1/26/21    Hemoglobin 14.1 14.9 14.4               Data reviewed: PCP notes        Assessment and Plan    Problem List Items Addressed This Visit     None      Visit Diagnoses     Schizoaffective disorder, depressive type (CMS/HCC)  (Chronic)   -  Primary    Relevant Medications    Cariprazine HCl (Vraylar) 1.5 MG capsule capsule    lamoTRIgine (LaMICtal) 100 MG tablet    Nightmares        Relevant Medications    Cariprazine HCl (Vraylar) 1.5 MG capsule capsule    Sleeping difficulty                TREATMENT PLAN/GOALS: Continue supportive psychotherapy efforts and medications as indicated. Treatment and medication options discussed during today's visit. Patient ackowledged and verbally consented to continue with current treatment plan and was educated on the importance of compliance with treatment and follow-up appointments.    MEDICATION ISSUES:  We discussed risks, benefits, and side effects of the above medications and the patient was agreeable with the plan. Patient was educated on the importance of compliance with treatment and follow-up appointments.  Patient is agreeable to call the office with any worsening of symptoms or onset of side effects. Patient is agreeable to call 911 or go to the nearest ER should he/she begin having SI/HI. Lengthy discussion with patient on the possible side effects of antipsychotic medications including increased cholesterol, increased blood sugar, and possibility of weight gain.  Also discussed the need to monitor lab work associated with this.  The risk of muscle movement disorders with this class of medication was also discussed. We will continue Lamictal in an effort to stabilize mood.  The patient was reminded to immediately come to the hospital should there be any loss of control.  Explanation was given to her regarding Lamictal and the potential for Foster Shon syndrome and significant rash.  Patient was encouraged to check skin prior to beginning.  Patient was encouraged to  report any rash and to immediately stop medication.      -Continue lamotrigine at 50 mg daily after taking 50 mg for 2 weeks if no side effects or rash will increase to 100 mg patient verbalized understanding.  -Continue Minipress 1 mg at night for nightmares.  -We will slowly begin tapering off risperidone and onto Vraylar once prior authorization is approved.  Instructed patient to take 1 mg in a.m. and 2 mg in p.m. for 3 days and then 1 mg at night and 1 mg in the morning for a week and then would follow-up for further tapering and then discontinuation.  -Begin Vraylar 1.5 mg for schizoaffective disorder depressed type now.    Patient has now failed and tried Haldol, aripiprazole, Latuda and now risperidone which has caused weight gain and due to diabetes and kidney disease would benefit from Vraylar as patient is still having auditory and visual hallucinations.     Counseled patient regarding multimodal approach with healthy nutrition, healthy sleep, regular physical activity, social activities, counseling, and medications.      Coping skills reviewed and encouraged positive framing of thoughts     Assisted patient in processing above session content; acknowledged and normalized patient’s thoughts, feelings, and concerns.  Applied  positive coping skills and behavior management in session.  Allowed patient to freely discuss issues without interruption or judgment. Provided safe, confidential environment to facilitate the development of positive therapeutic relationship and encourage open, honest communication. Assisted patient in identifying risk factors which would indicate the need for higher level of care including thoughts to harm self or others and/or self-harming behavior and encouraged patient to contact this office, call 911, or present to the nearest emergency room should any of these events occur. Discussed crisis intervention services and means to access.     MEDS ORDERED DURING VISIT:  New  Medications Ordered This Visit   Medications   • Cariprazine HCl (Vraylar) 1.5 MG capsule capsule     Sig: Take 1 capsule by mouth Daily.     Dispense:  30 capsule     Refill:  0   • lamoTRIgine (LaMICtal) 100 MG tablet     Sig: Start after take 50mg for the 2 weeks if no side effects or rash.     Dispense:  30 tablet     Refill:  0           Follow Up   Return in about 2 weeks (around 2/17/2021), or if symptoms worsen or fail to improve.    Patient was given instructions and counseling regarding her condition or for health maintenance advice. Please see specific information pulled into the AVS if appropriate.     This document has been electronically signed by ROD Spencer  February 3, 2021 15:03 EST    Part of this note may be an electronic transcription/translation of spoken language to printed text using the Dragon Dictation System.

## 2021-02-04 ENCOUNTER — OFFICE VISIT (OUTPATIENT)
Dept: FAMILY MEDICINE CLINIC | Facility: CLINIC | Age: 46
End: 2021-02-04

## 2021-02-04 ENCOUNTER — TELEPHONE (OUTPATIENT)
Dept: FAMILY MEDICINE CLINIC | Facility: CLINIC | Age: 46
End: 2021-02-04

## 2021-02-04 VITALS
OXYGEN SATURATION: 94 % | HEIGHT: 66 IN | HEART RATE: 100 BPM | WEIGHT: 293 LBS | SYSTOLIC BLOOD PRESSURE: 142 MMHG | TEMPERATURE: 96 F | DIASTOLIC BLOOD PRESSURE: 88 MMHG | BODY MASS INDEX: 47.09 KG/M2

## 2021-02-04 DIAGNOSIS — Z72.0 TOBACCO USER: ICD-10-CM

## 2021-02-04 DIAGNOSIS — G40.909 SEIZURE DISORDER (HCC): ICD-10-CM

## 2021-02-04 DIAGNOSIS — N18.2 STAGE 2 CHRONIC KIDNEY DISEASE: ICD-10-CM

## 2021-02-04 DIAGNOSIS — E55.9 VITAMIN D DEFICIENCY: ICD-10-CM

## 2021-02-04 DIAGNOSIS — M25.532 LEFT WRIST PAIN: Primary | ICD-10-CM

## 2021-02-04 DIAGNOSIS — E11.49 OTHER DIABETIC NEUROLOGICAL COMPLICATION ASSOCIATED WITH TYPE 2 DIABETES MELLITUS (HCC): ICD-10-CM

## 2021-02-04 DIAGNOSIS — F20.0 PARANOID SCHIZOPHRENIA (HCC): ICD-10-CM

## 2021-02-04 DIAGNOSIS — E66.01 MORBID OBESITY (HCC): ICD-10-CM

## 2021-02-04 DIAGNOSIS — E78.2 MIXED HYPERLIPIDEMIA: ICD-10-CM

## 2021-02-04 DIAGNOSIS — E11.65 UNCONTROLLED TYPE 2 DIABETES MELLITUS WITH HYPERGLYCEMIA (HCC): ICD-10-CM

## 2021-02-04 PROCEDURE — 99214 OFFICE O/P EST MOD 30 MIN: CPT | Performed by: FAMILY MEDICINE

## 2021-02-04 NOTE — TELEPHONE ENCOUNTER
----- Message from Jermaine Ferro MD sent at 2/4/2021 11:12 AM CST -----  Please call pt    X-ray of wrist shows a 4 mm lytic lesion distal radial diaphysis    Highly recommend Orthopedic referral and if pt agreeable I will put in order    Cannot rule out metastatic disease   May need whole body bone scan with spot films of left arm    Thanks

## 2021-02-04 NOTE — PATIENT INSTRUCTIONS
Diclofenac sodium topical solution  What is this medicine?  DICLOFENAC (dye KLOE fen ak) is a non-steroidal anti-inflammatory drug (NSAID). It is used to treat osteoarthritis of the knees.  This medicine may be used for other purposes; ask your health care provider or pharmacist if you have questions.  COMMON BRAND NAME(S): DICLOVIX, INFLAMMA-K, PENNSAID, VOPAC MDS  What should I tell my health care provider before I take this medicine?  They need to know if you have any of these conditions:  · asthma  · bleeding problems  · coronary artery bypass graft (CABG) surgery within the past 2 weeks  · heart disease  · high blood pressure  · if you frequently drink alcohol containing drinks  · kidney disease  · liver disease  · open or infected skin  · stomach problems  · an unusual or allergic reaction to diclofenac, aspirin, other NSAIDs, other medicines, foods, dyes, or preservatives  · pregnant or trying to get pregnant  · breast-feeding  How should I use this medicine?  This medicine is for external use only. Follow the directions on the prescription label. Wash hands before and after use. Apply to the clean, dry skin of the knee. Rub around front, back, and sides of the knee. Do not apply to open wounds, infections, swelling, or areas of exfoliative dermatitis. Allow medicine to dry before using any other lotion or medicine on the same place. Do not get this medicine in your mouth or eyes. If this medicine gets in your eye, rinse out with plenty of cool tap water. Protect treatment area from sunlight and sun lamps. Use your doses at regular intervals. Do not use it more often than directed.  A special MedGuide will be given to you by the pharmacist with each prescription and refill. Be sure to read this information carefully each time.  Talk to your pediatrician regarding the use of this medicine in children. Special care may be needed.  Overdosage: If you think you have taken too much of this medicine contact a  poison control center or emergency room at once.  NOTE: This medicine is only for you. Do not share this medicine with others.  What if I miss a dose?  If you miss a dose, use it as soon as you can. If it is almost time for your next dose, use only that dose. Do not use double or extra doses.  What may interact with this medicine?  Do not take this medicine with any of the following medications:  · cidofovir  · ketorolac  · methotrexate  This medicine may also interact with the following medications:  · aspirin  · cyclosporine  · lithium  · medicines for blood pressure  · medicines that treat or prevent blood clots like warfarin, enoxaparin, and dalteparin  · NSAIDs, medicines for pain and inflammation, like ibuprofen or naproxen  · other products used on the skin  · steroid medicines like prednisone or cortisone  This list may not describe all possible interactions. Give your health care provider a list of all the medicines, herbs, non-prescription drugs, or dietary supplements you use. Also tell them if you smoke, drink alcohol, or use illegal drugs. Some items may interact with your medicine.  What should I watch for while using this medicine?  Tell your doctor or healthcare provider if your symptoms do not start to get better or if they get worse.  This medicine may cause serious skin reactions. They can happen weeks to months after starting the medicine. Contact your healthcare provider right away if you notice fevers or flu-like symptoms with a rash. The rash may be red or purple and then turn into blisters or peeling of the skin. Or, you might notice a red rash with swelling of the face, lips or lymph nodes in your neck or under your arms.  This medicine can make you more sensitive to the sun. Keep out of the sun. If you cannot avoid being in the sun, wear protective clothing and use sunscreen. Do not use sun lamps or tanning beds/booths.  Do not take medicines such as ibuprofen and naproxen with this  medicine. Side effects such as stomach upset, nausea, or ulcers may be more likely to occur. Many medicines available without a prescription should not be taken with this medicine.  This medicine does not prevent heart attack or stroke. In fact, this medicine may increase the chance of a heart attack or stroke. The chance may increase with longer use of this medicine and in people who have heart disease. If you take aspirin to prevent heart attack or stroke, talk with your doctor or healthcare provider.  This medicine can cause ulcers and bleeding in the stomach and intestines at any time during treatment. Ulcers and bleeding can happen without warning symptoms and can cause death. To reduce your risk, do not smoke cigarettes or drink alcohol while you are taking this medicine.  This medicine can cause you to bleed more easily. Try to avoid damage to your teeth and gums when you brush or floss your teeth.  What side effects may I notice from receiving this medicine?  Side effects that you should report to your doctor or health care professional as soon as possible:  · allergic reactions like skin rash, itching or hives, swelling of the face, lips, or tongue  · black or bloody stools, blood in the urine or vomit  · blurred vision  · chest pain  · difficulty breathing or wheezing  · nausea or vomiting  · rash, fever, and swollen lymph nodes  · redness, blistering, peeling or loosening of the skin, including inside the mouth  · slurred speech or weakness on one side of the body  · trouble passing urine or change in the amount of urine  · unexplained weight gain or swelling  · unusually weak or tired  · yellowing of eyes or skin  Side effects that usually do not require medical attention (report to your doctor or health care professional if they continue or are bothersome):  · dizziness  · dry skin  · headache  · increased sensitivity to the sun  · stomach upset  · tingling at the application  This list may not describe  all possible side effects. Call your doctor for medical advice about side effects. You may report side effects to FDA at 4-814-JFC-9127.  Where should I keep my medicine?  Keep out of the reach of children.  Store at room temperature between 15 and 30 degrees C (59 and 86 degrees F). Keep container tightly closed. Throw away any unused medicine after the expiration date.  NOTE: This sheet is a summary. It may not cover all possible information. If you have questions about this medicine, talk to your doctor, pharmacist, or health care provider.  © 2020 [employer] (2020-03-04 13:15:19)  Cariprazine oral capsules  What is this medicine?  CARIPRAZINE (car i PRA zeen) is an antipsychotic. It is used to treat schizophrenia or bipolar disorder. Bipolar disorder is also known as manic-depression.  This medicine may be used for other purposes; ask your health care provider or pharmacist if you have questions.  COMMON BRAND NAME(S): NICKYCARLIE  What should I tell my health care provider before I take this medicine?  They need to know if you have any of these conditions:  · dementia  · diabetes  · difficulty swallowing  · heart disease  · history of breast cancer  · kidney disease  · liver disease  · low blood counts, like low white cell, platelet, or red cell counts  · low blood pressure  · Parkinson's disease  · seizures  · suicidal thoughts, plans, or attempt; a previous suicide attempt by you or a family member  · an unusual or allergic reaction to cariprazine, other medicines, foods, dyes, or preservatives  · pregnant or trying to get pregnant  · breast-feeding  How should I use this medicine?  Take this medicine by mouth with a glass of water. Follow the directions on the prescription label. You may take it with or without food. Take your medicine at regular intervals. Do not take it more often than directed. Do not stop taking except on your doctor's advice.  Talk to your pediatrician regarding the use of this  medicine in children. Special care may be needed.  Overdosage: If you think you have taken too much of this medicine contact a poison control center or emergency room at once.  NOTE: This medicine is only for you. Do not share this medicine with others.  What if I miss a dose?  If you miss a dose, take it as soon as you can. If it is almost time for your next dose, take only that dose. Do not take double or extra doses.  What may interact with this medicine?  Do not take this medicine with any of the following medications:  · metoclopramide  This medicine may also interact with the following medications:  · carbamazepine  · certain medicines for depression, anxiety, or psychotic disturbances  · certain medicines for sleep  · itraconazole  · ketoconazole  · medicines for blood pressure  · rifampin  · Tom's wort  This list may not describe all possible interactions. Give your health care provider a list of all the medicines, herbs, non-prescription drugs, or dietary supplements you use. Also tell them if you smoke, drink alcohol, or use illegal drugs. Some items may interact with your medicine.  What should I watch for while using this medicine?  Visit your health care professional for regular checks on your progress. Tell your health care professional if symptoms do not start to get better or if they get worse. Do not stop taking except on your health care professional's advice. You may develop a severe reaction. Your health care professional will tell you how much medicine to take.  Patients and their families should watch out for new or worsening depression or thoughts of suicide. Also watch out for sudden changes in feelings such as feeling anxious, agitated, panicky, irritable, hostile, aggressive, impulsive, severely restless, overly excited and hyperactive, or not being able to sleep. If this happens, especially at the beginning of treatment or after a change in dose, call your healthcare professional.  You  may get dizzy or drowsy. Do not drive, use machinery, or do anything that needs mental alertness until you know how this medicine affects you. Do not stand or sit up quickly, especially if you are an older patient. This reduces the risk of dizzy or fainting spells. Alcohol may interfere with the effect of this medicine. Avoid alcoholic drinks.  This medicine may cause dry eyes and blurred vision. If you wear contact lenses you may feel some discomfort. Lubricating drops may help. See your eye doctor if the problem does not go away or is severe.  This medicine may increase blood sugar. Ask your health care provider if changes in diet or medicines are needed if you have diabetes.  This drug can cause problems with controlling your body temperature. It can lower the response of your body to cold temperatures. If possible, stay indoors during cold weather. If you must go outdoors, wear warm clothes. It can also lower the response of your body to heat. Do not overheat. Do not over-exercise. Stay out of the sun when possible. If you must be in the sun, wear cool clothing. Drink plenty of water. If you have trouble controlling your body temperature, call your health care provider right away.  Women should inform their doctor if they wish to become pregnant or think they might be pregnant. The effects of this medicine on an unborn child are not known. A registry is available to monitor pregnancy outcomes in pregnant women exposed to this medicine or similar medicines. Talk to your health care professional or pharmacist for more information.  What side effects may I notice from receiving this medicine?  Side effects that you should report to your doctor or health care professional as soon as possible:  · allergic reactions like skin rash, itching or hives, swelling of the face, lips, or tongue  · breathing problems  · confusion  · fever or chills, sore throat  · inability to keep still  · problems with balance, talking,  walking  · redness, blistering, peeling or loosening of the skin, including inside the mouth  · seizures  · signs and symptoms of high blood sugar such as being more thirsty or hungry or having to urinate more than normal. You may also feel very tired or have blurry vision  · signs and symptoms of low blood pressure like dizziness; feeling faint or lightheaded, falls; unusually weak or tired  · signs and symptoms of neuroleptic malignant syndrome such as confusion; fast or irregular heartbeat; high fever; increased sweating; stiff muscles  · sudden numbness or weakness of the face, arm, or leg  · suicidal thoughts or other mood changes  · trouble swallowing  · uncontrollable movements of the arms, face, head, mouth, neck, or upper body  Side effects that usually do not require medical attention (report to your doctor or health care professional if they continue or are bothersome):  · constipation  · drowsiness  · nausea, vomiting  · upset stomach  · weight gain  This list may not describe all possible side effects. Call your doctor for medical advice about side effects. You may report side effects to FDA at 6-210-FDA-7998.  Where should I keep my medicine?  Keep out of the reach of children.  Store at room temperature between 15 and 30 degrees C (59 and 86 degrees F). Protect from light. Throw away any unused medicine after the expiration date.  NOTE: This sheet is a summary. It may not cover all possible information. If you have questions about this medicine, talk to your doctor, pharmacist, or health care provider.  © 2020 [employer] (2020-10-13 16:21:01)  Diclofenac sodium topical solution  What is this medicine?  DICLOFENAC (dye KLOE fen ak) is a non-steroidal anti-inflammatory drug (NSAID). It is used to treat osteoarthritis of the knees.  This medicine may be used for other purposes; ask your health care provider or pharmacist if you have questions.  COMMON BRAND NAME(S): DICLOVIX, INFLAMMA-K,  CHRISTINE [facility]  What should I tell my health care provider before I take this medicine?  They need to know if you have any of these conditions:  · asthma  · bleeding problems  · coronary artery bypass graft (CABG) surgery within the past 2 weeks  · heart disease  · high blood pressure  · if you frequently drink alcohol containing drinks  · kidney disease  · liver disease  · open or infected skin  · stomach problems  · an unusual or allergic reaction to diclofenac, aspirin, other NSAIDs, other medicines, foods, dyes, or preservatives  · pregnant or trying to get pregnant  · breast-feeding  How should I use this medicine?  This medicine is for external use only. Follow the directions on the prescription label. Wash hands before and after use. Apply to the clean, dry skin of the knee. Rub around front, back, and sides of the knee. Do not apply to open wounds, infections, swelling, or areas of exfoliative dermatitis. Allow medicine to dry before using any other lotion or medicine on the same place. Do not get this medicine in your mouth or eyes. If this medicine gets in your eye, rinse out with plenty of cool tap water. Protect treatment area from sunlight and sun lamps. Use your doses at regular intervals. Do not use it more often than directed.  A special MedGuide will be given to you by the pharmacist with each prescription and refill. Be sure to read this information carefully each time.  Talk to your pediatrician regarding the use of this medicine in children. Special care may be needed.  Overdosage: If you think you have taken too much of this medicine contact a poison control center or emergency room at once.  NOTE: This medicine is only for you. Do not share this medicine with others.  What if I miss a dose?  If you miss a dose, use it as soon as you can. If it is almost time for your next dose, use only that dose. Do not use double or extra doses.  What may interact with this medicine?  Do not take this  medicine with any of the following medications:  · cidofovir  · ketorolac  · methotrexate  This medicine may also interact with the following medications:  · aspirin  · cyclosporine  · lithium  · medicines for blood pressure  · medicines that treat or prevent blood clots like warfarin, enoxaparin, and dalteparin  · NSAIDs, medicines for pain and inflammation, like ibuprofen or naproxen  · other products used on the skin  · steroid medicines like prednisone or cortisone  This list may not describe all possible interactions. Give your health care provider a list of all the medicines, herbs, non-prescription drugs, or dietary supplements you use. Also tell them if you smoke, drink alcohol, or use illegal drugs. Some items may interact with your medicine.  What should I watch for while using this medicine?  Tell your doctor or healthcare provider if your symptoms do not start to get better or if they get worse.  This medicine may cause serious skin reactions. They can happen weeks to months after starting the medicine. Contact your healthcare provider right away if you notice fevers or flu-like symptoms with a rash. The rash may be red or purple and then turn into blisters or peeling of the skin. Or, you might notice a red rash with swelling of the face, lips or lymph nodes in your neck or under your arms.  This medicine can make you more sensitive to the sun. Keep out of the sun. If you cannot avoid being in the sun, wear protective clothing and use sunscreen. Do not use sun lamps or tanning beds/booths.  Do not take medicines such as ibuprofen and naproxen with this medicine. Side effects such as stomach upset, nausea, or ulcers may be more likely to occur. Many medicines available without a prescription should not be taken with this medicine.  This medicine does not prevent heart attack or stroke. In fact, this medicine may increase the chance of a heart attack or stroke. The chance may increase with longer use of  this medicine and in people who have heart disease. If you take aspirin to prevent heart attack or stroke, talk with your doctor or healthcare provider.  This medicine can cause ulcers and bleeding in the stomach and intestines at any time during treatment. Ulcers and bleeding can happen without warning symptoms and can cause death. To reduce your risk, do not smoke cigarettes or drink alcohol while you are taking this medicine.  This medicine can cause you to bleed more easily. Try to avoid damage to your teeth and gums when you brush or floss your teeth.  What side effects may I notice from receiving this medicine?  Side effects that you should report to your doctor or health care professional as soon as possible:  · allergic reactions like skin rash, itching or hives, swelling of the face, lips, or tongue  · black or bloody stools, blood in the urine or vomit  · blurred vision  · chest pain  · difficulty breathing or wheezing  · nausea or vomiting  · rash, fever, and swollen lymph nodes  · redness, blistering, peeling or loosening of the skin, including inside the mouth  · slurred speech or weakness on one side of the body  · trouble passing urine or change in the amount of urine  · unexplained weight gain or swelling  · unusually weak or tired  · yellowing of eyes or skin  Side effects that usually do not require medical attention (report to your doctor or health care professional if they continue or are bothersome):  · dizziness  · dry skin  · headache  · increased sensitivity to the sun  · stomach upset  · tingling at the application  This list may not describe all possible side effects. Call your doctor for medical advice about side effects. You may report side effects to FDA at 2-068-FDA-5369.  Where should I keep my medicine?  Keep out of the reach of children.  Store at room temperature between 15 and 30 degrees C (59 and 86 degrees F). Keep container tightly closed. Throw away any unused medicine after  the expiration date.  NOTE: This sheet is a summary. It may not cover all possible information. If you have questions about this medicine, talk to your doctor, pharmacist, or health care provider.  © 2020 [employer] (2020-03-04 13:15:19)

## 2021-02-05 ENCOUNTER — TELEPHONE (OUTPATIENT)
Dept: FAMILY MEDICINE CLINIC | Facility: CLINIC | Age: 46
End: 2021-02-05

## 2021-02-06 DIAGNOSIS — M25.532 LEFT WRIST PAIN: ICD-10-CM

## 2021-02-06 DIAGNOSIS — M89.9 LYTIC LESION OF BONE ON X-RAY: Primary | ICD-10-CM

## 2021-02-17 ENCOUNTER — TELEMEDICINE (OUTPATIENT)
Dept: PSYCHIATRY | Facility: CLINIC | Age: 46
End: 2021-02-17

## 2021-02-17 DIAGNOSIS — F51.5 NIGHTMARES: ICD-10-CM

## 2021-02-17 DIAGNOSIS — F25.1 SCHIZOAFFECTIVE DISORDER, DEPRESSIVE TYPE (HCC): Primary | Chronic | ICD-10-CM

## 2021-02-17 DIAGNOSIS — G47.9 SLEEPING DIFFICULTY: ICD-10-CM

## 2021-02-17 PROCEDURE — 99214 OFFICE O/P EST MOD 30 MIN: CPT | Performed by: NURSE PRACTITIONER

## 2021-02-17 RX ORDER — RISPERIDONE 0.5 MG/1
0.5 TABLET ORAL NIGHTLY
Qty: 7 TABLET | Refills: 0 | Status: SHIPPED | OUTPATIENT
Start: 2021-02-17 | End: 2021-03-04

## 2021-02-17 NOTE — PROGRESS NOTES
This provider is located at [facility], [address] [address].The Patient is seen remotely at home, [address] [facility] [place] [address] via [place].  Patient is being seen via telehealth and confirm that they are in a secure environment for this session. The patient's condition being diagnosed/treated is appropriate for telemedicine. The provider identified himself/herself: herself as well as her credentials.   The patient gave consent to be seen remotely, and when consent is given they understand that the consent allows for patient identifiable information to be sent to a third party as needed.   They may refuse to be seen remotely at any time. The electronic data is encrypted and password protected, and the patient has been advised of the potential risks to privacy not withstanding such measures.    You have chosen to receive care through a telehealth visit.  Do you consent to use a video/audio connection for your medical care today? Yes      Chief Complaint  No chief complaint on file.    Subjective    Sudhakar More Saul presents to [facility] for medication management.       History of Present Illness   Patient presents today reporting that she has been doing good.  She states that it was difficult coming off the Risperdal for the first few days but she states now she has done better.  She notices her mood swings have been better and when she does get frustrated that she gets over it quickly.  Patient states that she has not had any visual hallucinations this past week.  Patient states that the auditory come and go as they are about the same with tapering off the risperidone.  Patient states that she has been noticing she is not snacking as much which is helped with the weight.  Patient reports as well as her family that she is not as withdrawn and states she is socializing more with family.  Patient states she is still averaging 5 to  "6 hours of sleep at night.  She states she is having weird vivid dreams but not nightmares.  Patient denies any side effects with the medications.  Patient states that she went without her Cymbalta for a couple of days and noticed a difference with her pain and realized that she may have to stay on this.  When discussing side effects with patient she states that she does \"ground her jaw\".  She states she is always had eye twitching and shakiness then reports she has noticed those for a long time when asking how long she states the past 6 months.  Patient reports that she believes she does have TD symptoms will continue to evaluate once off risperidone and determine if Cogentin is needed.  Patient stated that it is not affecting her ADLs but they have been noticeable she is just forgot to mention them.  Patient denies any SI or HI.  Patient denies any visual hallucination at this time.  Patient reports auditory hallucinations still but states they are mostly negative but no one tells her to harm herself or others at this time.    Objective   Vital Signs:   There were no vitals taken for this visit.  Due to the remote nature of this encounter (virtual encounter), vitals were unable to be obtained.  Height stated at 66 inches.  Weight stated at 308 pounds.        PHQ-9 Score:   PHQ-9 Total Score: (P) 11   Patient screened positive for depression based on a PHQ-9 score of 11 on 2/17/2021. Follow-up recommendations include: Patient has schizophrenia and is currently being managed with medication..    Mental Status Exam:   Hygiene:   good  Cooperation:  Cooperative  Eye Contact:  Good  Psychomotor Behavior:  Appropriate  Affect:  Appropriate  Mood: sad  Speech:  Normal  Thought Process:  Goal directed and Linear  Thought Content:  Normal  Suicidal:  None  Homicidal:  None  Hallucinations:  Auditory and Visual  Delusion:  None  Memory:  Intact  Orientation:  Person, Place, Time and Situation  Reliability:  good  Insight:  " "Fair  Judgement:  Fair  Impulse Control:  Fair  Physical/Medical Issues:  Yes Dm. HTN, CKD stage 2, hep C+, JACQUE, hx substance abuse     Current Medications:   Current Outpatient Medications   Medication Sig Dispense Refill   • ADMELOG 100 UNIT/ML injection Inject 40 Units under the skin into the appropriate area as directed 3 (Three) Times a Day Before Meals. (Patient taking differently: Inject 40 Units under the skin into the appropriate area as directed 3 (Three) Times a Day Before Meals. Through insulin pump) 40 mL 11   • albuterol sulfate  (90 Base) MCG/ACT inhaler Inhale 2 puffs Every 6 (Six) Hours As Needed for Wheezing or Shortness of Air. 1 inhaler 3   • Cariprazine HCl (VRAYLAR) 3 MG capsule capsule Take 1 capsule by mouth Daily. 30 capsule 0   • cyclobenzaprine (FLEXERIL) 5 MG tablet Take 1 tablet by mouth 3 (Three) Times a Day As Needed for Muscle Spasms. (Patient taking differently: Take 5 mg by mouth 3 (Three) Times a Day.) 90 tablet 3   • Diclofenac Sodium (Voltaren) 1 % gel gel Apply 4 g topically to the appropriate area as directed 4 (Four) Times a Day. 150 g 3   • Dulaglutide 3 MG/0.5ML solution pen-injector Inject 3 mg under the skin into the appropriate area as directed 1 (One) Time Per Week. 4 pen 3   • DULoxetine (CYMBALTA) 30 MG capsule Take 3 capsules by mouth Daily. 90 capsule 3   • Ertugliflozin L-PyroglutamicAc (Steglatro) 15 MG tablet Take 1 tablet by mouth Every Morning. 90 tablet 3   • folic acid (FOLVITE) 1 MG tablet Take 1,000 mcg by mouth Daily.     • gabapentin (NEURONTIN) 300 MG capsule Take 1 capsule by mouth 3 (Three) Times a Day. 90 capsule 2   • glucose blood test strip Testing 4 times daily, E11.9 120 each 12   • glucose blood test strip Freestyle test strips, test 4 times daily, E11.9 120 each 12   • HYDROcodone-acetaminophen (NORCO) 7.5-325 MG per tablet Take 1 tablet by mouth 2 (Two) Times a Day.     • Insulin Syringe 29G X 1/2\" 0.5 ML misc Inject 3 times daily 100 " each 11   • lamoTRIgine (LaMICtal) 100 MG tablet Start after take 50mg for the 2 weeks if no side effects or rash. 30 tablet 0   • levETIRAcetam (KEPPRA) 1000 MG tablet Take 1 tablet by mouth 2 (Two) Times a Day. 60 tablet 3   • lisinopril (PRINIVIL,ZESTRIL) 40 MG tablet Take 1 tablet by mouth once daily 30 tablet 3   • metFORMIN (GLUCOPHAGE) 500 MG tablet TAKE 1 TABLET BY MOUTH TWICE DAILY WITH MEALS 60 tablet 3   • metoprolol tartrate (LOPRESSOR) 50 MG tablet TAKE 1 TABLET BY MOUTH EVERY 12 HOURS 60 tablet 3   • nicotine (Nicoderm CQ) 21 MG/24HR patch Place 1 patch on the skin as directed by provider Daily. 30 patch 3   • prazosin (Minipress) 1 MG capsule Take 1 capsule by mouth Every Night. 30 capsule 0   • RELION PEN NEEDLES 31G X 6 MM misc USE 1 NEEDLE 3 TO 4 TIMES DAILY  3   • risperiDONE (RisperDAL) 0.5 MG tablet Take 1 tablet by mouth Every Night. 7 tablet 0   • simvastatin (ZOCOR) 40 MG tablet Take 1 tablet by mouth Every Night. 30 tablet 3   • tiotropium bromide-olodaterol (STIOLTO RESPIMAT) 2.5-2.5 MCG/ACT aerosol solution inhaler Inhale 2 puffs Daily. 1 inhaler 5   • vitamin D (ERGOCALCIFEROL) 1.25 MG (87133 UT) capsule capsule TAKE 2 CAPSULES BY MOUTH ONCE A WEEK 8 capsule 3     No current facility-administered medications for this visit.        Physical Exam  Nursing note reviewed.   Constitutional:       Appearance: Normal appearance.   Neurological:      Mental Status: She is alert.   Psychiatric:         Attention and Perception: Attention and perception normal.         Mood and Affect: Mood is anxious and depressed.         Speech: Speech normal.         Behavior: Behavior is cooperative.         Cognition and Memory: Cognition and memory normal.        Result Review :     The following data was reviewed by: ROD Spencer on 02/03/2021:  Common labs    Common Labsle 6/16/20 9/9/20 9/9/20 9/9/20 9/9/20 9/9/20 1/26/21 1/26/21 1/26/21 1/26/21 1/26/21     0820 0820 0820 0820 0820 0804 0804  0804 0804 0804   Glucose    159 (A)      187 (A)    BUN    11      15    Creatinine    0.70      0.75    eGFR Non African Am    90      83    Sodium    137      137    Potassium    4.7      5.0    Chloride    103      104    Calcium    10.1      9.7    Albumin    3.70      3.90    Total Bilirubin    <0.2      0.2    Alkaline Phosphatase    72      67    AST (SGOT)    18      18    ALT (SGPT)    21      19    WBC  9.63     9.04       Hemoglobin  14.9     14.4       Hematocrit  44.3     45.0       Platelets  180     174       Total Cholesterol     129      114   Triglycerides     249 (A)      169 (A)   HDL Cholesterol     37 (A)      39 (A)   LDL Cholesterol      42      47   Hemoglobin A1C   9.24 (A)      7.60 (A)     Microalbumin, Urine <1.2     <1.2  <1.2      (A) Abnormal value            CMP    CMP 6/10/20 9/9/20 1/26/21   Glucose 219 (A) 159 (A) 187 (A)   BUN 17 11 15   Creatinine 0.78 0.70 0.75   eGFR Non African Am 80 90 83   Sodium 136 137 137   Potassium 4.9 4.7 5.0   Chloride 103 103 104   Calcium 9.4 10.1 9.7   Albumin 4.00 3.70 3.90   Total Bilirubin 0.2 <0.2 0.2   Alkaline Phosphatase 60 72 67   AST (SGOT) 19 18 18   ALT (SGPT) 17 21 19   (A) Abnormal value            CBC    CBC 6/10/20 9/9/20 1/26/21   WBC 8.89 9.63 9.04   RBC 4.85 5.07 5.10   Hemoglobin 14.1 14.9 14.4   Hematocrit 42.8 44.3 45.0   MCV 88.2 87.4 88.2   MCH 29.1 29.4 28.2   MCHC 32.9 33.6 32.0   RDW 13.6 13.3 13.0   Platelets 157 180 174           CBC w/diff    CBC w/Diff 6/10/20 9/9/20 1/26/21   WBC 8.89 9.63 9.04   RBC 4.85 5.07 5.10   Hemoglobin 14.1 14.9 14.4   Hematocrit 42.8 44.3 45.0   MCV 88.2 87.4 88.2   MCH 29.1 29.4 28.2   MCHC 32.9 33.6 32.0   RDW 13.6 13.3 13.0   Platelets 157 180 174   Neutrophil Rel % 63.8 67.2    Immature Granulocyte Rel % 0.6 (A) 0.5    Lymphocyte Rel % 26.4 21.6    Monocyte Rel % 4.5 (A) 4.8 (A)    Eosinophil Rel % 3.9 5.3    Basophil Rel % 0.8 0.6    (A) Abnormal value            Lipid Panel    Lipid  Panel 6/10/20 9/9/20 1/26/21   Total Cholesterol 115 129 114   Triglycerides 170 (A) 249 (A) 169 (A)   HDL Cholesterol 35 (A) 37 (A) 39 (A)   VLDL Cholesterol 34 49.8 (A) 28   LDL Cholesterol  46 42 47   LDL/HDL Ratio 1.31 1.14 1.06   (A) Abnormal value            TSH    TSH 6/10/20 1/26/21   TSH 2.550 1.610           BMP    BMP 6/10/20 9/9/20 1/26/21   BUN 17 11 15   Creatinine 0.78 0.70 0.75   Sodium 136 137 137   Potassium 4.9 4.7 5.0   Chloride 103 103 104   CO2 20.6 (A) 22.3 21.6 (A)   Calcium 9.4 10.1 9.7   (A) Abnormal value            HgB    HGB 6/10/20 9/9/20 1/26/21   Hemoglobin 14.1 14.9 14.4               Data reviewed: PCP notes        Assessment and Plan    Problem List Items Addressed This Visit     None      Visit Diagnoses     Schizoaffective disorder, depressive type (CMS/HCC)  (Chronic)   -  Primary    Relevant Medications    Cariprazine HCl (VRAYLAR) 3 MG capsule capsule    risperiDONE (RisperDAL) 0.5 MG tablet    Nightmares        Relevant Medications    Cariprazine HCl (VRAYLAR) 3 MG capsule capsule    risperiDONE (RisperDAL) 0.5 MG tablet    Sleeping difficulty                TREATMENT PLAN/GOALS: Continue supportive psychotherapy efforts and medications as indicated. Treatment and medication options discussed during today's visit. Patient ackowledged and verbally consented to continue with current treatment plan and was educated on the importance of compliance with treatment and follow-up appointments.    MEDICATION ISSUES:  We discussed risks, benefits, and side effects of the above medications and the patient was agreeable with the plan. Patient was educated on the importance of compliance with treatment and follow-up appointments.  Patient is agreeable to call the office with any worsening of symptoms or onset of side effects. Patient is agreeable to call 911 or go to the nearest ER should he/she begin having SI/HI. Lengthy discussion with patient on the possible side effects of  antipsychotic medications including increased cholesterol, increased blood sugar, and possibility of weight gain.  Also discussed the need to monitor lab work associated with this.  The risk of muscle movement disorders with this class of medication was also discussed. We will continue Lamictal in an effort to stabilize mood.  The patient was reminded to immediately come to the hospital should there be any loss of control.  Explanation was given to her regarding Lamictal and the potential for Foster Shon syndrome and significant rash.  Patient was encouraged to check skin prior to beginning.  Patient was encouraged to report any rash and to immediately stop medication.      -Continue lamotrigine 100 mg daily as patient just started the 100 mg dose today.  -Continue Minipress 1 mg at night for nightmares.  -Continue tapering off risperidone encourage patient to take 1 mg at night for the past 5 days and then 0.5 mg for the next 5 days then discontinue as patient is currently on 2 mg at night.  -Increase Vraylar to 3 mg for schizoaffective disorder depressed type now.  -Continuing to evaluate tardive dyskinesia symptoms as patient states she has been having them for the last 6 months and has failed to report some of these findings they are currently not affecting her ADLs but will continue monitoring for possible Cogentin use as patient is currently on metoprolol which may be a contraindication with the propanolol.      Patient has now failed and tried Haldol, aripiprazole, Latuda and now risperidone which has caused weight gain and due to diabetes and kidney disease would benefit from Vraylar as patient is still having auditory and visual hallucinations.     Counseled patient regarding multimodal approach with healthy nutrition, healthy sleep, regular physical activity, social activities, counseling, and medications.      Coping skills reviewed and encouraged positive framing of thoughts     Assisted patient in  processing above session content; acknowledged and normalized patient’s thoughts, feelings, and concerns.  Applied  positive coping skills and behavior management in session.  Allowed patient to freely discuss issues without interruption or judgment. Provided safe, confidential environment to facilitate the development of positive therapeutic relationship and encourage open, honest communication. Assisted patient in identifying risk factors which would indicate the need for higher level of care including thoughts to harm self or others and/or self-harming behavior and encouraged patient to contact this office, call 911, or present to the nearest emergency room should any of these events occur. Discussed crisis intervention services and means to access.     MEDS ORDERED DURING VISIT:  New Medications Ordered This Visit   Medications   • Cariprazine HCl (VRAYLAR) 3 MG capsule capsule     Sig: Take 1 capsule by mouth Daily.     Dispense:  30 capsule     Refill:  0   • risperiDONE (RisperDAL) 0.5 MG tablet     Sig: Take 1 tablet by mouth Every Night.     Dispense:  7 tablet     Refill:  0     Tapering off onto Vraylar           Follow Up   Return in about 2 weeks (around 3/3/2021), or if symptoms worsen or fail to improve, for Recheck.    Patient was given instructions and counseling regarding her condition or for health maintenance advice. Please see specific information pulled into the AVS if appropriate.     This document has been electronically signed by ROD Spencer  February 17, 2021 13:54 EST    Part of this note may be an electronic transcription/translation of spoken language to printed text using the Dragon Dictation System.

## 2021-02-19 ENCOUNTER — PRIOR AUTHORIZATION (OUTPATIENT)
Dept: PSYCHIATRY | Facility: CLINIC | Age: 46
End: 2021-02-19

## 2021-02-24 ENCOUNTER — TELEPHONE (OUTPATIENT)
Dept: FAMILY MEDICINE CLINIC | Facility: CLINIC | Age: 46
End: 2021-02-24

## 2021-02-24 NOTE — TELEPHONE ENCOUNTER
Called pt. No answer and no voicemail. Other number not a working number. 's office tried to contact pt 4 times to schedule and was unsuccessful. Pt will need to contact that office to make appointment.

## 2021-02-24 NOTE — TELEPHONE ENCOUNTER
Please call Ms. Saul back. She is calling in regards to the referral to [facility]. States she hasn't heard anything about it [phone]

## 2021-02-25 ENCOUNTER — OFFICE VISIT (OUTPATIENT)
Dept: ORTHOPEDIC SURGERY | Facility: CLINIC | Age: 46
End: 2021-02-25

## 2021-02-25 VITALS
HEART RATE: 97 BPM | TEMPERATURE: 98 F | DIASTOLIC BLOOD PRESSURE: 83 MMHG | RESPIRATION RATE: 20 BRPM | BODY MASS INDEX: 47.09 KG/M2 | WEIGHT: 293 LBS | OXYGEN SATURATION: 97 % | SYSTOLIC BLOOD PRESSURE: 146 MMHG | HEIGHT: 66 IN

## 2021-02-25 DIAGNOSIS — M25.539 PAIN IN WRIST, UNSPECIFIED LATERALITY: Primary | ICD-10-CM

## 2021-02-25 DIAGNOSIS — M65.4 DE QUERVAIN'S TENOSYNOVITIS, LEFT: ICD-10-CM

## 2021-02-25 PROCEDURE — 99203 OFFICE O/P NEW LOW 30 MIN: CPT | Performed by: ORTHOPAEDIC SURGERY

## 2021-02-25 PROCEDURE — 20605 DRAIN/INJ JOINT/BURSA W/O US: CPT | Performed by: ORTHOPAEDIC SURGERY

## 2021-02-25 RX ORDER — LIDOCAINE HYDROCHLORIDE 10 MG/ML
0.5 INJECTION, SOLUTION INFILTRATION; PERINEURAL
Status: COMPLETED | OUTPATIENT
Start: 2021-02-25 | End: 2021-02-25

## 2021-02-25 RX ORDER — TRIAMCINOLONE ACETONIDE 40 MG/ML
20 INJECTION, SUSPENSION INTRA-ARTICULAR; INTRAMUSCULAR
Status: COMPLETED | OUTPATIENT
Start: 2021-02-25 | End: 2021-02-25

## 2021-02-25 RX ADMIN — LIDOCAINE HYDROCHLORIDE 0.5 ML: 10 INJECTION, SOLUTION INFILTRATION; PERINEURAL at 09:03

## 2021-02-25 RX ADMIN — TRIAMCINOLONE ACETONIDE 20 MG: 40 INJECTION, SUSPENSION INTRA-ARTICULAR; INTRAMUSCULAR at 09:03

## 2021-02-25 NOTE — PROGRESS NOTES
Sudhakar Saul is a 45 y.o. female   Primary provider:  Jermaine Ferro MD       Chief Complaint   Patient presents with   • Left Wrist - Pain, Initial Evaluation     X-rays @ [facility]  HISTORY OF PRESENT ILLNESS:left wrist pain and swelling.  Pain scale today 6/10    This is the first office visit for evaluation of left wrist pain.    Ms. Saul is 45 years old and right-hand dominant.  She has about a 2-month history of dull aching pain over the radial aspect of the left wrist extending into the thumb.  There is been no trauma.  The pain is worse with lifting and motion of the wrist and relieved by rest.  Treatment has included Voltaren gel.  She recently had x-rays performed to assess her wrist pain and a lesion of the radial shaft was noted.    Home medicines include Cymbalta albuterol Neurontin Lamictal Keppra Glucophage Lopressor Risperdal all and hydrocodone among others.  She is allergic to Betadine iodine nifedipine and ramipril.  She smokes 1 to 2 cigarettes daily.  Past medical history is remarkable for diabetes hypertension hepatitis C and chronic renal insufficiency as well as depression.  He also has diagnoses of paranoid schizophrenia.  She denies any past history of cancer.  She admits that in the past she was an IV drug abuser but her last use was 3 years ago.    Dr. Ferro has asked that I see her for evaluation and treatment.      Pain  This is a new problem. The current episode started more than 1 month ago ( months). The problem occurs intermittently. The problem has been gradually worsening. Associated symptoms include joint swelling and numbness. Associated symptoms comments: Stabbing,aching,clicking/popping,pain and swelling left wrist. Exacerbated by: use of left hand or wrist. She has tried rest for the symptoms. The treatment provided no relief.        CONCURRENT MEDICAL HISTORY:    Past Medical History:   Diagnosis Date   • Allergic    • Anemia    • Angina pectoris (CMS/HCC)    • Anxiety     • Arthritis    • CAD (coronary artery disease)    • Cancer (CMS/Formerly [facility]) 1997    endometrial- surgery, no chemo/radiation   • Chronic kidney disease    • COPD (chronic obstructive pulmonary disease) (CMS/Formerly [facility])    • Degeneration macular    • Depression    • Diabetes mellitus (CMS/Formerly [facility])    • Head injury    • Hepatitis c    • Hypertension    • Injury of back    • Injury of neck    • Migraine    • Multiple personality disorder (CMS/Formerly [facility])    • Neuropathy    • Paranoid schizophrenia (CMS/Formerly [facility])    • Schizoaffective disorder (CMS/Formerly [facility])    • Seizures (CMS/Formerly [facility])     07/01/2020: last seizure was the first of the year   • Self-injurious behavior     teen years   • Shortness of breath    • Stage 2 chronic kidney disease    • Substance abuse (CMS/Formerly [facility])    • Suicide attempt (CMS/Formerly [facility])    • Urinary tract infection        Allergies   Allergen Reactions   • Betadine [Povidone Iodine] Anaphylaxis   • Contrast Dye Anaphylaxis   • Iodine Anaphylaxis   • Lipitor  [Atorvastatin Calcium] Shortness Of Breath   • Nifedipine Unknown (See Comments) and Other (See Comments)     Syncope     • Shellfish-Derived Products Anaphylaxis   • Adhesive Tape Unknown (See Comments)     Blister     • Altace [Ramipril] Unknown (See Comments)     Sleepy           Current Outpatient Medications:   •  ADMELOG 100 UNIT/ML injection, Inject 40 Units under the skin into the appropriate area as directed 3 (Three) Times a Day Before Meals. (Patient taking differently: Inject 40 Units under the skin into the appropriate area as directed 3 (Three) Times a Day Before Meals. Through insulin pump), Disp: 40 mL, Rfl: 11  •  albuterol sulfate  (90 Base) MCG/ACT inhaler, Inhale 2 puffs Every 6 (Six) Hours As Needed for Wheezing or Shortness of Air., Disp: 1 inhaler, Rfl: 3  •  Cariprazine HCl (VRAYLAR) 3 MG capsule capsule, Take 1 capsule by mouth Daily., Disp: 30 capsule, Rfl: 0  •  Dulaglutide 3 MG/0.5ML solution pen-injector, Inject 3 mg under the skin into the appropriate  "area as directed 1 (One) Time Per Week., Disp: 4 pen, Rfl: 3  •  DULoxetine (CYMBALTA) 30 MG capsule, Take 3 capsules by mouth Daily., Disp: 90 capsule, Rfl: 3  •  Ertugliflozin L-PyroglutamicAc (Steglatro) 15 MG tablet, Take 1 tablet by mouth Every Morning., Disp: 90 tablet, Rfl: 3  •  folic acid (FOLVITE) 1 MG tablet, Take 1,000 mcg by mouth Daily., Disp: , Rfl:   •  gabapentin (NEURONTIN) 300 MG capsule, Take 1 capsule by mouth 3 (Three) Times a Day., Disp: 90 capsule, Rfl: 2  •  glucose blood test strip, Testing 4 times daily, E11.9, Disp: 120 each, Rfl: 12  •  glucose blood test strip, Freestyle test strips, test 4 times daily, E11.9, Disp: 120 each, Rfl: 12  •  HYDROcodone-acetaminophen (NORCO) 7.5-325 MG per tablet, Take 1 tablet by mouth 2 (Two) Times a Day., Disp: , Rfl:   •  Insulin Syringe 29G X 1/2\" 0.5 ML misc, Inject 3 times daily, Disp: 100 each, Rfl: 11  •  lamoTRIgine (LaMICtal) 100 MG tablet, Start after take 50mg for the 2 weeks if no side effects or rash., Disp: 30 tablet, Rfl: 0  •  levETIRAcetam (KEPPRA) 1000 MG tablet, Take 1 tablet by mouth 2 (Two) Times a Day., Disp: 60 tablet, Rfl: 3  •  lisinopril (PRINIVIL,ZESTRIL) 40 MG tablet, Take 1 tablet by mouth once daily, Disp: 30 tablet, Rfl: 3  •  metFORMIN (GLUCOPHAGE) 500 MG tablet, TAKE 1 TABLET BY MOUTH TWICE DAILY WITH MEALS, Disp: 60 tablet, Rfl: 3  •  metoprolol tartrate (LOPRESSOR) 50 MG tablet, TAKE 1 TABLET BY MOUTH EVERY 12 HOURS, Disp: 60 tablet, Rfl: 3  •  RELION PEN NEEDLES 31G X 6 MM misc, USE 1 NEEDLE 3 TO 4 TIMES DAILY, Disp: , Rfl: 3  •  risperiDONE (RisperDAL) 0.5 MG tablet, Take 1 tablet by mouth Every Night., Disp: 7 tablet, Rfl: 0  •  simvastatin (ZOCOR) 40 MG tablet, Take 1 tablet by mouth Every Night., Disp: 30 tablet, Rfl: 3  •  tiotropium bromide-olodaterol (STIOLTO RESPIMAT) 2.5-2.5 MCG/ACT aerosol solution inhaler, Inhale 2 puffs Daily., Disp: 1 inhaler, Rfl: 5  •  vitamin D (ERGOCALCIFEROL) 1.25 MG (48401 UT) capsule " capsule, TAKE 2 CAPSULES BY MOUTH ONCE A WEEK, Disp: 8 capsule, Rfl: 3  •  cyclobenzaprine (FLEXERIL) 5 MG tablet, Take 1 tablet by mouth 3 (Three) Times a Day As Needed for Muscle Spasms. (Patient taking differently: Take 5 mg by mouth 3 (Three) Times a Day.), Disp: 90 tablet, Rfl: 3  •  Diclofenac Sodium (Voltaren) 1 % gel gel, Apply 4 g topically to the appropriate area as directed 4 (Four) Times a Day., Disp: 150 g, Rfl: 3  •  nicotine (Nicoderm CQ) 21 MG/24HR patch, Place 1 patch on the skin as directed by provider Daily., Disp: 30 patch, Rfl: 3  •  prazosin (Minipress) 1 MG capsule, Take 1 capsule by mouth Every Night., Disp: 30 capsule, Rfl: 0    Past Surgical History:   Procedure Laterality Date   • APPENDECTOMY     • BACK SURGERY     • BREAST SURGERY     • CARPAL TUNNEL RELEASE      right hand   • CHOLECYSTECTOMY     • COLONOSCOPY     • COLONOSCOPY N/A 2/8/2019    Procedure: COLONOSCOPY;  Surgeon: Joni Delacruz MD;  Location: [facility] ENDOSCOPY;  Service: Gastroenterology   • DENTAL PROCEDURE     • ENDOSCOPY N/A 2/8/2019    Procedure: ESOPHAGOGASTRODUODENOSCOPY--poss dilation;  Surgeon: Joni Delacruz MD;  Location: [facility] ENDOSCOPY;  Service: Gastroenterology   • HYSTERECTOMY     • LIPOMA EXCISION      9   • LIVER BIOPSY     • UPPER GASTROINTESTINAL ENDOSCOPY  02/08/2019       Family History   Problem Relation Age of Onset   • Hypertension Other    • Diabetes Other    • Stroke Other    • Cancer Other    • Kidney disease Other    • Lung disease Other    • Other Other    • Malone's esophagus Other    • Colon polyps Other    • Heart disease Other    • Ulcers Other    • Cholelithiasis Other    • Allergy (severe) Other    • Schizophrenia Father    • Alcohol abuse Father    • Drug abuse Paternal Grandfather         Social History     Socioeconomic History   • Marital status:      Spouse name: Not on file   • Number of children: Not on file   • Years of education: Not on file   • Highest education  "level: Not on file   Tobacco Use   • Smoking status: Light Tobacco Smoker     Packs/day: 0.25     Years: 20.00     Pack years: 5.00     Types: Cigarettes   • Smokeless tobacco: Never Used   • Tobacco comment: on patch trying to quit    Substance and Sexual Activity   • Alcohol use: Not Currently   • Drug use: Not Currently     Types: Amphetamines, Heroin, Methamphetamines, Morphine, Oxycodone   • Sexual activity: Defer     Birth control/protection: Surgical        Review of Systems   Respiratory:        Breathing difficulties   Musculoskeletal: Positive for joint swelling.        Muscle pain and stiffness   Neurological: Positive for numbness.   Hematological: Bruises/bleeds easily.   Psychiatric/Behavioral: The patient is nervous/anxious.    All other systems reviewed and are negative.    Review of systems is positive as noted above.  PHYSICAL EXAMINATION:       Vitals:    02/25/21 0830   BP: 146/83   BP Location: Right arm   Patient Position: Sitting   Cuff Size: Adult   Pulse: 97   Resp: 20   Temp: 98 °F (36.7 °C)   SpO2: 97%   Weight: (!) 140 kg (308 lb)   Height: 167.6 cm (66\")   PainSc:   6       Physical Exam she is alert and in no apparent distress.  She responds appropriately to questions and commands.    GAIT:     []  Normal  []  Antalgic    Assistive device: []  None  []  Walker     []  Crutches  []  Cane     []  Wheelchair  []  Stretcher    Ortho Exam is directed to the upper extremities.  There is mild edema in the left hand and wrist.  Flexion and extension of the left wrist are each smooth but extracted to about 30 degrees with mild complaints of pain.  She has active supination limited to about 45degrees.  There is tenderness diffusely over the radial aspect of the wrist and dorsal aspect of the wrist most prominent over the first extensor compartment.  Finkelstein test is positive with reproduction of her symptoms.  Sensory exam is intact to soft touch.  All tendons are intact.  Passive motion of " the thumb carpometacarpal metacarpophalangeal and IP joints produce no apparent pain and no crepitus.    Radiographs of the wrist done recently were reviewed.  There is a small oval lesion in the distal shaft of the radius.  This is seen best on the PA and oblique views.  There is no surrounding sclerosis or periostitis.  The maximum dimension of the lesion is 4 mm.  Carpal alignment is unremarkable.  There is no significant degenerative change.      Xr Wrist 3+ View Left    Result Date: 2/4/2021  Narrative: Three view left wrist HISTORY: Left wrist pain AP, lateral and oblique views obtained. COMPARISON: None FINDINGS: No fracture or dislocation. 4 mm oval lucent and lytic lesion distal radial diaphysis. No other osseous or articular abnormality.     Impression: CONCLUSION: Normal left wrist. 4 mm oval lucent and lytic lesion distal radial diaphysis. Cannot exclude metastatic disease. Could consider whole-body bone scan with spot films of the left arm. [id number] Electronically signed by:  Reid Will MD  2/4/2021 9:29 AM [employer] Workstation: [employer]          ASSESSMENT: 1 left wrist pain secondary to de Quervain's syndrome.  2 lesion radial shaft.  The diagnosis of the lesion is uncertain.  She has no worrying radiographic findings.  I recommended simple observation of the lesion with repeat x-rays in 6 months.    Treatment options for the de Quervain's syndrome were discussed.  She was given a thumb spica splint and instructed in symptomatic care.  Potential benefits of injection therapy were discussed.  She understands there is a risk of skin hypopigmentation after the injection.  She appeared to understand and wished to proceed.    The left wrist was prepped.  Skin was infiltrated with 1% Xylocaine with epinephrine.  The first extensor compartment was then injected with a mixture of Marcaine Kenalog and Xylocaine with epinephrine.  There were no complications.    If Her symptoms do not respond to the above  measures she will call for reevaluation.    Diagnoses and all orders for this visit:    Pain in wrist, unspecified laterality  -     Medium Joint Arthrocentesis    De Quervain's tenosynovitis, left  -     Medium Joint Arthrocentesis    Other orders  -     Cancel: Injection Tendon or Ligament          PLAN    Medium Joint Arthrocentesis  Date/Time: 2/25/2021 9:03 AM  Consent given by: patient  Site marked: site marked  Timeout: Immediately prior to procedure a time out was called to verify the correct patient, procedure, equipment, support staff and site/side marked as required   Procedure Details  Location: wrist - Wrist joint: left wrist.  Preparation: Patient was prepped and draped in the usual sterile fashion  Needle size: 25 G  Approach: anterolateral  Medications administered: 20 mg triamcinolone acetonide 40 MG/ML; 0.5 mL lidocaine 1 %  Patient tolerance: patient tolerated the procedure well with no immediate complications            Patient's Body mass index is 49.71 kg/m². BMI is above normal parameters. Recommendations include: exercise counseling, nutrition counseling and referral to primary care.    Return in about 6 months (around 8/25/2021).    Kevin Carlson MD

## 2021-03-01 NOTE — PROGRESS NOTES
Chief Complaint  Hypertension, Diabetes, Chronic Kidney Disease, and Depression    Subjective          Sudhakar Saul presents to [facility]         Pt is 46 yo female with management  of morbid obesity, IDDM Type 2 now on insulin pump , HTN, HLP, vitamin D deficiency, tobacco user, GERD, gastritis, esophagitis, diverticulosis, colonic polyp in sigmoid colon,  paranoid schizophrenia,  Seizure disorder, history of Illicit drug abuse, COPD, JACQUE, chronic hepatitis C , major depression, JD, sp appendectomy, sp cholecystectomy sp right carpal tunnel release, sp back surgery, sp breast surgery, sp lipoma excision, chronic back pain (moderate L4-L5 and L5-S1 DDD changes, mild bilateral L4-L5 moderate bilateral L5-S1 facet arthritic change, mild leveoscoliosis), CKD stage 2, de Quervain's syndrome of left wrist      2/4/21 in office visit for recheck on pt's above medical issues. Pt saw Endocrniology on 1/5/21 and behavioral health on 1/18/21  Had labwork done on 1/26/21 that showed stable TSH ,vitmain B12 and vitamin D levels normal hep C quantitative test was negative. Triglycerides were down from 249 to 169 with HDL at 39 and LDL at 47.  hga1c at 7.60 from 9.24.  CMP showed stable GFR At 83 and normal liver enzymes. Recently talked to Behavioral health and her risperdal was changed to vraylar and lamictal was increased. She is doing  Well overall.  She is down on smoking to 2 cigarettes a day.bP elevated but she just took her medicaiton. Her main concern is pain in left wrist for about a month. She denies any trauma. States it shoots up her left arm/forearm.  Other concern is lesion on lower lip that has been present for month.  No tenderness no getting bigger in size.   She does notice a knot on left wrist     3/18/21 in office visit for recheck on pt's above medical issues. She did see Orthopedic on 2/25/21 for her left wrist pain and was given an injection.  She does have de  Quervain's syndrome she did have lesion on wrist that was recommended observation in 6 months. Did have telemedcine with behavioral health. Pt continues to take her medications for IDDM type 2, HLP, HTN, schizophrenia, seizure disorder tobacco cessation. She notices her BP has been higher than usual since last visit. For example at pain management it was over >160/90. Denies any chest pain or dizziness. She is taking lisinopril and lopressor.  Her left wrist is feeling better  But still has soreness. Depression and mood stable. She continues to smoke tobacco but has cut back significantly.          Hyperlipidemia  The current episode started more than 1 year ago. The problem is controlled. Recent lipid tests were reviewed and are variable. Exacerbating diseases include chronic renal disease, diabetes and obesity. She has no history of nephrotic syndrome. Associated symptoms include shortness of breath. Pertinent negatives include no chest pain. Current antihyperlipidemic treatment includes statins. The current treatment provides significant improvement of lipids. Risk factors for coronary artery disease include diabetes mellitus, obesity, dyslipidemia, a sedentary lifestyle, stress and hypertension.   Seizures   This is a chronic problem. The current episode started more than 1 week ago. The problem has not changed since onset.Pertinent negatives include no sleepiness, no confusion, no headaches, no speech difficulty, no visual disturbance, no neck stiffness, no sore throat, no chest pain, no cough, no nausea, no vomiting, no diarrhea and no muscle weakness. There has been no fever.   Chronic Kidney Disease  This is a chronic problem. The current episode started more than 1 year ago. The problem occurs constantly. The problem has been unchanged. Associated symptoms include arthralgias, fatigue, numbness and weakness. Pertinent negatives include no abdominal pain, chest pain, coughing, headaches, nausea, sore throat  or vomiting. Nothing aggravates the symptoms. She has tried nothing for the symptoms. The treatment provided no relief.   Heartburn  She reports no abdominal pain, no belching, no chest pain, no choking, no coughing, no early satiety, no globus sensation, no heartburn, no hoarse voice, no nausea, no sore throat, no stridor, no tooth decay, no water brash or no wheezing. This is a chronic problem. The current episode started more than 1 month ago. The problem occurs constantly. The problem has been unchanged. Nothing aggravates the symptoms. Associated symptoms include fatigue. She has tried a PPI for the symptoms.   Wrist Pain   The pain is present in the left hand and left wrist. This is a new problem. The current episode started more than 1 month ago. The problem occurs constantly. The problem has been gradually worsening. The quality of the pain is described as aching. The pain is at a severity of 6/10. The pain is moderate. Pertinent negatives include no fever, inability to bear weight, itching, joint locking, joint swelling, limited range of motion, numbness, stiffness or tingling. Family history does not include gout. Her past medical history is significant for diabetes. There is no history of gout or rheumatoid arthritis.   Obesity   This is a chronic problem. The current episode started more than 1 year ago. The problem occurs constantly. The problem has been unchanged. Associated symptoms include arthralgias, fatigue, headaches, numbness and weakness. Pertinent negatives include no abdominal pain, anorexia, change in bowel habit, chest pain, chills, congestion, coughing, fever, joint swelling, myalgias, nausea, neck pain, rash, sore throat, swollen glands, urinary symptoms, vertigo, visual change or vomiting. Nothing aggravates the symptoms. She has tried nothing for the symptoms.   Hypertension   This is a chronic problem. The current episode started more than 1 year ago. The problem  is controlled  Associated symptoms include headaches. Pertinent negatives include no anxiety, blurred vision, chest pain, malaise/fatigue, neck pain, orthopnea, palpitations, peripheral edema, PND or shortness of breath. Risk factors for coronary artery disease include obesity, sedentary lifestyle, smoking/tobacco exposure and stress. Past treatments include ACE inhibitors, beta blockers and diuretics. There are no compliance problems.  There is no history of angina, kidney disease, CAD/MI, CVA, heart failure, left ventricular hypertrophy, PVD or retinopathy. There is no history of chronic renal disease, coarctation of the aorta, hyperaldosteronism, hypercortisolism, hyperparathyroidism, a hypertension causing med, pheochromocytoma, renovascular disease, sleep apnea or a thyroid problem.   Diabetes   She presents for her followupdiabetic visit. She has type 2 diabetes mellitus. Her disease course has been imrpoving Hypoglycemia symptoms include confusion, headaches, nervousness/anxiousness, seizures and sleepiness. Associated symptoms include fatigue and weakness. Pertinent negatives for diabetes include no blurred vision, no chest pain and no visual change. Pertinent negatives for diabetic complications include no CVA, PVD or retinopathy. Risk factors for coronary artery disease include dyslipidemia, obesity and post-menopausal. She has not had a previous visit with a dietitian. There is no change in her home blood glucose trend. An ACE inhibitor/angiotensin II receptor blocker is not being taken. She does not see a podiatrist.Eye exam is not current. teratments include steglastro insulin and trulicity      Review of Systems   Constitutional: Positive for activity change and fatigue.   HENT: Negative for hoarse voice and sore throat.    Eyes: Negative for visual disturbance.   Respiratory: Positive for shortness of breath. Negative for cough, choking and wheezing.    Cardiovascular: Negative for chest pain.    "  Gastrointestinal: Negative for abdominal pain, diarrhea, nausea and vomiting.   Musculoskeletal: Positive for arthralgias and back pain.   Neurological: Positive for seizures, weakness and numbness. Negative for dizziness, speech difficulty and confusion.   Psychiatric/Behavioral: Positive for agitation and behavioral problems. The patient is nervous/anxious.         Depressed mood      Objective   Vital Signs:   /80 (BP Location: Left arm, Patient Position: Sitting, Cuff Size: Large Adult)   Pulse 82   Temp 96.7 °F (35.9 °C)   Ht 167.6 cm (66\")   Wt (!) 138 kg (304 lb)   SpO2 94%   BMI 49.07 kg/m²       Physical Exam   Result Review :                 Assessment and Plan    Diagnoses and all orders for this visit:    1. Uncontrolled type 2 diabetes mellitus with hyperglycemia (CMS/[facility]) (Primary)  -     CBC Auto Differential; Future  -     Comprehensive Metabolic Panel; Future  -     Hemoglobin A1c; Future  -     Lipid Panel; Future  -     Vitamin D 25 Hydroxy; Future    2. Vitamin D deficiency  -     CBC Auto Differential; Future  -     Comprehensive Metabolic Panel; Future  -     Hemoglobin A1c; Future  -     Lipid Panel; Future  -     Vitamin D 25 Hydroxy; Future    3. Stage 2 chronic kidney disease  -     CBC Auto Differential; Future  -     Comprehensive Metabolic Panel; Future  -     Hemoglobin A1c; Future  -     Lipid Panel; Future  -     Vitamin D 25 Hydroxy; Future    4. Tobacco user  -     CBC Auto Differential; Future  -     Comprehensive Metabolic Panel; Future  -     Hemoglobin A1c; Future  -     Lipid Panel; Future  -     Vitamin D 25 Hydroxy; Future    5. Seizure disorder (CMS/[facility])  -     CBC Auto Differential; Future  -     Comprehensive Metabolic Panel; Future  -     Hemoglobin A1c; Future  -     Lipid Panel; Future  -     Vitamin D 25 Hydroxy; Future    6. Paranoid schizophrenia (CMS/[facility])  -     CBC Auto Differential; Future  -     Comprehensive Metabolic Panel; Future  -     " Hemoglobin A1c; Future  -     Lipid Panel; Future  -     Vitamin D 25 Hydroxy; Future    7. Mixed hyperlipidemia  -     CBC Auto Differential; Future  -     Comprehensive Metabolic Panel; Future  -     Hemoglobin A1c; Future  -     Lipid Panel; Future  -     Vitamin D 25 Hydroxy; Future    8. Essential hypertension  -     CBC Auto Differential; Future  -     Comprehensive Metabolic Panel; Future  -     Hemoglobin A1c; Future  -     Lipid Panel; Future  -     Vitamin D 25 Hydroxy; Future    9. Other diabetic neurological complication associated with type 2 diabetes mellitus (CMS/HCC)  -     CBC Auto Differential; Future  -     Comprehensive Metabolic Panel; Future  -     Hemoglobin A1c; Future  -     Lipid Panel; Future  -     Vitamin D 25 Hydroxy; Future         -recommend labwork   -recommend diabetic eye exam   -refer to [facility]  For Neurology and seizure disorder  -left wrist pain/de Quervain syndrome of left brest   - Orthopedic following. Stable continue to montior   -for back pain/arthritis of back/scoliosis/ history of back surgery - referral to Pain Management  X-rays of lumbar spine.  stable  on neurontin 300 mg PO TID.gave 90 pills and 3 refills. Will get NERY. UDS today. Pt declined PT/OT for now. .  also on cymbalta 60 mg will raise to 90 mg daily for depression   -chronic hep C - stable Gastroenterology following   -GERD with esophagitis  - start on prilosec 20 mg daiy. Consider another EGD if not improving   -COPD -  Pulmonology following.stable  on Stiolot 2 puff daily.    -tobacco user - Counseled quit smoking >5 minutes.  Start back on nicotine lozenges   -ckd stage 2 - stable continue to monitor. Stressed importance of diabetes control and BP control    -hyperlipdemia - stable [id number] mg PO qhs. ASCVD risk high. Recommend to take with coenzyme Q10  on vascepa 2 grams PO BID   -dyspnea/early COPD - pt will need to make appt with Pulmonologist. Needs new PFTs. Urged smoking cessation >5  minutes.  Continue spiriva and albuterol inhaler. Now on Anoro daily.    -for DM type 2- Endocrinology following. Currently on metformin 1000 gm PO BID, steglatro 15 mg daily. trulicity 1.5 mg now on insulin pump   -schizophrenia/major depression -  Behabvioral health following no on lamictal 100 mg PO q daily and vraylar 1.5 mg daily. risperdal stopped    -seizure disorder - Neurology following stable on Keppra 1000 mg Po BID   -Morbid obesity - counseled weight loss >5 minutes BMI at 49.07   -tobacco user counseled pt to quit smoking >5 minutes.  Gave tobacco cessation material   -hypertension - uncontrolled  on lisinopril  40 mg dialy.  lopressor 50 mg PO BID  start on nifedipine 30 mg daily. Drug information provided  -advised pt to be safe and call with any questions or concerns. All questions addressed today  -advised pt to followup with specialist and referrals  -advised pt to go to ER or call 911 if symptoms worrisome or severe   -advised pt to be safe during COVID-19 pandemic  -recheck in 6 weeks         This document has been electronically signed by Jermaine Ferro MD on March 18, 2021 08:08 CDT        Follow Up   No follow-ups on file.  Patient was given instructions and counseling regarding her condition or for health maintenance advice. Please see specific information pulled into the AVS if appropriate.

## 2021-03-04 ENCOUNTER — TELEMEDICINE (OUTPATIENT)
Dept: PSYCHIATRY | Facility: CLINIC | Age: 46
End: 2021-03-04

## 2021-03-04 DIAGNOSIS — G47.9 SLEEPING DIFFICULTY: ICD-10-CM

## 2021-03-04 DIAGNOSIS — F25.1 SCHIZOAFFECTIVE DISORDER, DEPRESSIVE TYPE (HCC): Primary | Chronic | ICD-10-CM

## 2021-03-04 DIAGNOSIS — F51.5 NIGHTMARES: ICD-10-CM

## 2021-03-04 PROCEDURE — 99214 OFFICE O/P EST MOD 30 MIN: CPT | Performed by: NURSE PRACTITIONER

## 2021-03-04 RX ORDER — PRAZOSIN HYDROCHLORIDE 1 MG/1
1 CAPSULE ORAL NIGHTLY
Qty: 30 CAPSULE | Refills: 0 | Status: SHIPPED | OUTPATIENT
Start: 2021-03-04 | End: 2021-04-01 | Stop reason: SDUPTHER

## 2021-03-04 RX ORDER — LAMOTRIGINE 100 MG/1
TABLET ORAL
Qty: 30 TABLET | Refills: 0 | Status: SHIPPED | OUTPATIENT
Start: 2021-03-04 | End: 2021-04-01 | Stop reason: SDUPTHER

## 2021-03-04 RX ORDER — DULOXETIN HYDROCHLORIDE 30 MG/1
90 CAPSULE, DELAYED RELEASE ORAL DAILY
Qty: 90 CAPSULE | Refills: 0 | Status: SHIPPED | OUTPATIENT
Start: 2021-03-04 | End: 2021-04-05 | Stop reason: SDUPTHER

## 2021-03-04 NOTE — PROGRESS NOTES
This provider is located at [facility], [address] [place] [place] [address].The Patient is seen remotely at home, [address] [facility] [address] [facility] [place] [address] via [id number].  Patient is being seen via telehealth and confirm that they are in a secure environment for this session. The patient's condition being diagnosed/treated is appropriate for telemedicine. The provider identified himself/herself: herself as well as her credentials.   The patient gave consent to be seen remotely, and when consent is given they understand that the consent allows for patient identifiable information to be sent to a third party as needed.   They may refuse to be seen remotely at any time. The electronic data is encrypted and password protected, and the patient has been advised of the potential risks to privacy not withstanding such measures.    You have chosen to receive care through a telehealth visit.  Do you consent to use a video/audio connection for your medical care today? Yes      Chief Complaint  Follow-for schizoaffective depressed type    Subjective    Sudhakar Sotojosef Saul presents to [facility] for medication management.       History of Present Illness   Patient presents today reporting she has been doing better has it was difficult to come off the Risperdal for a couple of days but she feels as if she is getting used to the new medications and tolerating well.  Patient states overall her mood has been good and feels her depression has been manageable.  Patient states that she still has bouts of hopelessness and helplessness at times but it is less often.  Patient states that her anxiety has improved as she states is not as bad and she is working on things she cannot control.  Patient states that she has not been set up for therapy yet so we will schedule that for her.  Patient states that she has not had any nightmares and she is getting 5 to 6 hours of sleep at night  and waking up with pain but reports she is able to go back to sleep quicker.  Patient states that she has had some auditory hallucinations but they are not as much and that is more negative self talk.  Patient denies any rash or side effects with the medications.  Patient states that she is currently seeing pain management and recently wearing a brace due to tendinitis in her left arm.  Patient states that she is still noticed the twitch above her left eye as well as spreading out her toes and grinding jaw but states is not as bad and she has noticed is slightly decreased.  Patient states is not affecting her ADLs.  Aims score 1.  Patient denies any SI/HI/VH.    Objective   Vital Signs:   There were no vitals taken for this visit.  Due to the remote nature of this encounter (virtual encounter), vitals were unable to be obtained.  Height stated at 66 inches.  Weight stated at 308 pounds.    PHQ-9 Depression Screening  Little interest or pleasure in doing things? 1   Feeling down, depressed, or hopeless? (P) 1   Trouble falling or staying asleep, or sleeping too much? (P) 2   Feeling tired or having little energy? (P) 2   Poor appetite or overeating? (P) 1   Feeling bad about yourself - or that you are a failure or have let yourself or your family down? (P) 1   Trouble concentrating on things, such as reading the newspaper or watching television? (P) 1   Moving or speaking so slowly that other people could have noticed? Or the opposite - being so fidgety or restless that you have been moving around a lot more than usual? (P) 1   Thoughts that you would be better off dead, or of hurting yourself in some way? (P) 0   PHQ-9 Total Score (P) 10   If you checked off any problems, how difficult have these problems made it for you to do your work, take care of things at home, or get along with other people? (P) Very difficult     PHQ-9 Total Score: (P) 10        Feeling nervous, anxious or on edge: Several days  Not being  able to stop or control worrying: Several days  Worrying too much about different things: Several days  Trouble Relaxing: Several days  Being so restless that it is hard to sit still: Several days  Feeling afraid as if something awful might happen: Several days  Becoming easily annoyed or irritable: Several days  JD 7 Total Score: 7  If you checked any problems, how difficult have these problems made it for you to do your work, take care of things at home, or get along with other people: Somewhat difficult      PROMIS scale screening tool that patient filled out virtually reviewed by this APRN at today's encounter.      PHQ-9 Score:   PHQ-9 Total Score: (P) 10   Patient screened positive for depression based on a PHQ-9 score of 10 on 3/4/2021. Follow-up recommendations include: Patient has schizophrenia and is currently being managed with medication..    Mental Status Exam:   Hygiene:   good  Cooperation:  Cooperative  Eye Contact:  Good  Psychomotor Behavior:  Appropriate  Affect:  Appropriate  Mood: normal  Speech:  Normal  Thought Process:  Goal directed and Linear  Thought Content:  Normal  Suicidal:  None  Homicidal:  None  Hallucinations:  Auditory  Delusion:  None  Memory:  Intact  Orientation:  Person, Place, Time and Situation  Reliability:  good  Insight:  Fair  Judgement:  Fair  Impulse Control:  Fair  Physical/Medical Issues:  Yes Dm. HTN, CKD stage 2, hep C+, JACQUE, hx substance abuse     Current Medications:   Current Outpatient Medications   Medication Sig Dispense Refill   • ADMELOG 100 UNIT/ML injection Inject 40 Units under the skin into the appropriate area as directed 3 (Three) Times a Day Before Meals. (Patient taking differently: Inject 40 Units under the skin into the appropriate area as directed 3 (Three) Times a Day Before Meals. Through insulin pump) 40 mL 11   • albuterol sulfate  (90 Base) MCG/ACT inhaler Inhale 2 puffs Every 6 (Six) Hours As Needed for Wheezing or Shortness of Air.  "1 inhaler 3   • Cariprazine HCl (VRAYLAR) 3 MG capsule capsule Take 1 capsule by mouth Daily. 30 capsule 0   • cyclobenzaprine (FLEXERIL) 5 MG tablet Take 1 tablet by mouth 3 (Three) Times a Day As Needed for Muscle Spasms. (Patient taking differently: Take 5 mg by mouth 3 (Three) Times a Day.) 90 tablet 3   • Diclofenac Sodium (Voltaren) 1 % gel gel Apply 4 g topically to the appropriate area as directed 4 (Four) Times a Day. 150 g 3   • Dulaglutide 3 MG/0.5ML solution pen-injector Inject 3 mg under the skin into the appropriate area as directed 1 (One) Time Per Week. 4 pen 3   • DULoxetine (CYMBALTA) 30 MG capsule Take 3 capsules by mouth Daily. 90 capsule 0   • Ertugliflozin L-PyroglutamicAc (Steglatro) 15 MG tablet Take 1 tablet by mouth Every Morning. 90 tablet 3   • folic acid (FOLVITE) 1 MG tablet Take 1,000 mcg by mouth Daily.     • gabapentin (NEURONTIN) 300 MG capsule Take 1 capsule by mouth 3 (Three) Times a Day. 90 capsule 2   • glucose blood test strip Testing 4 times daily, E11.9 120 each 12   • glucose blood test strip Freestyle test strips, test 4 times daily, E11.9 120 each 12   • HYDROcodone-acetaminophen (NORCO) 7.5-325 MG per tablet Take 1 tablet by mouth 2 (Two) Times a Day.     • Insulin Syringe 29G X 1/2\" 0.5 ML misc Inject 3 times daily 100 each 11   • lamoTRIgine (LaMICtal) 100 MG tablet Start after take 50mg for the 2 weeks if no side effects or rash. 30 tablet 0   • levETIRAcetam (KEPPRA) 1000 MG tablet Take 1 tablet by mouth 2 (Two) Times a Day. 60 tablet 3   • lisinopril (PRINIVIL,ZESTRIL) 40 MG tablet Take 1 tablet by mouth once daily 30 tablet 3   • metFORMIN (GLUCOPHAGE) 500 MG tablet TAKE 1 TABLET BY MOUTH TWICE DAILY WITH MEALS 60 tablet 3   • metoprolol tartrate (LOPRESSOR) 50 MG tablet TAKE 1 TABLET BY MOUTH EVERY 12 HOURS 60 tablet 3   • nicotine (Nicoderm CQ) 21 MG/24HR patch Place 1 patch on the skin as directed by provider Daily. 30 patch 3   • prazosin (Minipress) 1 MG " capsule Take 1 capsule by mouth Every Night. 30 capsule 0   • RELION PEN NEEDLES 31G X 6 MM misc USE 1 NEEDLE 3 TO 4 TIMES DAILY  3   • simvastatin (ZOCOR) 40 MG tablet Take 1 tablet by mouth Every Night. 30 tablet 3   • tiotropium bromide-olodaterol (STIOLTO RESPIMAT) 2.5-2.5 MCG/ACT aerosol solution inhaler Inhale 2 puffs Daily. 1 inhaler 5   • vitamin D (ERGOCALCIFEROL) 1.25 MG (67270 UT) capsule capsule TAKE 2 CAPSULES BY MOUTH ONCE A WEEK 8 capsule 3     No current facility-administered medications for this visit.        Physical Exam  Nursing note reviewed.   Constitutional:       Appearance: Normal appearance.   Neurological:      Mental Status: She is alert.   Psychiatric:         Attention and Perception: Attention normal. She perceives auditory hallucinations.         Mood and Affect: Mood is anxious and depressed.         Speech: Speech normal.         Behavior: Behavior is cooperative.         Cognition and Memory: Cognition and memory normal.        Result Review :     The following data was reviewed by: ROD Spencer on 02/03/2021:  Common labs    Common Labsle 6/16/20 9/9/20 9/9/20 9/9/20 9/9/20 9/9/20 1/26/21 1/26/21 1/26/21 1/26/21 1/26/21     0820 0820 0820 0820 0820 0804 0804 0804 0804 0804   Glucose    159 (A)      187 (A)    BUN    11      15    Creatinine    0.70      0.75    eGFR Non African Am    90      83    Sodium    137      137    Potassium    4.7      5.0    Chloride    103      104    Calcium    10.1      9.7    Albumin    3.70      3.90    Total Bilirubin    <0.2      0.2    Alkaline Phosphatase    72      67    AST (SGOT)    18      18    ALT (SGPT)    21      19    WBC  9.63     9.04       Hemoglobin  14.9     14.4       Hematocrit  44.3     45.0       Platelets  180     174       Total Cholesterol     129      114   Triglycerides     249 (A)      169 (A)   HDL Cholesterol     37 (A)      39 (A)   LDL Cholesterol      42      47   Hemoglobin A1C   9.24 (A)      7.60 (A)      Microalbumin, Urine <1.2     <1.2  <1.2      (A) Abnormal value            CMP    CMP 6/10/20 9/9/20 1/26/21   Glucose 219 (A) 159 (A) 187 (A)   BUN 17 11 15   Creatinine 0.78 0.70 0.75   eGFR Non African Am 80 90 83   Sodium 136 137 137   Potassium 4.9 4.7 5.0   Chloride 103 103 104   Calcium 9.4 10.1 9.7   Albumin 4.00 3.70 3.90   Total Bilirubin 0.2 <0.2 0.2   Alkaline Phosphatase 60 72 67   AST (SGOT) 19 18 18   ALT (SGPT) 17 21 19   (A) Abnormal value            CBC    CBC 6/10/20 9/9/20 1/26/21   WBC 8.89 9.63 9.04   RBC 4.85 5.07 5.10   Hemoglobin 14.1 14.9 14.4   Hematocrit 42.8 44.3 45.0   MCV 88.2 87.4 88.2   MCH 29.1 29.4 28.2   MCHC 32.9 33.6 32.0   RDW 13.6 13.3 13.0   Platelets 157 180 174           CBC w/diff    CBC w/Diff 6/10/20 9/9/20 1/26/21   WBC 8.89 9.63 9.04   RBC 4.85 5.07 5.10   Hemoglobin 14.1 14.9 14.4   Hematocrit 42.8 44.3 45.0   MCV 88.2 87.4 88.2   MCH 29.1 29.4 28.2   MCHC 32.9 33.6 32.0   RDW 13.6 13.3 13.0   Platelets 157 180 174   Neutrophil Rel % 63.8 67.2    Immature Granulocyte Rel % 0.6 (A) 0.5    Lymphocyte Rel % 26.4 21.6    Monocyte Rel % 4.5 (A) 4.8 (A)    Eosinophil Rel % 3.9 5.3    Basophil Rel % 0.8 0.6    (A) Abnormal value            Lipid Panel    Lipid Panel 6/10/20 9/9/20 1/26/21   Total Cholesterol 115 129 114   Triglycerides 170 (A) 249 (A) 169 (A)   HDL Cholesterol 35 (A) 37 (A) 39 (A)   VLDL Cholesterol 34 49.8 (A) 28   LDL Cholesterol  46 42 47   LDL/HDL Ratio 1.31 1.14 1.06   (A) Abnormal value            TSH    TSH 6/10/20 1/26/21   TSH 2.550 1.610           BMP    BMP 6/10/20 9/9/20 1/26/21   BUN 17 11 15   Creatinine 0.78 0.70 0.75   Sodium 136 137 137   Potassium 4.9 4.7 5.0   Chloride 103 103 104   CO2 20.6 (A) 22.3 21.6 (A)   Calcium 9.4 10.1 9.7   (A) Abnormal value            HgB    HGB 6/10/20 9/9/20 1/26/21   Hemoglobin 14.1 14.9 14.4               Data reviewed: PCP notes        Assessment and Plan    Problem List Items Addressed This Visit      None      Visit Diagnoses     Schizoaffective disorder, depressive type (CMS/HCC)  (Chronic)   -  Primary    Relevant Medications    Cariprazine HCl (VRAYLAR) 3 MG capsule capsule    lamoTRIgine (LaMICtal) 100 MG tablet    DULoxetine (CYMBALTA) 30 MG capsule    Nightmares        Relevant Medications    Cariprazine HCl (VRAYLAR) 3 MG capsule capsule    prazosin (Minipress) 1 MG capsule    DULoxetine (CYMBALTA) 30 MG capsule    Sleeping difficulty                TREATMENT PLAN/GOALS: Continue supportive psychotherapy efforts and medications as indicated. Treatment and medication options discussed during today's visit. Patient ackowledged and verbally consented to continue with current treatment plan and was educated on the importance of compliance with treatment and follow-up appointments.    MEDICATION ISSUES:  We discussed risks, benefits, and side effects of the above medications and the patient was agreeable with the plan. Patient was educated on the importance of compliance with treatment and follow-up appointments.  Patient is agreeable to call the office with any worsening of symptoms or onset of side effects. Patient is agreeable to call 911 or go to the nearest ER should he/she begin having SI/HI. Lengthy discussion with patient on the possible side effects of antipsychotic medications including increased cholesterol, increased blood sugar, and possibility of weight gain.  Also discussed the need to monitor lab work associated with this.  The risk of muscle movement disorders with this class of medication was also discussed. We will continue Lamictal in an effort to stabilize mood.  The patient was reminded to immediately come to the hospital should there be any loss of control.  Explanation was given to her regarding Lamictal and the potential for Foster Shon syndrome and significant rash.  Patient was encouraged to check skin prior to beginning.  Patient was encouraged to report any rash and to  immediately stop medication.      -Continue lamotrigine 100 mg daily for schizoaffective disorder..  -Continue Minipress 1 mg at night for nightmares.  -Continue Vraylar 3 mg daily for schizoaffective disorder depressed type.        Patient has now failed and tried Haldol, aripiprazole, Latuda and now risperidone which has caused weight gain and due to diabetes and kidney disease would benefit from Vraylar as patient is still having auditory and visual hallucinations.     Counseled patient regarding multimodal approach with healthy nutrition, healthy sleep, regular physical activity, social activities, counseling, and medications.      Coping skills reviewed and encouraged positive framing of thoughts     Assisted patient in processing above session content; acknowledged and normalized patient’s thoughts, feelings, and concerns.  Applied  positive coping skills and behavior management in session.  Allowed patient to freely discuss issues without interruption or judgment. Provided safe, confidential environment to facilitate the development of positive therapeutic relationship and encourage open, honest communication. Assisted patient in identifying risk factors which would indicate the need for higher level of care including thoughts to harm self or others and/or self-harming behavior and encouraged patient to contact this office, call 911, or present to the nearest emergency room should any of these events occur. Discussed crisis intervention services and means to access.     MEDS ORDERED DURING VISIT:  New Medications Ordered This Visit   Medications   • Cariprazine HCl (VRAYLAR) 3 MG capsule capsule     Sig: Take 1 capsule by mouth Daily.     Dispense:  30 capsule     Refill:  0   • prazosin (Minipress) 1 MG capsule     Sig: Take 1 capsule by mouth Every Night.     Dispense:  30 capsule     Refill:  0   • lamoTRIgine (LaMICtal) 100 MG tablet     Sig: Start after take 50mg for the 2 weeks if no side effects or  rash.     Dispense:  30 tablet     Refill:  0   • DULoxetine (CYMBALTA) 30 MG capsule     Sig: Take 3 capsules by mouth Daily.     Dispense:  90 capsule     Refill:  0           Follow Up   Return in about 4 weeks (around 4/1/2021), or if symptoms worsen or fail to improve, for Recheck.    Patient was given instructions and counseling regarding her condition or for health maintenance advice. Please see specific information pulled into the AVS if appropriate.     This document has been electronically signed by ROD Spencer  March 4, 2021 08:46 EST    Part of this note may be an electronic transcription/translation of spoken language to printed text using the Dragon Dictation System.

## 2021-03-08 RX ORDER — ERGOCALCIFEROL 1.25 MG/1
CAPSULE ORAL
Qty: 8 CAPSULE | Refills: 0 | Status: SHIPPED | OUTPATIENT
Start: 2021-03-08 | End: 2021-04-13

## 2021-03-18 ENCOUNTER — OFFICE VISIT (OUTPATIENT)
Dept: FAMILY MEDICINE CLINIC | Facility: CLINIC | Age: 46
End: 2021-03-18

## 2021-03-18 VITALS
HEART RATE: 82 BPM | TEMPERATURE: 96.7 F | SYSTOLIC BLOOD PRESSURE: 134 MMHG | OXYGEN SATURATION: 94 % | HEIGHT: 66 IN | DIASTOLIC BLOOD PRESSURE: 80 MMHG | BODY MASS INDEX: 47.09 KG/M2 | WEIGHT: 293 LBS

## 2021-03-18 DIAGNOSIS — F20.0 PARANOID SCHIZOPHRENIA (HCC): ICD-10-CM

## 2021-03-18 DIAGNOSIS — Z72.0 TOBACCO USER: ICD-10-CM

## 2021-03-18 DIAGNOSIS — G40.909 SEIZURE DISORDER (HCC): ICD-10-CM

## 2021-03-18 DIAGNOSIS — I10 ESSENTIAL HYPERTENSION: ICD-10-CM

## 2021-03-18 DIAGNOSIS — E11.49 OTHER DIABETIC NEUROLOGICAL COMPLICATION ASSOCIATED WITH TYPE 2 DIABETES MELLITUS (HCC): ICD-10-CM

## 2021-03-18 DIAGNOSIS — N18.2 STAGE 2 CHRONIC KIDNEY DISEASE: ICD-10-CM

## 2021-03-18 DIAGNOSIS — E55.9 VITAMIN D DEFICIENCY: ICD-10-CM

## 2021-03-18 DIAGNOSIS — E11.65 UNCONTROLLED TYPE 2 DIABETES MELLITUS WITH HYPERGLYCEMIA (HCC): Primary | ICD-10-CM

## 2021-03-18 DIAGNOSIS — E78.2 MIXED HYPERLIPIDEMIA: ICD-10-CM

## 2021-03-18 PROCEDURE — 99214 OFFICE O/P EST MOD 30 MIN: CPT | Performed by: FAMILY MEDICINE

## 2021-03-18 RX ORDER — NIFEDIPINE 30 MG/1
30 TABLET, EXTENDED RELEASE ORAL DAILY
Qty: 30 TABLET | Refills: 3 | Status: SHIPPED | OUTPATIENT
Start: 2021-03-18 | End: 2021-07-28 | Stop reason: SDUPTHER

## 2021-03-18 NOTE — PATIENT INSTRUCTIONS
Bring blood pressure readings next visit    Start on prilosec 20 mg daily on empty stomach in morning wait 30 minutes and eat breakfast and take medications    Recheck in 6 weeks    New BP medication nifedipine XL 30 mg dialy. Take around 10 am    Call if any problems     Omeprazole tablets (OTC)  What is this medicine?  OMEPRAZOLE (oh ME pray zol) prevents the production of acid in the stomach. It is used to treat the symptoms of heartburn. You can buy this medicine without a prescription. This product is not for long-term use, unless otherwise directed by your doctor or health care professional.  This medicine may be used for other purposes; ask your health care provider or pharmacist if you have questions.  COMMON BRAND NAME(S): Prilosec OTC  What should I tell my health care provider before I take this medicine?  They need to know if you have any of these conditions:  · black or bloody stools  · chest pain  · difficulty swallowing  · have had heartburn for over 3 months  · have heartburn with dizziness, lightheadedness or sweating  · liver disease  · lupus  · stomach pain  · unexplained weight loss  · vomiting with blood  · wheezing  · an unusual or allergic reaction to omeprazole, other medicines, foods, dyes, or preservatives  · pregnant or trying to get pregnant  · breast-feeding  How should I use this medicine?  Take this medicine by mouth with a glass of water. Follow the directions on the product label. Do not cut, crush or chew this medicine. Swallow the tablets whole. Take this medicine on an empty stomach, at least 30 minutes before breakfast. Take your medicine at regular intervals. Do not take it more often than directed.  Talk to your pediatrician regarding the use of this medicine in children. Special care may be needed.  Overdosage: If you think you have taken too much of this medicine contact a poison control center or emergency room at once.  NOTE: This medicine is only for you. Do not share  this medicine with others.  What if I miss a dose?  If you miss a dose, take it as soon as you can. If it is almost time for your next dose, take only that dose. Do not take double or extra doses.  What may interact with this medicine?  Do not take this medicine with any of the following medications:  · atazanavir  · clopidogrel  · nelfinavir  · rilpivirine  This medicine may also interact with the following medications:  · antifungals like itraconazole, ketoconazole, and voriconazole  · certain antivirals for HIV or hepatitis  · certain medicines that treat or prevent blood clots like warfarin  · cilostazol  · citalopram  · cyclosporine  · dasatinib  · digoxin  · disulfiram  · diuretics  · erlotinib  · iron supplements  · medicines for anxiety, panic, and sleep like diazepam  · medicines for seizures like carbamazepine, phenobarbital, phenytoin  · methotrexate  · mycophenolate mofetil  · nilotinib  · rifampin  · McPherson's wort  · tacrolimus  · vitamin B12  This list may not describe all possible interactions. Give your health care provider a list of all the medicines, herbs, non-prescription drugs, or dietary supplements you use. Also tell them if you smoke, drink alcohol, or use illegal drugs. Some items may interact with your medicine.  What should I watch for while using this medicine?  It can take several days before your heartburn gets better. Tell your healthcare professional if your symptoms do not start to get better or if they get worse. If you need to take this medicine for more than 14 days, talk to your healthcare professional. Heartburn may sometimes be caused by a more serious condition.  This medicine may cause a decrease in vitamin B12. You should make sure that you get enough vitamin B12 while you are taking this medicine. Discuss the foods you eat and the vitamins you take with your health care professional.  What side effects may I notice from receiving this medicine?  Side effects that you  should report to your doctor or health care professional as soon as possible:  · allergic reactions like skin rash, itching or hives, swelling of the face, lips, or tongue  · bone pain  · breathing problems  · fever or sore throat  · joint pain  · rash on cheeks or arms that gets worse in the sun  · redness, blistering, peeling, or loosening of the skin, including inside the mouth  · severe diarrhea  · signs and symptoms of kidney injury like trouble passing urine or change in the amount of urine  · signs and symptoms of low magnesium like muscle cramps; muscle pain; muscle weakness; tremors; seizures; or fast, irregular heartbeat  · stomach polyps  · unusual bleeding or bruising  Side effects that usually do not require medical attention (report to your doctor or health care professional if they continue or are bothersome):  · diarrhea  · dry mouth  · gas  · headache  · nausea  · stomach pain  This list may not describe all possible side effects. Call your doctor for medical advice about side effects. You may report side effects to FDA at 2-399-QWZ-8407.  Where should I keep my medicine?  Keep out of the reach of children.  Store at room temperature between 20 and 25 degrees C (68 and 77 degrees F). Protect from light and moisture. Throw away any unused medicine after the expiration date.  NOTE: This sheet is a summary. It may not cover all possible information. If you have questions about this medicine, talk to your doctor, pharmacist, or health care provider.  © 2021 [employer] (2019-10-09 13:06:30)    Heartburn  Heartburn is a type of pain or discomfort that can happen in the throat or chest. It is often described as a burning pain. It may also cause a bad, acid-like taste in the mouth. Heartburn may feel worse when you lie down or bend over, and it is often worse at night. Heartburn may be caused by stomach contents that move back up into the esophagus (reflux).  Follow these instructions at  home:  Eating and drinking    · Avoid certain foods and drinks as told by your health care provider. This may include:  ? Coffee and tea (with or without caffeine).  ? Drinks that contain alcohol.  ? Energy drinks and sports drinks.  ? Carbonated drinks or sodas.  ? Chocolate and cocoa.  ? Peppermint and mint flavorings.  ? Garlic and onions.  ? Horseradish.  ? Spicy and acidic foods, including peppers, chili powder, shelton powder, vinegar, hot sauces, and barbecue sauce.  ? Citrus fruit juices and citrus fruits, such as oranges, migel, and limes.  ? Tomato-based foods, such as red sauce, chili, salsa, and pizza with red sauce.  ? Fried and fatty foods, such as donuts, french fries, potato chips, and high-fat dressings.  ? High-fat meats, such as hot dogs and fatty cuts of red and white meats, such as rib eye steak, sausage, ham, and conteh.  ? High-fat dairy items, such as whole milk, butter, and cream cheese.  · Eat small, frequent meals instead of large meals.  · Avoid drinking large amounts of liquid with your meals.  · Avoid eating meals during the 2-3 hours before bedtime.  · Avoid lying down right after you eat.  · Do not exercise right after you eat.  Lifestyle         · If you are overweight, reduce your weight to an amount that is healthy for you. Ask your health care provider for guidance about a safe weight loss goal.  · Do not use any products that contain nicotine or tobacco, such as cigarettes, e-cigarettes, and chewing tobacco. These can make your symptoms worse. If you need help quitting, ask your health care provider.  · Wear loose-fitting clothing. Do not wear anything tight around your waist that causes pressure on your abdomen.  · Raise (elevate) the head of your bed about 6 inches (15 cm) when you sleep.  · Try to reduce your stress, such as with yoga or meditation. If you need help reducing stress, ask your health care provider.  General instructions  · Pay attention to any changes in your  symptoms.  · Take over-the-counter and prescription medicines only as told by your health care provider.  ? Do not take aspirin, ibuprofen, or other NSAIDs unless your health care provider told you to do so.  ? Stop medicines only as told by your health care provider. If you stop taking some medicines too quickly, your symptoms may get worse.  · Keep all follow-up visits as told by your health care provider. This is important.  Contact a health care provider if:  · You have new symptoms.  · You have unexplained weight loss.  · You have difficulty swallowing, or it hurts to swallow.  · You have wheezing or a persistent cough.  · Your symptoms do not improve with treatment.  · You have frequent heartburn for more than 2 weeks.  Get help right away if:  · You have pain in your arms, neck, jaw, teeth, or back.  · You feel sweaty, dizzy, or light-headed.  · You have chest pain or shortness of breath.  · You vomit and your vomit looks like blood or coffee grounds.  · Your stool is bloody or black.  These symptoms may represent a serious problem that is an emergency. Do not wait to see if the symptoms will go away. Get medical help right away. Call your local emergency services (911 in the [place] Do not drive yourself to the hospital.  Summary  · Heartburn is a type of pain or discomfort that can happen in the throat or chest. It is often described as a burning pain. It may also cause a bad, acid-like taste in the mouth.  · Avoid certain foods and drinks as told by your health care provider.  · Take over-the-counter and prescription medicines only as told by your health care provider. Do not take aspirin, ibuprofen, or other NSAIDs unless your health care provider told you to do so.  · Contact a health care provider if your symptoms do not improve or they get worse.  This information is not intended to replace advice given to you by your health care provider. Make sure you discuss any questions you have with your health  care provider.  Document Revised: 05/20/2019 Document Reviewed: 05/20/2019  [employer] Patient Education © 2021 [employer]

## 2021-03-22 ENCOUNTER — TELEMEDICINE (OUTPATIENT)
Dept: PSYCHIATRY | Facility: CLINIC | Age: 46
End: 2021-03-22

## 2021-03-22 DIAGNOSIS — F20.0 SCHIZOPHRENIA, PARANOID TYPE (HCC): Primary | ICD-10-CM

## 2021-03-22 PROCEDURE — 90791 PSYCH DIAGNOSTIC EVALUATION: CPT | Performed by: COUNSELOR
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
## 2021-03-23 ENCOUNTER — DOCUMENTATION (OUTPATIENT)
Dept: ENDOCRINOLOGY | Facility: CLINIC | Age: 46
End: 2021-03-23

## 2021-03-25 PROBLEM — F20.89: Status: ACTIVE | Noted: 2021-03-25

## 2021-03-29 ENCOUNTER — OFFICE VISIT (OUTPATIENT)
Dept: PULMONOLOGY | Facility: CLINIC | Age: 46
End: 2021-03-29

## 2021-03-29 VITALS
HEART RATE: 97 BPM | BODY MASS INDEX: 47.09 KG/M2 | HEIGHT: 66 IN | WEIGHT: 293 LBS | OXYGEN SATURATION: 98 % | DIASTOLIC BLOOD PRESSURE: 100 MMHG | SYSTOLIC BLOOD PRESSURE: 144 MMHG

## 2021-03-29 DIAGNOSIS — J44.9 CHRONIC OBSTRUCTIVE PULMONARY DISEASE, UNSPECIFIED COPD TYPE (HCC): Primary | ICD-10-CM

## 2021-03-29 PROCEDURE — 99214 OFFICE O/P EST MOD 30 MIN: CPT | Performed by: INTERNAL MEDICINE

## 2021-03-29 NOTE — PROGRESS NOTES
Pulmonary Office Follow-up    Subjective     Sudhakar Saul is seen today at the office for   Chief Complaint   Patient presents with   • Follow-up     3 months    • COPD         HPI  Sudhakar Saul is a 45 y.o. female with a PMH significant for COPD.  She was last in the office on 10/23/2020 and saw Dr. Will at that time.  She has not been hospitalized.  She told me she is only used her rescue inhaler once in the last 6 months.      Tobacco use history:  She has smoked most of her adult life and, unfortunately, still actively smoking now.    Patient Active Problem List   Diagnosis   • Seizure disorder (CMS/HCC)   • Morbid obesity (CMS/HCC)   • Tobacco abuse counseling   • Multiple joint pain   • Dysuria   • History of positive hepatitis C   • Essential hypertension   • Uncontrolled type 2 diabetes mellitus with hyperglycemia (CMS/HCC)   • Vitamin D deficiency   • Urinary tract infection without hematuria   • Chronic hepatitis C without hepatic coma (CMS/HCC)   • Cigarette nicotine dependence, uncomplicated   • Chronic obstructive pulmonary disease (CMS/HCC)   • JACQUE (obstructive sleep apnea)   • Gastroesophageal reflux disease with esophagitis   • Left upper quadrant pain   • Nausea and vomiting   • Weight gain, abnormal   • Change in bowel habits   • Hepatic fibrosis   • Non-compliance   • Depression   • Paranoid schizophrenia (CMS/HCC)   • Gastritis without bleeding   • Tobacco user   • Encounter for eye exam   • Scoliosis   • History of drug use   • Stage 2 chronic kidney disease   • Diabetic neuropathy (CMS/HCC)   • Mixed hyperlipidemia   • Chronic bilateral low back pain with bilateral sciatica   • Type 2 diabetes mellitus with hyperglycemia, with long-term current use of insulin (CMS/HCC)   • Auditory hallucination   • Class 3 severe obesity due to excess calories with serious comorbidity and body mass index (BMI) of 45.0 to 49.9 in adult (CMS/HCC)   • Pain in wrist   • Schizophrenia, simple,  chronic (CMS/[facility])         Medications, Allergies, Social, and Family Histories reviewed as per EMR.    Objective     Vitals:    03/29/21 1112   BP: 144/100   Pulse: 97   SpO2: 98%         03/29/21  1112   Weight: (!) 138 kg (304 lb)     [unfilled]  Physical Exam  Vitals reviewed.   Constitutional:       General: She is not in acute distress.     Appearance: She is obese.   HENT:      Head: Atraumatic.      Nose: Nose normal.      Mouth/Throat:      Comments: Dentures in place no lesions  Cardiovascular:      Comments: Distant tones, very difficult to auscultate  Pulmonary:      Effort: No respiratory distress.      Breath sounds: No stridor. No wheezing or rhonchi.   Abdominal:      Palpations: Abdomen is soft.      Comments: Insulin pump in the left lower quadrant, morbidly obese, soft, nontender, no masses   Musculoskeletal:         General: No swelling.      Cervical back: Normal range of motion.   Lymphadenopathy:      Cervical: No cervical adenopathy.   Skin:     General: Skin is warm.      Comments: Tattoos present   Neurological:      General: No focal deficit present.      Mental Status: She is alert and oriented to person, place, and time.      Comments: Using a cane to walk with   Psychiatric:         Mood and Affect: Mood normal.         Thought Content: Thought content normal.             Assessment/Plan     There are no diagnoses linked to this encounter.     1.  COPD: On 6/22/2020 her FEV1 was 1.90 L (61% predicted).  On that same day she had a chest x-ray that showed no active disease.  Liver COPD is very mild.  If she would quit smoking I feel that it would not progress any further.  She is using nicotine patches at this time with minimal to any success.  She is going to continue on her Stiolto and use albuterol as needed.  I doubt that inhalers are of any benefit as long she continues to smoke.  I told her to call us anytime otherwise we will see her again in 6 months.    2.  Morbid obesity:  Weight today 304 pounds body mass index 49.  I believe this is causing her to have restrictive lung disease along with her COPD.  She told me she weight is much is 550 at one time.  She is very interested in pursuing bariatric surgery and is working with her primary care provider concerning this.    3.  Sleep apnea: I do not have her sleep studies to document its severity.  She told me she has not had any treatment in years.  I talked her about tracheostomy and she was not interested in pursuing that.  Hopefully she will have bariatric surgery which will help her sleep apnea great deal.    4.  Ongoing smoking addiction:    5.  Hypertension    6.  Diabetes: She has an insulin pump in the left lower quadrant of her abdomen    7.  Possible history of hepatitis C: She had recent blood work as said this was an active at this time.    8.  History of a seizure disorder    9.  Hyperlipidemia      Patient's Body mass index is 49.07 kg/m². BMI is above normal parameters. Recommendations include: referral to primary care.        Return in about 6 months (around 9/29/2021).          This document has been electronically signed by Julian Bolton MD on March 29, 2021 11:27 CDT      Dictated using Dragon

## 2021-04-01 ENCOUNTER — TELEMEDICINE (OUTPATIENT)
Dept: PSYCHIATRY | Facility: CLINIC | Age: 46
End: 2021-04-01

## 2021-04-01 DIAGNOSIS — F51.5 NIGHTMARES: ICD-10-CM

## 2021-04-01 DIAGNOSIS — F25.1 SCHIZOAFFECTIVE DISORDER, DEPRESSIVE TYPE (HCC): Primary | Chronic | ICD-10-CM

## 2021-04-01 DIAGNOSIS — F43.22 ADJUSTMENT DISORDER WITH ANXIOUS MOOD: ICD-10-CM

## 2021-04-01 PROCEDURE — 99214 OFFICE O/P EST MOD 30 MIN: CPT | Performed by: NURSE PRACTITIONER

## 2021-04-01 RX ORDER — LAMOTRIGINE 150 MG/1
150 TABLET ORAL DAILY
Qty: 30 TABLET | Refills: 0 | Status: SHIPPED | OUTPATIENT
Start: 2021-04-01 | End: 2021-04-29 | Stop reason: SDUPTHER

## 2021-04-01 RX ORDER — HYDROXYZINE HYDROCHLORIDE 25 MG/1
12.5-25 TABLET, FILM COATED ORAL 3 TIMES DAILY PRN
Qty: 90 TABLET | Refills: 0 | Status: SHIPPED | OUTPATIENT
Start: 2021-04-01 | End: 2021-04-29 | Stop reason: SDUPTHER

## 2021-04-01 RX ORDER — PRAZOSIN HYDROCHLORIDE 1 MG/1
1 CAPSULE ORAL NIGHTLY
Qty: 30 CAPSULE | Refills: 0 | Status: SHIPPED | OUTPATIENT
Start: 2021-04-01 | End: 2021-04-29 | Stop reason: SDUPTHER

## 2021-04-01 NOTE — PROGRESS NOTES
This provider is located at [facility], [address] [place] [address].The Patient is seen remotely at home, [address] [place] [address] via [employer].  Patient is being seen via telehealth and confirm that they are in a secure environment for this session. The patient's condition being diagnosed/treated is appropriate for telemedicine. The provider identified himself/herself: herself as well as her credentials.   The patient gave consent to be seen remotely, and when consent is given they understand that the consent allows for patient identifiable information to be sent to a third party as needed.   They may refuse to be seen remotely at any time. The electronic data is encrypted and password protected, and the patient has been advised of the potential risks to privacy not withstanding such measures.    You have chosen to receive care through a telehealth visit.  Do you consent to use a video/audio connection for your medical care today? Yes      Chief Complaint  Follow-for schizoaffective depressed type    Subjective    Sudhakar Sotojosef Sual presents to [facility] for medication management.       History of Present Illness   Patient presents today via my chart stating that she is doing all right.  She states things have been slightly more stressful lately as her mother has had health issues so they have had several doctors appointments.  Patient states that the stressors of her mother being sick has made her anxiety much higher.  Patient states that she is still felt hopelessness and helplessness but the feelings of that have been much less as she states her mood is not as bad.  Patient states that she has been sleeping good other than pain.  She also notes her PCP placed her on a new blood pressure medication but states anxiety could have caused an increase as well.  Patient reports appetite is good other than stress eating due to her  mother's health issues.  Patient states that she had 1 visual hallucination of an animal running on the road that was not present.  She states she has had some more auditory hallucinations as some of it is negative self talk but she is also heard a few different voices.  Patient denies that they tell her to harm herself or anyone else.  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  Patient states it has not gotten worse.  Aims score 1.  Patient denies that it affects her ADLs.  Patient denies any other medical changes.  Patient denies any SI/HI.    Objective   Vital Signs:   There were no vitals taken for this visit.  Due to the remote nature of this encounter (virtual encounter), vitals were unable to be obtained.  Height stated at 66 inches.  Weight stated at 308 pounds.    PHQ-9 Depression Screening  Little interest or pleasure in doing things? (P) 1   Feeling down, depressed, or hopeless? (P) 1   Trouble falling or staying asleep, or sleeping too much? (P) 2   Feeling tired or having little energy? (P) 3   Poor appetite or overeating? (P) 1   Feeling bad about yourself - or that you are a failure or have let yourself or your family down? (P) 2   Trouble concentrating on things, such as reading the newspaper or watching television?     Moving or speaking so slowly that other people could have noticed? Or the opposite - being so fidgety or restless that you have been moving around a lot more than usual? (P) 1   Thoughts that you would be better off dead, or of hurting yourself in some way? (P) 0   PHQ-9 Total Score (P) 11   If you checked off any problems, how difficult have these problems made it for you to do your work, take care of things at home, or get along with other people? (P) Very difficult     PHQ-9 Total Score: (P) 11        Feeling nervous, anxious or on edge: Several days  Not being able to stop or control worrying:  Nearly every day  Worrying too much about different things: More than half the days  Trouble Relaxing: Several days  Being so restless that it is hard to sit still: Several days  Feeling afraid as if something awful might happen: Nearly every day  Becoming easily annoyed or irritable: Several days  JD 7 Total Score: 12  If you checked any problems, how difficult have these problems made it for you to do your work, take care of things at home, or get along with other people: Very difficult      PROMIS scale screening tool that patient filled out virtually reviewed by this APRN at today's encounter.      PHQ-9 Score:   PHQ-9 Total Score: (P) 11   Patient screened positive for depression based on a PHQ-9 score of 11 on 4/1/2021. Follow-up recommendations include: Patient has schizophrenia and is currently being managed with medication..    Mental Status Exam:   Hygiene:   good  Cooperation:  Cooperative  Eye Contact:  Good  Psychomotor Behavior:  Appropriate  Affect:  Appropriate  Mood: normal  Speech:  Normal  Thought Process:  Goal directed and Linear  Thought Content:  Normal  Suicidal:  None  Homicidal:  None  Hallucinations:  Auditory  Delusion:  None  Memory:  Intact  Orientation:  Person, Place, Time and Situation  Reliability:  good  Insight:  Fair  Judgement:  Fair  Impulse Control:  Fair  Physical/Medical Issues:  Yes Dm. HTN, CKD stage 2, hep C+, JACQUE, hx substance abuse     Current Medications:   Current Outpatient Medications   Medication Sig Dispense Refill   • ADMELOG 100 UNIT/ML injection Inject 40 Units under the skin into the appropriate area as directed 3 (Three) Times a Day Before Meals. (Patient taking differently: Inject 40 Units under the skin into the appropriate area as directed 3 (Three) Times a Day Before Meals. Through insulin pump) 40 mL 11   • albuterol sulfate  (90 Base) MCG/ACT inhaler Inhale 2 puffs Every 6 (Six) Hours As Needed for Wheezing or Shortness of Air. 1 inhaler 3   •  "Cariprazine HCl (VRAYLAR) 3 MG capsule capsule Take 1 capsule by mouth Daily. 30 capsule 0   • cyclobenzaprine (FLEXERIL) 5 MG tablet Take 1 tablet by mouth 3 (Three) Times a Day As Needed for Muscle Spasms. (Patient taking differently: Take 5 mg by mouth 3 (Three) Times a Day.) 90 tablet 3   • Diclofenac Sodium (Voltaren) 1 % gel gel Apply 4 g topically to the appropriate area as directed 4 (Four) Times a Day. 150 g 3   • Dulaglutide 3 MG/0.5ML solution pen-injector Inject 3 mg under the skin into the appropriate area as directed 1 (One) Time Per Week. 4 pen 3   • DULoxetine (CYMBALTA) 30 MG capsule Take 3 capsules by mouth Daily. 90 capsule 0   • Ertugliflozin L-PyroglutamicAc (Steglatro) 15 MG tablet Take 1 tablet by mouth Every Morning. 90 tablet 3   • folic acid (FOLVITE) 1 MG tablet Take 1,000 mcg by mouth Daily.     • gabapentin (NEURONTIN) 300 MG capsule Take 1 capsule by mouth 3 (Three) Times a Day. 90 capsule 2   • glucose blood test strip Testing 4 times daily, E11.9 120 each 12   • HYDROcodone-acetaminophen (NORCO) 7.5-325 MG per tablet Take 1 tablet by mouth 2 (Two) Times a Day.     • hydrOXYzine (ATARAX) 25 MG tablet Take 0.5-1 tablets by mouth 3 (Three) Times a Day As Needed for Anxiety. 90 tablet 0   • Insulin Syringe 29G X 1/2\" 0.5 ML misc Inject 3 times daily 100 each 11   • lamoTRIgine (LaMICtal) 150 MG tablet Take 1 tablet by mouth Daily. 30 tablet 0   • levETIRAcetam (KEPPRA) 1000 MG tablet Take 1 tablet by mouth 2 (Two) Times a Day. 60 tablet 3   • lisinopril (PRINIVIL,ZESTRIL) 40 MG tablet Take 1 tablet by mouth once daily 30 tablet 3   • metFORMIN (GLUCOPHAGE) 500 MG tablet TAKE 1 TABLET BY MOUTH TWICE DAILY WITH MEALS 60 tablet 3   • metoprolol tartrate (LOPRESSOR) 50 MG tablet TAKE 1 TABLET BY MOUTH EVERY 12 HOURS 60 tablet 3   • nicotine (Nicoderm CQ) 21 MG/24HR patch Place 1 patch on the skin as directed by provider Daily. 30 patch 3   • NIFEdipine XL (PROCARDIA XL) 30 MG 24 hr tablet " Take 1 tablet by mouth Daily. 30 tablet 3   • prazosin (Minipress) 1 MG capsule Take 1 capsule by mouth Every Night. 30 capsule 0   • RELION PEN NEEDLES 31G X 6 MM misc USE 1 NEEDLE 3 TO 4 TIMES DAILY  3   • simvastatin (ZOCOR) 40 MG tablet Take 1 tablet by mouth Every Night. 30 tablet 3   • tiotropium bromide-olodaterol (STIOLTO RESPIMAT) 2.5-2.5 MCG/ACT aerosol solution inhaler Inhale 2 puffs Daily. 1 inhaler 5   • vitamin D (ERGOCALCIFEROL) 1.25 MG (35504 UT) capsule capsule TAKE 2 CAPSULES BY MOUTH ONCE A WEEK 8 capsule 0     No current facility-administered medications for this visit.       Physical Exam  Nursing note reviewed.   Constitutional:       Appearance: Normal appearance.   Neurological:      Mental Status: She is alert.   Psychiatric:         Attention and Perception: Attention normal. She perceives auditory hallucinations.         Mood and Affect: Mood is anxious and depressed.         Speech: Speech normal.         Behavior: Behavior is cooperative.         Cognition and Memory: Cognition and memory normal.        Result Review :     The following data was reviewed by: ROD Spencer on 02/03/2021:  Common labs    Common Labsle 6/16/20 9/9/20 9/9/20 9/9/20 9/9/20 9/9/20 1/26/21 1/26/21 1/26/21 1/26/21 1/26/21     0820 0820 0820 0820 0820 0804 0804 0804 0804 0804   Glucose    159 (A)      187 (A)    BUN    11      15    Creatinine    0.70      0.75    eGFR Non African Am    90      83    Sodium    137      137    Potassium    4.7      5.0    Chloride    103      104    Calcium    10.1      9.7    Albumin    3.70      3.90    Total Bilirubin    <0.2      0.2    Alkaline Phosphatase    72      67    AST (SGOT)    18      18    ALT (SGPT)    21      19    WBC  9.63     9.04       Hemoglobin  14.9     14.4       Hematocrit  44.3     45.0       Platelets  180     174       Total Cholesterol     129      114   Triglycerides     249 (A)      169 (A)   HDL Cholesterol     37 (A)      39 (A)   LDL  Cholesterol      42      47   Hemoglobin A1C   9.24 (A)      7.60 (A)     Microalbumin, Urine <1.2     <1.2  <1.2      (A) Abnormal value            CMP    CMP 6/10/20 9/9/20 1/26/21   Glucose 219 (A) 159 (A) 187 (A)   BUN 17 11 15   Creatinine 0.78 0.70 0.75   eGFR Non African Am 80 90 83   Sodium 136 137 137   Potassium 4.9 4.7 5.0   Chloride 103 103 104   Calcium 9.4 10.1 9.7   Albumin 4.00 3.70 3.90   Total Bilirubin 0.2 <0.2 0.2   Alkaline Phosphatase 60 72 67   AST (SGOT) 19 18 18   ALT (SGPT) 17 21 19   (A) Abnormal value            CBC    CBC 6/10/20 9/9/20 1/26/21   WBC 8.89 9.63 9.04   RBC 4.85 5.07 5.10   Hemoglobin 14.1 14.9 14.4   Hematocrit 42.8 44.3 45.0   MCV 88.2 87.4 88.2   MCH 29.1 29.4 28.2   MCHC 32.9 33.6 32.0   RDW 13.6 13.3 13.0   Platelets 157 180 174           CBC w/diff    CBC w/Diff 6/10/20 9/9/20 1/26/21   WBC 8.89 9.63 9.04   RBC 4.85 5.07 5.10   Hemoglobin 14.1 14.9 14.4   Hematocrit 42.8 44.3 45.0   MCV 88.2 87.4 88.2   MCH 29.1 29.4 28.2   MCHC 32.9 33.6 32.0   RDW 13.6 13.3 13.0   Platelets 157 180 174   Neutrophil Rel % 63.8 67.2    Immature Granulocyte Rel % 0.6 (A) 0.5    Lymphocyte Rel % 26.4 21.6    Monocyte Rel % 4.5 (A) 4.8 (A)    Eosinophil Rel % 3.9 5.3    Basophil Rel % 0.8 0.6    (A) Abnormal value            Lipid Panel    Lipid Panel 6/10/20 9/9/20 1/26/21   Total Cholesterol 115 129 114   Triglycerides 170 (A) 249 (A) 169 (A)   HDL Cholesterol 35 (A) 37 (A) 39 (A)   VLDL Cholesterol 34 49.8 (A) 28   LDL Cholesterol  46 42 47   LDL/HDL Ratio 1.31 1.14 1.06   (A) Abnormal value            TSH    TSH 6/10/20 1/26/21   TSH 2.550 1.610           BMP    BMP 6/10/20 9/9/20 1/26/21   BUN 17 11 15   Creatinine 0.78 0.70 0.75   Sodium 136 137 137   Potassium 4.9 4.7 5.0   Chloride 103 103 104   CO2 20.6 (A) 22.3 21.6 (A)   Calcium 9.4 10.1 9.7   (A) Abnormal value            HgB    HGB 6/10/20 9/9/20 1/26/21   Hemoglobin 14.1 14.9 14.4               Data reviewed: PCP notes         Assessment and Plan    Problem List Items Addressed This Visit     None      Visit Diagnoses     Schizoaffective disorder, depressive type (CMS/HCC)  (Chronic)   -  Primary    Relevant Medications    Cariprazine HCl (VRAYLAR) 3 MG capsule capsule    lamoTRIgine (LaMICtal) 150 MG tablet    hydrOXYzine (ATARAX) 25 MG tablet    Nightmares        Relevant Medications    Cariprazine HCl (VRAYLAR) 3 MG capsule capsule    prazosin (Minipress) 1 MG capsule    hydrOXYzine (ATARAX) 25 MG tablet    Adjustment disorder with anxious mood        Relevant Medications    Cariprazine HCl (VRAYLAR) 3 MG capsule capsule    hydrOXYzine (ATARAX) 25 MG tablet            TREATMENT PLAN/GOALS: Continue supportive psychotherapy efforts and medications as indicated. Treatment and medication options discussed during today's visit. Patient ackowledged and verbally consented to continue with current treatment plan and was educated on the importance of compliance with treatment and follow-up appointments.    MEDICATION ISSUES:  We discussed risks, benefits, and side effects of the above medications and the patient was agreeable with the plan. Patient was educated on the importance of compliance with treatment and follow-up appointments.  Patient is agreeable to call the office with any worsening of symptoms or onset of side effects. Patient is agreeable to call 911 or go to the nearest ER should he/she begin having SI/HI. Lengthy discussion with patient on the possible side effects of antipsychotic medications including increased cholesterol, increased blood sugar, and possibility of weight gain.  Also discussed the need to monitor lab work associated with this.  The risk of muscle movement disorders with this class of medication was also discussed. We will continue Lamictal in an effort to stabilize mood.  The patient was reminded to immediately come to the hospital should there be any loss of control.  Explanation was given to her regarding  Lamictal and the potential for Foster Shon syndrome and significant rash.  Patient was encouraged to check skin prior to beginning.  Patient was encouraged to report any rash and to immediately stop medication.      -Increase lamotrigine to 100 mg daily for schizoaffective disorder..  -Continue Minipress 1 mg at night for nightmares.  -Continue Vraylar 3 mg daily for schizoaffective disorder depressed type.  -Begin hydroxyzine 12.5 to 25 mg up to 3 times a day as needed for anxiety and panic.      Patient has now failed and tried Haldol, aripiprazole, Latuda and now risperidone which has caused weight gain and due to diabetes and kidney disease would benefit from Vraylar as patient is still having auditory and visual hallucinations.     Counseled patient regarding multimodal approach with healthy nutrition, healthy sleep, regular physical activity, social activities, counseling, and medications.      Coping skills reviewed and encouraged positive framing of thoughts     Assisted patient in processing above session content; acknowledged and normalized patient’s thoughts, feelings, and concerns.  Applied  positive coping skills and behavior management in session.  Allowed patient to freely discuss issues without interruption or judgment. Provided safe, confidential environment to facilitate the development of positive therapeutic relationship and encourage open, honest communication. Assisted patient in identifying risk factors which would indicate the need for higher level of care including thoughts to harm self or others and/or self-harming behavior and encouraged patient to contact this office, call 911, or present to the nearest emergency room should any of these events occur. Discussed crisis intervention services and means to access.     MEDS ORDERED DURING VISIT:  New Medications Ordered This Visit   Medications   • Cariprazine HCl (VRAYLAR) 3 MG capsule capsule     Sig: Take 1 capsule by mouth Daily.      Dispense:  30 capsule     Refill:  0   • lamoTRIgine (LaMICtal) 150 MG tablet     Sig: Take 1 tablet by mouth Daily.     Dispense:  30 tablet     Refill:  0   • prazosin (Minipress) 1 MG capsule     Sig: Take 1 capsule by mouth Every Night.     Dispense:  30 capsule     Refill:  0   • hydrOXYzine (ATARAX) 25 MG tablet     Sig: Take 0.5-1 tablets by mouth 3 (Three) Times a Day As Needed for Anxiety.     Dispense:  90 tablet     Refill:  0           Follow Up   Return in about 4 weeks (around 4/29/2021), or if symptoms worsen or fail to improve, for Recheck.    Patient was given instructions and counseling regarding her condition or for health maintenance advice. Please see specific information pulled into the AVS if appropriate.     This document has been electronically signed by ROD Spencer  April 1, 2021 10:38 EDT    Part of this note may be an electronic transcription/translation of spoken language to printed text using the Dragon Dictation System.

## 2021-04-04 DIAGNOSIS — F25.1 SCHIZOAFFECTIVE DISORDER, DEPRESSIVE TYPE (HCC): Chronic | ICD-10-CM

## 2021-04-05 RX ORDER — DULOXETIN HYDROCHLORIDE 30 MG/1
CAPSULE, DELAYED RELEASE ORAL
Qty: 90 CAPSULE | Refills: 0 | Status: SHIPPED | OUTPATIENT
Start: 2021-04-05 | End: 2021-04-29 | Stop reason: SDUPTHER

## 2021-04-06 ENCOUNTER — TELEMEDICINE (OUTPATIENT)
Dept: ENDOCRINOLOGY | Facility: CLINIC | Age: 46
End: 2021-04-06

## 2021-04-06 DIAGNOSIS — E11.65 TYPE 2 DIABETES MELLITUS WITH HYPERGLYCEMIA, WITH LONG-TERM CURRENT USE OF INSULIN (HCC): Primary | ICD-10-CM

## 2021-04-06 DIAGNOSIS — E55.9 VITAMIN D DEFICIENCY: ICD-10-CM

## 2021-04-06 DIAGNOSIS — E11.42 DIABETIC POLYNEUROPATHY ASSOCIATED WITH TYPE 2 DIABETES MELLITUS (HCC): ICD-10-CM

## 2021-04-06 DIAGNOSIS — Z79.4 TYPE 2 DIABETES MELLITUS WITH HYPERGLYCEMIA, WITH LONG-TERM CURRENT USE OF INSULIN (HCC): Primary | ICD-10-CM

## 2021-04-06 DIAGNOSIS — E78.2 MIXED HYPERLIPIDEMIA: ICD-10-CM

## 2021-04-06 DIAGNOSIS — I10 ESSENTIAL HYPERTENSION: ICD-10-CM

## 2021-04-06 PROCEDURE — 99214 OFFICE O/P EST MOD 30 MIN: CPT | Performed by: NURSE PRACTITIONER

## 2021-04-06 RX ORDER — PROCHLORPERAZINE 25 MG/1
1 SUPPOSITORY RECTAL ONCE
Qty: 1 EACH | Refills: 0 | Status: SHIPPED | OUTPATIENT
Start: 2021-04-06 | End: 2021-04-06

## 2021-04-06 RX ORDER — PROCHLORPERAZINE 25 MG/1
1 SUPPOSITORY RECTAL AS NEEDED
Qty: 9 EACH | Refills: 3 | Status: SHIPPED | OUTPATIENT
Start: 2021-04-06 | End: 2022-04-04

## 2021-04-06 RX ORDER — PROCHLORPERAZINE 25 MG/1
1 SUPPOSITORY RECTAL
Qty: 1 EACH | Refills: 3 | Status: SHIPPED | OUTPATIENT
Start: 2021-04-06 | End: 2022-05-24

## 2021-04-13 RX ORDER — ERGOCALCIFEROL 1.25 MG/1
CAPSULE ORAL
Qty: 8 CAPSULE | Refills: 0 | Status: SHIPPED | OUTPATIENT
Start: 2021-04-13 | End: 2021-05-18

## 2021-04-13 NOTE — PROGRESS NOTES
Chief Complaint  Depression, Diabetes, Hypertension, and Sleep Apnea    Subjective          Sudhakar Saul presents to [facility]  History of Present Illness     Pt is 44 yo female with management  of morbid obesity, IDDM Type 2 now on insulin pump , HTN, HLP, vitamin D deficiency, tobacco user, GERD, gastritis, esophagitis, diverticulosis, colonic polyp in sigmoid colon,  paranoid schizophrenia,  Seizure disorder, history of Illicit drug abuse, COPD, JACQUE, chronic hepatitis C , major depression, JD, sp appendectomy, sp cholecystectomy sp right carpal tunnel release, sp back surgery, sp breast surgery, sp lipoma excision, chronic back pain (moderate L4-L5 and L5-S1 DDD changes, mild bilateral L4-L5 moderate bilateral L5-S1 facet arthritic change, mild leveoscoliosis), CKD stage 2, de Quervain's syndrome of left wrist     3/18/21 in office visit for recheck on pt's above medical issues. She did see Orthopedic on 2/25/21 for her left wrist pain and was given an injection.  She does have de Quervain's syndrome she did have lesion on wrist that was recommended observation in 6 months. Did have telemedcine with behavioral health. Pt continues to take her medications for IDDM type 2, HLP, HTN, schizophrenia, seizure disorder tobacco cessation. She notices her BP has been higher than usual since last visit. For example at pain management it was over >160/90. Denies any chest pain or dizziness. She is taking lisinopril and lopressor.  Her left wrist is feeling better  But still has soreness. Depression and mood stable. She continues to smoke tobacco but has cut back significantly.      4/28/21 in office visit for recheck on pt's above medical issues. Pt continues to talk to behavioral health regarding her mental health issues. Pt continues to take her medications for DM type 2, HTN, HLP, COPD, schizoprhenia. Had labwork done on 4/21/21 that showed vitamin D at 49.1  Lipid panel showed  triglcyerdies at 164.  hga1c at 8.54 CMP showed glucose at158 and GFR at 83 CBC showed normal hemoglobin and WBC. She state she has been bad with her diet. She is still trying to work on it. She has cut back on tobacco user. Dong well on psychg medications. Not having auditory hallucinations as much. Still sees PM.   Breathing stable. She has a new DEXCOM monitor to check her sugars.        Hyperlipidemia  The current episode started more than 1 year ago. The problem is controlled. Recent lipid tests were reviewed and are variable. Exacerbating diseases include chronic renal disease, diabetes and obesity. She has no history of nephrotic syndrome. Associated symptoms include shortness of breath. Pertinent negatives include no chest pain. Current antihyperlipidemic treatment includes statins. The current treatment provides significant improvement of lipids. Risk factors for coronary artery disease include diabetes mellitus, obesity, dyslipidemia, a sedentary lifestyle, stress and hypertension.   Seizures   This is a chronic problem. The current episode started more than 1 week ago. The problem has not changed since onset.Pertinent negatives include no sleepiness, no confusion, no headaches, no speech difficulty, no visual disturbance, no neck stiffness, no sore throat, no chest pain, no cough, no nausea, no vomiting, no diarrhea and no muscle weakness. There has been no fever.   Chronic Kidney Disease  This is a chronic problem. The current episode started more than 1 year ago. The problem occurs constantly. The problem has been unchanged. Associated symptoms include arthralgias, fatigue, numbness and weakness. Pertinent negatives include no abdominal pain, chest pain, coughing, headaches, nausea, sore throat or vomiting. Nothing aggravates the symptoms. She has tried nothing for the symptoms. The treatment provided no relief.   Heartburn  She reports no abdominal pain, no belching, no chest pain, no choking, no  coughing, no early satiety, no globus sensation, no heartburn, no hoarse voice, no nausea, no sore throat, no stridor, no tooth decay, no water brash or no wheezing. This is a chronic problem. The current episode started more than 1 month ago. The problem occurs constantly. The problem has been unchanged. Nothing aggravates the symptoms. Associated symptoms include fatigue. She has tried a PPI for the symptoms.   Obesity   This is a chronic problem. The current episode started more than 1 year ago. The problem occurs constantly. The problem has been unchanged. Associated symptoms include arthralgias, fatigue, headaches, numbness and weakness. Pertinent negatives include no abdominal pain, anorexia, change in bowel habit, chest pain, chills, congestion, coughing, fever, joint swelling, myalgias, nausea, neck pain, rash, sore throat, swollen glands, urinary symptoms, vertigo, visual change or vomiting. Nothing aggravates the symptoms. She has tried nothing for the symptoms.   Hypertension   This is a chronic problem. The current episode started more than 1 year ago. The problem is controlled  Associated symptoms include headaches. Pertinent negatives include no anxiety, blurred vision, chest pain, malaise/fatigue, neck pain, orthopnea, palpitations, peripheral edema, PND or shortness of breath. Risk factors for coronary artery disease include obesity, sedentary lifestyle, smoking/tobacco exposure and stress. Past treatments include ACE inhibitors, beta blockers and diuretics. There are no compliance problems.  There is no history of angina, kidney disease, CAD/MI, CVA, heart failure, left ventricular hypertrophy, PVD or retinopathy. There is no history of chronic renal disease, coarctation of the aorta, hyperaldosteronism, hypercortisolism, hyperparathyroidism, a hypertension causing med, pheochromocytoma, renovascular disease, sleep apnea or a thyroid problem.   Diabetes   She presents for her followupdiabetic visit.  "She has type 2 diabetes mellitus. Her disease course has been imrpoving Hypoglycemia symptoms include confusion, headaches, nervousness/anxiousness, seizures and sleepiness. Associated symptoms include fatigue and weakness. Pertinent negatives for diabetes include no blurred vision, no chest pain and no visual change. Pertinent negatives for diabetic complications include no CVA, PVD or retinopathy. Risk factors for coronary artery disease include dyslipidemia, obesity and post-menopausal. She has not had a previous visit with a dietitian. There is no change in her home blood glucose trend. An ACE inhibitor/angiotensin II receptor blocker is not being taken. She does not see a podiatrist.Eye exam is not current. teratments include steglastro insulin and trulicity     Objective   Vital Signs:   /70 (BP Location: Left arm, Patient Position: Sitting, Cuff Size: Large Adult)   Pulse 88   Temp 97.1 °F (36.2 °C)   Ht 167.6 cm (66\")   Wt (!) 141 kg (310 lb)   SpO2 96%   BMI 50.04 kg/m²     /70 (BP Location: Left arm, Patient Position: Sitting, Cuff Size: Large Adult)   Pulse 88   Temp 97.1 °F (36.2 °C)   Ht 167.6 cm (66\")   Wt (!) 141 kg (310 lb)   SpO2 96%   BMI 50.04 kg/m²     Physical Exam  Vitals and nursing note reviewed.   Constitutional:       Appearance: She is well-developed. She is not diaphoretic.   HENT:      Head: Normocephalic and atraumatic.      Right Ear: External ear normal.   Eyes:      Conjunctiva/sclera: Conjunctivae normal.      Pupils: Pupils are equal, round, and reactive to light.   Cardiovascular:      Rate and Rhythm: Normal rate and regular rhythm.      Heart sounds: Normal heart sounds. No murmur heard.     Pulmonary:      Effort: Pulmonary effort is normal. No respiratory distress.      Breath sounds: Normal breath sounds.   Abdominal:      General: Bowel sounds are normal. There is no distension.      Palpations: Abdomen is soft.      Tenderness: There is no abdominal " tenderness.      Comments: obes ea bdomen    Musculoskeletal:         General: Tenderness present. No deformity. Normal range of motion.      Cervical back: Normal range of motion and neck supple.   Skin:     General: Skin is warm.      Coloration: Skin is not pale.      Findings: No erythema or rash.   Neurological:      Mental Status: She is alert and oriented to person, place, and time.      Cranial Nerves: No cranial nerve deficit.   Psychiatric:         Behavior: Behavior normal.        Result Review :                 Assessment and Plan    Diagnoses and all orders for this visit:    1. Uncontrolled type 2 diabetes mellitus with hyperglycemia (CMS/[facility]) (Primary)  -     CBC Auto Differential; Future  -     Comprehensive Metabolic Panel; Future  -     Hemoglobin A1c; Future  -     Lipid Panel; Future  -     Vitamin D 25 Hydroxy; Future    2. Mixed hyperlipidemia  -     CBC Auto Differential; Future  -     Comprehensive Metabolic Panel; Future  -     Hemoglobin A1c; Future  -     Lipid Panel; Future  -     Vitamin D 25 Hydroxy; Future    3. Essential hypertension  -     CBC Auto Differential; Future  -     Comprehensive Metabolic Panel; Future  -     Hemoglobin A1c; Future  -     Lipid Panel; Future  -     Vitamin D 25 Hydroxy; Future    4. Morbid obesity (CMS/HCC)  -     CBC Auto Differential; Future  -     Comprehensive Metabolic Panel; Future  -     Hemoglobin A1c; Future  -     Lipid Panel; Future  -     Vitamin D 25 Hydroxy; Future    5. Vitamin D deficiency  -     CBC Auto Differential; Future  -     Comprehensive Metabolic Panel; Future  -     Hemoglobin A1c; Future  -     Lipid Panel; Future  -     Vitamin D 25 Hydroxy; Future    6. Schizophrenia, paranoid type (CMS/HCC)  -     CBC Auto Differential; Future  -     Comprehensive Metabolic Panel; Future  -     Hemoglobin A1c; Future  -     Lipid Panel; Future  -     Vitamin D 25 Hydroxy; Future    7. Tobacco user  -     CBC Auto Differential; Future  -      Comprehensive Metabolic Panel; Future  -     Hemoglobin A1c; Future  -     Lipid Panel; Future  -     Vitamin D 25 Hydroxy; Future    Other orders  -     nicotine (Nicoderm CQ) 21 MG/24HR patch; Place 1 patch on the skin as directed by provider Daily.  Dispense: 30 patch; Refill: 3  -     Dulaglutide 4.5 MG/0.5ML solution pen-injector; Inject 4.5 mg under the skin into the appropriate area as directed 1 (One) Time Per Week.  Dispense: 4 pen; Refill: 3  -     omeprazole (priLOSEC) 40 MG capsule; Take 1 capsule by mouth Daily.  Dispense: 30 capsule; Refill: 3       -went over labowrk    -recommend diabetic eye exam   -recommend COVID-19 vaccination  -refer to [facility]  For Neurology and seizure disorder  -GERD with esophagitis  - start on prilosec 20 mg daiy. Consider another EGD if not improving   -tobacco user - Counseled quit smoking >5 minutes.  recommend 1 800 QUIT NOW and nicotine patches   -ckd stage 2 - stable continue to monitor. Stressed importance of diabetes control and BP control    -hyperlipdemia - stable simvastatin 40 mg PO qhs. ASCVD risk high. Now on red yeast rice. On fish oil supplement  -dyspnea/early COPD -Pulmonlogy following on stioloto and abluterol PRN.    -for DM type 2- Endocrinology following. Currently on metformin 1000 gm PO BID, steglatro 15 mg daily. trulicity 4.5 mg now on insulin pump   -vitamin D  defciency - vitamin D once a week   -schizophrenia/major depression -  Behabvioral health following no on lamictal 100 mg PO q daily and vraylar 1.5 mg daily. risperdal stopped    -seizure disorder - Neurology following stable on Keppra 1000 mg Po BID   -Morbid obesity - counseled weight loss >5 minutes BMI at 50.04   -tobacco user counseled pt to quit smoking >5 minutes.  Gave tobacco cessation material   -hypertension - uncontrolled  on lisinopril  40 mg dialy.  lopressor 50 mg PO BID  start on nifedipine 30 mg daily. Drug information provided  -advised pt to be safe and call with  any questions or concerns. All questions addressed today  -advised pt to followup with specialist and referrals  -advised pt to go to ER or call 911 if symptoms worrisome or severe  -advised pt to be safe during COVID-19 pandemic  I spent 25  minutes caring for Sudhakar on this date of service. This time includes time spent by me in the following activities: preparing for the visit, reviewing tests, obtaining and/or reviewing a separately obtained history, performing a medically appropriate examination and/or evaluation, counseling and educating the patient/family/caregiver, ordering medications, tests, or procedures, referring and communicating with other health care professionals, documenting information in the medical record and care coordination.   -recheck in 3 months         This document has been electronically signed by Jermaine Ferro MD on April 28, 2021 08:21 CDT        Follow Up   Return in about 3 months (around 7/28/2021).  Patient was given instructions and counseling regarding her condition or for health maintenance advice. Please see specific information pulled into the AVS if appropriate.

## 2021-04-14 ENCOUNTER — TELEMEDICINE (OUTPATIENT)
Dept: PSYCHIATRY | Facility: CLINIC | Age: 46
End: 2021-04-14

## 2021-04-14 DIAGNOSIS — F20.0 SCHIZOPHRENIA, PARANOID TYPE (HCC): Primary | ICD-10-CM

## 2021-04-14 PROCEDURE — 90832 PSYTX W PT 30 MINUTES: CPT | Performed by: COUNSELOR

## 2021-04-21 ENCOUNTER — LAB (OUTPATIENT)
Dept: LAB | Facility: HOSPITAL | Age: 46
End: 2021-04-21

## 2021-04-21 DIAGNOSIS — E11.65 UNCONTROLLED TYPE 2 DIABETES MELLITUS WITH HYPERGLYCEMIA (HCC): ICD-10-CM

## 2021-04-21 DIAGNOSIS — E55.9 VITAMIN D DEFICIENCY: ICD-10-CM

## 2021-04-21 DIAGNOSIS — E78.2 MIXED HYPERLIPIDEMIA: ICD-10-CM

## 2021-04-21 DIAGNOSIS — F20.0 PARANOID SCHIZOPHRENIA (HCC): ICD-10-CM

## 2021-04-21 DIAGNOSIS — G40.909 SEIZURE DISORDER (HCC): ICD-10-CM

## 2021-04-21 DIAGNOSIS — N18.2 STAGE 2 CHRONIC KIDNEY DISEASE: ICD-10-CM

## 2021-04-21 DIAGNOSIS — E11.49 OTHER DIABETIC NEUROLOGICAL COMPLICATION ASSOCIATED WITH TYPE 2 DIABETES MELLITUS (HCC): ICD-10-CM

## 2021-04-21 DIAGNOSIS — I10 ESSENTIAL HYPERTENSION: ICD-10-CM

## 2021-04-21 DIAGNOSIS — Z72.0 TOBACCO USER: ICD-10-CM

## 2021-04-21 LAB
25(OH)D3 SERPL-MCNC: 49.1 NG/ML
ALBUMIN SERPL-MCNC: 3.8 G/DL (ref 3.5–5.2)
ALBUMIN/GLOB SERPL: 1.2 G/DL
ALP SERPL-CCNC: 75 U/L (ref 39–117)
ALT SERPL W P-5'-P-CCNC: 26 U/L (ref 1–33)
ANION GAP SERPL CALCULATED.3IONS-SCNC: 12.6 MMOL/L (ref 5–15)
AST SERPL-CCNC: 18 U/L (ref 1–32)
BASOPHILS # BLD AUTO: 0.06 10*3/MM3 (ref 0–0.2)
BASOPHILS NFR BLD AUTO: 0.6 % (ref 0–1.5)
BILIRUB SERPL-MCNC: 0.2 MG/DL (ref 0–1.2)
BUN SERPL-MCNC: 13 MG/DL (ref 6–20)
BUN/CREAT SERPL: 17.3 (ref 7–25)
CALCIUM SPEC-SCNC: 9.6 MG/DL (ref 8.6–10.5)
CHLORIDE SERPL-SCNC: 102 MMOL/L (ref 98–107)
CHOLEST SERPL-MCNC: 132 MG/DL (ref 0–200)
CO2 SERPL-SCNC: 21.4 MMOL/L (ref 22–29)
CREAT SERPL-MCNC: 0.75 MG/DL (ref 0.57–1)
DEPRECATED RDW RBC AUTO: 43.8 FL (ref 37–54)
EOSINOPHIL # BLD AUTO: 0.19 10*3/MM3 (ref 0–0.4)
EOSINOPHIL NFR BLD AUTO: 1.9 % (ref 0.3–6.2)
ERYTHROCYTE [DISTWIDTH] IN BLOOD BY AUTOMATED COUNT: 13.8 % (ref 12.3–15.4)
GFR SERPL CREATININE-BSD FRML MDRD: 83 ML/MIN/1.73
GLOBULIN UR ELPH-MCNC: 3.1 GM/DL
GLUCOSE SERPL-MCNC: 158 MG/DL (ref 65–99)
HBA1C MFR BLD: 8.54 % (ref 4.8–5.6)
HCT VFR BLD AUTO: 45.7 % (ref 34–46.6)
HDLC SERPL-MCNC: 42 MG/DL (ref 40–60)
HGB BLD-MCNC: 15 G/DL (ref 12–15.9)
LDLC SERPL CALC-MCNC: 62 MG/DL (ref 0–100)
LDLC/HDLC SERPL: 1.36 {RATIO}
LYMPHOCYTES # BLD AUTO: 3 10*3/MM3 (ref 0.7–3.1)
LYMPHOCYTES NFR BLD AUTO: 30.2 % (ref 19.6–45.3)
MCH RBC QN AUTO: 29.2 PG (ref 26.6–33)
MCHC RBC AUTO-ENTMCNC: 32.8 G/DL (ref 31.5–35.7)
MCV RBC AUTO: 89.1 FL (ref 79–97)
MONOCYTES # BLD AUTO: 0.43 10*3/MM3 (ref 0.1–0.9)
MONOCYTES NFR BLD AUTO: 4.3 % (ref 5–12)
NEUTROPHILS NFR BLD AUTO: 6.21 10*3/MM3 (ref 1.7–7)
NEUTROPHILS NFR BLD AUTO: 62.4 % (ref 42.7–76)
PLATELET # BLD AUTO: 173 10*3/MM3 (ref 140–450)
PMV BLD AUTO: 11.5 FL (ref 6–12)
POTASSIUM SERPL-SCNC: 5.1 MMOL/L (ref 3.5–5.2)
PROT SERPL-MCNC: 6.9 G/DL (ref 6–8.5)
RBC # BLD AUTO: 5.13 10*6/MM3 (ref 3.77–5.28)
SODIUM SERPL-SCNC: 136 MMOL/L (ref 136–145)
TRIGL SERPL-MCNC: 164 MG/DL (ref 0–150)
VLDLC SERPL-MCNC: 28 MG/DL (ref 5–40)
WBC # BLD AUTO: 9.95 10*3/MM3 (ref 3.4–10.8)

## 2021-04-21 PROCEDURE — 80053 COMPREHEN METABOLIC PANEL: CPT

## 2021-04-21 PROCEDURE — 83036 HEMOGLOBIN GLYCOSYLATED A1C: CPT

## 2021-04-21 PROCEDURE — 80061 LIPID PANEL: CPT

## 2021-04-21 PROCEDURE — 85025 COMPLETE CBC W/AUTO DIFF WBC: CPT

## 2021-04-21 PROCEDURE — 82306 VITAMIN D 25 HYDROXY: CPT

## 2021-04-22 ENCOUNTER — TELEPHONE (OUTPATIENT)
Dept: FAMILY MEDICINE CLINIC | Facility: CLINIC | Age: 46
End: 2021-04-22

## 2021-04-22 NOTE — TELEPHONE ENCOUNTER
----- Message from Jermaine Ferro MD sent at 4/21/2021  8:55 PM CDT -----  Please marjorie pt    All labwork stable except her hga1c up from 7.60 to 8.54.  recommend pt conitnue watching her sugar and carb intake but have pt go up on trulicity from 3 to 4.5 mg subq weekly give 4 pens and 3 refills    Recheck on next visit thanks

## 2021-04-28 ENCOUNTER — OFFICE VISIT (OUTPATIENT)
Dept: FAMILY MEDICINE CLINIC | Facility: CLINIC | Age: 46
End: 2021-04-28

## 2021-04-28 VITALS
OXYGEN SATURATION: 96 % | BODY MASS INDEX: 47.09 KG/M2 | HEART RATE: 88 BPM | WEIGHT: 293 LBS | SYSTOLIC BLOOD PRESSURE: 128 MMHG | HEIGHT: 66 IN | DIASTOLIC BLOOD PRESSURE: 70 MMHG | TEMPERATURE: 97.1 F

## 2021-04-28 DIAGNOSIS — E78.2 MIXED HYPERLIPIDEMIA: ICD-10-CM

## 2021-04-28 DIAGNOSIS — E11.65 UNCONTROLLED TYPE 2 DIABETES MELLITUS WITH HYPERGLYCEMIA (HCC): Primary | ICD-10-CM

## 2021-04-28 DIAGNOSIS — E66.01 MORBID OBESITY (HCC): ICD-10-CM

## 2021-04-28 DIAGNOSIS — Z72.0 TOBACCO USER: ICD-10-CM

## 2021-04-28 DIAGNOSIS — E55.9 VITAMIN D DEFICIENCY: ICD-10-CM

## 2021-04-28 DIAGNOSIS — I10 ESSENTIAL HYPERTENSION: ICD-10-CM

## 2021-04-28 DIAGNOSIS — F20.0 SCHIZOPHRENIA, PARANOID TYPE (HCC): ICD-10-CM

## 2021-04-28 PROCEDURE — 99214 OFFICE O/P EST MOD 30 MIN: CPT | Performed by: FAMILY MEDICINE

## 2021-04-28 RX ORDER — OMEPRAZOLE 40 MG/1
40 CAPSULE, DELAYED RELEASE ORAL DAILY
Qty: 30 CAPSULE | Refills: 3 | Status: SHIPPED | OUTPATIENT
Start: 2021-04-28 | End: 2021-07-28 | Stop reason: SDUPTHER

## 2021-04-28 RX ORDER — NICOTINE 21 MG/24HR
1 PATCH, TRANSDERMAL 24 HOURS TRANSDERMAL EVERY 24 HOURS
Qty: 30 PATCH | Refills: 3 | Status: SHIPPED | OUTPATIENT
Start: 2021-04-28 | End: 2021-07-01

## 2021-04-28 RX ORDER — PROCHLORPERAZINE 25 MG/1
SUPPOSITORY RECTAL
COMMUNITY
Start: 2021-04-06

## 2021-04-29 ENCOUNTER — TELEMEDICINE (OUTPATIENT)
Dept: PSYCHIATRY | Facility: CLINIC | Age: 46
End: 2021-04-29

## 2021-04-29 DIAGNOSIS — F25.1 SCHIZOAFFECTIVE DISORDER, DEPRESSIVE TYPE (HCC): Chronic | ICD-10-CM

## 2021-04-29 DIAGNOSIS — F51.5 NIGHTMARES: ICD-10-CM

## 2021-04-29 DIAGNOSIS — F43.22 ADJUSTMENT DISORDER WITH ANXIOUS MOOD: ICD-10-CM

## 2021-04-29 PROCEDURE — 99214 OFFICE O/P EST MOD 30 MIN: CPT | Performed by: NURSE PRACTITIONER

## 2021-04-29 RX ORDER — PRAZOSIN HYDROCHLORIDE 1 MG/1
1 CAPSULE ORAL NIGHTLY
Qty: 90 CAPSULE | Refills: 0 | Status: SHIPPED | OUTPATIENT
Start: 2021-04-29 | End: 2021-06-15 | Stop reason: SDUPTHER

## 2021-04-29 RX ORDER — DULOXETIN HYDROCHLORIDE 30 MG/1
90 CAPSULE, DELAYED RELEASE ORAL DAILY
Qty: 90 CAPSULE | Refills: 1 | Status: SHIPPED | OUTPATIENT
Start: 2021-04-29 | End: 2021-06-15 | Stop reason: SDUPTHER

## 2021-04-29 RX ORDER — HYDROXYZINE HYDROCHLORIDE 25 MG/1
12.5-25 TABLET, FILM COATED ORAL 3 TIMES DAILY PRN
Qty: 90 TABLET | Refills: 2 | Status: SHIPPED | OUTPATIENT
Start: 2021-04-29 | End: 2021-06-15 | Stop reason: SDUPTHER

## 2021-04-29 RX ORDER — LAMOTRIGINE 150 MG/1
150 TABLET ORAL DAILY
Qty: 30 TABLET | Refills: 1 | Status: SHIPPED | OUTPATIENT
Start: 2021-04-29 | End: 2021-06-15 | Stop reason: SDUPTHER

## 2021-04-29 NOTE — PROGRESS NOTES
This provider is located at [facility], [address] [place] [address].The Patient is seen remotely at home, [address] [place] [address] via [employer].  Patient is being seen via telehealth and confirm that they are in a secure environment for this session. The patient's condition being diagnosed/treated is appropriate for telemedicine. The provider identified himself/herself: herself as well as her credentials.   The patient gave consent to be seen remotely, and when consent is given they understand that the consent allows for patient identifiable information to be sent to a third party as needed.   They may refuse to be seen remotely at any time. The electronic data is encrypted and password protected, and the patient has been advised of the potential risks to privacy not withstanding such measures.    You have chosen to receive care through a telehealth visit.  Do you consent to use a video/audio connection for your medical care today? Yes      Chief Complaint  Follow-for schizoaffective depressed type    Subjective    Sudhakar More Saul presents to [facility] for medication management.       History of Present Illness   Patient presents today reporting that she is been doing better since the change in medication.  She states that her mom is doing slightly better as well as she has been diagnosed with fatty liver so for the past couple weeks patient reports that her and her mother have been watching her carbs as well as her fat.  Patient notes the lamotrigine has helped overall with her mood as she is not having as many down days according to the patient.  She denies any side effects or rash with the medications.  She notes that the hydroxyzine has been helpful as she has been able to go out to [employer] and go out with her friends and eat so overall she is doing better.  Patient notes that even her mother has noticed that she is  communicating better not getting as frustrated.  Patient states she is seeing a significant decrease in the auditory hallucinations and denies any visual hallucinations.  Patient reports that she is sleeping good but she is talking more in her sleep and moving her hands more.  Patient denies any SI/HI/AH/VH.      Objective   Vital Signs:   There were no vitals taken for this visit.  Due to the remote nature of this encounter (virtual encounter), vitals were unable to be obtained.  Height stated at 66 inches.  Weight stated at 308 pounds.        PHQ-9 Score:   PHQ-9 Total Score:     Patient screened positive for depression based on a PHQ-9 score of 11 on 4/1/2021. Follow-up recommendations include: Patient has schizophrenia and is currently being managed with medication..    Mental Status Exam:   Hygiene:   good  Cooperation:  Cooperative  Eye Contact:  Good  Psychomotor Behavior:  Appropriate  Affect:  Appropriate  Mood: normal  Speech:  Normal  Thought Process:  Goal directed and Linear  Thought Content:  Normal  Suicidal:  None  Homicidal:  None  Hallucinations:  Auditory  Delusion:  None  Memory:  Intact  Orientation:  Person, Place, Time and Situation  Reliability:  good  Insight:  Good and Fair  Judgement:  Good and Fair  Impulse Control:  Good and Fair  Physical/Medical Issues:  Yes Dm. HTN, CKD stage 2, hep C+, JACQUE, hx substance abuse     Current Medications:   Current Outpatient Medications   Medication Sig Dispense Refill   • ADMELOG 100 UNIT/ML injection Inject 40 Units under the skin into the appropriate area as directed 3 (Three) Times a Day Before Meals. (Patient taking differently: Inject 40 Units under the skin into the appropriate area as directed 3 (Three) Times a Day Before Meals. Through insulin pump) 40 mL 11   • albuterol sulfate  (90 Base) MCG/ACT inhaler Inhale 2 puffs Every 6 (Six) Hours As Needed for Wheezing or Shortness of Air. 1 inhaler 3   • Cariprazine HCl (VRAYLAR) 3 MG capsule  "capsule Take 1 capsule by mouth Daily. 30 capsule 1   • Continuous Blood Gluc  (Dexcom G6 ) device USE FOR 6 MONTHS     • Continuous Blood Gluc Sensor (Dexcom G6 Sensor) 1 each As Needed (glucose control). Every 10 days 9 each 3   • Continuous Blood Gluc Transmit (Dexcom G6 Transmitter) misc 1 each Every 3 (Three) Months. 1 each 3   • cyclobenzaprine (FLEXERIL) 5 MG tablet Take 1 tablet by mouth 3 (Three) Times a Day As Needed for Muscle Spasms. (Patient taking differently: Take 5 mg by mouth 3 (Three) Times a Day.) 90 tablet 3   • Diclofenac Sodium (Voltaren) 1 % gel gel Apply 4 g topically to the appropriate area as directed 4 (Four) Times a Day. 150 g 3   • Dulaglutide 4.5 MG/0.5ML solution pen-injector Inject 4.5 mg under the skin into the appropriate area as directed 1 (One) Time Per Week. 4 pen 3   • DULoxetine (CYMBALTA) 30 MG capsule Take 3 capsules by mouth Daily. 90 capsule 1   • Ertugliflozin L-PyroglutamicAc (Steglatro) 15 MG tablet Take 1 tablet by mouth Every Morning. 90 tablet 3   • folic acid (FOLVITE) 1 MG tablet Take 1,000 mcg by mouth Daily.     • gabapentin (NEURONTIN) 300 MG capsule Take 1 capsule by mouth 3 (Three) Times a Day. 90 capsule 2   • glucose blood test strip Testing 4 times daily, E11.9 120 each 12   • HYDROcodone-acetaminophen (NORCO) 7.5-325 MG per tablet Take 1 tablet by mouth 2 (Two) Times a Day.     • hydrOXYzine (ATARAX) 25 MG tablet Take 0.5-1 tablets by mouth 3 (Three) Times a Day As Needed for Anxiety. 90 tablet 2   • Insulin Syringe 29G X 1/2\" 0.5 ML misc Inject 3 times daily 100 each 11   • lamoTRIgine (LaMICtal) 150 MG tablet Take 1 tablet by mouth Daily. 30 tablet 1   • levETIRAcetam (KEPPRA) 1000 MG tablet Take 1 tablet by mouth 2 (Two) Times a Day. 60 tablet 3   • lisinopril (PRINIVIL,ZESTRIL) 40 MG tablet Take 1 tablet by mouth once daily 30 tablet 3   • metFORMIN (GLUCOPHAGE) 500 MG tablet TAKE 1 TABLET BY MOUTH TWICE DAILY WITH MEALS 60 tablet 3   • " metoprolol tartrate (LOPRESSOR) 50 MG tablet TAKE 1 TABLET BY MOUTH EVERY 12 HOURS 60 tablet 3   • nicotine (Nicoderm CQ) 21 MG/24HR patch Place 1 patch on the skin as directed by provider Daily. 30 patch 3   • NIFEdipine XL (PROCARDIA XL) 30 MG 24 hr tablet Take 1 tablet by mouth Daily. 30 tablet 3   • omeprazole (priLOSEC) 40 MG capsule Take 1 capsule by mouth Daily. 30 capsule 3   • prazosin (Minipress) 1 MG capsule Take 1 capsule by mouth Every Night. 90 capsule 0   • RELION PEN NEEDLES 31G X 6 MM misc USE 1 NEEDLE 3 TO 4 TIMES DAILY  3   • simvastatin (ZOCOR) 40 MG tablet Take 1 tablet by mouth Every Night. 30 tablet 3   • tiotropium bromide-olodaterol (STIOLTO RESPIMAT) 2.5-2.5 MCG/ACT aerosol solution inhaler Inhale 2 puffs Daily. 1 inhaler 5   • vitamin D (ERGOCALCIFEROL) 1.25 MG (12096 UT) capsule capsule TAKE 2 CAPSULES BY MOUTH ONCE A WEEK 8 capsule 0     No current facility-administered medications for this visit.       Physical Exam  Nursing note reviewed.   Constitutional:       Appearance: Normal appearance.   Neurological:      Mental Status: She is alert.   Psychiatric:         Attention and Perception: Attention normal. She does not perceive auditory hallucinations.         Mood and Affect: Mood is anxious. Mood is not depressed.         Speech: Speech normal.         Behavior: Behavior is cooperative.         Cognition and Memory: Cognition and memory normal.        Result Review :     The following data was reviewed by: ROD Spencer on 02/03/2021:  Common labs    Common Labsle 9/9/20 9/9/20 9/9/20 9/9/20 9/9/20 1/26/21 1/26/21 1/26/21 1/26/21 1/26/21 4/21/21 4/21/21 4/21/21 4/21/21    0820 0820 0820 0820 0820 0804 0804 0804 0804 0804 0802 0802 0802 0802   Glucose   159 (A)      187 (A)   158 (A)     BUN   11      15   13     Creatinine   0.70      0.75   0.75     eGFR Non African Am   90      83   83     Sodium   137      137   136     Potassium   4.7      5.0   5.1     Chloride   103       104   102     Calcium   10.1      9.7   9.6     Albumin   3.70      3.90   3.80     Total Bilirubin   <0.2      0.2   0.2     Alkaline Phosphatase   72      67   75     AST (SGOT)   18      18   18     ALT (SGPT)   21      19   26     WBC 9.63     9.04        9.95   Hemoglobin 14.9     14.4        15.0   Hematocrit 44.3     45.0        45.7   Platelets 180     174        173   Total Cholesterol    129      114   132    Triglycerides    249 (A)      169 (A)   164 (A)    HDL Cholesterol    37 (A)      39 (A)   42    LDL Cholesterol     42      47   62    Hemoglobin A1C  9.24 (A)      7.60 (A)   8.54 (A)      Microalbumin, Urine     <1.2  <1.2          (A) Abnormal value            CMP    CMP 9/9/20 1/26/21 4/21/21   Glucose 159 (A) 187 (A) 158 (A)   BUN 11 15 13   Creatinine 0.70 0.75 0.75   eGFR Non African Am 90 83 83   Sodium 137 137 136   Potassium 4.7 5.0 5.1   Chloride 103 104 102   Calcium 10.1 9.7 9.6   Albumin 3.70 3.90 3.80   Total Bilirubin <0.2 0.2 0.2   Alkaline Phosphatase 72 67 75   AST (SGOT) 18 18 18   ALT (SGPT) 21 19 26   (A) Abnormal value            CBC    CBC 9/9/20 1/26/21 4/21/21   WBC 9.63 9.04 9.95   RBC 5.07 5.10 5.13   Hemoglobin 14.9 14.4 15.0   Hematocrit 44.3 45.0 45.7   MCV 87.4 88.2 89.1   MCH 29.4 28.2 29.2   MCHC 33.6 32.0 32.8   RDW 13.3 13.0 13.8   Platelets 180 174 173           CBC w/diff    CBC w/Diff 6/10/20 9/9/20 1/26/21   WBC 8.89 9.63 9.04   RBC 4.85 5.07 5.10   Hemoglobin 14.1 14.9 14.4   Hematocrit 42.8 44.3 45.0   MCV 88.2 87.4 88.2   MCH 29.1 29.4 28.2   MCHC 32.9 33.6 32.0   RDW 13.6 13.3 13.0   Platelets 157 180 174   Neutrophil Rel % 63.8 67.2    Immature Granulocyte Rel % 0.6 (A) 0.5    Lymphocyte Rel % 26.4 21.6    Monocyte Rel % 4.5 (A) 4.8 (A)    Eosinophil Rel % 3.9 5.3    Basophil Rel % 0.8 0.6    (A) Abnormal value            Lipid Panel    Lipid Panel 9/9/20 1/26/21 4/21/21   Total Cholesterol 129 114 132   Triglycerides 249 (A) 169 (A) 164 (A)   HDL  Cholesterol 37 (A) 39 (A) 42   VLDL Cholesterol 49.8 (A) 28 28   LDL Cholesterol  42 47 62   LDL/HDL Ratio 1.14 1.06 1.36   (A) Abnormal value            TSH    TSH 6/10/20 1/26/21   TSH 2.550 1.610           BMP    BMP 9/9/20 1/26/21 4/21/21   BUN 11 15 13   Creatinine 0.70 0.75 0.75   Sodium 137 137 136   Potassium 4.7 5.0 5.1   Chloride 103 104 102   CO2 22.3 21.6 (A) 21.4 (A)   Calcium 10.1 9.7 9.6   (A) Abnormal value            HgB    HGB 9/9/20 1/26/21 4/21/21   Hemoglobin 14.9 14.4 15.0               Data reviewed: PCP notes        Assessment and Plan    Problem List Items Addressed This Visit     None      Visit Diagnoses     Schizoaffective disorder, depressive type (CMS/HCC)  (Chronic)       Relevant Medications    Cariprazine HCl (VRAYLAR) 3 MG capsule capsule    lamoTRIgine (LaMICtal) 150 MG tablet    hydrOXYzine (ATARAX) 25 MG tablet    DULoxetine (CYMBALTA) 30 MG capsule    Nightmares        Relevant Medications    Cariprazine HCl (VRAYLAR) 3 MG capsule capsule    prazosin (Minipress) 1 MG capsule    hydrOXYzine (ATARAX) 25 MG tablet    DULoxetine (CYMBALTA) 30 MG capsule    Adjustment disorder with anxious mood        Relevant Medications    Cariprazine HCl (VRAYLAR) 3 MG capsule capsule    hydrOXYzine (ATARAX) 25 MG tablet    DULoxetine (CYMBALTA) 30 MG capsule            TREATMENT PLAN/GOALS: Continue supportive psychotherapy efforts and medications as indicated. Treatment and medication options discussed during today's visit. Patient ackowledged and verbally consented to continue with current treatment plan and was educated on the importance of compliance with treatment and follow-up appointments.    MEDICATION ISSUES:  We discussed risks, benefits, and side effects of the above medications and the patient was agreeable with the plan. Patient was educated on the importance of compliance with treatment and follow-up appointments.  Patient is agreeable to call the office with any worsening of  symptoms or onset of side effects. Patient is agreeable to call 911 or go to the nearest ER should he/she begin having SI/HI. Lengthy discussion with patient on the possible side effects of antipsychotic medications including increased cholesterol, increased blood sugar, and possibility of weight gain.  Also discussed the need to monitor lab work associated with this.  The risk of muscle movement disorders with this class of medication was also discussed. We will continue Lamictal in an effort to stabilize mood.  The patient was reminded to immediately come to the hospital should there be any loss of control.  Explanation was given to her regarding Lamictal and the potential for Foster Shon syndrome and significant rash.  Patient was encouraged to check skin prior to beginning.  Patient was encouraged to report any rash and to immediately stop medication.      -Continue lamotrigine 150 mg daily for schizoaffective disorder..  -Continue Minipress 1 mg at night for nightmares.  Encourage patient if she wants to take 2 mg and see if that helps with her sleep talking and her hand movement then she may try it but if she has any side effects to go back to 1 mg she verbalized understanding.  -Continue Vraylar 3 mg daily for schizoaffective disorder depressed type.  -Continue hydroxyzine 12.5 to 25 mg up to 3 times a day as needed for anxiety and panic.  -Patient inquired about the duloxetine as she does not know if it is effective for her depression or anxiety but states she does help with her pain but she does not know if she wants to stay on it.  Encourage patient that she can slowly taper over the next 4 weeks off of it if she would like to see if she can tolerate the pain and use her other medications without the duloxetine and possibly try an alternative patient verbalized understanding and stated she would try it and inform me if she had any concerns.    Patient has now failed and tried Haldol, aripiprazole,  Latuda and now risperidone which has caused weight gain and due to diabetes and kidney disease would benefit from Vraylar as patient is still having auditory and visual hallucinations.     Counseled patient regarding multimodal approach with healthy nutrition, healthy sleep, regular physical activity, social activities, counseling, and medications.      Coping skills reviewed and encouraged positive framing of thoughts     Assisted patient in processing above session content; acknowledged and normalized patient’s thoughts, feelings, and concerns.  Applied  positive coping skills and behavior management in session.  Allowed patient to freely discuss issues without interruption or judgment. Provided safe, confidential environment to facilitate the development of positive therapeutic relationship and encourage open, honest communication. Assisted patient in identifying risk factors which would indicate the need for higher level of care including thoughts to harm self or others and/or self-harming behavior and encouraged patient to contact this office, call 911, or present to the nearest emergency room should any of these events occur. Discussed crisis intervention services and means to access.     MEDS ORDERED DURING VISIT:  New Medications Ordered This Visit   Medications   • Cariprazine HCl (VRAYLAR) 3 MG capsule capsule     Sig: Take 1 capsule by mouth Daily.     Dispense:  30 capsule     Refill:  1   • lamoTRIgine (LaMICtal) 150 MG tablet     Sig: Take 1 tablet by mouth Daily.     Dispense:  30 tablet     Refill:  1   • prazosin (Minipress) 1 MG capsule     Sig: Take 1 capsule by mouth Every Night.     Dispense:  90 capsule     Refill:  0   • hydrOXYzine (ATARAX) 25 MG tablet     Sig: Take 0.5-1 tablets by mouth 3 (Three) Times a Day As Needed for Anxiety.     Dispense:  90 tablet     Refill:  2   • DULoxetine (CYMBALTA) 30 MG capsule     Sig: Take 3 capsules by mouth Daily.     Dispense:  90 capsule     Refill:  1            Follow Up   Return in about 6 weeks (around 6/10/2021), or if symptoms worsen or fail to improve, for Recheck.    Patient was given instructions and counseling regarding her condition or for health maintenance advice. Please see specific information pulled into the AVS if appropriate.     This document has been electronically signed by ROD Spencer  April 29, 2021 09:25 EDT    Part of this note may be an electronic transcription/translation of spoken language to printed text using the Dragon Dictation System.

## 2021-05-03 ENCOUNTER — TELEPHONE (OUTPATIENT)
Dept: FAMILY MEDICINE CLINIC | Facility: CLINIC | Age: 46
End: 2021-05-03

## 2021-05-05 RX ORDER — METOPROLOL TARTRATE 50 MG/1
TABLET, FILM COATED ORAL
Qty: 60 TABLET | Refills: 0 | Status: SHIPPED | OUTPATIENT
Start: 2021-05-05 | End: 2021-06-03

## 2021-05-11 RX ORDER — LISINOPRIL 40 MG/1
TABLET ORAL
Qty: 30 TABLET | Refills: 3 | Status: SHIPPED | OUTPATIENT
Start: 2021-05-11 | End: 2021-07-28 | Stop reason: SDUPTHER

## 2021-05-12 ENCOUNTER — TELEMEDICINE (OUTPATIENT)
Dept: PSYCHIATRY | Facility: CLINIC | Age: 46
End: 2021-05-12

## 2021-05-12 DIAGNOSIS — F20.89 SCHIZOPHRENIA, SIMPLE, CHRONIC (HCC): Primary | ICD-10-CM

## 2021-05-12 PROCEDURE — 90832 PSYTX W PT 30 MINUTES: CPT | Performed by: COUNSELOR

## 2021-05-18 RX ORDER — ERGOCALCIFEROL 1.25 MG/1
CAPSULE ORAL
Qty: 8 CAPSULE | Refills: 3 | Status: SHIPPED | OUTPATIENT
Start: 2021-05-18 | End: 2021-07-28 | Stop reason: SDUPTHER

## 2021-06-03 RX ORDER — METOPROLOL TARTRATE 50 MG/1
TABLET, FILM COATED ORAL
Qty: 60 TABLET | Refills: 3 | Status: SHIPPED | OUTPATIENT
Start: 2021-06-03 | End: 2021-07-28 | Stop reason: SDUPTHER

## 2021-06-03 RX ORDER — SIMVASTATIN 40 MG
TABLET ORAL
Qty: 30 TABLET | Refills: 3 | Status: SHIPPED | OUTPATIENT
Start: 2021-06-03 | End: 2021-07-28 | Stop reason: SDUPTHER

## 2021-06-15 ENCOUNTER — TELEPHONE (OUTPATIENT)
Dept: ENDOCRINOLOGY | Facility: CLINIC | Age: 46
End: 2021-06-15

## 2021-06-15 ENCOUNTER — TELEMEDICINE (OUTPATIENT)
Dept: PSYCHIATRY | Facility: CLINIC | Age: 46
End: 2021-06-15

## 2021-06-15 DIAGNOSIS — F43.22 ADJUSTMENT DISORDER WITH ANXIOUS MOOD: ICD-10-CM

## 2021-06-15 DIAGNOSIS — F25.1 SCHIZOAFFECTIVE DISORDER, DEPRESSIVE TYPE (HCC): Primary | ICD-10-CM

## 2021-06-15 DIAGNOSIS — F51.5 NIGHTMARES: ICD-10-CM

## 2021-06-15 PROCEDURE — 99214 OFFICE O/P EST MOD 30 MIN: CPT | Performed by: NURSE PRACTITIONER

## 2021-06-15 RX ORDER — LAMOTRIGINE 150 MG/1
150 TABLET ORAL DAILY
Qty: 30 TABLET | Refills: 2 | Status: SHIPPED | OUTPATIENT
Start: 2021-06-15 | End: 2021-08-02 | Stop reason: SDUPTHER

## 2021-06-15 RX ORDER — PRAZOSIN HYDROCHLORIDE 2 MG/1
2 CAPSULE ORAL NIGHTLY
Qty: 30 CAPSULE | Refills: 2 | Status: SHIPPED | OUTPATIENT
Start: 2021-06-15 | End: 2021-09-27

## 2021-06-15 RX ORDER — PRAZOSIN HYDROCHLORIDE 2 MG/1
2 CAPSULE ORAL NIGHTLY
Qty: 30 CAPSULE | Refills: 2 | Status: SHIPPED | OUTPATIENT
Start: 2021-06-15 | End: 2021-06-15

## 2021-06-15 RX ORDER — HYDROXYZINE HYDROCHLORIDE 25 MG/1
12.5-25 TABLET, FILM COATED ORAL 3 TIMES DAILY PRN
Qty: 90 TABLET | Refills: 2 | Status: SHIPPED | OUTPATIENT
Start: 2021-06-15 | End: 2021-09-13 | Stop reason: SDUPTHER

## 2021-06-15 RX ORDER — DULOXETIN HYDROCHLORIDE 30 MG/1
90 CAPSULE, DELAYED RELEASE ORAL DAILY
Qty: 90 CAPSULE | Refills: 2 | Status: SHIPPED | OUTPATIENT
Start: 2021-06-15 | End: 2021-09-13 | Stop reason: SDUPTHER

## 2021-06-15 NOTE — TELEPHONE ENCOUNTER
Pt says she uses a pump and is not enough insulin  to cover her basil amount .  Pharmacy said was for 50 day supply instead of 30 day supply so she is going to run out before they will refill     Admelog vials   Pharmacy- [facility] [address] in [place], [place]

## 2021-06-15 NOTE — PROGRESS NOTES
[facility] provider is located at [facility], [address] [place] [address].The Patient is seen remotely at home, [address] [facility] [place] [address] via [employer].  Patient is being seen via telehealth and confirm that they are in a secure environment for this session. The patient's condition being diagnosed/treated is appropriate for telemedicine. The provider identified himself/herself: herself as well as her credentials.   The patient gave consent to be seen remotely, and when consent is given they understand that the consent allows for patient identifiable information to be sent to a third party as needed.   They may refuse to be seen remotely at any time. The electronic data is encrypted and password protected, and the patient has been advised of the potential risks to privacy not withstanding such measures.    You have chosen to receive care through a telehealth visit.  Do you consent to use a video/audio connection for your medical care today? Yes      Chief Complaint  Follow-for schizoaffective depressed type    Subjective    Sudhakar More Saul presents to [facility] for medication management.       History of Present Illness   Patient presents today reporting she is doing okay.  She states that she took her Trulicity shot today which causes flulike symptoms for 24 hours so it has been difficult for her today.  Patient states that she feels her depression and anxiety have been situational as they found a lesion on her mother's spine and have been doing bone scan so she has been worried about her mother.  Patient states that otherwise her depression and anxiety have been manageable.  Patient states that she had 1 visual hallucination in which she thought she saw someone she knew standing in the yard.  She states she heard a childlike voice since her last visit but denies any other auditory visual hallucinations.  Patient states she has had 2  seizures since her last visit and sees her neurologist on Friday.  Patient notes that she has taken half of the lamotrigine in the morning and the other half in the evening and that helps with her symptom management.  Patient reports that she is averaging 5 hours of sleep at night and the 2 mg of Minipress was helpful for nightmares.  Patient denies any side effects or rash to the medications.  Denies any current auditory visual hallucinations.  Denies any SI/HI.        Objective   Vital Signs:   There were no vitals taken for this visit.  Due to the remote nature of this encounter (virtual encounter), vitals were unable to be obtained.  Height stated at 66 inches.  Weight stated at 308 pounds.        PHQ-9 Score:   PHQ-9 Total Score:     Patient screened positive for depression based on a PHQ-9 score of 11 on 4/1/2021. Follow-up recommendations include: Patient has schizophrenia and is currently being managed with medication..    Mental Status Exam:   Hygiene:   good  Cooperation:  Cooperative  Eye Contact:  Good  Psychomotor Behavior:  Appropriate  Affect:  Appropriate  Mood: normal  Speech:  Normal  Thought Process:  Goal directed and Linear  Thought Content:  Normal  Suicidal:  None  Homicidal:  None  Hallucinations:  Auditory  Delusion:  None  Memory:  Intact  Orientation:  Person, Place, Time and Situation  Reliability:  good  Insight:  Good and Fair  Judgement:  Good and Fair  Impulse Control:  Good and Fair  Physical/Medical Issues:  Yes Dm. HTN, CKD stage 2, hep C+, JACQUE, hx substance abuse     Current Medications:   Current Outpatient Medications   Medication Sig Dispense Refill   • Admelog 100 UNIT/ML injection Inject 40 Units under the skin into the appropriate area as directed 3 (Three) Times a Day Before Meals. Through insulin pump 60 mL 1   • albuterol sulfate  (90 Base) MCG/ACT inhaler Inhale 2 puffs Every 6 (Six) Hours As Needed for Wheezing or Shortness of Air. 1 inhaler 3   • Cariprazine HCl  "(VRAYLAR) 3 MG capsule capsule Take 1 capsule by mouth Daily. 30 capsule 2   • Continuous Blood Gluc  (Dexcom G6 ) device USE FOR 6 MONTHS     • Continuous Blood Gluc Sensor (Dexcom G6 Sensor) 1 each As Needed (glucose control). Every 10 days 9 each 3   • Continuous Blood Gluc Transmit (Dexcom G6 Transmitter) misc 1 each Every 3 (Three) Months. 1 each 3   • cyclobenzaprine (FLEXERIL) 5 MG tablet Take 1 tablet by mouth 3 (Three) Times a Day As Needed for Muscle Spasms. (Patient taking differently: Take 5 mg by mouth 3 (Three) Times a Day.) 90 tablet 3   • Diclofenac Sodium (Voltaren) 1 % gel gel Apply 4 g topically to the appropriate area as directed 4 (Four) Times a Day. 150 g 3   • Dulaglutide 4.5 MG/0.5ML solution pen-injector Inject 4.5 mg under the skin into the appropriate area as directed 1 (One) Time Per Week. 4 pen 3   • DULoxetine (CYMBALTA) 30 MG capsule Take 3 capsules by mouth Daily. 90 capsule 2   • Ertugliflozin L-PyroglutamicAc (Steglatro) 15 MG tablet Take 1 tablet by mouth Every Morning. 90 tablet 3   • folic acid (FOLVITE) 1 MG tablet Take 1,000 mcg by mouth Daily.     • gabapentin (NEURONTIN) 300 MG capsule Take 1 capsule by mouth 3 (Three) Times a Day. 90 capsule 2   • glucose blood test strip Testing 4 times daily, E11.9 120 each 12   • HYDROcodone-acetaminophen (NORCO) 7.5-325 MG per tablet Take 1 tablet by mouth 2 (Two) Times a Day.     • hydrOXYzine (ATARAX) 25 MG tablet Take 0.5-1 tablets by mouth 3 (Three) Times a Day As Needed for Anxiety. 90 tablet 2   • Insulin Syringe 29G X 1/2\" 0.5 ML misc Inject 3 times daily 100 each 11   • lamoTRIgine (LaMICtal) 150 MG tablet Take 1 tablet by mouth Daily. 30 tablet 2   • levETIRAcetam (KEPPRA) 1000 MG tablet Take 1 tablet by mouth 2 (Two) Times a Day. 60 tablet 3   • lisinopril (PRINIVIL,ZESTRIL) 40 MG tablet Take 1 tablet by mouth once daily 30 tablet 3   • metFORMIN (GLUCOPHAGE) 500 MG tablet TAKE 1 TABLET BY MOUTH TWICE DAILY WITH " MEALS 60 tablet 3   • metoprolol tartrate (LOPRESSOR) 50 MG tablet TAKE 1 TABLET BY MOUTH EVERY 12 HOURS 60 tablet 3   • nicotine (Nicoderm CQ) 21 MG/24HR patch Place 1 patch on the skin as directed by provider Daily. 30 patch 3   • NIFEdipine XL (PROCARDIA XL) 30 MG 24 hr tablet Take 1 tablet by mouth Daily. 30 tablet 3   • omeprazole (priLOSEC) 40 MG capsule Take 1 capsule by mouth Daily. 30 capsule 3   • prazosin (Minipress) 2 MG capsule Take 1 capsule by mouth Every Night. 30 capsule 2   • RELION PEN NEEDLES 31G X 6 MM misc USE 1 NEEDLE 3 TO 4 TIMES DAILY  3   • simvastatin (ZOCOR) 40 MG tablet TAKE 1 TABLET BY MOUTH ONCE DAILY AT NIGHT 30 tablet 3   • tiotropium bromide-olodaterol (STIOLTO RESPIMAT) 2.5-2.5 MCG/ACT aerosol solution inhaler Inhale 2 puffs Daily. 1 inhaler 5   • vitamin D (ERGOCALCIFEROL) 1.25 MG (46525 UT) capsule capsule TAKE 2 CAPSULES BY MOUTH ONCE A WEEK 8 capsule 3     No current facility-administered medications for this visit.       Physical Exam  Nursing note reviewed.   Constitutional:       Appearance: Normal appearance.   Neurological:      Mental Status: She is alert.   Psychiatric:         Attention and Perception: Attention normal. She perceives auditory hallucinations.         Mood and Affect: Mood is anxious and depressed.         Speech: Speech normal.         Behavior: Behavior is cooperative.         Cognition and Memory: Cognition and memory normal.        Result Review :     The following data was reviewed by: ROD Spencer on 02/03/2021:  Common labs    Common Labsle 9/9/20 9/9/20 9/9/20 9/9/20 9/9/20 1/26/21 1/26/21 1/26/21 1/26/21 1/26/21 4/21/21 4/21/21 4/21/21 4/21/21    0820 0820 0820 0820 0820 0804 0804 0804 0804 0804 0802 0802 0802 0802   Glucose   159 (A)      187 (A)   158 (A)     BUN   11      15   13     Creatinine   0.70      0.75   0.75     eGFR Non African Am   90      83   83     Sodium   137      137   136     Potassium   4.7      5.0   5.1      Chloride   103      104   102     Calcium   10.1      9.7   9.6     Albumin   3.70      3.90   3.80     Total Bilirubin   <0.2      0.2   0.2     Alkaline Phosphatase   72      67   75     AST (SGOT)   18      18   18     ALT (SGPT)   21      19   26     WBC 9.63     9.04        9.95   Hemoglobin 14.9     14.4        15.0   Hematocrit 44.3     45.0        45.7   Platelets 180     174        173   Total Cholesterol    129      114   132    Triglycerides    249 (A)      169 (A)   164 (A)    HDL Cholesterol    37 (A)      39 (A)   42    LDL Cholesterol     42      47   62    Hemoglobin A1C  9.24 (A)      7.60 (A)   8.54 (A)      Microalbumin, Urine     <1.2  <1.2          (A) Abnormal value            CMP    CMP 9/9/20 1/26/21 4/21/21   Glucose 159 (A) 187 (A) 158 (A)   BUN 11 15 13   Creatinine 0.70 0.75 0.75   eGFR Non African Am 90 83 83   Sodium 137 137 136   Potassium 4.7 5.0 5.1   Chloride 103 104 102   Calcium 10.1 9.7 9.6   Albumin 3.70 3.90 3.80   Total Bilirubin <0.2 0.2 0.2   Alkaline Phosphatase 72 67 75   AST (SGOT) 18 18 18   ALT (SGPT) 21 19 26   (A) Abnormal value            CBC    CBC 9/9/20 1/26/21 4/21/21   WBC 9.63 9.04 9.95   RBC 5.07 5.10 5.13   Hemoglobin 14.9 14.4 15.0   Hematocrit 44.3 45.0 45.7   MCV 87.4 88.2 89.1   MCH 29.4 28.2 29.2   MCHC 33.6 32.0 32.8   RDW 13.3 13.0 13.8   Platelets 180 174 173           CBC w/diff    CBC w/Diff 6/10/20 9/9/20 1/26/21   WBC 8.89 9.63 9.04   RBC 4.85 5.07 5.10   Hemoglobin 14.1 14.9 14.4   Hematocrit 42.8 44.3 45.0   MCV 88.2 87.4 88.2   MCH 29.1 29.4 28.2   MCHC 32.9 33.6 32.0   RDW 13.6 13.3 13.0   Platelets 157 180 174   Neutrophil Rel % 63.8 67.2    Immature Granulocyte Rel % 0.6 (A) 0.5    Lymphocyte Rel % 26.4 21.6    Monocyte Rel % 4.5 (A) 4.8 (A)    Eosinophil Rel % 3.9 5.3    Basophil Rel % 0.8 0.6    (A) Abnormal value            Lipid Panel    Lipid Panel 9/9/20 1/26/21 4/21/21   Total Cholesterol 129 114 132   Triglycerides 249 (A) 169 (A)  164 (A)   HDL Cholesterol 37 (A) 39 (A) 42   VLDL Cholesterol 49.8 (A) 28 28   LDL Cholesterol  42 47 62   LDL/HDL Ratio 1.14 1.06 1.36   (A) Abnormal value            TSH    TSH 1/26/21   TSH 1.610           BMP    BMP 9/9/20 1/26/21 4/21/21   BUN 11 15 13   Creatinine 0.70 0.75 0.75   Sodium 137 137 136   Potassium 4.7 5.0 5.1   Chloride 103 104 102   CO2 22.3 21.6 (A) 21.4 (A)   Calcium 10.1 9.7 9.6   (A) Abnormal value            HgB    HGB 9/9/20 1/26/21 4/21/21   Hemoglobin 14.9 14.4 15.0               Data reviewed: PCP notes        Assessment and Plan    Problem List Items Addressed This Visit     None      Visit Diagnoses     Schizoaffective disorder, depressive type (CMS/HCC)    -  Primary    Relevant Medications    Cariprazine HCl (VRAYLAR) 3 MG capsule capsule    DULoxetine (CYMBALTA) 30 MG capsule    hydrOXYzine (ATARAX) 25 MG tablet    lamoTRIgine (LaMICtal) 150 MG tablet    Nightmares        Relevant Medications    Cariprazine HCl (VRAYLAR) 3 MG capsule capsule    DULoxetine (CYMBALTA) 30 MG capsule    hydrOXYzine (ATARAX) 25 MG tablet    prazosin (Minipress) 2 MG capsule    Adjustment disorder with anxious mood        Relevant Medications    Cariprazine HCl (VRAYLAR) 3 MG capsule capsule    DULoxetine (CYMBALTA) 30 MG capsule    hydrOXYzine (ATARAX) 25 MG tablet            TREATMENT PLAN/GOALS: Continue supportive psychotherapy efforts and medications as indicated. Treatment and medication options discussed during today's visit. Patient ackowledged and verbally consented to continue with current treatment plan and was educated on the importance of compliance with treatment and follow-up appointments.    MEDICATION ISSUES:  We discussed risks, benefits, and side effects of the above medications and the patient was agreeable with the plan. Patient was educated on the importance of compliance with treatment and follow-up appointments.  Patient is agreeable to call the office with any worsening of  symptoms or onset of side effects. Patient is agreeable to call 911 or go to the nearest ER should he/she begin having SI/HI. Lengthy discussion with patient on the possible side effects of antipsychotic medications including increased cholesterol, increased blood sugar, and possibility of weight gain.  Also discussed the need to monitor lab work associated with this.  The risk of muscle movement disorders with this class of medication was also discussed. We will continue Lamictal in an effort to stabilize mood.  The patient was reminded to immediately come to the hospital should there be any loss of control.  Explanation was given to her regarding Lamictal and the potential for Foster Shon syndrome and significant rash.  Patient was encouraged to check skin prior to beginning.  Patient was encouraged to report any rash and to immediately stop medication.      -Continue lamotrigine 150 mg daily for schizoaffective disorder..  -Continue minipress 2mg at night for nightmares.  -Continue Vraylar 3 mg daily for schizoaffective disorder depressed type.  -Continue hydroxyzine 12.5 to 25 mg up to 3 times a day as needed for anxiety and panic.  -Patient inquired about the duloxetine as she does not know if it is effective for her depression or anxiety but states she does help with her pain but she does not know if she wants to stay on it.  Continue at 90mg daily for depression and pain and will further evaluate.     Patient has now failed and tried Haldol, aripiprazole, Latuda and now risperidone which has caused weight gain and due to diabetes and kidney disease would benefit from Vraylar as patient is still having auditory and visual hallucinations.     Counseled patient regarding multimodal approach with healthy nutrition, healthy sleep, regular physical activity, social activities, counseling, and medications.      Coping skills reviewed and encouraged positive framing of thoughts     Assisted patient in processing  above session content; acknowledged and normalized patient’s thoughts, feelings, and concerns.  Applied  positive coping skills and behavior management in session.  Allowed patient to freely discuss issues without interruption or judgment. Provided safe, confidential environment to facilitate the development of positive therapeutic relationship and encourage open, honest communication. Assisted patient in identifying risk factors which would indicate the need for higher level of care including thoughts to harm self or others and/or self-harming behavior and encouraged patient to contact this office, call 911, or present to the nearest emergency room should any of these events occur. Discussed crisis intervention services and means to access.     MEDS ORDERED DURING VISIT:  New Medications Ordered This Visit   Medications   • Cariprazine HCl (VRAYLAR) 3 MG capsule capsule     Sig: Take 1 capsule by mouth Daily.     Dispense:  30 capsule     Refill:  2   • DULoxetine (CYMBALTA) 30 MG capsule     Sig: Take 3 capsules by mouth Daily.     Dispense:  90 capsule     Refill:  2   • hydrOXYzine (ATARAX) 25 MG tablet     Sig: Take 0.5-1 tablets by mouth 3 (Three) Times a Day As Needed for Anxiety.     Dispense:  90 tablet     Refill:  2   • lamoTRIgine (LaMICtal) 150 MG tablet     Sig: Take 1 tablet by mouth Daily.     Dispense:  30 tablet     Refill:  2   • prazosin (Minipress) 2 MG capsule     Sig: Take 1 capsule by mouth Every Night.     Dispense:  30 capsule     Refill:  2           Follow Up   Return in about 4 weeks (around 7/13/2021), or if symptoms worsen or fail to improve, for Recheck.    Patient was given instructions and counseling regarding her condition or for health maintenance advice. Please see specific information pulled into the AVS if appropriate.     This document has been electronically signed by ROD Spencer  Kimberly 15, 2021 09:33 EDT    Part of this note may be an electronic  transcription/translation of spoken language to printed text using the Dragon Dictation System.

## 2021-06-21 ENCOUNTER — TELEMEDICINE (OUTPATIENT)
Dept: PSYCHIATRY | Facility: CLINIC | Age: 46
End: 2021-06-21

## 2021-06-21 DIAGNOSIS — F20.0 SCHIZOPHRENIA, PARANOID TYPE (HCC): Primary | ICD-10-CM

## 2021-06-21 PROCEDURE — 90837 PSYTX W PT 60 MINUTES: CPT | Performed by: COUNSELOR
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
## 2021-07-01 ENCOUNTER — OFFICE VISIT (OUTPATIENT)
Dept: ENDOCRINOLOGY | Facility: CLINIC | Age: 46
End: 2021-07-01

## 2021-07-01 VITALS
DIASTOLIC BLOOD PRESSURE: 70 MMHG | WEIGHT: 293 LBS | SYSTOLIC BLOOD PRESSURE: 132 MMHG | BODY MASS INDEX: 47.09 KG/M2 | HEIGHT: 66 IN

## 2021-07-01 DIAGNOSIS — E55.9 VITAMIN D DEFICIENCY: ICD-10-CM

## 2021-07-01 DIAGNOSIS — E78.2 MIXED HYPERLIPIDEMIA: ICD-10-CM

## 2021-07-01 DIAGNOSIS — E11.65 TYPE 2 DIABETES MELLITUS WITH HYPERGLYCEMIA, WITH LONG-TERM CURRENT USE OF INSULIN (HCC): Primary | ICD-10-CM

## 2021-07-01 DIAGNOSIS — Z79.4 TYPE 2 DIABETES MELLITUS WITH HYPERGLYCEMIA, WITH LONG-TERM CURRENT USE OF INSULIN (HCC): Primary | ICD-10-CM

## 2021-07-01 DIAGNOSIS — I10 ESSENTIAL HYPERTENSION: ICD-10-CM

## 2021-07-01 PROCEDURE — 95251 CONT GLUC MNTR ANALYSIS I&R: CPT | Performed by: NURSE PRACTITIONER

## 2021-07-01 PROCEDURE — 99214 OFFICE O/P EST MOD 30 MIN: CPT | Performed by: NURSE PRACTITIONER

## 2021-07-01 RX ORDER — NICOTINE 21 MG/24HR
1 PATCH, TRANSDERMAL 24 HOURS TRANSDERMAL EVERY 24 HOURS
COMMUNITY
End: 2021-07-28 | Stop reason: SDUPTHER

## 2021-07-01 NOTE — PROGRESS NOTES
"Chief Complaint  Diabetes    Subjective          Sudhakar Saul presents to [facility] ENDOCRINOLOGY  History of Present Illness     In office visit      Primary provider Jermaine Ferro MD        45 year old female presents for follow up      Reason diabetes mellitus type 2      Duration diagnosed in late 20 s           Timing is constant     Quality is not controlled     Severity is high due to complications           Complications, neuropathy, hyperglycemia         Severity (Complication/Hospitalizations)        Secondary Macrovascular-- no CAD, no PAD, no CVA          Secondary Microvascular--- neuropathy, mild diabetic retinopathy , CKD stage II     COPD , hepatitis C      Context--- found during pre-op evaluation bg was 300            Diabetes Regimen           Lab Results   Component Value Date     HGBA1C 7.60 (H) 01/26/2021            Blood Glucose Readings     Checking 4 times daily      States at goal               Wearing Omni Pod insulin pump     Dexcom G6     Downloaded and reviewed     Dated from June 18 to July 1, 2021    Average bg 156     Target bg 80 %     High - 19%     Very high less than 1 %    Low less than 1%    Very low 0 %         Diet-   low-carb        Exercises walking                 Associated Signs/Symptoms          Hyperglycemic Symptoms: none       Hypoglycemic Episodes:  None         Review of Systems - General ROS: negative          Objective   Vital Signs:   /70   Ht 167.6 cm (66\")   Wt (!) 148 kg (325 lb 3.2 oz)   BMI 52.49 kg/m²     Physical Exam  Cardiovascular:      Rate and Rhythm: Regular rhythm.      Heart sounds: Normal heart sounds.   Pulmonary:      Breath sounds: Normal breath sounds.   Musculoskeletal:         General: Normal range of motion.      Cervical back: Normal range of motion.   Skin:     Coloration: Skin is not pale.   Neurological:      General: No focal deficit present.      Mental Status: She is alert.        Result Review : "   The following data was reviewed by: ROD Chawla on 07/01/2021:  Common labs    Common Labsle 9/9/20 9/9/20 9/9/20 9/9/20 9/9/20 1/26/21 1/26/21 1/26/21 1/26/21 1/26/21 4/21/21 4/21/21 4/21/21 4/21/21    0820 0820 0820 0820 0820 0804 0804 0804 0804 0804 0802 0802 0802 0802   Glucose   159 (A)      187 (A)   158 (A)     BUN   11      15   13     Creatinine   0.70      0.75   0.75     eGFR Non African Am   90      83   83     Sodium   137      137   136     Potassium   4.7      5.0   5.1     Chloride   103      104   102     Calcium   10.1      9.7   9.6     Albumin   3.70      3.90   3.80     Total Bilirubin   <0.2      0.2   0.2     Alkaline Phosphatase   72      67   75     AST (SGOT)   18      18   18     ALT (SGPT)   21      19   26     WBC 9.63     9.04        9.95   Hemoglobin 14.9     14.4        15.0   Hematocrit 44.3     45.0        45.7   Platelets 180     174        173   Total Cholesterol    129      114   132    Triglycerides    249 (A)      169 (A)   164 (A)    HDL Cholesterol    37 (A)      39 (A)   42    LDL Cholesterol     42      47   62    Hemoglobin A1C  9.24 (A)      7.60 (A)   8.54 (A)      Microalbumin, Urine     <1.2  <1.2          (A) Abnormal value                      Assessment and Plan    Diagnoses and all orders for this visit:    1. Type 2 diabetes mellitus with hyperglycemia, with long-term current use of insulin (CMS/[facility]) (Primary)  -     Hemoglobin A1c    2. Essential hypertension    3. Vitamin D deficiency    4. Mixed hyperlipidemia                   Glycemic Management     Diabetes mellitus type 2 not controlled      Lab Results   Component Value Date    HGBA1C 8.54 (H) 04/21/2021        Dexcom G6     Downloaded and reviewed     Dated from June 18 to July 1, 2021    Average bg 156     Target bg 80 %     High - 19%     Very high less than 1 %    Low less than 1%    Very low 0 %       Most at goal     occasional postprandial hyperglycemia     No change today             Omni pod         Basal      MN - 2.65         Carb ratio      4      Correction         14     Average bg 120               Taking trulicity 4.5  mg weekly      Taking Metformin 1000 mg po BID      Taking Steglatro 15 mg one daily         Previous regimen      basaglar taking 63 units ---stop        admelog--stop      Taking about 30 units            Aim for 45 grams of Carbohydrate for meals      Aim for 15 grams of Carbohydrate for snack                        Lipid Management           Taking Simvastatin 40 mg                Total Cholesterol   Date Value Ref Range Status   01/26/2021 114 0 - 200 mg/dL Final            Triglycerides   Date Value Ref Range Status   01/26/2021 169 (H) 0 - 150 mg/dL Final            HDL Cholesterol   Date Value Ref Range Status   01/26/2021 39 (L) 40 - 60 mg/dL Final            LDL Cholesterol    Date Value Ref Range Status   01/26/2021 47 0 - 100 mg/dL Final            Blood Pressure Management           Taking Lisinopril 40 mg daily               Microvascular Complication Monitoring        Last eye exam -due Jan. 2021         Component      Latest Ref Rng & Units 9/9/2020   Microalbumin/Creatinine Ratio           Creatinine, Urine      mg/dL 52.8   Microalbumin, Urine      mg/dL <1.2            Neuropathy        Taking Gabapentin 300 mg tid          Bone Health            taking vitamin d daily      Component      Latest Ref Rng & Units 9/9/2020   25 Hydroxy, Vitamin D      30.0 - 100.0 ng/ml 41.6               Other Diabetes Related Aspects               Lab Results   Component Value Date     [id number] 546 09/09/2020         Thyroid health               Lab Results   Component Value Date     TSH 1.610 01/26/2021                   Follow Up   Return in about 3 months (around 10/1/2021) for Recheck.  Patient was given instructions and counseling regarding her condition or for health maintenance advice. Please see specific information pulled into the AVS if appropriate.

## 2021-07-02 DIAGNOSIS — M25.539 PAIN IN WRIST, UNSPECIFIED LATERALITY: Primary | ICD-10-CM

## 2021-07-02 DIAGNOSIS — M65.4 DE QUERVAIN'S TENOSYNOVITIS, LEFT: ICD-10-CM

## 2021-07-05 NOTE — PROGRESS NOTES
Chief Complaint  Chronic Kidney Disease, Heartburn, Hyperlipidemia, COPD, Shortness of Breath, Diabetes, Vitamin D Deficiency, Depression, Seizures, and Hypertension    Subjective          Sudhakar Saul presents to [facility] PRIMARY CARE  History of Present Illness     Pt is 44 yo female with management  of morbid obesity, IDDM Type 2 now on insulin pump , HTN, HLP, vitamin D deficiency, tobacco user, GERD, gastritis, esophagitis, diverticulosis, colonic polyp in sigmoid colon,  paranoid schizophrenia,  Seizure disorder, history of Illicit drug abuse, COPD, JACQUE, chronic hepatitis C , major depression, JD, sp appendectomy, sp cholecystectomy sp right carpal tunnel release, sp back surgery, sp breast surgery, sp lipoma excision, chronic back pain (moderate L4-L5 and L5-S1 DDD changes, mild bilateral L4-L5 moderate bilateral L5-S1 facet arthritic change, mild leveoscoliosis), CKD stage 2, de Quervain's syndrome of left wrist        4/28/21 in office visit for recheck on pt's above medical issues. Pt continues to talk to behavioral health regarding her mental health issues. Pt continues to take her medications for DM type 2, HTN, HLP, COPD, schizoprhenia. Had labwork done on 4/21/21 that showed vitamin D at 49.1  Lipid panel showed triglcyerdies at 164.  hga1c at 8.54 CMP showed glucose at158 and GFR at 83 CBC showed normal hemoglobin and WBC. She state she has been bad with her diet. She is still trying to work on it. She has cut back on tobacco user. Dong well on psychg medications. Not having auditory hallucinations as much. Still sees PM.   Breathing stable. She has a new DEXCOM monitor to check her sugars.     7/28/21 in office visit for recheck on pt's above medical issues. Pt has yet to get labwork ordered on 7/1/21 and 4/28/21. Pt saw Endocrinology on 7/1/21 for her uncontrolled DM type 2.  sbhe continues to be on insulin pump omni pod. Pt had labwork doneo n 7/202/1 that shwoed normal  TSH vitamin D was at 48.0  Lipid panel showed total cholesterol at 123 with triglycerides at 114. hga1c is at 7.60.  From 8.54. CMP showed stable kidney and liver function CBC showed WBC at 11.45 with hemoglobin at 13.8 . Pt continues to take her medicaiotns for HTN, IDDM type 2, HLP, vitamin D dfeiciency her psych medications Pt is doing well phsyically and mentally. She continues to talk to behavioral health. She has upcoming EMG study ordered on 8/16/21. Her seizures are stable and she sees Neurology in . She has cut back on smoking to  A few cigarettes        Hyperlipidemia  The current episode started more than 1 year ago. The problem is controlled. Recent lipid tests were reviewed and are variable. Exacerbating diseases include chronic renal disease, diabetes and obesity. She has no history of nephrotic syndrome. Associated symptoms include shortness of breath. Pertinent negatives include no chest pain. Current antihyperlipidemic treatment includes statins. The current treatment provides significant improvement of lipids. Risk factors for coronary artery disease include diabetes mellitus, obesity, dyslipidemia, a sedentary lifestyle, stress and hypertension.   Seizures   This is a chronic problem. The current episode started more than 1 week ago. The problem has not changed since onset.Pertinent negatives include no sleepiness, no confusion, no headaches, no speech difficulty, no visual disturbance, no neck stiffness, no sore throat, no chest pain, no cough, no nausea, no vomiting, no diarrhea and no muscle weakness. There has been no fever.   Chronic Kidney Disease  This is a chronic problem. The current episode started more than 1 year ago. The problem occurs constantly. The problem has been unchanged. Associated symptoms include arthralgias, fatigue, numbness and weakness. Pertinent negatives include no abdominal pain, chest pain, coughing, headaches, nausea, sore throat or vomiting. Nothing aggravates  the symptoms. She has tried nothing for the symptoms. The treatment provided no relief.   Heartburn  She reports no abdominal pain, no belching, no chest pain, no choking, no coughing, no early satiety, no globus sensation, no heartburn, no hoarse voice, no nausea, no sore throat, no stridor, no tooth decay, no water brash or no wheezing. This is a chronic problem. The current episode started more than 1 month ago. The problem occurs constantly. The problem has been unchanged. Nothing aggravates the symptoms. Associated symptoms include fatigue. She has tried a PPI for the symptoms.   Obesity   This is a chronic problem. The current episode started more than 1 year ago. The problem occurs constantly. The problem has been unchanged. Associated symptoms include arthralgias, fatigue, headaches, numbness and weakness. Pertinent negatives include no abdominal pain, anorexia, change in bowel habit, chest pain, chills, congestion, coughing, fever, joint swelling, myalgias, nausea, neck pain, rash, sore throat, swollen glands, urinary symptoms, vertigo, visual change or vomiting. Nothing aggravates the symptoms. She has tried nothing for the symptoms.   Hypertension   This is a chronic problem. The current episode started more than 1 year ago. The problem is controlled  Associated symptoms include headaches. Pertinent negatives include no anxiety, blurred vision, chest pain, malaise/fatigue, neck pain, orthopnea, palpitations, peripheral edema, PND or shortness of breath. Risk factors for coronary artery disease include obesity, sedentary lifestyle, smoking/tobacco exposure and stress. Past treatments include ACE inhibitors, beta blockers and diuretics. There are no compliance problems.  There is no history of angina, kidney disease, CAD/MI, CVA, heart failure, left ventricular hypertrophy, PVD or retinopathy. There is no history of chronic renal disease, coarctation of the  "aorta, hyperaldosteronism, hypercortisolism, hyperparathyroidism, a hypertension causing med, pheochromocytoma, renovascular disease, sleep apnea or a thyroid problem.   Diabetes   She presents for her followupdiabetic visit. She has type 2 diabetes mellitus. Her disease course has been imrpoving Hypoglycemia symptoms include confusion, headaches, nervousness/anxiousness, seizures and sleepiness. Associated symptoms include fatigue and weakness. Pertinent negatives for diabetes include no blurred vision, no chest pain and no visual change. Pertinent negatives for diabetic complications include no CVA, PVD or retinopathy. Risk factors for coronary artery disease include dyslipidemia, obesity and post-menopausal. She has not had a previous visit with a dietitian. There is no change in her home blood glucose trend. An ACE inhibitor/angiotensin II receptor blocker is not being taken. She does not see a podiatrist.Eye exam is not current. teratments include steglastro insulin and trulicity       Objective   Vital Signs:   /68 (BP Location: Right arm, Patient Position: Sitting, Cuff Size: Large Adult)   Pulse 88   Temp 97.1 °F (36.2 °C)   Ht 167.6 cm (66\")   Wt (!) 147 kg (323 lb 9.6 oz)   SpO2 95%   BMI 52.23 kg/m²     Physical Exam  Vitals and nursing note reviewed.   Constitutional:       Appearance: She is well-developed. She is not diaphoretic.   HENT:      Head: Normocephalic and atraumatic.      Right Ear: External ear normal.   Eyes:      Conjunctiva/sclera: Conjunctivae normal.      Pupils: Pupils are equal, round, and reactive to light.   Cardiovascular:      Rate and Rhythm: Normal rate and regular rhythm.      Heart sounds: Normal heart sounds. No murmur heard.     Pulmonary:      Effort: Pulmonary effort is normal. No respiratory distress.      Breath sounds: Normal breath sounds.   Abdominal:      General: Bowel sounds are normal. There is no distension.      Palpations: Abdomen is soft.      " Tenderness: There is no abdominal tenderness.      Comments: Obese abdomen    Musculoskeletal:         General: Tenderness present. No deformity. Normal range of motion.      Cervical back: Normal range of motion and neck supple.   Skin:     General: Skin is warm.      Coloration: Skin is not pale.      Findings: No erythema or rash.   Neurological:      Mental Status: She is alert and oriented to person, place, and time.      Cranial Nerves: No cranial nerve deficit.   Psychiatric:         Behavior: Behavior normal.        Result Review :                 Assessment and Plan    Diagnoses and all orders for this visit:    1. Schizophrenia, paranoid type (CMS/[facility]) (Primary)    2. Tobacco user    3. Stage 2 chronic kidney disease    4. Uncontrolled type 2 diabetes mellitus with hyperglycemia (CMS/[facility])    5. Vitamin D deficiency    6. Essential hypertension    7. Mixed hyperlipidemia    8. Chronic obstructive pulmonary disease, unspecified COPD type (CMS/[facility])    9. Episode of recurrent major depressive disorder, unspecified depression episode severity (CMS/[facility])    Other orders  -     Ertugliflozin L-PyroglutamicAc (Steglatro) 15 MG tablet; Take 1 tablet by mouth Every Morning.  Dispense: 90 tablet; Refill: 3  -     vitamin D (ERGOCALCIFEROL) 1.25 MG (88350 UT) capsule capsule; Take 1 tablet once a  Week  Dispense: 8 capsule; Refill: 3  -     lisinopril (PRINIVIL,ZESTRIL) 40 MG tablet; Take 1 tablet by mouth Daily.  Dispense: 30 tablet; Refill: 3  -     metFORMIN (GLUCOPHAGE) 500 MG tablet; Take 1 tablet by mouth 2 (Two) Times a Day With Meals.  Dispense: 60 tablet; Refill: 3  -     metoprolol tartrate (LOPRESSOR) 50 MG tablet; Take 1 tablet by mouth Every 12 (Twelve) Hours.  Dispense: 60 tablet; Refill: 3  -     nicotine (NICODERM CQ) 21 MG/24HR patch; Place 1 patch on the skin as directed by provider Daily.  Dispense: 28 patch; Refill: 3  -     NIFEdipine XL (PROCARDIA XL) 30 MG 24 hr tablet; Take 1 tablet by mouth  Daily.  Dispense: 30 tablet; Refill: 3  -     omeprazole (priLOSEC) 40 MG capsule; Take 1 capsule by mouth Daily.  Dispense: 30 capsule; Refill: 3  -     simvastatin (ZOCOR) 40 MG tablet; Take 1 tablet by mouth Every Night.  Dispense: 30 tablet; Refill: 3       -recommend labowork   -recommend diabetic eye exam   -recommend COVID-19 vaccination  -GERD with esophagitis  - start on prilosec 20 mg daiy. Consider another EGD if not improving   -tobacco user - Counseled quit smoking >5 minutes.  recommend 1 800 QUIT NOW and nicotine patches   -wrist pain - Orthopedic following has upcoming EEG study   -ckd stage 2 - stable continue to monitor. Stressed importance of diabetes control and BP control    -hyperlipdemia - stable simvastatin 40 mg PO qhs. ASCVD risk high. Now on red yeast rice. On fish oil supplement  -dyspnea/early COPD -Pulmonlogy following on stioloto and abluterol PRN.    -for DM type 2- Endocrinology following. Currently on metformin 1000 gm PO BID, steglatro 15 mg daily. trulicity 4.5 mg now on insulin pump   -vitamin D  defciency - vitamin D once a week   -schizophrenia/major depression -  Behabvioral health following no on lamictal 100 mg PO q daily and vraylar 1.5 mg daily. risperdal stopped    -seizure disorder - Neurology following stable on Keppra 1000 mg Po BID. Neurology following in [place].    -Morbid obesity - counseled weight loss >5 minutes BMI at 52..23   -tobacco user counseled pt to quit smoking >5 minutes.  Gave tobacco cessation material   -hypertension - uncontrolled  on lisinopril  40 mg dialy.  lopressor 50 mg PO BID  start on nifedipine 30 mg daily. Drug information provided  -advised pt to be safe and call with any questions or concerns. All questions addressed today  -advised pt to followup with specialist and referrals  -advised pt to go to ER or call 911 if symptoms worrisome or severe  -advised pt to be safe during COVID-19 pandemic  I spent 30  minutes caring for Sudhakar on this date  of service. This time includes time spent by me in the following activities: preparing for the visit, reviewing tests, obtaining and/or reviewing a separately obtained history, performing a medically appropriate examination and/or evaluation, counseling and educating the patient/family/caregiver, ordering medications, tests, or procedures, referring and communicating with other health care professionals, documenting information in the medical record, independently interpreting results and communicating that information with the patient/family/caregiver and care coordination.   -recheck in 3 months         This document has been electronically signed by Jermaine Ferro MD on July 28, 2021 08:37 CDT            Follow Up   Return in about 3 months (around 10/28/2021).  Patient was given instructions and counseling regarding her condition or for health maintenance advice. Please see specific information pulled into the AVS if appropriate.

## 2021-07-07 ENCOUNTER — OFFICE VISIT (OUTPATIENT)
Dept: ORTHOPEDIC SURGERY | Facility: CLINIC | Age: 46
End: 2021-07-07

## 2021-07-07 VITALS
SYSTOLIC BLOOD PRESSURE: 175 MMHG | WEIGHT: 293 LBS | HEART RATE: 96 BPM | BODY MASS INDEX: 47.09 KG/M2 | HEIGHT: 66 IN | DIASTOLIC BLOOD PRESSURE: 103 MMHG | OXYGEN SATURATION: 98 %

## 2021-07-07 DIAGNOSIS — M25.532 LEFT WRIST PAIN: Primary | ICD-10-CM

## 2021-07-07 DIAGNOSIS — G56.02 CARPAL TUNNEL SYNDROME OF LEFT WRIST: ICD-10-CM

## 2021-07-07 DIAGNOSIS — M65.4 DE QUERVAIN'S TENOSYNOVITIS, LEFT: ICD-10-CM

## 2021-07-07 PROCEDURE — 99213 OFFICE O/P EST LOW 20 MIN: CPT | Performed by: ORTHOPAEDIC SURGERY

## 2021-07-07 PROCEDURE — 20526 THER INJECTION CARP TUNNEL: CPT | Performed by: ORTHOPAEDIC SURGERY

## 2021-07-07 NOTE — PROGRESS NOTES
"Sudhakar Saul is a 45 y.o. female returns for     Chief Complaint   Patient presents with   • Left Wrist - Follow-up       HISTORY OF PRESENT ILLNESS: Patient being seen for left wrist follow up. X-rays done today. injection given 2/25/2021, which helped for a short time. Requesting new brace today.     Ms. Saul is said she had partial relief of her radial wrist pain after the injection.  However her primary complaint today is that of numbness in the left index middle and ring fingers.  Apparently this has been a chronic recurrent problem but she has noticed it a bit more since her last visit here.  She has a history of bilateral carpal tunnel syndrome and underwent carpal tunnel release on the right with a good response.  She said her splint was chewed up by her dog and request replacement of this.       CONCURRENT MEDICAL HISTORY:    The following portions of the patient's history were reviewed and updated as appropriate: allergies, current medications, past family history, past medical history, past social history, past surgical history and problem list.         PHYSICAL EXAMINATION:       BP (!) 175/103 (BP Location: Left arm, Patient Position: Sitting)   Pulse 96   Ht 167.6 cm (66\")   Wt (!) 148 kg (325 lb 11.2 oz)   SpO2 98%   BMI 52.57 kg/m²     Physical Exam she is alert and in no apparent distress.    GAIT:     []  Normal  [x]  Antalgic    Assistive device: []  None  []  Walker     []  Crutches  [x]  Cane     []  Wheelchair  []  Stretcher    Ortho Exam Finkelstein test is mildly positive on the left.  She has mild edema of all fingers.  There is hypaesthesia soft touch in the median nerve distribution including the radial aspect of the ring finger.  There is no thenar atrophy.  Pressure Phalen testing causes tingling extending into the middle finger.    No results found.          ASSESSMENT: 1 left carpal tunnel syndrome.  2 de Quervain's syndrome, left.    The natural history of the disorders " and treatment options were reviewed.  She was given replacement wrist splint.  He understands surgery may eventually be needed for both conditions.  Potential benefits of carpal tunnel injection were discussed.  She wished to proceed with this.    The left wrist was prepped.  Skin was infiltrated with 1% Xylocaine with epinephrine.  The flexor compartment of the distal forearm was then injected with a mixture of Xylocaine and Kenalog.  There were no complications.    When her symptoms recur she will call for reevaluation.  She was given replacement for wrist splint.    Diagnoses and all orders for this visit:    Left wrist pain    De Quervain's tenosynovitis, left    Carpal tunnel syndrome of left wrist          PLAN        Left carpal tunnel injection  1cc 1% lidocaine NDC [id number] lot [id number]  1cc kenalog [id number] lot [id number]      Return if symptoms worsen or fail to improve.    Kevin Carlson MD

## 2021-07-12 ENCOUNTER — TELEMEDICINE (OUTPATIENT)
Dept: PSYCHIATRY | Facility: CLINIC | Age: 46
End: 2021-07-12

## 2021-07-12 DIAGNOSIS — F43.22 ADJUSTMENT DISORDER WITH ANXIOUS MOOD: ICD-10-CM

## 2021-07-12 DIAGNOSIS — G47.9 SLEEPING DIFFICULTY: ICD-10-CM

## 2021-07-12 DIAGNOSIS — F25.1 SCHIZOAFFECTIVE DISORDER, DEPRESSIVE TYPE (HCC): Primary | ICD-10-CM

## 2021-07-12 DIAGNOSIS — F51.5 NIGHTMARES: ICD-10-CM

## 2021-07-12 PROCEDURE — 99214 OFFICE O/P EST MOD 30 MIN: CPT | Performed by: NURSE PRACTITIONER

## 2021-07-12 NOTE — PROGRESS NOTES
This provider is located at [facility], [address] [place] [address].The Patient is seen remotely at home, [address] [place] [address] via [employer].  Patient is being seen via telehealth and confirm that they are in a secure environment for this session. The patient's condition being diagnosed/treated is appropriate for telemedicine. The provider identified himself/herself: herself as well as her credentials.   The patient gave consent to be seen remotely, and when consent is given they understand that the consent allows for patient identifiable information to be sent to a third party as needed.   They may refuse to be seen remotely at any time. The electronic data is encrypted and password protected, and the patient has been advised of the potential risks to privacy not withstanding such measures.    You have chosen to receive care through a telehealth visit.  Do you consent to use a video/audio connection for your medical care today? Yes      Chief Complaint  Follow-for schizoaffective depressed type    Subjective    Sudhakar More Saul presents to [facility] for medication management.       History of Present Illness   Patient presents today reporting that she has been doing okay.  She states that her mom went to the doctor and the lesion on her bones are benign so that has helped with her anxiety.  Patient states her anxiety is still present but more manageable.  She states that [place] her [place] and had to take hydroxyzine but was there for 3 hours and did well.  Patient states she did follow-up with her neurologist and she is doing an EEG on August 17 and then he is going to prescribe medications afterwards.  Patient denies any seizures since then.  Patient notes that she is going to have to have surgery on her left hand due to carpal tunnel and tendinitis.  Patient reports that she is sleeping okay roughly 5 to  6 hours at night.  Patient states that she is working with the therapist on her anxiety and sleep.  Patient states her appetite is fair.  Denies any side effects or rash to the medications.  Patient notes that she has heard the little girl's voice a couple of times as well as had a few visual hallucinations of roots from trees moving as well as shadows a couple more times than usual.  Patient states that she has been feeling more down and helpless lately with a depressed mood and lack of energy and focus.  Patient denies any SI/HI.      Objective   Vital Signs:   There were no vitals taken for this visit.  Due to the remote nature of this encounter (virtual encounter), vitals were unable to be obtained.  Height stated at 66 inches.  Weight stated at 308 pounds.        PHQ-9 Score:   PHQ-9 Total Score:     Patient screened positive for depression based on a PHQ-9 score of 11 on 4/1/2021. Follow-up recommendations include: Patient has schizophrenia and is currently being managed with medication..    Mental Status Exam:   Hygiene:   good  Cooperation:  Cooperative  Eye Contact:  Good  Psychomotor Behavior:  Appropriate  Affect:  Appropriate  Mood: depressed  Speech:  Normal  Thought Process:  Goal directed and Linear  Thought Content:  Normal  Suicidal:  None  Homicidal:  None  Hallucinations:  Auditory and Visual  Delusion:  None  Memory:  Intact  Orientation:  Person, Place, Time and Situation  Reliability:  good  Insight:  Good and Fair  Judgement:  Good and Fair  Impulse Control:  Good and Fair  Physical/Medical Issues:  Yes Dm. HTN, CKD stage 2, hep C+, JACQUE, hx substance abuse     Current Medications:   Current Outpatient Medications   Medication Sig Dispense Refill   • albuterol sulfate  (90 Base) MCG/ACT inhaler Inhale 2 puffs Every 6 (Six) Hours As Needed for Wheezing or Shortness of Air. 1 inhaler 3   • Cariprazine HCl 4.5 MG capsule Take 4.5 mg by mouth Daily. 30 capsule 0   • Continuous Blood Gluc  " (Dexcom G6 ) device USE FOR 6 MONTHS     • Continuous Blood Gluc Sensor (Dexcom G6 Sensor) 1 each As Needed (glucose control). Every 10 days 9 each 3   • Continuous Blood Gluc Transmit (Dexcom G6 Transmitter) misc 1 each Every 3 (Three) Months. 1 each 3   • cyclobenzaprine (FLEXERIL) 5 MG tablet Take 1 tablet by mouth 3 (Three) Times a Day As Needed for Muscle Spasms. (Patient taking differently: Take 5 mg by mouth 3 (Three) Times a Day.) 90 tablet 3   • Dulaglutide 4.5 MG/0.5ML solution pen-injector Inject 4.5 mg under the skin into the appropriate area as directed 1 (One) Time Per Week. 4 pen 3   • DULoxetine (CYMBALTA) 30 MG capsule Take 3 capsules by mouth Daily. 90 capsule 2   • Ertugliflozin L-PyroglutamicAc (Steglatro) 15 MG tablet Take 1 tablet by mouth Every Morning. 90 tablet 3   • folic acid (FOLVITE) 1 MG tablet Take 1,000 mcg by mouth Daily.     • gabapentin (NEURONTIN) 300 MG capsule Take 1 capsule by mouth 3 (Three) Times a Day. 90 capsule 2   • glucose blood test strip Testing 4 times daily, E11.9 120 each 12   • HYDROcodone-acetaminophen (NORCO) 7.5-325 MG per tablet Take 1 tablet by mouth 2 (Two) Times a Day.     • hydrOXYzine (ATARAX) 25 MG tablet Take 0.5-1 tablets by mouth 3 (Three) Times a Day As Needed for Anxiety. 90 tablet 2   • insulin lispro (Admelog) 100 UNIT/ML injection 150  units daily through pump 60 mL 11   • Insulin Syringe 29G X 1/2\" 0.5 ML misc Inject 3 times daily 100 each 11   • lamoTRIgine (LaMICtal) 150 MG tablet Take 1 tablet by mouth Daily. 30 tablet 2   • levETIRAcetam (KEPPRA) 1000 MG tablet Take 1 tablet by mouth 2 (Two) Times a Day. 60 tablet 3   • lisinopril (PRINIVIL,ZESTRIL) 40 MG tablet Take 1 tablet by mouth once daily 30 tablet 3   • metFORMIN (GLUCOPHAGE) 500 MG tablet TAKE 1 TABLET BY MOUTH TWICE DAILY WITH MEALS 60 tablet 3   • metoprolol tartrate (LOPRESSOR) 50 MG tablet TAKE 1 TABLET BY MOUTH EVERY 12 HOURS 60 tablet 3   • nicotine (NICODERM " CQ) 21 MG/24HR patch Place 1 patch on the skin as directed by provider Daily.     • NIFEdipine XL (PROCARDIA XL) 30 MG 24 hr tablet Take 1 tablet by mouth Daily. 30 tablet 3   • omeprazole (priLOSEC) 40 MG capsule Take 1 capsule by mouth Daily. 30 capsule 3   • prazosin (Minipress) 2 MG capsule Take 1 capsule by mouth Every Night. 30 capsule 2   • RELION PEN NEEDLES 31G X 6 MM misc USE 1 NEEDLE 3 TO 4 TIMES DAILY  3   • simvastatin (ZOCOR) 40 MG tablet TAKE 1 TABLET BY MOUTH ONCE DAILY AT NIGHT 30 tablet 3   • tiotropium bromide-olodaterol (STIOLTO RESPIMAT) 2.5-2.5 MCG/ACT aerosol solution inhaler Inhale 2 puffs Daily. 1 inhaler 5   • vitamin D (ERGOCALCIFEROL) 1.25 MG (59755 UT) capsule capsule TAKE 2 CAPSULES BY MOUTH ONCE A WEEK 8 capsule 3     No current facility-administered medications for this visit.       Physical Exam  Nursing note reviewed.   Constitutional:       Appearance: Normal appearance.   Neurological:      Mental Status: She is alert.   Psychiatric:         Attention and Perception: Attention normal. She perceives auditory and visual hallucinations.         Mood and Affect: Mood is depressed. Mood is not anxious.         Speech: Speech normal.         Behavior: Behavior is cooperative.         Cognition and Memory: Cognition and memory normal.        Result Review :     The following data was reviewed by: ROD Spencer on 02/03/2021:  Common labs    Common Labsle 9/9/20 9/9/20 9/9/20 9/9/20 9/9/20 1/26/21 1/26/21 1/26/21 1/26/21 1/26/21 4/21/21 4/21/21 4/21/21 4/21/21    0820 0820 0820 0820 0820 0804 0804 0804 0804 0804 0802 0802 0802 0802   Glucose   159 (A)      187 (A)   158 (A)     BUN   11      15   13     Creatinine   0.70      0.75   0.75     eGFR Non African Am   90      83   83     Sodium   137      137   136     Potassium   4.7      5.0   5.1     Chloride   103      104   102     Calcium   10.1      9.7   9.6     Albumin   3.70      3.90   3.80     Total Bilirubin   <0.2       0.2   0.2     Alkaline Phosphatase   72      67   75     AST (SGOT)   18      18   18     ALT (SGPT)   21      19 26     WBC 9.63     9.04        9.95   Hemoglobin 14.9     14.4        15.0   Hematocrit 44.3     45.0        45.7   Platelets 180     174        173   Total Cholesterol    129      114   132    Triglycerides    249 (A)      169 (A)   164 (A)    HDL Cholesterol    37 (A)      39 (A)   42    LDL Cholesterol     42      47   62    Hemoglobin A1C  9.24 (A)      7.60 (A)   8.54 (A)      Microalbumin, Urine     <1.2  <1.2          (A) Abnormal value            CMP    CMP 9/9/20 1/26/21 4/21/21   Glucose 159 (A) 187 (A) 158 (A)   BUN 11 15 13   Creatinine 0.70 0.75 0.75   eGFR Non African Am 90 83 83   Sodium 137 137 136   Potassium 4.7 5.0 5.1   Chloride 103 104 102   Calcium 10.1 9.7 9.6   Albumin 3.70 3.90 3.80   Total Bilirubin <0.2 0.2 0.2   Alkaline Phosphatase 72 67 75   AST (SGOT) 18 18 18   ALT (SGPT) 21 19 26   (A) Abnormal value            CBC    CBC 9/9/20 1/26/21 4/21/21   WBC 9.63 9.04 9.95   RBC 5.07 5.10 5.13   Hemoglobin 14.9 14.4 15.0   Hematocrit 44.3 45.0 45.7   MCV 87.4 88.2 89.1   MCH 29.4 28.2 29.2   MCHC 33.6 32.0 32.8   RDW 13.3 13.0 13.8   Platelets 180 174 173           CBC w/diff    CBC w/Diff 6/10/20 9/9/20 1/26/21   WBC 8.89 9.63 9.04   RBC 4.85 5.07 5.10   Hemoglobin 14.1 14.9 14.4   Hematocrit 42.8 44.3 45.0   MCV 88.2 87.4 88.2   MCH 29.1 29.4 28.2   MCHC 32.9 33.6 32.0   RDW 13.6 13.3 13.0   Platelets 157 180 174   Neutrophil Rel % 63.8 67.2    Immature Granulocyte Rel % 0.6 (A) 0.5    Lymphocyte Rel % 26.4 21.6    Monocyte Rel % 4.5 (A) 4.8 (A)    Eosinophil Rel % 3.9 5.3    Basophil Rel % 0.8 0.6    (A) Abnormal value            Lipid Panel    Lipid Panel 9/9/20 1/26/21 4/21/21   Total Cholesterol 129 114 132   Triglycerides 249 (A) 169 (A) 164 (A)   HDL Cholesterol 37 (A) 39 (A) 42   VLDL Cholesterol 49.8 (A) 28 28   LDL Cholesterol  42 47 62   LDL/HDL Ratio 1.14 1.06  1.36   (A) Abnormal value            TSH    TSH 1/26/21   TSH 1.610           BMP    BMP 9/9/20 1/26/21 4/21/21   BUN 11 15 13   Creatinine 0.70 0.75 0.75   Sodium 137 137 136   Potassium 4.7 5.0 5.1   Chloride 103 104 102   CO2 22.3 21.6 (A) 21.4 (A)   Calcium 10.1 9.7 9.6   (A) Abnormal value            HgB    HGB 9/9/20 1/26/21 4/21/21   Hemoglobin 14.9 14.4 15.0               Data reviewed: PCP notes        Assessment and Plan    Problem List Items Addressed This Visit     None      Visit Diagnoses     Schizoaffective disorder, depressive type (CMS/HCC)    -  Primary    Relevant Medications    Cariprazine HCl 4.5 MG capsule    Other Relevant Orders    TSH    Adjustment disorder with anxious mood        Relevant Medications    Cariprazine HCl 4.5 MG capsule    Nightmares        Relevant Medications    Cariprazine HCl 4.5 MG capsule    Sleeping difficulty                TREATMENT PLAN/GOALS: Continue supportive psychotherapy efforts and medications as indicated. Treatment and medication options discussed during today's visit. Patient ackowledged and verbally consented to continue with current treatment plan and was educated on the importance of compliance with treatment and follow-up appointments.    MEDICATION ISSUES:  We discussed risks, benefits, and side effects of the above medications and the patient was agreeable with the plan. Patient was educated on the importance of compliance with treatment and follow-up appointments.  Patient is agreeable to call the office with any worsening of symptoms or onset of side effects. Patient is agreeable to call 911 or go to the nearest ER should he/she begin having SI/HI. Lengthy discussion with patient on the possible side effects of antipsychotic medications including increased cholesterol, increased blood sugar, and possibility of weight gain.  Also discussed the need to monitor lab work associated with this.  The risk of muscle movement disorders with this class of  medication was also discussed. We will continue Lamictal in an effort to stabilize mood.  The patient was reminded to immediately come to the hospital should there be any loss of control.  Explanation was given to her regarding Lamictal and the potential for Foster Shon syndrome and significant rash.  Patient was encouraged to check skin prior to beginning.  Patient was encouraged to report any rash and to immediately stop medication.      -Continue lamotrigine 150 mg daily for schizoaffective disorder..  -Continue minipress 2mg at night for nightmares.  -Increase Vraylar to 4.5 mg daily for schizoaffective disorder depressed type.  -Continue hydroxyzine 12.5 to 25 mg up to 3 times a day as needed for anxiety and panic.  - Continue at 90mg daily for depression and pain and will further evaluate.     Patient has now failed and tried Haldol, aripiprazole, Latuda and now risperidone which has caused weight gain and due to diabetes and kidney disease would benefit from Vraylar as patient is still having auditory and visual hallucinations.     Counseled patient regarding multimodal approach with healthy nutrition, healthy sleep, regular physical activity, social activities, counseling, and medications.      Coping skills reviewed and encouraged positive framing of thoughts     Assisted patient in processing above session content; acknowledged and normalized patient’s thoughts, feelings, and concerns.  Applied  positive coping skills and behavior management in session.  Allowed patient to freely discuss issues without interruption or judgment. Provided safe, confidential environment to facilitate the development of positive therapeutic relationship and encourage open, honest communication. Assisted patient in identifying risk factors which would indicate the need for higher level of care including thoughts to harm self or others and/or self-harming behavior and encouraged patient to contact this office, call 911, or  present to the nearest emergency room should any of these events occur. Discussed crisis intervention services and means to access.     MEDS ORDERED DURING VISIT:  New Medications Ordered This Visit   Medications   • Cariprazine HCl 4.5 MG capsule     Sig: Take 4.5 mg by mouth Daily.     Dispense:  30 capsule     Refill:  0           Follow Up   Return in about 3 weeks (around 8/2/2021), or if symptoms worsen or fail to improve, for Recheck.    Patient was given instructions and counseling regarding her condition or for health maintenance advice. Please see specific information pulled into the AVS if appropriate.     Some of the data in this electronic note has been brought forward from a previous encounter, any necessary changes have been made, it has been reviewed by this APRN, and it is accurate.      This document has been electronically signed by ROD Spencer  July 12, 2021 09:25 EDT    Part of this note may be an electronic transcription/translation of spoken language to printed text using the Dragon Dictation System.

## 2021-07-20 ENCOUNTER — LAB (OUTPATIENT)
Dept: LAB | Facility: HOSPITAL | Age: 46
End: 2021-07-20

## 2021-07-20 DIAGNOSIS — F25.1 SCHIZOAFFECTIVE DISORDER, DEPRESSIVE TYPE (HCC): ICD-10-CM

## 2021-07-20 DIAGNOSIS — I10 ESSENTIAL HYPERTENSION: ICD-10-CM

## 2021-07-20 DIAGNOSIS — F20.0 SCHIZOPHRENIA, PARANOID TYPE (HCC): ICD-10-CM

## 2021-07-20 DIAGNOSIS — E11.65 UNCONTROLLED TYPE 2 DIABETES MELLITUS WITH HYPERGLYCEMIA (HCC): ICD-10-CM

## 2021-07-20 DIAGNOSIS — E66.01 MORBID OBESITY (HCC): ICD-10-CM

## 2021-07-20 DIAGNOSIS — E78.2 MIXED HYPERLIPIDEMIA: ICD-10-CM

## 2021-07-20 DIAGNOSIS — Z72.0 TOBACCO USER: ICD-10-CM

## 2021-07-20 DIAGNOSIS — E55.9 VITAMIN D DEFICIENCY: ICD-10-CM

## 2021-07-20 LAB
25(OH)D3 SERPL-MCNC: 48 NG/ML (ref 30–100)
ALBUMIN SERPL-MCNC: 3.7 G/DL (ref 3.5–5.2)
ALBUMIN/GLOB SERPL: 1.2 G/DL
ALP SERPL-CCNC: 76 U/L (ref 39–117)
ALT SERPL W P-5'-P-CCNC: 19 U/L (ref 1–33)
ANION GAP SERPL CALCULATED.3IONS-SCNC: 11.5 MMOL/L (ref 5–15)
AST SERPL-CCNC: 16 U/L (ref 1–32)
BASOPHILS # BLD AUTO: 0.07 10*3/MM3 (ref 0–0.2)
BASOPHILS NFR BLD AUTO: 0.6 % (ref 0–1.5)
BILIRUB SERPL-MCNC: <0.2 MG/DL (ref 0–1.2)
BUN SERPL-MCNC: 20 MG/DL (ref 6–20)
BUN/CREAT SERPL: 35.1 (ref 7–25)
CALCIUM SPEC-SCNC: 9.1 MG/DL (ref 8.6–10.5)
CHLORIDE SERPL-SCNC: 102 MMOL/L (ref 98–107)
CHOLEST SERPL-MCNC: 123 MG/DL (ref 0–200)
CO2 SERPL-SCNC: 22.5 MMOL/L (ref 22–29)
CREAT SERPL-MCNC: 0.57 MG/DL (ref 0.57–1)
DEPRECATED RDW RBC AUTO: 42.6 FL (ref 37–54)
EOSINOPHIL # BLD AUTO: 0.32 10*3/MM3 (ref 0–0.4)
EOSINOPHIL NFR BLD AUTO: 2.8 % (ref 0.3–6.2)
ERYTHROCYTE [DISTWIDTH] IN BLOOD BY AUTOMATED COUNT: 13.4 % (ref 12.3–15.4)
GFR SERPL CREATININE-BSD FRML MDRD: 114 ML/MIN/1.73
GLOBULIN UR ELPH-MCNC: 3 GM/DL
GLUCOSE SERPL-MCNC: 146 MG/DL (ref 65–99)
HBA1C MFR BLD: 7.6 % (ref 4.8–5.6)
HCT VFR BLD AUTO: 41.4 % (ref 34–46.6)
HDLC SERPL-MCNC: 43 MG/DL (ref 40–60)
HGB BLD-MCNC: 13.8 G/DL (ref 12–15.9)
IMM GRANULOCYTES # BLD AUTO: 0.04 10*3/MM3 (ref 0–0.05)
IMM GRANULOCYTES NFR BLD AUTO: 0.3 % (ref 0–0.5)
LDLC SERPL CALC-MCNC: 59 MG/DL (ref 0–100)
LDLC/HDLC SERPL: 1.33 {RATIO}
LYMPHOCYTES # BLD AUTO: 2.77 10*3/MM3 (ref 0.7–3.1)
LYMPHOCYTES NFR BLD AUTO: 24.2 % (ref 19.6–45.3)
MCH RBC QN AUTO: 29 PG (ref 26.6–33)
MCHC RBC AUTO-ENTMCNC: 33.3 G/DL (ref 31.5–35.7)
MCV RBC AUTO: 87 FL (ref 79–97)
MONOCYTES # BLD AUTO: 0.43 10*3/MM3 (ref 0.1–0.9)
MONOCYTES NFR BLD AUTO: 3.8 % (ref 5–12)
NEUTROPHILS NFR BLD AUTO: 68.3 % (ref 42.7–76)
NEUTROPHILS NFR BLD AUTO: 7.82 10*3/MM3 (ref 1.7–7)
NRBC BLD AUTO-RTO: 0 /100 WBC (ref 0–0.2)
PLATELET # BLD AUTO: 176 10*3/MM3 (ref 140–450)
PMV BLD AUTO: 11.4 FL (ref 6–12)
POTASSIUM SERPL-SCNC: 4.8 MMOL/L (ref 3.5–5.2)
PROT SERPL-MCNC: 6.7 G/DL (ref 6–8.5)
RBC # BLD AUTO: 4.76 10*6/MM3 (ref 3.77–5.28)
SODIUM SERPL-SCNC: 136 MMOL/L (ref 136–145)
TRIGL SERPL-MCNC: 114 MG/DL (ref 0–150)
TSH SERPL DL<=0.05 MIU/L-ACNC: 1.68 UIU/ML (ref 0.27–4.2)
VLDLC SERPL-MCNC: 21 MG/DL (ref 5–40)
WBC # BLD AUTO: 11.45 10*3/MM3 (ref 3.4–10.8)

## 2021-07-20 PROCEDURE — 84443 ASSAY THYROID STIM HORMONE: CPT

## 2021-07-20 PROCEDURE — 80053 COMPREHEN METABOLIC PANEL: CPT

## 2021-07-20 PROCEDURE — 85025 COMPLETE CBC W/AUTO DIFF WBC: CPT

## 2021-07-20 PROCEDURE — 82306 VITAMIN D 25 HYDROXY: CPT

## 2021-07-20 PROCEDURE — 80061 LIPID PANEL: CPT

## 2021-07-20 PROCEDURE — 83036 HEMOGLOBIN GLYCOSYLATED A1C: CPT

## 2021-07-21 ENCOUNTER — TELEPHONE (OUTPATIENT)
Dept: FAMILY MEDICINE CLINIC | Facility: CLINIC | Age: 46
End: 2021-07-21

## 2021-07-21 NOTE — TELEPHONE ENCOUNTER
----- Message from Jermaine Ferro MD sent at 7/20/2021  9:57 PM CDT -----  Please let pt know labwork stable    Hga1c at 7.60    On CBC WBC is elevated. Likely reactive    Recheck on next visit thanks

## 2021-07-22 ENCOUNTER — OFFICE VISIT (OUTPATIENT)
Dept: ORTHOPEDIC SURGERY | Facility: CLINIC | Age: 46
End: 2021-07-22

## 2021-07-22 VITALS
HEIGHT: 66 IN | RESPIRATION RATE: 20 BRPM | OXYGEN SATURATION: 92 % | SYSTOLIC BLOOD PRESSURE: 160 MMHG | BODY MASS INDEX: 47.09 KG/M2 | WEIGHT: 293 LBS | HEART RATE: 101 BPM | DIASTOLIC BLOOD PRESSURE: 97 MMHG

## 2021-07-22 DIAGNOSIS — G56.02 CARPAL TUNNEL SYNDROME OF LEFT WRIST: ICD-10-CM

## 2021-07-22 DIAGNOSIS — M25.532 LEFT WRIST PAIN: Primary | ICD-10-CM

## 2021-07-22 DIAGNOSIS — M65.4 DE QUERVAIN'S TENOSYNOVITIS, LEFT: ICD-10-CM

## 2021-07-22 DIAGNOSIS — G56.22 CUBITAL TUNNEL SYNDROME ON LEFT: ICD-10-CM

## 2021-07-22 PROCEDURE — 99213 OFFICE O/P EST LOW 20 MIN: CPT | Performed by: ORTHOPAEDIC SURGERY

## 2021-07-22 PROCEDURE — 29105 APPLICATION LONG ARM SPLINT: CPT | Performed by: ORTHOPAEDIC SURGERY

## 2021-07-22 NOTE — PROGRESS NOTES
"Sudhakar Saul is a 45 y.o. female returns for     Chief Complaint   Patient presents with   • Left Wrist - Follow-up   • Left Hand - Follow-up       HISTORY OF PRESENT ILLNESS: Patient being seen for left wrist and left hand follow up. Pain today 7/10     had no benefit from a carpal tunnel injection.  She continues to complain of diffuse tingling in the hand somewhat worse at night.  She admits her radial wrist pain has improved.           CONCURRENT MEDICAL HISTORY:    The following portions of the patient's history were reviewed and updated as appropriate: allergies, current medications, past family history, past medical history, past social history, past surgical history and problem list.         PHYSICAL EXAMINATION:       /97   Pulse 101   Resp 20   Ht 167.6 cm (66\")   Wt (!) 146 kg (322 lb)   SpO2 92%   BMI 51.97 kg/m²     Physical Exam she is alert and in no apparent distress.    GAIT:     [x]  Normal  []  Antalgic    Assistive device: [x]  None  []  Walker     []  Crutches  []  Cane     []  Wheelchair  []  Stretcher    Ortho Exam there is hypaesthesia soft touch involving the small finger and dorsal ulnar aspect of the hand.  Tinel's sign is positive over the ulnar nerve at the elbow      XR Wrist 3+ View Left    Result Date: 7/7/2021  Narrative: Ordering Provider:  Kevin Carlson MD Ordering Diagnosis/Indication:  De Quervain's tenosynovitis, left, Pain in wrist, unspecified laterality Procedure:  XR WRIST 3+ VW LEFT Exam Date:  7/7/21 COMPARISON: Exam of the wrist dated February 2021     Impression:  Radiographs of the left wrist PA lateral and oblique views done today show normal carpal alignment.  There is no significant degenerative change. Kevin Carlson MD 7/7/21            ASSESSMENT: 1 de Quervain's syndrome left wrist.  2 possible cubital tunnel syndrome.  3 possible carpal tunnel syndrome.    Treatment options were reviewed.  She understands surgical " treatment may be needed.  Potential benefits of splinting were discussed.  She wished to proceed with this.    A long-arm posterior splint was then fabricated of fiberglass casting material with the elbow in about 20 to 30 degrees of flexion.  She will use this at night and as needed.    I recommend obtaining an EMG and nerve conduction study to further define her nerve pathology.  She was agreeable with this.    Return here after the study for further counseling.    Diagnoses and all orders for this visit:    Left wrist pain  -     EMG & Nerve Conduction Test; Future    De Quervain's tenosynovitis, left  -     EMG & Nerve Conduction Test; Future    Carpal tunnel syndrome of left wrist  -     EMG & Nerve Conduction Test; Future    Cubital tunnel syndrome on left  -     EMG & Nerve Conduction Test; Future          PLAN    Return if symptoms worsen or fail to improve.    Kevin Carlson MD

## 2021-07-27 ENCOUNTER — TELEPHONE (OUTPATIENT)
Dept: FAMILY MEDICINE CLINIC | Facility: CLINIC | Age: 46
End: 2021-07-27

## 2021-07-28 ENCOUNTER — OFFICE VISIT (OUTPATIENT)
Dept: FAMILY MEDICINE CLINIC | Facility: CLINIC | Age: 46
End: 2021-07-28

## 2021-07-28 VITALS
HEIGHT: 66 IN | WEIGHT: 293 LBS | BODY MASS INDEX: 47.09 KG/M2 | SYSTOLIC BLOOD PRESSURE: 118 MMHG | HEART RATE: 88 BPM | DIASTOLIC BLOOD PRESSURE: 68 MMHG | TEMPERATURE: 97.1 F | OXYGEN SATURATION: 95 %

## 2021-07-28 DIAGNOSIS — Z72.0 TOBACCO USER: ICD-10-CM

## 2021-07-28 DIAGNOSIS — E11.65 UNCONTROLLED TYPE 2 DIABETES MELLITUS WITH HYPERGLYCEMIA (HCC): ICD-10-CM

## 2021-07-28 DIAGNOSIS — J44.9 CHRONIC OBSTRUCTIVE PULMONARY DISEASE, UNSPECIFIED COPD TYPE (HCC): ICD-10-CM

## 2021-07-28 DIAGNOSIS — F20.0 SCHIZOPHRENIA, PARANOID TYPE (HCC): Primary | ICD-10-CM

## 2021-07-28 DIAGNOSIS — E78.2 MIXED HYPERLIPIDEMIA: ICD-10-CM

## 2021-07-28 DIAGNOSIS — E55.9 VITAMIN D DEFICIENCY: ICD-10-CM

## 2021-07-28 DIAGNOSIS — I10 ESSENTIAL HYPERTENSION: ICD-10-CM

## 2021-07-28 DIAGNOSIS — N18.2 STAGE 2 CHRONIC KIDNEY DISEASE: ICD-10-CM

## 2021-07-28 DIAGNOSIS — F33.9 EPISODE OF RECURRENT MAJOR DEPRESSIVE DISORDER, UNSPECIFIED DEPRESSION EPISODE SEVERITY (HCC): ICD-10-CM

## 2021-07-28 PROCEDURE — 99214 OFFICE O/P EST MOD 30 MIN: CPT | Performed by: FAMILY MEDICINE

## 2021-07-28 RX ORDER — METOPROLOL TARTRATE 50 MG/1
50 TABLET, FILM COATED ORAL EVERY 12 HOURS
Qty: 60 TABLET | Refills: 3 | Status: SHIPPED | OUTPATIENT
Start: 2021-07-28 | End: 2021-10-26

## 2021-07-28 RX ORDER — LISINOPRIL 40 MG/1
40 TABLET ORAL DAILY
Qty: 30 TABLET | Refills: 3 | Status: SHIPPED | OUTPATIENT
Start: 2021-07-28 | End: 2021-10-28 | Stop reason: SDUPTHER

## 2021-07-28 RX ORDER — OMEPRAZOLE 40 MG/1
40 CAPSULE, DELAYED RELEASE ORAL DAILY
Qty: 30 CAPSULE | Refills: 3 | Status: SHIPPED | OUTPATIENT
Start: 2021-07-28 | End: 2021-10-28 | Stop reason: SDUPTHER

## 2021-07-28 RX ORDER — NICOTINE 21 MG/24HR
1 PATCH, TRANSDERMAL 24 HOURS TRANSDERMAL EVERY 24 HOURS
Qty: 28 PATCH | Refills: 3 | Status: SHIPPED | OUTPATIENT
Start: 2021-07-28 | End: 2021-10-28 | Stop reason: SDUPTHER

## 2021-07-28 RX ORDER — OXCARBAZEPINE 600 MG/1
600 TABLET, FILM COATED ORAL 2 TIMES DAILY
COMMUNITY
Start: 2021-07-16

## 2021-07-28 RX ORDER — ERGOCALCIFEROL 1.25 MG/1
CAPSULE ORAL
Qty: 8 CAPSULE | Refills: 3 | Status: SHIPPED | OUTPATIENT
Start: 2021-07-28 | End: 2021-09-27

## 2021-07-28 RX ORDER — SIMVASTATIN 40 MG
40 TABLET ORAL NIGHTLY
Qty: 30 TABLET | Refills: 3 | Status: SHIPPED | OUTPATIENT
Start: 2021-07-28 | End: 2021-10-28 | Stop reason: SDUPTHER

## 2021-07-28 RX ORDER — INSULIN PUMP CONTROLLER
EACH MISCELLANEOUS
COMMUNITY
Start: 2021-07-23

## 2021-07-28 RX ORDER — NIFEDIPINE 30 MG/1
30 TABLET, EXTENDED RELEASE ORAL DAILY
Qty: 30 TABLET | Refills: 3 | Status: SHIPPED | OUTPATIENT
Start: 2021-07-28 | End: 2021-10-28 | Stop reason: SDUPTHER

## 2021-08-02 ENCOUNTER — TELEMEDICINE (OUTPATIENT)
Dept: PSYCHIATRY | Facility: CLINIC | Age: 46
End: 2021-08-02

## 2021-08-02 DIAGNOSIS — F51.5 NIGHTMARES: ICD-10-CM

## 2021-08-02 DIAGNOSIS — F43.22 ADJUSTMENT DISORDER WITH ANXIOUS MOOD: ICD-10-CM

## 2021-08-02 DIAGNOSIS — G47.9 SLEEPING DIFFICULTY: ICD-10-CM

## 2021-08-02 DIAGNOSIS — F25.1 SCHIZOAFFECTIVE DISORDER, DEPRESSIVE TYPE (HCC): Primary | ICD-10-CM

## 2021-08-02 PROCEDURE — 99214 OFFICE O/P EST MOD 30 MIN: CPT | Performed by: NURSE PRACTITIONER

## 2021-08-02 RX ORDER — LAMOTRIGINE 150 MG/1
150 TABLET ORAL DAILY
Qty: 90 TABLET | Refills: 0 | Status: SHIPPED | OUTPATIENT
Start: 2021-08-02 | End: 2021-10-26

## 2021-08-02 NOTE — PROGRESS NOTES
This provider is located at [facility], [address] [place] [place] [address].The Patient is seen remotely at home, [address] [facility] [place] [address] via [employer].  Patient is being seen via telehealth and confirm that they are in a secure environment for this session. The patient's condition being diagnosed/treated is appropriate for telemedicine. The provider identified himself/herself: herself as well as her credentials.   The patient gave consent to be seen remotely, and when consent is given they understand that the consent allows for patient identifiable information to be sent to a third party as needed.   They may refuse to be seen remotely at any time. The electronic data is encrypted and password protected, and the patient has been advised of the potential risks to privacy not withstanding such measures.    You have chosen to receive care through a telehealth visit.  Do you consent to use a video/audio connection for your medical care today? Yes      Chief Complaint  Follow-for schizoaffective depressed type    Subjective    Sudhakar More Saul presents to [facility] for medication management.       History of Present Illness   Patient presents today reporting that she has been doing good.  She states that she has had a few health problems with her ulnar nerve causing pain as she has nerve test coming up and questionable surgery.  Patient reports as far as her mental health goes she has been doing good.  Patient states she has had 1 auditory hallucination of the small girl but denies any visual hallucinations.  Patient feels her mood is more level and her depression has been better rating it a 5 on a scale 0-10 with 10 being the worst.  Patient reports that nightmares have improved.  She states that she is sleeping well except for pain.  She denies any rash or side effects to the medications.  She denies any seizures since last visit.  Patient  reports that she had been doing well with her eating but had difficulty the past several weeks but is trying to get back on track.  Denies any SI/HI/AH/VH.  Aims score 0.        Objective   Vital Signs:   There were no vitals taken for this visit.  Due to the remote nature of this encounter (virtual encounter), vitals were unable to be obtained.  Height stated at 66 inches.  Weight stated at 322 pounds.        PHQ-9 Score:   PHQ-9 Total Score:     Patient screened positive for depression based on a PHQ-9 score of 11 on 4/1/2021. Follow-up recommendations include: Patient has schizophrenia and is currently being managed with medication..    Mental Status Exam:   Hygiene:   good  Cooperation:  Cooperative  Eye Contact:  Good  Psychomotor Behavior:  Appropriate  Affect:  Appropriate  Mood: normal  Speech:  Normal  Thought Process:  Goal directed and Linear  Thought Content:  Normal  Suicidal:  None  Homicidal:  None  Hallucinations:  Auditory and Visual  Delusion:  None  Memory:  Intact  Orientation:  Person, Place, Time and Situation  Reliability:  good  Insight:  Good and Fair  Judgement:  Good and Fair  Impulse Control:  Good and Fair  Physical/Medical Issues:  Yes Dm. HTN, CKD stage 2, hep C+, JACQUE, hx substance abuse     Current Medications:   Current Outpatient Medications   Medication Sig Dispense Refill   • albuterol sulfate  (90 Base) MCG/ACT inhaler Inhale 2 puffs Every 6 (Six) Hours As Needed for Wheezing or Shortness of Air. 1 inhaler 3   • Cariprazine HCl 4.5 MG capsule Take 4.5 mg by mouth Daily. 90 capsule 0   • Continuous Blood Gluc  (Dexcom G6 ) device USE FOR 6 MONTHS     • Continuous Blood Gluc Sensor (Dexcom G6 Sensor) 1 each As Needed (glucose control). Every 10 days 9 each 3   • Continuous Blood Gluc Transmit (Dexcom G6 Transmitter) misc 1 each Every 3 (Three) Months. 1 each 3   • cyclobenzaprine (FLEXERIL) 5 MG tablet Take 1 tablet by mouth 3 (Three) Times a Day As Needed  for Muscle Spasms. (Patient taking differently: Take 5 mg by mouth 3 (Three) Times a Day.) 90 tablet 3   • DULoxetine (CYMBALTA) 30 MG capsule Take 3 capsules by mouth Daily. 90 capsule 2   • Ertugliflozin L-PyroglutamicAc (Steglatro) 15 MG tablet Take 1 tablet by mouth Every Morning. 90 tablet 3   • folic acid (FOLVITE) 1 MG tablet Take 1,000 mcg by mouth Daily.     • gabapentin (NEURONTIN) 300 MG capsule Take 1 capsule by mouth 3 (Three) Times a Day. 90 capsule 2   • glucose blood test strip Testing 4 times daily, E11.9 120 each 12   • HYDROcodone-acetaminophen (NORCO) 7.5-325 MG per tablet Take 1 tablet by mouth 2 (Two) Times a Day.     • hydrOXYzine (ATARAX) 25 MG tablet Take 0.5-1 tablets by mouth 3 (Three) Times a Day As Needed for Anxiety. 90 tablet 2   • Insulin Disposable Pump (OmniPod Dash 5 Pack Pods) misc      • insulin lispro (Admelog) 100 UNIT/ML injection 150  units daily through pump 60 mL 11   • lamoTRIgine (LaMICtal) 150 MG tablet Take 1 tablet by mouth Daily. 90 tablet 0   • levETIRAcetam (KEPPRA) 1000 MG tablet Take 1 tablet by mouth 2 (Two) Times a Day. 60 tablet 3   • lisinopril (PRINIVIL,ZESTRIL) 40 MG tablet Take 1 tablet by mouth Daily. 30 tablet 3   • metFORMIN (GLUCOPHAGE) 500 MG tablet Take 1 tablet by mouth 2 (Two) Times a Day With Meals. 60 tablet 3   • metoprolol tartrate (LOPRESSOR) 50 MG tablet Take 1 tablet by mouth Every 12 (Twelve) Hours. 60 tablet 3   • nicotine (NICODERM CQ) 21 MG/24HR patch Place 1 patch on the skin as directed by provider Daily. 28 patch 3   • NIFEdipine XL (PROCARDIA XL) 30 MG 24 hr tablet Take 1 tablet by mouth Daily. 30 tablet 3   • omeprazole (priLOSEC) 40 MG capsule Take 1 capsule by mouth Daily. 30 capsule 3   • OXcarbazepine (TRILEPTAL) 600 MG tablet TAKE 1 2 (ONE HALF) TABLET BY MOUTH TWICE DAILY FOR 7 DAYS THEN 1 TWICE DAILY     • prazosin (Minipress) 2 MG capsule Take 1 capsule by mouth Every Night. 30 capsule 2   • simvastatin (ZOCOR) 40 MG tablet  Take 1 tablet by mouth Every Night. 30 tablet 3   • tiotropium bromide-olodaterol (STIOLTO RESPIMAT) 2.5-2.5 MCG/ACT aerosol solution inhaler Inhale 2 puffs Daily. 1 inhaler 5   • vitamin D (ERGOCALCIFEROL) 1.25 MG (73781 UT) capsule capsule Take 1 tablet once a  Week 8 capsule 3     No current facility-administered medications for this visit.       Physical Exam  Nursing note reviewed.   Constitutional:       Appearance: Normal appearance.   Neurological:      Mental Status: She is alert.   Psychiatric:         Attention and Perception: Attention normal. She perceives auditory hallucinations. She does not perceive visual hallucinations.         Mood and Affect: Mood and affect normal. Mood is not anxious or depressed.         Speech: Speech normal.         Behavior: Behavior is cooperative.         Thought Content: Thought content normal.         Cognition and Memory: Cognition and memory normal.        Result Review :     The following data was reviewed by: ROD Spencer on 02/03/2021:  Common labs    Common Labsle 1/26/21 1/26/21 1/26/21 1/26/21 1/26/21 4/21/21 4/21/21 4/21/21 4/21/21 7/20/21 7/20/21 7/20/21 7/20/21    0804 0804 0804 0804 0804 0802 0802 0802 0802 0808 0808 0808 0808   Glucose    187 (A)   158 (A)      146 (A)   BUN    15   13      20   Creatinine    0.75   0.75      0.57   eGFR Non  Am    83   83      114   Sodium    137   136      136   Potassium    5.0   5.1      4.8   Chloride    104   102      102   Calcium    9.7   9.6      9.1   Albumin    3.90   3.80      3.70   Total Bilirubin    0.2   0.2      <0.2   Alkaline Phosphatase    67   75      76   AST (SGOT)    18   18      16   ALT (SGPT)    19   26      19   WBC 9.04        9.95 11.45 (A)      Hemoglobin 14.4        15.0 13.8      Hematocrit 45.0        45.7 41.4      Platelets 174        173 176      Total Cholesterol     114   132    123    Triglycerides     169 (A)   164 (A)    114    HDL Cholesterol     39 (A)   42    43     LDL Cholesterol      47   62    59    Hemoglobin A1C   7.60 (A)   8.54 (A)     7.60 (A)     Microalbumin, Urine  <1.2              (A) Abnormal value            CMP    CMP 1/26/21 4/21/21 7/20/21   Glucose 187 (A) 158 (A) 146 (A)   BUN 15 13 20   Creatinine 0.75 0.75 0.57   eGFR Non African Am 83 83 114   Sodium 137 136 136   Potassium 5.0 5.1 4.8   Chloride 104 102 102   Calcium 9.7 9.6 9.1   Albumin 3.90 3.80 3.70   Total Bilirubin 0.2 0.2 <0.2   Alkaline Phosphatase 67 75 76   AST (SGOT) 18 18 16   ALT (SGPT) 19 26 19   (A) Abnormal value            CBC    CBC 1/26/21 4/21/21 7/20/21   WBC 9.04 9.95 11.45 (A)   RBC 5.10 5.13 4.76   Hemoglobin 14.4 15.0 13.8   Hematocrit 45.0 45.7 41.4   MCV 88.2 89.1 87.0   MCH 28.2 29.2 29.0   MCHC 32.0 32.8 33.3   RDW 13.0 13.8 13.4   Platelets 174 173 176   (A) Abnormal value            CBC w/diff    CBC w/Diff 6/10/20 9/9/20 1/26/21   WBC 8.89 9.63 9.04   RBC 4.85 5.07 5.10   Hemoglobin 14.1 14.9 14.4   Hematocrit 42.8 44.3 45.0   MCV 88.2 87.4 88.2   MCH 29.1 29.4 28.2   MCHC 32.9 33.6 32.0   RDW 13.6 13.3 13.0   Platelets 157 180 174   Neutrophil Rel % 63.8 67.2    Immature Granulocyte Rel % 0.6 (A) 0.5    Lymphocyte Rel % 26.4 21.6    Monocyte Rel % 4.5 (A) 4.8 (A)    Eosinophil Rel % 3.9 5.3    Basophil Rel % 0.8 0.6    (A) Abnormal value            Lipid Panel    Lipid Panel 1/26/21 4/21/21 7/20/21   Total Cholesterol 114 132 123   Triglycerides 169 (A) 164 (A) 114   HDL Cholesterol 39 (A) 42 43   VLDL Cholesterol 28 28 21   LDL Cholesterol  47 62 59   LDL/HDL Ratio 1.06 1.36 1.33   (A) Abnormal value            TSH    TSH 1/26/21 7/20/21   TSH 1.610 1.680           BMP    BMP 1/26/21 4/21/21 7/20/21   BUN 15 13 20   Creatinine 0.75 0.75 0.57   Sodium 137 136 136   Potassium 5.0 5.1 4.8   Chloride 104 102 102   CO2 21.6 (A) 21.4 (A) 22.5   Calcium 9.7 9.6 9.1   (A) Abnormal value            HgB    HGB 1/26/21 4/21/21 7/20/21   Hemoglobin 14.4 15.0 13.8                Data reviewed: PCP notes        Assessment and Plan    Problem List Items Addressed This Visit     None      Visit Diagnoses     Schizoaffective disorder, depressive type (CMS/HCC)    -  Primary    Relevant Medications    Cariprazine HCl 4.5 MG capsule    lamoTRIgine (LaMICtal) 150 MG tablet    Adjustment disorder with anxious mood        Relevant Medications    Cariprazine HCl 4.5 MG capsule    Nightmares        Relevant Medications    Cariprazine HCl 4.5 MG capsule    Sleeping difficulty                TREATMENT PLAN/GOALS: Continue supportive psychotherapy efforts and medications as indicated. Treatment and medication options discussed during today's visit. Patient ackowledged and verbally consented to continue with current treatment plan and was educated on the importance of compliance with treatment and follow-up appointments.    MEDICATION ISSUES:  We discussed risks, benefits, and side effects of the above medications and the patient was agreeable with the plan. Patient was educated on the importance of compliance with treatment and follow-up appointments.  Patient is agreeable to call the office with any worsening of symptoms or onset of side effects. Patient is agreeable to call 911 or go to the nearest ER should he/she begin having SI/HI. Lengthy discussion with patient on the possible side effects of antipsychotic medications including increased cholesterol, increased blood sugar, and possibility of weight gain.  Also discussed the need to monitor lab work associated with this.  The risk of muscle movement disorders with this class of medication was also discussed. We will continue Lamictal in an effort to stabilize mood.  The patient was reminded to immediately come to the hospital should there be any loss of control.  Explanation was given to her regarding Lamictal and the potential for Foster Shon syndrome and significant rash.  Patient was encouraged to check skin prior to beginning.   Patient was encouraged to report any rash and to immediately stop medication.      -Continue lamotrigine 150 mg daily for schizoaffective disorder.  -Continue minipress 2mg at night for nightmares.  -Continue Vraylar to 4.5 mg daily for schizoaffective disorder depressed type.  -Continue hydroxyzine 12.5 to 25 mg up to 3 times a day as needed for anxiety and panic.  - Continue at 90mg daily for depression and pain and will further evaluate.     Patient has now failed and tried Haldol, aripiprazole, Latuda and now risperidone which has caused weight gain and due to diabetes and kidney disease would benefit from Vraylar as patient is still having auditory and visual hallucinations.     Counseled patient regarding multimodal approach with healthy nutrition, healthy sleep, regular physical activity, social activities, counseling, and medications.      Coping skills reviewed and encouraged positive framing of thoughts     Assisted patient in processing above session content; acknowledged and normalized patient’s thoughts, feelings, and concerns.  Applied  positive coping skills and behavior management in session.  Allowed patient to freely discuss issues without interruption or judgment. Provided safe, confidential environment to facilitate the development of positive therapeutic relationship and encourage open, honest communication. Assisted patient in identifying risk factors which would indicate the need for higher level of care including thoughts to harm self or others and/or self-harming behavior and encouraged patient to contact this office, call 911, or present to the nearest emergency room should any of these events occur. Discussed crisis intervention services and means to access.     MEDS ORDERED DURING VISIT:  New Medications Ordered This Visit   Medications   • Cariprazine HCl 4.5 MG capsule     Sig: Take 4.5 mg by mouth Daily.     Dispense:  90 capsule     Refill:  0   • lamoTRIgine (LaMICtal) 150 MG tablet      Sig: Take 1 tablet by mouth Daily.     Dispense:  90 tablet     Refill:  0           Follow Up   Return in about 6 weeks (around 9/13/2021), or if symptoms worsen or fail to improve, for Recheck.    Patient was given instructions and counseling regarding her condition or for health maintenance advice. Please see specific information pulled into the AVS if appropriate.     Some of the data in this electronic note has been brought forward from a previous encounter, any necessary changes have been made, it has been reviewed by this APRN, and it is accurate.      This document has been electronically signed by ROD Spencer  August 2, 2021 08:40 EDT    Part of this note may be an electronic transcription/translation of spoken language to printed text using the Dragon Dictation System.

## 2021-08-04 ENCOUNTER — TELEMEDICINE (OUTPATIENT)
Dept: PSYCHIATRY | Facility: CLINIC | Age: 46
End: 2021-08-04

## 2021-08-04 DIAGNOSIS — F25.1 SCHIZOAFFECTIVE DISORDER, DEPRESSIVE TYPE (HCC): Primary | ICD-10-CM

## 2021-08-04 PROCEDURE — 90837 PSYTX W PT 60 MINUTES: CPT | Performed by: COUNSELOR

## 2021-08-04 NOTE — PROGRESS NOTES
Date: August 4, 2021  Time In: 8:31 AM  Time Out: 9:25 AM   This provider is located at the [facility] (through [facility]), [address], [place] [address] using a secure [employer] Video Visit through [employer]. Patient is being seen remotely via telehealth at home address in [place] and stated they are in a secure environment for this session. The patient's condition being diagnosed/treated is appropriate for telemedicine. The provider identified herself as well as her credentials. The patient, and/or patients guardian, consent to be seen remotely, and when consent is given they understand that the consent allows for patient identifiable information to be sent to a third party as needed. They may refuse to be seen remotely at any time. The electronic data is encrypted and password protected, and the patient and/or guardian has been advised of the potential risks to privacy not withstanding such measures.     You have chosen to receive care through a telehealth visit.  Do you consent to use a video/audio connection for your medical care today? Yes    PROGRESS NOTE  Data:  Sudhakar Saul is a 45 y.o. female who presents today for follow up. Patient states that she is having some medical issues recently. Patient states that she is having increased anxiety in regard to her medical status as she is getting ready to have testing on her ulnar nerve that may require surgery and she is already aware that she needs to have carpal tunnel surgery on her right hand but due to having trouble waking from surgeries this is an anxiety producing situation.  Patient states that she has been trying to stay in bed and has actually fallen back asleep a few times and is able to stay in bed a little longer. Patient was also happy to report that she was able to celebrate her step-father's birthday at a restaurant and then went to the casino with her parents and enjoyed that. Patient stated  that she wants to start getting out more. Patient reported an auditory hallucination while changing her insulin pump as she heard the voice of the little girl but denies and visual hallucinations. Patient is also protective of her mother and has her guard up in response to going to visit an aunt whom she deems as manipulative and narcissistic. Patient states that she is cautious about how her aunt turns things to be about her and tries to take credit for things that others in the family do. Patient states that she doesn't want to see her mother hurt and is therefore dreading the trip to see her aunt.    Chief Complaint: worry, stress, feelings of anxiety and depression    History of Present Illness: Patient has been dealing with feelings of depression and anxiety and hallucinations for seveal years.       Clinical Maneuvering/Intervention: CBT     (Scales based on 0 - 10 with 10 being the worst)  Depression: 5 Anxiety: 7       Assisted patient in processing above session content; acknowledged and normalized patient’s thoughts, feelings, and concerns.  Rationalized patient thought process regarding her aunt and feelings of worry about possibly having surgery.  Discussed triggers associated with patient's feelings of anxiety in relation to worry about her hand and what will have to be done as her mother has had to have a similar surgery for a similar problem.  Also discussed coping skills for patient to implement such as continuing with self-care, maintaining realistic expectations for her daily activities, and using other coping skills besides smoking such as praying, thought stopping or listening to a favorite song at the time she wishes to smoke a cigarette.    Allowed patient to freely discuss issues without interruption or judgment. Provided safe, confidential environment to facilitate the development of positive therapeutic relationship and encourage open, honest communication. Assisted patient in identifying  risk factors which would indicate the need for higher level of care including thoughts to harm self or others and/or self-harming behavior and encouraged patient to contact this office, call 911, or present to the nearest emergency room should any of these events occur. Discussed crisis intervention services and means to access. Patient adamantly and convincingly denies current suicidal or homicidal ideation or perceptual disturbance.    Assessment:   Assessment   Patient appears to maintain relative stability as compared to their baseline.  However, patient continues to struggle with hallucinations and feelings of anxiety and depression and also struggles with physical limitations which cause impairment in important areas of functioning.  A result, they can be reasonably expected to continue to benefit from treatment and would likely be at increased risk for decompensation otherwise.    Mental Status Exam:   Hygiene:   good  Cooperation:  Cooperative  Eye Contact:  Good  Psychomotor Behavior:  Appropriate  Affect:  Appropriate  Mood: anxious  Speech:  Normal  Thought Process:  Goal directed  Thought Content:  Normal  Suicidal:  None  Homicidal:  None  Hallucinations:  Auditory- changing insulin pump and heard the little girl say she didn't like that, it hurts. No visual hallucinations since an increase in Vraylar  Delusion:  None  Memory:  Deficits- due to substance abuse  Orientation:  Person, Place, Time and Situation  Reliability:  good  Insight:  Fair  Judgement:  Good  Impulse Control:  Good  Physical/Medical Issues:  Yes possible ulnar nerve damage       Patient's Support Network Includes:  parents and extended family    Functional Status: Moderate impairment     Progress toward goal: Not at goal    Prognosis: Fair with Ongoing Treatment          Plan:    Patient will continue in individual outpatient therapy with focus on improved functioning and coping skills, maintaining stability, and avoiding  decompensation and the need for higher level of care.    Patient will adhere to medication regimen as prescribed and report any side effects. Patient will contact this office, call 911 or present to the nearest emergency room should suicidal or homicidal ideations occur. Provide Cognitive Behavioral Therapy and Solution Focused Therapy to improve functioning, maintain stability, and avoid decompensation and the need for higher level of care.     Return in about 2 weeks, or earlier if symptoms worsen or fail to improve.           VISIT DIAGNOSIS:     ICD-10-CM ICD-9-CM   1. Schizoaffective disorder, depressive type (CMS/[facility])  F25.1 295.70             This document has been electronically signed by DREAD Vo  August 4, 2021 09:57 EDT      Part of this note may be an electronic transcription/translation of spoken language to printed text using the Dragon Dictation System.

## 2021-08-16 ENCOUNTER — HOSPITAL ENCOUNTER (OUTPATIENT)
Dept: NEUROLOGY | Facility: HOSPITAL | Age: 46
Discharge: HOME | End: 2021-08-16
Admitting: ORTHOPAEDIC SURGERY

## 2021-08-16 DIAGNOSIS — G56.02 CARPAL TUNNEL SYNDROME OF LEFT WRIST: ICD-10-CM

## 2021-08-16 DIAGNOSIS — M25.532 LEFT WRIST PAIN: ICD-10-CM

## 2021-08-16 DIAGNOSIS — G56.22 CUBITAL TUNNEL SYNDROME ON LEFT: ICD-10-CM

## 2021-08-16 DIAGNOSIS — M65.4 DE QUERVAIN'S TENOSYNOVITIS, LEFT: ICD-10-CM

## 2021-08-16 PROCEDURE — 95909 NRV CNDJ TST 5-6 STUDIES: CPT

## 2021-08-16 PROCEDURE — 95886 MUSC TEST DONE W/N TEST COMP: CPT

## 2021-08-18 ENCOUNTER — TELEMEDICINE (OUTPATIENT)
Dept: PSYCHIATRY | Facility: CLINIC | Age: 46
End: 2021-08-18

## 2021-08-18 DIAGNOSIS — F25.1 SCHIZOAFFECTIVE DISORDER, DEPRESSIVE TYPE (HCC): Primary | ICD-10-CM

## 2021-08-18 PROCEDURE — 90834 PSYTX W PT 45 MINUTES: CPT | Performed by: COUNSELOR

## 2021-08-18 NOTE — PROGRESS NOTES
Date: August 18, 2021  Time In: 8:30 AM   Time Out: 9:15 AM   This provider is located at the [facility]), [address], [place] [address] using a secure [employer] Video Visit through [employer]. Patient is being seen remotely via telehealth at home address in [place] and stated they are in a secure environment for this session. The patient's condition being diagnosed/treated is appropriate for telemedicine. The provider identified herself as well as her credentials. The patient, and/or patients guardian, consent to be seen remotely, and when consent is given they understand that the consent allows for patient identifiable information to be sent to a third party as needed. They may refuse to be seen remotely at any time. The electronic data is encrypted and password protected, and the patient and/or guardian has been advised of the potential risks to privacy not withstanding such measures.     You have chosen to receive care through a telehealth visit.  Do you consent to use a video/audio connection for your medical care today? Yes    PROGRESS NOTE  Data:  Sudhakar Saul is a 45 y.o. female who presents today for follow up. Patient states that she is in a lot of physical pain today due to having been in the car traveling so much for the last two days. Patient had a nerve conduction test and is nervous about the outcome due to being asked about neck injuries as she has a bulging disc in her neck that could eventually require surgery. Patient discussed progress on her goal of quitting smoking and patient states that she has gone from 4 cigarettes a day to only 2. Discussed hand to mouth actions that could simulate the smoking motion in order to assist the patient with furthering her goal.  Patient is continuing to engage in relaxation and prayer to help with her sleep and has been able to add almost half an hour to her sleep/relaxation time in the  mornings. Patient also reported having been able to use fondant and create a cake for a party since the last visit which she enjoyed but can only do at times when her hands are not bothering her and she stated that it did take a couple of days to complete. Patient is also worried about her mother's health as well.    Chief Complaint: physical pain, feelings of anxiety due to medical condition    History of Present Illness: patient has been struggling with anxiety and depression and       Clinical Maneuvering/Intervention: CBT     (Scales based on 0 - 10 with 10 being the worst)  Depression: 5 Anxiety: 6       Assisted patient in processing above session content; acknowledged and normalized patient’s thoughts, feelings, and concerns.  Rationalized patient thought process regarding her physical pain and physical health issues.  Discussed triggers associated with patient's feelings of anxiety due to her medical conditions  Also discussed coping skills for patient to implement such as continuing to do some self-care/relaxation techniques, asking nutritional questions of her endocrinologist, and continuing to engage in physical activities as much as possible as they provide her with a sense of happiness and relief from anxiety    Allowed patient to freely discuss issues without interruption or judgment. Provided safe, confidential environment to facilitate the development of positive therapeutic relationship and encourage open, honest communication. Assisted patient in identifying risk factors which would indicate the need for higher level of care including thoughts to harm self or others and/or self-harming behavior and encouraged patient to contact this office, call 911, or present to the nearest emergency room should any of these events occur. Discussed crisis intervention services and means to access. Patient adamantly and convincingly denies current suicidal or homicidal ideation or perceptual  disturbance.    Assessment:   Assessment   Patient appears to maintain relative stability as compared to their baseline.  However, patient continues to struggle with anxiety and depression  which continues to cause impairment in important areas of functioning.  A result, they can be reasonably expected to continue to benefit from treatment and would likely be at increased risk for decompensation otherwise.    Mental Status Exam:   Hygiene:   good  Cooperation:  Cooperative  Eye Contact:  Good  Psychomotor Behavior:  Appropriate  Affect:  Appropriate  Mood: anxious  Speech:  Normal  Thought Process:  Goal directed  Thought Content:  Normal  Suicidal:  None  Homicidal:  None  Hallucinations:  Visual-shadow movement in peripheral vision  Delusion:  None  Memory:  Deficits  Orientation:  Person, Place, Time and Situation  Reliability:  good  Insight:  Fair  Judgement:  Good  Impulse Control:  Good  Physical/Medical Issues:  Yes bulging disc in neck, numb fingers, and back pain        Patient's Support Network Includes:  parents    Functional Status: Moderate impairment     Progress toward goal: Not at goal    Prognosis: Fair with Ongoing Treatment          Plan:    Patient will continue in individual outpatient therapy with focus on improved functioning and coping skills, maintaining stability, and avoiding decompensation and the need for higher level of care.    Patient will adhere to medication regimen as prescribed and report any side effects. Patient will contact this office, call 911 or present to the nearest emergency room should suicidal or homicidal ideations occur. Provide Cognitive Behavioral Therapy and Solution Focused Therapy to improve functioning, maintain stability, and avoid decompensation and the need for higher level of care.     Return in about 2 weeks, or earlier if symptoms worsen or fail to improve.           VISIT DIAGNOSIS:     ICD-10-CM ICD-9-CM   1. Schizoaffective disorder, depressive type  (CMS/[facility])  F25.1 295.70             This document has been electronically signed by DREAD Vo  August 18, 2021 09:02 EDT      Part of this note may be an electronic transcription/translation of spoken language to printed text using the Dragon Dictation System.

## 2021-08-25 ENCOUNTER — OFFICE VISIT (OUTPATIENT)
Dept: ORTHOPEDIC SURGERY | Facility: CLINIC | Age: 46
End: 2021-08-25

## 2021-08-25 VITALS — HEIGHT: 66 IN | WEIGHT: 293 LBS | BODY MASS INDEX: 47.09 KG/M2

## 2021-08-25 DIAGNOSIS — M65.4 DE QUERVAIN'S TENOSYNOVITIS, LEFT: ICD-10-CM

## 2021-08-25 DIAGNOSIS — G56.22 CUBITAL TUNNEL SYNDROME ON LEFT: ICD-10-CM

## 2021-08-25 DIAGNOSIS — M25.532 LEFT WRIST PAIN: Primary | ICD-10-CM

## 2021-08-25 DIAGNOSIS — G56.02 CARPAL TUNNEL SYNDROME OF LEFT WRIST: ICD-10-CM

## 2021-08-25 PROCEDURE — 99214 OFFICE O/P EST MOD 30 MIN: CPT | Performed by: ORTHOPAEDIC SURGERY

## 2021-08-25 NOTE — PROGRESS NOTES
"Sudhakar Saul is a 45 y.o. female returns for     Chief Complaint   Patient presents with   • Left Wrist - Follow-up   • Left Hand - Follow-up       HISTORY OF PRESENT ILLNESS:  Patient in for EMG results of the left upper extremity     Ms. Saul had no benefit from her brace.  She continues to complain of numbness and tingling primarily in the index middle and ring fingers on the left.    Home medications are numerous and include Cymbalta albuterol Neurontin Lamictal Keppra liquefies Lopressor respite all and hydrocodone among others.  She is allergic to Betadine iodine nifedipine and ramipril.  She smokes 1 to 2 cigarettes/day.    Past medical history is remarkable for diabetes hypertension hepatitis C and chronic renal insufficiency and depression.  She also has a diagnosis of schizophrenia.  Previous surgeries include carpal tunnel release on the right hysterectomy resection of lipomata breast biopsy cholecystectomy and appendectomy.  She also has a history of COPD.  There is been no history of anesthetic complication.    Family history she is .    Social history she lives in [place] alone I believe.  She is unemployed and says she is currently seeking disability.     CONCURRENT MEDICAL HISTORY:    The following portions of the patient's history were reviewed and updated as appropriate: allergies, current medications, past family history, past medical history, past social history, past surgical history and problem list.         PHYSICAL EXAMINATION:       Ht 167.6 cm (66\")   Wt (!) 147 kg (323 lb)   BMI 52.13 kg/m²     Physical Exam she is alert and in no apparent distress.    HEENT exam showed extraocular movements are intact.  Oropharynx is edentulous with full plates in place.    Lungs are clear to auscultation.    Cardiac exam shows a regular rhythm normal rate no murmur.  Heart sounds are somewhat distant.    The abdomen is soft obese and nontender with active bowel sounds.  " Genitourinary and rectal exams were not done.    GAIT:     []  Normal  []  Antalgic    Assistive device: []  None  []  Walker     []  Crutches  [x]  Cane     []  Wheelchair  []  Stretcher    Ortho Exam digital motions are full.  There is no intrinsic atrophy in the left hand.  Radial pulse is strong.    EMG and nerve conduction study done recently shows evidence of mild carpal tunnel syndrome on the left with no evidence of ulnar neuropathy or other nerve compression.                                                                    ASSESSMENT: Left carpal tunnel syndrome.  The natural history of the disorder and treatment options were reviewed.  She finds her current condition unacceptable to her.  I recommended carpal tunnel release.  Anticipated benefits of surgery as well as potential complications of surgery and anesthetic were reviewed in detail and she appeared to understand all matters discussed and wished to proceed.    Diagnoses and all orders for this visit:    Left wrist pain    De Quervain's tenosynovitis, left    Carpal tunnel syndrome of left wrist    Cubital tunnel syndrome on left          PLAN    Return if symptoms worsen or fail to improve.    Kevin Carlson MD

## 2021-08-26 ENCOUNTER — PREP FOR SURGERY (OUTPATIENT)
Dept: OTHER | Facility: HOSPITAL | Age: 46
End: 2021-08-26

## 2021-08-26 DIAGNOSIS — G56.02 CARPAL TUNNEL SYNDROME OF LEFT WRIST: Primary | ICD-10-CM

## 2021-08-26 PROBLEM — G56.00 CARPAL TUNNEL SYNDROME: Status: ACTIVE | Noted: 2021-08-26

## 2021-08-26 RX ORDER — CHLORHEXIDINE GLUCONATE 4 G/100ML
SOLUTION TOPICAL DAILY
Qty: 236 ML | Refills: 0 | Status: SHIPPED | OUTPATIENT
Start: 2021-08-26 | End: 2021-10-19 | Stop reason: HOSPADM

## 2021-09-01 ENCOUNTER — TELEMEDICINE (OUTPATIENT)
Dept: PSYCHIATRY | Facility: CLINIC | Age: 46
End: 2021-09-01

## 2021-09-01 DIAGNOSIS — F25.1 SCHIZOAFFECTIVE DISORDER, DEPRESSIVE TYPE (HCC): Primary | ICD-10-CM

## 2021-09-01 PROCEDURE — 90837 PSYTX W PT 60 MINUTES: CPT | Performed by: COUNSELOR

## 2021-09-01 NOTE — PROGRESS NOTES
Date: September 1, 2021  Time In: 8:30 AM   Time Out: 9:36 AM   This provider is located at the [facility]), [address], [place] [address] using a secure [email or web address] Video Visit through [employer]. Patient is being seen remotely via telehealth at home address in [place] and stated they are in a secure environment for this session. The patient's condition being diagnosed/treated is appropriate for telemedicine. The provider identified herself as well as her credentials. The patient, and/or patients guardian, consent to be seen remotely, and when consent is given they understand that the consent allows for patient identifiable information to be sent to a third party as needed. They may refuse to be seen remotely at any time. The electronic data is encrypted and password protected, and the patient and/or guardian has been advised of the potential risks to privacy not withstanding such measures.     You have chosen to receive care through a telehealth visit.  Do you consent to use a video/audio connection for your medical care today? Yes    PROGRESS NOTE  Data:  Sudhakar Saul is a 45 y.o. female who presents today for follow up. Patient states that she has been up since 3:00 AM with a migraine. Patient states that she has suffered with migraines since she was 10 years old but thinks that this could have been triggered by her mother having an accident that has left her mother in an immobilizer; patient is feeling increased anxiety due to feeling that she won't be able to do everything that needs to be done. Patient states that times like this is when she wishes she could drive again but is unsure about how she could drive with her diagnosis of epilepsy and also states that she cannot have her 's license back until she pays off old fines. Patient discussed what she feels is the possible origin of her auditory hallucinations of the 5 year old Alisha.  Patient states that she had an aunt named Alisha who passed away when the patient was around 4 years old and that she related well to her aunt at that time who had both mental and physical disabilities. Patient also discussed how her father was reacting to his diagnosis of schizophrenia and how he rejects it and how the patient gets more support from her step mother than from her father and patient still has questions about the origins of mental illness in her family.    Chief Complaint: migraine pain, worry/anxiety     History of Present Illness: patient has struggled with anxiety and depression and hallucinations for several years      Clinical Maneuvering/Intervention:  CBT    (Scales based on 0 - 10 with 10 being the worst)  Depression: 5 Anxiety: 7       Assisted patient in processing above session content; acknowledged and normalized patient’s thoughts, feelings, and concerns.  Rationalized patient thought process regarding her feelings of increased anxiety in regard to her mother's accident that has left her mother unable to assist her with tasks.  Discussed triggers associated with patient's migraines such as anxiety and stress.  Also discussed coping skills for patient to implement such as using relaxation exercises, taking time to rest and talk with her mother to prevent loneliness in her mother while she is not able to move around,     Allowed patient to freely discuss issues without interruption or judgment. Provided safe, confidential environment to facilitate the development of positive therapeutic relationship and encourage open, honest communication. Assisted patient in identifying risk factors which would indicate the need for higher level of care including thoughts to harm self or others and/or self-harming behavior and encouraged patient to contact this office, call 911, or present to the nearest emergency room should any of these events occur. Discussed crisis intervention services and means to  "access. Patient adamantly and convincingly denies current suicidal or homicidal ideation or perceptual disturbance.    Assessment:   Assessment   Patient appears to maintain relative stability as compared to their baseline.  However, patient continues to struggle with depression, hallucinations, and anxiety which has increased with her mother's accident which continues to cause impairment in important areas of functioning.  A result, they can be reasonably expected to continue to benefit from treatment and would likely be at increased risk for decompensation otherwise.    Mental Status Exam:   Hygiene:   good  Cooperation:  Cooperative  Eye Contact:  Good  Psychomotor Behavior:  Appropriate  Affect:  Appropriate  Mood: anxious  Speech:  Normal  Thought Process:  Goal directed  Thought Content:  Normal  Suicidal:  None  Homicidal:  None  Hallucinations:  Auditory- hearing the voice of the little girl, \"Alisha,\" and some questionable shadow movement in an early morning.  Delusion:  Paranoid  Memory:  Deficits  Orientation:  Person, Place, Time and Situation  Reliability:  good  Insight:  Good  Judgement:  Good  Impulse Control:  Good  Physical/Medical Issues:  Yes epilepsy, diabetes       Patient's Support Network Includes:  parents    Functional Status: Moderate impairment     Progress toward goal: Not at goal    Prognosis: Fair with Ongoing Treatment          Plan:    Patient will continue in individual outpatient therapy with focus on improved functioning and coping skills, maintaining stability, and avoiding decompensation and the need for higher level of care.    Patient will adhere to medication regimen as prescribed and report any side effects. Patient will contact this office, call 911 or present to the nearest emergency room should suicidal or homicidal ideations occur. Provide Cognitive Behavioral Therapy and Solution Focused Therapy to improve functioning, maintain stability, and avoid decompensation and " the need for higher level of care.     Return in about 2 weeks, or earlier if symptoms worsen or fail to improve.           VISIT DIAGNOSIS:     ICD-10-CM ICD-9-CM   1. Schizoaffective disorder, depressive type (CMS/[facility])  F25.1 295.70             This document has been electronically signed by DREAD Vo  September 1, 2021 08:43 EDT      Part of this note may be an electronic transcription/translation of spoken language to printed text using the Dragon Dictation System.

## 2021-09-13 ENCOUNTER — TELEMEDICINE (OUTPATIENT)
Dept: PSYCHIATRY | Facility: CLINIC | Age: 46
End: 2021-09-13

## 2021-09-13 DIAGNOSIS — F25.1 SCHIZOAFFECTIVE DISORDER, DEPRESSIVE TYPE (HCC): Primary | ICD-10-CM

## 2021-09-13 DIAGNOSIS — F43.22 ADJUSTMENT DISORDER WITH ANXIOUS MOOD: ICD-10-CM

## 2021-09-13 DIAGNOSIS — F51.5 NIGHTMARES: ICD-10-CM

## 2021-09-13 DIAGNOSIS — G47.9 SLEEPING DIFFICULTY: ICD-10-CM

## 2021-09-13 PROCEDURE — 99214 OFFICE O/P EST MOD 30 MIN: CPT | Performed by: NURSE PRACTITIONER

## 2021-09-13 RX ORDER — DULOXETIN HYDROCHLORIDE 30 MG/1
90 CAPSULE, DELAYED RELEASE ORAL DAILY
Qty: 270 CAPSULE | Refills: 0 | Status: SHIPPED | OUTPATIENT
Start: 2021-09-13 | End: 2021-12-29

## 2021-09-13 RX ORDER — HYDROXYZINE HYDROCHLORIDE 25 MG/1
12.5-25 TABLET, FILM COATED ORAL 3 TIMES DAILY PRN
Qty: 270 TABLET | Refills: 0 | Status: SHIPPED | OUTPATIENT
Start: 2021-09-13 | End: 2021-12-29

## 2021-09-13 NOTE — PROGRESS NOTES
This provider is located at [facility], [address] [place] [address].The Patient is seen remotely at home, [address] [place] [address] via [employer]hart.  Patient is being seen via telehealth and confirm that they are in a secure environment for this session. The patient's condition being diagnosed/treated is appropriate for telemedicine. The provider identified himself/herself: herself as well as her credentials.   The patient gave consent to be seen remotely, and when consent is given they understand that the consent allows for patient identifiable information to be sent to a third party as needed.   They may refuse to be seen remotely at any time. The electronic data is encrypted and password protected, and the patient has been advised of the potential risks to privacy not withstanding such measures.    You have chosen to receive care through a telehealth visit.  Do you consent to use a video/audio connection for your medical care today? Yes      Chief Complaint  Follow-for schizoaffective depressed type    Subjective    Sudhakar More Saul presents to [facility] for medication management.       History of Present Illness   Patient presents today stating that she has been doing okay.  She notes that she does have to have carpal tunnel surgery but is waiting on insurance approval.  Patient states her depression is roughly a 5 on a scale 0-10 with 10 being the worst.  Patient states that her anxiety has been slightly increased lately due to her mother and her health issues.  Patient states that she has been working with [employer] as she has been not smoking and doing positive things in the morning and getting herself on a good schedule and routine.  Patient states that she had 1 auditory/visual hallucination of the small girl.  Patient states that she has had 2 to 3 pound weight gain but notes that is from not smoking as she has been snacking  more lately.  Patient states her sleep has been decent but interrupted due to back pain.  Patient states however she did get good notes from her p.o. officer as she has been compliant they are requesting her case to be an active which she was excited about.  Patient denies any side effects to the medications.  Denies any SI/HI.  Denies any current auditory or visual hallucinations.      Objective   Vital Signs:   There were no vitals taken for this visit.  Due to the remote nature of this encounter (virtual encounter), vitals were unable to be obtained.  Height stated at 66 inches.  Weight stated at 325 pounds.        PHQ-9 Score:   PHQ-9 Total Score:     Patient screened positive for depression based on a PHQ-9 score of 11 on 4/1/2021. Follow-up recommendations include: Patient has schizophrenia and is currently being managed with medication..    Mental Status Exam:   Hygiene:   good  Cooperation:  Cooperative  Eye Contact:  Good  Psychomotor Behavior:  Appropriate  Affect:  Appropriate  Mood: normal and anxious  Speech:  Normal  Thought Process:  Goal directed and Linear  Thought Content:  Normal  Suicidal:  None  Homicidal:  None  Hallucinations:  Auditory and Visual  Delusion:  None  Memory:  Intact  Orientation:  Person, Place, Time and Situation  Reliability:  good  Insight:  Good and Fair  Judgement:  Good and Fair  Impulse Control:  Good and Fair  Physical/Medical Issues:  Yes Dm. HTN, CKD stage 2, hep C+, JACQUE, hx substance abuse     Current Medications:   Current Outpatient Medications   Medication Sig Dispense Refill   • albuterol sulfate  (90 Base) MCG/ACT inhaler Inhale 2 puffs Every 6 (Six) Hours As Needed for Wheezing or Shortness of Air. 1 inhaler 3   • Cariprazine HCl 4.5 MG capsule Take 4.5 mg by mouth Daily. 90 capsule 0   • chlorhexidine (HIBICLENS) 4 % external liquid Apply  topically to the appropriate area as directed Daily. 236 mL 0   • Continuous Blood Gluc  (Dexcom G6  ) device USE FOR 6 MONTHS     • Continuous Blood Gluc Sensor (Dexcom G6 Sensor) 1 each As Needed (glucose control). Every 10 days 9 each 3   • Continuous Blood Gluc Transmit (Dexcom G6 Transmitter) misc 1 each Every 3 (Three) Months. 1 each 3   • cyclobenzaprine (FLEXERIL) 5 MG tablet Take 1 tablet by mouth 3 (Three) Times a Day As Needed for Muscle Spasms. (Patient taking differently: Take 5 mg by mouth 3 (Three) Times a Day.) 90 tablet 3   • DULoxetine (CYMBALTA) 30 MG capsule Take 3 capsules by mouth Daily. 270 capsule 0   • Ertugliflozin L-PyroglutamicAc (Steglatro) 15 MG tablet Take 1 tablet by mouth Every Morning. 90 tablet 3   • folic acid (FOLVITE) 1 MG tablet Take 1,000 mcg by mouth Daily.     • gabapentin (NEURONTIN) 300 MG capsule Take 1 capsule by mouth 3 (Three) Times a Day. 90 capsule 2   • glucose blood test strip Testing 4 times daily, E11.9 120 each 12   • HYDROcodone-acetaminophen (NORCO) 7.5-325 MG per tablet Take 1 tablet by mouth 2 (Two) Times a Day.     • hydrOXYzine (ATARAX) 25 MG tablet Take 0.5-1 tablets by mouth 3 (Three) Times a Day As Needed for Anxiety. 270 tablet 0   • Insulin Disposable Pump (OmniPod Dash 5 Pack Pods) misc      • insulin lispro (Admelog) 100 UNIT/ML injection 150  units daily through pump 60 mL 11   • lamoTRIgine (LaMICtal) 150 MG tablet Take 1 tablet by mouth Daily. 90 tablet 0   • levETIRAcetam (KEPPRA) 1000 MG tablet Take 1 tablet by mouth 2 (Two) Times a Day. 60 tablet 3   • lisinopril (PRINIVIL,ZESTRIL) 40 MG tablet Take 1 tablet by mouth Daily. 30 tablet 3   • metFORMIN (GLUCOPHAGE) 500 MG tablet Take 1 tablet by mouth 2 (Two) Times a Day With Meals. 60 tablet 3   • metoprolol tartrate (LOPRESSOR) 50 MG tablet Take 1 tablet by mouth Every 12 (Twelve) Hours. 60 tablet 3   • nicotine (NICODERM CQ) 21 MG/24HR patch Place 1 patch on the skin as directed by provider Daily. 28 patch 3   • NIFEdipine XL (PROCARDIA XL) 30 MG 24 hr tablet Take 1 tablet by mouth  Daily. 30 tablet 3   • omeprazole (priLOSEC) 40 MG capsule Take 1 capsule by mouth Daily. 30 capsule 3   • OXcarbazepine (TRILEPTAL) 600 MG tablet TAKE 1 2 (ONE HALF) TABLET BY MOUTH TWICE DAILY FOR 7 DAYS THEN 1 TWICE DAILY     • prazosin (Minipress) 2 MG capsule Take 1 capsule by mouth Every Night. 30 capsule 2   • simvastatin (ZOCOR) 40 MG tablet Take 1 tablet by mouth Every Night. 30 tablet 3   • tiotropium bromide-olodaterol (STIOLTO RESPIMAT) 2.5-2.5 MCG/ACT aerosol solution inhaler Inhale 2 puffs Daily. 1 inhaler 5   • vitamin D (ERGOCALCIFEROL) 1.25 MG (93764 UT) capsule capsule Take 1 tablet once a  Week 8 capsule 3     No current facility-administered medications for this visit.       Physical Exam  Nursing note reviewed.   Constitutional:       Appearance: Normal appearance.   Neurological:      Mental Status: She is alert.   Psychiatric:         Attention and Perception: Attention normal. She perceives auditory hallucinations. She does not perceive visual hallucinations.         Mood and Affect: Affect normal. Mood is anxious and depressed.         Speech: Speech normal.         Behavior: Behavior is cooperative.         Thought Content: Thought content normal.         Cognition and Memory: Cognition and memory normal.        Result Review :     The following data was reviewed by: ROD Spencer on 02/03/2021:  Common labs    Common Labsle 1/26/21 1/26/21 1/26/21 1/26/21 1/26/21 4/21/21 4/21/21 4/21/21 4/21/21 7/20/21 7/20/21 7/20/21 7/20/21    0804 0804 0804 0804 0804 0802 0802 0802 0802 0808 0808 0808 0808   Glucose    187 (A)   158 (A)      146 (A)   BUN    15   13      20   Creatinine    0.75   0.75      0.57   eGFR Non  Am    83   83      114   Sodium    137   136      136   Potassium    5.0   5.1      4.8   Chloride    104   102      102   Calcium    9.7   9.6      9.1   Albumin    3.90   3.80      3.70   Total Bilirubin    0.2   0.2      <0.2   Alkaline Phosphatase    67   75       76   AST (SGOT)    18   18      16   ALT (SGPT)    19   26      19   WBC 9.04        9.95 11.45 (A)      Hemoglobin 14.4        15.0 13.8      Hematocrit 45.0        45.7 41.4      Platelets 174        173 176      Total Cholesterol     114   132    123    Triglycerides     169 (A)   164 (A)    114    HDL Cholesterol     39 (A)   42    43    LDL Cholesterol      47   62    59    Hemoglobin A1C   7.60 (A)   8.54 (A)     7.60 (A)     Microalbumin, Urine  <1.2              (A) Abnormal value            CMP    CMP 1/26/21 4/21/21 7/20/21   Glucose 187 (A) 158 (A) 146 (A)   BUN 15 13 20   Creatinine 0.75 0.75 0.57   eGFR Non African Am 83 83 114   Sodium 137 136 136   Potassium 5.0 5.1 4.8   Chloride 104 102 102   Calcium 9.7 9.6 9.1   Albumin 3.90 3.80 3.70   Total Bilirubin 0.2 0.2 <0.2   Alkaline Phosphatase 67 75 76   AST (SGOT) 18 18 16   ALT (SGPT) 19 26 19   (A) Abnormal value            CBC    CBC 1/26/21 4/21/21 7/20/21   WBC 9.04 9.95 11.45 (A)   RBC 5.10 5.13 4.76   Hemoglobin 14.4 15.0 13.8   Hematocrit 45.0 45.7 41.4   MCV 88.2 89.1 87.0   MCH 28.2 29.2 29.0   MCHC 32.0 32.8 33.3   RDW 13.0 13.8 13.4   Platelets 174 173 176   (A) Abnormal value            CBC w/diff    CBC w/Diff 6/10/20 9/9/20 1/26/21   WBC 8.89 9.63 9.04   RBC 4.85 5.07 5.10   Hemoglobin 14.1 14.9 14.4   Hematocrit 42.8 44.3 45.0   MCV 88.2 87.4 88.2   MCH 29.1 29.4 28.2   MCHC 32.9 33.6 32.0   RDW 13.6 13.3 13.0   Platelets 157 180 174   Neutrophil Rel % 63.8 67.2    Immature Granulocyte Rel % 0.6 (A) 0.5    Lymphocyte Rel % 26.4 21.6    Monocyte Rel % 4.5 (A) 4.8 (A)    Eosinophil Rel % 3.9 5.3    Basophil Rel % 0.8 0.6    (A) Abnormal value            Lipid Panel    Lipid Panel 1/26/21 4/21/21 7/20/21   Total Cholesterol 114 132 123   Triglycerides 169 (A) 164 (A) 114   HDL Cholesterol 39 (A) 42 43   VLDL Cholesterol 28 28 21   LDL Cholesterol  47 62 59   LDL/HDL Ratio 1.06 1.36 1.33   (A) Abnormal value            TSH    TSH 1/26/21  7/20/21   TSH 1.610 1.680           BMP    BMP 1/26/21 4/21/21 7/20/21   BUN 15 13 20   Creatinine 0.75 0.75 0.57   Sodium 137 136 136   Potassium 5.0 5.1 4.8   Chloride 104 102 102   CO2 21.6 (A) 21.4 (A) 22.5   Calcium 9.7 9.6 9.1   (A) Abnormal value            HgB    HGB 1/26/21 4/21/21 7/20/21   Hemoglobin 14.4 15.0 13.8               Data reviewed: PCP notes        Assessment and Plan    Problem List Items Addressed This Visit        Mental Health    Schizoaffective disorder, depressive type (CMS/HCC) - Primary    Relevant Medications    DULoxetine (CYMBALTA) 30 MG capsule    hydrOXYzine (ATARAX) 25 MG tablet      Other Visit Diagnoses     Adjustment disorder with anxious mood        Relevant Medications    DULoxetine (CYMBALTA) 30 MG capsule    hydrOXYzine (ATARAX) 25 MG tablet    Nightmares        Relevant Medications    DULoxetine (CYMBALTA) 30 MG capsule    hydrOXYzine (ATARAX) 25 MG tablet    Sleeping difficulty                TREATMENT PLAN/GOALS: Continue supportive psychotherapy efforts and medications as indicated. Treatment and medication options discussed during today's visit. Patient ackowledged and verbally consented to continue with current treatment plan and was educated on the importance of compliance with treatment and follow-up appointments.    MEDICATION ISSUES:  We discussed risks, benefits, and side effects of the above medications and the patient was agreeable with the plan. Patient was educated on the importance of compliance with treatment and follow-up appointments.  Patient is agreeable to call the office with any worsening of symptoms or onset of side effects. Patient is agreeable to call 911 or go to the nearest ER should he/she begin having SI/HI. Lengthy discussion with patient on the possible side effects of antipsychotic medications including increased cholesterol, increased blood sugar, and possibility of weight gain.  Also discussed the need to monitor lab work associated with  this.  The risk of muscle movement disorders with this class of medication was also discussed. We will continue Lamictal in an effort to stabilize mood.  The patient was reminded to immediately come to the hospital should there be any loss of control.  Explanation was given to her regarding Lamictal and the potential for Foster Shon syndrome and significant rash.  Patient was encouraged to check skin prior to beginning.  Patient was encouraged to report any rash and to immediately stop medication.      -Continue lamotrigine 150 mg daily for schizoaffective disorder.  -Continue minipress 2mg at night for nightmares.  -Continue Vraylar to 4.5 mg daily for schizoaffective disorder depressed type.  -Continue hydroxyzine 12.5 to 25 mg up to 3 times a day as needed for anxiety and panic.  - Continue Cymbalta 90mg daily for depression and pain and will further evaluate.     Patient has now failed and tried Haldol, aripiprazole, Latuda and now risperidone which has caused weight gain and due to diabetes and kidney disease would benefit from Vraylar as patient is still having auditory and visual hallucinations.     Counseled patient regarding multimodal approach with healthy nutrition, healthy sleep, regular physical activity, social activities, counseling, and medications.      Coping skills reviewed and encouraged positive framing of thoughts     Assisted patient in processing above session content; acknowledged and normalized patient’s thoughts, feelings, and concerns.  Applied  positive coping skills and behavior management in session.  Allowed patient to freely discuss issues without interruption or judgment. Provided safe, confidential environment to facilitate the development of positive therapeutic relationship and encourage open, honest communication. Assisted patient in identifying risk factors which would indicate the need for higher level of care including thoughts to harm self or others and/or self-harming  behavior and encouraged patient to contact this office, call 911, or present to the nearest emergency room should any of these events occur. Discussed crisis intervention services and means to access.     MEDS ORDERED DURING VISIT:  New Medications Ordered This Visit   Medications   • DULoxetine (CYMBALTA) 30 MG capsule     Sig: Take 3 capsules by mouth Daily.     Dispense:  270 capsule     Refill:  0   • hydrOXYzine (ATARAX) 25 MG tablet     Sig: Take 0.5-1 tablets by mouth 3 (Three) Times a Day As Needed for Anxiety.     Dispense:  270 tablet     Refill:  0           Follow Up   Return in about 4 weeks (around 10/11/2021), or if symptoms worsen or fail to improve, for Recheck.    Patient was given instructions and counseling regarding her condition or for health maintenance advice. Please see specific information pulled into the AVS if appropriate.     Some of the data in this electronic note has been brought forward from a previous encounter, any necessary changes have been made, it has been reviewed by this APRN, and it is accurate.      This document has been electronically signed by ROD Spencer  September 13, 2021 09:55 EDT    Part of this note may be an electronic transcription/translation of spoken language to printed text using the Dragon Dictation System.

## 2021-09-27 DIAGNOSIS — F51.5 NIGHTMARES: ICD-10-CM

## 2021-09-27 RX ORDER — ERGOCALCIFEROL 1.25 MG/1
CAPSULE ORAL
Qty: 8 CAPSULE | Refills: 0 | Status: SHIPPED | OUTPATIENT
Start: 2021-09-27 | End: 2021-10-28 | Stop reason: SDUPTHER

## 2021-09-27 RX ORDER — PRAZOSIN HYDROCHLORIDE 2 MG/1
CAPSULE ORAL
Qty: 30 CAPSULE | Refills: 0 | Status: SHIPPED | OUTPATIENT
Start: 2021-09-27 | End: 2021-10-26

## 2021-09-27 NOTE — TELEPHONE ENCOUNTER
Rx Refill Note  Requested Prescriptions     Pending Prescriptions Disp Refills   • vitamin D (ERGOCALCIFEROL) 1.25 MG (27551 UT) capsule capsule [Pharmacy Med Name: VITAMIN D2 (ERGO) 1.25MG  CAP] 8 capsule 0     Sig: TAKE 2 CAPSULES BY MOUTH ONCE A WEEK      Last office visit with prescribing clinician: 7/28/2021      Next office visit with prescribing clinician: 10/28/2021       {TIP  Please add Last Relevant Lab Date if appropriate  {TIP  Is Refill Pharmacy correct?  Holly Christy MA  09/27/21, 11:00 CDT

## 2021-09-30 ENCOUNTER — OFFICE VISIT (OUTPATIENT)
Dept: ENDOCRINOLOGY | Facility: CLINIC | Age: 46
End: 2021-09-30

## 2021-09-30 VITALS
DIASTOLIC BLOOD PRESSURE: 102 MMHG | OXYGEN SATURATION: 91 % | HEIGHT: 66 IN | BODY MASS INDEX: 47.09 KG/M2 | SYSTOLIC BLOOD PRESSURE: 117 MMHG | WEIGHT: 293 LBS | HEART RATE: 117 BPM

## 2021-09-30 DIAGNOSIS — I10 ESSENTIAL HYPERTENSION: ICD-10-CM

## 2021-09-30 DIAGNOSIS — E11.65 TYPE 2 DIABETES MELLITUS WITH HYPERGLYCEMIA, WITH LONG-TERM CURRENT USE OF INSULIN (HCC): Primary | ICD-10-CM

## 2021-09-30 DIAGNOSIS — Z79.4 TYPE 2 DIABETES MELLITUS WITH HYPERGLYCEMIA, WITH LONG-TERM CURRENT USE OF INSULIN (HCC): Primary | ICD-10-CM

## 2021-09-30 DIAGNOSIS — E55.9 VITAMIN D DEFICIENCY: ICD-10-CM

## 2021-09-30 DIAGNOSIS — Z71.6 TOBACCO ABUSE COUNSELING: ICD-10-CM

## 2021-09-30 PROCEDURE — 99214 OFFICE O/P EST MOD 30 MIN: CPT | Performed by: NURSE PRACTITIONER

## 2021-09-30 PROCEDURE — 95251 CONT GLUC MNTR ANALYSIS I&R: CPT | Performed by: NURSE PRACTITIONER

## 2021-09-30 RX ORDER — DULAGLUTIDE 4.5 MG/.5ML
4.5 INJECTION, SOLUTION SUBCUTANEOUS WEEKLY
Qty: 4 PEN | Refills: 11 | Status: SHIPPED | OUTPATIENT
Start: 2021-09-30 | End: 2022-01-21 | Stop reason: SDUPTHER

## 2021-09-30 NOTE — PROGRESS NOTES
"Chief Complaint  Diabetes and Hypertension    Subjective          Sudhakar Saul presents to [facility]  History of Present Illness         In office visit       45 year old female presents for follow up     Reason diabetes mellitus type 2    Timing constant     Quality not controlled    Lab Results   Component Value Date    HGBA1C 7.60 (H) 07/20/2021         Severity high     Duration diagnosed at late 20 s        Macrovascular-- no CAD, no PAD, no CVA                  Microvascular--- neuropathy, mild diabetic retinopathy , CKD stage II     COPD , hepatitis C                    Diabetes Regimen    Insulin through pump        omni pod           Blood Glucose Readings     Checking 4 times daily      Uses dexcom       see below                Review of Systems - General ROS: negative            Objective   Vital Signs:   BP (!) 117/102   Pulse 117   Ht 167.6 cm (66\")   Wt (!) 156 kg (344 lb 1.6 oz)   SpO2 91%   BMI 55.54 kg/m²     Physical Exam  Constitutional:       Appearance: Normal appearance.   Cardiovascular:      Rate and Rhythm: Regular rhythm.      Heart sounds: Normal heart sounds.   Pulmonary:      Breath sounds: Normal breath sounds.   Musculoskeletal:      Cervical back: Normal range of motion.   Neurological:      Mental Status: She is alert.        Result Review :   The following data was reviewed by: ROD Chawla on 09/30/2021:  Common labs    Common Labsle 1/26/21 1/26/21 1/26/21 1/26/21 1/26/21 4/21/21 4/21/21 4/21/21 4/21/21 7/20/21 7/20/21 7/20/21 7/20/21    0804 0804 0804 0804 0804 0802 0802 0802 0802 0808 0808 0808 0808   Glucose    187 (A)   158 (A)      146 (A)   BUN    15   13      20   Creatinine    0.75   0.75      0.57   eGFR Non  Am    83   83      114   Sodium    137   136      136   Potassium    5.0   5.1      4.8   Chloride    104   102      102   Calcium    9.7   9.6      9.1   Albumin    3.90   3.80      3.70 "   Total Bilirubin    0.2   0.2      <0.2   Alkaline Phosphatase    67   75      76   AST (SGOT)    18   18      16   ALT (SGPT)    19   26      19   WBC 9.04        9.95 11.45 (A)      Hemoglobin 14.4        15.0 13.8      Hematocrit 45.0        45.7 41.4      Platelets 174        173 176      Total Cholesterol     114   132    123    Triglycerides     169 (A)   164 (A)    114    HDL Cholesterol     39 (A)   42    43    LDL Cholesterol      47   62    59    Hemoglobin A1C   7.60 (A)   8.54 (A)     7.60 (A)     Microalbumin, Urine  <1.2              (A) Abnormal value                      Assessment and Plan    Diagnoses and all orders for this visit:    1. Type 2 diabetes mellitus with hyperglycemia, with long-term current use of insulin (CMS/[facility]) (Primary)  -     Hemoglobin A1c  -     TSH    2. Tobacco abuse counseling    3. Essential hypertension    4. Vitamin D deficiency    Other orders  -     insulin lispro (Admelog) 100 UNIT/ML injection; 180  units daily through pump  Dispense: 90 mL; Refill: 11  -     Dulaglutide (Trulicity) 4.5 MG/0.5ML solution pen-injector; Inject 4.5 mg under the skin into the appropriate area as directed 1 (One) Time Per Week.  Dispense: 4 pen; Refill: 11             Glycemic Management     Diabetes mellitus type 2 not controlled      Lab Results   Component Value Date    HGBA1C 7.60 (H) 07/20/2021        Dexcom G6      Downloaded and reviewed     Dated from Sept 17 to Sept 30, 2021         average bg 170       Time in target 62.1%     High 37.9%     Very high 1.7%     Low 0 %     Very low 0 %     She is drinking coffee --at 3 am causing hyperglycemia       Using creamer --- need to bolus for creamer     Otherwise no changes             Omni pod         Basal      MN - 2.65         Carb ratio      4      Correction         14     Average bg 120               Taking trulicity 4.5  mg weekly      Taking Metformin 1000 mg po BID      Taking Steglatro 15 mg one daily         Previous  regimen      basaglar taking 63 units ---stop        admelog--stop      Taking about 30 units            Aim for 45 grams of Carbohydrate for meals      Aim for 15 grams of Carbohydrate for snack                        Lipid Management           Taking Simvastatin 40 mg          Total Cholesterol   Date Value Ref Range Status   07/20/2021 123 0 - 200 mg/dL Final     Triglycerides   Date Value Ref Range Status   07/20/2021 114 0 - 150 mg/dL Final     HDL Cholesterol   Date Value Ref Range Status   07/20/2021 43 40 - 60 mg/dL Final     LDL Cholesterol    Date Value Ref Range Status   07/20/2021 59 0 - 100 mg/dL Final           Blood Pressure Management           Taking Lisinopril 40 mg daily               Microvascular Complication Monitoring        Last eye exam -due Jan. 2021           Neuropathy        Taking Gabapentin 300 mg tid          Bone Health            taking vitamin d daily      Component      Latest Ref Rng & Units 7/20/2021   25 Hydroxy, Vitamin D      30.0 - 100.0 ng/ml 48.0           Other Diabetes Related Aspects      Lab Results   Component Value Date    MBHCDRBG34 491 01/26/2021             Thyroid health         Lab Results   Component Value Date    TSH 1.680 07/20/2021                    Follow Up   Return in about 3 months (around 12/30/2021) for Recheck.  Patient was given instructions and counseling regarding her condition or for health maintenance advice. Please see specific information pulled into the AVS if appropriate.         This document has been electronically signed by ROD Chawla on September 30, 2021 12:21 CDT.

## 2021-10-08 ENCOUNTER — TELEMEDICINE (OUTPATIENT)
Dept: PSYCHIATRY | Facility: CLINIC | Age: 46
End: 2021-10-08

## 2021-10-08 DIAGNOSIS — F25.1 SCHIZOAFFECTIVE DISORDER, DEPRESSIVE TYPE (HCC): Primary | ICD-10-CM

## 2021-10-08 PROCEDURE — 90834 PSYTX W PT 45 MINUTES: CPT | Performed by: COUNSELOR

## 2021-10-08 NOTE — PROGRESS NOTES
Date: October 8, 2021  Time In: 9:00 AM  Time Out: 9:48 AM  This provider is located at the [facility]), [address], [place] [address] using a secure [employer] Video Visit through [employer]. Patient is being seen remotely via telehealth at home address in [place] and stated they are in a secure environment for this session. The patient's condition being diagnosed/treated is appropriate for telemedicine. The provider identified herself as well as her credentials. The patient, and/or patients guardian, consent to be seen remotely, and when consent is given they understand that the consent allows for patient identifiable information to be sent to a third party as needed. They may refuse to be seen remotely at any time. The electronic data is encrypted and password protected, and the patient and/or guardian has been advised of the potential risks to privacy not withstanding such measures.     You have chosen to receive care through a telehealth visit.  Do you consent to use a video/audio connection for your medical care today? Yes    PROGRESS NOTE  Data:  Sudhakar Saul is a 45 y.o. female who presents today for follow up. Patient states that she has been doing ok since the last session. Patient will be having carpal surgery on her left hand on the 18th of the month. Patient states that she has started a low carb and low fat diet about 3 weeks ago and she has lost 8 pounds already.  Patient states that she was able to control her emotions in a situation with her mother over a mistake about hair color. Patient was able to discuss how she has has to talk with her mother about her need to walk away from situations at times and process before she can have a conversation about an issue. Discussed that patient's mother will also be having a surgery and the patient is worried about her mother as well. Patient is still trying to do what she needs to do to  control her feelings of anxiety and depression and and states that most of her anxiety at this point is related to the upcoming medical issues for her and her mother and how they will cope with both needing recovery time.    Chief Complaint: anxiety, depression, worry about upcoming surgery    History of Present Illness: patient has struggled with symptoms of schizophrenia, depression and anxiety,      Clinical Maneuvering/Intervention:  Solution focused    (Scales based on 0 - 10 with 10 being the worst)  Depression: 4 Anxiety: 6       Assisted patient in processing above session content; acknowledged and normalized patient’s thoughts, feelings, and concerns.  Rationalized patient thought process regarding her ability to control her emotions and the progress she has been making and her upcoming surgery and the issues that will result from the surgery.  Discussed triggers associated with patient's in regard to her upcoming surgery, contact with old friends .  Also discussed coping skills for patient to implement such as talking about her concerns with her surgeon, redirecting her thoughts when they begin to go to the negative side and remembering her progress and radha when she     Allowed patient to freely discuss issues without interruption or judgment. Provided safe, confidential environment to facilitate the development of positive therapeutic relationship and encourage open, honest communication. Assisted patient in identifying risk factors which would indicate the need for higher level of care including thoughts to harm self or others and/or self-harming behavior and encouraged patient to contact this office, call 911, or present to the nearest emergency room should any of these events occur. Discussed crisis intervention services and means to access. Patient adamantly and convincingly denies current suicidal or homicidal ideation or perceptual disturbance.    Assessment:   Assessment   Patient appears to  maintain relative stability as compared to their baseline.  However, patient continues to struggle with depression and anxiety and symptoms of schizoaffective disorder which continues to cause impairment in important areas of functioning.  A result, they can be reasonably expected to continue to benefit from treatment and would likely be at increased risk for decompensation otherwise.    Mental Status Exam:   Hygiene:   good  Cooperation:  Cooperative  Eye Contact:  Good  Psychomotor Behavior:  Appropriate  Affect:  Appropriate  Mood: tired  Speech:  Normal  Thought Process:  Goal directed  Thought Content:  Normal  Suicidal:  None  Homicidal:  None  Hallucinations:  Visual-small shadowlike movement outside but it could have been an actual movement from something because it didn't take form as the others have doen  Delusion:  Paranoid- at times  Memory:  Deficits  Orientation:  Person, Place, Time and Situation  Reliability:  good  Insight:  Fair  Judgement:  Good  Impulse Control:  Good  Physical/Medical Issues:  Yes recent ER visit due to breathing complications from COPD       Patient's Support Network Includes:  parents    Functional Status: Moderate impairment     Progress toward goal: Not at goal    Prognosis: Good with Ongoing Treatment          Plan:    Patient will continue in individual outpatient therapy with focus on improved functioning and coping skills, maintaining stability, and avoiding decompensation and the need for higher level of care.    Patient will adhere to medication regimen as prescribed and report any side effects. Patient will contact this office, call 911 or present to the nearest emergency room should suicidal or homicidal ideations occur. Provide Cognitive Behavioral Therapy and Solution Focused Therapy to improve functioning, maintain stability, and avoid decompensation and the need for higher level of care.     Return in about 3 weeks, or earlier if symptoms worsen or fail to  improve.           VISIT DIAGNOSIS:     ICD-10-CM ICD-9-CM   1. Schizoaffective disorder, depressive type (HCC)  F25.1 295.70             This document has been electronically signed by DREAD Vo  October 8, 2021 09:32 EDT      Part of this note may be an electronic transcription/translation of spoken language to printed text using the Dragon Dictation System.

## 2021-10-12 ENCOUNTER — PRE-ADMISSION TESTING (OUTPATIENT)
Dept: PREADMISSION TESTING | Facility: HOSPITAL | Age: 46
End: 2021-10-12

## 2021-10-12 ENCOUNTER — PREP FOR SURGERY (OUTPATIENT)
Dept: OTHER | Facility: HOSPITAL | Age: 46
End: 2021-10-12

## 2021-10-12 VITALS
HEIGHT: 66 IN | SYSTOLIC BLOOD PRESSURE: 152 MMHG | DIASTOLIC BLOOD PRESSURE: 84 MMHG | HEART RATE: 92 BPM | WEIGHT: 293 LBS | RESPIRATION RATE: 18 BRPM | OXYGEN SATURATION: 95 % | BODY MASS INDEX: 47.09 KG/M2

## 2021-10-12 DIAGNOSIS — G56.02 CARPAL TUNNEL SYNDROME OF LEFT WRIST: ICD-10-CM

## 2021-10-12 LAB
ANION GAP SERPL CALCULATED.3IONS-SCNC: 10 MMOL/L (ref 5–15)
BASOPHILS # BLD AUTO: 0.06 10*3/MM3 (ref 0–0.2)
BASOPHILS NFR BLD AUTO: 0.5 % (ref 0–1.5)
BILIRUB UR QL STRIP: NEGATIVE
BUN SERPL-MCNC: 12 MG/DL (ref 6–20)
BUN/CREAT SERPL: 16 (ref 7–25)
CALCIUM SPEC-SCNC: 9.4 MG/DL (ref 8.6–10.5)
CHLORIDE SERPL-SCNC: 98 MMOL/L (ref 98–107)
CLARITY UR: CLEAR
CO2 SERPL-SCNC: 25 MMOL/L (ref 22–29)
COLOR UR: YELLOW
CREAT SERPL-MCNC: 0.75 MG/DL (ref 0.57–1)
DEPRECATED RDW RBC AUTO: 44 FL (ref 37–54)
EOSINOPHIL # BLD AUTO: 0.24 10*3/MM3 (ref 0–0.4)
EOSINOPHIL NFR BLD AUTO: 2 % (ref 0.3–6.2)
ERYTHROCYTE [DISTWIDTH] IN BLOOD BY AUTOMATED COUNT: 14.6 % (ref 12.3–15.4)
GFR SERPL CREATININE-BSD FRML MDRD: 83 ML/MIN/1.73
GLUCOSE SERPL-MCNC: 109 MG/DL (ref 65–99)
GLUCOSE UR STRIP-MCNC: NEGATIVE MG/DL
HCT VFR BLD AUTO: 40.5 % (ref 34–46.6)
HGB BLD-MCNC: 13.3 G/DL (ref 12–15.9)
HGB UR QL STRIP.AUTO: NEGATIVE
IMM GRANULOCYTES # BLD AUTO: 0.05 10*3/MM3 (ref 0–0.05)
IMM GRANULOCYTES NFR BLD AUTO: 0.4 % (ref 0–0.5)
KETONES UR QL STRIP: NEGATIVE
LEUKOCYTE ESTERASE UR QL STRIP.AUTO: NEGATIVE
LYMPHOCYTES # BLD AUTO: 3.35 10*3/MM3 (ref 0.7–3.1)
LYMPHOCYTES NFR BLD AUTO: 28.2 % (ref 19.6–45.3)
MCH RBC QN AUTO: 27.4 PG (ref 26.6–33)
MCHC RBC AUTO-ENTMCNC: 32.8 G/DL (ref 31.5–35.7)
MCV RBC AUTO: 83.5 FL (ref 79–97)
MONOCYTES # BLD AUTO: 0.7 10*3/MM3 (ref 0.1–0.9)
MONOCYTES NFR BLD AUTO: 5.9 % (ref 5–12)
NEUTROPHILS NFR BLD AUTO: 63 % (ref 42.7–76)
NEUTROPHILS NFR BLD AUTO: 7.46 10*3/MM3 (ref 1.7–7)
NITRITE UR QL STRIP: NEGATIVE
NRBC BLD AUTO-RTO: 0 /100 WBC (ref 0–0.2)
PH UR STRIP.AUTO: 7 [PH] (ref 5–9)
PLATELET # BLD AUTO: 202 10*3/MM3 (ref 140–450)
PMV BLD AUTO: 10.4 FL (ref 6–12)
POTASSIUM SERPL-SCNC: 4.3 MMOL/L (ref 3.5–5.2)
PROT UR QL STRIP: NEGATIVE
QT INTERVAL: 334 MS
QTC INTERVAL: 441 MS
RBC # BLD AUTO: 4.85 10*6/MM3 (ref 3.77–5.28)
SODIUM SERPL-SCNC: 133 MMOL/L (ref 136–145)
SP GR UR STRIP: 1.01 (ref 1–1.03)
UROBILINOGEN UR QL STRIP: NORMAL
WBC # BLD AUTO: 11.86 10*3/MM3 (ref 3.4–10.8)

## 2021-10-12 PROCEDURE — 85025 COMPLETE CBC W/AUTO DIFF WBC: CPT

## 2021-10-12 PROCEDURE — 80048 BASIC METABOLIC PNL TOTAL CA: CPT

## 2021-10-12 PROCEDURE — 93010 ELECTROCARDIOGRAM REPORT: CPT | Performed by: INTERNAL MEDICINE

## 2021-10-12 PROCEDURE — 93005 ELECTROCARDIOGRAM TRACING: CPT

## 2021-10-12 PROCEDURE — 81003 URINALYSIS AUTO W/O SCOPE: CPT

## 2021-10-12 PROCEDURE — 36415 COLL VENOUS BLD VENIPUNCTURE: CPT

## 2021-10-12 RX ORDER — ASPIRIN 81 MG/1
81 TABLET ORAL DAILY
COMMUNITY
End: 2023-01-05 | Stop reason: SDUPTHER

## 2021-10-12 RX ORDER — SODIUM CHLORIDE 9 MG/ML
1000 INJECTION, SOLUTION INTRAVENOUS CONTINUOUS
Status: CANCELLED | OUTPATIENT
Start: 2021-10-19

## 2021-10-12 NOTE — H&P (VIEW-ONLY)
Sudhakar Saul is a 45 y.o. female returns for     No chief complaint on file.      HISTORY OF PRESENT ILLNESS:      Ms. Saul is 45 years old and right-hand dominant.  She began having pain in the radial aspect of the left wrist in about January of this year.  There is been no trauma.  The pain is worse with lifting and motion of the wrist and relieved by rest.  Initially she had injection of the first extensor compartment to help with her pain.  However she later began having complaints of numbness in the index middle and ring fingers .  Physical exam was not diagnostic resulting in EMG and nerve conduction study.Showing findings consistent with mild carpal tunnel syndrome with no evidence of ulnar neuropathy.  Treatment options were reviewed and I recommended carpal tunnel release.    Home medications are numerous and include Cymbalta albuterol Neurontin Lamictal Keppra liquefies Lopressor respite all and hydrocodone among others.  She is allergic to Betadine iodine nifedipine and ramipril.  She smokes 1 to 2 cigarettes/day.    Past medical history is remarkable for diabetes hypertension hepatitis C and chronic renal insufficiency and depression.  She also has a diagnosis of schizophrenia.  Previous surgeries include carpal tunnel release on the right hysterectomy resection of lipomata breast biopsy cholecystectomy and appendectomy.  She also has a history of COPD.  There is been no history of anesthetic complication.    Family history she is .    Social history she lives in [place] alone I believe.  She is unemployed and says she is currently seeking disability.     CONCURRENT MEDICAL HISTORY:    The following portions of the patient's history were reviewed and updated as appropriate: allergies, current medications, past family history, past medical history, past social history, past surgical history and problem list.         PHYSICAL EXAMINATION:       There were no vitals taken for this  visit.    Physical Exam she is alert and in no apparent distress.    HEENT exam showed extraocular movements are intact.  Oropharynx is edentulous with full plates in place.    Lungs are clear to auscultation.    Cardiac exam shows a regular rhythm normal rate no murmur.  Heart sounds are somewhat distant.    The abdomen is soft obese and nontender with active bowel sounds.  Genitourinary and rectal exams were not done.    GAIT:     []  Normal  []  Antalgic    Assistive device: []  None  []  Walker     []  Crutches  [x]  Cane     []  Wheelchair  []  Stretcher    Ortho Exam digital motions are full.  There is no intrinsic atrophy in the left hand.  Radial pulse is strong.  Sensory exam reveals hypnesthesia soft touch involving the small finger and dorsal ulnar aspect of the hand.  Tinel's sign is mildly positive over the ulnar nerve at the elbow.  Pressure Phalen testing also reproduces her symptoms.    EMG and nerve conduction study done recently shows evidence of mild carpal tunnel syndrome on the left with no evidence of ulnar neuropathy or other nerve compression.                                                                    ASSESSMENT: Left carpal tunnel syndrome.  The natural history of the disorder and treatment options were reviewed.  She finds her current condition unacceptable to her.  She declined carpal tunnel injection.  I recommended carpal tunnel release.  Postoperative rehabilitation was reviewed.  Potential complications of surgery and anesthetic were reviewed in detail including but not limited to infection blood loss neurovascular damage sore throat pneumonia brain damage and even death.  She appears to understand all matters discussed and wishes to proceed.    There are no diagnoses linked to this encounter.      PLAN    No follow-ups on file.    Kevin Carlson MD

## 2021-10-12 NOTE — H&P
Sudhakar Saul is a 45 y.o. female returns for     No chief complaint on file.      HISTORY OF PRESENT ILLNESS:      Ms. Saul is 45 years old and right-hand dominant.  She began having pain in the radial aspect of the left wrist in about January of this year.  There is been no trauma.  The pain is worse with lifting and motion of the wrist and relieved by rest.  Initially she had injection of the first extensor compartment to help with her pain.  However she later began having complaints of numbness in the index middle and ring fingers .  Physical exam was not diagnostic resulting in EMG and nerve conduction study.Showing findings consistent with mild carpal tunnel syndrome with no evidence of ulnar neuropathy.  Treatment options were reviewed and I recommended carpal tunnel release.    Home medications are numerous and include Cymbalta albuterol Neurontin Lamictal Keppra liquefies Lopressor respite all and hydrocodone among others.  She is allergic to Betadine iodine nifedipine and ramipril.  She smokes 1 to 2 cigarettes/day.    Past medical history is remarkable for diabetes hypertension hepatitis C and chronic renal insufficiency and depression.  She also has a diagnosis of schizophrenia.  Previous surgeries include carpal tunnel release on the right hysterectomy resection of lipomata breast biopsy cholecystectomy and appendectomy.  She also has a history of COPD.  There is been no history of anesthetic complication.    Family history she is .    Social history she lives in [place] alone I believe.  She is unemployed and says she is currently seeking disability.     CONCURRENT MEDICAL HISTORY:    The following portions of the patient's history were reviewed and updated as appropriate: allergies, current medications, past family history, past medical history, past social history, past surgical history and problem list.         PHYSICAL EXAMINATION:       There were no vitals taken for this  visit.    Physical Exam she is alert and in no apparent distress.    HEENT exam showed extraocular movements are intact.  Oropharynx is edentulous with full plates in place.    Lungs are clear to auscultation.    Cardiac exam shows a regular rhythm normal rate no murmur.  Heart sounds are somewhat distant.    The abdomen is soft obese and nontender with active bowel sounds.  Genitourinary and rectal exams were not done.    GAIT:     []  Normal  []  Antalgic    Assistive device: []  None  []  Walker     []  Crutches  [x]  Cane     []  Wheelchair  []  Stretcher    Ortho Exam digital motions are full.  There is no intrinsic atrophy in the left hand.  Radial pulse is strong.  Sensory exam reveals hypnesthesia soft touch involving the small finger and dorsal ulnar aspect of the hand.  Tinel's sign is mildly positive over the ulnar nerve at the elbow.  Pressure Phalen testing also reproduces her symptoms.    EMG and nerve conduction study done recently shows evidence of mild carpal tunnel syndrome on the left with no evidence of ulnar neuropathy or other nerve compression.                                                                    ASSESSMENT: Left carpal tunnel syndrome.  The natural history of the disorder and treatment options were reviewed.  She finds her current condition unacceptable to her.  She declined carpal tunnel injection.  I recommended carpal tunnel release.  Postoperative rehabilitation was reviewed.  Potential complications of surgery and anesthetic were reviewed in detail including but not limited to infection blood loss neurovascular damage sore throat pneumonia brain damage and even death.  She appears to understand all matters discussed and wishes to proceed.    There are no diagnoses linked to this encounter.      PLAN    No follow-ups on file.    Kevin Carlson MD

## 2021-10-12 NOTE — PROGRESS NOTES
Chief Complaint  Heartburn, Chronic Kidney Disease, Hyperlipidemia, COPD, Diabetes, Vitamin D Deficiency, Depression, Hypertension, and Seizures    Subjective          Sudhakar Saul presents to [facility] [place]  History of Present Illness     Pt is 46 yo female with management  of morbid obesity, IDDM Type 2 now on insulin pump , HTN, HLP, vitamin D deficiency, tobacco user, GERD, gastritis, esophagitis, diverticulosis, colonic polyp in sigmoid colon,  paranoid schizophrenia,  Seizure disorder, history of Illicit drug abuse, COPD, JACQUE, chronic hepatitis C , major depression, JD, sp appendectomy, sp cholecystectomy sp right carpal tunnel release, sp back surgery, sp breast surgery, sp lipoma excision, chronic back pain (moderate L4-L5 and L5-S1 DDD changes, mild bilateral L4-L5 moderate bilateral L5-S1 facet arthritic change, mild leveoscoliosis), CKD stage 2, de Quervain's syndrome of left wrist ,sp left carpal tunnel release      7/28/21 in office visit for recheck on pt's above medical issues. Pt has yet to get labwork ordered on 7/1/21 and 4/28/21. Pt saw Endocrinology on 7/1/21 for her uncontrolled DM type 2.  sbhe continues to be on insulin pump omni pod. Pt had labwork doneo n 7/202/1 that shwoed normal TSH vitamin D was at 48.0  Lipid panel showed total cholesterol at 123 with triglycerides at 114. hga1c is at 7.60.  From 8.54. CMP showed stable kidney and liver function CBC showed WBC at 11.45 with hemoglobin at 13.8 . Pt continues to take her medicaiotns for HTN, IDDM type 2, HLP, vitamin D dfeiciency her psych medications Pt is doing well phsyically and mentally. She continues to talk to behavioral health. She has upcoming EMG study ordered on 8/16/21. Her seizures are stable and she sees Neurology in . She has cut back on smoking to  A few cigarettes     10/28/21 in office visit for recheck on pt's above medical issues. Pt had left carpal tunnel  release. Pt continues to take her psych medications, IDDM type 2, HTN, HLP, Vitamin D deficiency. Her last hga1c on 10/21/21 was at 8.0 from 7.60. pt gained 20 lbs since last visit. She did go to ER at [facility] for chest pain and cardiac test per patient were negative. SHe was given nebulizer treatment. She would like a nebulizer.  Doing well on medications. Pt doing well since left carpl tunnel surgery. Currently in a cast. Pain is minimal        Hyperlipidemia  The current episode started more than 1 year ago. The problem is controlled. Recent lipid tests were reviewed and are variable. Exacerbating diseases include chronic renal disease, diabetes and obesity. She has no history of nephrotic syndrome. Associated symptoms include shortness of breath. Pertinent negatives include no chest pain. Current antihyperlipidemic treatment includes statins. The current treatment provides significant improvement of lipids. Risk factors for coronary artery disease include diabetes mellitus, obesity, dyslipidemia, a sedentary lifestyle, stress and hypertension.   Seizures   This is a chronic problem. The current episode started more than 1 week ago. The problem has not changed since onset.Pertinent negatives include no sleepiness, no confusion, no headaches, no speech difficulty, no visual disturbance, no neck stiffness, no sore throat, no chest pain, no cough, no nausea, no vomiting, no diarrhea and no muscle weakness. There has been no fever.   Chronic Kidney Disease  This is a chronic problem. The current episode started more than 1 year ago. The problem occurs constantly. The problem has been unchanged. Associated symptoms include arthralgias, fatigue, numbness and weakness. Pertinent negatives include no abdominal pain, chest pain, coughing, headaches, nausea, sore throat or vomiting. Nothing aggravates the symptoms. She has tried nothing for the symptoms. The treatment provided no relief.   Heartburn  She reports no abdominal  pain, no belching, no chest pain, no choking, no coughing, no early satiety, no globus sensation, no heartburn, no hoarse voice, no nausea, no sore throat, no stridor, no tooth decay, no water brash or no wheezing. This is a chronic problem. The current episode started more than 1 month ago. The problem occurs constantly. The problem has been unchanged. Nothing aggravates the symptoms. Associated symptoms include fatigue. She has tried a PPI for the symptoms.   Obesity   This is a chronic problem. The current episode started more than 1 year ago. The problem occurs constantly. The problem has been unchanged. Associated symptoms include arthralgias, fatigue, headaches, numbness and weakness. Pertinent negatives include no abdominal pain, anorexia, change in bowel habit, chest pain, chills, congestion, coughing, fever, joint swelling, myalgias, nausea, neck pain, rash, sore throat, swollen glands, urinary symptoms, vertigo, visual change or vomiting. Nothing aggravates the symptoms. She has tried nothing for the symptoms.   Hypertension   This is a chronic problem. The current episode started more than 1 year ago. The problem is controlled  Associated symptoms include headaches. Pertinent negatives include no anxiety, blurred vision, chest pain, malaise/fatigue, neck pain, orthopnea, palpitations, peripheral edema, PND or shortness of breath. Risk factors for coronary artery disease include obesity, sedentary lifestyle, smoking/tobacco exposure and stress. Past treatments include ACE inhibitors, beta blockers and diuretics. There are no compliance problems.  There is no history of angina, kidney disease, CAD/MI, CVA, heart failure, left ventricular hypertrophy, PVD or retinopathy. There is no history of chronic renal disease, coarctation of the aorta, hyperaldosteronism, hypercortisolism, hyperparathyroidism, a hypertension causing med, pheochromocytoma, renovascular disease, sleep apnea or a thyroid  "problem.   Diabetes   She presents for her followupdiabetic visit. She has type 2 diabetes mellitus. Her disease course has been imrpoving Hypoglycemia symptoms include confusion, headaches, nervousness/anxiousness, seizures and sleepiness. Associated symptoms include fatigue and weakness. Pertinent negatives for diabetes include no blurred vision, no chest pain and no visual change. Pertinent negatives for diabetic complications include no CVA, PVD or retinopathy. Risk factors for coronary artery disease include dyslipidemia, obesity and post-menopausal. She has not had a previous visit with a dietitian. There is no change in her home blood glucose trend. An ACE inhibitor/angiotensin II receptor blocker is not being taken. She does not see a podiatrist.Eye exam is not current. teratments include steglastro insulin and trulicity       Objective   Vital Signs:   /74 (BP Location: Right arm, Patient Position: Sitting, Cuff Size: Large Adult)   Pulse 78   Temp 97.1 °F (36.2 °C)   Ht 167.6 cm (66\")   Wt (!) 156 kg (343 lb 9.6 oz)   SpO2 95%   BMI 55.46 kg/m²     /74 (BP Location: Right arm, Patient Position: Sitting, Cuff Size: Large Adult)   Pulse 78   Temp 97.1 °F (36.2 °C)   Ht 167.6 cm (66\")   Wt (!) 156 kg (343 lb 9.6 oz)   SpO2 95%   BMI 55.46 kg/m²     Physical Exam  Vitals and nursing note reviewed.   Constitutional:       Appearance: She is well-developed. She is not diaphoretic.   HENT:      Head: Normocephalic and atraumatic.      Right Ear: External ear normal.   Eyes:      Conjunctiva/sclera: Conjunctivae normal.      Pupils: Pupils are equal, round, and reactive to light.   Cardiovascular:      Rate and Rhythm: Normal rate and regular rhythm.      Heart sounds: Normal heart sounds. No murmur heard.      Pulmonary:      Effort: Pulmonary effort is normal. No respiratory distress.      Breath sounds: Normal breath sounds.   Abdominal:      General: Bowel sounds are normal. There is no " distension.      Palpations: Abdomen is soft.      Tenderness: There is no abdominal tenderness.      Comments: Obese abdomen    Musculoskeletal:         General: Tenderness present. No deformity. Normal range of motion.        Arms:       Cervical back: Normal range of motion and neck supple.   Skin:     General: Skin is warm.      Coloration: Skin is not pale.      Findings: No erythema or rash.   Neurological:      Mental Status: She is alert and oriented to person, place, and time.      Cranial Nerves: No cranial nerve deficit.   Psychiatric:         Behavior: Behavior normal.        Result Review :                 Assessment and Plan    Diagnoses and all orders for this visit:    1. Uncontrolled type 2 diabetes mellitus with hyperglycemia (HCC) (Primary)  -     CBC Auto Differential; Future  -     Comprehensive Metabolic Panel; Future  -     Hemoglobin A1c; Future  -     Lipid Panel; Future  -     Vitamin D 25 Hydroxy; Future  -     Vitamin B12; Future  -     Microalbumin / Creatinine Urine Ratio - Urine, Clean Catch; Future    2. Morbid obesity (HCC)  -     CBC Auto Differential; Future  -     Comprehensive Metabolic Panel; Future  -     Hemoglobin A1c; Future  -     Lipid Panel; Future  -     Vitamin D 25 Hydroxy; Future  -     Vitamin B12; Future  -     Microalbumin / Creatinine Urine Ratio - Urine, Clean Catch; Future    3. Vitamin D deficiency  -     CBC Auto Differential; Future  -     Comprehensive Metabolic Panel; Future  -     Hemoglobin A1c; Future  -     Lipid Panel; Future  -     Vitamin D 25 Hydroxy; Future  -     Vitamin B12; Future  -     Microalbumin / Creatinine Urine Ratio - Urine, Clean Catch; Future    4. Mixed hyperlipidemia  -     CBC Auto Differential; Future  -     Comprehensive Metabolic Panel; Future  -     Hemoglobin A1c; Future  -     Lipid Panel; Future  -     Vitamin D 25 Hydroxy; Future  -     Vitamin B12; Future  -     Microalbumin / Creatinine Urine Ratio - Urine, Clean Catch;  Future    5. Essential hypertension  -     CBC Auto Differential; Future  -     Comprehensive Metabolic Panel; Future  -     Hemoglobin A1c; Future  -     Lipid Panel; Future  -     Vitamin D 25 Hydroxy; Future  -     Vitamin B12; Future  -     Microalbumin / Creatinine Urine Ratio - Urine, Clean Catch; Future    6. Gastroesophageal reflux disease with esophagitis without hemorrhage  -     CBC Auto Differential; Future  -     Comprehensive Metabolic Panel; Future  -     Hemoglobin A1c; Future  -     Lipid Panel; Future  -     Vitamin D 25 Hydroxy; Future  -     Vitamin B12; Future  -     Microalbumin / Creatinine Urine Ratio - Urine, Clean Catch; Future    7. Schizophrenia, simple, chronic (HCC)  -     CBC Auto Differential; Future  -     Comprehensive Metabolic Panel; Future  -     Hemoglobin A1c; Future  -     Lipid Panel; Future  -     Vitamin D 25 Hydroxy; Future  -     Vitamin B12; Future  -     Microalbumin / Creatinine Urine Ratio - Urine, Clean Catch; Future    8. Chronic obstructive pulmonary disease, unspecified COPD type (HCC)  -     CBC Auto Differential; Future  -     Comprehensive Metabolic Panel; Future  -     Hemoglobin A1c; Future  -     Lipid Panel; Future  -     Vitamin D 25 Hydroxy; Future  -     Vitamin B12; Future  -     Microalbumin / Creatinine Urine Ratio - Urine, Clean Catch; Future    9. Need for immunization against influenza  -     Flulaval/Fluarix/Fluzone >6 Months (2070-7890)  -     CBC Auto Differential; Future  -     Comprehensive Metabolic Panel; Future  -     Hemoglobin A1c; Future  -     Lipid Panel; Future  -     Vitamin D 25 Hydroxy; Future  -     Vitamin B12; Future  -     Microalbumin / Creatinine Urine Ratio - Urine, Clean Catch; Future    Other orders  -     albuterol sulfate  (90 Base) MCG/ACT inhaler; Inhale 2 puffs Every 6 (Six) Hours As Needed for Wheezing or Shortness of Air.  Dispense: 18 g; Refill: 3  -     levETIRAcetam (KEPPRA) 1000 MG tablet; Take 1 tablet  by mouth 2 (Two) Times a Day.  Dispense: 60 tablet; Refill: 3  -     lisinopril (PRINIVIL,ZESTRIL) 40 MG tablet; Take 1 tablet by mouth Daily.  Dispense: 30 tablet; Refill: 3  -     metFORMIN (GLUCOPHAGE) 500 MG tablet; Take 1 tablet by mouth 2 (Two) Times a Day With Meals.  Dispense: 60 tablet; Refill: 3  -     metoprolol tartrate (LOPRESSOR) 50 MG tablet; Take 1 tablet by mouth Every 12 (Twelve) Hours.  Dispense: 60 tablet; Refill: 3  -     nicotine (NICODERM CQ) 21 MG/24HR patch; Place 1 patch on the skin as directed by provider Daily.  Dispense: 28 patch; Refill: 3  -     NIFEdipine XL (PROCARDIA XL) 30 MG 24 hr tablet; Take 1 tablet by mouth Daily.  Dispense: 30 tablet; Refill: 3  -     omeprazole (priLOSEC) 40 MG capsule; Take 1 capsule by mouth Daily.  Dispense: 30 capsule; Refill: 3  -     simvastatin (ZOCOR) 40 MG tablet; Take 1 tablet by mouth Every Night.  Dispense: 30 tablet; Refill: 3  -     vitamin D (ERGOCALCIFEROL) 1.25 MG (35271 UT) capsule capsule; TAKE 2 CAPSULES BY MOUTH ONCE A WEEK  Dispense: 8 capsule; Refill: 0  -     Nebulizer device; 1 application Every 6 (Six) Hours As Needed (dyspnea).  Dispense: 1 each; Refill: 3  -     albuterol (ACCUNEB) 1.25 MG/3ML nebulizer solution; Take 3 mL by nebulization Every 6 (Six) Hours As Needed for Wheezing or Shortness of Air.  Dispense: 9 mL; Refill: 3           -recommend labowork   -recommend diabetic eye exam   -recommend COVID-19 vaccination  -GERD with esophagitis  - start on prilosec 20 mg daiy. Consider another EGD if not improving   -tobacco user - Counseled quit smoking >5 minutes.  recommend 1 800 QUIT NOW and nicotine patches   -wrist pain - Orthopedic following has upcoming EEG study   -ckd stage 2 - stable continue to monitor. Stressed importance of diabetes control and BP control    -hyperlipdemia - stable simvastatin 40 mg PO qhs. ASCVD risk high. Now on red yeast rice. On fish oil supplement  -dyspnea/early COPD -Pulmonlogy following on  stioloto and abluterol PRN.   Albuterol nebulizer and treatment every 6 hours PRN.      -for DM type 2- Endocrinology following. Currently on metformin 1000 gm PO BID, steglatro 15 mg daily. trulicity 4.5 mg now on insulin pump   -vitamin D  defciency - vitamin D once a week   -schizophrenia/major depression -  Behabvioral health following no on lamictal 100 mg PO q daily and vraylar 1.5 mg daily. risperdal stopped    -seizure disorder - Neurology following stable on Keppra 1000 mg Po BID. Neurology following in [place].    -Morbid obesity - counseled weight loss >5 minutes BMI at 55.46  -tobacco user counseled pt to quit smoking >5 minutes.  Gave tobacco cessation material   -hypertension - uncontrolled  on lisinopril  40 mg dialy.  lopressor 50 mg PO BID  start on nifedipine 30 mg daily. Drug information provided  -advised pt to be safe and call with any questions or concerns. All questions addressed today  -advised pt to followup with specialist and referrals  -advised pt to go to ER or call 911 if symptoms worrisome or severe  -advised pt to be safe during COVID-19 pandemic  I spent 30  minutes caring for Sudhakar on this date of service. This time includes time spent by me in the following activities: preparing for the visit, reviewing tests, obtaining and/or reviewing a separately obtained history, performing a medically appropriate examination and/or evaluation, counseling and educating the patient/family/caregiver, ordering medications, tests, or procedures, referring and communicating with other health care professionals, documenting information in the medical record, independently interpreting results and communicating that information with the patient/family/caregiver and care coordination.   -recheck in 3 months         This document has been electronically signed by Jermaine Ferro MD on October 28, 2021 08:19 CDT        Follow Up   No follow-ups on file.  Patient was given instructions and counseling  regarding her condition or for health maintenance advice. Please see specific information pulled into the AVS if appropriate.

## 2021-10-12 NOTE — PAT
DOCTOR KUHLENBECK REVIEWED EKG, HISTORY, AND SPOKE WITH PATIENT.  NO ORDERS RECEIVED AT THIS TIME.

## 2021-10-18 ENCOUNTER — ANESTHESIA EVENT (OUTPATIENT)
Dept: PERIOP | Facility: HOSPITAL | Age: 46
End: 2021-10-18

## 2021-10-18 ENCOUNTER — LAB (OUTPATIENT)
Dept: LAB | Facility: HOSPITAL | Age: 46
End: 2021-10-18

## 2021-10-18 DIAGNOSIS — Z01.818 PREOP TESTING: Primary | ICD-10-CM

## 2021-10-18 LAB — SARS-COV-2 N GENE RESP QL NAA+PROBE: NOT DETECTED

## 2021-10-18 PROCEDURE — 87635 SARS-COV-2 COVID-19 AMP PRB: CPT

## 2021-10-18 PROCEDURE — C9803 HOPD COVID-19 SPEC COLLECT: HCPCS

## 2021-10-19 ENCOUNTER — HOSPITAL ENCOUNTER (OUTPATIENT)
Facility: HOSPITAL | Age: 46
Setting detail: HOSPITAL OUTPATIENT SURGERY
Discharge: HOME | End: 2021-10-19
Attending: ORTHOPAEDIC SURGERY | Admitting: ORTHOPAEDIC SURGERY

## 2021-10-19 ENCOUNTER — ANESTHESIA (OUTPATIENT)
Dept: PERIOP | Facility: HOSPITAL | Age: 46
End: 2021-10-19

## 2021-10-19 VITALS
HEART RATE: 81 BPM | BODY MASS INDEX: 47.09 KG/M2 | DIASTOLIC BLOOD PRESSURE: 65 MMHG | HEIGHT: 66 IN | OXYGEN SATURATION: 94 % | SYSTOLIC BLOOD PRESSURE: 130 MMHG | WEIGHT: 293 LBS | TEMPERATURE: 96.5 F | RESPIRATION RATE: 18 BRPM

## 2021-10-19 LAB
AMPHET+METHAMPHET UR QL: NEGATIVE
AMPHETAMINES UR QL: NEGATIVE
BARBITURATES UR QL SCN: NEGATIVE
BENZODIAZ UR QL SCN: NEGATIVE
BUPRENORPHINE SERPL-MCNC: NEGATIVE NG/ML
CANNABINOIDS SERPL QL: NEGATIVE
COCAINE UR QL: NEGATIVE
GLUCOSE BLDC GLUCOMTR-MCNC: 142 MG/DL (ref 70–130)
GLUCOSE BLDC GLUCOMTR-MCNC: 166 MG/DL (ref 70–130)
METHADONE UR QL SCN: NEGATIVE
OPIATES UR QL: POSITIVE
OXYCODONE UR QL SCN: NEGATIVE
PCP UR QL SCN: NEGATIVE
PROPOXYPH UR QL: NEGATIVE
TRICYCLICS UR QL SCN: NEGATIVE

## 2021-10-19 PROCEDURE — 25010000002 MIDAZOLAM PER 1 MG: Performed by: NURSE ANESTHETIST, CERTIFIED REGISTERED

## 2021-10-19 PROCEDURE — 64721 CARPAL TUNNEL SURGERY: CPT | Performed by: ORTHOPAEDIC SURGERY

## 2021-10-19 PROCEDURE — 25010000002 PROPOFOL 10 MG/ML EMULSION: Performed by: NURSE ANESTHETIST, CERTIFIED REGISTERED

## 2021-10-19 PROCEDURE — 64721 CARPAL TUNNEL SURGERY: CPT | Performed by: SPECIALIST/TECHNOLOGIST, OTHER

## 2021-10-19 PROCEDURE — 94799 UNLISTED PULMONARY SVC/PX: CPT

## 2021-10-19 PROCEDURE — 82962 GLUCOSE BLOOD TEST: CPT

## 2021-10-19 PROCEDURE — 25010000002 ONDANSETRON PER 1 MG: Performed by: NURSE ANESTHETIST, CERTIFIED REGISTERED

## 2021-10-19 PROCEDURE — 25010000002 FENTANYL CITRATE (PF) 50 MCG/ML SOLUTION: Performed by: NURSE ANESTHETIST, CERTIFIED REGISTERED

## 2021-10-19 PROCEDURE — 80306 DRUG TEST PRSMV INSTRMNT: CPT | Performed by: ANESTHESIOLOGY

## 2021-10-19 PROCEDURE — 94640 AIRWAY INHALATION TREATMENT: CPT

## 2021-10-19 RX ORDER — SODIUM CHLORIDE 9 MG/ML
1000 INJECTION, SOLUTION INTRAVENOUS CONTINUOUS
Status: DISCONTINUED | OUTPATIENT
Start: 2021-10-19 | End: 2021-10-19 | Stop reason: HOSPADM

## 2021-10-19 RX ORDER — LIDOCAINE HYDROCHLORIDE 20 MG/ML
INJECTION, SOLUTION INFILTRATION; PERINEURAL AS NEEDED
Status: DISCONTINUED | OUTPATIENT
Start: 2021-10-19 | End: 2021-10-19 | Stop reason: SURG

## 2021-10-19 RX ORDER — BUPIVACAINE HYDROCHLORIDE 2.5 MG/ML
INJECTION, SOLUTION EPIDURAL; INFILTRATION; INTRACAUDAL AS NEEDED
Status: DISCONTINUED | OUTPATIENT
Start: 2021-10-19 | End: 2021-10-19 | Stop reason: HOSPADM

## 2021-10-19 RX ORDER — MIDAZOLAM HYDROCHLORIDE 1 MG/ML
INJECTION INTRAMUSCULAR; INTRAVENOUS AS NEEDED
Status: DISCONTINUED | OUTPATIENT
Start: 2021-10-19 | End: 2021-10-19 | Stop reason: SURG

## 2021-10-19 RX ORDER — ONDANSETRON 2 MG/ML
4 INJECTION INTRAMUSCULAR; INTRAVENOUS ONCE AS NEEDED
Status: DISCONTINUED | OUTPATIENT
Start: 2021-10-19 | End: 2021-10-19 | Stop reason: HOSPADM

## 2021-10-19 RX ORDER — FENTANYL CITRATE 50 UG/ML
INJECTION, SOLUTION INTRAMUSCULAR; INTRAVENOUS AS NEEDED
Status: DISCONTINUED | OUTPATIENT
Start: 2021-10-19 | End: 2021-10-19 | Stop reason: SURG

## 2021-10-19 RX ORDER — HYDROCODONE BITARTRATE AND ACETAMINOPHEN 7.5; 325 MG/1; MG/1
1 TABLET ORAL ONCE AS NEEDED
Status: DISCONTINUED | OUTPATIENT
Start: 2021-10-19 | End: 2021-10-19 | Stop reason: HOSPADM

## 2021-10-19 RX ORDER — ONDANSETRON 2 MG/ML
INJECTION INTRAMUSCULAR; INTRAVENOUS AS NEEDED
Status: DISCONTINUED | OUTPATIENT
Start: 2021-10-19 | End: 2021-10-19 | Stop reason: SURG

## 2021-10-19 RX ORDER — PROPOFOL 10 MG/ML
VIAL (ML) INTRAVENOUS AS NEEDED
Status: DISCONTINUED | OUTPATIENT
Start: 2021-10-19 | End: 2021-10-19 | Stop reason: SURG

## 2021-10-19 RX ORDER — ALBUTEROL SULFATE 2.5 MG/3ML
2.5 SOLUTION RESPIRATORY (INHALATION) ONCE
Status: COMPLETED | OUTPATIENT
Start: 2021-10-19 | End: 2021-10-19

## 2021-10-19 RX ADMIN — LIDOCAINE HYDROCHLORIDE 100 MG: 20 INJECTION, SOLUTION INFILTRATION; PERINEURAL at 07:14

## 2021-10-19 RX ADMIN — MIDAZOLAM HYDROCHLORIDE 1 MG: 1 INJECTION, SOLUTION INTRAMUSCULAR; INTRAVENOUS at 07:10

## 2021-10-19 RX ADMIN — MIDAZOLAM HYDROCHLORIDE 0.5 MG: 1 INJECTION, SOLUTION INTRAMUSCULAR; INTRAVENOUS at 07:08

## 2021-10-19 RX ADMIN — PROPOFOL 200 MG: 10 INJECTION, EMULSION INTRAVENOUS at 07:14

## 2021-10-19 RX ADMIN — HYDROCODONE BITARTRATE AND ACETAMINOPHEN 1 TABLET: 7.5; 325 TABLET ORAL at 08:40

## 2021-10-19 RX ADMIN — FENTANYL CITRATE 25 MCG: 50 INJECTION INTRAMUSCULAR; INTRAVENOUS at 07:38

## 2021-10-19 RX ADMIN — ONDANSETRON 4 MG: 2 INJECTION INTRAMUSCULAR; INTRAVENOUS at 07:43

## 2021-10-19 RX ADMIN — ALBUTEROL SULFATE 2.5 MG: 2.5 SOLUTION RESPIRATORY (INHALATION) at 06:31

## 2021-10-19 RX ADMIN — MIDAZOLAM HYDROCHLORIDE 0.5 MG: 1 INJECTION, SOLUTION INTRAMUSCULAR; INTRAVENOUS at 07:14

## 2021-10-19 RX ADMIN — FENTANYL CITRATE 25 MCG: 50 INJECTION INTRAMUSCULAR; INTRAVENOUS at 07:22

## 2021-10-19 RX ADMIN — FENTANYL CITRATE 25 MCG: 50 INJECTION INTRAMUSCULAR; INTRAVENOUS at 07:49

## 2021-10-19 RX ADMIN — FENTANYL CITRATE 25 MCG: 50 INJECTION INTRAMUSCULAR; INTRAVENOUS at 07:30

## 2021-10-19 RX ADMIN — SODIUM CHLORIDE 1000 ML: 9 INJECTION, SOLUTION INTRAVENOUS at 06:30

## 2021-10-19 NOTE — ANESTHESIA PROCEDURE NOTES
Airway  Urgency: elective    Date/Time: 10/19/2021 7:16 AM  End Time:10/19/2021 7:16 AM  Airway not difficult    General Information and Staff    Patient location during procedure: OR  CRNA: Sharita Johnson CRNA    Indications and Patient Condition  Indications for airway management: airway protection    Preoxygenated: yes  Mask difficulty assessment: 0 - not attempted    Final Airway Details  Final airway type: supraglottic airway      Successful airway: I-gel  Size 4    Number of attempts at approach: 1  Assessment: lips, teeth, and gum same as pre-op and atraumatic intubation

## 2021-10-19 NOTE — ANESTHESIA POSTPROCEDURE EVALUATION
Patient: Sudhakar Saul    Procedure Summary     Date: 10/19/21 Room / Location: [facility] OR [address] [place] [address] OR    Anesthesia Start: 0709 Anesthesia Stop: 0811    Procedure: LEFT CARPAL TUNNEL RELEASE (Left Wrist) Diagnosis:       Carpal tunnel syndrome of left wrist      (Carpal tunnel syndrome of left wrist [G56.02])    Surgeons: Kevin Carlson MD Provider: Horace Rosen MD    Anesthesia Type: general ASA Status: 4          Anesthesia Type: general    Vitals  Vitals Value Taken Time   /68 10/19/21 0805   Temp 98.5 °F (36.9 °C) 10/19/21 0805   Pulse 79 10/19/21 0805   Resp 14 10/19/21 0805   SpO2 99 % 10/19/21 0805           Post Anesthesia Care and Evaluation    Patient location during evaluation: PACU  Patient participation: complete - patient participated  Level of consciousness: awake  Pain score: 3  Airway patency: patent  Anesthetic complications: No anesthetic complications  PONV Status: none  Cardiovascular status: hemodynamically stable  Respiratory status: spontaneous ventilation and room air  Hydration status: acceptable

## 2021-10-19 NOTE — ANESTHESIA PREPROCEDURE EVALUATION
Anesthesia Evaluation     Patient summary reviewed and Nursing notes reviewed   no history of anesthetic complications:  NPO Solid Status: > 8 hours  NPO Liquid Status: > 8 hours           Airway   Mallampati: II  TM distance: >3 FB  Neck ROM: full  possible difficult intubation and Large neck circumference  Dental    (+) edentulous    Pulmonary    (+) a smoker Current Smoked day of surgery, COPD severe, shortness of breath, sleep apnea, decreased breath sounds, wheezes,     PE comment: Albuterol treatment ordered pre-op.  Cardiovascular - normal exam    ECG reviewed  Rhythm: regular  Rate: normal    (+) hypertension, CAD, angina, hyperlipidemia,   (-) murmur    ROS comment: Sinus tachycardia  Otherwise normal ECG  When compared with ECG of 29-AUG-2018 14:23,  Vent. rate has increased BY  55 BPM     Referred By:            Confirmed By: CAMILLA KIMBROUGH MD    Specimen Collected: 10/12/21 12:15          Neuro/Psych  (+) seizures well controlled, headaches (Migraine), numbness (Carpal tunnel), psychiatric history Anxiety, Depression and Schizophrenia,     GI/Hepatic/Renal/Endo    (+) morbid obesity, GERD well controlled,  hepatitis C, diabetes mellitus type 2 using insulin,   Renal disease: Creatinine 0.75.    Musculoskeletal     (+) back pain,   Abdominal   (+) obese,    Substance History   (+) drug use (History of stimulant drug abuse.)     OB/GYN negative ob/gyn ROS         Other   arthritis,      ROS/Med Hx Other: Na+ 133      Phys Exam Other: Chronic hoarseness secondary to smoking history.                Anesthesia Plan    ASA 4     general     intravenous induction     Anesthetic plan, all risks, benefits, and alternatives have been provided, discussed and informed consent has been obtained with: patient.

## 2021-10-19 NOTE — OP NOTE
Procedure(s):  LEFT CARPAL TUNNEL RELEASE  Procedure Note    Sudhakar Saul  10/19/2021    Pre-op Diagnosis: Left carpal tunnel syndrome  Carpal tunnel syndrome of left wrist [G56.02]    Post-op Diagnosis: Same  Post-Op Diagnosis Codes:     * Carpal tunnel syndrome of left wrist [G56.02]    Procedure/CPT® Codes: Carpal tunnel release (CPT code 93908)      Procedure(s):  LEFT CARPAL TUNNEL RELEASE    Surgeon(s):  Kevin Carlson MD    Anesthesia: Choice    Staff:   Circulator: Radha Varghese RN  Scrub Person: Tg Ramires  Assistant: Debo Munoz CSA    Assistant: Debo Munoz CSA was responsible for performing the following activities: Suturing, Closing and Placing Dressing and their skilled assistance was necessary for the success of this case.     Estimated Blood Loss: Less than 5 cc    Specimens:     None                 Drains: None    Findings: Mild hyperemia and flattening of the median nerve consistent with carpal tunnel syndrome    Complications: None    INDICATIONS : The patient is 45 years old.  She has signs and symptoms consistent with the diagnoses noted above.  Her symptoms have failed respond to conservative management.  I recommended the above procedure.  Anticipated benefits of surgery as well as potential complications of surgery and anesthetic were reviewed in detail and she appeared to understand all matters discussed and wished to proceed.  The timeout procedure was performed prior to entry to the operating room suite.    Operative Report: The patient was brought to the operating room.  The timeout procedure was followed.  General anesthesia was induced.  A tourniquet was placed on the left arm.  The limb was prepped and drapes applied.    The limb was exsanguinated and the pneumatic tourniquet inflated.  A straight longitudinal incision was made in the palm along the ring finger ray.  All dissection was performed using loupe magnification.  Bleeding points  were cauterized.  The palmar fascia was identified and split longitudinally.  The carpal tunnel was identified.  A grooved elevator was placed deep to the ligament.  A subcutaneous pocket was fashioned proximally and a nasal speculum was placed.  The release was performed under direct visualization proximally with a scalpel.  Contents of the canal were then carefully dissected free from the dorsal aspect of the ligament.  The release was completed proximally with scissors.  There was mild hyperemia of the median nerve as well as slight flattening.  The floor and roof of the carpal tunnel were palpated I felt that a satisfactory release had been achieved.  The wound was irrigated and bleeding points cauterized.  Skin closure was with interrupted nylon suture.  The wound was infiltrated with Marcaine.  A bulky soft dressing was applied followed by a volar splint.    Patient was transported to the recovery room in stable condition.  There were no intraoperative or immediate postoperative complications.  Estimated blood loss was less than 5 cc.    Kevin Carlson MD  10/19/21  07:55 CDT

## 2021-10-19 NOTE — INTERVAL H&P NOTE
"There has been no change from previously dictated history and physical.  The timeout procedure was performed.    Visit Vitals  /69 (BP Location: Right arm, Patient Position: Lying)   Pulse 77   Temp 98.9 °F (37.2 °C) (Temporal)   Resp 18   Ht 167.6 cm (66\")   Wt (!) 153 kg (337 lb 4.9 oz)   SpO2 95%   BMI 54.44 kg/m²      "

## 2021-10-21 ENCOUNTER — LAB (OUTPATIENT)
Dept: LAB | Facility: HOSPITAL | Age: 46
End: 2021-10-21

## 2021-10-21 LAB
HBA1C MFR BLD: 8 % (ref 4.8–5.6)
TSH SERPL DL<=0.05 MIU/L-ACNC: 1.11 UIU/ML (ref 0.27–4.2)

## 2021-10-21 PROCEDURE — 84443 ASSAY THYROID STIM HORMONE: CPT | Performed by: NURSE PRACTITIONER

## 2021-10-21 PROCEDURE — 83036 HEMOGLOBIN GLYCOSYLATED A1C: CPT | Performed by: NURSE PRACTITIONER

## 2021-10-25 ENCOUNTER — OFFICE VISIT (OUTPATIENT)
Dept: ORTHOPEDIC SURGERY | Facility: CLINIC | Age: 46
End: 2021-10-25

## 2021-10-25 DIAGNOSIS — Z98.890 STATUS POST CARPAL TUNNEL RELEASE: Primary | ICD-10-CM

## 2021-10-25 PROCEDURE — 99024 POSTOP FOLLOW-UP VISIT: CPT | Performed by: ORTHOPAEDIC SURGERY

## 2021-10-25 NOTE — PROGRESS NOTES
Sudhakar Saul is a 45 y.o. female is s/p       Chief Complaint   Patient presents with   • Left Wrist - Post-op       HISTORY OF PRESENT ILLNESS: Ms. Saul is now 6 days following uncomplicated carpal tunnel release.  Her pain is well controlled.  She denies any numbness or tingling in the hand.    10/19/21 (6d) Kevin Carlson MD   Left Carpal Tunnel Release - Left              Allergies   Allergen Reactions   • Betadine [Povidone Iodine] Anaphylaxis   • Contrast Dye Anaphylaxis   • Iodine Anaphylaxis   • Lipitor  [Atorvastatin Calcium] Shortness Of Breath   • Shellfish-Derived Products Anaphylaxis   • Adhesive Tape Unknown (See Comments)     Blister           Current Outpatient Medications:   •  albuterol sulfate  (90 Base) MCG/ACT inhaler, Inhale 2 puffs Every 6 (Six) Hours As Needed for Wheezing or Shortness of Air., Disp: 1 inhaler, Rfl: 3  •  aspirin 81 MG EC tablet, Take 81 mg by mouth Daily., Disp: , Rfl:   •  Cariprazine HCl 4.5 MG capsule, Take 4.5 mg by mouth Daily., Disp: 90 capsule, Rfl: 0  •  Continuous Blood Gluc  (Dexcom G6 ) device, USE FOR 6 MONTHS, Disp: , Rfl:   •  Continuous Blood Gluc Sensor (Dexcom G6 Sensor), 1 each As Needed (glucose control). Every 10 days, Disp: 9 each, Rfl: 3  •  Continuous Blood Gluc Transmit (Dexcom G6 Transmitter) misc, 1 each Every 3 (Three) Months., Disp: 1 each, Rfl: 3  •  cyclobenzaprine (FLEXERIL) 5 MG tablet, Take 1 tablet by mouth 3 (Three) Times a Day As Needed for Muscle Spasms. (Patient taking differently: Take 5 mg by mouth 3 (Three) Times a Day.), Disp: 90 tablet, Rfl: 3  •  Dulaglutide (Trulicity) 4.5 MG/0.5ML solution pen-injector, Inject 4.5 mg under the skin into the appropriate area as directed 1 (One) Time Per Week., Disp: 4 pen, Rfl: 11  •  DULoxetine (CYMBALTA) 30 MG capsule, Take 3 capsules by mouth Daily., Disp: 270 capsule, Rfl: 0  •  Ertugliflozin L-PyroglutamicAc (Steglatro) 15 MG tablet, Take 1 tablet by  mouth Every Morning., Disp: 90 tablet, Rfl: 3  •  folic acid (FOLVITE) 1 MG tablet, Take 1,000 mcg by mouth Daily., Disp: , Rfl:   •  gabapentin (NEURONTIN) 300 MG capsule, Take 1 capsule by mouth 3 (Three) Times a Day. (Patient taking differently: Take 100 mg by mouth 3 (Three) Times a Day.), Disp: 90 capsule, Rfl: 2  •  glucose blood test strip, Testing 4 times daily, E11.9, Disp: 120 each, Rfl: 12  •  HYDROcodone-acetaminophen (NORCO) 7.5-325 MG per tablet, Take 1 tablet by mouth 3 (Three) Times a Day., Disp: , Rfl:   •  hydrOXYzine (ATARAX) 25 MG tablet, Take 0.5-1 tablets by mouth 3 (Three) Times a Day As Needed for Anxiety., Disp: 270 tablet, Rfl: 0  •  Insulin Disposable Pump (OmniPod Dash 5 Pack Pods) misc, , Disp: , Rfl:   •  insulin lispro (Admelog) 100 UNIT/ML injection, 180  units daily through pump (Patient taking differently: Inject 40 Units under the skin into the appropriate area as directed 3 (Three) Times a Day Before Meals. 180  units daily through pump), Disp: 90 mL, Rfl: 11  •  lamoTRIgine (LaMICtal) 150 MG tablet, Take 1 tablet by mouth Daily., Disp: 90 tablet, Rfl: 0  •  levETIRAcetam (KEPPRA) 1000 MG tablet, Take 1 tablet by mouth 2 (Two) Times a Day., Disp: 60 tablet, Rfl: 3  •  lisinopril (PRINIVIL,ZESTRIL) 40 MG tablet, Take 1 tablet by mouth Daily., Disp: 30 tablet, Rfl: 3  •  metFORMIN (GLUCOPHAGE) 500 MG tablet, Take 1 tablet by mouth 2 (Two) Times a Day With Meals., Disp: 60 tablet, Rfl: 3  •  metoprolol tartrate (LOPRESSOR) 50 MG tablet, Take 1 tablet by mouth Every 12 (Twelve) Hours., Disp: 60 tablet, Rfl: 3  •  nicotine (NICODERM CQ) 21 MG/24HR patch, Place 1 patch on the skin as directed by provider Daily., Disp: 28 patch, Rfl: 3  •  NIFEdipine XL (PROCARDIA XL) 30 MG 24 hr tablet, Take 1 tablet by mouth Daily., Disp: 30 tablet, Rfl: 3  •  omeprazole (priLOSEC) 40 MG capsule, Take 1 capsule by mouth Daily., Disp: 30 capsule, Rfl: 3  •  OXcarbazepine (TRILEPTAL) 600 MG tablet, Take  600 mg by mouth Every Evening., Disp: , Rfl:   •  prazosin (MINIPRESS) 2 MG capsule, TAKE 1 CAPSULE BY MOUTH ONCE DAILY AT NIGHT (Patient taking differently: Take 2 mg by mouth Every Night.), Disp: 30 capsule, Rfl: 0  •  simvastatin (ZOCOR) 40 MG tablet, Take 1 tablet by mouth Every Night., Disp: 30 tablet, Rfl: 3  •  tiotropium bromide-olodaterol (STIOLTO RESPIMAT) 2.5-2.5 MCG/ACT aerosol solution inhaler, Inhale 2 puffs Daily., Disp: 1 inhaler, Rfl: 5  •  vitamin D (ERGOCALCIFEROL) 1.25 MG (51752 UT) capsule capsule, TAKE 2 CAPSULES BY MOUTH ONCE A WEEK, Disp: 8 capsule, Rfl: 0    No fevers or chills.  No nausea or vomiting.      PHYSICAL EXAMINATION:       Sudhakar Saul is a 45 y.o. female    She is alert and in no apparent distress.    GAIT:     [x]  Normal  []  Antalgic    Assistive device: []  None  []  Walker     []  Crutches  []  Cane     []  Wheelchair  []  Stretcher    Ortho Exam her wound is clean and dry with no evidence of infection.  Sensory exam is intact to soft touch.    No results found.        ASSESSMENT: Satisfactory progress following carpal tunnel release.  She was instructed in activity restriction and wound care.  She was given a wrist splint to wear when lifting or gripping and as needed for comfort.    Return here in 10 days for suture removal.    Diagnoses and all orders for this visit:    Status post carpal tunnel release          PLAN    Return in about 10 days (around 11/4/2021).    Kevin Carlson MD

## 2021-10-26 DIAGNOSIS — F25.1 SCHIZOAFFECTIVE DISORDER, DEPRESSIVE TYPE (HCC): ICD-10-CM

## 2021-10-26 DIAGNOSIS — F51.5 NIGHTMARES: ICD-10-CM

## 2021-10-26 RX ORDER — LAMOTRIGINE 150 MG/1
TABLET ORAL
Qty: 90 TABLET | Refills: 0 | Status: SHIPPED | OUTPATIENT
Start: 2021-10-26 | End: 2022-01-03 | Stop reason: SDUPTHER

## 2021-10-26 RX ORDER — METOPROLOL TARTRATE 50 MG/1
TABLET, FILM COATED ORAL
Qty: 60 TABLET | Refills: 0 | Status: SHIPPED | OUTPATIENT
Start: 2021-10-26 | End: 2021-10-28 | Stop reason: SDUPTHER

## 2021-10-26 RX ORDER — PRAZOSIN HYDROCHLORIDE 2 MG/1
CAPSULE ORAL
Qty: 30 CAPSULE | Refills: 0 | Status: SHIPPED | OUTPATIENT
Start: 2021-10-26 | End: 2021-11-03 | Stop reason: SDUPTHER

## 2021-10-28 ENCOUNTER — OFFICE VISIT (OUTPATIENT)
Dept: FAMILY MEDICINE CLINIC | Facility: CLINIC | Age: 46
End: 2021-10-28

## 2021-10-28 VITALS
HEIGHT: 66 IN | TEMPERATURE: 97.1 F | HEART RATE: 78 BPM | SYSTOLIC BLOOD PRESSURE: 122 MMHG | WEIGHT: 293 LBS | OXYGEN SATURATION: 95 % | BODY MASS INDEX: 47.09 KG/M2 | DIASTOLIC BLOOD PRESSURE: 74 MMHG

## 2021-10-28 DIAGNOSIS — F20.89 SCHIZOPHRENIA, SIMPLE, CHRONIC (HCC): ICD-10-CM

## 2021-10-28 DIAGNOSIS — E11.65 UNCONTROLLED TYPE 2 DIABETES MELLITUS WITH HYPERGLYCEMIA (HCC): Primary | ICD-10-CM

## 2021-10-28 DIAGNOSIS — E55.9 VITAMIN D DEFICIENCY: ICD-10-CM

## 2021-10-28 DIAGNOSIS — K21.00 GASTROESOPHAGEAL REFLUX DISEASE WITH ESOPHAGITIS WITHOUT HEMORRHAGE: ICD-10-CM

## 2021-10-28 DIAGNOSIS — J44.9 CHRONIC OBSTRUCTIVE PULMONARY DISEASE, UNSPECIFIED COPD TYPE (HCC): ICD-10-CM

## 2021-10-28 DIAGNOSIS — E66.01 MORBID OBESITY (HCC): ICD-10-CM

## 2021-10-28 DIAGNOSIS — I10 ESSENTIAL HYPERTENSION: ICD-10-CM

## 2021-10-28 DIAGNOSIS — E78.2 MIXED HYPERLIPIDEMIA: ICD-10-CM

## 2021-10-28 DIAGNOSIS — Z23 NEED FOR IMMUNIZATION AGAINST INFLUENZA: ICD-10-CM

## 2021-10-28 PROCEDURE — 90686 IIV4 VACC NO PRSV 0.5 ML IM: CPT | Performed by: FAMILY MEDICINE

## 2021-10-28 PROCEDURE — 99214 OFFICE O/P EST MOD 30 MIN: CPT | Performed by: FAMILY MEDICINE

## 2021-10-28 PROCEDURE — 90471 IMMUNIZATION ADMIN: CPT | Performed by: FAMILY MEDICINE

## 2021-10-28 RX ORDER — ALBUTEROL SULFATE 90 UG/1
2 AEROSOL, METERED RESPIRATORY (INHALATION) EVERY 6 HOURS PRN
Qty: 18 G | Refills: 3 | Status: SHIPPED | OUTPATIENT
Start: 2021-10-28 | End: 2022-07-19 | Stop reason: SDUPTHER

## 2021-10-28 RX ORDER — NICOTINE 21 MG/24HR
1 PATCH, TRANSDERMAL 24 HOURS TRANSDERMAL EVERY 24 HOURS
Qty: 28 PATCH | Refills: 3 | Status: SHIPPED | OUTPATIENT
Start: 2021-10-28 | End: 2022-06-14 | Stop reason: SDUPTHER

## 2021-10-28 RX ORDER — LEVETIRACETAM 1000 MG/1
1000 TABLET ORAL 2 TIMES DAILY
Qty: 60 TABLET | Refills: 3 | Status: SHIPPED | OUTPATIENT
Start: 2021-10-28

## 2021-10-28 RX ORDER — LISINOPRIL 40 MG/1
40 TABLET ORAL DAILY
Qty: 30 TABLET | Refills: 3 | Status: SHIPPED | OUTPATIENT
Start: 2021-10-28 | End: 2022-05-16

## 2021-10-28 RX ORDER — SIMVASTATIN 40 MG
40 TABLET ORAL NIGHTLY
Qty: 30 TABLET | Refills: 3 | Status: SHIPPED | OUTPATIENT
Start: 2021-10-28 | End: 2022-06-14 | Stop reason: SDUPTHER

## 2021-10-28 RX ORDER — METOPROLOL TARTRATE 50 MG/1
50 TABLET, FILM COATED ORAL EVERY 12 HOURS
Qty: 60 TABLET | Refills: 3 | Status: SHIPPED | OUTPATIENT
Start: 2021-10-28 | End: 2022-05-16

## 2021-10-28 RX ORDER — NIFEDIPINE 30 MG/1
30 TABLET, EXTENDED RELEASE ORAL DAILY
Qty: 30 TABLET | Refills: 3 | Status: SHIPPED | OUTPATIENT
Start: 2021-10-28 | End: 2021-11-24

## 2021-10-28 RX ORDER — SOFT LENS DISINFECTANT
1 SOLUTION, NON-ORAL MISCELLANEOUS EVERY 6 HOURS PRN
Qty: 1 EACH | Refills: 3 | Status: SHIPPED | OUTPATIENT
Start: 2021-10-28 | End: 2022-06-14 | Stop reason: SDUPTHER

## 2021-10-28 RX ORDER — OMEPRAZOLE 40 MG/1
40 CAPSULE, DELAYED RELEASE ORAL DAILY
Qty: 30 CAPSULE | Refills: 3 | Status: SHIPPED | OUTPATIENT
Start: 2021-10-28 | End: 2021-11-24

## 2021-10-28 RX ORDER — ALBUTEROL SULFATE 1.25 MG/3ML
1 SOLUTION RESPIRATORY (INHALATION) EVERY 6 HOURS PRN
Qty: 9 ML | Refills: 3 | Status: SHIPPED | OUTPATIENT
Start: 2021-10-28

## 2021-10-28 RX ORDER — ERGOCALCIFEROL 1.25 MG/1
CAPSULE ORAL
Qty: 8 CAPSULE | Refills: 0 | Status: SHIPPED | OUTPATIENT
Start: 2021-10-28 | End: 2021-12-29

## 2021-10-28 NOTE — PROGRESS NOTES
Injection  Injection performed in Left Deltoid by Asha Louie MA. Patient tolerated the procedure well without complications.  10/28/21   Asha Louie MA

## 2021-11-01 ENCOUNTER — TELEMEDICINE (OUTPATIENT)
Dept: PSYCHIATRY | Facility: CLINIC | Age: 46
End: 2021-11-01

## 2021-11-01 DIAGNOSIS — F20.89 SCHIZOPHRENIA, SIMPLE, CHRONIC (HCC): Primary | ICD-10-CM

## 2021-11-01 PROCEDURE — 90834 PSYTX W PT 45 MINUTES: CPT | Performed by: COUNSELOR

## 2021-11-01 NOTE — PROGRESS NOTES
Date: November 4, 2021  Time In: 12:31 PM  Time Out: 1:22 PM  This provider is located at the [facility]through [facility]), [address], [place] [address] using a secure [employer] Video Visit through Mobile System 7. Patient is being seen remotely via telehealth at home address in [place] and stated they are in a secure environment for this session. The patient's condition being diagnosed/treated is appropriate for telemedicine. The provider identified herself as well as her credentials. The patient, and/or patients guardian, consent to be seen remotely, and when consent is given they understand that the consent allows for patient identifiable information to be sent to a third party as needed. They may refuse to be seen remotely at any time. The electronic data is encrypted and password protected, and the patient and/or guardian has been advised of the potential risks to privacy not withstanding such measures.     You have chosen to receive care through a telehealth visit.  Do you consent to use a video/audio connection for your medical care today? Yes    PROGRESS NOTE  Data:  Sudhakar Saul is a 45 y.o. female who presents today for follow up. Patient states that she is doing well after her carpal tunnel surgery and can almost touch all of her fingers to her thumb with the exception of her pinky; patient states that she will be getting her stitches out on Thursday. Patient stated that the decision was made to get a new Patient states that she is . Patient is now preparing for her disability hearing on the 18 th and is having to make special trip to   [place] in order to pickup medical records for her  to have or else the court case will take longer. Patient states that she and her mother have prepared for her mother's surgery tomorrow as much as they can. Patient reflected on her use of drugs and how that has complicated her life in regard to medical issues  and medications that she can take. Patient states that she got a new puppy that she is hopeful will learn when she is going to have a seizure and will be able to alert her and her mother in time to prepare as her older dog does. Patient states that the decision was made to get the puppy as her other dog is getting older and they feel that dog may not be around much longer.    Chief Complaint: physical pain, improving feelings of anxiety and depression, decreased hallucinations    History of Present Illness: Patient has struggled with symptoms of schizoaffective disorder      Clinical Maneuvering/Intervention: CBT      Assisted patient in processing above session content; acknowledged and normalized patient’s thoughts, feelings, and concerns.  Rationalized patient thought process regarding getting a new puppy to train to her needs and prepare for the patient's seizures.  Discussed triggers associated with patient's hallucinations and anxiety such as thoughts of her father and additional stress.  Also discussed coping skills for patient to implement such as continued use of relaxation, thought stopping and continuing to plan her days to keep from feeling overwhelmed.    Allowed patient to freely discuss issues without interruption or judgment. Provided safe, confidential environment to facilitate the development of positive therapeutic relationship and encourage open, honest communication. Assisted patient in identifying risk factors which would indicate the need for higher level of care including thoughts to harm self or others and/or self-harming behavior and encouraged patient to contact this office, call 911, or present to the nearest emergency room should any of these events occur. Discussed crisis intervention services and means to access. Patient adamantly and convincingly denies current suicidal or homicidal ideation or perceptual disturbance.    Assessment:   Assessment   Patient appears to maintain relative  stability as compared to their baseline.  However, patient continues to struggle with symptoms of schizophrenia but states that her hallucinations have decreased which continues to cause impairment in important areas of functioning.  A result, they can be reasonably expected to continue to benefit from treatment and would likely be at increased risk for decompensation otherwise.    Mental Status Exam:   Hygiene:   good  Cooperation:  Cooperative  Eye Contact:  Good  Psychomotor Behavior:  Appropriate  Affect:  Appropriate  Mood: normal  Speech:  Normal  Thought Process:  Goal directed  Thought Content:  Normal  Suicidal:  None  Homicidal:  None  Hallucinations:  shadow movement about a month ago   Delusion:  None  Memory:  Deficits  Orientation:  Person, Place, Time and Situation  Reliability:  good  Insight:  Good  Judgement:  Good  Impulse Control:  Good  Physical/Medical Issues:  Yes recovering from carpal tunnel surgery       Patient's Support Network Includes:  mother and extended family    Functional Status: Moderate impairment     Progress toward goal: Not at goal    Prognosis: Fair with Ongoing Treatment          Plan:    Patient will continue in individual outpatient therapy with focus on improved functioning and coping skills, maintaining stability, and avoiding decompensation and the need for higher level of care.    Patient will adhere to medication regimen as prescribed and report any side effects. Patient will contact this office, call 911 or present to the nearest emergency room should suicidal or homicidal ideations occur. Provide Cognitive Behavioral Therapy and Solution Focused Therapy to improve functioning, maintain stability, and avoid decompensation and the need for higher level of care.     Return in about 3 weeks, or earlier if symptoms worsen or fail to improve.           VISIT DIAGNOSIS:     ICD-10-CM ICD-9-CM   1. Schizophrenia, simple, chronic ([facility])  F20.89 295.02             This  document has been electronically signed by DREAD Vo  November 4, 2021 09:03 EDT      Part of this note may be an electronic transcription/translation of spoken language to printed text using the Dragon Dictation System.

## 2021-11-03 ENCOUNTER — TELEMEDICINE (OUTPATIENT)
Dept: PSYCHIATRY | Facility: CLINIC | Age: 46
End: 2021-11-03

## 2021-11-03 DIAGNOSIS — F51.5 NIGHTMARES: ICD-10-CM

## 2021-11-03 DIAGNOSIS — F25.1 SCHIZOAFFECTIVE DISORDER, DEPRESSIVE TYPE (HCC): Primary | ICD-10-CM

## 2021-11-03 DIAGNOSIS — F41.1 GENERALIZED ANXIETY DISORDER: ICD-10-CM

## 2021-11-03 PROCEDURE — 99214 OFFICE O/P EST MOD 30 MIN: CPT | Performed by: NURSE PRACTITIONER

## 2021-11-03 RX ORDER — PRAZOSIN HYDROCHLORIDE 2 MG/1
2 CAPSULE ORAL NIGHTLY
Qty: 90 CAPSULE | Refills: 0 | Status: SHIPPED | OUTPATIENT
Start: 2021-11-03 | End: 2022-01-03 | Stop reason: SDUPTHER

## 2021-11-03 NOTE — PROGRESS NOTES
This provider is located at [facility], [address] [place] [address].The Patient is seen remotely at home, [address] [facility] [place] [address] via [employer].  Patient is being seen via telehealth and confirm that they are in a secure environment for this session. The patient's condition being diagnosed/treated is appropriate for telemedicine. The provider identified himself/herself: herself as well as her credentials.   The patient gave consent to be seen remotely, and when consent is given they understand that the consent allows for patient identifiable information to be sent to a third party as needed.   They may refuse to be seen remotely at any time. The electronic data is encrypted and password protected, and the patient has been advised of the potential risks to privacy not withstanding such measures.    You have chosen to receive care through a telehealth visit.  Do you consent to use a video/audio connection for your medical care today? Yes      Chief Complaint  Follow-for schizoaffective depressed type    Subjective    Sudhakar More Saul presents to [facility] for medication management.       History of Present Illness     Patient presents today reporting that she did have surgery on her hand and it has went well as she denies any tingling or numbness and reports she has been better lately.  She states her mom had her knee surgery yesterday and is doing okay.  She states her anxiety is high because of the surgeries and the upcoming hearing for disability and SSI but otherwise she is doing well.  Patient states her depression is a 4 as she is states she has not had time to think about depression and done well overall with her mood.  Patient reports that she is getting 4 to 5 hours at night of sleep and denies any nightmares as she states it varies based on pain.  Patient reports that she has saw some shadows movements that took form but  denies any auditory hallucinations.  Denies any SI/HI.  Patient reports that she is had some weight gain but also notes due to the insulin pump and other lifestyle habits she does not believe it is just the medications.  Patient states that she has started a new diet to help with this.  Denies any major concerns or issues at this time and reports therapy is going well.        Objective   Vital Signs:   There were no vitals taken for this visit.  Due to the remote nature of this encounter (virtual encounter), vitals were unable to be obtained.  Height stated at 66 inches.  Weight stated at 325 pounds.        PHQ-9 Score:   PHQ-9 Total Score:     Patient screened positive for depression based on a PHQ-9 score of 11 on 4/1/2021. Follow-up recommendations include: Patient has schizophrenia and is currently being managed with medication..    Mental Status Exam:   Hygiene:   good  Cooperation:  Cooperative  Eye Contact:  Good  Psychomotor Behavior:  Appropriate  Affect:  Appropriate  Mood: normal  Speech:  Normal  Thought Process:  Goal directed and Linear  Thought Content:  Normal  Suicidal:  None  Homicidal:  None  Hallucinations:  Auditory and Visual  Delusion:  None  Memory:  Intact  Orientation:  Person, Place, Time and Situation  Reliability:  good  Insight:  Good and Fair  Judgement:  Good and Fair  Impulse Control:  Good and Fair  Physical/Medical Issues:  Yes Dm. HTN, CKD stage 2, hep C+, JACQUE, hx substance abuse     Current Medications:   Current Outpatient Medications   Medication Sig Dispense Refill   • albuterol (ACCUNEB) 1.25 MG/3ML nebulizer solution Take 3 mL by nebulization Every 6 (Six) Hours As Needed for Wheezing or Shortness of Air. 9 mL 3   • albuterol sulfate  (90 Base) MCG/ACT inhaler Inhale 2 puffs Every 6 (Six) Hours As Needed for Wheezing or Shortness of Air. 18 g 3   • aspirin 81 MG EC tablet Take 81 mg by mouth Daily.     • Cariprazine HCl 4.5 MG capsule Take 4.5 mg by mouth Daily. 90  capsule 0   • Continuous Blood Gluc  (Dexcom G6 ) device USE FOR 6 MONTHS     • Continuous Blood Gluc Sensor (Dexcom G6 Sensor) 1 each As Needed (glucose control). Every 10 days 9 each 3   • Continuous Blood Gluc Transmit (Dexcom G6 Transmitter) misc 1 each Every 3 (Three) Months. 1 each 3   • cyclobenzaprine (FLEXERIL) 5 MG tablet Take 1 tablet by mouth 3 (Three) Times a Day As Needed for Muscle Spasms. (Patient taking differently: Take 5 mg by mouth 3 (Three) Times a Day.) 90 tablet 3   • Dulaglutide (Trulicity) 4.5 MG/0.5ML solution pen-injector Inject 4.5 mg under the skin into the appropriate area as directed 1 (One) Time Per Week. 4 pen 11   • DULoxetine (CYMBALTA) 30 MG capsule Take 3 capsules by mouth Daily. 270 capsule 0   • Ertugliflozin L-PyroglutamicAc (Steglatro) 15 MG tablet Take 1 tablet by mouth Every Morning. 90 tablet 3   • folic acid (FOLVITE) 1 MG tablet Take 1,000 mcg by mouth Daily.     • gabapentin (NEURONTIN) 300 MG capsule Take 1 capsule by mouth 3 (Three) Times a Day. (Patient taking differently: Take 100 mg by mouth 3 (Three) Times a Day.) 90 capsule 2   • glucose blood test strip Testing 4 times daily, E11.9 120 each 12   • HYDROcodone-acetaminophen (NORCO) 7.5-325 MG per tablet Take 1 tablet by mouth 3 (Three) Times a Day.     • hydrOXYzine (ATARAX) 25 MG tablet Take 0.5-1 tablets by mouth 3 (Three) Times a Day As Needed for Anxiety. 270 tablet 0   • Insulin Disposable Pump (OmniPod Dash 5 Pack Pods) misc      • insulin lispro (Admelog) 100 UNIT/ML injection 180  units daily through pump (Patient taking differently: Inject 40 Units under the skin into the appropriate area as directed 3 (Three) Times a Day Before Meals. 180  units daily through pump) 90 mL 11   • lamoTRIgine (LaMICtal) 150 MG tablet Take 1 tablet by mouth once daily 90 tablet 0   • levETIRAcetam (KEPPRA) 1000 MG tablet Take 1 tablet by mouth 2 (Two) Times a Day. 60 tablet 3   • lisinopril  (PRINIVIL,ZESTRIL) 40 MG tablet Take 1 tablet by mouth Daily. 30 tablet 3   • metFORMIN (GLUCOPHAGE) 500 MG tablet Take 1 tablet by mouth 2 (Two) Times a Day With Meals. 60 tablet 3   • metoprolol tartrate (LOPRESSOR) 50 MG tablet Take 1 tablet by mouth Every 12 (Twelve) Hours. 60 tablet 3   • Nebulizer device 1 application Every 6 (Six) Hours As Needed (dyspnea). 1 each 3   • nicotine (NICODERM CQ) 21 MG/24HR patch Place 1 patch on the skin as directed by provider Daily. 28 patch 3   • NIFEdipine XL (PROCARDIA XL) 30 MG 24 hr tablet Take 1 tablet by mouth Daily. 30 tablet 3   • omeprazole (priLOSEC) 40 MG capsule Take 1 capsule by mouth Daily. 30 capsule 3   • OXcarbazepine (TRILEPTAL) 600 MG tablet Take 600 mg by mouth Every Evening.     • prazosin (MINIPRESS) 2 MG capsule Take 1 capsule by mouth Every Night. 90 capsule 0   • simvastatin (ZOCOR) 40 MG tablet Take 1 tablet by mouth Every Night. 30 tablet 3   • tiotropium bromide-olodaterol (STIOLTO RESPIMAT) 2.5-2.5 MCG/ACT aerosol solution inhaler Inhale 2 puffs Daily. 1 inhaler 5   • vitamin D (ERGOCALCIFEROL) 1.25 MG (68943 UT) capsule capsule TAKE 2 CAPSULES BY MOUTH ONCE A WEEK 8 capsule 0     No current facility-administered medications for this visit.       Physical Exam  Nursing note reviewed.   Constitutional:       Appearance: Normal appearance.   Neurological:      Mental Status: She is alert.   Psychiatric:         Attention and Perception: Attention normal. She perceives visual hallucinations. She does not perceive auditory hallucinations.         Mood and Affect: Affect normal. Mood is anxious. Mood is not depressed.         Speech: Speech normal.         Behavior: Behavior is cooperative.         Thought Content: Thought content normal.         Cognition and Memory: Cognition and memory normal.        Result Review :     The following data was reviewed by: ROD Spencer on 02/03/2021:  Common labs    Common Labsle 7/20/21 7/20/21 7/20/21  7/20/21 10/12/21 10/12/21 10/21/21    0808 0808 0808 0808 1427 1428    Glucose    146 (A)  109 (A)    BUN    20  12    Creatinine    0.57  0.75    eGFR Non African Am    114  83    Sodium    136  133 (A)    Potassium    4.8  4.3    Chloride    102  98    Calcium    9.1  9.4    Albumin    3.70      Total Bilirubin    <0.2      Alkaline Phosphatase    76      AST (SGOT)    16      ALT (SGPT)    19      WBC 11.45 (A)    11.86 (A)     Hemoglobin 13.8    13.3     Hematocrit 41.4    40.5     Platelets 176    202     Total Cholesterol   123       Triglycerides   114       HDL Cholesterol   43       LDL Cholesterol    59       Hemoglobin A1C  7.60 (A)     8.00 (A)   (A) Abnormal value            CMP    CMP 4/21/21 7/20/21 10/12/21   Glucose 158 (A) 146 (A) 109 (A)   BUN 13 20 12   Creatinine 0.75 0.57 0.75   eGFR Non African Am 83 114 83   Sodium 136 136 133 (A)   Potassium 5.1 4.8 4.3   Chloride 102 102 98   Calcium 9.6 9.1 9.4   Albumin 3.80 3.70    Total Bilirubin 0.2 <0.2    Alkaline Phosphatase 75 76    AST (SGOT) 18 16    ALT (SGPT) 26 19    (A) Abnormal value            CBC    CBC 4/21/21 7/20/21 10/12/21   WBC 9.95 11.45 (A) 11.86 (A)   RBC 5.13 4.76 4.85   Hemoglobin 15.0 13.8 13.3   Hematocrit 45.7 41.4 40.5   MCV 89.1 87.0 83.5   MCH 29.2 29.0 27.4   MCHC 32.8 33.3 32.8   RDW 13.8 13.4 14.6   Platelets 173 176 202   (A) Abnormal value            CBC w/diff    CBC w/Diff 6/10/20 9/9/20 1/26/21   WBC 8.89 9.63 9.04   RBC 4.85 5.07 5.10   Hemoglobin 14.1 14.9 14.4   Hematocrit 42.8 44.3 45.0   MCV 88.2 87.4 88.2   MCH 29.1 29.4 28.2   MCHC 32.9 33.6 32.0   RDW 13.6 13.3 13.0   Platelets 157 180 174   Neutrophil Rel % 63.8 67.2    Immature Granulocyte Rel % 0.6 (A) 0.5    Lymphocyte Rel % 26.4 21.6    Monocyte Rel % 4.5 (A) 4.8 (A)    Eosinophil Rel % 3.9 5.3    Basophil Rel % 0.8 0.6    (A) Abnormal value            Lipid Panel    Lipid Panel 1/26/21 4/21/21 7/20/21   Total Cholesterol 114 132 123   Triglycerides  169 (A) 164 (A) 114   HDL Cholesterol 39 (A) 42 43   VLDL Cholesterol 28 28 21   LDL Cholesterol  47 62 59   LDL/HDL Ratio 1.06 1.36 1.33   (A) Abnormal value            TSH    TSH 1/26/21 7/20/21 10/21/21   TSH 1.610 1.680 1.110           BMP    BMP 4/21/21 7/20/21 10/12/21   BUN 13 20 12   Creatinine 0.75 0.57 0.75   Sodium 136 136 133 (A)   Potassium 5.1 4.8 4.3   Chloride 102 102 98   CO2 21.4 (A) 22.5 25.0   Calcium 9.6 9.1 9.4   (A) Abnormal value            HgB    HGB 4/21/21 7/20/21 10/12/21   Hemoglobin 15.0 13.8 13.3           UA    Urinalysis 10/12/21   Specific Vassar, UA 1.013   Ketones, UA Negative   Blood, UA Negative   Leukocytes, UA Negative   Nitrite, UA Negative           Data reviewed: PCP notes        Assessment and Plan    Problem List Items Addressed This Visit        Mental Health    Schizoaffective disorder, depressive type (HCC) - Primary    Relevant Medications    Cariprazine HCl 4.5 MG capsule      Other Visit Diagnoses     Nightmares        Relevant Medications    prazosin (MINIPRESS) 2 MG capsule    Cariprazine HCl 4.5 MG capsule    Generalized anxiety disorder        Relevant Medications    Cariprazine HCl 4.5 MG capsule            TREATMENT PLAN/GOALS: Continue supportive psychotherapy efforts and medications as indicated. Treatment and medication options discussed during today's visit. Patient ackowledged and verbally consented to continue with current treatment plan and was educated on the importance of compliance with treatment and follow-up appointments.    MEDICATION ISSUES:  We discussed risks, benefits, and side effects of the above medications and the patient was agreeable with the plan. Patient was educated on the importance of compliance with treatment and follow-up appointments.  Patient is agreeable to call the office with any worsening of symptoms or onset of side effects. Patient is agreeable to call 911 or go to the nearest ER should he/she begin having SI/HI. Lengthy  discussion with patient on the possible side effects of antipsychotic medications including increased cholesterol, increased blood sugar, and possibility of weight gain.  Also discussed the need to monitor lab work associated with this.  The risk of muscle movement disorders with this class of medication was also discussed. We will continue Lamictal in an effort to stabilize mood.  The patient was reminded to immediately come to the hospital should there be any loss of control.  Explanation was given to her regarding Lamictal and the potential for Foster Shon syndrome and significant rash.  Patient was encouraged to check skin prior to beginning.  Patient was encouraged to report any rash and to immediately stop medication.      -Continue lamotrigine 150 mg daily for schizoaffective disorder.  -Continue minipress 2mg at night for nightmares.  -Continue Vraylar to 4.5 mg daily for schizoaffective disorder depressed type.  -Continue hydroxyzine 12.5 to 25 mg up to 3 times a day as needed for anxiety and panic.  - Continue Cymbalta 90mg daily for depression and pain and will further evaluate.  -Continue psychotherapy to help with past trauma as well as depressive and anxiety symptoms.    Patient has now failed and tried Haldol, aripiprazole, Latuda and now risperidone which has caused weight gain and due to diabetes and kidney disease would benefit from Vraylar as patient is still having auditory and visual hallucinations.     Counseled patient regarding multimodal approach with healthy nutrition, healthy sleep, regular physical activity, social activities, counseling, and medications.      Coping skills reviewed and encouraged positive framing of thoughts     Assisted patient in processing above session content; acknowledged and normalized patient’s thoughts, feelings, and concerns.  Applied  positive coping skills and behavior management in session.  Allowed patient to freely discuss issues without interruption or  judgment. Provided safe, confidential environment to facilitate the development of positive therapeutic relationship and encourage open, honest communication. Assisted patient in identifying risk factors which would indicate the need for higher level of care including thoughts to harm self or others and/or self-harming behavior and encouraged patient to contact this office, call 911, or present to the nearest emergency room should any of these events occur. Discussed crisis intervention services and means to access.     MEDS ORDERED DURING VISIT:  New Medications Ordered This Visit   Medications   • prazosin (MINIPRESS) 2 MG capsule     Sig: Take 1 capsule by mouth Every Night.     Dispense:  90 capsule     Refill:  0   • Cariprazine HCl 4.5 MG capsule     Sig: Take 4.5 mg by mouth Daily.     Dispense:  90 capsule     Refill:  0     No other refills needed at this time      Follow Up   Return in about 8 weeks (around 12/29/2021), or if symptoms worsen or fail to improve, for Recheck.    Patient was given instructions and counseling regarding her condition or for health maintenance advice. Please see specific information pulled into the AVS if appropriate.     Some of the data in this electronic note has been brought forward from a previous encounter, any necessary changes have been made, it has been reviewed by this APRN, and it is accurate.      This document has been electronically signed by ROD Spencer  November 3, 2021 09:48 EDT    Part of this note may be an electronic transcription/translation of spoken language to printed text using the Dragon Dictation System.

## 2021-11-06 DIAGNOSIS — F25.1 SCHIZOAFFECTIVE DISORDER, DEPRESSIVE TYPE (HCC): ICD-10-CM

## 2021-11-08 RX ORDER — CARIPRAZINE 4.5 MG/1
CAPSULE, GELATIN COATED ORAL
Qty: 30 CAPSULE | Refills: 0 | OUTPATIENT
Start: 2021-11-08

## 2021-11-24 RX ORDER — NIFEDIPINE 30 MG/1
TABLET, EXTENDED RELEASE ORAL
Qty: 30 TABLET | Refills: 0 | Status: SHIPPED | OUTPATIENT
Start: 2021-11-24 | End: 2021-12-29

## 2021-11-24 RX ORDER — OMEPRAZOLE 40 MG/1
CAPSULE, DELAYED RELEASE ORAL
Qty: 30 CAPSULE | Refills: 0 | Status: SHIPPED | OUTPATIENT
Start: 2021-11-24 | End: 2022-05-16

## 2021-12-17 ENCOUNTER — DOCUMENTATION (OUTPATIENT)
Dept: ENDOCRINOLOGY | Facility: CLINIC | Age: 46
End: 2021-12-17

## 2021-12-21 ENCOUNTER — DOCUMENTATION (OUTPATIENT)
Dept: ENDOCRINOLOGY | Facility: CLINIC | Age: 46
End: 2021-12-21

## 2021-12-21 NOTE — PROGRESS NOTES
PT INSURANCE WOULD NOT APPROVE THE PT'S ADMELOG, SO I SENT IN VERBAL SCRIPT TO [facility] [place] FOR NOVOLOG SAME DOSAGE AS HER ADMELOG

## 2021-12-27 ENCOUNTER — TELEMEDICINE (OUTPATIENT)
Dept: PSYCHIATRY | Facility: CLINIC | Age: 46
End: 2021-12-27

## 2021-12-27 DIAGNOSIS — F25.1 SCHIZOAFFECTIVE DISORDER, DEPRESSIVE TYPE (HCC): Primary | ICD-10-CM

## 2021-12-27 PROCEDURE — 90834 PSYTX W PT 45 MINUTES: CPT | Performed by: COUNSELOR

## 2021-12-27 NOTE — PROGRESS NOTES
Date: December 27, 2021  Time In: 8:32 AM   Time Out: 9:24 AM   This provider is located at the [facility] (through [facility]), [address], [place] [address] using a secure [employer] Video Visit through [employer]. Patient is being seen remotely via telehealth at home address in [place] and stated they are in a secure environment for this session. The patient's condition being diagnosed/treated is appropriate for telemedicine. The provider identified herself as well as her credentials. The patient, and/or patients guardian, consent to be seen remotely, and when consent is given they understand that the consent allows for patient identifiable information to be sent to a third party as needed. They may refuse to be seen remotely at any time. The electronic data is encrypted and password protected, and the patient and/or guardian has been advised of the potential risks to privacy not withstanding such measures.     You have chosen to receive care through a telehealth visit.  Do you consent to use a video/audio connection for your medical care today? Yes    PROGRESS NOTE  Data:  Sudhakar Saul is a 46 y.o. female who presents today for follow up. Patient states that she is doing pretty good and has been busy doing things for the holidays. Patient states that she had a friend get injured in the tornados and lost his home but he is healing. Patient states that she and her mother have decided to work on weight loss and the patient states that she feels that she also wants to cut back on her smoking as she realizes that she has been smoking more due to anxiety.  Patient states that she has also been trying to push herself to get out and do more little by little. Patient states that having her mother home with her now is giving them the chance to resolve things from the past. Patient reflected more on her past drug use and the people that she has had to lose to get where she  is now and to understand that the people who were in her life before were only there for what she could do for them. Patient states that she has purposely increased her anxiety due to putting herself out in the community more and spending more time with with family. Patient has also been worried about her disability hearing and hopes that she will be able to hear something soon.    Chief Complaint: increased anxiety    History of Present Illness: patient has struggled with symptoms of schizoaffective disorder for several years      Clinical Maneuvering/Intervention:  CBT    (Scales based on 0 - 10 with 10 being the worst)  Depression: 5 Anxiety: 7       Assisted patient in processing above session content; acknowledged and normalized patient’s thoughts, feelings, and concerns.  Rationalized patient thought process regarding her progress and little mishaps that have occurred.  Discussed triggers associated with patient's anxiety such as crowds, waiting on her disability decision, and worry about her mother.  Also discussed coping skills for patient to implement such as continuing to have open communication with her mother, reminding herself of her progress and maintaining the goals and boundaries that she has set for herself.    Allowed patient to freely discuss issues without interruption or judgment. Provided safe, confidential environment to facilitate the development of positive therapeutic relationship and encourage open, honest communication. Assisted patient in identifying risk factors which would indicate the need for higher level of care including thoughts to harm self or others and/or self-harming behavior and encouraged patient to contact this office, call 911, or present to the nearest emergency room should any of these events occur. Discussed crisis intervention services and means to access. Patient adamantly and convincingly denies current suicidal or homicidal ideation or perceptual  disturbance.    Assessment:   Assessment   Patient appears to maintain relative stability as compared to their baseline.  However, patient continues to struggle with symptoms of schizoaffective disorder which continues to cause impairment in important areas of functioning.  A result, they can be reasonably expected to continue to benefit from treatment and would likely be at increased risk for decompensation otherwise.    Mental Status Exam:   Hygiene:   good  Cooperation:  Cooperative  Eye Contact:  Good  Psychomotor Behavior:  Appropriate  Affect:  Appropriate  Mood: normal  Speech:  Normal  Thought Process:  Goal directed  Thought Content:  Normal  Suicidal:  None  Homicidal:  None  Hallucinations:  Auditory and Visual-has occurred a few times; voice of the little girl and of Zulema who appeared around the age of 13 when the patient was dealing with a lot of bullying per patient, Zulema was a lot stronger than her  Delusion:  None  Memory:  Deficits  Orientation:  Person, Place, Time and Situation  Reliability:  good  Insight:  Good  Judgement:  Good  Impulse Control:  Good  Physical/Medical Issues:  No        Patient's Support Network Includes:  parents, extended family and friends    Functional Status: Moderate impairment     Progress toward goal: Not at goal    Prognosis: Good with Ongoing Treatment          Plan:    Patient will continue in individual outpatient therapy with focus on improved functioning and coping skills, maintaining stability, and avoiding decompensation and the need for higher level of care.    Patient will adhere to medication regimen as prescribed and report any side effects. Patient will contact this office, call 911 or present to the nearest emergency room should suicidal or homicidal ideations occur. Provide Cognitive Behavioral Therapy and Solution Focused Therapy to improve functioning, maintain stability, and avoid decompensation and the need for higher level of care.     Return in  about 4 weeks, or earlier if symptoms worsen or fail to improve.           VISIT DIAGNOSIS:     ICD-10-CM ICD-9-CM   1. Schizoaffective disorder, depressive type (HCC)  F25.1 295.70             This document has been electronically signed by DREAD Vo  December 27, 2021 09:24 EST      Part of this note may be an electronic transcription/translation of spoken language to printed text using the Dragon Dictation System.

## 2021-12-29 DIAGNOSIS — F25.1 SCHIZOAFFECTIVE DISORDER, DEPRESSIVE TYPE (HCC): ICD-10-CM

## 2021-12-29 DIAGNOSIS — F43.22 ADJUSTMENT DISORDER WITH ANXIOUS MOOD: ICD-10-CM

## 2021-12-29 RX ORDER — DULOXETIN HYDROCHLORIDE 30 MG/1
CAPSULE, DELAYED RELEASE ORAL
Qty: 270 CAPSULE | Refills: 0 | Status: SHIPPED | OUTPATIENT
Start: 2021-12-29 | End: 2022-02-14 | Stop reason: SDUPTHER

## 2021-12-29 RX ORDER — ERTUGLIFLOZIN 15 MG/1
TABLET, FILM COATED ORAL
Qty: 90 TABLET | Refills: 0 | Status: SHIPPED | OUTPATIENT
Start: 2021-12-29 | End: 2022-06-14 | Stop reason: SDUPTHER

## 2021-12-29 RX ORDER — ERGOCALCIFEROL 1.25 MG/1
CAPSULE ORAL
Qty: 8 CAPSULE | Refills: 0 | Status: SHIPPED | OUTPATIENT
Start: 2021-12-29 | End: 2022-06-14 | Stop reason: SDUPTHER

## 2021-12-29 RX ORDER — HYDROXYZINE HYDROCHLORIDE 25 MG/1
TABLET, FILM COATED ORAL
Qty: 90 TABLET | Refills: 0 | Status: SHIPPED | OUTPATIENT
Start: 2021-12-29 | End: 2022-03-14 | Stop reason: SDUPTHER

## 2021-12-29 RX ORDER — NIFEDIPINE 30 MG/1
TABLET, EXTENDED RELEASE ORAL
Qty: 30 TABLET | Refills: 0 | Status: SHIPPED | OUTPATIENT
Start: 2021-12-29 | End: 2022-02-07

## 2022-01-03 ENCOUNTER — TELEMEDICINE (OUTPATIENT)
Dept: PSYCHIATRY | Facility: CLINIC | Age: 47
End: 2022-01-03

## 2022-01-03 DIAGNOSIS — F25.1 SCHIZOAFFECTIVE DISORDER, DEPRESSIVE TYPE: Primary | ICD-10-CM

## 2022-01-03 DIAGNOSIS — F51.5 NIGHTMARES: ICD-10-CM

## 2022-01-03 DIAGNOSIS — F41.1 GENERALIZED ANXIETY DISORDER: ICD-10-CM

## 2022-01-03 PROCEDURE — 99214 OFFICE O/P EST MOD 30 MIN: CPT | Performed by: NURSE PRACTITIONER

## 2022-01-03 RX ORDER — LAMOTRIGINE 150 MG/1
150 TABLET ORAL DAILY
Qty: 90 TABLET | Refills: 0 | Status: SHIPPED | OUTPATIENT
Start: 2022-01-03 | End: 2022-03-14 | Stop reason: SDUPTHER

## 2022-01-03 RX ORDER — PRAZOSIN HYDROCHLORIDE 2 MG/1
2 CAPSULE ORAL NIGHTLY
Qty: 90 CAPSULE | Refills: 0 | Status: SHIPPED | OUTPATIENT
Start: 2022-01-03 | End: 2022-03-14 | Stop reason: SDUPTHER

## 2022-01-03 NOTE — PROGRESS NOTES
This provider is located at [facility], [address] [place] [address].The Patient is seen remotely at home, [address] [place] [address] via [employer].  Patient is being seen via telehealth and confirm that they are in a secure environment for this session. The patient's condition being diagnosed/treated is appropriate for telemedicine. The provider identified himself/herself: herself as well as her credentials.   The patient gave consent to be seen remotely, and when consent is given they understand that the consent allows for patient identifiable information to be sent to a third party as needed.   They may refuse to be seen remotely at any time. The electronic data is encrypted and password protected, and the patient has been advised of the potential risks to privacy not withstanding such measures.    You have chosen to receive care through a telehealth visit.  Do you consent to use a video/audio connection for your medical care today? Yes      Chief Complaint  Follow-for schizoaffective depressed type    Subjective    Sudhakar More Saul presents to [facility] for medication management.       History of Present Illness   Patient presents today reporting that she has been doing well.  She states despite the holidays her depression has not been that bad but her and her mom did stay active going to places and being out in public.  She states that increased her anxiety but also helped with her depression.  She notes her depression and anxiety have been more manageable.  She states that she has had 2 incidents of auditory hallucination and saw shadows movements otherwise denies any visual hallucinations.  Patient denies any side effects to the medications.  She reports her sleep has been good other than being affected by her pain.  Denies any nightmares.  Patient states that she has had 1 isolated seizure but otherwise doing well denies any  SI/HI.        Objective   Vital Signs:   There were no vitals taken for this visit.  Due to the remote nature of this encounter (virtual encounter), vitals were unable to be obtained.  Height stated at 66 inches.  Weight stated at 325 pounds.        PHQ-9 Score:   PHQ-9 Total Score:     Patient screened positive for depression based on a PHQ-9 score of 11 on 4/1/2021. Follow-up recommendations include: Patient has schizophrenia and is currently being managed with medication..    Mental Status Exam:   Hygiene:   good  Cooperation:  Cooperative  Eye Contact:  Good  Psychomotor Behavior:  Appropriate  Affect:  Appropriate  Mood: normal  Speech:  Normal  Thought Process:  Goal directed and Linear  Thought Content:  Normal  Suicidal:  None  Homicidal:  None  Hallucinations:  Auditory and Visual  Delusion:  None  Memory:  Intact  Orientation:  Person, Place, Time and Situation  Reliability:  good  Insight:  Good and Fair  Judgement:  Good and Fair  Impulse Control:  Good and Fair  Physical/Medical Issues:  Yes Dm. HTN, CKD stage 2, hep C+, JACQUE, hx substance abuse     Current Medications:   Current Outpatient Medications   Medication Sig Dispense Refill   • albuterol (ACCUNEB) 1.25 MG/3ML nebulizer solution Take 3 mL by nebulization Every 6 (Six) Hours As Needed for Wheezing or Shortness of Air. 9 mL 3   • albuterol sulfate  (90 Base) MCG/ACT inhaler Inhale 2 puffs Every 6 (Six) Hours As Needed for Wheezing or Shortness of Air. 18 g 3   • aspirin 81 MG EC tablet Take 81 mg by mouth Daily.     • Cariprazine HCl 4.5 MG capsule Take 4.5 mg by mouth Daily. 90 capsule 0   • Continuous Blood Gluc  (Dexcom G6 ) device USE FOR 6 MONTHS     • Continuous Blood Gluc Sensor (Dexcom G6 Sensor) 1 each As Needed (glucose control). Every 10 days 9 each 3   • Continuous Blood Gluc Transmit (Dexcom G6 Transmitter) misc 1 each Every 3 (Three) Months. 1 each 3   • cyclobenzaprine (FLEXERIL) 5 MG tablet Take 1 tablet by  mouth 3 (Three) Times a Day As Needed for Muscle Spasms. (Patient taking differently: Take 5 mg by mouth 3 (Three) Times a Day.) 90 tablet 3   • Dulaglutide (Trulicity) 4.5 MG/0.5ML solution pen-injector Inject 4.5 mg under the skin into the appropriate area as directed 1 (One) Time Per Week. 4 pen 11   • DULoxetine (CYMBALTA) 30 MG capsule TAKE 3 CAPSULES BY MOUTH ONCE DAILY 270 capsule 0   • folic acid (FOLVITE) 1 MG tablet Take 1,000 mcg by mouth Daily.     • gabapentin (NEURONTIN) 300 MG capsule Take 1 capsule by mouth 3 (Three) Times a Day. (Patient taking differently: Take 100 mg by mouth 3 (Three) Times a Day.) 90 capsule 2   • glucose blood test strip Testing 4 times daily, E11.9 120 each 12   • HYDROcodone-acetaminophen (NORCO) 7.5-325 MG per tablet Take 1 tablet by mouth 3 (Three) Times a Day.     • hydrOXYzine (ATARAX) 25 MG tablet TAKE 1/2 TO 1 (ONE-HALF TO ONE) TABLET BY MOUTH THREE TIMES DAILY AS NEEDED FOR ANXIETY 90 tablet 0   • Insulin Disposable Pump (OmniPod Dash 5 Pack Pods) misc      • insulin lispro (Admelog) 100 UNIT/ML injection 180  units daily through pump (Patient taking differently: Inject 40 Units under the skin into the appropriate area as directed 3 (Three) Times a Day Before Meals. 180  units daily through pump) 90 mL 11   • lamoTRIgine (LaMICtal) 150 MG tablet Take 1 tablet by mouth Daily. 90 tablet 0   • levETIRAcetam (KEPPRA) 1000 MG tablet Take 1 tablet by mouth 2 (Two) Times a Day. 60 tablet 3   • lisinopril (PRINIVIL,ZESTRIL) 40 MG tablet Take 1 tablet by mouth Daily. 30 tablet 3   • metFORMIN (GLUCOPHAGE) 500 MG tablet Take 1 tablet by mouth 2 (Two) Times a Day With Meals. 60 tablet 3   • metoprolol tartrate (LOPRESSOR) 50 MG tablet Take 1 tablet by mouth Every 12 (Twelve) Hours. 60 tablet 3   • Nebulizer device 1 application Every 6 (Six) Hours As Needed (dyspnea). 1 each 3   • nicotine (NICODERM CQ) 21 MG/24HR patch Place 1 patch on the skin as directed by provider Daily. 28  patch 3   • NIFEdipine XL (PROCARDIA XL) 30 MG 24 hr tablet Take 1 tablet by mouth once daily 30 tablet 0   • omeprazole (priLOSEC) 40 MG capsule Take 1 capsule by mouth once daily 30 capsule 0   • OXcarbazepine (TRILEPTAL) 600 MG tablet Take 600 mg by mouth Every Evening.     • prazosin (MINIPRESS) 2 MG capsule Take 1 capsule by mouth Every Night. 90 capsule 0   • simvastatin (ZOCOR) 40 MG tablet Take 1 tablet by mouth Every Night. 30 tablet 3   • Steglatro 15 MG tablet TAKE 1 TABLET BY MOUTH ONCE DAILY IN THE MORNING 90 tablet 0   • tiotropium bromide-olodaterol (STIOLTO RESPIMAT) 2.5-2.5 MCG/ACT aerosol solution inhaler Inhale 2 puffs Daily. 1 inhaler 5   • vitamin D (ERGOCALCIFEROL) 1.25 MG (53684 UT) capsule capsule TAKE 2 CAPSULES BY MOUTH ONCE A WEEK 8 capsule 0     No current facility-administered medications for this visit.       Physical Exam  Nursing note reviewed.   Constitutional:       Appearance: Normal appearance.   Neurological:      Mental Status: She is alert.   Psychiatric:         Attention and Perception: Attention normal. She perceives visual hallucinations. She does not perceive auditory hallucinations.         Mood and Affect: Affect normal. Mood is anxious. Mood is not depressed.         Speech: Speech normal.         Behavior: Behavior is cooperative.         Thought Content: Thought content normal.         Cognition and Memory: Cognition and memory normal.        Result Review :     The following data was reviewed by: ROD Spencer on 02/03/2021:  Common labs    Common Labsle 7/20/21 7/20/21 7/20/21 7/20/21 10/12/21 10/12/21 10/21/21    0808 0808 0808 0808 1427 1428    Glucose    146 (A)  109 (A)    BUN    20  12    Creatinine    0.57  0.75    eGFR Non African Am    114  83    Sodium    136  133 (A)    Potassium    4.8  4.3    Chloride    102  98    Calcium    9.1  9.4    Albumin    3.70      Total Bilirubin    <0.2      Alkaline Phosphatase    76      AST (SGOT)    16      ALT  (SGPT)    19      WBC 11.45 (A)    11.86 (A)     Hemoglobin 13.8    13.3     Hematocrit 41.4    40.5     Platelets 176    202     Total Cholesterol   123       Triglycerides   114       HDL Cholesterol   43       LDL Cholesterol    59       Hemoglobin A1C  7.60 (A)     8.00 (A)   (A) Abnormal value            CMP    CMP 4/21/21 7/20/21 10/12/21   Glucose 158 (A) 146 (A) 109 (A)   BUN 13 20 12   Creatinine 0.75 0.57 0.75   eGFR Non African Am 83 114 83   Sodium 136 136 133 (A)   Potassium 5.1 4.8 4.3   Chloride 102 102 98   Calcium 9.6 9.1 9.4   Albumin 3.80 3.70    Total Bilirubin 0.2 <0.2    Alkaline Phosphatase 75 76    AST (SGOT) 18 16    ALT (SGPT) 26 19    (A) Abnormal value            CBC    CBC 4/21/21 7/20/21 10/12/21   WBC 9.95 11.45 (A) 11.86 (A)   RBC 5.13 4.76 4.85   Hemoglobin 15.0 13.8 13.3   Hematocrit 45.7 41.4 40.5   MCV 89.1 87.0 83.5   MCH 29.2 29.0 27.4   MCHC 32.8 33.3 32.8   RDW 13.8 13.4 14.6   Platelets 173 176 202   (A) Abnormal value            CBC w/diff    CBC w/Diff 6/10/20 9/9/20 1/26/21   WBC 8.89 9.63 9.04   RBC 4.85 5.07 5.10   Hemoglobin 14.1 14.9 14.4   Hematocrit 42.8 44.3 45.0   MCV 88.2 87.4 88.2   MCH 29.1 29.4 28.2   MCHC 32.9 33.6 32.0   RDW 13.6 13.3 13.0   Platelets 157 180 174   Neutrophil Rel % 63.8 67.2    Immature Granulocyte Rel % 0.6 (A) 0.5    Lymphocyte Rel % 26.4 21.6    Monocyte Rel % 4.5 (A) 4.8 (A)    Eosinophil Rel % 3.9 5.3    Basophil Rel % 0.8 0.6    (A) Abnormal value            Lipid Panel    Lipid Panel 1/26/21 4/21/21 7/20/21   Total Cholesterol 114 132 123   Triglycerides 169 (A) 164 (A) 114   HDL Cholesterol 39 (A) 42 43   VLDL Cholesterol 28 28 21   LDL Cholesterol  47 62 59   LDL/HDL Ratio 1.06 1.36 1.33   (A) Abnormal value            TSH    TSH 1/26/21 7/20/21 10/21/21   TSH 1.610 1.680 1.110           BMP    BMP 4/21/21 7/20/21 10/12/21   BUN 13 20 12   Creatinine 0.75 0.57 0.75   Sodium 136 136 133 (A)   Potassium 5.1 4.8 4.3   Chloride 102 102  98   CO2 21.4 (A) 22.5 25.0   Calcium 9.6 9.1 9.4   (A) Abnormal value            HgB    HGB 4/21/21 7/20/21 10/12/21   Hemoglobin 15.0 13.8 13.3           UA    Urinalysis 10/12/21   Specific [place], UA 1.013   Ketones, UA Negative   Blood, UA Negative   Leukocytes, UA Negative   Nitrite, UA Negative           Data reviewed: PCP notes        Assessment and Plan    Problem List Items Addressed This Visit        Mental Health    Schizoaffective disorder, depressive type (HCC) - Primary    Relevant Medications    lamoTRIgine (LaMICtal) 150 MG tablet    Cariprazine HCl 4.5 MG capsule      Other Visit Diagnoses     Nightmares        Relevant Medications    prazosin (MINIPRESS) 2 MG capsule    Cariprazine HCl 4.5 MG capsule    Generalized anxiety disorder        Relevant Medications    Cariprazine HCl 4.5 MG capsule            TREATMENT PLAN/GOALS: Continue supportive psychotherapy efforts and medications as indicated. Treatment and medication options discussed during today's visit. Patient ackowledged and verbally consented to continue with current treatment plan and was educated on the importance of compliance with treatment and follow-up appointments.    MEDICATION ISSUES:  We discussed risks, benefits, and side effects of the above medications and the patient was agreeable with the plan. Patient was educated on the importance of compliance with treatment and follow-up appointments.  Patient is agreeable to call the office with any worsening of symptoms or onset of side effects. Patient is agreeable to call 911 or go to the nearest ER should he/she begin having SI/HI. Lengthy discussion with patient on the possible side effects of antipsychotic medications including increased cholesterol, increased blood sugar, and possibility of weight gain.  Also discussed the need to monitor lab work associated with this.  The risk of muscle movement disorders with this class of medication was also discussed. We will continue  Lamictal in an effort to stabilize mood.  The patient was reminded to immediately come to the hospital should there be any loss of control.  Explanation was given to her regarding Lamictal and the potential for Foster Shon syndrome and significant rash.  Patient was encouraged to check skin prior to beginning.  Patient was encouraged to report any rash and to immediately stop medication.      -Continue lamotrigine 150 mg daily for schizoaffective disorder.  -Continue minipress 2mg at night for nightmares.  -Continue Vraylar to 4.5 mg daily for schizoaffective disorder depressed type.  -Continue hydroxyzine 12.5 to 25 mg up to 3 times a day as needed for anxiety and panic.  - Continue Cymbalta 90mg daily for depression and pain and will further evaluate.  -Continue psychotherapy to help with past trauma as well as depressive and anxiety symptoms.    Patient has now failed and tried Haldol, aripiprazole, Latuda and now risperidone which has caused weight gain and due to diabetes and kidney disease would benefit from Vraylar as patient is still having auditory and visual hallucinations.     Counseled patient regarding multimodal approach with healthy nutrition, healthy sleep, regular physical activity, social activities, counseling, and medications.      Coping skills reviewed and encouraged positive framing of thoughts     Assisted patient in processing above session content; acknowledged and normalized patient’s thoughts, feelings, and concerns.  Applied  positive coping skills and behavior management in session.  Allowed patient to freely discuss issues without interruption or judgment. Provided safe, confidential environment to facilitate the development of positive therapeutic relationship and encourage open, honest communication. Assisted patient in identifying risk factors which would indicate the need for higher level of care including thoughts to harm self or others and/or self-harming behavior and  encouraged patient to contact this office, call 911, or present to the nearest emergency room should any of these events occur. Discussed crisis intervention services and means to access.     MEDS ORDERED DURING VISIT:  New Medications Ordered This Visit   Medications   • lamoTRIgine (LaMICtal) 150 MG tablet     Sig: Take 1 tablet by mouth Daily.     Dispense:  90 tablet     Refill:  0   • prazosin (MINIPRESS) 2 MG capsule     Sig: Take 1 capsule by mouth Every Night.     Dispense:  90 capsule     Refill:  0   • Cariprazine HCl 4.5 MG capsule     Sig: Take 4.5 mg by mouth Daily.     Dispense:  90 capsule     Refill:  0         Follow Up   Return in about 6 weeks (around 2/14/2022), or if symptoms worsen or fail to improve, for Recheck.    Patient was given instructions and counseling regarding her condition or for health maintenance advice. Please see specific information pulled into the AVS if appropriate.     Some of the data in this electronic note has been brought forward from a previous encounter, any necessary changes have been made, it has been reviewed by this APRN, and it is accurate.      This document has been electronically signed by ROD Spencer  January 3, 2022 09:22 EST    Part of this note may be an electronic transcription/translation of spoken language to printed text using the Dragon Dictation System.

## 2022-01-17 ENCOUNTER — TELEPHONE (OUTPATIENT)
Dept: FAMILY MEDICINE CLINIC | Facility: CLINIC | Age: 47
End: 2022-01-17

## 2022-01-21 ENCOUNTER — OFFICE VISIT (OUTPATIENT)
Dept: ENDOCRINOLOGY | Facility: CLINIC | Age: 47
End: 2022-01-21

## 2022-01-21 VITALS
WEIGHT: 293 LBS | SYSTOLIC BLOOD PRESSURE: 122 MMHG | HEART RATE: 100 BPM | OXYGEN SATURATION: 86 % | BODY MASS INDEX: 47.09 KG/M2 | HEIGHT: 66 IN | DIASTOLIC BLOOD PRESSURE: 74 MMHG

## 2022-01-21 DIAGNOSIS — Z79.4 TYPE 2 DIABETES MELLITUS WITH HYPERGLYCEMIA, WITH LONG-TERM CURRENT USE OF INSULIN: ICD-10-CM

## 2022-01-21 DIAGNOSIS — E78.2 MIXED HYPERLIPIDEMIA: ICD-10-CM

## 2022-01-21 DIAGNOSIS — E55.9 VITAMIN D DEFICIENCY: ICD-10-CM

## 2022-01-21 DIAGNOSIS — E11.65 TYPE 2 DIABETES MELLITUS WITH HYPERGLYCEMIA, WITH LONG-TERM CURRENT USE OF INSULIN: ICD-10-CM

## 2022-01-21 DIAGNOSIS — I10 ESSENTIAL HYPERTENSION: ICD-10-CM

## 2022-01-21 DIAGNOSIS — E11.65 UNCONTROLLED TYPE 2 DIABETES MELLITUS WITH HYPERGLYCEMIA: Primary | ICD-10-CM

## 2022-01-21 PROCEDURE — 99214 OFFICE O/P EST MOD 30 MIN: CPT | Performed by: NURSE PRACTITIONER

## 2022-01-21 PROCEDURE — 95251 CONT GLUC MNTR ANALYSIS I&R: CPT | Performed by: NURSE PRACTITIONER

## 2022-01-21 RX ORDER — DULAGLUTIDE 4.5 MG/.5ML
4.5 INJECTION, SOLUTION SUBCUTANEOUS WEEKLY
Qty: 4 PEN | Refills: 11 | Status: SHIPPED | OUTPATIENT
Start: 2022-01-21

## 2022-02-04 DIAGNOSIS — F25.1 SCHIZOAFFECTIVE DISORDER, DEPRESSIVE TYPE: ICD-10-CM

## 2022-02-07 RX ORDER — NIFEDIPINE 30 MG/1
TABLET, EXTENDED RELEASE ORAL
Qty: 30 TABLET | Refills: 0 | Status: SHIPPED | OUTPATIENT
Start: 2022-02-07 | End: 2022-06-14 | Stop reason: SDUPTHER

## 2022-02-07 RX ORDER — CARIPRAZINE 4.5 MG/1
CAPSULE, GELATIN COATED ORAL
Qty: 90 CAPSULE | Refills: 0 | OUTPATIENT
Start: 2022-02-07

## 2022-02-14 ENCOUNTER — TELEMEDICINE (OUTPATIENT)
Dept: PSYCHIATRY | Facility: CLINIC | Age: 47
End: 2022-02-14

## 2022-02-14 DIAGNOSIS — F25.1 SCHIZOAFFECTIVE DISORDER, DEPRESSIVE TYPE: ICD-10-CM

## 2022-02-14 DIAGNOSIS — F41.1 GENERALIZED ANXIETY DISORDER: Primary | ICD-10-CM

## 2022-02-14 DIAGNOSIS — F51.5 NIGHTMARES: ICD-10-CM

## 2022-02-14 PROCEDURE — 99214 OFFICE O/P EST MOD 30 MIN: CPT | Performed by: NURSE PRACTITIONER

## 2022-02-14 RX ORDER — DULOXETIN HYDROCHLORIDE 30 MG/1
90 CAPSULE, DELAYED RELEASE ORAL DAILY
Qty: 270 CAPSULE | Refills: 0 | Status: SHIPPED | OUTPATIENT
Start: 2022-02-14 | End: 2022-03-14 | Stop reason: SDUPTHER

## 2022-02-14 NOTE — PROGRESS NOTES
This provider is located at [facility], [address] [place] [address].The Patient is seen remotely at home, [address] [facility] [place] [address] via [employer].  Patient is being seen via telehealth and confirm that they are in a secure environment for this session. The patient's condition being diagnosed/treated is appropriate for telemedicine. The provider identified himself/herself: herself as well as her credentials.   The patient gave consent to be seen remotely, and when consent is given they understand that the consent allows for patient identifiable information to be sent to a third party as needed.   They may refuse to be seen remotely at any time. The electronic data is encrypted and password protected, and the patient has been advised of the potential risks to privacy not withstanding such measures.    You have chosen to receive care through a telehealth visit.  Do you consent to use a video/audio connection for your medical care today? Yes      Chief Complaint  Follow-for schizoaffective depressed type    Subjective    Sudhakar More Saul presents to [facility] for medication management.       History of Present Illness   Patient presents today reporting that she has had 3 seizures the past couple weeks and follows up with her neurologist on Thursday. Patient also notes however the last 3 weeks she has been taking care of a puppy that has hip dysplasia. She states dealing with that has increased her anxiety and caused her to feel down at times. Patient states that she has had a couple days where she did not want to get out of bed. She notes that she has had a lot more dreams of past situations as a birthday is coming up reminding her of past events. Patient states that it usually does this and then passes. Patient reports her appetite is the same as she is trying to make healthier choices. Patient denies any shadows movements. She states  she has had 1 auditory hallucination of a little girl's voice but that was when her dog had to stay overnight for surgery. Patient notes that she is only getting 4 hours of sleep at night some part due to the dog and the other part due to pain. Patient states that she has been trying to do adult coloring to help with her anxiety and listen to music. Patient denies any SI/HI. Denies any current auditory or visual hallucinations. Denies any side effects to the medications.      Objective   Vital Signs:   There were no vitals taken for this visit.  Due to the remote nature of this encounter (virtual encounter), vitals were unable to be obtained.  Height stated at 66 inches.  Weight stated at 338 pounds.        PHQ-9 Score:   PHQ-9 Total Score: 6   Patient screened positive for depression based on a PHQ-9 score of 6 on 2/14/2022. Follow-up recommendations include: Patient has schizophrenia and is currently being managed with medication..    Mental Status Exam:   Hygiene:   good  Cooperation:  Cooperative  Eye Contact:  Good  Psychomotor Behavior:  Appropriate  Affect:  Appropriate  Mood: sad and anxious  Speech:  Normal  Thought Process:  Goal directed and Linear  Thought Content:  Normal  Suicidal:  None  Homicidal:  None  Hallucinations:  Auditory  Delusion:  None  Memory:  Intact  Orientation:  Person, Place, Time and Situation  Reliability:  good  Insight:  Good and Fair  Judgement:  Good and Fair  Impulse Control:  Good and Fair  Physical/Medical Issues:  Yes Dm. HTN, CKD stage 2, hep C+, JACQUE, hx substance abuse     Current Medications:   Current Outpatient Medications   Medication Sig Dispense Refill   • albuterol (ACCUNEB) 1.25 MG/3ML nebulizer solution Take 3 mL by nebulization Every 6 (Six) Hours As Needed for Wheezing or Shortness of Air. 9 mL 3   • albuterol sulfate  (90 Base) MCG/ACT inhaler Inhale 2 puffs Every 6 (Six) Hours As Needed for Wheezing or Shortness of Air. 18 g 3   • aspirin 81 MG EC  tablet Take 81 mg by mouth Daily.     • Cariprazine HCl 4.5 MG capsule Take 4.5 mg by mouth Daily. 90 capsule 0   • Continuous Blood Gluc  (Dexcom G6 ) device USE FOR 6 MONTHS     • Continuous Blood Gluc Sensor (Dexcom G6 Sensor) 1 each As Needed (glucose control). Every 10 days 9 each 3   • Continuous Blood Gluc Transmit (Dexcom G6 Transmitter) misc 1 each Every 3 (Three) Months. 1 each 3   • cyclobenzaprine (FLEXERIL) 5 MG tablet Take 1 tablet by mouth 3 (Three) Times a Day As Needed for Muscle Spasms. (Patient taking differently: Take 5 mg by mouth 3 (Three) Times a Day.) 90 tablet 3   • Dulaglutide (Trulicity) 4.5 MG/0.5ML solution pen-injector Inject 4.5 mg under the skin into the appropriate area as directed 1 (One) Time Per Week. 4 pen 11   • DULoxetine (CYMBALTA) 30 MG capsule Take 3 capsules by mouth Daily. 270 capsule 0   • folic acid (FOLVITE) 1 MG tablet Take 1,000 mcg by mouth Daily.     • gabapentin (NEURONTIN) 300 MG capsule Take 1 capsule by mouth 3 (Three) Times a Day. (Patient taking differently: Take 100 mg by mouth 3 (Three) Times a Day.) 90 capsule 2   • glucose blood test strip Testing 4 times daily, E11.9 120 each 12   • HYDROcodone-acetaminophen (NORCO) 7.5-325 MG per tablet Take 1 tablet by mouth 3 (Three) Times a Day.     • hydrOXYzine (ATARAX) 25 MG tablet TAKE 1/2 TO 1 (ONE-HALF TO ONE) TABLET BY MOUTH THREE TIMES DAILY AS NEEDED FOR ANXIETY 90 tablet 0   • Insulin Disposable Pump (OmniPod Dash 5 Pack Pods) misc      • insulin lispro (Admelog) 100 UNIT/ML injection 180  units daily through pump (Patient taking differently: Inject 40 Units under the skin into the appropriate area as directed 3 (Three) Times a Day Before Meals. 180  units daily through pump) 90 mL 11   • lamoTRIgine (LaMICtal) 150 MG tablet Take 1 tablet by mouth Daily. 90 tablet 0   • levETIRAcetam (KEPPRA) 1000 MG tablet Take 1 tablet by mouth 2 (Two) Times a Day. 60 tablet 3   • lisinopril  (PRINIVIL,ZESTRIL) 40 MG tablet Take 1 tablet by mouth Daily. 30 tablet 3   • metFORMIN (GLUCOPHAGE) 500 MG tablet Take 1 tablet by mouth 2 (Two) Times a Day With Meals. 60 tablet 3   • metoprolol tartrate (LOPRESSOR) 50 MG tablet Take 1 tablet by mouth Every 12 (Twelve) Hours. 60 tablet 3   • Nebulizer device 1 application Every 6 (Six) Hours As Needed (dyspnea). 1 each 3   • nicotine (NICODERM CQ) 21 MG/24HR patch Place 1 patch on the skin as directed by provider Daily. 28 patch 3   • NIFEdipine XL (PROCARDIA XL) 30 MG 24 hr tablet Take 1 tablet by mouth once daily 30 tablet 0   • omeprazole (priLOSEC) 40 MG capsule Take 1 capsule by mouth once daily 30 capsule 0   • OXcarbazepine (TRILEPTAL) 600 MG tablet Take 600 mg by mouth Every Evening.     • prazosin (MINIPRESS) 2 MG capsule Take 1 capsule by mouth Every Night. 90 capsule 0   • simvastatin (ZOCOR) 40 MG tablet Take 1 tablet by mouth Every Night. 30 tablet 3   • Steglatro 15 MG tablet TAKE 1 TABLET BY MOUTH ONCE DAILY IN THE MORNING 90 tablet 0   • tiotropium bromide-olodaterol (STIOLTO RESPIMAT) 2.5-2.5 MCG/ACT aerosol solution inhaler Inhale 2 puffs Daily. 1 inhaler 5   • vitamin D (ERGOCALCIFEROL) 1.25 MG (21165 UT) capsule capsule TAKE 2 CAPSULES BY MOUTH ONCE A WEEK 8 capsule 0     No current facility-administered medications for this visit.       Physical Exam  Nursing note reviewed.   Constitutional:       Appearance: Normal appearance.   Neurological:      Mental Status: She is alert.   Psychiatric:         Attention and Perception: Attention normal. She perceives visual hallucinations. She does not perceive auditory hallucinations.         Mood and Affect: Affect normal. Mood is anxious and depressed.         Speech: Speech normal.         Behavior: Behavior is cooperative.         Thought Content: Thought content normal.         Cognition and Memory: Cognition and memory normal.        Result Review :     The following data was reviewed by: Afsaneh MEHTA  ROD Lagunas on 02/03/2021:  Common labs    Common Labsle 7/20/21 7/20/21 7/20/21 7/20/21 10/12/21 10/12/21 10/21/21    0808 0808 0808 0808 1427 1428    Glucose    146 (A)  109 (A)    BUN    20  12    Creatinine    0.57  0.75    eGFR Non African Am    114  83    Sodium    136  133 (A)    Potassium    4.8  4.3    Chloride    102  98    Calcium    9.1  9.4    Albumin    3.70      Total Bilirubin    <0.2      Alkaline Phosphatase    76      AST (SGOT)    16      ALT (SGPT)    19      WBC 11.45 (A)    11.86 (A)     Hemoglobin 13.8    13.3     Hematocrit 41.4    40.5     Platelets 176    202     Total Cholesterol   123       Triglycerides   114       HDL Cholesterol   43       LDL Cholesterol    59       Hemoglobin A1C  7.60 (A)     8.00 (A)   (A) Abnormal value            CMP    CMP 4/21/21 7/20/21 10/12/21   Glucose 158 (A) 146 (A) 109 (A)   BUN 13 20 12   Creatinine 0.75 0.57 0.75   eGFR Non African Am 83 114 83   Sodium 136 136 133 (A)   Potassium 5.1 4.8 4.3   Chloride 102 102 98   Calcium 9.6 9.1 9.4   Albumin 3.80 3.70    Total Bilirubin 0.2 <0.2    Alkaline Phosphatase 75 76    AST (SGOT) 18 16    ALT (SGPT) 26 19    (A) Abnormal value            CBC    CBC 4/21/21 7/20/21 10/12/21   WBC 9.95 11.45 (A) 11.86 (A)   RBC 5.13 4.76 4.85   Hemoglobin 15.0 13.8 13.3   Hematocrit 45.7 41.4 40.5   MCV 89.1 87.0 83.5   MCH 29.2 29.0 27.4   MCHC 32.8 33.3 32.8   RDW 13.8 13.4 14.6   Platelets 173 176 202   (A) Abnormal value            CBC w/diff    CBC w/Diff 6/10/20 9/9/20 1/26/21   WBC 8.89 9.63 9.04   RBC 4.85 5.07 5.10   Hemoglobin 14.1 14.9 14.4   Hematocrit 42.8 44.3 45.0   MCV 88.2 87.4 88.2   MCH 29.1 29.4 28.2   MCHC 32.9 33.6 32.0   RDW 13.6 13.3 13.0   Platelets 157 180 174   Neutrophil Rel % 63.8 67.2    Immature Granulocyte Rel % 0.6 (A) 0.5    Lymphocyte Rel % 26.4 21.6    Monocyte Rel % 4.5 (A) 4.8 (A)    Eosinophil Rel % 3.9 5.3    Basophil Rel % 0.8 0.6    (A) Abnormal value            Lipid Panel     Lipid Panel 4/21/21 7/20/21   Total Cholesterol 132 123   Triglycerides 164 (A) 114   HDL Cholesterol 42 43   VLDL Cholesterol 28 21   LDL Cholesterol  62 59   LDL/HDL Ratio 1.36 1.33   (A) Abnormal value            TSH    TSH 7/20/21 10/21/21   TSH 1.680 1.110           BMP    BMP 4/21/21 7/20/21 10/12/21   BUN 13 20 12   Creatinine 0.75 0.57 0.75   Sodium 136 136 133 (A)   Potassium 5.1 4.8 4.3   Chloride 102 102 98   CO2 21.4 (A) 22.5 25.0   Calcium 9.6 9.1 9.4   (A) Abnormal value            HgB    HGB 4/21/21 7/20/21 10/12/21   Hemoglobin 15.0 13.8 13.3           UA    Urinalysis 10/12/21   Specific [place]bal[place], UA 1.013   Ketones, UA Negative   Blood, UA Negative   Leukocytes, UA Negative   Nitrite, UA Negative           Data reviewed: PCP notes        Assessment and Plan    Problem List Items Addressed This Visit        Mental Health    Schizoaffective disorder, depressive type (HCC)    Relevant Medications    DULoxetine (CYMBALTA) 30 MG capsule      Other Visit Diagnoses     Generalized anxiety disorder    -  Primary    Relevant Medications    DULoxetine (CYMBALTA) 30 MG capsule    Nightmares        Relevant Medications    DULoxetine (CYMBALTA) 30 MG capsule            TREATMENT PLAN/GOALS: Continue supportive psychotherapy efforts and medications as indicated. Treatment and medication options discussed during today's visit. Patient ackowledged and verbally consented to continue with current treatment plan and was educated on the importance of compliance with treatment and follow-up appointments.    MEDICATION ISSUES:  We discussed risks, benefits, and side effects of the above medications and the patient was agreeable with the plan. Patient was educated on the importance of compliance with treatment and follow-up appointments.  Patient is agreeable to call the office with any worsening of symptoms or onset of side effects. Patient is agreeable to call 911 or go to the nearest ER should he/she begin having  SI/HI. Lengthy discussion with patient on the possible side effects of antipsychotic medications including increased cholesterol, increased blood sugar, and possibility of weight gain.  Also discussed the need to monitor lab work associated with this.  The risk of muscle movement disorders with this class of medication was also discussed. We will continue Lamictal in an effort to stabilize mood.  The patient was reminded to immediately come to the hospital should there be any loss of control.  Explanation was given to her regarding Lamictal and the potential for Foster Shon syndrome and significant rash.  Patient was encouraged to check skin prior to beginning.  Patient was encouraged to report any rash and to immediately stop medication.      -Continue lamotrigine 150 mg daily for schizoaffective disorder.  -Continue minipress 2mg at night for nightmares.  -Continue Vraylar to 4.5 mg daily for schizoaffective disorder depressed type.  -Continue hydroxyzine 12.5 to 25 mg up to 3 times a day as needed for anxiety and panic.  - Continue Cymbalta 90mg daily for depression and pain and will further evaluate.  -Continue psychotherapy to help with past trauma as well as depressive and anxiety symptoms.  -Encourage patient that we will follow-up after she sees the neurologist in case he makes any changes. Informed the patient that if depression and anxiety are still elevated that we could discuss changing medications. Also informed patient that she can take two of the minipress capsules totaling 4 mg to help with nightmares during this difficult time. Encouraged her that if it caused any dizziness then go back to the 1 capsule at night she verbalized understanding.    Patient has now failed and tried Haldol, aripiprazole, Latuda and now risperidone which has caused weight gain and due to diabetes and kidney disease would benefit from Vraylar as patient is still having auditory and visual hallucinations.     Counseled  patient regarding multimodal approach with healthy nutrition, healthy sleep, regular physical activity, social activities, counseling, and medications.      Coping skills reviewed and encouraged positive framing of thoughts     Assisted patient in processing above session content; acknowledged and normalized patient’s thoughts, feelings, and concerns.  Applied  positive coping skills and behavior management in session.  Allowed patient to freely discuss issues without interruption or judgment. Provided safe, confidential environment to facilitate the development of positive therapeutic relationship and encourage open, honest communication. Assisted patient in identifying risk factors which would indicate the need for higher level of care including thoughts to harm self or others and/or self-harming behavior and encouraged patient to contact this office, call 911, or present to the nearest emergency room should any of these events occur. Discussed crisis intervention services and means to access.     MEDS ORDERED DURING VISIT:  New Medications Ordered This Visit   Medications   • DULoxetine (CYMBALTA) 30 MG capsule     Sig: Take 3 capsules by mouth Daily.     Dispense:  270 capsule     Refill:  0         Follow Up   Return in about 4 weeks (around 3/14/2022), or if symptoms worsen or fail to improve, for Recheck.    Patient was given instructions and counseling regarding her condition or for health maintenance advice. Please see specific information pulled into the AVS if appropriate.     Some of the data in this electronic note has been brought forward from a previous encounter, any necessary changes have been made, it has been reviewed by this APRN, and it is accurate.      This document has been electronically signed by ROD Spencer  February 14, 2022 09:05 EST    Part of this note may be an electronic transcription/translation of spoken language to printed text using the Dragon Dictation System.

## 2022-03-09 ENCOUNTER — TELEMEDICINE (OUTPATIENT)
Dept: PSYCHIATRY | Facility: CLINIC | Age: 47
End: 2022-03-09

## 2022-03-09 DIAGNOSIS — F25.1 SCHIZOAFFECTIVE DISORDER, DEPRESSIVE TYPE: Primary | ICD-10-CM

## 2022-03-09 PROCEDURE — 90834 PSYTX W PT 45 MINUTES: CPT | Performed by: COUNSELOR

## 2022-03-09 NOTE — PROGRESS NOTES
Date: March 17, 2022  Time In: 1:22 PM  Time Out: 2:08 PM  This provider is located at the [facility] (through [facility]), [address], [place] [address] using a secure [employer] Video Visit through [employer]. Patient is being seen remotely via telehealth at home address in [place] and stated they are in a secure environment for this session. The patient's condition being diagnosed/treated is appropriate for telemedicine. The provider identified herself as well as her credentials. The patient, and/or patients guardian, consent to be seen remotely, and when consent is given they understand that the consent allows for patient identifiable information to be sent to a third party as needed. They may refuse to be seen remotely at any time. The electronic data is encrypted and password protected, and the patient and/or guardian has been advised of the potential risks to privacy not withstanding such measures.     You have chosen to receive care through a telehealth visit.  Do you consent to use a video/audio connection for your medical care today? Yes    PROGRESS NOTE  Data:  Sudhakar Saul is a 46 y.o. female who presents today for follow up. Patient states that lately she has had trouble sleeping recently and in the last two weeks she has had increased seizure actvitity. Patient states that her dog is concerned when she has a seizure and will alter the patient's mother when the patient is having a seizure. Patient is concerned about cause of the seizures and will be having her medications looked at soon. Patient states that she is trying to be hopeful about getting her disability so she can really contribute to her family and patient states that she is doing as much as she can to assist her mother and step father around the home but it is harder to do so now. Patient states that she continues to see shadow movements and has hear the little girl's voice on a few occassions  but it is not as bad as it had been.    Chief Complaint: trouble sleeping, increased anxiety, increased seizure activity    History of Present Illness: Patient has struggled with symptoms of schizoaffective disorder for several years      Clinical Maneuvering/Intervention: CBT    (Scales based on 0 - 10 with 10 being the worst)  Depression: 4 Anxiety: 8       Assisted patient in processing above session content; acknowledged and normalized patient’s thoughts, feelings, and concerns.  Rationalized patient thought process regarding her increased seizure activity and wanting to contribute to her family.  Discussed triggers associated with patient's anxiety and depression such as not having an income, worrying about her health and the health of her mother.  Also discussed coping skills for patient to implement such as increasing self-care, attempting to log what is going on before and after she has a seizure to look for environmental triggers and discussing possible contributions to the family with her mother so that they can create a plan in case the patient receives her disability income to help the patient feel like she is really contributing in a big way to the family.    Allowed patient to freely discuss issues without interruption or judgment. Provided safe, confidential environment to facilitate the development of positive therapeutic relationship and encourage open, honest communication. Assisted patient in identifying risk factors which would indicate the need for higher level of care including thoughts to harm self or others and/or self-harming behavior and encouraged patient to contact this office, call 911, or present to the nearest emergency room should any of these events occur. Discussed crisis intervention services and means to access. Patient adamantly and convincingly denies current suicidal or homicidal ideation or perceptual disturbance.    Assessment:   Assessment   Patient appears to maintain  relative stability as compared to their baseline.  However, patient continues to struggle with symptoms of schizoaffective disorder which continues to cause impairment in important areas of functioning.  A result, they can be reasonably expected to continue to benefit from treatment and would likely be at increased risk for decompensation otherwise.    Mental Status Exam:   Hygiene:   good  Cooperation:  Cooperative  Eye Contact:  Good  Psychomotor Behavior:  Appropriate  Affect:  Appropriate  Mood: anxious  Speech:  Normal  Thought Process:  Goal directed  Thought Content:  Normal  Suicidal:  None  Homicidal:  None  Hallucinations:  shadow movements at times and auditory hearing child's voice  Delusion:  Paranoid at times  Memory:  Deficits  Orientation:  Person, Place, Time and Situation  Reliability:  good  Insight:  Good  Judgement:  Good  Impulse Control:  Good  Physical/Medical Issues:  Yes increased seizure activity       Patient's Support Network Includes:  parents    Functional Status: Moderate impairment     Progress toward goal: Not at goal    Prognosis: Good with Ongoing Treatment          Plan:    Patient will continue in individual outpatient therapy with focus on improved functioning and coping skills, maintaining stability, and avoiding decompensation and the need for higher level of care.    Patient will adhere to medication regimen as prescribed and report any side effects. Patient will contact this office, call 911 or present to the nearest emergency room should suicidal or homicidal ideations occur. Provide Cognitive Behavioral Therapy and Solution Focused Therapy to improve functioning, maintain stability, and avoid decompensation and the need for higher level of care.     Return in about 5 weeks, or earlier if symptoms worsen or fail to improve.           VISIT DIAGNOSIS:     ICD-10-CM ICD-9-CM   1. Schizoaffective disorder, depressive type (HCC)  F25.1 295.70             This document has been  electronically signed by DREAD Vo  March 17, 2022 23:59 EDT      Part of this note may be an electronic transcription/translation of spoken language to printed text using the Dragon Dictation System.

## 2022-03-14 ENCOUNTER — TELEMEDICINE (OUTPATIENT)
Dept: PSYCHIATRY | Facility: CLINIC | Age: 47
End: 2022-03-14

## 2022-03-14 DIAGNOSIS — F25.1 SCHIZOAFFECTIVE DISORDER, DEPRESSIVE TYPE: Primary | ICD-10-CM

## 2022-03-14 DIAGNOSIS — F41.1 GENERALIZED ANXIETY DISORDER: ICD-10-CM

## 2022-03-14 DIAGNOSIS — F51.5 NIGHTMARES: ICD-10-CM

## 2022-03-14 PROCEDURE — 99214 OFFICE O/P EST MOD 30 MIN: CPT | Performed by: NURSE PRACTITIONER

## 2022-03-14 RX ORDER — DULOXETIN HYDROCHLORIDE 30 MG/1
90 CAPSULE, DELAYED RELEASE ORAL DAILY
Qty: 270 CAPSULE | Refills: 0 | Status: SHIPPED | OUTPATIENT
Start: 2022-03-14 | End: 2022-06-09 | Stop reason: SDUPTHER

## 2022-03-14 RX ORDER — PRAZOSIN HYDROCHLORIDE 2 MG/1
4 CAPSULE ORAL NIGHTLY
Qty: 180 CAPSULE | Refills: 0 | Status: SHIPPED | OUTPATIENT
Start: 2022-03-14 | End: 2022-05-16

## 2022-03-14 RX ORDER — HYDROXYZINE HYDROCHLORIDE 25 MG/1
12.5-25 TABLET, FILM COATED ORAL 3 TIMES DAILY PRN
Qty: 270 TABLET | Refills: 0 | Status: SHIPPED | OUTPATIENT
Start: 2022-03-14 | End: 2022-06-09 | Stop reason: SDUPTHER

## 2022-03-14 RX ORDER — LAMOTRIGINE 150 MG/1
150 TABLET ORAL DAILY
Qty: 90 TABLET | Refills: 0 | Status: SHIPPED | OUTPATIENT
Start: 2022-03-14 | End: 2022-06-09 | Stop reason: SDUPTHER

## 2022-03-14 NOTE — PROGRESS NOTES
This provider is located at [facility], [address] [place] [address].The Patient is seen remotely at home, [address] [facility] [place] [address] via [employer].  Patient is being seen via telehealth and confirm that they are in a secure environment for this session. The patient's condition being diagnosed/treated is appropriate for telemedicine. The provider identified himself/herself: herself as well as her credentials.   The patient gave consent to be seen remotely, and when consent is given they understand that the consent allows for patient identifiable information to be sent to a third party as needed.   They may refuse to be seen remotely at any time. The electronic data is encrypted and password protected, and the patient has been advised of the potential risks to privacy not withstanding such measures.    You have chosen to receive care through a telehealth visit.  Do you consent to use a video/audio connection for your medical care today? Yes      Chief Complaint  Follow-for schizoaffective depressed type    Subjective    Sudhakar More Saul presents to [facility] for medication management.       History of Present Illness   Patient presents today reporting that she did follow-up with a neurologist as she has had 4 seizures the last 2 weeks and it has slurred her speech slightly as she notes it is been hard to recover this past time.  Patient reports that he placed her on Trileptal 600 twice daily and she has been taking for 2 days now and denies any seizures or side effects or rash.  Patient reports the increase in Minipress seem to help with the nightmares.  She notes she is still having dreams about using but states that is getting closer to her sober date.  Mentioned AA meetings as she notes she has had transportation issues encouraged her to follow-up as there have been online meetings patient verbalized understanding.  Patient denies  any major depressive symptoms as she states it may be a 3 or 4 as she has not had time to be depressed.  Patient states that she has been anxious but is been due to situations with medical issues as well as her dog going through surgery.  Patient states she is getting roughly 4 hours of sleep at night napping some during the day but that is due to her dog.  Patient states she is trying to watch what she eats the best she can with her diabetes.  Patient reports she has noticed twitching in her eyebrow but had that previously with Bell palsy but noticed it is been slightly worse the past couple days.  Highly encouraged her to continue to monitor and if it worsens or affect ADLs contact clinic she verbalized understanding.  Denies any side effects to the medications.  Aims score 0.  Denies any current auditory visual hallucinations.  Denies any SI/HI.        Objective   Vital Signs:   There were no vitals taken for this visit.  Due to the remote nature of this encounter (virtual encounter), vitals were unable to be obtained.  Height stated at 66 inches.  Weight stated at 338 pounds.        PHQ-9 Score:   PHQ-9 Total Score:     Patient screened positive for depression based on a PHQ-9 score of  on . Follow-up recommendations include: Patient has schizophrenia and is currently being managed with medication..    Mental Status Exam:   Hygiene:   good  Cooperation:  Cooperative  Eye Contact:  Good  Psychomotor Behavior:  Appropriate  Affect:  Appropriate  Mood: normal and anxious  Speech:  Normal  Thought Process:  Goal directed and Linear  Thought Content:  Normal  Suicidal:  None  Homicidal:  None  Hallucinations:  Auditory  Delusion:  None  Memory:  Intact  Orientation:  Person, Place, Time and Situation  Reliability:  good  Insight:  Good and Fair  Judgement:  Good and Fair  Impulse Control:  Good and Fair  Physical/Medical Issues:  Yes Dm. HTN, CKD stage 2, hep C+, JACQUE, hx substance abuse     Current Medications:    Current Outpatient Medications   Medication Sig Dispense Refill   • albuterol (ACCUNEB) 1.25 MG/3ML nebulizer solution Take 3 mL by nebulization Every 6 (Six) Hours As Needed for Wheezing or Shortness of Air. 9 mL 3   • albuterol sulfate  (90 Base) MCG/ACT inhaler Inhale 2 puffs Every 6 (Six) Hours As Needed for Wheezing or Shortness of Air. 18 g 3   • aspirin 81 MG EC tablet Take 81 mg by mouth Daily.     • Cariprazine HCl 4.5 MG capsule Take 4.5 mg by mouth Daily. 90 capsule 0   • Continuous Blood Gluc  (Dexcom G6 ) device USE FOR 6 MONTHS     • Continuous Blood Gluc Sensor (Dexcom G6 Sensor) 1 each As Needed (glucose control). Every 10 days 9 each 3   • Continuous Blood Gluc Transmit (Dexcom G6 Transmitter) misc 1 each Every 3 (Three) Months. 1 each 3   • cyclobenzaprine (FLEXERIL) 5 MG tablet Take 1 tablet by mouth 3 (Three) Times a Day As Needed for Muscle Spasms. (Patient taking differently: Take 5 mg by mouth 3 (Three) Times a Day.) 90 tablet 3   • Dulaglutide (Trulicity) 4.5 MG/0.5ML solution pen-injector Inject 4.5 mg under the skin into the appropriate area as directed 1 (One) Time Per Week. 4 pen 11   • DULoxetine (CYMBALTA) 30 MG capsule Take 3 capsules by mouth Daily. 270 capsule 0   • folic acid (FOLVITE) 1 MG tablet Take 1,000 mcg by mouth Daily.     • gabapentin (NEURONTIN) 300 MG capsule Take 1 capsule by mouth 3 (Three) Times a Day. (Patient taking differently: Take 100 mg by mouth 3 (Three) Times a Day.) 90 capsule 2   • glucose blood test strip Testing 4 times daily, E11.9 120 each 12   • HYDROcodone-acetaminophen (NORCO) 7.5-325 MG per tablet Take 1 tablet by mouth 3 (Three) Times a Day.     • hydrOXYzine (ATARAX) 25 MG tablet Take 0.5-1 tablets by mouth 3 (Three) Times a Day As Needed for Anxiety. 270 tablet 0   • Insulin Disposable Pump (OmniPod Dash 5 Pack Pods) misc      • lamoTRIgine (LaMICtal) 150 MG tablet Take 1 tablet by mouth Daily. 90 tablet 0   •  levETIRAcetam (KEPPRA) 1000 MG tablet Take 1 tablet by mouth 2 (Two) Times a Day. 60 tablet 3   • lisinopril (PRINIVIL,ZESTRIL) 40 MG tablet Take 1 tablet by mouth Daily. 30 tablet 3   • metFORMIN (GLUCOPHAGE) 500 MG tablet Take 1 tablet by mouth 2 (Two) Times a Day With Meals. 60 tablet 3   • metoprolol tartrate (LOPRESSOR) 50 MG tablet Take 1 tablet by mouth Every 12 (Twelve) Hours. 60 tablet 3   • Nebulizer device 1 application Every 6 (Six) Hours As Needed (dyspnea). 1 each 3   • nicotine (NICODERM CQ) 21 MG/24HR patch Place 1 patch on the skin as directed by provider Daily. 28 patch 3   • NIFEdipine XL (PROCARDIA XL) 30 MG 24 hr tablet Take 1 tablet by mouth once daily 30 tablet 0   • omeprazole (priLOSEC) 40 MG capsule Take 1 capsule by mouth once daily 30 capsule 0   • OXcarbazepine (TRILEPTAL) 600 MG tablet Take 600 mg by mouth Every Evening.     • prazosin (MINIPRESS) 2 MG capsule Take 2 capsules by mouth Every Night. 180 capsule 0   • simvastatin (ZOCOR) 40 MG tablet Take 1 tablet by mouth Every Night. 30 tablet 3   • Steglatro 15 MG tablet TAKE 1 TABLET BY MOUTH ONCE DAILY IN THE MORNING 90 tablet 0   • tiotropium bromide-olodaterol (STIOLTO RESPIMAT) 2.5-2.5 MCG/ACT aerosol solution inhaler Inhale 2 puffs Daily. 1 inhaler 5   • vitamin D (ERGOCALCIFEROL) 1.25 MG (86610 UT) capsule capsule TAKE 2 CAPSULES BY MOUTH ONCE A WEEK 8 capsule 0   • insulin lispro (Admelog) 100 UNIT/ML injection 180  units daily through pump (Patient taking differently: Inject 40 Units under the skin into the appropriate area as directed 3 (Three) Times a Day Before Meals. 180  units daily through pump) 90 mL 11     No current facility-administered medications for this visit.       Physical Exam  Nursing note reviewed.   Constitutional:       Appearance: Normal appearance.   Neurological:      Mental Status: She is alert.   Psychiatric:         Attention and Perception: Attention normal. She does not perceive auditory or visual  hallucinations.         Mood and Affect: Affect normal. Mood is anxious. Mood is not depressed.         Speech: Speech normal.         Behavior: Behavior is cooperative.         Thought Content: Thought content normal.         Cognition and Memory: Cognition and memory normal.        Result Review :     The following data was reviewed by: ROD Spencer on 02/03/2021:  Common labs    Common Labsle 7/20/21 7/20/21 7/20/21 7/20/21 10/12/21 10/12/21 10/21/21    0808 0808 0808 0808 1427 1428    Glucose    146 (A)  109 (A)    BUN    20  12    Creatinine    0.57  0.75    eGFR Non African Am    114  83    Sodium    136  133 (A)    Potassium    4.8  4.3    Chloride    102  98    Calcium    9.1  9.4    Albumin    3.70      Total Bilirubin    <0.2      Alkaline Phosphatase    76      AST (SGOT)    16      ALT (SGPT)    19      WBC 11.45 (A)    11.86 (A)     Hemoglobin 13.8    13.3     Hematocrit 41.4    40.5     Platelets 176    202     Total Cholesterol   123       Triglycerides   114       HDL Cholesterol   43       LDL Cholesterol    59       Hemoglobin A1C  7.60 (A)     8.00 (A)   (A) Abnormal value            CMP    CMP 4/21/21 7/20/21 10/12/21   Glucose 158 (A) 146 (A) 109 (A)   BUN 13 20 12   Creatinine 0.75 0.57 0.75   eGFR Non African Am 83 114 83   Sodium 136 136 133 (A)   Potassium 5.1 4.8 4.3   Chloride 102 102 98   Calcium 9.6 9.1 9.4   Albumin 3.80 3.70    Total Bilirubin 0.2 <0.2    Alkaline Phosphatase 75 76    AST (SGOT) 18 16    ALT (SGPT) 26 19    (A) Abnormal value            CBC    CBC 4/21/21 7/20/21 10/12/21   WBC 9.95 11.45 (A) 11.86 (A)   RBC 5.13 4.76 4.85   Hemoglobin 15.0 13.8 13.3   Hematocrit 45.7 41.4 40.5   MCV 89.1 87.0 83.5   MCH 29.2 29.0 27.4   MCHC 32.8 33.3 32.8   RDW 13.8 13.4 14.6   Platelets 173 176 202   (A) Abnormal value            CBC w/diff    CBC w/Diff 6/10/20 9/9/20 1/26/21   WBC 8.89 9.63 9.04   RBC 4.85 5.07 5.10   Hemoglobin 14.1 14.9 14.4   Hematocrit 42.8 44.3 45.0    MCV 88.2 87.4 88.2   MCH 29.1 29.4 28.2   MCHC 32.9 33.6 32.0   RDW 13.6 13.3 13.0   Platelets 157 180 174   Neutrophil Rel % 63.8 67.2    Immature Granulocyte Rel % 0.6 (A) 0.5    Lymphocyte Rel % 26.4 21.6    Monocyte Rel % 4.5 (A) 4.8 (A)    Eosinophil Rel % 3.9 5.3    Basophil Rel % 0.8 0.6    (A) Abnormal value            Lipid Panel    Lipid Panel 4/21/21 7/20/21   Total Cholesterol 132 123   Triglycerides 164 (A) 114   HDL Cholesterol 42 43   VLDL Cholesterol 28 21   LDL Cholesterol  62 59   LDL/HDL Ratio 1.36 1.33   (A) Abnormal value            TSH    TSH 7/20/21 10/21/21   TSH 1.680 1.110           BMP    BMP 4/21/21 7/20/21 10/12/21   BUN 13 20 12   Creatinine 0.75 0.57 0.75   Sodium 136 136 133 (A)   Potassium 5.1 4.8 4.3   Chloride 102 102 98   CO2 21.4 (A) 22.5 25.0   Calcium 9.6 9.1 9.4   (A) Abnormal value            HgB    HGB 4/21/21 7/20/21 10/12/21   Hemoglobin 15.0 13.8 13.3           UA    Urinalysis 10/12/21   Specific Blanchard, UA 1.013   Ketones, UA Negative   Blood, UA Negative   Leukocytes, UA Negative   Nitrite, UA Negative           Data reviewed: PCP notes        Assessment and Plan    Problem List Items Addressed This Visit        Mental Health    Schizoaffective disorder, depressive type (HCC) - Primary    Relevant Medications    lamoTRIgine (LaMICtal) 150 MG tablet    hydrOXYzine (ATARAX) 25 MG tablet    DULoxetine (CYMBALTA) 30 MG capsule    Cariprazine HCl 4.5 MG capsule      Other Visit Diagnoses     Nightmares        Relevant Medications    prazosin (MINIPRESS) 2 MG capsule    hydrOXYzine (ATARAX) 25 MG tablet    DULoxetine (CYMBALTA) 30 MG capsule    Cariprazine HCl 4.5 MG capsule    Generalized anxiety disorder        Relevant Medications    hydrOXYzine (ATARAX) 25 MG tablet    DULoxetine (CYMBALTA) 30 MG capsule    Cariprazine HCl 4.5 MG capsule            TREATMENT PLAN/GOALS: Continue supportive psychotherapy efforts and medications as indicated. Treatment and medication  options discussed during today's visit. Patient ackowledged and verbally consented to continue with current treatment plan and was educated on the importance of compliance with treatment and follow-up appointments.    MEDICATION ISSUES:  We discussed risks, benefits, and side effects of the above medications and the patient was agreeable with the plan. Patient was educated on the importance of compliance with treatment and follow-up appointments.  Patient is agreeable to call the office with any worsening of symptoms or onset of side effects. Patient is agreeable to call 911 or go to the nearest ER should he/she begin having SI/HI. Lengthy discussion with patient on the possible side effects of antipsychotic medications including increased cholesterol, increased blood sugar, and possibility of weight gain.  Also discussed the need to monitor lab work associated with this.  The risk of muscle movement disorders with this class of medication was also discussed. We will continue Lamictal in an effort to stabilize mood.  The patient was reminded to immediately come to the hospital should there be any loss of control.  Explanation was given to her regarding Lamictal and the potential for Foster Shon syndrome and significant rash.  Patient was encouraged to check skin prior to beginning.  Patient was encouraged to report any rash and to immediately stop medication.      -Continue lamotrigine 150 mg daily for schizoaffective disorder.  -Continue minipress 2-4mg at night for nightmares.  -Continue Vraylar to 4.5 mg daily for schizoaffective disorder depressed type.  -Continue hydroxyzine 12.5 to 25 mg up to 3 times a day as needed for anxiety and panic.  - Continue Cymbalta 90mg daily for depression and pain and will further evaluate.  -Continue psychotherapy to help with past trauma as well as depressive and anxiety symptoms.  -Highly encourage patient since she is now on Trileptal to be aware of side effects of  multiple mood stabilizers and if she were to have any rash to contact the clinic as we may have to look at tapering off the lamotrigine or talking with neurologist to reevaluate Trileptal.  Patient verbalized understanding and denied any side effects at this time.    Patient has now failed and tried Haldol, aripiprazole, Latuda and now risperidone which has caused weight gain and due to diabetes and kidney disease would benefit from Vraylar as patient is still having auditory and visual hallucinations.     Counseled patient regarding multimodal approach with healthy nutrition, healthy sleep, regular physical activity, social activities, counseling, and medications.      Coping skills reviewed and encouraged positive framing of thoughts     Assisted patient in processing above session content; acknowledged and normalized patient’s thoughts, feelings, and concerns.  Applied  positive coping skills and behavior management in session.  Allowed patient to freely discuss issues without interruption or judgment. Provided safe, confidential environment to facilitate the development of positive therapeutic relationship and encourage open, honest communication. Assisted patient in identifying risk factors which would indicate the need for higher level of care including thoughts to harm self or others and/or self-harming behavior and encouraged patient to contact this office, call 911, or present to the nearest emergency room should any of these events occur. Discussed crisis intervention services and means to access.     MEDS ORDERED DURING VISIT:  New Medications Ordered This Visit   Medications   • prazosin (MINIPRESS) 2 MG capsule     Sig: Take 2 capsules by mouth Every Night.     Dispense:  180 capsule     Refill:  0   • lamoTRIgine (LaMICtal) 150 MG tablet     Sig: Take 1 tablet by mouth Daily.     Dispense:  90 tablet     Refill:  0   • hydrOXYzine (ATARAX) 25 MG tablet     Sig: Take 0.5-1 tablets by mouth 3 (Three)  Times a Day As Needed for Anxiety.     Dispense:  270 tablet     Refill:  0   • DULoxetine (CYMBALTA) 30 MG capsule     Sig: Take 3 capsules by mouth Daily.     Dispense:  270 capsule     Refill:  0   • Cariprazine HCl 4.5 MG capsule     Sig: Take 4.5 mg by mouth Daily.     Dispense:  90 capsule     Refill:  0         Follow Up   Return in about 6 weeks (around 4/25/2022), or if symptoms worsen or fail to improve, for Recheck.    Patient was given instructions and counseling regarding her condition or for health maintenance advice. Please see specific information pulled into the AVS if appropriate.     Some of the data in this electronic note has been brought forward from a previous encounter, any necessary changes have been made, it has been reviewed by this APRN, and it is accurate.      This document has been electronically signed by ROD Spencer  March 14, 2022 09:02 EDT    Part of this note may be an electronic transcription/translation of spoken language to printed text using the Dragon Dictation System.

## 2022-04-04 RX ORDER — PROCHLORPERAZINE 25 MG/1
SUPPOSITORY RECTAL
Qty: 9 EACH | Refills: 0 | Status: SHIPPED | OUTPATIENT
Start: 2022-04-04 | End: 2022-06-28

## 2022-04-05 ENCOUNTER — DOCUMENTATION (OUTPATIENT)
Dept: ENDOCRINOLOGY | Facility: CLINIC | Age: 47
End: 2022-04-05

## 2022-04-14 ENCOUNTER — TELEMEDICINE (OUTPATIENT)
Dept: PSYCHIATRY | Facility: CLINIC | Age: 47
End: 2022-04-14

## 2022-04-14 DIAGNOSIS — F25.1 SCHIZOAFFECTIVE DISORDER, DEPRESSIVE TYPE: Primary | ICD-10-CM

## 2022-04-14 PROCEDURE — 90832 PSYTX W PT 30 MINUTES: CPT | Performed by: COUNSELOR

## 2022-04-14 NOTE — PROGRESS NOTES
Date: April 14, 2022  Time In: 11:38 AM   Time Out: 12:07 PM  This provider is located at the [facility] [address], [place] [address] using a secure [employer] Video Visit through [employer]. Patient is being seen remotely via telehealth at home address in [place] and stated they are in a secure environment for this session. The patient's condition being diagnosed/treated is appropriate for telemedicine. The provider identified herself as well as her credentials. The patient, and/or patients guardian, consent to be seen remotely, and when consent is given they understand that the consent allows for patient identifiable information to be sent to a third party as needed. They may refuse to be seen remotely at any time. The electronic data is encrypted and password protected, and the patient and/or guardian has been advised of the potential risks to privacy not withstanding such measures.     You have chosen to receive care through a telehealth visit.  Do you consent to use a video/audio connection for your medical care today? Yes    PROGRESS NOTE  Data:  Sudhakar Saul is a 46 y.o. female who presents today for follow up. Patient states that she has had a rough two weeks and was denied disability due to being told that she could work part time and work from home. Patient was able to discuss ways in which she could try to earn some extra money but states that due to her medical condition that she feels that she will be unable to keep a job with a set schedule due to never being sure how she is going to feel. Patient states that she has been struggling with her finances and was hoping that she would see some relief if she were to be able to get her disability. Patient states that she doesn't want to feel as though she is a burden to her mother and step father and that she has been doing all the work she can around her home to help out. Patient states  that she has experienced some auditory hallucinations but nothing that was very disturbing to her. Patient states that she is willing to try just about anything that she can do to make money and improve her finances and that is her biggest stressor at this time and she has struggled to understand the reason behind why she was turned down for disability.    Chief Complaint: sadness, disappointment, auditory hallucinations    History of Present Illness: patient has battled with symptoms of schizoaffective disorder for several years      Clinical Maneuvering/Intervention:  Solution focused     (Scales based on 0 - 10 with 10 being the worst)  Depression: 8 Anxiety: varies       Assisted patient in processing above session content; acknowledged and normalized patient’s thoughts, feelings, and concerns.  Rationalized patient thought process regarding her feelings about being turned down for disability services .  Discussed triggers associated with patient's feelings of depression and anxiety such as lack of finances and her physical health.   Also discussed coping skills for patient to implement such as looking into doing more cakes and looking for part time work from home and continuing to do all she can around her home.     Allowed patient to freely discuss issues without interruption or judgment. Provided safe, confidential environment to facilitate the development of positive therapeutic relationship and encourage open, honest communication. Assisted patient in identifying risk factors which would indicate the need for higher level of care including thoughts to harm self or others and/or self-harming behavior and encouraged patient to contact this office, call 911, or present to the nearest emergency room should any of these events occur. Discussed crisis intervention services and means to access. Patient adamantly and convincingly denies current suicidal or homicidal ideation or perceptual  "disturbance.    Assessment:   Assessment   Patient appears to maintain relative stability as compared to their baseline.  However, patient continues to struggle with symptoms of schizoaffective disorder which continues to cause impairment in important areas of functioning.  A result, they can be reasonably expected to continue to benefit from treatment and would likely be at increased risk for decompensation otherwise.    Mental Status Exam:   Hygiene:   good  Cooperation:  Cooperative  Eye Contact:  Good  Psychomotor Behavior:  Appropriate  Affect:  Appropriate  Mood: fluctates  Speech:  Normal  Thought Process:  Goal directed  Thought Content:  Normal  Suicidal:  None  Homicidal:  None  Hallucinations:  Auditory- auditory of the lety voice to tell her to \"put on your big girl panties\" Hallucinated about tree roots that could be seen above ground could be moving  Delusion:  None  Memory:  Deficits  Orientation:  Person, Place, Time and Situation  Reliability:  good  Insight:  Good  Judgement:  Good  Impulse Control:  Good  Physical/Medical Issues:  No but patient will be getting an MRI and CT of her full back so that her pain management doctor can make a determination on what to do next       Patient's Support Network Includes:  parents    Functional Status: Moderate impairment     Progress toward goal: Not at goal    Prognosis: Good with Ongoing Treatment          Plan:    Patient will continue in individual outpatient therapy with focus on improved functioning and coping skills, maintaining stability, and avoiding decompensation and the need for higher level of care.    Patient will adhere to medication regimen as prescribed and report any side effects. Patient will contact this office, call 911 or present to the nearest emergency room should suicidal or homicidal ideations occur. Provide Cognitive Behavioral Therapy and Solution Focused Therapy to improve functioning, maintain stability, and avoid " decompensation and the need for higher level of care.     Return in about 2 weeks, or earlier if symptoms worsen or fail to improve.           VISIT DIAGNOSIS:     ICD-10-CM ICD-9-CM   1. Schizoaffective disorder, depressive type (HCC)  F25.1 295.70             This document has been electronically signed by DREAD Vo  April 14, 2022 11:39 EDT      Part of this note may be an electronic transcription/translation of spoken language to printed text using the Dragon Dictation System.

## 2022-04-22 ENCOUNTER — TRANSCRIBE ORDERS (OUTPATIENT)
Dept: LAB | Facility: HOSPITAL | Age: 47
End: 2022-04-22

## 2022-04-22 ENCOUNTER — LAB (OUTPATIENT)
Dept: LAB | Facility: HOSPITAL | Age: 47
End: 2022-04-22

## 2022-04-22 DIAGNOSIS — Z79.4 TYPE 2 DIABETES MELLITUS WITH HYPERGLYCEMIA, WITH LONG-TERM CURRENT USE OF INSULIN: ICD-10-CM

## 2022-04-22 DIAGNOSIS — Z79.82 LONG TERM CURRENT USE OF ASPIRIN: ICD-10-CM

## 2022-04-22 DIAGNOSIS — E11.65 TYPE 2 DIABETES MELLITUS WITH HYPERGLYCEMIA, WITH LONG-TERM CURRENT USE OF INSULIN: ICD-10-CM

## 2022-04-22 DIAGNOSIS — E55.9 VITAMIN D DEFICIENCY: ICD-10-CM

## 2022-04-22 DIAGNOSIS — E11.42 DIABETIC POLYNEUROPATHY ASSOCIATED WITH TYPE 2 DIABETES MELLITUS: Primary | ICD-10-CM

## 2022-04-22 DIAGNOSIS — K21.00 GASTROESOPHAGEAL REFLUX DISEASE WITH ESOPHAGITIS WITHOUT HEMORRHAGE: ICD-10-CM

## 2022-04-22 DIAGNOSIS — E11.42 DIABETIC POLYNEUROPATHY ASSOCIATED WITH TYPE 2 DIABETES MELLITUS: ICD-10-CM

## 2022-04-22 DIAGNOSIS — E55.9 VITAMIN D DEFICIENCY, UNSPECIFIED: ICD-10-CM

## 2022-04-22 DIAGNOSIS — Z23 NEED FOR IMMUNIZATION AGAINST INFLUENZA: ICD-10-CM

## 2022-04-22 DIAGNOSIS — J44.9 CHRONIC OBSTRUCTIVE PULMONARY DISEASE, UNSPECIFIED COPD TYPE: ICD-10-CM

## 2022-04-22 DIAGNOSIS — I10 ESSENTIAL HYPERTENSION: ICD-10-CM

## 2022-04-22 DIAGNOSIS — F20.89 SCHIZOPHRENIA, SIMPLE, CHRONIC: ICD-10-CM

## 2022-04-22 DIAGNOSIS — E11.65 UNCONTROLLED TYPE 2 DIABETES MELLITUS WITH HYPERGLYCEMIA: ICD-10-CM

## 2022-04-22 DIAGNOSIS — E78.2 MIXED HYPERLIPIDEMIA: ICD-10-CM

## 2022-04-22 DIAGNOSIS — E66.01 MORBID OBESITY: ICD-10-CM

## 2022-04-22 LAB
25(OH)D3 SERPL-MCNC: 23.9 NG/ML (ref 30–100)
ALBUMIN SERPL-MCNC: 3.8 G/DL (ref 3.5–5.2)
ALBUMIN UR-MCNC: 2.5 MG/DL
ALBUMIN/GLOB SERPL: 1.3 G/DL
ALP SERPL-CCNC: 96 U/L (ref 39–117)
ALT SERPL W P-5'-P-CCNC: 39 U/L (ref 1–33)
ANION GAP SERPL CALCULATED.3IONS-SCNC: 10 MMOL/L (ref 5–15)
AST SERPL-CCNC: 34 U/L (ref 1–32)
BASOPHILS # BLD AUTO: 0.06 10*3/MM3 (ref 0–0.2)
BASOPHILS NFR BLD AUTO: 0.7 % (ref 0–1.5)
BILIRUB SERPL-MCNC: 0.2 MG/DL (ref 0–1.2)
BUN SERPL-MCNC: 12 MG/DL (ref 6–20)
BUN/CREAT SERPL: 15.6 (ref 7–25)
CALCIUM SPEC-SCNC: 9.4 MG/DL (ref 8.6–10.5)
CHLORIDE SERPL-SCNC: 102 MMOL/L (ref 98–107)
CHOLEST SERPL-MCNC: 169 MG/DL (ref 0–200)
CO2 SERPL-SCNC: 24 MMOL/L (ref 22–29)
CREAT SERPL-MCNC: 0.77 MG/DL (ref 0.57–1)
CREAT UR-MCNC: 77.1 MG/DL
CREAT UR-MCNC: 77.1 MG/DL
DEPRECATED RDW RBC AUTO: 45.5 FL (ref 37–54)
EGFRCR SERPLBLD CKD-EPI 2021: 95.3 ML/MIN/1.73
EOSINOPHIL # BLD AUTO: 0.13 10*3/MM3 (ref 0–0.4)
EOSINOPHIL NFR BLD AUTO: 1.5 % (ref 0.3–6.2)
ERYTHROCYTE [DISTWIDTH] IN BLOOD BY AUTOMATED COUNT: 15 % (ref 12.3–15.4)
GLOBULIN UR ELPH-MCNC: 3 GM/DL
GLUCOSE SERPL-MCNC: 211 MG/DL (ref 65–99)
HBA1C MFR BLD: 9.5 % (ref 4.8–5.6)
HCT VFR BLD AUTO: 41 % (ref 34–46.6)
HDLC SERPL-MCNC: 44 MG/DL (ref 40–60)
HGB BLD-MCNC: 13.3 G/DL (ref 12–15.9)
IMM GRANULOCYTES # BLD AUTO: 0.04 10*3/MM3 (ref 0–0.05)
IMM GRANULOCYTES NFR BLD AUTO: 0.5 % (ref 0–0.5)
LDLC SERPL CALC-MCNC: 100 MG/DL (ref 0–100)
LDLC/HDLC SERPL: 2.19 {RATIO}
LYMPHOCYTES # BLD AUTO: 2.15 10*3/MM3 (ref 0.7–3.1)
LYMPHOCYTES NFR BLD AUTO: 25.4 % (ref 19.6–45.3)
MAGNESIUM SERPL-MCNC: 1.8 MG/DL (ref 1.6–2.6)
MCH RBC QN AUTO: 27.1 PG (ref 26.6–33)
MCHC RBC AUTO-ENTMCNC: 32.4 G/DL (ref 31.5–35.7)
MCV RBC AUTO: 83.7 FL (ref 79–97)
MICROALBUMIN/CREAT UR: 32.4 MG/G
MONOCYTES # BLD AUTO: 0.43 10*3/MM3 (ref 0.1–0.9)
MONOCYTES NFR BLD AUTO: 5.1 % (ref 5–12)
NEUTROPHILS NFR BLD AUTO: 5.64 10*3/MM3 (ref 1.7–7)
NEUTROPHILS NFR BLD AUTO: 66.8 % (ref 42.7–76)
NRBC BLD AUTO-RTO: 0 /100 WBC (ref 0–0.2)
PLATELET # BLD AUTO: 169 10*3/MM3 (ref 140–450)
PMV BLD AUTO: 11.1 FL (ref 6–12)
POTASSIUM SERPL-SCNC: 4.6 MMOL/L (ref 3.5–5.2)
PROT ?TM UR-MCNC: 10.8 MG/DL
PROT SERPL-MCNC: 6.8 G/DL (ref 6–8.5)
PROT/CREAT UR: 140.1 MG/G CREA (ref 0–200)
RBC # BLD AUTO: 4.9 10*6/MM3 (ref 3.77–5.28)
SODIUM SERPL-SCNC: 136 MMOL/L (ref 136–145)
TRIGL SERPL-MCNC: 144 MG/DL (ref 0–150)
TSH SERPL DL<=0.05 MIU/L-ACNC: 1.74 UIU/ML (ref 0.27–4.2)
VIT B12 BLD-MCNC: 493 PG/ML (ref 211–946)
VLDLC SERPL-MCNC: 25 MG/DL (ref 5–40)
WBC NRBC COR # BLD: 8.45 10*3/MM3 (ref 3.4–10.8)

## 2022-04-22 PROCEDURE — 83036 HEMOGLOBIN GLYCOSYLATED A1C: CPT

## 2022-04-22 PROCEDURE — 84443 ASSAY THYROID STIM HORMONE: CPT

## 2022-04-22 PROCEDURE — 84156 ASSAY OF PROTEIN URINE: CPT

## 2022-04-22 PROCEDURE — 80053 COMPREHEN METABOLIC PANEL: CPT

## 2022-04-22 PROCEDURE — 83735 ASSAY OF MAGNESIUM: CPT

## 2022-04-22 PROCEDURE — 80061 LIPID PANEL: CPT

## 2022-04-22 PROCEDURE — 85025 COMPLETE CBC W/AUTO DIFF WBC: CPT

## 2022-04-22 PROCEDURE — 82043 UR ALBUMIN QUANTITATIVE: CPT

## 2022-04-22 PROCEDURE — 82306 VITAMIN D 25 HYDROXY: CPT

## 2022-04-22 PROCEDURE — 82607 VITAMIN B-12: CPT

## 2022-04-22 PROCEDURE — 82570 ASSAY OF URINE CREATININE: CPT

## 2022-04-25 ENCOUNTER — TELEPHONE (OUTPATIENT)
Dept: FAMILY MEDICINE CLINIC | Facility: CLINIC | Age: 47
End: 2022-04-25

## 2022-04-25 ENCOUNTER — TELEMEDICINE (OUTPATIENT)
Dept: PSYCHIATRY | Facility: CLINIC | Age: 47
End: 2022-04-25

## 2022-04-25 DIAGNOSIS — F51.5 NIGHTMARES: ICD-10-CM

## 2022-04-25 DIAGNOSIS — F41.1 GENERALIZED ANXIETY DISORDER: ICD-10-CM

## 2022-04-25 DIAGNOSIS — F25.1 SCHIZOAFFECTIVE DISORDER, DEPRESSIVE TYPE: Primary | ICD-10-CM

## 2022-04-25 PROCEDURE — 99214 OFFICE O/P EST MOD 30 MIN: CPT | Performed by: NURSE PRACTITIONER

## 2022-04-25 NOTE — TELEPHONE ENCOUNTER
----- Message from Jermaine Ferro MD sent at 4/25/2022 10:31 AM CDT -----  Please call pt    Labwork stable except her vitamin D is low and recommend pt start taking this again    Her lipid panel shows LDL and total cholesterol increasing and will need to continue taking her statin medication    Hga1c at 9.50  up from 8.00 and she will need to continue watching her sugar and carb intake.  Recommend followup with Endocrinology since she is in insulin pump.       Recheck on next visit thanks

## 2022-04-25 NOTE — PROGRESS NOTES
"This provider is located at [facility], [address] [address].The Patient is seen remotely at home, [address] [facility] [place] [address] via [employer].  Patient is being seen via telehealth and confirm that they are in a secure environment for this session. The patient's condition being diagnosed/treated is appropriate for telemedicine. The provider identified himself/herself: herself as well as her credentials.   The patient gave consent to be seen remotely, and when consent is given they understand that the consent allows for patient identifiable information to be sent to a third party as needed.   They may refuse to be seen remotely at any time. The electronic data is encrypted and password protected, and the patient has been advised of the potential risks to privacy not withstanding such measures.    You have chosen to receive care through a telehealth visit.  Do you consent to use a video/audio connection for your medical care today? Yes      Chief Complaint  Follow-for schizoaffective depressed type    Subjective    Sudhakar More Saul presents to [facility] for medication management.       History of Present Illness   Patient presents today reporting that she is just getting over a cold.  Patient reports that her depression has been bad lately due to getting denied disability as she stated they told her she could work from home.  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  Patient states that she has put it over and \"God's hands\".  Patient reports that she did good with her sobriety anniversary as she states she did have dreams and woke up anxious but was able to calm herself down.  Patient reports that she is sleeping better roughly 5 hours at night.  She states that her appetite has not been the best as she was previously watching what she " eats.  Patient states that she is only saw one shadow movement.  Patient reports despite depression getting worse she states it is slowly getting better and does not want to make any changes at this time.  Denies any side effects to the medications.  Denies any SI/HI/AH/VH.        Objective   Vital Signs:   There were no vitals taken for this visit.  Due to the remote nature of this encounter (virtual encounter), vitals were unable to be obtained.  Height stated at 66 inches.  Weight stated at 338 pounds.        PHQ-9 Score:   PHQ-9 Total Score:     Patient screened positive for depression based on a PHQ-9 score of  on . Follow-up recommendations include: Patient has schizophrenia and is currently being managed with medication..    Mental Status Exam:   Hygiene:   good  Cooperation:  Cooperative  Eye Contact:  Good  Psychomotor Behavior:  Appropriate  Affect:  Appropriate  Mood: normal and depressed  Speech:  Normal  Thought Process:  Goal directed and Linear  Thought Content:  Normal  Suicidal:  None  Homicidal:  None  Hallucinations:  Auditory  Delusion:  None  Memory:  Intact  Orientation:  Person, Place, Time and Situation  Reliability:  good  Insight:  Good and Fair  Judgement:  Good and Fair  Impulse Control:  Good and Fair  Physical/Medical Issues:  Yes Dm. HTN, CKD stage 2, hep C+, JACQUE, hx substance abuse     Current Medications:   Current Outpatient Medications   Medication Sig Dispense Refill   • albuterol (ACCUNEB) 1.25 MG/3ML nebulizer solution Take 3 mL by nebulization Every 6 (Six) Hours As Needed for Wheezing or Shortness of Air. 9 mL 3   • albuterol sulfate  (90 Base) MCG/ACT inhaler Inhale 2 puffs Every 6 (Six) Hours As Needed for Wheezing or Shortness of Air. 18 g 3   • aspirin 81 MG EC tablet Take 81 mg by mouth Daily.     • Cariprazine HCl 4.5 MG capsule Take 4.5 mg by mouth Daily. 90 capsule 0   • Continuous Blood Gluc  (Dexcom G6 ) device USE FOR 6 MONTHS     • Continuous  Blood Gluc Sensor (Dexcom G6 Sensor) USE 1 SENSOR ONCE EVERY 10 DAYS 9 each 0   • Continuous Blood Gluc Transmit (Dexcom G6 Transmitter) misc 1 each Every 3 (Three) Months. 1 each 3   • cyclobenzaprine (FLEXERIL) 5 MG tablet Take 1 tablet by mouth 3 (Three) Times a Day As Needed for Muscle Spasms. (Patient taking differently: Take 5 mg by mouth 3 (Three) Times a Day.) 90 tablet 3   • Dulaglutide (Trulicity) 4.5 MG/0.5ML solution pen-injector Inject 4.5 mg under the skin into the appropriate area as directed 1 (One) Time Per Week. 4 pen 11   • DULoxetine (CYMBALTA) 30 MG capsule Take 3 capsules by mouth Daily. 270 capsule 0   • folic acid (FOLVITE) 1 MG tablet Take 1,000 mcg by mouth Daily.     • gabapentin (NEURONTIN) 300 MG capsule Take 1 capsule by mouth 3 (Three) Times a Day. (Patient taking differently: Take 100 mg by mouth 3 (Three) Times a Day.) 90 capsule 2   • glucose blood test strip Testing 4 times daily, E11.9 120 each 12   • HYDROcodone-acetaminophen (NORCO) 7.5-325 MG per tablet Take 1 tablet by mouth 3 (Three) Times a Day.     • hydrOXYzine (ATARAX) 25 MG tablet Take 0.5-1 tablets by mouth 3 (Three) Times a Day As Needed for Anxiety. 270 tablet 0   • Insulin Disposable Pump (OmniPod Dash 5 Pack Pods) misc      • insulin lispro (Admelog) 100 UNIT/ML injection 180  units daily through pump (Patient taking differently: Inject 40 Units under the skin into the appropriate area as directed 3 (Three) Times a Day Before Meals. 180  units daily through pump) 90 mL 11   • lamoTRIgine (LaMICtal) 150 MG tablet Take 1 tablet by mouth Daily. 90 tablet 0   • levETIRAcetam (KEPPRA) 1000 MG tablet Take 1 tablet by mouth 2 (Two) Times a Day. 60 tablet 3   • lisinopril (PRINIVIL,ZESTRIL) 40 MG tablet Take 1 tablet by mouth Daily. 30 tablet 3   • metFORMIN (GLUCOPHAGE) 500 MG tablet Take 1 tablet by mouth 2 (Two) Times a Day With Meals. 60 tablet 3   • metoprolol tartrate (LOPRESSOR) 50 MG tablet Take 1 tablet by mouth  Every 12 (Twelve) Hours. 60 tablet 3   • Nebulizer device 1 application Every 6 (Six) Hours As Needed (dyspnea). 1 each 3   • nicotine (NICODERM CQ) 21 MG/24HR patch Place 1 patch on the skin as directed by provider Daily. 28 patch 3   • NIFEdipine XL (PROCARDIA XL) 30 MG 24 hr tablet Take 1 tablet by mouth once daily 30 tablet 0   • omeprazole (priLOSEC) 40 MG capsule Take 1 capsule by mouth once daily 30 capsule 0   • OXcarbazepine (TRILEPTAL) 600 MG tablet Take 600 mg by mouth Every Evening.     • prazosin (MINIPRESS) 2 MG capsule Take 2 capsules by mouth Every Night. 180 capsule 0   • simvastatin (ZOCOR) 40 MG tablet Take 1 tablet by mouth Every Night. 30 tablet 3   • Steglatro 15 MG tablet TAKE 1 TABLET BY MOUTH ONCE DAILY IN THE MORNING 90 tablet 0   • tiotropium bromide-olodaterol (STIOLTO RESPIMAT) 2.5-2.5 MCG/ACT aerosol solution inhaler Inhale 2 puffs Daily. 1 inhaler 5   • vitamin D (ERGOCALCIFEROL) 1.25 MG (89783 UT) capsule capsule TAKE 2 CAPSULES BY MOUTH ONCE A WEEK 8 capsule 0     No current facility-administered medications for this visit.       Physical Exam  Nursing note reviewed.   Constitutional:       Appearance: Normal appearance.   Neurological:      Mental Status: She is alert.   Psychiatric:         Attention and Perception: Attention normal. She does not perceive auditory or visual hallucinations.         Mood and Affect: Affect normal. Mood is depressed. Mood is not anxious.         Speech: Speech normal.         Behavior: Behavior is cooperative.         Thought Content: Thought content normal.         Cognition and Memory: Cognition and memory normal.        Result Review :     The following data was reviewed by: ROD Spencer on 02/03/2021:  Common labs    Common Labsle 10/12/21 10/12/21 10/21/21 4/22/22 4/22/22 4/22/22 4/22/22 4/22/22    1427 1428  0808 0808 0808 0808 0808   Glucose  109 (A)   211 (A)      BUN  12   12      Creatinine  0.75   0.77      eGFR Non African Am  83          Sodium  133 (A)   136      Potassium  4.3   4.6      Chloride  98   102      Calcium  9.4   9.4      Albumin     3.80      Total Bilirubin     0.2      Alkaline Phosphatase     96      AST (SGOT)     34 (A)      ALT (SGPT)     39 (A)      WBC 11.86 (A)   8.45       Hemoglobin 13.3   13.3       Hematocrit 40.5   41.0       Platelets 202   169       Total Cholesterol      169     Triglycerides      144     HDL Cholesterol      44     LDL Cholesterol       100     Hemoglobin A1C   8.00 (A)    9.50 (A)    Microalbumin, Urine        2.5   (A) Abnormal value            CMP    CMP 7/20/21 10/12/21 4/22/22   Glucose 146 (A) 109 (A) 211 (A)   BUN 20 12 12   Creatinine 0.57 0.75 0.77   eGFR Non African Am 114 83    Sodium 136 133 (A) 136   Potassium 4.8 4.3 4.6   Chloride 102 98 102   Calcium 9.1 9.4 9.4   Albumin 3.70  3.80   Total Bilirubin <0.2  0.2   Alkaline Phosphatase 76  96   AST (SGOT) 16  34 (A)   ALT (SGPT) 19  39 (A)   (A) Abnormal value            CBC    CBC 7/20/21 10/12/21 4/22/22   WBC 11.45 (A) 11.86 (A) 8.45   RBC 4.76 4.85 4.90   Hemoglobin 13.8 13.3 13.3   Hematocrit 41.4 40.5 41.0   MCV 87.0 83.5 83.7   MCH 29.0 27.4 27.1   MCHC 33.3 32.8 32.4   RDW 13.4 14.6 15.0   Platelets 176 202 169   (A) Abnormal value            CBC w/diff    CBC w/Diff 6/10/20 9/9/20 1/26/21   WBC 8.89 9.63 9.04   RBC 4.85 5.07 5.10   Hemoglobin 14.1 14.9 14.4   Hematocrit 42.8 44.3 45.0   MCV 88.2 87.4 88.2   MCH 29.1 29.4 28.2   MCHC 32.9 33.6 32.0   RDW 13.6 13.3 13.0   Platelets 157 180 174   Neutrophil Rel % 63.8 67.2    Immature Granulocyte Rel % 0.6 (A) 0.5    Lymphocyte Rel % 26.4 21.6    Monocyte Rel % 4.5 (A) 4.8 (A)    Eosinophil Rel % 3.9 5.3    Basophil Rel % 0.8 0.6    (A) Abnormal value            Lipid Panel    Lipid Panel 7/20/21 4/22/22   Total Cholesterol 123 169   Triglycerides 114 144   HDL Cholesterol 43 44   VLDL Cholesterol 21 25   LDL Cholesterol  59 100   LDL/HDL Ratio 1.33 2.19           TSH     TSH 7/20/21 10/21/21 4/22/22   TSH 1.680 1.110 1.740           BMP    BMP 7/20/21 10/12/21 4/22/22   BUN 20 12 12   Creatinine 0.57 0.75 0.77   Sodium 136 133 (A) 136   Potassium 4.8 4.3 4.6   Chloride 102 98 102   CO2 22.5 25.0 24.0   Calcium 9.1 9.4 9.4   (A) Abnormal value            HgB    HGB 7/20/21 10/12/21 4/22/22   Hemoglobin 13.8 13.3 13.3           UA    Urinalysis 10/12/21   Specific [place], UA 1.013   Ketones, UA Negative   Blood, UA Negative   Leukocytes, UA Negative   Nitrite, UA Negative           Data reviewed: PCP notes        Assessment and Plan    Problem List Items Addressed This Visit        Mental Health    Schizoaffective disorder, depressive type (HCC) - Primary      Other Visit Diagnoses     Nightmares        Generalized anxiety disorder                TREATMENT PLAN/GOALS: Continue supportive psychotherapy efforts and medications as indicated. Treatment and medication options discussed during today's visit. Patient ackowledged and verbally consented to continue with current treatment plan and was educated on the importance of compliance with treatment and follow-up appointments.    MEDICATION ISSUES:  We discussed risks, benefits, and side effects of the above medications and the patient was agreeable with the plan. Patient was educated on the importance of compliance with treatment and follow-up appointments.  Patient is agreeable to call the office with any worsening of symptoms or onset of side effects. Patient is agreeable to call 911 or go to the nearest ER should he/she begin having SI/HI. Lengthy discussion with patient on the possible side effects of antipsychotic medications including increased cholesterol, increased blood sugar, and possibility of weight gain.  Also discussed the need to monitor lab work associated with this.  The risk of muscle movement disorders with this class of medication was also discussed. We will continue Lamictal in an effort to stabilize mood.  The  patient was reminded to immediately come to the hospital should there be any loss of control.  Explanation was given to her regarding Lamictal and the potential for Foster Shon syndrome and significant rash.  Patient was encouraged to check skin prior to beginning.  Patient was encouraged to report any rash and to immediately stop medication.      -Continue lamotrigine 150 mg daily for schizoaffective disorder.  -Continue minipress 2-4mg at night for nightmares.  -Continue Vraylar to 4.5 mg daily for schizoaffective disorder depressed type.  -Continue hydroxyzine 12.5 to 25 mg up to 3 times a day as needed for anxiety and panic.  - Continue Cymbalta 90mg daily for depression and pain and will further evaluate.  -Continue psychotherapy to help with past trauma as well as depressive and anxiety symptoms.  -Highly encourage patient since she is now on Trileptal to be aware of side effects of multiple mood stabilizers and if she were to have any rash to contact the clinic as we may have to look at tapering off the lamotrigine or talking with neurologist to reevaluate Trileptal.  Patient verbalized understanding and denied any side effects at this time.  -Informed patient if her depression did not improved to contact the clinic as we can make med adjustments as she stated she did not believe she wanted to make any adjustments at this time.    Patient has now failed and tried Haldol, aripiprazole, Latuda and now risperidone which has caused weight gain and due to diabetes and kidney disease would benefit from Vraylar as patient is still having auditory and visual hallucinations.     Counseled patient regarding multimodal approach with healthy nutrition, healthy sleep, regular physical activity, social activities, counseling, and medications.      Coping skills reviewed and encouraged positive framing of thoughts     Assisted patient in processing above session content; acknowledged and normalized patient’s thoughts,  feelings, and concerns.  Applied  positive coping skills and behavior management in session.  Allowed patient to freely discuss issues without interruption or judgment. Provided safe, confidential environment to facilitate the development of positive therapeutic relationship and encourage open, honest communication. Assisted patient in identifying risk factors which would indicate the need for higher level of care including thoughts to harm self or others and/or self-harming behavior and encouraged patient to contact this office, call 911, or present to the nearest emergency room should any of these events occur. Discussed crisis intervention services and means to access.     MEDS ORDERED DURING VISIT:  No orders of the defined types were placed in this encounter.    90-day supply sent in last visit    Follow Up   Return in about 4 weeks (around 5/23/2022), or if symptoms worsen or fail to improve, for Recheck.  Patient had an emergency with her dog so she stated she would call for a follow-up.    Patient was given instructions and counseling regarding her condition or for health maintenance advice. Please see specific information pulled into the AVS if appropriate.     Some of the data in this electronic note has been brought forward from a previous encounter, any necessary changes have been made, it has been reviewed by this APRN, and it is accurate.      This document has been electronically signed by ROD Spencer  April 25, 2022 09:12 EDT    Part of this note may be an electronic transcription/translation of spoken language to printed text using the Dragon Dictation System.

## 2022-04-28 ENCOUNTER — TELEMEDICINE (OUTPATIENT)
Dept: PSYCHIATRY | Facility: CLINIC | Age: 47
End: 2022-04-28

## 2022-04-28 DIAGNOSIS — F25.1 SCHIZOAFFECTIVE DISORDER, DEPRESSIVE TYPE: Primary | ICD-10-CM

## 2022-04-28 PROCEDURE — 90832 PSYTX W PT 30 MINUTES: CPT | Performed by: COUNSELOR

## 2022-04-29 ENCOUNTER — OFFICE VISIT (OUTPATIENT)
Dept: ENDOCRINOLOGY | Facility: CLINIC | Age: 47
End: 2022-04-29

## 2022-04-29 VITALS
BODY MASS INDEX: 47.09 KG/M2 | SYSTOLIC BLOOD PRESSURE: 164 MMHG | DIASTOLIC BLOOD PRESSURE: 88 MMHG | WEIGHT: 293 LBS | OXYGEN SATURATION: 95 % | HEART RATE: 118 BPM | HEIGHT: 66 IN

## 2022-04-29 DIAGNOSIS — Z79.4 TYPE 2 DIABETES MELLITUS WITH HYPERGLYCEMIA, WITH LONG-TERM CURRENT USE OF INSULIN: Primary | ICD-10-CM

## 2022-04-29 DIAGNOSIS — E11.49 OTHER DIABETIC NEUROLOGICAL COMPLICATION ASSOCIATED WITH TYPE 2 DIABETES MELLITUS: ICD-10-CM

## 2022-04-29 DIAGNOSIS — E78.2 MIXED HYPERLIPIDEMIA: ICD-10-CM

## 2022-04-29 DIAGNOSIS — E11.65 TYPE 2 DIABETES MELLITUS WITH HYPERGLYCEMIA, WITH LONG-TERM CURRENT USE OF INSULIN: Primary | ICD-10-CM

## 2022-04-29 DIAGNOSIS — I10 ESSENTIAL HYPERTENSION: ICD-10-CM

## 2022-04-29 DIAGNOSIS — E55.9 VITAMIN D DEFICIENCY: ICD-10-CM

## 2022-04-29 PROCEDURE — 95251 CONT GLUC MNTR ANALYSIS I&R: CPT | Performed by: NURSE PRACTITIONER

## 2022-04-29 PROCEDURE — 99214 OFFICE O/P EST MOD 30 MIN: CPT | Performed by: NURSE PRACTITIONER

## 2022-04-29 RX ORDER — INSULIN LISPRO 100 [IU]/ML
INJECTION, SOLUTION INTRAVENOUS; SUBCUTANEOUS
Qty: 90 ML | Refills: 11 | Status: SHIPPED | OUTPATIENT
Start: 2022-04-29

## 2022-04-29 NOTE — PROGRESS NOTES
"Chief Complaint  Diabetes (3 mo f/u )    Subjective          Sudhakar Saul presents to [facility]  History of Present Illness       In office vist    46 year old female presents for follow up            Diagnosed in her 20s         Reason diabetes mellitus type 2     Timing constant      Quality not controlled          Severity high                Macrovascular-- no CAD, no PAD, no CVA                     Microvascular--- neuropathy, mild diabetic retinopathy , CKD stage II     COPD , hepatitis C                      Diabetes Regimen     Insulin through pump          omni pod            Blood Glucose Readings     Checking 4 times daily      Uses dexcom       see below            Review of Systems - General ROS: negative        Objective   Vital Signs:   /88   Pulse 118   Ht 167.6 cm (66\")   Wt (!) 154 kg (339 lb 11.2 oz)   SpO2 95%   BMI 54.83 kg/m²     Physical Exam  Constitutional:       Appearance: Normal appearance.   Cardiovascular:      Rate and Rhythm: Regular rhythm.      Heart sounds: Normal heart sounds.   Pulmonary:      Breath sounds: Normal breath sounds.   Musculoskeletal:      Cervical back: Normal range of motion.   Neurological:      Mental Status: She is alert.        Result Review :   The following data was reviewed by: ROD Chawla on 04/29/2022:  Common labs    Common Labsle 10/12/21 10/12/21 10/21/21 4/22/22 4/22/22 4/22/22 4/22/22 4/22/22    1427 1428  0808 0808 0808 0808 0808   Glucose  109 (A)   211 (A)      BUN  12   12      Creatinine  0.75   0.77      eGFR Non African Am  83         Sodium  133 (A)   136      Potassium  4.3   4.6      Chloride  98   102      Calcium  9.4   9.4      Albumin     3.80      Total Bilirubin     0.2      Alkaline Phosphatase     96      AST (SGOT)     34 (A)      ALT (SGPT)     39 (A)      WBC 11.86 (A)   8.45       Hemoglobin 13.3   13.3       Hematocrit 40.5   41.0       Platelets 202   " 169       Total Cholesterol      169     Triglycerides      144     HDL Cholesterol      44     LDL Cholesterol       100     Hemoglobin A1C   8.00 (A)    9.50 (A)    Microalbumin, Urine        2.5   (A) Abnormal value                      Assessment and Plan    Diagnoses and all orders for this visit:    1. Type 2 diabetes mellitus with hyperglycemia, with long-term current use of insulin (HCC) (Primary)    Other orders  -     insulin lispro (Admelog) 100 UNIT/ML injection; 180  units daily through pump  Dispense: 90 mL; Refill: 11          Glycemic Management     Diabetes mellitus type 2 not controlled      Lab Results   Component Value Date    HGBA1C 9.50 (H) 04/22/2022           Dexcom G6      Downloaded and reviewed      Dated from April 16 to April 29, 2022      Average bg 277     Time in target 6%       High 27%      Very high 67%      Low 0 %      Very low 0 %            Hyperglycemia all time      Adjust all insulin                Omni pod         Basal      MN -  2.75 --change to 2.85        Carb ratio      4 --change to 3      Correction         14     Average bg 120               Taking trulicity 4.5  mg weekly      Taking Metformin 1000 mg po BID      Taking Steglatro 15 mg one daily         Previous regimen      basaglar taking 63 units ---stop        admelog--stop      Taking about 30 units            Aim for 45 grams of Carbohydrate for meals      Aim for 15 grams of Carbohydrate for snack                        Lipid Management           Taking Simvastatin 40 mg          Total Cholesterol   Date Value Ref Range Status   04/22/2022 169 0 - 200 mg/dL Final     Triglycerides   Date Value Ref Range Status   04/22/2022 144 0 - 150 mg/dL Final     HDL Cholesterol   Date Value Ref Range Status   04/22/2022 44 40 - 60 mg/dL Final     LDL Cholesterol    Date Value Ref Range Status   04/22/2022 100 0 - 100 mg/dL Final           Blood Pressure Management           Taking Lisinopril 40 mg  daily               Microvascular Complication Monitoring        Last eye exam   May 2022 , mild DR            Neuropathy        Taking Gabapentin 300 mg tid          Component      Latest Ref Rng & Units 4/22/2022   Microalbumin/Creatinine Ratio      mg/g 32.4   Creatinine, Urine      mg/dL 77.1   Microalbumin, Urine      mg/dL 2.5           Bone Health            taking vitamin d daily        Component      Latest Ref Rng & Units 4/22/2022   25 Hydroxy, Vitamin D      30.0 - 100.0 ng/ml 23.9 (L)           Other Diabetes Related Aspects        Lab Results   Component Value Date    [id number] 04/22/2022                   Thyroid health         Lab Results   Component Value Date    TSH 1.740 04/22/2022                            Follow Up   No follow-ups on file.  Patient was given instructions and counseling regarding her condition or for health maintenance advice. Please see specific information pulled into the AVS if appropriate.         This document has been electronically signed by ROD Chawla on April 29, 2022 09:03 CDT.

## 2022-05-12 ENCOUNTER — TELEMEDICINE (OUTPATIENT)
Dept: PSYCHIATRY | Facility: CLINIC | Age: 47
End: 2022-05-12

## 2022-05-12 DIAGNOSIS — F25.1 SCHIZOAFFECTIVE DISORDER, DEPRESSIVE TYPE: Primary | ICD-10-CM

## 2022-05-12 PROCEDURE — 90837 PSYTX W PT 60 MINUTES: CPT | Performed by: COUNSELOR
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
## 2022-05-16 DIAGNOSIS — F51.5 NIGHTMARES: ICD-10-CM

## 2022-05-16 RX ORDER — LISINOPRIL 40 MG/1
40 TABLET ORAL DAILY
Qty: 30 TABLET | Refills: 0 | Status: SHIPPED | OUTPATIENT
Start: 2022-05-16 | End: 2022-06-14 | Stop reason: SDUPTHER

## 2022-05-16 RX ORDER — OMEPRAZOLE 40 MG/1
40 CAPSULE, DELAYED RELEASE ORAL DAILY
Qty: 30 CAPSULE | Refills: 0 | Status: SHIPPED | OUTPATIENT
Start: 2022-05-16 | End: 2022-06-14 | Stop reason: SDUPTHER

## 2022-05-16 RX ORDER — METOPROLOL TARTRATE 50 MG/1
50 TABLET, FILM COATED ORAL EVERY 12 HOURS
Qty: 60 TABLET | Refills: 0 | Status: SHIPPED | OUTPATIENT
Start: 2022-05-16 | End: 2022-06-14 | Stop reason: SDUPTHER

## 2022-05-16 RX ORDER — PRAZOSIN HYDROCHLORIDE 2 MG/1
2-4 CAPSULE ORAL NIGHTLY
Qty: 180 CAPSULE | Refills: 0 | Status: SHIPPED | OUTPATIENT
Start: 2022-05-16 | End: 2022-09-07 | Stop reason: SDUPTHER

## 2022-05-16 NOTE — TELEPHONE ENCOUNTER
Rx Refill Note  Requested Prescriptions     Pending Prescriptions Disp Refills   • metoprolol tartrate (LOPRESSOR) 50 MG tablet [Pharmacy Med Name: Metoprolol Tartrate 50 MG Oral Tablet] 60 tablet 0     Sig: Take 1 tablet by mouth Every 12 (Twelve) Hours. NO ADDITIONAL REFILLS WITHOUT AN APPOINTMENT   • omeprazole (priLOSEC) 40 MG capsule [Pharmacy Med Name: Omeprazole 40 MG Oral Capsule Delayed Release] 30 capsule 0     Sig: Take 1 capsule by mouth Daily. NO ADDITIONAL REFILLS WITHOUT AN APPOINTMENT   • lisinopril (PRINIVIL,ZESTRIL) 40 MG tablet [Pharmacy Med Name: Lisinopril 40 MG Oral Tablet] 30 tablet 0     Sig: Take 1 tablet by mouth Daily. NO ADDITIONAL REFILLS WITHOUT AN APPOINTMENT      Last office visit with prescribing clinician: 10/28/2021      Next office visit with prescribing clinician: Visit date not found   {TIP  Encounters:     Beta-Blockers Protocol Failed 05/16/2022 07:26 AM   Protocol Details  Recent or future visit with authorizing provider            {TIP  Please add Last Relevant Lab Date if appropriate  {TIP  Is Refill Pharmacy correct? YES  Scotty Dickinson LPN  05/16/22, 08:37 CDT

## 2022-05-24 RX ORDER — PROCHLORPERAZINE 25 MG/1
SUPPOSITORY RECTAL
Qty: 1 EACH | Refills: 0 | Status: SHIPPED | OUTPATIENT
Start: 2022-05-24 | End: 2022-09-02

## 2022-06-06 ENCOUNTER — TELEPHONE (OUTPATIENT)
Dept: PSYCHIATRY | Facility: CLINIC | Age: 47
End: 2022-06-06

## 2022-06-06 NOTE — TELEPHONE ENCOUNTER
Called patient to work in with [facility] today but patient could not take the appointment due to conflict with another appointment.

## 2022-06-08 NOTE — PROGRESS NOTES
Subjective:  Sudhakar Saul is a 46 y.o. female who presents for       Patient Active Problem List   Diagnosis   • Seizure disorder (HCC)   • Morbid obesity (HCC)   • Tobacco abuse counseling   • Multiple joint pain   • Dysuria   • History of positive hepatitis C   • Essential hypertension   • Uncontrolled type 2 diabetes mellitus with hyperglycemia (HCC)   • Vitamin D deficiency   • Urinary tract infection without hematuria   • Chronic hepatitis C without hepatic coma (HCC)   • Cigarette nicotine dependence, uncomplicated   • Chronic obstructive pulmonary disease (HCC)   • JACQUE (obstructive sleep apnea)   • Gastroesophageal reflux disease with esophagitis   • Left upper quadrant pain   • Nausea and vomiting   • Weight gain, abnormal   • Change in bowel habits   • Hepatic fibrosis   • Non-compliance   • Schizoaffective disorder, depressive type (HCC)   • Schizophrenia, paranoid type ([facility])   • Gastritis without bleeding   • Tobacco user   • Encounter for eye exam   • Scoliosis   • History of drug use   • Stage 2 chronic kidney disease   • Diabetic neuropathy (HCC)   • Mixed hyperlipidemia   • Chronic bilateral low back pain with bilateral sciatica   • Type 2 diabetes mellitus with hyperglycemia, with long-term current use of insulin ([facility])   • Auditory hallucination   • Class 3 severe obesity due to excess calories with serious comorbidity and body mass index (BMI) of 45.0 to 49.9 in adult (HCC)   • Pain in wrist   • Schizophrenia, simple, chronic (HCC)   • Carpal tunnel syndrome   • Status post carpal tunnel release           Current Outpatient Medications:   •  albuterol (ACCUNEB) 1.25 MG/3ML nebulizer solution, Take 3 mL by nebulization Every 6 (Six) Hours As Needed for Wheezing or Shortness of Air., Disp: 9 mL, Rfl: 3  •  albuterol sulfate  (90 Base) MCG/ACT inhaler, Inhale 2 puffs Every 6 (Six) Hours As Needed for Wheezing or Shortness of Air., Disp: 18 g, Rfl: 3  •  aspirin 81 MG EC tablet, Take 81  mg by mouth Daily., Disp: , Rfl:   •  Cariprazine HCl 4.5 MG capsule, Take 4.5 mg by mouth Daily., Disp: 90 capsule, Rfl: 0  •  Continuous Blood Gluc  (Dexcom G6 ) device, USE FOR 6 MONTHS, Disp: , Rfl:   •  Continuous Blood Gluc Sensor (Dexcom G6 Sensor), USE 1 SENSOR ONCE EVERY 10 DAYS, Disp: 9 each, Rfl: 0  •  Continuous Blood Gluc Transmit (Dexcom G6 Transmitter) misc, USE 1  ONCE EVERY THREE MONTHS, Disp: 1 each, Rfl: 0  •  cyclobenzaprine (FLEXERIL) 5 MG tablet, Take 1 tablet by mouth 3 (Three) Times a Day As Needed for Muscle Spasms. (Patient taking differently: Take 5 mg by mouth 3 (Three) Times a Day.), Disp: 90 tablet, Rfl: 3  •  Dulaglutide (Trulicity) 4.5 MG/0.5ML solution pen-injector, Inject 4.5 mg under the skin into the appropriate area as directed 1 (One) Time Per Week., Disp: 4 pen, Rfl: 11  •  DULoxetine (CYMBALTA) 30 MG capsule, Take 3 capsules by mouth Daily., Disp: 270 capsule, Rfl: 0  •  Ertugliflozin L-PyroglutamicAc (Steglatro) 15 MG tablet, Take 1 tablet by mouth Every Morning., Disp: 90 tablet, Rfl: 1  •  folic acid (FOLVITE) 1 MG tablet, Take 1,000 mcg by mouth Daily., Disp: , Rfl:   •  gabapentin (NEURONTIN) 300 MG capsule, Take 1 capsule by mouth 3 (Three) Times a Day. (Patient taking differently: Take 100 mg by mouth 3 (Three) Times a Day.), Disp: 90 capsule, Rfl: 2  •  glucose blood test strip, Testing 4 times daily, E11.9, Disp: 120 each, Rfl: 12  •  HYDROcodone-acetaminophen (NORCO) 7.5-325 MG per tablet, Take 1 tablet by mouth 3 (Three) Times a Day., Disp: , Rfl:   •  hydrOXYzine (ATARAX) 25 MG tablet, Take 0.5-1 tablets by mouth 3 (Three) Times a Day As Needed for Anxiety., Disp: 270 tablet, Rfl: 0  •  Insulin Disposable Pump (OmniPod Dash 5 Pack Pods) misc, , Disp: , Rfl:   •  insulin lispro (Admelog) 100 UNIT/ML injection, 180  units daily through pump, Disp: 90 mL, Rfl: 11  •  lamoTRIgine (LaMICtal) 150 MG tablet, Take 1 tablet by mouth Daily., Disp: 90 tablet,  Rfl: 0  •  levETIRAcetam (KEPPRA) 1000 MG tablet, Take 1 tablet by mouth 2 (Two) Times a Day., Disp: 60 tablet, Rfl: 3  •  lisinopril (PRINIVIL,ZESTRIL) 40 MG tablet, Take 1 tablet by mouth Daily., Disp: 30 tablet, Rfl: 3  •  metFORMIN (GLUCOPHAGE) 500 MG tablet, Take 1 tablet by mouth 2 (Two) Times a Day With Meals., Disp: 60 tablet, Rfl: 3  •  metoprolol tartrate (LOPRESSOR) 50 MG tablet, Take 1 tablet by mouth 2 (Two) Times a Day., Disp: 60 tablet, Rfl: 3  •  Nebulizer device, 1 application Every 6 (Six) Hours As Needed (dyspnea)., Disp: 1 each, Rfl: 3  •  nicotine (NICODERM CQ) 21 MG/24HR patch, Place 1 patch on the skin as directed by provider Daily., Disp: 28 patch, Rfl: 3  •  NIFEdipine XL (PROCARDIA XL) 30 MG 24 hr tablet, Take 1 tablet by mouth Daily., Disp: 30 tablet, Rfl: 3  •  omeprazole (priLOSEC) 40 MG capsule, Take 1 capsule by mouth Daily., Disp: 30 capsule, Rfl: 3  •  OXcarbazepine (TRILEPTAL) 600 MG tablet, Take 600 mg by mouth Every Evening., Disp: , Rfl:   •  prazosin (MINIPRESS) 2 MG capsule, Take 1-2 capsules by mouth Every Night., Disp: 180 capsule, Rfl: 0  •  simvastatin (ZOCOR) 40 MG tablet, Take 1 tablet by mouth Every Night., Disp: 30 tablet, Rfl: 3  •  tiotropium bromide-olodaterol (STIOLTO RESPIMAT) 2.5-2.5 MCG/ACT aerosol solution inhaler, Inhale 2 puffs Daily., Disp: 1 inhaler, Rfl: 5  •  vitamin D (ERGOCALCIFEROL) 1.25 MG (18101 UT) capsule capsule, Take 2 tablets by mouth weekly, Disp: 8 capsule, Rfl: 3    HPI     Pt is 45 yo female with management  of morbid obesity, IDDM Type 2 now on insulin pump , HTN, HLP, vitamin D deficiency, tobacco user, GERD, gastritis, esophagitis, diverticulosis, colonic polyp in sigmoid colon,  paranoid schizophrenia,  Seizure disorder, history of Illicit drug abuse, COPD, JACQUE, chronic hepatitis C , major depression, JD, sp appendectomy, sp cholecystectomy sp right carpal tunnel release, sp back surgery, sp breast surgery, sp lipoma excision, chronic  back pain (moderate L4-L5 and L5-S1 DDD changes, mild bilateral L4-L5 moderate bilateral L5-S1 facet arthritic change, mild leveoscoliosis), CKD stage 2, de Quervain's syndrome of left wrist ,sp left carpal tunnel release      10/28/21 in office visit for recheck on pt's above medical issues. Pt had left carpal tunnel release. Pt continues to take her psych medications, IDDM type 2, HTN, HLP, Vitamin D deficiency. Her last hga1c on 10/21/21 was at 8.0 from 7.60. pt gained 20 lbs since last visit. She did go to ER at [facility] for chest pain and cardiac test per patient were negative. SHe was given nebulizer treatment. She would like a nebulizer.  Doing well on medications. Pt doing well since left carpl tunnel surgery. Currently in a cast. Pain is minimal     6/14/22 in office visit for recheck. Pt continues to take to  for her major depression/schizophrenia. She had labwork on 4/22/22 that showed hga1c at 9.50 vitamin D is low. CMP showed elevated liver enzymes. Glucose at 211. BP is better controlled with procardia.  She is now back on track in keeping her sugars control. She stopped caring about her health in January and recently she is now back on track. She is now back to taking her medication. She is talking to counseling every 2-3 week.s She is now on trileptal started by her Neurologist Dr. Monte.  She has cut back pain on her smoking to a few cigarettes a day. Her sugar readings now range in 180s .       Hyperlipidemia  The current episode started more than 1 year ago. The problem is controlled. Recent lipid tests were reviewed and are variable. Exacerbating diseases include chronic renal disease, diabetes and obesity. She has no history of nephrotic syndrome. Associated symptoms include shortness of breath. Pertinent negatives include no chest pain. Current antihyperlipidemic treatment includes statins. The current treatment provides significant improvement of lipids. Risk factors for coronary artery disease  include diabetes mellitus, obesity, dyslipidemia, a sedentary lifestyle, stress and hypertension.   Seizures   This is a chronic problem. The current episode started more than 1 week ago. The problem has not changed since onset.Pertinent negatives include no sleepiness, no confusion, no headaches, no speech difficulty, no visual disturbance, no neck stiffness, no sore throat, no chest pain, no cough, no nausea, no vomiting, no diarrhea and no muscle weakness. There has been no fever.   Chronic Kidney Disease  This is a chronic problem. The current episode started more than 1 year ago. The problem occurs constantly. The problem has been unchanged. Associated symptoms include arthralgias, fatigue, numbness and weakness. Pertinent negatives include no abdominal pain, chest pain, coughing, headaches, nausea, sore throat or vomiting. Nothing aggravates the symptoms. She has tried nothing for the symptoms. The treatment provided no relief.   Heartburn  She reports no abdominal pain, no belching, no chest pain, no choking, no coughing, no early satiety, no globus sensation, no heartburn, no hoarse voice, no nausea, no sore throat, no stridor, no tooth decay, no water brash or no wheezing. This is a chronic problem. The current episode started more than 1 month ago. The problem occurs constantly. The problem has been unchanged. Nothing aggravates the symptoms. Associated symptoms include fatigue. She has tried a PPI for the symptoms.   Obesity   This is a chronic problem. The current episode started more than 1 year ago. The problem occurs constantly. The problem has been unchanged. Associated symptoms include arthralgias, fatigue, headaches, numbness and weakness. Pertinent negatives include no abdominal pain, anorexia, change in bowel habit, chest pain, chills, congestion, coughing, fever, joint swelling, myalgias, nausea, neck pain, rash, sore throat, swollen glands, urinary symptoms, vertigo, visual change or  vomiting. Nothing aggravates the symptoms. She has tried nothing for the symptoms.   Hypertension   This is a chronic problem. The current episode started more than 1 year ago. The problem is controlled  Associated symptoms include headaches. Pertinent negatives include no anxiety, blurred vision, chest pain, malaise/fatigue, neck pain, orthopnea, palpitations, peripheral edema, PND or shortness of breath. Risk factors for coronary artery disease include obesity, sedentary lifestyle, smoking/tobacco exposure and stress. Past treatments include ACE inhibitors, beta blockers and diuretics. There are no compliance problems.  There is no history of angina, kidney disease, CAD/MI, CVA, heart failure, left ventricular hypertrophy, PVD or retinopathy. There is no history of chronic renal disease, coarctation of the aorta, hyperaldosteronism, hypercortisolism, hyperparathyroidism, a hypertension causing med, pheochromocytoma, renovascular disease, sleep apnea or a thyroid problem.   Diabetes   She presents for her followupdiabetic visit. She has type 2 diabetes mellitus. Her disease course has been waxing and waning  Hypoglycemia symptoms include confusion, headaches, nervousness/anxiousness, seizures and sleepiness. Associated symptoms include fatigue and weakness. Pertinent negatives for diabetes include no blurred vision, no chest pain and no visual change. Pertinent negatives for diabetic complications include no CVA, PVD or retinopathy. Risk factors for coronary artery disease include dyslipidemia, obesity and post-menopausal. She has not had a previous visit with a dietitian. There is no change in her home blood glucose trend. An ACE inhibitor/angiotensin II receptor blocker is not being taken. She does not see a podiatrist.Eye exam is not current. teratments include steglastro insulin and trulicity           Review of Systems  Review of Systems   Constitutional: Positive for activity change and fatigue. Negative for  appetite change, chills, diaphoresis and fever.   HENT: Negative for congestion, postnasal drip, rhinorrhea, sinus pressure, sinus pain, sneezing, sore throat, trouble swallowing and voice change.    Respiratory: Positive for shortness of breath. Negative for cough, choking, chest tightness, wheezing and stridor.    Cardiovascular: Negative for chest pain.   Gastrointestinal: Negative for diarrhea, nausea and vomiting.   Musculoskeletal: Positive for arthralgias.   Neurological: Positive for seizures, weakness and numbness. Negative for headaches.   Psychiatric/Behavioral: Positive for agitation and behavioral problems. The patient is nervous/anxious.        Patient Active Problem List   Diagnosis   • Seizure disorder (HCC)   • Morbid obesity (HCC)   • Tobacco abuse counseling   • Multiple joint pain   • Dysuria   • History of positive hepatitis C   • Essential hypertension   • Uncontrolled type 2 diabetes mellitus with hyperglycemia (HCC)   • Vitamin D deficiency   • Urinary tract infection without hematuria   • Chronic hepatitis C without hepatic coma (HCC)   • Cigarette nicotine dependence, uncomplicated   • Chronic obstructive pulmonary disease (HCC)   • JACQUE (obstructive sleep apnea)   • Gastroesophageal reflux disease with esophagitis   • Left upper quadrant pain   • Nausea and vomiting   • Weight gain, abnormal   • Change in bowel habits   • Hepatic fibrosis   • Non-compliance   • Schizoaffective disorder, depressive type (HCC)   • Schizophrenia, paranoid type (HCC)   • Gastritis without bleeding   • Tobacco user   • Encounter for eye exam   • Scoliosis   • History of drug use   • Stage 2 chronic kidney disease   • Diabetic neuropathy (HCC)   • Mixed hyperlipidemia   • Chronic bilateral low back pain with bilateral sciatica   • Type 2 diabetes mellitus with hyperglycemia, with long-term current use of insulin (HCC)   • Auditory hallucination   • Class 3 severe obesity due to excess calories with serious  comorbidity and body mass index (BMI) of 45.0 to 49.9 in adult (HCC)   • Pain in wrist   • Schizophrenia, simple, chronic (HCC)   • Carpal tunnel syndrome   • Status post carpal tunnel release     Past Surgical History:   Procedure Laterality Date   • APPENDECTOMY     • BACK SURGERY     • BREAST SURGERY     • CARPAL TUNNEL RELEASE      right hand   • CARPAL TUNNEL RELEASE Left 10/19/2021    Procedure: LEFT CARPAL TUNNEL RELEASE;  Surgeon: Kevin Carlson MD;  Location: [facility] OR;  Service: Orthopedics;  Laterality: Left;   • CHOLECYSTECTOMY     • COLONOSCOPY     • COLONOSCOPY N/A 2/8/2019    Procedure: COLONOSCOPY;  Surgeon: Joni Delacruz MD;  Location: [facility] ENDOSCOPY;  Service: Gastroenterology   • DENTAL PROCEDURE     • ENDOSCOPY N/A 2/8/2019    Procedure: ESOPHAGOGASTRODUODENOSCOPY--poss dilation;  Surgeon: Joni Delacruz MD;  Location: [facility] ENDOSCOPY;  Service: Gastroenterology   • HYSTERECTOMY     • LIPOMA EXCISION      9   • LIVER BIOPSY     • UPPER GASTROINTESTINAL ENDOSCOPY  02/08/2019     Social History     Socioeconomic History   • Marital status:    Tobacco Use   • Smoking status: Light Tobacco Smoker     Packs/day: 0.25     Years: 20.00     Pack years: 5.00     Types: Cigarettes   • Smokeless tobacco: Never Used   • Tobacco comment: on patch trying to quit    Vaping Use   • Vaping Use: Never used   Substance and Sexual Activity   • Alcohol use: Not Currently   • Drug use: Not Currently     Types: Amphetamines, Heroin, Methamphetamines, Morphine, Oxycodone     Comment: QUIT 3 YEARS AGO   • Sexual activity: Not Currently     Birth control/protection: Surgical     Family History   Problem Relation Age of Onset   • Hypertension Other    • Diabetes Other    • Stroke Other    • Cancer Other    • Kidney disease Other    • Lung disease Other    • Other Other    • Malone's esophagus Other    • Colon polyps Other    • Heart disease Other    • Ulcers Other    • Cholelithiasis Other    •  Allergy (severe) Other    • Schizophrenia Father    • Alcohol abuse Father    • Drug abuse Paternal Grandfather      Lab on 04/22/2022   Component Date Value Ref Range Status   • Hemoglobin A1C 04/22/2022 9.50 (A) 4.80 - 5.60 % Final   • Total Cholesterol 04/22/2022 169  0 - 200 mg/dL Final   • Triglycerides 04/22/2022 144  0 - 150 mg/dL Final   • HDL Cholesterol 04/22/2022 44  40 - 60 mg/dL Final   • LDL Cholesterol  04/22/2022 100  0 - 100 mg/dL Final   • VLDL Cholesterol 04/22/2022 25  5 - 40 mg/dL Final   • LDL/HDL Ratio 04/22/2022 2.19   Final   • 25 Hydroxy, Vitamin D 04/22/2022 23.9 (A) 30.0 - 100.0 ng/ml Final   • Vitamin B-12 04/22/2022 493  211 - 946 pg/mL Final   • Microalbumin/Creatinine Ratio 04/22/2022 32.4  mg/g Final   • Creatinine, Urine 04/22/2022 77.1  mg/dL Final   • Microalbumin, Urine 04/22/2022 2.5  mg/dL Final   • Glucose 04/22/2022 211 (A) 65 - 99 mg/dL Final   • BUN 04/22/2022 12  6 - 20 mg/dL Final   • Creatinine 04/22/2022 0.77  0.57 - 1.00 mg/dL Final   • Sodium 04/22/2022 136  136 - 145 mmol/L Final   • Potassium 04/22/2022 4.6  3.5 - 5.2 mmol/L Final   • Chloride 04/22/2022 102  98 - 107 mmol/L Final   • CO2 04/22/2022 24.0  22.0 - 29.0 mmol/L Final   • Calcium 04/22/2022 9.4  8.6 - 10.5 mg/dL Final   • Total Protein 04/22/2022 6.8  6.0 - 8.5 g/dL Final   • Albumin 04/22/2022 3.80  3.50 - 5.20 g/dL Final   • ALT (SGPT) 04/22/2022 39 (A) 1 - 33 U/L Final   • AST (SGOT) 04/22/2022 34 (A) 1 - 32 U/L Final   • Alkaline Phosphatase 04/22/2022 96  39 - 117 U/L Final   • Total Bilirubin 04/22/2022 0.2  0.0 - 1.2 mg/dL Final   • Globulin 04/22/2022 3.0  gm/dL Final   • A/G Ratio 04/22/2022 1.3  g/dL Final   • BUN/Creatinine Ratio 04/22/2022 15.6  7.0 - 25.0 Final   • Anion Gap 04/22/2022 10.0  5.0 - 15.0 mmol/L Final   • eGFR 04/22/2022 95.3  >60.0 mL/min/1.73 Final    National Kidney Foundation and American Society of Nephrology (ASN) Task Force recommended calculation based on the Chronic  Kidney Disease Epidemiology Collaboration (CKD-EPI) equation refit without adjustment for race.   • Protein/Creatinine Ratio, Urine 04/22/2022 140.1  0.0 - 200.0 mg/G Crea Final   • Creatinine, Urine 04/22/2022 77.1  mg/dL Final   • Total Protein, Urine 04/22/2022 10.8  mg/dL Final   • TSH 04/22/2022 1.740  0.270 - 4.200 uIU/mL Final   • Magnesium 04/22/2022 1.8  1.6 - 2.6 mg/dL Final   • WBC 04/22/2022 8.45  3.40 - 10.80 10*3/mm3 Final   • RBC 04/22/2022 4.90  3.77 - 5.28 10*6/mm3 Final   • Hemoglobin 04/22/2022 13.3  12.0 - 15.9 g/dL Final   • Hematocrit 04/22/2022 41.0  34.0 - 46.6 % Final   • MCV 04/22/2022 83.7  79.0 - 97.0 fL Final   • MCH 04/22/2022 27.1  26.6 - 33.0 pg Final   • MCHC 04/22/2022 32.4  31.5 - 35.7 g/dL Final   • RDW 04/22/2022 15.0  12.3 - 15.4 % Final   • RDW-SD 04/22/2022 45.5  37.0 - 54.0 fl Final   • MPV 04/22/2022 11.1  6.0 - 12.0 fL Final   • Platelets 04/22/2022 169  140 - 450 10*3/mm3 Final   • Neutrophil % 04/22/2022 66.8  42.7 - 76.0 % Final   • Lymphocyte % 04/22/2022 25.4  19.6 - 45.3 % Final   • Monocyte % 04/22/2022 5.1  5.0 - 12.0 % Final   • Eosinophil % 04/22/2022 1.5  0.3 - 6.2 % Final   • Basophil % 04/22/2022 0.7  0.0 - 1.5 % Final   • Immature Grans % 04/22/2022 0.5  0.0 - 0.5 % Final   • Neutrophils, Absolute 04/22/2022 5.64  1.70 - 7.00 10*3/mm3 Final   • Lymphocytes, Absolute 04/22/2022 2.15  0.70 - 3.10 10*3/mm3 Final   • Monocytes, Absolute 04/22/2022 0.43  0.10 - 0.90 10*3/mm3 Final   • Eosinophils, Absolute 04/22/2022 0.13  0.00 - 0.40 10*3/mm3 Final   • Basophils, Absolute 04/22/2022 0.06  0.00 - 0.20 10*3/mm3 Final   • Immature Grans, Absolute 04/22/2022 0.04  0.00 - 0.05 10*3/mm3 Final   • nRBC 04/22/2022 0.0  0.0 - 0.2 /100 WBC Final      XR Wrist 3+ View Left  Narrative: Ordering Provider:  Kevin Carlson MD  Ordering Diagnosis/Indication:  De Quervain's tenosynovitis, left, Pain in   wrist, unspecified laterality    Procedure:  XR WRIST 3+ VW LEFT  Exam  "Date:  7/7/21    COMPARISON: Exam of the wrist dated February 2021  Impression:  Radiographs of the left wrist PA lateral and oblique views   done today show normal carpal alignment.  There is no significant   degenerative change.    Kevin Carlson MD  7/7/21    [unfilled]  Immunization History   Administered Date(s) Administered   • COVID-19 (MODERNA) 1st, 2nd, 3rd Dose Only 04/06/2021, 04/08/2021, 04/08/2021, 05/06/2021, 05/06/2021, 12/10/2021   • FluLaval/Fluarix/Fluzone >6 09/17/2020, 10/28/2021   • Hepatitis A 06/12/2018   • Pneumococcal Polysaccharide (PPSV23) 12/13/2018   • Tdap 09/17/2020       The following portions of the patient's history were reviewed and updated as appropriate: allergies, current medications, past family history, past medical history, past social history, past surgical history and problem list.        Physical Exam  /76 (BP Location: Left arm, Patient Position: Sitting, Cuff Size: Large Adult)   Pulse 96   Temp 98.7 °F (37.1 °C)   Ht 167.6 cm (66\")   Wt (!) 157 kg (345 lb 12.8 oz)   SpO2 95%   BMI 55.81 kg/m²     Physical Exam  Vitals and nursing note reviewed.   Constitutional:       Appearance: She is well-developed. She is not diaphoretic.   HENT:      Head: Normocephalic and atraumatic.      Right Ear: External ear normal.   Eyes:      Conjunctiva/sclera: Conjunctivae normal.      Pupils: Pupils are equal, round, and reactive to light.   Cardiovascular:      Rate and Rhythm: Normal rate and regular rhythm.      Heart sounds: Normal heart sounds. No murmur heard.  Pulmonary:      Effort: Pulmonary effort is normal. No respiratory distress.      Breath sounds: Normal breath sounds.   Abdominal:      General: Bowel sounds are normal. There is no distension.      Palpations: Abdomen is soft.      Tenderness: There is no abdominal tenderness.      Comments: Obese abdomen    Musculoskeletal:         General: Tenderness present. No deformity. Normal range of motion.     "  Cervical back: Normal range of motion and neck supple.   Feet:      Right foot:      Skin integrity: Dry skin present. No ulcer, blister, skin breakdown, erythema, warmth, callus or fissure.      Left foot:      Skin integrity: Dry skin present. No ulcer, blister, skin breakdown, erythema, warmth, callus or fissure.   Skin:     General: Skin is warm.      Coloration: Skin is not pale.      Findings: No erythema or rash.   Neurological:      Mental Status: She is alert and oriented to person, place, and time.      Cranial Nerves: No cranial nerve deficit.   Psychiatric:         Behavior: Behavior normal.         [unfilled]   Diagnosis Plan   1. Morbid obesity (HCC)     2. Seizure disorder ([facility])     3. Tobacco abuse counseling     4. Essential hypertension     5. Vitamin D deficiency     6. Uncontrolled type 2 diabetes mellitus with hyperglycemia ([facility])     7. Chronic obstructive pulmonary disease, unspecified COPD type ([facility])  Ambulatory Referral to Pulmonology   8. Schizoaffective disorder, depressive type ([facility])     9. Tobacco user  Ambulatory Referral to Pulmonology   10. Stage 2 chronic kidney disease     11. Mixed hyperlipidemia     12. Screening mammogram, encounter for  Mammo Screening Bilateral With CAD                -recommend labowork   -recommend diabetic eye exam   -recommend COVID-19 vaccination  -hep B vaccination - given today  -recommend mammogram screening -schedule at imaging center   -GERD with esophagitis  - on prilosec 20 mg daiy. Consider another EGD if not improving. GI following   -tobacco user - Counseled quit smoking >5 minutes.  recommend 1 800 QUIT NOW and nicotine patches   -ckd stage 2 - stable continue to monitor. Stressed importance of diabetes control and BP control    -hyperlipdemia - on  simvastatin 40 mg PO qhs. ASCVD risk high. Now on red yeast rice. On fish oil supplement  -chronic back pain/DDD lumbar spine/arthritis lumbar spine  - on neurontin 100 mg PO tID on flexeril 5  mg PO TID PRN on Norco 7.5/325 mg every 8 hours MN. PM following   -dyspnea/early COPD -Pulmonlogy following on stioloto and abluterol PRN.   Albuterol nebulizer and treatment every 6 hours PRN.   will refer back to Pulmonoloyg   - DM type 2- Endocrinology following. Currently on metformin 1000 gm PO BID, steglatro 15 mg daily. trulicity 4.5 mg now on insulin pump   -vitamin D  defciency - vitamin D once a week   -schizophrenia/major depression.JD  -  [facility] following no on lamictal 100 mg PO q daily and vraylar 1.5 mg daily. risperdal stopped  on minipress 2 mg at bedtime. On vistaril 25 mg PO TID PRN   -seizure disorder - Neurology following stable on Keppra 1000 mg Po BID. Neurology following in [facility]. She is now on tileptal 600 mg daily.    -Morbid obesity - counseled weight loss >5 minutes BMI at 55.81   -hypertension - better  on lisinopril  40 mg dialy.  lopressor 50 mg PO BID  son nifedipine 30 mg daily.   -advised pt to be safe and call with any questions or concerns. All questions addressed today  -advised pt to followup with specialist and referrals  -advised pt to go to ER or call 911 if symptoms worrisome or severe  -advised pt to be safe during COVID-19 pandemic  I spent 30  minutes caring for Sudhakar on this date of service. This time includes time spent by me in the following activities: preparing for the visit, reviewing tests, obtaining and/or reviewing a separately obtained history, performing a medically appropriate examination and/or evaluation, counseling and educating the patient/family/caregiver, ordering medications, tests, or procedures, referring and communicating with other health care professionals, documenting information in the medical record, independently interpreting results and communicating that information with the patient/family/caregiver and care coordination  -recheck in 6 weeks         This document has been electronically signed by Jermaine Ferro MD on June 14,  2022 14:55 CDT

## 2022-06-09 ENCOUNTER — TELEMEDICINE (OUTPATIENT)
Dept: PSYCHIATRY | Facility: CLINIC | Age: 47
End: 2022-06-09

## 2022-06-09 DIAGNOSIS — F25.1 SCHIZOAFFECTIVE DISORDER, DEPRESSIVE TYPE: Primary | ICD-10-CM

## 2022-06-09 DIAGNOSIS — F51.5 NIGHTMARES: ICD-10-CM

## 2022-06-09 DIAGNOSIS — F41.1 GENERALIZED ANXIETY DISORDER: ICD-10-CM

## 2022-06-09 PROCEDURE — 99214 OFFICE O/P EST MOD 30 MIN: CPT | Performed by: NURSE PRACTITIONER

## 2022-06-09 RX ORDER — LAMOTRIGINE 150 MG/1
150 TABLET ORAL DAILY
Qty: 90 TABLET | Refills: 0 | Status: SHIPPED | OUTPATIENT
Start: 2022-06-09 | End: 2022-08-09 | Stop reason: SDUPTHER

## 2022-06-09 RX ORDER — DULOXETIN HYDROCHLORIDE 30 MG/1
90 CAPSULE, DELAYED RELEASE ORAL DAILY
Qty: 270 CAPSULE | Refills: 0 | Status: SHIPPED | OUTPATIENT
Start: 2022-06-09 | End: 2022-08-09 | Stop reason: SDUPTHER

## 2022-06-09 RX ORDER — HYDROXYZINE HYDROCHLORIDE 25 MG/1
12.5-25 TABLET, FILM COATED ORAL 3 TIMES DAILY PRN
Qty: 270 TABLET | Refills: 0 | Status: SHIPPED | OUTPATIENT
Start: 2022-06-09 | End: 2023-02-28 | Stop reason: SDUPTHER
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
## 2022-06-14 ENCOUNTER — OFFICE VISIT (OUTPATIENT)
Dept: FAMILY MEDICINE CLINIC | Facility: CLINIC | Age: 47
End: 2022-06-14

## 2022-06-14 VITALS
HEIGHT: 66 IN | DIASTOLIC BLOOD PRESSURE: 76 MMHG | OXYGEN SATURATION: 95 % | SYSTOLIC BLOOD PRESSURE: 128 MMHG | WEIGHT: 293 LBS | HEART RATE: 96 BPM | TEMPERATURE: 98.7 F | BODY MASS INDEX: 47.09 KG/M2

## 2022-06-14 DIAGNOSIS — E78.2 MIXED HYPERLIPIDEMIA: ICD-10-CM

## 2022-06-14 DIAGNOSIS — J44.9 CHRONIC OBSTRUCTIVE PULMONARY DISEASE, UNSPECIFIED COPD TYPE: ICD-10-CM

## 2022-06-14 DIAGNOSIS — E66.01 MORBID OBESITY: Primary | ICD-10-CM

## 2022-06-14 DIAGNOSIS — Z71.6 TOBACCO ABUSE COUNSELING: ICD-10-CM

## 2022-06-14 DIAGNOSIS — I10 ESSENTIAL HYPERTENSION: ICD-10-CM

## 2022-06-14 DIAGNOSIS — F25.1 SCHIZOAFFECTIVE DISORDER, DEPRESSIVE TYPE: ICD-10-CM

## 2022-06-14 DIAGNOSIS — E55.9 VITAMIN D DEFICIENCY: ICD-10-CM

## 2022-06-14 DIAGNOSIS — Z12.31 SCREENING MAMMOGRAM, ENCOUNTER FOR: ICD-10-CM

## 2022-06-14 DIAGNOSIS — G40.909 SEIZURE DISORDER: ICD-10-CM

## 2022-06-14 DIAGNOSIS — N18.2 STAGE 2 CHRONIC KIDNEY DISEASE: ICD-10-CM

## 2022-06-14 DIAGNOSIS — E11.65 UNCONTROLLED TYPE 2 DIABETES MELLITUS WITH HYPERGLYCEMIA: ICD-10-CM

## 2022-06-14 DIAGNOSIS — Z72.0 TOBACCO USER: ICD-10-CM

## 2022-06-14 PROCEDURE — 99214 OFFICE O/P EST MOD 30 MIN: CPT | Performed by: FAMILY MEDICINE

## 2022-06-14 PROCEDURE — 90746 HEPB VACCINE 3 DOSE ADULT IM: CPT | Performed by: FAMILY MEDICINE

## 2022-06-14 PROCEDURE — 90471 IMMUNIZATION ADMIN: CPT | Performed by: FAMILY MEDICINE

## 2022-06-14 RX ORDER — LISINOPRIL 40 MG/1
40 TABLET ORAL DAILY
Qty: 30 TABLET | Refills: 3 | Status: SHIPPED | OUTPATIENT
Start: 2022-06-14 | End: 2022-10-21 | Stop reason: SDUPTHER

## 2022-06-14 RX ORDER — NICOTINE 21 MG/24HR
1 PATCH, TRANSDERMAL 24 HOURS TRANSDERMAL EVERY 24 HOURS
Qty: 28 PATCH | Refills: 3 | Status: SHIPPED | OUTPATIENT
Start: 2022-06-14 | End: 2023-03-06

## 2022-06-14 RX ORDER — NIFEDIPINE 30 MG/1
30 TABLET, EXTENDED RELEASE ORAL DAILY
Qty: 30 TABLET | Refills: 3 | Status: SHIPPED | OUTPATIENT
Start: 2022-06-14 | End: 2022-10-21 | Stop reason: SDUPTHER

## 2022-06-14 RX ORDER — SIMVASTATIN 40 MG
40 TABLET ORAL NIGHTLY
Qty: 30 TABLET | Refills: 3 | Status: SHIPPED | OUTPATIENT
Start: 2022-06-14 | End: 2022-10-21 | Stop reason: SDUPTHER

## 2022-06-14 RX ORDER — ERGOCALCIFEROL 1.25 MG/1
CAPSULE ORAL
Qty: 8 CAPSULE | Refills: 3 | Status: SHIPPED | OUTPATIENT
Start: 2022-06-14 | End: 2022-10-21 | Stop reason: SDUPTHER

## 2022-06-14 RX ORDER — OMEPRAZOLE 40 MG/1
40 CAPSULE, DELAYED RELEASE ORAL DAILY
Qty: 30 CAPSULE | Refills: 3 | Status: SHIPPED | OUTPATIENT
Start: 2022-06-14 | End: 2022-10-21 | Stop reason: SDUPTHER

## 2022-06-14 RX ORDER — SOFT LENS DISINFECTANT
1 SOLUTION, NON-ORAL MISCELLANEOUS EVERY 6 HOURS PRN
Qty: 1 EACH | Refills: 3 | Status: SHIPPED | OUTPATIENT
Start: 2022-06-14

## 2022-06-14 RX ORDER — METOPROLOL TARTRATE 50 MG/1
50 TABLET, FILM COATED ORAL 2 TIMES DAILY
Qty: 60 TABLET | Refills: 3 | Status: SHIPPED | OUTPATIENT
Start: 2022-06-14 | End: 2022-10-21 | Stop reason: SDUPTHER

## 2022-06-14 NOTE — PROGRESS NOTES
Injection  Injection performed in Right Deltoid by Asha Louie MA. Patient tolerated the procedure well without complications.  06/14/22   Asha Louie MA

## 2022-06-16 ENCOUNTER — TELEPHONE (OUTPATIENT)
Dept: FAMILY MEDICINE CLINIC | Facility: CLINIC | Age: 47
End: 2022-06-16

## 2022-06-22 DIAGNOSIS — Z12.31 SCREENING MAMMOGRAM, ENCOUNTER FOR: ICD-10-CM

## 2022-06-28 RX ORDER — PROCHLORPERAZINE 25 MG/1
SUPPOSITORY RECTAL
Qty: 9 EACH | Refills: 0 | Status: SHIPPED | OUTPATIENT
Start: 2022-06-28 | End: 2022-09-26

## 2022-07-07 ENCOUNTER — TELEMEDICINE (OUTPATIENT)
Dept: PSYCHIATRY | Facility: CLINIC | Age: 47
End: 2022-07-07

## 2022-07-07 DIAGNOSIS — F25.1 SCHIZOAFFECTIVE DISORDER, DEPRESSIVE TYPE: Primary | ICD-10-CM

## 2022-07-07 PROCEDURE — 90832 PSYTX W PT 30 MINUTES: CPT | Performed by: COUNSELOR

## 2022-07-07 NOTE — PROGRESS NOTES
Date: July 20, 2022  Time In: 10:45 AM   Time Out: 11:22 AM   This provider is located at the [facility] (through [facility]), [address], [place] [address] using a secure [employer] Video Visit through [employer]. Patient is being seen remotely via telehealth at home address in [place] and stated they are in a secure environment for this session. The patient's condition being diagnosed/treated is appropriate for telemedicine. The provider identified herself as well as her credentials. The patient, and/or patients guardian, consent to be seen remotely, and when consent is given they understand that the consent allows for patient identifiable information to be sent to a third party as needed. They may refuse to be seen remotely at any time. The electronic data is encrypted and password protected, and the patient and/or guardian has been advised of the potential risks to privacy not withstanding such measures.     You have chosen to receive care through a telehealth visit.  Do you consent to use a video/audio connection for your medical care today? Yes    PROGRESS NOTE  Data:  Sudhakar Saul is a 46 y.o. female who presents today for follow up. Patient states that she has had a recent exacerbation of COPD and her blood pressure has also been elevated which has also increased her feelings of anxiety. Patient is looking forward to seeing her new pulmonologist on the 19th to determine what has caused this exacerbation. Patient states that she was able to go out with her parents and some friends to the [employer] and won $500 but she won the money playing with $100 that her mother had given her but she did keep a portion of her winnings and gave the rest to her mother. Patient chose to discuss issues with her father and hte value of women to her father's side of the family in that women were not given much favor and the patient feels that is why her father behaved the way he did  toward the patient's mother and could be part of the reason he remains distant from the patient in addition to her past addiction history that the patient feels is also a barrier to their relationship. Patient is also concerned that with her recent health issues her mother is also in need of a knee replacement and the patient is trying to do as much as she can in order to help her mother and is sure that her step father will be take off work to assist them if her mother has to have surgery soon. Patient is trying to maintain hope that she will be able to get well soon and find out what caused the exacerbations in order to assist her with keeping her condition under control.    Chief Complaint: auditory and visual hallucinations, recent exacerbation of COPD, feelings of anxiety and depression    History of Present Illness: Patient has struggled with symptoms of schizoaffective disorder for several years      Clinical Maneuvering/Intervention:  CBT    (Scales based on 0 - 10 with 10 being the worst)  Depression: 5 Anxiety: 6       Assisted patient in processing above session content; acknowledged and normalized patient’s thoughts, feelings, and concerns.  Rationalized patient thought process regarding her recent health issues and relationship concerns with her father.  Discussed triggers associated with patient's feelings of anxiety and depression in regard to her physical status and her mother's physical status and financial concerns.  Also discussed coping skills for patient to implement such as following through with any medical appointments, prioritizing daily tasks and beginning to look at ways in which the family can set up the home in a different manner to assist her mother with mobility issues.    Allowed patient to freely discuss issues without interruption or judgment. Provided safe, confidential environment to facilitate the development of positive therapeutic relationship and encourage open, honest  communication. Assisted patient in identifying risk factors which would indicate the need for higher level of care including thoughts to harm self or others and/or self-harming behavior and encouraged patient to contact this office, call 911, or present to the nearest emergency room should any of these events occur. Discussed crisis intervention services and means to access. Patient adamantly and convincingly denies current suicidal or homicidal ideation or perceptual disturbance.    Assessment:   Assessment   Patient appears to maintain relative stability as compared to their baseline.  However, patient continues to struggle with which continues to cause impairment in important areas of functioning.  A result, they can be reasonably expected to continue to benefit from treatment and would likely be at increased risk for decompensation otherwise.    Mental Status Exam:   Hygiene:   good  Cooperation:  Cooperative  Eye Contact:  Good  Psychomotor Behavior:  Appropriate  Affect:  Appropriate  Mood: fluctates  Speech:  Normal  Thought Process:  Goal directed  Thought Content:  Normal  Suicidal:  None  Homicidal:  None  Hallucinations:  Auditory and Visual- auditory of the little girl singing happy birthday to the patient's step father; visual of a white rat in the yard this morning.  Delusion:  None  Memory:  Deficits  Orientation:  Person, Place, Time and Situation  Reliability:  good  Insight:  Good  Judgement:  Good  Impulse Control:  Good  Physical/Medical Issues:  Yes exacerbation of COPD and elevated blood pressure       Patient's Support Network Includes:  parents    Functional Status: Moderate impairment     Progress toward goal: Not at goal; showing progress    Prognosis: Good with Ongoing Treatment          Plan:    Patient will continue in individual outpatient therapy with focus on improved functioning and coping skills, maintaining stability, and avoiding decompensation and the need for higher level of  care.    Patient will adhere to medication regimen as prescribed and report any side effects. Patient will contact this office, call 911 or present to the nearest emergency room should suicidal or homicidal ideations occur. Provide Cognitive Behavioral Therapy and Solution Focused Therapy to improve functioning, maintain stability, and avoid decompensation and the need for higher level of care.     Return in about 2 weeks, or earlier if symptoms worsen or fail to improve.           VISIT DIAGNOSIS:     ICD-10-CM ICD-9-CM   1. Schizoaffective disorder, depressive type (HCC)  F25.1 295.70             This document has been electronically signed by DREAD Vo  July 20, 2022 08:34 EDT      Part of this note may be an electronic transcription/translation of spoken language to printed text using the Dragon Dictation System.

## 2022-07-08 ENCOUNTER — LAB (OUTPATIENT)
Dept: LAB | Facility: HOSPITAL | Age: 47
End: 2022-07-08

## 2022-07-08 DIAGNOSIS — I10 ESSENTIAL HYPERTENSION: ICD-10-CM

## 2022-07-08 DIAGNOSIS — E78.2 MIXED HYPERLIPIDEMIA: ICD-10-CM

## 2022-07-08 DIAGNOSIS — Z79.4 TYPE 2 DIABETES MELLITUS WITH HYPERGLYCEMIA, WITH LONG-TERM CURRENT USE OF INSULIN: ICD-10-CM

## 2022-07-08 DIAGNOSIS — E11.65 TYPE 2 DIABETES MELLITUS WITH HYPERGLYCEMIA, WITH LONG-TERM CURRENT USE OF INSULIN: ICD-10-CM

## 2022-07-08 DIAGNOSIS — E11.49 OTHER DIABETIC NEUROLOGICAL COMPLICATION ASSOCIATED WITH TYPE 2 DIABETES MELLITUS: ICD-10-CM

## 2022-07-08 DIAGNOSIS — E55.9 VITAMIN D DEFICIENCY: ICD-10-CM

## 2022-07-08 LAB
ALBUMIN SERPL-MCNC: 3.8 G/DL (ref 3.5–5.2)
ALBUMIN/GLOB SERPL: 1.2 G/DL
ALP SERPL-CCNC: 93 U/L (ref 39–117)
ALT SERPL W P-5'-P-CCNC: 37 U/L (ref 1–33)
ANION GAP SERPL CALCULATED.3IONS-SCNC: 12 MMOL/L (ref 5–15)
AST SERPL-CCNC: 42 U/L (ref 1–32)
BASOPHILS # BLD AUTO: 0.06 10*3/MM3 (ref 0–0.2)
BASOPHILS NFR BLD AUTO: 0.7 % (ref 0–1.5)
BILIRUB SERPL-MCNC: 0.2 MG/DL (ref 0–1.2)
BUN SERPL-MCNC: 16 MG/DL (ref 6–20)
BUN/CREAT SERPL: 19.5 (ref 7–25)
CALCIUM SPEC-SCNC: 9.4 MG/DL (ref 8.6–10.5)
CHLORIDE SERPL-SCNC: 106 MMOL/L (ref 98–107)
CO2 SERPL-SCNC: 21 MMOL/L (ref 22–29)
CREAT SERPL-MCNC: 0.82 MG/DL (ref 0.57–1)
DEPRECATED RDW RBC AUTO: 44.4 FL (ref 37–54)
EGFRCR SERPLBLD CKD-EPI 2021: 88.4 ML/MIN/1.73
EOSINOPHIL # BLD AUTO: 0.2 10*3/MM3 (ref 0–0.4)
EOSINOPHIL NFR BLD AUTO: 2.3 % (ref 0.3–6.2)
ERYTHROCYTE [DISTWIDTH] IN BLOOD BY AUTOMATED COUNT: 14.6 % (ref 12.3–15.4)
GLOBULIN UR ELPH-MCNC: 3.2 GM/DL
GLUCOSE SERPL-MCNC: 106 MG/DL (ref 65–99)
HCT VFR BLD AUTO: 41.3 % (ref 34–46.6)
HGB BLD-MCNC: 13.6 G/DL (ref 12–15.9)
IMM GRANULOCYTES # BLD AUTO: 0.04 10*3/MM3 (ref 0–0.05)
IMM GRANULOCYTES NFR BLD AUTO: 0.5 % (ref 0–0.5)
LYMPHOCYTES # BLD AUTO: 3.19 10*3/MM3 (ref 0.7–3.1)
LYMPHOCYTES NFR BLD AUTO: 36.3 % (ref 19.6–45.3)
MCH RBC QN AUTO: 27.9 PG (ref 26.6–33)
MCHC RBC AUTO-ENTMCNC: 32.9 G/DL (ref 31.5–35.7)
MCV RBC AUTO: 84.8 FL (ref 79–97)
MONOCYTES # BLD AUTO: 0.39 10*3/MM3 (ref 0.1–0.9)
MONOCYTES NFR BLD AUTO: 4.4 % (ref 5–12)
NEUTROPHILS NFR BLD AUTO: 4.91 10*3/MM3 (ref 1.7–7)
NEUTROPHILS NFR BLD AUTO: 55.8 % (ref 42.7–76)
NRBC BLD AUTO-RTO: 0 /100 WBC (ref 0–0.2)
PLATELET # BLD AUTO: 176 10*3/MM3 (ref 140–450)
PMV BLD AUTO: 11 FL (ref 6–12)
POTASSIUM SERPL-SCNC: 4.7 MMOL/L (ref 3.5–5.2)
PROT SERPL-MCNC: 7 G/DL (ref 6–8.5)
RBC # BLD AUTO: 4.87 10*6/MM3 (ref 3.77–5.28)
SODIUM SERPL-SCNC: 139 MMOL/L (ref 136–145)
WBC NRBC COR # BLD: 8.79 10*3/MM3 (ref 3.4–10.8)

## 2022-07-08 PROCEDURE — 84443 ASSAY THYROID STIM HORMONE: CPT

## 2022-07-08 PROCEDURE — 82570 ASSAY OF URINE CREATININE: CPT

## 2022-07-08 PROCEDURE — 83036 HEMOGLOBIN GLYCOSYLATED A1C: CPT

## 2022-07-08 PROCEDURE — 80061 LIPID PANEL: CPT

## 2022-07-08 PROCEDURE — 80053 COMPREHEN METABOLIC PANEL: CPT

## 2022-07-08 PROCEDURE — 82043 UR ALBUMIN QUANTITATIVE: CPT

## 2022-07-08 PROCEDURE — 82306 VITAMIN D 25 HYDROXY: CPT

## 2022-07-08 PROCEDURE — 85025 COMPLETE CBC W/AUTO DIFF WBC: CPT

## 2022-07-09 LAB
25(OH)D3 SERPL-MCNC: 34.9 NG/ML (ref 30–100)
ALBUMIN UR-MCNC: <1.2 MG/DL
CHOLEST SERPL-MCNC: 119 MG/DL (ref 0–200)
CREAT UR-MCNC: 80 MG/DL
HBA1C MFR BLD: 8.7 % (ref 4.8–5.6)
HDLC SERPL-MCNC: 43 MG/DL (ref 40–60)
LDLC SERPL CALC-MCNC: 50 MG/DL (ref 0–100)
LDLC/HDLC SERPL: 1.06 {RATIO}
MICROALBUMIN/CREAT UR: NORMAL MG/G{CREAT}
TRIGL SERPL-MCNC: 152 MG/DL (ref 0–150)
TSH SERPL DL<=0.05 MIU/L-ACNC: 2.29 UIU/ML (ref 0.27–4.2)
VLDLC SERPL-MCNC: 26 MG/DL (ref 5–40)

## 2022-07-19 ENCOUNTER — TELEPHONE (OUTPATIENT)
Dept: PULMONOLOGY | Facility: CLINIC | Age: 47
End: 2022-07-19

## 2022-07-19 ENCOUNTER — OFFICE VISIT (OUTPATIENT)
Dept: PULMONOLOGY | Facility: CLINIC | Age: 47
End: 2022-07-19

## 2022-07-19 ENCOUNTER — PROCEDURE VISIT (OUTPATIENT)
Dept: PULMONOLOGY | Facility: CLINIC | Age: 47
End: 2022-07-19

## 2022-07-19 VITALS
BODY MASS INDEX: 47.09 KG/M2 | DIASTOLIC BLOOD PRESSURE: 72 MMHG | HEART RATE: 74 BPM | HEIGHT: 66 IN | OXYGEN SATURATION: 100 % | WEIGHT: 293 LBS | SYSTOLIC BLOOD PRESSURE: 120 MMHG

## 2022-07-19 DIAGNOSIS — J44.9 STAGE 2 MODERATE COPD BY GOLD CLASSIFICATION: ICD-10-CM

## 2022-07-19 DIAGNOSIS — Z72.0 TOBACCO USE: ICD-10-CM

## 2022-07-19 DIAGNOSIS — J45.998 ASTHMA, PERSISTENT NOT CONTROLLED: ICD-10-CM

## 2022-07-19 DIAGNOSIS — J44.9 STAGE 2 MODERATE COPD BY GOLD CLASSIFICATION: Primary | ICD-10-CM

## 2022-07-19 PROCEDURE — 94060 EVALUATION OF WHEEZING: CPT | Performed by: INTERNAL MEDICINE

## 2022-07-19 PROCEDURE — 99214 OFFICE O/P EST MOD 30 MIN: CPT | Performed by: NURSE PRACTITIONER

## 2022-07-19 PROCEDURE — 94060 EVALUATION OF WHEEZING: CPT | Performed by: NURSE PRACTITIONER

## 2022-07-19 RX ORDER — ALBUTEROL SULFATE 90 UG/1
2 AEROSOL, METERED RESPIRATORY (INHALATION) EVERY 6 HOURS PRN
Qty: 6.7 G | Refills: 11 | Status: SHIPPED | OUTPATIENT
Start: 2022-07-19 | End: 2022-10-19

## 2022-07-19 NOTE — PROGRESS NOTES
PRE/POST PERFORMED.     GOOD PATIENT EFFORT AND COOPERATION.     6 SECOND EXHALATION ON SPIROMETRY.     ORDERED BY ROD HAQUE; READ BY DR. BRYAN DEJESUS.

## 2022-07-19 NOTE — TELEPHONE ENCOUNTER
Called to let patient know that her PA was approved for her generic form of Breo Ellipta.     Called pharmacy and made them aware as well.     No answer, left message

## 2022-07-19 NOTE — PROGRESS NOTES
Pulmonary Office Visit    Jermaine Ferro MD    Thank you for asking me to see Sudhakar Saul for   Chief Complaint   Patient presents with   • COPD     Chief Complaint   .      History of Present Illness  Ms. Saul is a 46 year old patient who returns back to Pulmonology, last seen March of 2021. She was stable on Stiloto and albuterol prn.     She had an ER visit 3 weeks ago in [place].   She ran out of inhalers 2-3 months ago. Over that time, she was having more bouts of not being able to catch her breath and breathe. Her oxygen would drop into the 70s during these attacks.   She then saw Dr. Ferro who prescribed Albuterol nebulizer and she is using 2-3x a day.     Pre/Post PFTs completed today due to her presentation sounding more asthma related than COPD. I am also hesitant to diagnose COPD in her young age although she has been a very heavy smoker over the years. She did smoke up to 3 PPD for 30 years. She qualifies for LDCT screening and has not had one prior.     PFTs in 2018 were normal and 2020 PFTs showed moderate obstruction, were without post, and with poor volume loops. This could be due to asthma flare or inaccurate test but unknown without a post.     Due to her albuterol use of 2-3x a day, we will go ahead and up-titrate her to ICS/LABA and see how she does.     At the end of the visit, I repeated PFTs with bronchodilator. These were normal. She does not have COPD. I will proceed with ICS/LABA in order to treat an asthma component versus COPD.     Ratio: 85  FVC: 83  FEV1 88    Post  Ratio: 88  FVC: 78  FEV1: 86    No BDR    Triggers/Pets/Occupation: aerosols.   Prior medication use: albuterol, Stiloto  Rescue inhaler use:  Albuterol neb 2-3x day  ER visits/hospitalization/exacerbations at PCP/UC: 3 weeks at [facility]. IV solumedrol and no prednisone pack.       Tobacco use history:  Social History     Socioeconomic History   • Marital status:    Tobacco Use   • Smoking status:  Heavy Tobacco Smoker     Packs/day: 1.00     Years: 35.00     Pack years: 35.00     Types: Cigarettes   • Smokeless tobacco: Never Used   • Tobacco comment: on patch trying to quit    Vaping Use   • Vaping Use: Never used   Substance and Sexual Activity   • Alcohol use: Not Currently   • Drug use: Not Currently     Types: Amphetamines, Heroin, Methamphetamines, Morphine, Oxycodone     Comment: QUIT 3 YEARS AGO   • Sexual activity: Not Currently     Birth control/protection: Surgical         Review of Systems: History obtained from chart review and the patient.  Review of Systems   Constitutional: Positive for fatigue. Negative for diaphoresis, fever and unexpected weight change.   Respiratory: Positive for shortness of breath and wheezing. Negative for cough and chest tightness.    Cardiovascular: Negative for chest pain, palpitations and leg swelling.   Gastrointestinal: Negative for abdominal distention and blood in stool.   Genitourinary: Negative for hematuria.   Musculoskeletal: Negative for arthralgias and myalgias.   Skin: Negative for pallor and rash.   Neurological: Negative for dizziness, syncope and weakness.   Hematological: Does not bruise/bleed easily.   Psychiatric/Behavioral: Negative for confusion. The patient is not nervous/anxious.        As described in the HPI. Otherwise, remainder of ROS (14 systems) were negative.    Patient Active Problem List   Diagnosis   • Seizure disorder (HCC)   • Morbid obesity (HCC)   • Tobacco abuse counseling   • Multiple joint pain   • Dysuria   • History of positive hepatitis C   • Essential hypertension   • Uncontrolled type 2 diabetes mellitus with hyperglycemia (HCC)   • Vitamin D deficiency   • Urinary tract infection without hematuria   • Chronic hepatitis C without hepatic coma (HCC)   • Cigarette nicotine dependence, uncomplicated   • JACQUE (obstructive sleep apnea)   • Gastroesophageal reflux disease with esophagitis   • Left upper quadrant pain   • Nausea  and vomiting   • Weight gain, abnormal   • Change in bowel habits   • Hepatic fibrosis   • Non-compliance   • Schizoaffective disorder, depressive type ([facility])   • Schizophrenia, paranoid type ([facility])   • Gastritis without bleeding   • Tobacco user   • Encounter for eye exam   • Scoliosis   • History of drug use   • Stage 2 chronic kidney disease   • Diabetic neuropathy ([facility])   • Mixed hyperlipidemia   • Chronic bilateral low back pain with bilateral sciatica   • Type 2 diabetes mellitus with hyperglycemia, with long-term current use of insulin ([facility])   • Auditory hallucination   • Class 3 severe obesity due to excess calories with serious comorbidity and body mass index (BMI) of 45.0 to 49.9 in adult ([facility])   • Pain in wrist   • Schizophrenia, simple, chronic ([facility])   • Carpal tunnel syndrome   • Status post carpal tunnel release   • Asthma, persistent not controlled         Current Outpatient Medications:   •  albuterol (ACCUNEB) 1.25 MG/3ML nebulizer solution, Take 3 mL by nebulization Every 6 (Six) Hours As Needed for Wheezing or Shortness of Air., Disp: 9 mL, Rfl: 3  •  albuterol sulfate  (90 Base) MCG/ACT inhaler, Inhale 2 puffs Every 6 (Six) Hours As Needed for Wheezing or Shortness of Air., Disp: 6.7 g, Rfl: 11  •  aspirin 81 MG EC tablet, Take 81 mg by mouth Daily., Disp: , Rfl:   •  Cariprazine HCl 4.5 MG capsule, Take 4.5 mg by mouth Daily., Disp: 90 capsule, Rfl: 0  •  Continuous Blood Gluc  (Dexcom G6 ) device, USE FOR 6 MONTHS, Disp: , Rfl:   •  Continuous Blood Gluc Sensor (Dexcom G6 Sensor), USE AS DIRECTED EVERY  10  DAYS, Disp: 9 each, Rfl: 0  •  Continuous Blood Gluc Transmit (Dexcom G6 Transmitter) misc, USE 1  ONCE EVERY THREE MONTHS, Disp: 1 each, Rfl: 0  •  cyclobenzaprine (FLEXERIL) 5 MG tablet, Take 1 tablet by mouth 3 (Three) Times a Day As Needed for Muscle Spasms. (Patient taking differently: Take 5 mg by mouth 3 (Three) Times a Day.), Disp: 90 tablet, Rfl: 3  •   Dulaglutide (Trulicity) 4.5 MG/0.5ML solution pen-injector, Inject 4.5 mg under the skin into the appropriate area as directed 1 (One) Time Per Week., Disp: 4 pen, Rfl: 11  •  DULoxetine (CYMBALTA) 30 MG capsule, Take 3 capsules by mouth Daily., Disp: 270 capsule, Rfl: 0  •  Ertugliflozin L-PyroglutamicAc (Steglatro) 15 MG tablet, Take 1 tablet by mouth Every Morning., Disp: 90 tablet, Rfl: 1  •  folic acid (FOLVITE) 1 MG tablet, Take 1,000 mcg by mouth Daily., Disp: , Rfl:   •  gabapentin (NEURONTIN) 300 MG capsule, Take 1 capsule by mouth 3 (Three) Times a Day. (Patient taking differently: Take 100 mg by mouth 3 (Three) Times a Day.), Disp: 90 capsule, Rfl: 2  •  glucose blood test strip, Testing 4 times daily, E11.9, Disp: 120 each, Rfl: 12  •  HYDROcodone-acetaminophen (NORCO) 7.5-325 MG per tablet, Take 1 tablet by mouth 3 (Three) Times a Day., Disp: , Rfl:   •  hydrOXYzine (ATARAX) 25 MG tablet, Take 0.5-1 tablets by mouth 3 (Three) Times a Day As Needed for Anxiety., Disp: 270 tablet, Rfl: 0  •  Insulin Disposable Pump (OmniPod Dash 5 Pack Pods) misc, , Disp: , Rfl:   •  insulin lispro (Admelog) 100 UNIT/ML injection, 180  units daily through pump, Disp: 90 mL, Rfl: 11  •  lamoTRIgine (LaMICtal) 150 MG tablet, Take 1 tablet by mouth Daily., Disp: 90 tablet, Rfl: 0  •  levETIRAcetam (KEPPRA) 1000 MG tablet, Take 1 tablet by mouth 2 (Two) Times a Day., Disp: 60 tablet, Rfl: 3  •  lisinopril (PRINIVIL,ZESTRIL) 40 MG tablet, Take 1 tablet by mouth Daily., Disp: 30 tablet, Rfl: 3  •  metFORMIN (GLUCOPHAGE) 500 MG tablet, TAKE 1 TABLET BY MOUTH TWICE DAILY WITH MEALS, Disp: 60 tablet, Rfl: 0  •  metoprolol tartrate (LOPRESSOR) 50 MG tablet, Take 1 tablet by mouth 2 (Two) Times a Day., Disp: 60 tablet, Rfl: 3  •  Nebulizer device, 1 application Every 6 (Six) Hours As Needed (dyspnea)., Disp: 1 each, Rfl: 3  •  nicotine (NICODERM CQ) 21 MG/24HR patch, Place 1 patch on the skin as directed by provider Daily., Disp: 28  patch, Rfl: 3  •  NIFEdipine XL (PROCARDIA XL) 30 MG 24 hr tablet, Take 1 tablet by mouth Daily., Disp: 30 tablet, Rfl: 3  •  omeprazole (priLOSEC) 40 MG capsule, Take 1 capsule by mouth Daily., Disp: 30 capsule, Rfl: 3  •  OXcarbazepine (TRILEPTAL) 600 MG tablet, Take 600 mg by mouth Every Evening., Disp: , Rfl:   •  prazosin (MINIPRESS) 2 MG capsule, Take 1-2 capsules by mouth Every Night., Disp: 180 capsule, Rfl: 0  •  simvastatin (ZOCOR) 40 MG tablet, Take 1 tablet by mouth Every Night., Disp: 30 tablet, Rfl: 3  •  vitamin D (ERGOCALCIFEROL) 1.25 MG (31104 UT) capsule capsule, Take 2 tablets by mouth weekly, Disp: 8 capsule, Rfl: 3  •  Fluticasone Furoate-Vilanterol (Breo Ellipta) 200-25 MCG/INH inhaler, Inhale 1 puff Daily., Disp: 28 each, Rfl: 11    Allergies   Allergen Reactions   • Betadine [Povidone Iodine] Anaphylaxis   • Contrast Dye Anaphylaxis   • Iodine Anaphylaxis   • Lipitor  [Atorvastatin Calcium] Shortness Of Breath   • Shellfish-Derived Products Anaphylaxis   • Adhesive Tape Unknown (See Comments)     Blister         Past Medical History:   Diagnosis Date   • Allergic    • Anemia    • Angina pectoris ([facility])    • Anxiety    • Arthritis    • Back pain    • CAD (coronary artery disease)    • Cancer ([facility]) 1997    endometrial- surgery, no chemo/radiation   • Chronic kidney disease    • COPD (chronic obstructive pulmonary disease) ([facility])    • Degeneration macular    • Depression    • Diabetes mellitus ([facility])    • GERD (gastroesophageal reflux disease)    • Head injury    • Hepatitis c    • Hyperlipidemia    • Hypertension    • Injury of back    • Injury of neck    • Migraine    • Multiple personality disorder ([facility])    • Neuropathy    • Paranoid schizophrenia ([facility])    • Schizoaffective disorder ([facility])    • Seizures ([facility])     07/01/2020: last seizure was the first of the year   • Self-injurious behavior     teen years   • Shortness of breath    • Sleep apnea    • Stage 2 chronic kidney disease    • Substance  abuse (HCC)    • Suicide attempt (HCC)    • Urinary tract infection      Past Surgical History:   Procedure Laterality Date   • APPENDECTOMY     • BACK SURGERY     • BREAST SURGERY     • CARPAL TUNNEL RELEASE      right hand   • CARPAL TUNNEL RELEASE Left 10/19/2021    Procedure: LEFT CARPAL TUNNEL RELEASE;  Surgeon: Kevin Carlson MD;  Location: Health system OR;  Service: Orthopedics;  Laterality: Left;   • CHOLECYSTECTOMY     • COLONOSCOPY     • COLONOSCOPY N/A 2/8/2019    Procedure: COLONOSCOPY;  Surgeon: Joni Delacruz MD;  Location: Health system ENDOSCOPY;  Service: Gastroenterology   • DENTAL PROCEDURE     • ENDOSCOPY N/A 2/8/2019    Procedure: ESOPHAGOGASTRODUODENOSCOPY--poss dilation;  Surgeon: Joni Delacruz MD;  Location: Health system ENDOSCOPY;  Service: Gastroenterology   • HYSTERECTOMY     • LIPOMA EXCISION      9   • LIVER BIOPSY     • UPPER GASTROINTESTINAL ENDOSCOPY  02/08/2019     Social History     Socioeconomic History   • Marital status:    Tobacco Use   • Smoking status: Heavy Tobacco Smoker     Packs/day: 1.00     Years: 35.00     Pack years: 35.00     Types: Cigarettes   • Smokeless tobacco: Never Used   • Tobacco comment: on patch trying to quit    Vaping Use   • Vaping Use: Never used   Substance and Sexual Activity   • Alcohol use: Not Currently   • Drug use: Not Currently     Types: Amphetamines, Heroin, Methamphetamines, Morphine, Oxycodone     Comment: QUIT 3 YEARS AGO   • Sexual activity: Not Currently     Birth control/protection: Surgical     Family History   Problem Relation Age of Onset   • Hypertension Other    • Diabetes Other    • Stroke Other    • Cancer Other    • Kidney disease Other    • Lung disease Other    • Other Other    • Malone's esophagus Other    • Colon polyps Other    • Heart disease Other    • Ulcers Other    • Cholelithiasis Other    • Allergy (severe) Other    • Schizophrenia Father    • Alcohol abuse Father    • Drug abuse Paternal Grandfather        Advance  "Care Planning   ACP discussion was declined by the patient. Patient does not have an advance directive, declines further assistance.          Objective     Vitals:    07/19/22 0915   BP: 120/72   BP Location: Left arm   Patient Position: Sitting   Cuff Size: Adult   Pulse: 74   SpO2: 100%   Weight: (!) 152 kg (336 lb)   Height: 167.6 cm (66\")       Physical Exam  Vitals and nursing note reviewed.   Constitutional:       General: She is not in acute distress.     Appearance: She is well-developed. She is not diaphoretic.   HENT:      Head: Normocephalic.   Eyes:      Conjunctiva/sclera: Conjunctivae normal.   Neck:      Vascular: No JVD.   Cardiovascular:      Rate and Rhythm: Normal rate and regular rhythm.      Heart sounds: Normal heart sounds, S1 normal and S2 normal. No murmur heard.    No friction rub. No gallop.   Pulmonary:      Effort: Pulmonary effort is normal. No respiratory distress.      Breath sounds: Normal breath sounds. No wheezing or rales.   Abdominal:      General: Bowel sounds are normal. There is no distension.      Palpations: Abdomen is soft.   Musculoskeletal:         General: Normal range of motion.   Skin:     General: Skin is warm and dry.      Findings: No erythema.   Neurological:      Mental Status: She is alert and oriented to person, place, and time.   Psychiatric:         Behavior: Behavior normal.         Thought Content: Thought content normal.         Judgment: Judgment normal.         PFTs              Radiology (independently reviewed by me, interpreted by physcian)    No radiology results for the last 30 days.           Assessment       Discussion/ Recommendations:    Diagnosis Plan   1. Mild persistent asthma not well controlled    Pulmonary Function Test With Bronchodilator    - She does not have COPD.   - Absence of bronchodilator response does not rule out asthma. She does have improvement at home with albuterol when she wheezes.   - Breo 1 puff daily. Given sample in " office and instructed on use with return demonstration. 200mcg for 2 weeks than down to 100mcg daily. Will complete PA if needed.        2. Tobacco use   CT Chest Low Dose Cancer Screening WO     Low-Dose Lung Cancer CT Screening    Shared Decision Making  I am discussing tobacco cessation today with Sudhakar Saul    Social History     Tobacco Use   Smoking Status Heavy Tobacco Smoker   • Packs/day: 1.00   • Years: 35.00   • Pack years: 35.00   • Types: Cigarettes   Smokeless Tobacco Never Used   Tobacco Comment    on patch trying to quit    - she is down to 2-3 cigarettes a day. We set a quit date of 8/1/22.     The patient has had no hemoptysis, unintentional weight loss or increasing shortness of breath. The patient is asymptomatic and has no signs or symptoms of lung cancer.     Together we discussed the potential benefits and potential harms of being screened for lung cancer including the potential for follow up diagnostic testing, risk for over diagnosis, false positive rate and radiation exposure using the AHRQ: Is Lung Cancer Screening Right for Me Decision Aid (Publication [id number], web site www.[place].gov). A copy of this decision aid resource has been provided in the after visit summary.  We also reviewed the patient's smoking history and counseled her on the importance and health benefits of maintaining cigarette smoking abstinence.      Continued Smoking Abstinence discussion:     We discussed that there are a number of resources and interventions to assist with smoking cessation if needed in the future including the 1-800-Quit Now line.(Included in the decision aid shared with the patient today).       On a scale of zero to ten, the patient rates their motivation to  quit at a 9 out of 10 today.     Recommendations for continued lung cancer screening:      We discussed the NCCN guidelines for lung cancer screening and the patient verbalized understanding that annual screening is recommended  until fifteen years beyond smoking as long as they have no other disease or comorbidity that would prevent them from receiving cancer treatments such as surgery should a lung cancer be detected.  After review of the NCCN guidelines and recommendations for ongoing screening, the patient verbalized understanding of recommendations for follow-up.      The patient has decided to proceed with a Low Dose Lung Cancer Screening CT today.       4minutes face-to-face spent counseling patient on the continued health benefits of having quit tobacco, maintaining smoking abstinence, smoking cessation strategies and resources, as well as the importance of adherence to annual lung cancer low-dose CT screening.         Class 3 Severe Obesity (BMI >=40). Obesity-related health conditions include the following: hypertension. Obesity is unchanged. BMI is is above average; BMI management plan is completed. We discussed portion control and increasing exercise.    Follow up: 3 weeks after scan for med change evaluation.     I spent 35 minutes caring for Sudhakar on this date of service. This time includes time spent by me in the following activities: preparing for the visit, reviewing tests, obtaining and/or reviewing a separately obtained history, performing a medically appropriate examination and/or evaluation, counseling and educating the patient/family/caregiver, ordering medications, tests, or procedures, referring and communicating with other health care professionals, documenting information in the medical record, independently interpreting results and communicating that information with the patient/family/caregiver and care coordination            This document has been electronically signed by ROD Kelly on July 19, 2022 10:21 CDT

## 2022-07-20 NOTE — PROCEDURES
Full Pulmonary Function Test With Bronchodilator  Performed by: Zahraa Mathews APRN  Authorized by: Zahraa Mathews APRN      Pre Drug % Predicted    FVC: 83%   FEV1: 88%   FEF 25-75%: 108%   FEV1/FVC: 85%     Post Drug % Predicted    FVC: 78%   FEV1: 86%   FEF 25-75%: 104%   FEV1/FVC: 88%      Change in % (calculated):    FVC: -5   FEV1: -1   FEF 25-75%: -3   FEV1/FVC: 4    Interpretation   Spirometry   Spirometry shows normal results.   Review of FVL curve   There is a flattening of the expiratory curve, suggesting variable intrathoracic obstruction.   Overall comments: The patient's prebronchodilator spirometry is within normal limits.  There is actually slight worsening of her FVC postbronchodilator and it now is mildly diminished.  Otherwise there is no significant change in spirometry postbronchodilator.  There is some flattening of the expiratory limb of the flow volume loop which could be seen with a variable intrathoracic upper airway obstruction, clinical correlation is advised.

## 2022-07-21 ENCOUNTER — TELEMEDICINE (OUTPATIENT)
Dept: PSYCHIATRY | Facility: CLINIC | Age: 47
End: 2022-07-21

## 2022-07-21 DIAGNOSIS — F25.9 SCHIZOAFFECTIVE DISORDER, UNSPECIFIED TYPE: Primary | ICD-10-CM

## 2022-07-21 PROCEDURE — 90834 PSYTX W PT 45 MINUTES: CPT | Performed by: COUNSELOR

## 2022-08-02 NOTE — PROGRESS NOTES
Subjective:  Sudhakar Saul is a 46 y.o. female who presents for       Patient Active Problem List   Diagnosis   • Seizure disorder (HCC)   • Morbid obesity (HCC)   • Tobacco abuse counseling   • Multiple joint pain   • Dysuria   • History of positive hepatitis C   • Essential hypertension   • Uncontrolled type 2 diabetes mellitus with hyperglycemia (HCC)   • Vitamin D deficiency   • Urinary tract infection without hematuria   • Chronic hepatitis C without hepatic coma (HCC)   • Cigarette nicotine dependence, uncomplicated   • JACQUE (obstructive sleep apnea)   • Gastroesophageal reflux disease with esophagitis   • Left upper quadrant pain   • Nausea and vomiting   • Weight gain, abnormal   • Change in bowel habits   • Hepatic fibrosis   • Non-compliance   • Schizoaffective disorder, depressive type (HCC)   • Schizophrenia, paranoid type ([facility])   • Gastritis without bleeding   • Tobacco user   • Encounter for eye exam   • Scoliosis   • History of drug use   • Stage 2 chronic kidney disease   • Diabetic neuropathy (HCC)   • Mixed hyperlipidemia   • Chronic bilateral low back pain with bilateral sciatica   • Type 2 diabetes mellitus with hyperglycemia, with long-term current use of insulin ([facility])   • Auditory hallucination   • Class 3 severe obesity due to excess calories with serious comorbidity and body mass index (BMI) of 45.0 to 49.9 in adult (HCC)   • Pain in wrist   • Schizophrenia, simple, chronic (HCC)   • Carpal tunnel syndrome   • Status post carpal tunnel release   • Asthma, persistent not controlled           Current Outpatient Medications:   •  albuterol (ACCUNEB) 1.25 MG/3ML nebulizer solution, Take 3 mL by nebulization Every 6 (Six) Hours As Needed for Wheezing or Shortness of Air., Disp: 9 mL, Rfl: 3  •  albuterol sulfate  (90 Base) MCG/ACT inhaler, Inhale 2 puffs Every 6 (Six) Hours As Needed for Wheezing or Shortness of Air., Disp: 6.7 g, Rfl: 11  •  aspirin 81 MG EC tablet, Take 81 mg by  mouth Daily., Disp: , Rfl:   •  Cariprazine HCl 4.5 MG capsule, Take 4.5 mg by mouth Daily., Disp: 90 capsule, Rfl: 0  •  Continuous Blood Gluc  (Dexcom G6 ) device, USE FOR 6 MONTHS, Disp: , Rfl:   •  Continuous Blood Gluc Sensor (Dexcom G6 Sensor), USE AS DIRECTED EVERY  10  DAYS, Disp: 9 each, Rfl: 0  •  Continuous Blood Gluc Transmit (Dexcom G6 Transmitter) misc, USE 1  ONCE EVERY THREE MONTHS, Disp: 1 each, Rfl: 0  •  cyclobenzaprine (FLEXERIL) 5 MG tablet, Take 1 tablet by mouth 3 (Three) Times a Day As Needed for Muscle Spasms. (Patient taking differently: Take 5 mg by mouth 3 (Three) Times a Day.), Disp: 90 tablet, Rfl: 3  •  Dulaglutide (Trulicity) 4.5 MG/0.5ML solution pen-injector, Inject 4.5 mg under the skin into the appropriate area as directed 1 (One) Time Per Week., Disp: 4 pen, Rfl: 11  •  DULoxetine (CYMBALTA) 30 MG capsule, Take 3 capsules by mouth Daily., Disp: 270 capsule, Rfl: 0  •  Ertugliflozin L-PyroglutamicAc (Steglatro) 15 MG tablet, Take 1 tablet by mouth Every Morning., Disp: 90 tablet, Rfl: 1  •  Fluticasone Furoate-Vilanterol (Breo Ellipta) 200-25 MCG/INH inhaler, Inhale 1 puff Daily., Disp: 28 each, Rfl: 11  •  folic acid (FOLVITE) 1 MG tablet, Take 1,000 mcg by mouth Daily., Disp: , Rfl:   •  gabapentin (NEURONTIN) 300 MG capsule, Take 1 capsule by mouth 3 (Three) Times a Day. (Patient taking differently: Take 100 mg by mouth 3 (Three) Times a Day.), Disp: 90 capsule, Rfl: 2  •  glucose blood test strip, Testing 4 times daily, E11.9, Disp: 120 each, Rfl: 12  •  HYDROcodone-acetaminophen (NORCO) 7.5-325 MG per tablet, Take 1 tablet by mouth 3 (Three) Times a Day., Disp: , Rfl:   •  hydrOXYzine (ATARAX) 25 MG tablet, Take 0.5-1 tablets by mouth 3 (Three) Times a Day As Needed for Anxiety., Disp: 270 tablet, Rfl: 0  •  Insulin Disposable Pump (OmniPod Dash 5 Pack Pods) misc, , Disp: , Rfl:   •  insulin lispro (Admelog) 100 UNIT/ML injection, 180  units daily through  pump, Disp: 90 mL, Rfl: 11  •  lamoTRIgine (LaMICtal) 150 MG tablet, Take 1 tablet by mouth Daily., Disp: 90 tablet, Rfl: 0  •  levETIRAcetam (KEPPRA) 1000 MG tablet, Take 1 tablet by mouth 2 (Two) Times a Day., Disp: 60 tablet, Rfl: 3  •  lisinopril (PRINIVIL,ZESTRIL) 40 MG tablet, Take 1 tablet by mouth Daily., Disp: 30 tablet, Rfl: 3  •  metFORMIN (GLUCOPHAGE) 500 MG tablet, TAKE 1 TABLET BY MOUTH TWICE DAILY WITH MEALS, Disp: 60 tablet, Rfl: 0  •  metoprolol tartrate (LOPRESSOR) 50 MG tablet, Take 1 tablet by mouth 2 (Two) Times a Day., Disp: 60 tablet, Rfl: 3  •  Nebulizer device, 1 application Every 6 (Six) Hours As Needed (dyspnea)., Disp: 1 each, Rfl: 3  •  nicotine (NICODERM CQ) 21 MG/24HR patch, Place 1 patch on the skin as directed by provider Daily., Disp: 28 patch, Rfl: 3  •  NIFEdipine XL (PROCARDIA XL) 30 MG 24 hr tablet, Take 1 tablet by mouth Daily., Disp: 30 tablet, Rfl: 3  •  omeprazole (priLOSEC) 40 MG capsule, Take 1 capsule by mouth Daily., Disp: 30 capsule, Rfl: 3  •  OXcarbazepine (TRILEPTAL) 600 MG tablet, Take 600 mg by mouth 2 (Two) Times a Day. PT TAKES  MG TAB IN MORNING AND  MG IN EVENING, Disp: , Rfl:   •  prazosin (MINIPRESS) 2 MG capsule, Take 1-2 capsules by mouth Every Night., Disp: 180 capsule, Rfl: 0  •  simvastatin (ZOCOR) 40 MG tablet, Take 1 tablet by mouth Every Night., Disp: 30 tablet, Rfl: 3  •  vitamin D (ERGOCALCIFEROL) 1.25 MG (48543 UT) capsule capsule, Take 2 tablets by mouth weekly, Disp: 8 capsule, Rfl: 3    HPI        Pt is 45 yo female with management  of morbid obesity, IDDM Type 2 now on insulin pump , HTN, HLP, vitamin D deficiency, tobacco user, GERD, gastritis, esophagitis, diverticulosis, colonic polyp in sigmoid colon,  paranoid schizophrenia,  Seizure disorder, history of Illicit drug abuse, asthma , JACQUE, chronic hepatitis C , major depression, JD, sp appendectomy, sp cholecystectomy sp right carpal tunnel release, sp back surgery, sp breast  surgery, sp lipoma excision, chronic back pain (moderate L4-L5 and L5-S1 DDD changes, mild bilateral L4-L5 moderate bilateral L5-S1 facet arthritic change, mild leveoscoliosis), CKD stage 2, de Quervain's syndrome of left wrist ,sp left carpal tunnel release. 0.3 cm nodule along left lower and right middle lobe        6/14/22 in office visit for recheck. Pt continues to take to  for her major depression/schizophrenia. She had labwork on 4/22/22 that showed hga1c at 9.50 vitamin D is low. CMP showed elevated liver enzymes. Glucose at 211. BP is better controlled with procardia.  She is now back on track in keeping her sugars control. She stopped caring about her health in January and recently she is now back on track. She is now back to taking her medication. She is talking to counseling every 2-3 week.s She is now on trileptal started by her Neurologist Dr. Monte.  She has cut back pain on her smoking to a few cigarettes a day. Her sugar readings now range in 180s .    8/10/22 in office visit for recheck. Pt has been following up with  with telemedicine. Also has seen Endocrinology for her uncontrolled DM type 2. She is on omnipod and her Basal insulin was changed from 2.85 to 2.95 she continues with trulicity and steglatro and emtformin 1000 mg PO BID. She saw Pulmonology and had PFTs consistent with mild asthma.  Pt does not have COPD.  She did have CT of chest low dose on 8/4/22 that showed a tiny 0.3 cm nodule along left lower and right middle lobe. Another CT of chest was recommended in 12 months. Pt had labwor on 7/8/22 that showed triglcyerides hig hat 152. [id number] at 8.70 from 9.50. CMP showed elevated liver enzymes. GFR in the 80s. She is doing fair. Mentally she is stable  She had one episode of auditory hallucination since last visit.   BP is stable. She will need new referral with PM. Her current PM will no longer taker her insurance. She has had back injections in the past. Currently she is on  neurontin, flexeril and Norco She had one episode of seizure .       Hyperlipidemia  The current episode started more than 1 year ago. The problem is controlled. Recent lipid tests were reviewed and are variable. Exacerbating diseases include chronic renal disease, diabetes and obesity. She has no history of nephrotic syndrome. Associated symptoms include shortness of breath. Pertinent negatives include no chest pain. Current antihyperlipidemic treatment includes statins. The current treatment provides significant improvement of lipids. Risk factors for coronary artery disease include diabetes mellitus, obesity, dyslipidemia, a sedentary lifestyle, stress and hypertension.   Seizures   This is a chronic problem. The current episode started more than 1 week ago. The problem has not changed since onset.Pertinent negatives include no sleepiness, no confusion, no headaches, no speech difficulty, no visual disturbance, no neck stiffness, no sore throat, no chest pain, no cough, no nausea, no vomiting, no diarrhea and no muscle weakness. There has been no fever.   Chronic Kidney Disease  This is a chronic problem. The current episode started more than 1 year ago. The problem occurs constantly. The problem has been unchanged. Associated symptoms include arthralgias, fatigue, numbness and weakness. Pertinent negatives include no abdominal pain, chest pain, coughing, headaches, nausea, sore throat or vomiting. Nothing aggravates the symptoms. She has tried nothing for the symptoms. The treatment provided no relief.   Heartburn  She reports no abdominal pain, no belching, no chest pain, no choking, no coughing, no early satiety, no globus sensation, no heartburn, no hoarse voice, no nausea, no sore throat, no stridor, no tooth decay, no water brash or no wheezing. This is a chronic problem. The current episode started more than 1 month ago. The problem occurs constantly. The problem has been unchanged. Nothing aggravates the  symptoms. Associated symptoms include fatigue. She has tried a PPI for the symptoms.   Obesity   This is a chronic problem. The current episode started more than 1 year ago. The problem occurs constantly. The problem has been unchanged. Associated symptoms include arthralgias, fatigue, headaches, numbness and weakness. Pertinent negatives include no abdominal pain, anorexia, change in bowel habit, chest pain, chills, congestion, coughing, fever, joint swelling, myalgias, nausea, neck pain, rash, sore throat, swollen glands, urinary symptoms, vertigo, visual change or vomiting. Nothing aggravates the symptoms. She has tried nothing for the symptoms.   Hypertension   This is a chronic problem. The current episode started more than 1 year ago. The problem is controlled  Associated symptoms include headaches. Pertinent negatives include no anxiety, blurred vision, chest pain, malaise/fatigue, neck pain, orthopnea, palpitations, peripheral edema, PND or shortness of breath. Risk factors for coronary artery disease include obesity, sedentary lifestyle, smoking/tobacco exposure and stress. Past treatments include ACE inhibitors, beta blockers and diuretics. There are no compliance problems.  There is no history of angina, kidney disease, CAD/MI, CVA, heart failure, left ventricular hypertrophy, PVD or retinopathy. There is no history of chronic renal disease, coarctation of the aorta, hyperaldosteronism, hypercortisolism, hyperparathyroidism, a hypertension causing med, pheochromocytoma, renovascular disease, sleep apnea or a thyroid problem.   Diabetes   She presents for her followupdiabetic visit. She has type 2 diabetes mellitus. Her disease course has been waxing and waning  Hypoglycemia symptoms include confusion, headaches, nervousness/anxiousness, seizures and sleepiness. Associated symptoms include fatigue and weakness. Pertinent negatives for diabetes include no blurred vision, no chest pain and no visual change.  Pertinent negatives for diabetic complications include no CVA, PVD or retinopathy. Risk factors for coronary artery disease include dyslipidemia, obesity and post-menopausal. She has not had a previous visit with a dietitian. There is no change in her home blood glucose trend. An ACE inhibitor/angiotensin II receptor blocker is not being taken. She does not see a podiatrist.Eye exam is not current. teratments include steglastro insulin and trulicity        Review of Systems  Review of Systems   Constitutional: Positive for activity change and fatigue. Negative for appetite change, chills, diaphoresis and fever.   HENT: Negative for congestion, postnasal drip, rhinorrhea, sinus pressure, sinus pain, sneezing, sore throat, trouble swallowing and voice change.    Respiratory: Positive for cough and shortness of breath. Negative for choking, chest tightness, wheezing and stridor.    Cardiovascular: Negative for chest pain.   Gastrointestinal: Negative for diarrhea, nausea and vomiting.   Musculoskeletal: Positive for arthralgias and back pain.   Neurological: Positive for seizures, weakness and numbness. Negative for headaches.   Psychiatric/Behavioral: Positive for agitation, behavioral problems and hallucinations. The patient is nervous/anxious.         Depressed mood        Patient Active Problem List   Diagnosis   • Seizure disorder (HCC)   • Morbid obesity (HCC)   • Tobacco abuse counseling   • Multiple joint pain   • Dysuria   • History of positive hepatitis C   • Essential hypertension   • Uncontrolled type 2 diabetes mellitus with hyperglycemia (HCC)   • Vitamin D deficiency   • Urinary tract infection without hematuria   • Chronic hepatitis C without hepatic coma (HCC)   • Cigarette nicotine dependence, uncomplicated   • JACQUE (obstructive sleep apnea)   • Gastroesophageal reflux disease with esophagitis   • Left upper quadrant pain   • Nausea and vomiting   • Weight gain, abnormal   • Change in bowel habits   •  Hepatic fibrosis   • Non-compliance   • Schizoaffective disorder, depressive type (HCC)   • Schizophrenia, paranoid type (HCC)   • Gastritis without bleeding   • Tobacco user   • Encounter for eye exam   • Scoliosis   • History of drug use   • Stage 2 chronic kidney disease   • Diabetic neuropathy (HCC)   • Mixed hyperlipidemia   • Chronic bilateral low back pain with bilateral sciatica   • Type 2 diabetes mellitus with hyperglycemia, with long-term current use of insulin (HCC)   • Auditory hallucination   • Class 3 severe obesity due to excess calories with serious comorbidity and body mass index (BMI) of 45.0 to 49.9 in adult (HCC)   • Pain in wrist   • Schizophrenia, simple, chronic (HCC)   • Carpal tunnel syndrome   • Status post carpal tunnel release   • Asthma, persistent not controlled     Past Surgical History:   Procedure Laterality Date   • APPENDECTOMY     • BACK SURGERY     • BREAST SURGERY     • CARPAL TUNNEL RELEASE      right hand   • CARPAL TUNNEL RELEASE Left 10/19/2021    Procedure: LEFT CARPAL TUNNEL RELEASE;  Surgeon: Kevin Carlson MD;  Location: [facility] OR;  Service: Orthopedics;  Laterality: Left;   • CHOLECYSTECTOMY     • COLONOSCOPY     • COLONOSCOPY N/A 2019    Procedure: COLONOSCOPY;  Surgeon: Joni Delacruz MD;  Location: [facility] ENDOSCOPY;  Service: Gastroenterology   • DENTAL PROCEDURE     • ENDOSCOPY N/A 2019    Procedure: ESOPHAGOGASTRODUODENOSCOPY--poss dilation;  Surgeon: Joni Delacruz MD;  Location: [facility] ENDOSCOPY;  Service: Gastroenterology   • HYSTERECTOMY     • LIPOMA EXCISION      9   • LIVER BIOPSY     • UPPER GASTROINTESTINAL ENDOSCOPY  2019     Social History     Socioeconomic History   • Marital status:    Tobacco Use   • Smoking status: Former Smoker     Packs/day: 1.00     Years: 35.00     Pack years: 35.00     Types: Cigarettes     Start date:      Quit date: 2022     Years since quittin.1   • Smokeless tobacco: Never Used   •  Tobacco comment: on patch trying to quit    Vaping Use   • Vaping Use: Never used   Substance and Sexual Activity   • Alcohol use: Not Currently   • Drug use: Not Currently     Types: Amphetamines, Heroin, Methamphetamines, Morphine, Oxycodone     Comment: QUIT 3 YEARS AGO   • Sexual activity: Not Currently     Birth control/protection: Surgical     Family History   Problem Relation Age of Onset   • Hypertension Other    • Diabetes Other    • Stroke Other    • Cancer Other    • Kidney disease Other    • Lung disease Other    • Other Other    • Malone's esophagus Other    • Colon polyps Other    • Heart disease Other    • Ulcers Other    • Cholelithiasis Other    • Allergy (severe) Other    • Schizophrenia Father    • Alcohol abuse Father    • Drug abuse Paternal Grandfather      Lab on 07/08/2022   Component Date Value Ref Range Status   • Glucose 07/08/2022 106 (A) 65 - 99 mg/dL Final   • BUN 07/08/2022 16  6 - 20 mg/dL Final   • Creatinine 07/08/2022 0.82  0.57 - 1.00 mg/dL Final   • Sodium 07/08/2022 139  136 - 145 mmol/L Final   • Potassium 07/08/2022 4.7  3.5 - 5.2 mmol/L Final   • Chloride 07/08/2022 106  98 - 107 mmol/L Final   • CO2 07/08/2022 21.0 (A) 22.0 - 29.0 mmol/L Final   • Calcium 07/08/2022 9.4  8.6 - 10.5 mg/dL Final   • Total Protein 07/08/2022 7.0  6.0 - 8.5 g/dL Final   • Albumin 07/08/2022 3.80  3.50 - 5.20 g/dL Final   • ALT (SGPT) 07/08/2022 37 (A) 1 - 33 U/L Final   • AST (SGOT) 07/08/2022 42 (A) 1 - 32 U/L Final   • Alkaline Phosphatase 07/08/2022 93  39 - 117 U/L Final   • Total Bilirubin 07/08/2022 0.2  0.0 - 1.2 mg/dL Final   • Globulin 07/08/2022 3.2  gm/dL Final   • A/G Ratio 07/08/2022 1.2  g/dL Final   • BUN/Creatinine Ratio 07/08/2022 19.5  7.0 - 25.0 Final   • Anion Gap 07/08/2022 12.0  5.0 - 15.0 mmol/L Final   • eGFR 07/08/2022 88.4  >60.0 mL/min/1.73 Final    National Kidney Foundation and American Society of Nephrology (ASN) Task Force recommended calculation based on the  Chronic Kidney Disease Epidemiology Collaboration (CKD-EPI) equation refit without adjustment for race.   • Hemoglobin A1C 07/08/2022 8.70 (A) 4.80 - 5.60 % Final   • Total Cholesterol 07/08/2022 119  0 - 200 mg/dL Final   • Triglycerides 07/08/2022 152 (A) 0 - 150 mg/dL Final   • HDL Cholesterol 07/08/2022 43  40 - 60 mg/dL Final   • LDL Cholesterol  07/08/2022 50  0 - 100 mg/dL Final   • VLDL Cholesterol 07/08/2022 26  5 - 40 mg/dL Final   • LDL/HDL Ratio 07/08/2022 1.06   Final   • Microalbumin/Creatinine Ratio 07/08/2022    Final    Unable to calculate   • Creatinine, Urine 07/08/2022 80.0  mg/dL Final   • Microalbumin, Urine 07/08/2022 <1.2  mg/dL Final   • TSH 07/08/2022 2.290  0.270 - 4.200 uIU/mL Final   • 25 Hydroxy, Vitamin D 07/08/2022 34.9  30.0 - 100.0 ng/ml Final   • WBC 07/08/2022 8.79  3.40 - 10.80 10*3/mm3 Final   • RBC 07/08/2022 4.87  3.77 - 5.28 10*6/mm3 Final   • Hemoglobin 07/08/2022 13.6  12.0 - 15.9 g/dL Final   • Hematocrit 07/08/2022 41.3  34.0 - 46.6 % Final   • MCV 07/08/2022 84.8  79.0 - 97.0 fL Final   • MCH 07/08/2022 27.9  26.6 - 33.0 pg Final   • MCHC 07/08/2022 32.9  31.5 - 35.7 g/dL Final   • RDW 07/08/2022 14.6  12.3 - 15.4 % Final   • RDW-SD 07/08/2022 44.4  37.0 - 54.0 fl Final   • MPV 07/08/2022 11.0  6.0 - 12.0 fL Final   • Platelets 07/08/2022 176  140 - 450 10*3/mm3 Final   • Neutrophil % 07/08/2022 55.8  42.7 - 76.0 % Final   • Lymphocyte % 07/08/2022 36.3  19.6 - 45.3 % Final   • Monocyte % 07/08/2022 4.4 (A) 5.0 - 12.0 % Final   • Eosinophil % 07/08/2022 2.3  0.3 - 6.2 % Final   • Basophil % 07/08/2022 0.7  0.0 - 1.5 % Final   • Immature Grans % 07/08/2022 0.5  0.0 - 0.5 % Final   • Neutrophils, Absolute 07/08/2022 4.91  1.70 - 7.00 10*3/mm3 Final   • Lymphocytes, Absolute 07/08/2022 3.19 (A) 0.70 - 3.10 10*3/mm3 Final   • Monocytes, Absolute 07/08/2022 0.39  0.10 - 0.90 10*3/mm3 Final   • Eosinophils, Absolute 07/08/2022 0.20  0.00 - 0.40 10*3/mm3 Final   • Basophils,  Absolute 07/08/2022 0.06  0.00 - 0.20 10*3/mm3 Final   • Immature Grans, Absolute 07/08/2022 0.04  0.00 - 0.05 10*3/mm3 Final   • nRBC 07/08/2022 0.0  0.0 - 0.2 /100 WBC Final   Lab on 04/22/2022   Component Date Value Ref Range Status   • Hemoglobin A1C 04/22/2022 9.50 (A) 4.80 - 5.60 % Final   • Total Cholesterol 04/22/2022 169  0 - 200 mg/dL Final   • Triglycerides 04/22/2022 144  0 - 150 mg/dL Final   • HDL Cholesterol 04/22/2022 44  40 - 60 mg/dL Final   • LDL Cholesterol  04/22/2022 100  0 - 100 mg/dL Final   • VLDL Cholesterol 04/22/2022 25  5 - 40 mg/dL Final   • LDL/HDL Ratio 04/22/2022 2.19   Final   • 25 Hydroxy, Vitamin D 04/22/2022 23.9 (A) 30.0 - 100.0 ng/ml Final   • Vitamin B-12 04/22/2022 493  211 - 946 pg/mL Final   • Microalbumin/Creatinine Ratio 04/22/2022 32.4  mg/g Final   • Creatinine, Urine 04/22/2022 77.1  mg/dL Final   • Microalbumin, Urine 04/22/2022 2.5  mg/dL Final   • Glucose 04/22/2022 211 (A) 65 - 99 mg/dL Final   • BUN 04/22/2022 12  6 - 20 mg/dL Final   • Creatinine 04/22/2022 0.77  0.57 - 1.00 mg/dL Final   • Sodium 04/22/2022 136  136 - 145 mmol/L Final   • Potassium 04/22/2022 4.6  3.5 - 5.2 mmol/L Final   • Chloride 04/22/2022 102  98 - 107 mmol/L Final   • CO2 04/22/2022 24.0  22.0 - 29.0 mmol/L Final   • Calcium 04/22/2022 9.4  8.6 - 10.5 mg/dL Final   • Total Protein 04/22/2022 6.8  6.0 - 8.5 g/dL Final   • Albumin 04/22/2022 3.80  3.50 - 5.20 g/dL Final   • ALT (SGPT) 04/22/2022 39 (A) 1 - 33 U/L Final   • AST (SGOT) 04/22/2022 34 (A) 1 - 32 U/L Final   • Alkaline Phosphatase 04/22/2022 96  39 - 117 U/L Final   • Total Bilirubin 04/22/2022 0.2  0.0 - 1.2 mg/dL Final   • Globulin 04/22/2022 3.0  gm/dL Final   • A/G Ratio 04/22/2022 1.3  g/dL Final   • BUN/Creatinine Ratio 04/22/2022 15.6  7.0 - 25.0 Final   • Anion Gap 04/22/2022 10.0  5.0 - 15.0 mmol/L Final   • eGFR 04/22/2022 95.3  >60.0 mL/min/1.73 Final    National Kidney Foundation and American Society of Nephrology  (ASN) Task Force recommended calculation based on the Chronic Kidney Disease Epidemiology Collaboration (CKD-EPI) equation refit without adjustment for race.   • Protein/Creatinine Ratio, Urine 04/22/2022 140.1  0.0 - 200.0 mg/G Crea Final   • Creatinine, Urine 04/22/2022 77.1  mg/dL Final   • Total Protein, Urine 04/22/2022 10.8  mg/dL Final   • TSH 04/22/2022 1.740  0.270 - 4.200 uIU/mL Final   • Magnesium 04/22/2022 1.8  1.6 - 2.6 mg/dL Final   • WBC 04/22/2022 8.45  3.40 - 10.80 10*3/mm3 Final   • RBC 04/22/2022 4.90  3.77 - 5.28 10*6/mm3 Final   • Hemoglobin 04/22/2022 13.3  12.0 - 15.9 g/dL Final   • Hematocrit 04/22/2022 41.0  34.0 - 46.6 % Final   • MCV 04/22/2022 83.7  79.0 - 97.0 fL Final   • MCH 04/22/2022 27.1  26.6 - 33.0 pg Final   • MCHC 04/22/2022 32.4  31.5 - 35.7 g/dL Final   • RDW 04/22/2022 15.0  12.3 - 15.4 % Final   • RDW-SD 04/22/2022 45.5  37.0 - 54.0 fl Final   • MPV 04/22/2022 11.1  6.0 - 12.0 fL Final   • Platelets 04/22/2022 169  140 - 450 10*3/mm3 Final   • Neutrophil % 04/22/2022 66.8  42.7 - 76.0 % Final   • Lymphocyte % 04/22/2022 25.4  19.6 - 45.3 % Final   • Monocyte % 04/22/2022 5.1  5.0 - 12.0 % Final   • Eosinophil % 04/22/2022 1.5  0.3 - 6.2 % Final   • Basophil % 04/22/2022 0.7  0.0 - 1.5 % Final   • Immature Grans % 04/22/2022 0.5  0.0 - 0.5 % Final   • Neutrophils, Absolute 04/22/2022 5.64  1.70 - 7.00 10*3/mm3 Final   • Lymphocytes, Absolute 04/22/2022 2.15  0.70 - 3.10 10*3/mm3 Final   • Monocytes, Absolute 04/22/2022 0.43  0.10 - 0.90 10*3/mm3 Final   • Eosinophils, Absolute 04/22/2022 0.13  0.00 - 0.40 10*3/mm3 Final   • Basophils, Absolute 04/22/2022 0.06  0.00 - 0.20 10*3/mm3 Final   • Immature Grans, Absolute 04/22/2022 0.04  0.00 - 0.05 10*3/mm3 Final   • nRBC 04/22/2022 0.0  0.0 - 0.2 /100 WBC Final      CT Chest Low Dose Cancer Screening WO  Narrative: PROCEDURE:  CT LUNG SCREENING    CLINICAL HISTORY:  Lung cancer screening (Age 18-49y), Z72.0  Tobacco use.  High  risk patient population group (with smoking  history)    COMPARISON EXAMINATIONS:   none        TECHNIQUE:  Low dose CT protocol.  This exam was performed  according to our departmental dose optimization program, which  includes automated exposure control, adjustment of the mA and/or  KV according to patient size and/or use of iterative  reconstruction technique.    FINDINGS:         PULMONARY NODULES: Abutting the pleura of the left lower and  right middle lobe are tiny, 0.3 cm noncalcified nodular opacities  (series 3, image 186 along the left lower lobe, image 170 along  the right middle lobe).    No focal airspace consolidation. Mild linear atelectasis/scar in  the lingula. No pleural effusion or pneumothorax. The central  airways are patent. No bronchiectasis.    No thoracic lymphadenopathy.          Impression: There is a tiny, pleural-based 0.3 cm nodule along the left lower  and right middle lobe.             Follow-up recommendation(s):  Continue annual screening with low  dose CT in 12 months        ACR Lung-RADS assessment score:  Category 2: BENIGN APPEARANCE or  behavior       References:      ---------------    NCCN for patients: (patient handbook)   https://nccn.org/patients/guidelines/lung_screening/index.html       NCCN for physicians:   http://www.nccn.org/professionals/physician_gls/pdf/lung_screenin  .pdf    ACR classification and management suggestions:  http://www.acr.org/~/media/ACR/Documents/PDF/QualitySafety/Reso  rces/LungRADS/AssessmentCategories.pdf?              Electronically signed by:  Too Campbell MD  8/6/2022 7:26 AM CDT  Workstation: [id number]    [email or web address]@  Immunization History   Administered Date(s) Administered   • COVID-19 (MODERNA) 1st, 2nd, 3rd Dose Only 04/06/2021, 04/08/2021, 04/08/2021, 05/06/2021, 05/06/2021, 12/10/2021   • FluLaval/Fluarix/Fluzone >6 09/17/2020, 10/28/2021   • Hepatitis A 06/12/2018   • Hepatitis B Vaccine Adult IM 06/14/2022   • Pneumococcal  "Polysaccharide (PPSV23) 12/13/2018   • Tdap 09/17/2020       The following portions of the patient's history were reviewed and updated as appropriate: allergies, current medications, past family history, past medical history, past social history, past surgical history and problem list.        Physical Exam  /74 (BP Location: Left arm, Patient Position: Sitting, Cuff Size: Adult)   Pulse 81   Temp 97.9 °F (36.6 °C)   Ht 167.6 cm (66\")   Wt (!) 156 kg (343 lb)   SpO2 98%   BMI 55.36 kg/m²     Physical Exam  Vitals and nursing note reviewed.   Constitutional:       Appearance: She is well-developed. She is not diaphoretic.   HENT:      Head: Normocephalic and atraumatic.      Right Ear: External ear normal.   Eyes:      Conjunctiva/sclera: Conjunctivae normal.      Pupils: Pupils are equal, round, and reactive to light.   Cardiovascular:      Rate and Rhythm: Normal rate and regular rhythm.      Heart sounds: Normal heart sounds. No murmur heard.  Pulmonary:      Effort: Pulmonary effort is normal. No respiratory distress.      Breath sounds: Normal breath sounds.   Abdominal:      General: Bowel sounds are normal. There is no distension.      Palpations: Abdomen is soft.      Tenderness: There is no abdominal tenderness.      Comments: Obese abdomen    Musculoskeletal:         General: Tenderness present. No deformity. Normal range of motion.      Cervical back: Normal range of motion and neck supple.   Skin:     General: Skin is warm.      Coloration: Skin is not pale.      Findings: No erythema or rash.   Neurological:      Mental Status: She is alert and oriented to person, place, and time.      Cranial Nerves: No cranial nerve deficit.   Psychiatric:         Behavior: Behavior normal.         [unfilled]   Diagnosis Plan   1. DDD (degenerative disc disease), lumbar  Ambulatory Referral to Pain Management   2. Mixed hyperlipidemia     3. Essential hypertension     4. Uncontrolled type 2 diabetes " mellitus with hyperglycemia (HCC)     5. Vitamin D deficiency     6. Morbid obesity (HCC)     7. Stage 2 chronic kidney disease     8. Gastroesophageal reflux disease with esophagitis without hemorrhage     9. Seizure disorder (HCC)     10. Schizophrenia, paranoid type (HCC)     11. Chronic obstructive pulmonary disease, unspecified COPD type (HCC)     12. Elevated liver enzymes     13. Chronic midline low back pain with bilateral sciatica  Ambulatory Referral to Pain Management             -recommend labowork   -recommend diabetic eye exam   -recommend COVID-19 vaccination  -hep B vaccination - given today  -elevated liver enzymes -suspect fatty liver will gt US of liver   -GERD with esophagitis  - on prilosec 20 mg daiy. Consider another EGD if not improving. GI following   -tobacco user - Counseled quit smoking >5 minutes.  recommend 1 800 QUIT NOW and nicotine patches   -ckd stage 2 - stable continue to monitor. Stressed importance of diabetes control and BP control    -hyperlipdemia - on  simvastatin 40 mg PO qhs. ASCVD risk high. Now on red yeast rice. On fish oil supplement  -chronic back pain/DDD lumbar spine/arthritis lumbar spine  - on neurontin 100 mg PO tID on flexeril 5 mg PO TID PRN on Norco 7.5/325 mg every 8 hours NC. PM following. She will be referred to another PM in [place] that will take her insurance   -dyspnea/mild asthma/pulmonary nodule   -Pulmonlogy following on breo now and abluterol PRN.   Albuterol nebulizer and treatment every 6 hours PRN.  repeat CT of chest in 1 year   - DM type 2- Endocrinology following. Currently on metformin 1000 gm PO BID, steglatro 15 mg daily. trulicity 4.5 mg now on insulin pump   -vitamin D  defciency - vitamin D once a week   -schizophrenia/major depression.JD  -  Behabvioral health following no on lamictal 150 mg PO q daily and vraylar 1.5 mg daily. risperdal stopped  on minipress 2 mg at bedtime. On vistaril 25 mg PO TID PRN   -seizure disorder - Neurology  following stable on Keppra 1000 mg Po BID. Neurology following in [place]. She is now on tileptal 600 mg daily.    -Morbid obesity - counseled weight loss >5 minutes BMI at 55.36   -hypertension - better  on lisinopril  40 mg dialy.  lopressor 50 mg PO BID  son nifedipine 30 mg daily.   -advised pt to be safe and call with any questions or concerns. All questions addressed today  -advised pt to followup with specialist and referrals  -advised pt to go to ER or call 911 if symptoms worrisome or severe  -advised pt to be safe during COVID-19 pandemic  I spent 30  minutes caring for Sudhakar on this date of service. This time includes time spent by me in the following activities: preparing for the visit, reviewing tests, obtaining and/or reviewing a separately obtained history, performing a medically appropriate examination and/or evaluation, counseling and educating the patient/family/caregiver, ordering medications, tests, or procedures, referring and communicating with other health care professionals, documenting information in the medical record, independently interpreting results and communicating that information with the patient/family/caregiver and care coordination  -recheck in 3 months         This document has been electronically signed by Jermaine Ferro MD on August 10, 2022 08:37 CDT

## 2022-08-04 ENCOUNTER — HOSPITAL ENCOUNTER (OUTPATIENT)
Dept: CT IMAGING | Facility: HOSPITAL | Age: 47
Discharge: HOME | End: 2022-08-04
Admitting: NURSE PRACTITIONER

## 2022-08-04 DIAGNOSIS — Z72.0 TOBACCO USE: ICD-10-CM

## 2022-08-04 PROCEDURE — 71271 CT THORAX LUNG CANCER SCR C-: CPT

## 2022-08-08 ENCOUNTER — OFFICE VISIT (OUTPATIENT)
Dept: PULMONOLOGY | Facility: CLINIC | Age: 47
End: 2022-08-08

## 2022-08-08 VITALS
OXYGEN SATURATION: 97 % | SYSTOLIC BLOOD PRESSURE: 120 MMHG | HEART RATE: 74 BPM | HEIGHT: 66 IN | BODY MASS INDEX: 47.09 KG/M2 | WEIGHT: 293 LBS | DIASTOLIC BLOOD PRESSURE: 74 MMHG

## 2022-08-08 DIAGNOSIS — J45.998 ASTHMA, PERSISTENT NOT CONTROLLED: Primary | ICD-10-CM

## 2022-08-08 DIAGNOSIS — F17.211 CIGARETTE NICOTINE DEPENDENCE IN REMISSION: ICD-10-CM

## 2022-08-08 PROCEDURE — 99214 OFFICE O/P EST MOD 30 MIN: CPT | Performed by: NURSE PRACTITIONER

## 2022-08-08 NOTE — PROGRESS NOTES
Pulmonary Office Visit    Thank you for asking me to see Sudhakar Davisonley for   Chief Complaint   Patient presents with   • CT results       History of Present Illness  7/19/22  Ms. Saul is a 46 year old patient who returns back to Pulmonology, last seen March of 2021. She was stable on Stiloto and albuterol prn.     She had an ER visit 3 weeks ago in [place].   She ran out of inhalers 2-3 months ago. Over that time, she was having more bouts of not being able to catch her breath and breathe. Her oxygen would drop into the 70s during these attacks.   She then saw Dr. Ferro who prescribed Albuterol nebulizer and she is using 2-3x a day.     Pre/Post PFTs completed today due to her presentation sounding more asthma related than COPD. I am also hesitant to diagnose COPD in her young age although she has been a very heavy smoker over the years. She did smoke up to 3 PPD for 30 years. She qualifies for LDCT screening and has not had one prior.     PFTs in 2018 were normal and 2020 PFTs showed moderate obstruction, were without post, and with poor volume loops. This could be due to asthma flare or inaccurate test but unknown without a post.     Due to her albuterol use of 2-3x a day, we will go ahead and up-titrate her to ICS/LABA and see how she does.     At the end of the visit, I repeated PFTs with bronchodilator. These were normal. She does not have COPD. I will proceed with ICS/LABA in order to treat an asthma component versus COPD.    8/8/22: Returns today for CT results for LDCT screening. She has subcentimeter nodules that are pleural based to monitor. She was started on Breo 200. I completed a PA and this was approved.   She is breathing better. Her cough is much improved.     She quit smoking 2 weeks ago. This was praised. She lives with her mother and step dad who do not smoke.   Has used albuterol 2x in the past month. She used this following activity.       Triggers/Pets/Occupation: aerosols.    Prior medication use: albuterol, Stiloto  Rescue inhaler use:  Albuterol neb 2-3x day  ER visits/hospitalization/exacerbations at PCP/UC: 3 weeks at [facility]. IV solumedrol and no prednisone pack.     PFTS:  Ratio: 85  FVC: 83  FEV1 88    Post  Ratio: 88  FVC: 78  FEV1: 86    No BDR      Tobacco use history:  Social History     Socioeconomic History   • Marital status:    Tobacco Use   • Smoking status: Former Smoker     Packs/day: 1.00     Years: 35.00     Pack years: 35.00     Types: Cigarettes     Start date:      Quit date: 2022     Years since quittin.1   • Smokeless tobacco: Never Used   • Tobacco comment: on patch trying to quit    Vaping Use   • Vaping Use: Never used   Substance and Sexual Activity   • Alcohol use: Not Currently   • Drug use: Not Currently     Types: Amphetamines, Heroin, Methamphetamines, Morphine, Oxycodone     Comment: QUIT 3 YEARS AGO   • Sexual activity: Not Currently     Birth control/protection: Surgical         Review of Systems: History obtained from chart review and the patient.  Review of Systems   Constitutional: Positive for fatigue. Negative for diaphoresis, fever and unexpected weight change.   Respiratory: Positive for shortness of breath and wheezing. Negative for cough and chest tightness.    Cardiovascular: Negative for chest pain, palpitations and leg swelling.   Gastrointestinal: Negative for abdominal distention and blood in stool.   Genitourinary: Negative for hematuria.   Musculoskeletal: Negative for arthralgias and myalgias.   Skin: Negative for pallor and rash.   Neurological: Negative for dizziness, syncope and weakness.   Hematological: Does not bruise/bleed easily.   Psychiatric/Behavioral: Negative for confusion. The patient is not nervous/anxious.        As described in the HPI. Otherwise, remainder of ROS (14 systems) were negative.    Patient Active Problem List   Diagnosis   • Seizure disorder (HCC)   • Morbid obesity (HCC)   • Tobacco  abuse counseling   • Multiple joint pain   • Dysuria   • History of positive hepatitis C   • Essential hypertension   • Uncontrolled type 2 diabetes mellitus with hyperglycemia (HCC)   • Vitamin D deficiency   • Urinary tract infection without hematuria   • Chronic hepatitis C without hepatic coma (HCC)   • Cigarette nicotine dependence, uncomplicated   • JACQUE (obstructive sleep apnea)   • Gastroesophageal reflux disease with esophagitis   • Left upper quadrant pain   • Nausea and vomiting   • Weight gain, abnormal   • Change in bowel habits   • Hepatic fibrosis   • Non-compliance   • Schizoaffective disorder, depressive type (HCC)   • Schizophrenia, paranoid type (HCC)   • Gastritis without bleeding   • Tobacco user   • Encounter for eye exam   • Scoliosis   • History of drug use   • Stage 2 chronic kidney disease   • Diabetic neuropathy (HCC)   • Mixed hyperlipidemia   • Chronic bilateral low back pain with bilateral sciatica   • Type 2 diabetes mellitus with hyperglycemia, with long-term current use of insulin (HCC)   • Auditory hallucination   • Class 3 severe obesity due to excess calories with serious comorbidity and body mass index (BMI) of 45.0 to 49.9 in adult (HCC)   • Pain in wrist   • Schizophrenia, simple, chronic (HCC)   • Carpal tunnel syndrome   • Status post carpal tunnel release   • Asthma, persistent not controlled         Current Outpatient Medications:   •  albuterol (ACCUNEB) 1.25 MG/3ML nebulizer solution, Take 3 mL by nebulization Every 6 (Six) Hours As Needed for Wheezing or Shortness of Air., Disp: 9 mL, Rfl: 3  •  albuterol sulfate  (90 Base) MCG/ACT inhaler, Inhale 2 puffs Every 6 (Six) Hours As Needed for Wheezing or Shortness of Air., Disp: 6.7 g, Rfl: 11  •  aspirin 81 MG EC tablet, Take 81 mg by mouth Daily., Disp: , Rfl:   •  Cariprazine HCl 4.5 MG capsule, Take 4.5 mg by mouth Daily., Disp: 90 capsule, Rfl: 0  •  Continuous Blood Gluc  (Dexcom G6 ) device, USE  FOR 6 MONTHS, Disp: , Rfl:   •  Continuous Blood Gluc Sensor (Dexcom G6 Sensor), USE AS DIRECTED EVERY  10  DAYS, Disp: 9 each, Rfl: 0  •  Continuous Blood Gluc Transmit (Dexcom G6 Transmitter) misc, USE 1  ONCE EVERY THREE MONTHS, Disp: 1 each, Rfl: 0  •  cyclobenzaprine (FLEXERIL) 5 MG tablet, Take 1 tablet by mouth 3 (Three) Times a Day As Needed for Muscle Spasms. (Patient taking differently: Take 5 mg by mouth 3 (Three) Times a Day.), Disp: 90 tablet, Rfl: 3  •  Dulaglutide (Trulicity) 4.5 MG/0.5ML solution pen-injector, Inject 4.5 mg under the skin into the appropriate area as directed 1 (One) Time Per Week., Disp: 4 pen, Rfl: 11  •  DULoxetine (CYMBALTA) 30 MG capsule, Take 3 capsules by mouth Daily., Disp: 270 capsule, Rfl: 0  •  Ertugliflozin L-PyroglutamicAc (Steglatro) 15 MG tablet, Take 1 tablet by mouth Every Morning., Disp: 90 tablet, Rfl: 1  •  Fluticasone Furoate-Vilanterol (Breo Ellipta) 200-25 MCG/INH inhaler, Inhale 1 puff Daily., Disp: 28 each, Rfl: 11  •  folic acid (FOLVITE) 1 MG tablet, Take 1,000 mcg by mouth Daily., Disp: , Rfl:   •  gabapentin (NEURONTIN) 300 MG capsule, Take 1 capsule by mouth 3 (Three) Times a Day. (Patient taking differently: Take 100 mg by mouth 3 (Three) Times a Day.), Disp: 90 capsule, Rfl: 2  •  glucose blood test strip, Testing 4 times daily, E11.9, Disp: 120 each, Rfl: 12  •  HYDROcodone-acetaminophen (NORCO) 7.5-325 MG per tablet, Take 1 tablet by mouth 3 (Three) Times a Day., Disp: , Rfl:   •  hydrOXYzine (ATARAX) 25 MG tablet, Take 0.5-1 tablets by mouth 3 (Three) Times a Day As Needed for Anxiety., Disp: 270 tablet, Rfl: 0  •  Insulin Disposable Pump (OmniPod Dash 5 Pack Pods) misc, , Disp: , Rfl:   •  insulin lispro (Admelog) 100 UNIT/ML injection, 180  units daily through pump, Disp: 90 mL, Rfl: 11  •  lamoTRIgine (LaMICtal) 150 MG tablet, Take 1 tablet by mouth Daily., Disp: 90 tablet, Rfl: 0  •  levETIRAcetam (KEPPRA) 1000 MG tablet, Take 1 tablet by mouth  2 (Two) Times a Day., Disp: 60 tablet, Rfl: 3  •  lisinopril (PRINIVIL,ZESTRIL) 40 MG tablet, Take 1 tablet by mouth Daily., Disp: 30 tablet, Rfl: 3  •  metFORMIN (GLUCOPHAGE) 500 MG tablet, TAKE 1 TABLET BY MOUTH TWICE DAILY WITH MEALS, Disp: 60 tablet, Rfl: 0  •  metoprolol tartrate (LOPRESSOR) 50 MG tablet, Take 1 tablet by mouth 2 (Two) Times a Day., Disp: 60 tablet, Rfl: 3  •  Nebulizer device, 1 application Every 6 (Six) Hours As Needed (dyspnea)., Disp: 1 each, Rfl: 3  •  nicotine (NICODERM CQ) 21 MG/24HR patch, Place 1 patch on the skin as directed by provider Daily., Disp: 28 patch, Rfl: 3  •  NIFEdipine XL (PROCARDIA XL) 30 MG 24 hr tablet, Take 1 tablet by mouth Daily., Disp: 30 tablet, Rfl: 3  •  omeprazole (priLOSEC) 40 MG capsule, Take 1 capsule by mouth Daily., Disp: 30 capsule, Rfl: 3  •  OXcarbazepine (TRILEPTAL) 600 MG tablet, Take 600 mg by mouth Every Evening., Disp: , Rfl:   •  prazosin (MINIPRESS) 2 MG capsule, Take 1-2 capsules by mouth Every Night., Disp: 180 capsule, Rfl: 0  •  simvastatin (ZOCOR) 40 MG tablet, Take 1 tablet by mouth Every Night., Disp: 30 tablet, Rfl: 3  •  vitamin D (ERGOCALCIFEROL) 1.25 MG (59968 UT) capsule capsule, Take 2 tablets by mouth weekly, Disp: 8 capsule, Rfl: 3    Allergies   Allergen Reactions   • Betadine [Povidone Iodine] Anaphylaxis   • Contrast Dye Anaphylaxis   • Iodine Anaphylaxis   • Lipitor  [Atorvastatin Calcium] Shortness Of Breath   • Shellfish-Derived Products Anaphylaxis   • Adhesive Tape Unknown (See Comments)     Blister         Past Medical History:   Diagnosis Date   • Allergic    • Anemia    • Angina pectoris (HCC)    • Anxiety    • Arthritis    • Back pain    • CAD (coronary artery disease)    • Cancer (HCC) 1997    endometrial- surgery, no chemo/radiation   • Chronic kidney disease    • COPD (chronic obstructive pulmonary disease) ([facility])    • Degeneration macular    • Depression    • Diabetes mellitus ([facility])    • GERD (gastroesophageal reflux  disease)    • Head injury    • Hepatitis c    • Hyperlipidemia    • Hypertension    • Injury of back    • Injury of neck    • Migraine    • Multiple personality disorder (HCC)    • Neuropathy    • Paranoid schizophrenia (HCC)    • Schizoaffective disorder (HCC)    • Seizures (HCC)     2020: last seizure was the first of the year   • Self-injurious behavior     teen years   • Shortness of breath    • Sleep apnea    • Stage 2 chronic kidney disease    • Substance abuse (HCC)    • Suicide attempt (HCC)    • Urinary tract infection      Past Surgical History:   Procedure Laterality Date   • APPENDECTOMY     • BACK SURGERY     • BREAST SURGERY     • CARPAL TUNNEL RELEASE      right hand   • CARPAL TUNNEL RELEASE Left 10/19/2021    Procedure: LEFT CARPAL TUNNEL RELEASE;  Surgeon: Kevin Carlson MD;  Location: [facility] OR;  Service: Orthopedics;  Laterality: Left;   • CHOLECYSTECTOMY     • COLONOSCOPY     • COLONOSCOPY N/A 2019    Procedure: COLONOSCOPY;  Surgeon: Joni Delacruz MD;  Location: [facility] ENDOSCOPY;  Service: Gastroenterology   • DENTAL PROCEDURE     • ENDOSCOPY N/A 2019    Procedure: ESOPHAGOGASTRODUODENOSCOPY--poss dilation;  Surgeon: Joni Delacruz MD;  Location: [facility] ENDOSCOPY;  Service: Gastroenterology   • HYSTERECTOMY     • LIPOMA EXCISION      9   • LIVER BIOPSY     • UPPER GASTROINTESTINAL ENDOSCOPY  2019     Social History     Socioeconomic History   • Marital status:    Tobacco Use   • Smoking status: Former Smoker     Packs/day: 1.00     Years: 35.00     Pack years: 35.00     Types: Cigarettes     Start date:      Quit date: 2022     Years since quittin.1   • Smokeless tobacco: Never Used   • Tobacco comment: on patch trying to quit    Vaping Use   • Vaping Use: Never used   Substance and Sexual Activity   • Alcohol use: Not Currently   • Drug use: Not Currently     Types: Amphetamines, Heroin, Methamphetamines, Morphine, Oxycodone     Comment: QUIT 3  "YEARS AGO   • Sexual activity: Not Currently     Birth control/protection: Surgical     Family History   Problem Relation Age of Onset   • Hypertension Other    • Diabetes Other    • Stroke Other    • Cancer Other    • Kidney disease Other    • Lung disease Other    • Other Other    • Malone's esophagus Other    • Colon polyps Other    • Heart disease Other    • Ulcers Other    • Cholelithiasis Other    • Allergy (severe) Other    • Schizophrenia Father    • Alcohol abuse Father    • Drug abuse Paternal Grandfather        Advance Care Planning   ACP discussion was declined by the patient. Patient does not have an advance directive, declines further assistance.          Objective     Vitals:    08/08/22 0912   BP: 120/74   Pulse: 74   SpO2: 97%   Weight: (!) 160 kg (352 lb 3.2 oz)   Height: 167.6 cm (66\")       Physical Exam  Vitals and nursing note reviewed.   Constitutional:       General: She is not in acute distress.     Appearance: She is well-developed. She is not diaphoretic.   HENT:      Head: Normocephalic.   Eyes:      Conjunctiva/sclera: Conjunctivae normal.   Neck:      Vascular: No JVD.   Cardiovascular:      Rate and Rhythm: Normal rate and regular rhythm.      Heart sounds: Normal heart sounds, S1 normal and S2 normal. No murmur heard.    No friction rub. No gallop.   Pulmonary:      Effort: Pulmonary effort is normal. No respiratory distress.      Breath sounds: Normal breath sounds. No wheezing or rales.   Abdominal:      General: Bowel sounds are normal. There is no distension.      Palpations: Abdomen is soft.   Musculoskeletal:         General: Normal range of motion.   Skin:     General: Skin is warm and dry.      Findings: No erythema.   Neurological:      Mental Status: She is alert and oriented to person, place, and time.   Psychiatric:         Behavior: Behavior normal.         Thought Content: Thought content normal.         Judgment: Judgment normal.         PFTs 7/19/22    Ratio: 85  FVC: " 83  FEV1 88    Post  Ratio: 88  FVC: 78  FEV1: 86    No BDR                Radiology (independently reviewed by me, interpreted by physcian)    CT Chest Low Dose Cancer Screening WO    Result Date: 8/6/2022  There is a tiny, pleural-based 0.3 cm nodule along the left lower and right middle lobe.           Follow-up recommendation(s):  Continue annual screening with low dose CT in 12 months      ACR Lung-RADS assessment score:  Category 2: BENIGN APPEARANCE or behavior   References:    ---------------  NCCN for patients: (patient handbook) https://nccn.org/patients/guidelines/lung_screening/index.html   NCCN for physicians: http://www.nccn.org/professionals/physician_gls/pdf/lung_screenin .pdf  ACR classification and management suggestions: http://www.acr.org/~/media/ACR/Documents/PDF/QualitySafety/Reso rces/LungRADS/AssessmentCategories.pdf?      Electronically signed by:  Too Campbell MD  8/6/2022 7:26 AM CDT Workstation: [id number]    FINDINGS:         PULMONARY NODULES: Abutting the pleura of the left lower and  right middle lobe are tiny, 0.3 cm noncalcified nodular opacities  (series 3, image 186 along the left lower lobe, image 170 along  the right middle lobe).     No focal airspace consolidation. Mild linear atelectasis/scar in  the lingula. No pleural effusion or pneumothorax. The central  airways are patent. No bronchiectasis.     No thoracic lymphadenopathy.       Assessment       Discussion/ Recommendations:    Diagnosis Plan   1. Mild persistent asthma not well controlled    Pulmonary Function Test With Bronchodilator    - She does not have COPD.   - Absence of bronchodilator response does not rule out asthma. She does have improvement at home with albuterol when she wheezes.   - Breo 200 1 puff daily.     2. Tobacco use   CT Chest Low Dose Cancer Screening WO    Shared Decision Making  I am discussing tobacco cessation today with Sudhakar Saul    Social History     Tobacco Use   Smoking Status  Former Smoker   • Packs/day: 1.00   • Years: 35.00   • Pack years: 35.00   • Types: Cigarettes   • Start date:    • Quit date: 2022   • Years since quittin.1   Smokeless Tobacco Never Used   Tobacco Comment    on patch trying to quit    -  She quit smoking 2 weeks ago.     The patient has had no hemoptysis, unintentional weight loss or increasing shortness of breath. The patient is asymptomatic and has no signs or symptoms of lung cancer.     Together we discussed the potential benefits and potential harms of being screened for lung cancer including the potential for follow up diagnostic testing, risk for over diagnosis, false positive rate and radiation exposure using the AHRQ: Is Lung Cancer Screening Right for Me Decision Aid (Publication 38-[id number], web site [facility].gov). A copy of this decision aid resource has been provided in the after visit summary.  We also reviewed the patient's smoking history and counseled her on the importance and health benefits of maintaining cigarette smoking abstinence.      Continued Smoking Abstinence discussion.    Recommendations for continued lung cancer screening:  Annual. Due on or before 2023    We discussed the NCCN guidelines for lung cancer screening and the patient verbalized understanding that annual screening is recommended until fifteen years beyond smoking as long as they have no other disease or comorbidity that would prevent them from receiving cancer treatments such as surgery should a lung cancer be detected.  After review of the NCCN guidelines and recommendations for ongoing screening, the patient verbalized understanding of recommendations for follow-up.      The patient has decided to proceed with a Low Dose Lung Cancer Screening, annually            Class 3 Severe Obesity (BMI >=40). Obesity-related health conditions include the following: hypertension. Obesity is unchanged. BMI is is above average; BMI management plan is completed. We  discussed portion control and increasing exercise.    Follow up: 3 months.     I spent 30 minutes caring for Sudhakar on this date of service. This time includes time spent by me in the following activities: preparing for the visit, reviewing tests, obtaining and/or reviewing a separately obtained history, performing a medically appropriate examination and/or evaluation, counseling and educating the patient/family/caregiver, ordering medications, tests, or procedures, referring and communicating with other health care professionals, documenting information in the medical record, independently interpreting results and communicating that information with the patient/family/caregiver and care coordination            This document has been electronically signed by ROD Kelly on August 8, 2022 09:27 CDT

## 2022-08-09 ENCOUNTER — TELEMEDICINE (OUTPATIENT)
Dept: PSYCHIATRY | Facility: CLINIC | Age: 47
End: 2022-08-09

## 2022-08-09 ENCOUNTER — OFFICE VISIT (OUTPATIENT)
Dept: ENDOCRINOLOGY | Facility: CLINIC | Age: 47
End: 2022-08-09

## 2022-08-09 VITALS
HEART RATE: 93 BPM | SYSTOLIC BLOOD PRESSURE: 126 MMHG | WEIGHT: 293 LBS | OXYGEN SATURATION: 95 % | HEIGHT: 66 IN | DIASTOLIC BLOOD PRESSURE: 80 MMHG | BODY MASS INDEX: 47.09 KG/M2

## 2022-08-09 DIAGNOSIS — E11.49 OTHER DIABETIC NEUROLOGICAL COMPLICATION ASSOCIATED WITH TYPE 2 DIABETES MELLITUS: ICD-10-CM

## 2022-08-09 DIAGNOSIS — E78.2 MIXED HYPERLIPIDEMIA: ICD-10-CM

## 2022-08-09 DIAGNOSIS — E11.65 TYPE 2 DIABETES MELLITUS WITH HYPERGLYCEMIA, WITH LONG-TERM CURRENT USE OF INSULIN: Primary | ICD-10-CM

## 2022-08-09 DIAGNOSIS — F41.1 GENERALIZED ANXIETY DISORDER: ICD-10-CM

## 2022-08-09 DIAGNOSIS — F25.1 SCHIZOAFFECTIVE DISORDER, DEPRESSIVE TYPE: ICD-10-CM

## 2022-08-09 DIAGNOSIS — I10 ESSENTIAL HYPERTENSION: ICD-10-CM

## 2022-08-09 DIAGNOSIS — E55.9 VITAMIN D DEFICIENCY: ICD-10-CM

## 2022-08-09 DIAGNOSIS — Z79.4 TYPE 2 DIABETES MELLITUS WITH HYPERGLYCEMIA, WITH LONG-TERM CURRENT USE OF INSULIN: Primary | ICD-10-CM

## 2022-08-09 PROCEDURE — 99214 OFFICE O/P EST MOD 30 MIN: CPT | Performed by: NURSE PRACTITIONER

## 2022-08-09 PROCEDURE — 95251 CONT GLUC MNTR ANALYSIS I&R: CPT | Performed by: NURSE PRACTITIONER

## 2022-08-09 RX ORDER — BUPROPION HYDROCHLORIDE 150 MG/1
150 TABLET ORAL EVERY MORNING
Qty: 30 TABLET | Refills: 0 | Status: SHIPPED | OUTPATIENT
Start: 2022-08-09 | End: 2022-08-09

## 2022-08-09 RX ORDER — DULOXETIN HYDROCHLORIDE 30 MG/1
90 CAPSULE, DELAYED RELEASE ORAL DAILY
Qty: 270 CAPSULE | Refills: 0 | Status: SHIPPED | OUTPATIENT
Start: 2022-08-09 | End: 2022-11-01 | Stop reason: SDUPTHER

## 2022-08-09 RX ORDER — LAMOTRIGINE 150 MG/1
150 TABLET ORAL DAILY
Qty: 90 TABLET | Refills: 0 | Status: SHIPPED | OUTPATIENT
Start: 2022-08-09 | End: 2022-11-01 | Stop reason: SDUPTHER

## 2022-08-09 NOTE — PROGRESS NOTES
"This provider is located at [facility], [address] [place] [address].The Patient is seen remotely at home, [address] [place] [address] via [employer].  Patient is being seen via telehealth and confirm that they are in a secure environment for this session. The patient's condition being diagnosed/treated is appropriate for telemedicine. The provider identified himself/herself: herself as well as her credentials.   The patient gave consent to be seen remotely, and when consent is given they understand that the consent allows for patient identifiable information to be sent to a third party as needed.   They may refuse to be seen remotely at any time. The electronic data is encrypted and password protected, and the patient has been advised of the potential risks to privacy not withstanding such measures.    You have chosen to receive care through a telehealth visit.  Do you consent to use a video/audio connection for your medical care today? Yes      Chief Complaint  Follow-for schizoaffective depressed type    Subjective    Sudhakar More Saul presents to [facility] for medication management.       History of Present Illness   Patient presents today reporting that her depression has improved as it is \"not so bad\".  Patient notes however her anxiety has been up as she recently quit smoking and is trying to handle stressors on her own without turning to food.  Patient notes that she recently saw her endocrinologist today and reports that her blood pressure was 126/80 and heart rate 93.  Patient reports that her sleep still varies that she is getting roughly 5 hours at night and waking up related to pain or else she reports she could sleep longer.  Patient states that she is still following up with her pulmonologist due to inhaler changes with her breathing but she has had 2 nodules that are benign which she was happy about as there were no more " "damage.  Patient states that there are 2 incidents where she saw a shadow movement that took form but went away quickly.  She also notes to episodes of hearing a little girl say \"you better not do that\".  Denies that anyone tells her to harm herself or others.  Denied any side effects or rash to the medications.  Denies any SI/HI.      Objective   Vital Signs:   There were no vitals taken for this visit.  Due to the remote nature of this encounter (virtual encounter), vitals were unable to be obtained.  Height stated at 66 inches.  Weight stated at 345 pounds.        PHQ-9 Score:   PHQ-9 Total Score:     Patient screened positive for depression based on a PHQ-9 score of  on . Follow-up recommendations include: Patient has schizophrenia and is currently being managed with medication..    Mental Status Exam:   Hygiene:   good  Cooperation:  Cooperative  Eye Contact:  Good  Psychomotor Behavior:  Appropriate  Affect:  Appropriate  Mood: normal and anxious  Speech:  Normal  Thought Process:  Goal directed and Linear  Thought Content:  Normal  Suicidal:  None  Homicidal:  None  Hallucinations:  Auditory and Not demonstrated today  Delusion:  None  Memory:  Intact  Orientation:  Person, Place, Time and Situation  Reliability:  good  Insight:  Good and Fair  Judgement:  Good and Fair  Impulse Control:  Good and Fair  Physical/Medical Issues:  Yes Dm. HTN, CKD stage 2, hep C+, JACQUE, hx substance abuse     Current Medications:   Current Outpatient Medications   Medication Sig Dispense Refill   • Cariprazine HCl 4.5 MG capsule Take 4.5 mg by mouth Daily. 90 capsule 0   • DULoxetine (CYMBALTA) 30 MG capsule Take 3 capsules by mouth Daily. 270 capsule 0   • lamoTRIgine (LaMICtal) 150 MG tablet Take 1 tablet by mouth Daily. 90 tablet 0   • albuterol (ACCUNEB) 1.25 MG/3ML nebulizer solution Take 3 mL by nebulization Every 6 (Six) Hours As Needed for Wheezing or Shortness of Air. 9 mL 3   • albuterol sulfate  (90 Base) " MCG/ACT inhaler Inhale 2 puffs Every 6 (Six) Hours As Needed for Wheezing or Shortness of Air. 6.7 g 11   • aspirin 81 MG EC tablet Take 81 mg by mouth Daily.     • Continuous Blood Gluc  (Dexcom G6 ) device USE FOR 6 MONTHS     • Continuous Blood Gluc Sensor (Dexcom G6 Sensor) USE AS DIRECTED EVERY  10  DAYS 9 each 0   • Continuous Blood Gluc Transmit (Dexcom G6 Transmitter) misc USE 1  ONCE EVERY THREE MONTHS 1 each 0   • cyclobenzaprine (FLEXERIL) 5 MG tablet Take 1 tablet by mouth 3 (Three) Times a Day As Needed for Muscle Spasms. (Patient taking differently: Take 5 mg by mouth 3 (Three) Times a Day.) 90 tablet 3   • Dulaglutide (Trulicity) 4.5 MG/0.5ML solution pen-injector Inject 4.5 mg under the skin into the appropriate area as directed 1 (One) Time Per Week. 4 pen 11   • Ertugliflozin L-PyroglutamicAc (Steglatro) 15 MG tablet Take 1 tablet by mouth Every Morning. 90 tablet 1   • Fluticasone Furoate-Vilanterol (Breo Ellipta) 200-25 MCG/INH inhaler Inhale 1 puff Daily. 28 each 11   • folic acid (FOLVITE) 1 MG tablet Take 1,000 mcg by mouth Daily.     • gabapentin (NEURONTIN) 300 MG capsule Take 1 capsule by mouth 3 (Three) Times a Day. (Patient taking differently: Take 100 mg by mouth 3 (Three) Times a Day.) 90 capsule 2   • glucose blood test strip Testing 4 times daily, E11.9 120 each 12   • HYDROcodone-acetaminophen (NORCO) 7.5-325 MG per tablet Take 1 tablet by mouth 3 (Three) Times a Day.     • hydrOXYzine (ATARAX) 25 MG tablet Take 0.5-1 tablets by mouth 3 (Three) Times a Day As Needed for Anxiety. 270 tablet 0   • Insulin Disposable Pump (OmniPod Dash 5 Pack Pods) misc      • insulin lispro (Admelog) 100 UNIT/ML injection 180  units daily through pump 90 mL 11   • levETIRAcetam (KEPPRA) 1000 MG tablet Take 1 tablet by mouth 2 (Two) Times a Day. 60 tablet 3   • lisinopril (PRINIVIL,ZESTRIL) 40 MG tablet Take 1 tablet by mouth Daily. 30 tablet 3   • metFORMIN (GLUCOPHAGE) 500 MG tablet  TAKE 1 TABLET BY MOUTH TWICE DAILY WITH MEALS 60 tablet 0   • metoprolol tartrate (LOPRESSOR) 50 MG tablet Take 1 tablet by mouth 2 (Two) Times a Day. 60 tablet 3   • Nebulizer device 1 application Every 6 (Six) Hours As Needed (dyspnea). 1 each 3   • nicotine (NICODERM CQ) 21 MG/24HR patch Place 1 patch on the skin as directed by provider Daily. 28 patch 3   • NIFEdipine XL (PROCARDIA XL) 30 MG 24 hr tablet Take 1 tablet by mouth Daily. 30 tablet 3   • omeprazole (priLOSEC) 40 MG capsule Take 1 capsule by mouth Daily. 30 capsule 3   • OXcarbazepine (TRILEPTAL) 600 MG tablet Take 600 mg by mouth 2 (Two) Times a Day. PT TAKES  MG TAB IN MORNING AND  MG IN EVENING     • prazosin (MINIPRESS) 2 MG capsule Take 1-2 capsules by mouth Every Night. 180 capsule 0   • simvastatin (ZOCOR) 40 MG tablet Take 1 tablet by mouth Every Night. 30 tablet 3   • vitamin D (ERGOCALCIFEROL) 1.25 MG (73894 UT) capsule capsule Take 2 tablets by mouth weekly 8 capsule 3     No current facility-administered medications for this visit.       Physical Exam  Nursing note reviewed.   Constitutional:       Appearance: Normal appearance.   Neurological:      Mental Status: She is alert.   Psychiatric:         Attention and Perception: Attention normal. She does not perceive auditory or visual hallucinations.         Mood and Affect: Affect normal. Mood is anxious. Mood is not depressed.         Speech: Speech normal.         Behavior: Behavior is cooperative.         Thought Content: Thought content normal.         Cognition and Memory: Cognition and memory normal.        Result Review :     The following data was reviewed by: ROD Spencer on 02/03/2021:  Common labs    Common Labsle 10/21/21 4/22/22 4/22/22 4/22/22 4/22/22 4/22/22 7/8/22 7/8/22 7/8/22 7/8/22 7/8/22     0808 0808 0808 0808 0808 0822 0822 0822 0822 0822   Glucose   211 (A)     106 (A)      BUN   12     16      Creatinine   0.77     0.82      Sodium   136      139      Potassium   4.6     4.7      Chloride   102     106      Calcium   9.4     9.4      Albumin   3.80     3.80      Total Bilirubin   0.2     0.2      Alkaline Phosphatase   96     93      AST (SGOT)   34 (A)     42 (A)      ALT (SGPT)   39 (A)     37 (A)      WBC  8.45     8.79       Hemoglobin  13.3     13.6       Hematocrit  41.0     41.3       Platelets  169     176       Total Cholesterol    169     119     Triglycerides    144     152 (A)     HDL Cholesterol    44     43     LDL Cholesterol     100     50     Hemoglobin A1C 8.00 (A)    9.50 (A)     8.70 (A)    Microalbumin, Urine      2.5     <1.2   (A) Abnormal value            CMP    CMP 10/12/21 4/22/22 7/8/22   Glucose 109 (A) 211 (A) 106 (A)   BUN 12 12 16   Creatinine 0.75 0.77 0.82   eGFR Non African Am 83     Sodium 133 (A) 136 139   Potassium 4.3 4.6 4.7   Chloride 98 102 106   Calcium 9.4 9.4 9.4   Albumin  3.80 3.80   Total Bilirubin  0.2 0.2   Alkaline Phosphatase  96 93   AST (SGOT)  34 (A) 42 (A)   ALT (SGPT)  39 (A) 37 (A)   (A) Abnormal value            CBC    CBC 10/12/21 4/22/22 7/8/22   WBC 11.86 (A) 8.45 8.79   RBC 4.85 4.90 4.87   Hemoglobin 13.3 13.3 13.6   Hematocrit 40.5 41.0 41.3   MCV 83.5 83.7 84.8   MCH 27.4 27.1 27.9   MCHC 32.8 32.4 32.9   RDW 14.6 15.0 14.6   Platelets 202 169 176   (A) Abnormal value            CBC w/diff    CBC w/Diff 6/10/20 9/9/20 1/26/21   WBC 8.89 9.63 9.04   RBC 4.85 5.07 5.10   Hemoglobin 14.1 14.9 14.4   Hematocrit 42.8 44.3 45.0   MCV 88.2 87.4 88.2   MCH 29.1 29.4 28.2   MCHC 32.9 33.6 32.0   RDW 13.6 13.3 13.0   Platelets 157 180 174   Neutrophil Rel % 63.8 67.2    Immature Granulocyte Rel % 0.6 (A) 0.5    Lymphocyte Rel % 26.4 21.6    Monocyte Rel % 4.5 (A) 4.8 (A)    Eosinophil Rel % 3.9 5.3    Basophil Rel % 0.8 0.6    (A) Abnormal value            Lipid Panel    Lipid Panel 4/22/22 7/8/22   Total Cholesterol 169 119   Triglycerides 144 152 (A)   HDL Cholesterol 44 43   VLDL Cholesterol 25 26    LDL Cholesterol  100 50   LDL/HDL Ratio 2.19 1.06   (A) Abnormal value            TSH    TSH 10/21/21 4/22/22 7/8/22   TSH 1.110 1.740 2.290           BMP    BMP 10/12/21 4/22/22 7/8/22   BUN 12 12 16   Creatinine 0.75 0.77 0.82   Sodium 133 (A) 136 139   Potassium 4.3 4.6 4.7   Chloride 98 102 106   CO2 25.0 24.0 21.0 (A)   Calcium 9.4 9.4 9.4   (A) Abnormal value            HgB    HGB 10/12/21 4/22/22 7/8/22   Hemoglobin 13.3 13.3 13.6           UA    Urinalysis 10/12/21   Specific [place], UA 1.013   Ketones, UA Negative   Blood, UA Negative   Leukocytes, UA Negative   Nitrite, UA Negative           Data reviewed: PCP notes        Assessment and Plan    Problem List Items Addressed This Visit        Mental Health    Schizoaffective disorder, depressive type (HCC)    Relevant Medications    Cariprazine HCl 4.5 MG capsule    DULoxetine (CYMBALTA) 30 MG capsule    lamoTRIgine (LaMICtal) 150 MG tablet      Other Visit Diagnoses     Generalized anxiety disorder        Relevant Medications    Cariprazine HCl 4.5 MG capsule    DULoxetine (CYMBALTA) 30 MG capsule            TREATMENT PLAN/GOALS: Continue supportive psychotherapy efforts and medications as indicated. Treatment and medication options discussed during today's visit. Patient ackowledged and verbally consented to continue with current treatment plan and was educated on the importance of compliance with treatment and follow-up appointments.    MEDICATION ISSUES:  We discussed risks, benefits, and side effects of the above medications and the patient was agreeable with the plan. Patient was educated on the importance of compliance with treatment and follow-up appointments.  Patient is agreeable to call the office with any worsening of symptoms or onset of side effects. Patient is agreeable to call 911 or go to the nearest ER should he/she begin having SI/HI. Lengthy discussion with patient on the possible side effects of antipsychotic medications including  increased cholesterol, increased blood sugar, and possibility of weight gain.  Also discussed the need to monitor lab work associated with this.  The risk of muscle movement disorders with this class of medication was also discussed. We will continue Lamictal in an effort to stabilize mood.  The patient was reminded to immediately come to the hospital should there be any loss of control.  Explanation was given to her regarding Lamictal and the potential for Foster Shon syndrome and significant rash.  Patient was encouraged to check skin prior to beginning.  Patient was encouraged to report any rash and to immediately stop medication.      -Continue lamotrigine 150 mg daily for schizoaffective disorder.  -Continue minipress 2-4mg at night for nightmares.  -Continue Vraylar  4.5 mg daily for schizoaffective disorder depressed type.  -Continue hydroxyzine 12.5 to 25 mg up to 3 times a day as needed for anxiety and panic.  - Continue Cymbalta 90mg daily for depression and pain and will further evaluate.  -Continue psychotherapy to help with past trauma as well as depressive and anxiety symptoms.  -Highly encourage patient since she is now on Trileptal to be aware of side effects of multiple mood stabilizers and if she were to have any rash to contact the clinic as we may have to look at tapering off the lamotrigine or talking with neurologist to reevaluate Trileptal.  Patient verbalized understanding and denied any side effects at this time.  -Wanted to begin Wellbutrin for the patient but due to her seizure disorder it is contraindicated.  Highly advised patient to look into weight loss surgery and management to evaluate her options.  -Encourage patient to use more of her hydroxyzine at this time to help with her anxiety but if things did not improve to contact the clinic for sooner appointment she verbalized understanding.    Patient has now failed and tried Haldol, aripiprazole, Latuda and now risperidone which  has caused weight gain and due to diabetes and kidney disease would benefit from Vraylar as patient is still having auditory and visual hallucinations.     Counseled patient regarding multimodal approach with healthy nutrition, healthy sleep, regular physical activity, social activities, counseling, and medications.      Coping skills reviewed and encouraged positive framing of thoughts     Assisted patient in processing above session content; acknowledged and normalized patient’s thoughts, feelings, and concerns.  Applied  positive coping skills and behavior management in session.  Allowed patient to freely discuss issues without interruption or judgment. Provided safe, confidential environment to facilitate the development of positive therapeutic relationship and encourage open, honest communication. Assisted patient in identifying risk factors which would indicate the need for higher level of care including thoughts to harm self or others and/or self-harming behavior and encouraged patient to contact this office, call 911, or present to the nearest emergency room should any of these events occur. Discussed crisis intervention services and means to access.     MEDS ORDERED DURING VISIT:  New Medications Ordered This Visit   Medications   • Cariprazine HCl 4.5 MG capsule     Sig: Take 4.5 mg by mouth Daily.     Dispense:  90 capsule     Refill:  0   • DULoxetine (CYMBALTA) 30 MG capsule     Sig: Take 3 capsules by mouth Daily.     Dispense:  270 capsule     Refill:  0   • lamoTRIgine (LaMICtal) 150 MG tablet     Sig: Take 1 tablet by mouth Daily.     Dispense:  90 tablet     Refill:  0       Follow Up   Return in about 4 weeks (around 9/6/2022), or if symptoms worsen or fail to improve, for Recheck.      Patient was given instructions and counseling regarding her condition or for health maintenance advice. Please see specific information pulled into the AVS if appropriate.     Some of the data in this electronic  note has been brought forward from a previous encounter, any necessary changes have been made, it has been reviewed by this APRN, and it is accurate.      This document has been electronically signed by ROD Spencer  August 9, 2022 13:24 EDT    Part of this note may be an electronic transcription/translation of spoken language to printed text using the Dragon Dictation System.

## 2022-08-09 NOTE — PROGRESS NOTES
"Chief Complaint  Diabetes    Subjective          Sudhakar Saul presents to [facility]  History of Present Illness         In office vist    46 year old female presents for follow up         Diagnosed in her 20s     Reason diabetes mellitus type 2     Timing constant      Quality not controlled     Severity high        Macrovascular-- no CAD, no PAD, no CVA         Microvascular--- neuropathy, mild diabetic retinopathy , CKD stage II        hepatitis C                      Diabetes Regimen     Insulin through pump          omni pod            Blood Glucose Readings     Checking 4 times daily      Uses dexcom       see below            Review of Systems - General ROS: negative        Objective   Vital Signs:   /80   Pulse 93   Ht 167.6 cm (66\")   Wt (!) 157 kg (345 lb 3.2 oz)   SpO2 95%   BMI 55.72 kg/m²     Physical Exam  Constitutional:       Appearance: Normal appearance.   Cardiovascular:      Rate and Rhythm: Regular rhythm.      Heart sounds: Normal heart sounds.   Pulmonary:      Breath sounds: Normal breath sounds.   Musculoskeletal:      Cervical back: Normal range of motion.   Neurological:      Mental Status: She is alert.        Result Review :   The following data was reviewed by: ROD Chawla on 04/29/2022:  Common labs    Common Labsle 10/21/21 4/22/22 4/22/22 4/22/22 4/22/22 4/22/22 7/8/22 7/8/22 7/8/22 7/8/22 7/8/22     0808 0808 0808 0808 0808 0822 0822 0822 0822 0822   Glucose   211 (A)     106 (A)      BUN   12     16      Creatinine   0.77     0.82      Sodium   136     139      Potassium   4.6     4.7      Chloride   102     106      Calcium   9.4     9.4      Albumin   3.80     3.80      Total Bilirubin   0.2     0.2      Alkaline Phosphatase   96     93      AST (SGOT)   34 (A)     42 (A)      ALT (SGPT)   39 (A)     37 (A)      WBC  8.45     8.79       Hemoglobin  13.3     13.6       Hematocrit  41.0     41.3     "   Platelets  169     176       Total Cholesterol    169     119     Triglycerides    144     152 (A)     HDL Cholesterol    44     43     LDL Cholesterol     100     50     Hemoglobin A1C 8.00 (A)    9.50 (A)     8.70 (A)    Microalbumin, Urine      2.5     <1.2   (A) Abnormal value                        Assessment and Plan    Diagnoses and all orders for this visit:    1. Type 2 diabetes mellitus with hyperglycemia, with long-term current use of insulin (HCC) (Primary)  -     Comprehensive Metabolic Panel; Future  -     CBC & Differential; Future  -     Hemoglobin A1c; Future  -     Lipid Panel; Future  -     Microalbumin / Creatinine Urine Ratio - Urine, Clean Catch; Future  -     TSH; Future  -     Vitamin B12; Future  -     Vitamin D 25 Hydroxy; Future    2. Other diabetic neurological complication associated with type 2 diabetes mellitus (HCC)  -     Comprehensive Metabolic Panel; Future  -     CBC & Differential; Future  -     Hemoglobin A1c; Future  -     Lipid Panel; Future  -     Microalbumin / Creatinine Urine Ratio - Urine, Clean Catch; Future  -     TSH; Future  -     Vitamin B12; Future  -     Vitamin D 25 Hydroxy; Future    3. Essential hypertension  -     Comprehensive Metabolic Panel; Future  -     CBC & Differential; Future  -     Hemoglobin A1c; Future  -     Lipid Panel; Future  -     Microalbumin / Creatinine Urine Ratio - Urine, Clean Catch; Future  -     TSH; Future  -     Vitamin B12; Future  -     Vitamin D 25 Hydroxy; Future    4. Vitamin D deficiency  -     Comprehensive Metabolic Panel; Future  -     CBC & Differential; Future  -     Hemoglobin A1c; Future  -     Lipid Panel; Future  -     Microalbumin / Creatinine Urine Ratio - Urine, Clean Catch; Future  -     TSH; Future  -     Vitamin B12; Future  -     Vitamin D 25 Hydroxy; Future    5. Mixed hyperlipidemia  -     Comprehensive Metabolic Panel; Future  -     CBC & Differential; Future  -     Hemoglobin A1c; Future  -     Lipid Panel;  Future  -     Microalbumin / Creatinine Urine Ratio - Urine, Clean Catch; Future  -     TSH; Future  -     Vitamin B12; Future  -     Vitamin D 25 Hydroxy; Future          Glycemic Management     Diabetes mellitus type 2 not controlled      Lab Results   Component Value Date    HGBA1C 8.70 (H) 07/08/2022           Dexcom G6      Downloaded and reviewed      Dated from July 27 to August 9, 2022    Average bg 215    Target in range 24%     High 57%     Very 19%     Low 0 %     Very low 0 %     Hyperglycemia all the time         Adjust basal        Omni pod         Basal      MN -   2.85--- change to 2.95         Carb ratio       3      Correction         14     Average bg 120               Taking trulicity 4.5  mg weekly      Taking Metformin 1000 mg po BID      Taking Steglatro 15 mg one daily         Previous regimen      basaglar taking 63 units ---stop        admelog--stop      Taking about 30 units            Aim for 45 grams of Carbohydrate for meals      Aim for 15 grams of Carbohydrate for snack                        Lipid Management           Taking Simvastatin 40 mg          Total Cholesterol   Date Value Ref Range Status   07/08/2022 119 0 - 200 mg/dL Final     Triglycerides   Date Value Ref Range Status   07/08/2022 152 (H) 0 - 150 mg/dL Final     HDL Cholesterol   Date Value Ref Range Status   07/08/2022 43 40 - 60 mg/dL Final     LDL Cholesterol    Date Value Ref Range Status   07/08/2022 50 0 - 100 mg/dL Final           Blood Pressure Management           Taking Lisinopril 40 mg daily               Microvascular Complication Monitoring        Last eye exam   May 2022 , mild DR            Neuropathy        Taking Gabapentin 300 mg tid        Component      Latest Ref Rng & Units 7/8/2022   Microalbumin/Creatinine Ratio          Creatinine, Urine      mg/dL 80.0   Microalbumin, Urine      mg/dL <1.2           Bone Health      vitamin d def       taking vitamin d daily              Other Diabetes Related  Aspects        Lab Results   Component Value Date    [id number] 493 04/22/2022                   Thyroid health         Lab Results   Component Value Date    TSH 2.290 07/08/2022                            Follow Up   Return in about 3 months (around 11/9/2022) for Recheck.  Patient was given instructions and counseling regarding her condition or for health maintenance advice. Please see specific information pulled into the AVS if appropriate.         This document has been electronically signed by ROD Chawla on August 9, 2022 09:20 CDT.

## 2022-08-10 ENCOUNTER — OFFICE VISIT (OUTPATIENT)
Dept: FAMILY MEDICINE CLINIC | Facility: CLINIC | Age: 47
End: 2022-08-10

## 2022-08-10 VITALS
HEART RATE: 81 BPM | DIASTOLIC BLOOD PRESSURE: 74 MMHG | OXYGEN SATURATION: 98 % | WEIGHT: 293 LBS | TEMPERATURE: 97.9 F | HEIGHT: 66 IN | SYSTOLIC BLOOD PRESSURE: 130 MMHG | BODY MASS INDEX: 47.09 KG/M2

## 2022-08-10 DIAGNOSIS — E11.65 UNCONTROLLED TYPE 2 DIABETES MELLITUS WITH HYPERGLYCEMIA: ICD-10-CM

## 2022-08-10 DIAGNOSIS — I10 ESSENTIAL HYPERTENSION: ICD-10-CM

## 2022-08-10 DIAGNOSIS — G40.909 SEIZURE DISORDER: ICD-10-CM

## 2022-08-10 DIAGNOSIS — E78.2 MIXED HYPERLIPIDEMIA: ICD-10-CM

## 2022-08-10 DIAGNOSIS — K21.00 GASTROESOPHAGEAL REFLUX DISEASE WITH ESOPHAGITIS WITHOUT HEMORRHAGE: ICD-10-CM

## 2022-08-10 DIAGNOSIS — M51.36 DDD (DEGENERATIVE DISC DISEASE), LUMBAR: Primary | ICD-10-CM

## 2022-08-10 DIAGNOSIS — E55.9 VITAMIN D DEFICIENCY: ICD-10-CM

## 2022-08-10 DIAGNOSIS — G89.29 CHRONIC MIDLINE LOW BACK PAIN WITH BILATERAL SCIATICA: ICD-10-CM

## 2022-08-10 DIAGNOSIS — E66.01 MORBID OBESITY: ICD-10-CM

## 2022-08-10 DIAGNOSIS — R74.8 ELEVATED LIVER ENZYMES: ICD-10-CM

## 2022-08-10 DIAGNOSIS — M54.41 CHRONIC MIDLINE LOW BACK PAIN WITH BILATERAL SCIATICA: ICD-10-CM

## 2022-08-10 DIAGNOSIS — N18.2 STAGE 2 CHRONIC KIDNEY DISEASE: ICD-10-CM

## 2022-08-10 DIAGNOSIS — M54.42 CHRONIC MIDLINE LOW BACK PAIN WITH BILATERAL SCIATICA: ICD-10-CM

## 2022-08-10 DIAGNOSIS — F20.0 SCHIZOPHRENIA, PARANOID TYPE: ICD-10-CM

## 2022-08-10 DIAGNOSIS — J44.9 CHRONIC OBSTRUCTIVE PULMONARY DISEASE, UNSPECIFIED COPD TYPE: ICD-10-CM

## 2022-08-10 PROCEDURE — 99214 OFFICE O/P EST MOD 30 MIN: CPT | Performed by: FAMILY MEDICINE

## 2022-08-29 ENCOUNTER — TELEPHONE (OUTPATIENT)
Dept: PULMONOLOGY | Facility: CLINIC | Age: 47
End: 2022-08-29

## 2022-08-29 NOTE — TELEPHONE ENCOUNTER
Patient notified that note will be mailed to her.  She verbalized understanding and was agreeable.          ----- Message from ROD Holbrook sent at 2022 11:48 AM CDT -----  Regarding: RE: call back  Contact: [phone]  Tell her I will mail her a letter for surgery. Dr. Pierson's office wants the patient to keep up with paperwork for the surgery, we do not send to their office unless requested by them.     ----- Message -----  From: Nory Hart CSA  Sent: 2022  11:16 AM CDT  To: ROD Holbrook  Subject: FW: call back                                    She is wanting clearance to have gastric bypass by Dr. ISAI Benavides.   Does she need to be seen again?  Please advise.      ----- Message -----  From: Chelsie Barrow  Sent: 2022  10:46 AM CDT  To: Nory Hart CSA  Subject: call back                                        Sudhakar Saul philippe 71 wanted to talk about her upcoming procedure, she wanted a call back @ [phone]. Thxs

## 2022-09-02 RX ORDER — PROCHLORPERAZINE 25 MG/1
SUPPOSITORY RECTAL
Qty: 1 EACH | Refills: 0 | Status: SHIPPED | OUTPATIENT
Start: 2022-09-02 | End: 2022-12-13

## 2022-09-07 ENCOUNTER — TELEMEDICINE (OUTPATIENT)
Dept: PSYCHIATRY | Facility: CLINIC | Age: 47
End: 2022-09-07

## 2022-09-07 ENCOUNTER — TELEPHONE (OUTPATIENT)
Dept: PSYCHIATRY | Facility: CLINIC | Age: 47
End: 2022-09-07

## 2022-09-07 DIAGNOSIS — F51.5 NIGHTMARES: ICD-10-CM

## 2022-09-07 DIAGNOSIS — F41.1 GENERALIZED ANXIETY DISORDER: ICD-10-CM

## 2022-09-07 DIAGNOSIS — F25.1 SCHIZOAFFECTIVE DISORDER, DEPRESSIVE TYPE: Primary | ICD-10-CM

## 2022-09-07 PROCEDURE — 99214 OFFICE O/P EST MOD 30 MIN: CPT | Performed by: NURSE PRACTITIONER

## 2022-09-07 RX ORDER — PRAZOSIN HYDROCHLORIDE 2 MG/1
2-4 CAPSULE ORAL NIGHTLY
Qty: 180 CAPSULE | Refills: 0 | Status: SHIPPED | OUTPATIENT
Start: 2022-09-07 | End: 2022-11-01 | Stop reason: SDUPTHER

## 2022-09-07 NOTE — TELEPHONE ENCOUNTER
Office attempted to contact patient regarding scheduling an appt with Tg on 9/28 at 12 or 4. Left Voicemail for Patient .  If Patient returns call please schedule .

## 2022-09-07 NOTE — PROGRESS NOTES
This provider is located at [facility], [address] [place] [address].The Patient is seen remotely at home, [address] [facility] [place] [address] via [employer].  Patient is being seen via telehealth and confirm that they are in a secure environment for this session. The patient's condition being diagnosed/treated is appropriate for telemedicine. The provider identified himself/herself: herself as well as her credentials.   The patient gave consent to be seen remotely, and when consent is given they understand that the consent allows for patient identifiable information to be sent to a third party as needed.   They may refuse to be seen remotely at any time. The electronic data is encrypted and password protected, and the patient has been advised of the potential risks to privacy not withstanding such measures.    You have chosen to receive care through a telehealth visit.  Do you consent to use a video/audio connection for your medical care today? Yes      Chief Complaint  Follow-for schizoaffective depressed type    Subjective    Sudhakar More Saul presents to [facility] for medication management.       History of Present Illness   Patient presents today reporting that she has not felt well as she notes she has been congested and coughing and going to the clinic today to get tested for COVID.  Patient states however her mood has been good and denies any major depressive symptoms.  She reports however her anxiety has been up due to quit smoking as well as driving in a car.  Patient states that she has not heard any voices but did see one small shadow movement.  Patient reports her appetite is the same.  She states she was getting on average 5 hours but the past couple days it has decreased due to her being sick.  Patient denied any side effects to the medications.  She reports that she did find a surgeon that will consider the weight loss surgery as  she has to get all letters from her previous providers together saying they approved before she can have an appointment as she notes this will be a longer process but is hopeful and has a goal set in mind.  Patient denies any SI or HI.  Denies any auditory hallucinations.  Admits to visual hallucinations.      Objective   Vital Signs:   There were no vitals taken for this visit.  Due to the remote nature of this encounter (virtual encounter), vitals were unable to be obtained.  Height stated at 66 inches.  Weight stated at 343 pounds.        PHQ-9 Score:   PHQ-9 Total Score:     Patient screened positive for depression based on a PHQ-9 score of  on . Follow-up recommendations include: Patient has schizophrenia and is currently being managed with medication..    Mental Status Exam:   Hygiene:   good  Cooperation:  Cooperative  Eye Contact:  Good  Psychomotor Behavior:  Appropriate  Affect:  Appropriate  Mood: normal and anxious  Speech:  Normal  Thought Process:  Goal directed and Linear  Thought Content:  Normal  Suicidal:  None  Homicidal:  None  Hallucinations:  Auditory and Not demonstrated today  Delusion:  None  Memory:  Intact  Orientation:  Person, Place, Time and Situation  Reliability:  good  Insight:  Good and Fair  Judgement:  Good and Fair  Impulse Control:  Good and Fair  Physical/Medical Issues:  Yes Dm. HTN, CKD stage 2, hep C+, JACQUE, hx substance abuse     Current Medications:   Current Outpatient Medications   Medication Sig Dispense Refill   • prazosin (MINIPRESS) 2 MG capsule Take 1-2 capsules by mouth Every Night. 180 capsule 0   • albuterol (ACCUNEB) 1.25 MG/3ML nebulizer solution Take 3 mL by nebulization Every 6 (Six) Hours As Needed for Wheezing or Shortness of Air. 9 mL 3   • albuterol sulfate  (90 Base) MCG/ACT inhaler Inhale 2 puffs Every 6 (Six) Hours As Needed for Wheezing or Shortness of Air. 6.7 g 11   • aspirin 81 MG EC tablet Take 81 mg by mouth Daily.     • Cariprazine HCl 4.5  MG capsule Take 4.5 mg by mouth Daily. 90 capsule 0   • Continuous Blood Gluc  (Dexcom G6 ) device USE FOR 6 MONTHS     • Continuous Blood Gluc Sensor (Dexcom G6 Sensor) USE AS DIRECTED EVERY  10  DAYS 9 each 0   • Continuous Blood Gluc Transmit (Dexcom G6 Transmitter) misc USE 1 TRANSMITTER FOR 3 MONTHS 1 each 0   • cyclobenzaprine (FLEXERIL) 5 MG tablet Take 1 tablet by mouth 3 (Three) Times a Day As Needed for Muscle Spasms. (Patient taking differently: Take 5 mg by mouth 3 (Three) Times a Day.) 90 tablet 3   • Dulaglutide (Trulicity) 4.5 MG/0.5ML solution pen-injector Inject 4.5 mg under the skin into the appropriate area as directed 1 (One) Time Per Week. 4 pen 11   • DULoxetine (CYMBALTA) 30 MG capsule Take 3 capsules by mouth Daily. 270 capsule 0   • Ertugliflozin L-PyroglutamicAc (Steglatro) 15 MG tablet Take 1 tablet by mouth Every Morning. 90 tablet 1   • Fluticasone Furoate-Vilanterol (Breo Ellipta) 200-25 MCG/INH inhaler Inhale 1 puff Daily. 28 each 11   • folic acid (FOLVITE) 1 MG tablet Take 1,000 mcg by mouth Daily.     • gabapentin (NEURONTIN) 300 MG capsule Take 1 capsule by mouth 3 (Three) Times a Day. (Patient taking differently: Take 100 mg by mouth 3 (Three) Times a Day.) 90 capsule 2   • glucose blood test strip Testing 4 times daily, E11.9 120 each 12   • HYDROcodone-acetaminophen (NORCO) 7.5-325 MG per tablet Take 1 tablet by mouth 3 (Three) Times a Day.     • hydrOXYzine (ATARAX) 25 MG tablet Take 0.5-1 tablets by mouth 3 (Three) Times a Day As Needed for Anxiety. 270 tablet 0   • Insulin Disposable Pump (OmniPod Dash 5 Pack Pods) misc      • insulin lispro (Admelog) 100 UNIT/ML injection 180  units daily through pump 90 mL 11   • lamoTRIgine (LaMICtal) 150 MG tablet Take 1 tablet by mouth Daily. 90 tablet 0   • levETIRAcetam (KEPPRA) 1000 MG tablet Take 1 tablet by mouth 2 (Two) Times a Day. 60 tablet 3   • lisinopril (PRINIVIL,ZESTRIL) 40 MG tablet Take 1 tablet by mouth  Daily. 30 tablet 3   • metFORMIN (GLUCOPHAGE) 500 MG tablet TAKE 1 TABLET BY MOUTH TWICE DAILY WITH MEALS 60 tablet 0   • metoprolol tartrate (LOPRESSOR) 50 MG tablet Take 1 tablet by mouth 2 (Two) Times a Day. 60 tablet 3   • Nebulizer device 1 application Every 6 (Six) Hours As Needed (dyspnea). 1 each 3   • nicotine (NICODERM CQ) 21 MG/24HR patch Place 1 patch on the skin as directed by provider Daily. 28 patch 3   • NIFEdipine XL (PROCARDIA XL) 30 MG 24 hr tablet Take 1 tablet by mouth Daily. 30 tablet 3   • omeprazole (priLOSEC) 40 MG capsule Take 1 capsule by mouth Daily. 30 capsule 3   • OXcarbazepine (TRILEPTAL) 600 MG tablet Take 600 mg by mouth 2 (Two) Times a Day. PT TAKES  MG TAB IN MORNING AND  MG IN EVENING     • simvastatin (ZOCOR) 40 MG tablet Take 1 tablet by mouth Every Night. 30 tablet 3   • vitamin D (ERGOCALCIFEROL) 1.25 MG (33188 UT) capsule capsule Take 2 tablets by mouth weekly 8 capsule 3     No current facility-administered medications for this visit.       Physical Exam  Nursing note reviewed.   Constitutional:       Appearance: Normal appearance.   Neurological:      Mental Status: She is alert.   Psychiatric:         Attention and Perception: Attention normal. She does not perceive auditory or visual hallucinations.         Mood and Affect: Affect normal. Mood is anxious. Mood is not depressed.         Speech: Speech normal.         Behavior: Behavior is cooperative.         Thought Content: Thought content normal.         Cognition and Memory: Cognition and memory normal.        Result Review :     The following data was reviewed by: ROD Spencer on 02/03/2021:  Common labs    Common Labsle 10/21/21 4/22/22 4/22/22 4/22/22 4/22/22 4/22/22 7/8/22 7/8/22 7/8/22 7/8/22 7/8/22     0808 0808 0808 0808 0808 0822 0822 0822 0822 0822   Glucose   211 (A)     106 (A)      BUN   12     16      Creatinine   0.77     0.82      Sodium   136     139      Potassium   4.6     4.7       Chloride   102     106      Calcium   9.4     9.4      Albumin   3.80     3.80      Total Bilirubin   0.2     0.2      Alkaline Phosphatase   96     93      AST (SGOT)   34 (A)     42 (A)      ALT (SGPT)   39 (A)     37 (A)      WBC  8.45     8.79       Hemoglobin  13.3     13.6       Hematocrit  41.0     41.3       Platelets  169     176       Total Cholesterol    169     119     Triglycerides    144     152 (A)     HDL Cholesterol    44     43     LDL Cholesterol     100     50     Hemoglobin A1C 8.00 (A)    9.50 (A)     8.70 (A)    Microalbumin, Urine      2.5     <1.2   (A) Abnormal value            CMP    CMP 10/12/21 4/22/22 7/8/22   Glucose 109 (A) 211 (A) 106 (A)   BUN 12 12 16   Creatinine 0.75 0.77 0.82   eGFR Non African Am 83     Sodium 133 (A) 136 139   Potassium 4.3 4.6 4.7   Chloride 98 102 106   Calcium 9.4 9.4 9.4   Albumin  3.80 3.80   Total Bilirubin  0.2 0.2   Alkaline Phosphatase  96 93   AST (SGOT)  34 (A) 42 (A)   ALT (SGPT)  39 (A) 37 (A)   (A) Abnormal value            CBC    CBC 10/12/21 4/22/22 7/8/22   WBC 11.86 (A) 8.45 8.79   RBC 4.85 4.90 4.87   Hemoglobin 13.3 13.3 13.6   Hematocrit 40.5 41.0 41.3   MCV 83.5 83.7 84.8   MCH 27.4 27.1 27.9   MCHC 32.8 32.4 32.9   RDW 14.6 15.0 14.6   Platelets 202 169 176   (A) Abnormal value            CBC w/diff    CBC w/Diff 6/10/20 9/9/20 1/26/21   WBC 8.89 9.63 9.04   RBC 4.85 5.07 5.10   Hemoglobin 14.1 14.9 14.4   Hematocrit 42.8 44.3 45.0   MCV 88.2 87.4 88.2   MCH 29.1 29.4 28.2   MCHC 32.9 33.6 32.0   RDW 13.6 13.3 13.0   Platelets 157 180 174   Neutrophil Rel % 63.8 67.2    Immature Granulocyte Rel % 0.6 (A) 0.5    Lymphocyte Rel % 26.4 21.6    Monocyte Rel % 4.5 (A) 4.8 (A)    Eosinophil Rel % 3.9 5.3    Basophil Rel % 0.8 0.6    (A) Abnormal value            Lipid Panel    Lipid Panel 4/22/22 7/8/22   Total Cholesterol 169 119   Triglycerides 144 152 (A)   HDL Cholesterol 44 43   VLDL Cholesterol 25 26   LDL Cholesterol  100 50    LDL/HDL Ratio 2.19 1.06   (A) Abnormal value            TSH    TSH 10/21/21 4/22/22 7/8/22   TSH 1.110 1.740 2.290           BMP    BMP 10/12/21 4/22/22 7/8/22   BUN 12 12 16   Creatinine 0.75 0.77 0.82   Sodium 133 (A) 136 139   Potassium 4.3 4.6 4.7   Chloride 98 102 106   CO2 25.0 24.0 21.0 (A)   Calcium 9.4 9.4 9.4   (A) Abnormal value            HgB    HGB 10/12/21 4/22/22 7/8/22   Hemoglobin 13.3 13.3 13.6           UA    Urinalysis 10/12/21   Specific [place], UA 1.013   Ketones, UA Negative   Blood, UA Negative   Leukocytes, UA Negative   Nitrite, UA Negative           Data reviewed: PCP notes        Assessment and Plan    Problem List Items Addressed This Visit        Mental Health    Schizoaffective disorder, depressive type (HCC) - Primary      Other Visit Diagnoses     Nightmares        Relevant Medications    prazosin (MINIPRESS) 2 MG capsule    Generalized anxiety disorder                TREATMENT PLAN/GOALS: Continue supportive psychotherapy efforts and medications as indicated. Treatment and medication options discussed during today's visit. Patient ackowledged and verbally consented to continue with current treatment plan and was educated on the importance of compliance with treatment and follow-up appointments.    MEDICATION ISSUES:  We discussed risks, benefits, and side effects of the above medications and the patient was agreeable with the plan. Patient was educated on the importance of compliance with treatment and follow-up appointments.  Patient is agreeable to call the office with any worsening of symptoms or onset of side effects. Patient is agreeable to call 911 or go to the nearest ER should he/she begin having SI/HI. Lengthy discussion with patient on the possible side effects of antipsychotic medications including increased cholesterol, increased blood sugar, and possibility of weight gain.  Also discussed the need to monitor lab work associated with this.  The risk of muscle movement  disorders with this class of medication was also discussed. We will continue Lamictal in an effort to stabilize mood.  The patient was reminded to immediately come to the hospital should there be any loss of control.  Explanation was given to her regarding Lamictal and the potential for Foster Shon syndrome and significant rash.  Patient was encouraged to check skin prior to beginning.  Patient was encouraged to report any rash and to immediately stop medication.      -Continue lamotrigine 150 mg daily for schizoaffective disorder.  -Continue minipress 2-4mg at night for nightmares.  -Continue Vraylar  4.5 mg daily for schizoaffective disorder depressed type.  -Continue hydroxyzine 12.5 to 25 mg up to 3 times a day as needed for anxiety and panic.  - Continue Cymbalta 90mg daily for depression and pain and will further evaluate.  -Continue psychotherapy to help with past trauma as well as depressive and anxiety symptoms.  -Highly encourage patient since she is now on Trileptal to be aware of side effects of multiple mood stabilizers and if she were to have any rash to contact the clinic as we may have to look at tapering off the lamotrigine or talking with neurologist to reevaluate Trileptal.  Patient verbalized understanding and denied any side effects at this time.      Patient has now failed and tried Haldol, aripiprazole, Latuda and now risperidone which has caused weight gain and due to diabetes and kidney disease would benefit from Vraylar as patient is still having auditory and visual hallucinations.     Counseled patient regarding multimodal approach with healthy nutrition, healthy sleep, regular physical activity, social activities, counseling, and medications.      Coping skills reviewed and encouraged positive framing of thoughts     Assisted patient in processing above session content; acknowledged and normalized patient’s thoughts, feelings, and concerns.  Applied  positive coping skills and behavior  management in session.  Allowed patient to freely discuss issues without interruption or judgment. Provided safe, confidential environment to facilitate the development of positive therapeutic relationship and encourage open, honest communication. Assisted patient in identifying risk factors which would indicate the need for higher level of care including thoughts to harm self or others and/or self-harming behavior and encouraged patient to contact this office, call 911, or present to the nearest emergency room should any of these events occur. Discussed crisis intervention services and means to access.     MEDS ORDERED DURING VISIT:  New Medications Ordered This Visit   Medications   • prazosin (MINIPRESS) 2 MG capsule     Sig: Take 1-2 capsules by mouth Every Night.     Dispense:  180 capsule     Refill:  0       Follow Up   Return in about 6 weeks (around 10/19/2022), or if symptoms worsen or fail to improve, for Recheck.      Patient was given instructions and counseling regarding her condition or for health maintenance advice. Please see specific information pulled into the AVS if appropriate.     Some of the data in this electronic note has been brought forward from a previous encounter, any necessary changes have been made, it has been reviewed by this APRN, and it is accurate.      This document has been electronically signed by ROD Spencer  September 7, 2022 09:05 EDT    Part of this note may be an electronic transcription/translation of spoken language to printed text using the Dragon Dictation System.

## 2022-09-12 NOTE — TELEPHONE ENCOUNTER
2nd attempt to reach patient left vm . If patient returns call please schedule her with Tg on 9/28 .  Will also send patient a [employer] message .

## 2022-09-13 ENCOUNTER — OFFICE VISIT (OUTPATIENT)
Dept: FAMILY MEDICINE CLINIC | Facility: CLINIC | Age: 47
End: 2022-09-13

## 2022-09-13 ENCOUNTER — DOCUMENTATION (OUTPATIENT)
Dept: ENDOCRINOLOGY | Facility: CLINIC | Age: 47
End: 2022-09-13

## 2022-09-13 VITALS
BODY MASS INDEX: 47.09 KG/M2 | HEIGHT: 66 IN | SYSTOLIC BLOOD PRESSURE: 130 MMHG | OXYGEN SATURATION: 94 % | HEART RATE: 98 BPM | DIASTOLIC BLOOD PRESSURE: 84 MMHG | WEIGHT: 293 LBS | TEMPERATURE: 98.7 F

## 2022-09-13 DIAGNOSIS — G40.909 SEIZURE DISORDER: ICD-10-CM

## 2022-09-13 DIAGNOSIS — E55.9 VITAMIN D DEFICIENCY: ICD-10-CM

## 2022-09-13 DIAGNOSIS — E78.2 MIXED HYPERLIPIDEMIA: ICD-10-CM

## 2022-09-13 DIAGNOSIS — F25.1 SCHIZOAFFECTIVE DISORDER, DEPRESSIVE TYPE: ICD-10-CM

## 2022-09-13 DIAGNOSIS — G47.33 OSA (OBSTRUCTIVE SLEEP APNEA): ICD-10-CM

## 2022-09-13 DIAGNOSIS — Z72.0 TOBACCO USER: ICD-10-CM

## 2022-09-13 DIAGNOSIS — Z71.89 ENCOUNTER FOR PRE-BARIATRIC SURGERY COUNSELING AND EDUCATION: Primary | ICD-10-CM

## 2022-09-13 DIAGNOSIS — E66.01 MORBID OBESITY: ICD-10-CM

## 2022-09-13 DIAGNOSIS — E11.65 UNCONTROLLED TYPE 2 DIABETES MELLITUS WITH HYPERGLYCEMIA: ICD-10-CM

## 2022-09-13 DIAGNOSIS — N18.2 STAGE 2 CHRONIC KIDNEY DISEASE: ICD-10-CM

## 2022-09-13 DIAGNOSIS — Z01.818 PREOPERATIVE CLEARANCE: ICD-10-CM

## 2022-09-13 DIAGNOSIS — I10 ESSENTIAL HYPERTENSION: ICD-10-CM

## 2022-09-13 PROCEDURE — 99214 OFFICE O/P EST MOD 30 MIN: CPT | Performed by: FAMILY MEDICINE

## 2022-09-13 RX ORDER — CYCLOBENZAPRINE HCL 10 MG
10 TABLET ORAL DAILY
COMMUNITY
Start: 2022-08-13 | End: 2023-03-07 | Stop reason: SDUPTHER

## 2022-09-26 RX ORDER — PROCHLORPERAZINE 25 MG/1
SUPPOSITORY RECTAL
Qty: 9 EACH | Refills: 0 | Status: SHIPPED | OUTPATIENT
Start: 2022-09-26 | End: 2023-01-09

## 2022-09-28 ENCOUNTER — TELEMEDICINE (OUTPATIENT)
Dept: PSYCHIATRY | Facility: CLINIC | Age: 47
End: 2022-09-28

## 2022-09-28 DIAGNOSIS — F25.1 SCHIZOAFFECTIVE DISORDER, DEPRESSIVE TYPE: Primary | ICD-10-CM

## 2022-09-28 DIAGNOSIS — E66.01 MORBID OBESITY: ICD-10-CM

## 2022-09-28 PROCEDURE — 90837 PSYTX W PT 60 MINUTES: CPT | Performed by: COUNSELOR

## 2022-10-10 NOTE — PROGRESS NOTES
Subjective:  Sudhakar Saul is a 46 y.o. female who presents for       Patient Active Problem List   Diagnosis   • Seizure disorder (HCC)   • Morbid obesity (HCC)   • Tobacco abuse counseling   • Multiple joint pain   • Dysuria   • History of positive hepatitis C   • Essential hypertension   • Uncontrolled type 2 diabetes mellitus with hyperglycemia (HCC)   • Vitamin D deficiency   • Urinary tract infection without hematuria   • Chronic hepatitis C without hepatic coma (HCC)   • Dyspnea on exertion   • Cigarette nicotine dependence, uncomplicated   • JACQUE (obstructive sleep apnea)   • Gastroesophageal reflux disease with esophagitis   • Left upper quadrant pain   • Nausea and vomiting   • Weight gain, abnormal   • Change in bowel habits   • Hepatic fibrosis   • Non-compliance   • Schizoaffective disorder, depressive type (HCC)   • Schizophrenia, paranoid type (HCC)   • Gastritis without bleeding   • Tobacco user   • Pre-operative cardiovascular examination   • Scoliosis   • History of drug use   • Stage 2 chronic kidney disease   • Diabetic neuropathy (HCC)   • Mixed hyperlipidemia   • Chronic bilateral low back pain with bilateral sciatica   • Type 2 diabetes mellitus with hyperglycemia, with long-term current use of insulin (HCC)   • Auditory hallucination   • Class 3 severe obesity due to excess calories with serious comorbidity and body mass index (BMI) of 45.0 to 49.9 in adult (HCC)   • Pain in wrist   • Schizophrenia, simple, chronic (HCC)   • Carpal tunnel syndrome   • Status post carpal tunnel release   • Asthma, persistent not controlled   • Weight loss counseling, encounter for   • Encounter for pre-bariatric surgery counseling and education           Current Outpatient Medications:   •  albuterol (ACCUNEB) 1.25 MG/3ML nebulizer solution, Take 3 mL by nebulization Every 6 (Six) Hours As Needed for Wheezing or Shortness of Air., Disp: 9 mL, Rfl: 3  •  aspirin 81 MG EC tablet, Take 81 mg by mouth  Daily., Disp: , Rfl:   •  Cariprazine HCl 4.5 MG capsule, Take 4.5 mg by mouth Daily., Disp: 90 capsule, Rfl: 0  •  Continuous Blood Gluc  (Dexcom G6 ) device, USE FOR 6 MONTHS, Disp: , Rfl:   •  Continuous Blood Gluc Sensor (Dexcom G6 Sensor), USE AS DIRECTED EVERY  10  DAYS, Disp: 9 each, Rfl: 0  •  Continuous Blood Gluc Transmit (Dexcom G6 Transmitter) misc, USE 1 TRANSMITTER FOR 3 MONTHS, Disp: 1 each, Rfl: 0  •  cyclobenzaprine (FLEXERIL) 10 MG tablet, Take 10 mg by mouth 3 (Three) Times a Day As Needed., Disp: , Rfl:   •  Dulaglutide (Trulicity) 4.5 MG/0.5ML solution pen-injector, Inject 4.5 mg under the skin into the appropriate area as directed 1 (One) Time Per Week., Disp: 4 pen, Rfl: 11  •  DULoxetine (CYMBALTA) 30 MG capsule, Take 3 capsules by mouth Daily., Disp: 270 capsule, Rfl: 0  •  Ertugliflozin L-PyroglutamicAc (Steglatro) 15 MG tablet, Take 1 tablet by mouth Every Morning., Disp: 90 tablet, Rfl: 1  •  Fluticasone Furoate-Vilanterol (Breo Ellipta) 200-25 MCG/INH inhaler, Inhale 1 puff Daily., Disp: 28 each, Rfl: 11  •  folic acid (FOLVITE) 1 MG tablet, Take 1,000 mcg by mouth Daily., Disp: , Rfl:   •  gabapentin (NEURONTIN) 300 MG capsule, Take 1 capsule by mouth 3 (Three) Times a Day. (Patient taking differently: Take 100 mg by mouth 3 (Three) Times a Day.), Disp: 90 capsule, Rfl: 2  •  glucose blood test strip, Testing 4 times daily, E11.9, Disp: 120 each, Rfl: 12  •  HYDROcodone-acetaminophen (NORCO) 7.5-325 MG per tablet, Take 1 tablet by mouth 3 (Three) Times a Day., Disp: , Rfl:   •  hydrOXYzine (ATARAX) 25 MG tablet, Take 0.5-1 tablets by mouth 3 (Three) Times a Day As Needed for Anxiety., Disp: 270 tablet, Rfl: 0  •  Insulin Disposable Pump (OmniPod Dash 5 Pack Pods) misc, , Disp: , Rfl:   •  insulin lispro (Admelog) 100 UNIT/ML injection, 180  units daily through pump, Disp: 90 mL, Rfl: 11  •  lamoTRIgine (LaMICtal) 150 MG tablet, Take 1 tablet by mouth Daily., Disp: 90  tablet, Rfl: 0  •  levETIRAcetam (KEPPRA) 1000 MG tablet, Take 1 tablet by mouth 2 (Two) Times a Day., Disp: 60 tablet, Rfl: 3  •  lisinopril (PRINIVIL,ZESTRIL) 40 MG tablet, Take 1 tablet by mouth Daily., Disp: 30 tablet, Rfl: 3  •  metFORMIN (GLUCOPHAGE) 500 MG tablet, TAKE 1 TABLET BY MOUTH TWICE DAILY WITH MEALS, Disp: 60 tablet, Rfl: 0  •  metoprolol tartrate (LOPRESSOR) 50 MG tablet, Take 1 tablet by mouth 2 (Two) Times a Day., Disp: 60 tablet, Rfl: 3  •  Nebulizer device, 1 application Every 6 (Six) Hours As Needed (dyspnea)., Disp: 1 each, Rfl: 3  •  nicotine (NICODERM CQ) 21 MG/24HR patch, Place 1 patch on the skin as directed by provider Daily., Disp: 28 patch, Rfl: 3  •  NIFEdipine XL (PROCARDIA XL) 30 MG 24 hr tablet, Take 1 tablet by mouth Daily., Disp: 30 tablet, Rfl: 3  •  omeprazole (priLOSEC) 40 MG capsule, Take 1 capsule by mouth Daily., Disp: 30 capsule, Rfl: 3  •  OXcarbazepine (TRILEPTAL) 600 MG tablet, Take 600 mg by mouth 2 (Two) Times a Day. PT TAKES  MG TAB IN MORNING AND  MG IN EVENING, Disp: , Rfl:   •  prazosin (MINIPRESS) 2 MG capsule, Take 1-2 capsules by mouth Every Night., Disp: 180 capsule, Rfl: 0  •  simvastatin (ZOCOR) 40 MG tablet, Take 1 tablet by mouth Every Night., Disp: 30 tablet, Rfl: 3  •  Ventolin  (90 Base) MCG/ACT inhaler, Inhale 2 puffs Every 4 (Four) Hours As Needed for Wheezing., Disp: 6.7 g, Rfl: 11  •  vitamin D (ERGOCALCIFEROL) 1.25 MG (76241 UT) capsule capsule, Take 2 tablets by mouth weekly, Disp: 8 capsule, Rfl: 3    HPI     Pt is 47 yo female with management  of morbid obesity, IDDM Type 2 now on insulin pump , HTN, HLP, vitamin D deficiency, tobacco user, GERD, gastritis, esophagitis, diverticulosis, colonic polyp in sigmoid colon,  paranoid schizophrenia,  Seizure disorder, history of Illicit drug abuse, asthma , JACQUE, chronic hepatitis C , major depression, JD, sp appendectomy, sp cholecystectomy sp right carpal tunnel release, sp back  surgery, sp breast surgery, sp lipoma excision, chronic back pain (moderate L4-L5 and L5-S1 DDD changes, mild bilateral L4-L5 moderate bilateral L5-S1 facet arthritic change, mild leveoscoliosis), CKD stage 2, de Quervain's syndrome of left wrist ,sp left carpal tunnel release. 0.3 cm nodule along left lower and right middle lobe         9/13/22 in office visit for recheck. Pt continues to see Telemedicine for her  issues. Pt's BP has been better controlled.  Pt is now requesting for reevalution for bariatric weight loss surgery. She would like to see Dr. Benavides again.  Is also needing referral to Cardiology and Sleep Medicine. She has been cleared by Pulmonology and  to get the surgery. She is here today for 1st weight at 340 lbs.     10/21/22 in office visit for recheck. Pt has been seen by Cardiology for preoperative clearance for bariatric surgeyr and echo was ordered. Pt was referred to Dr. Benavides for bariatric weight loss surgery. This is month 4 of weight check and weight last visit was 340 lbs and today is is 335 lbs. BP slighlty up today but at home stable. Sugars are stable. No chest pain no dizziness. She has been tobacco free for 2 months.         Hyperlipidemia  The current episode started more than 1 year ago. The problem is controlled. Recent lipid tests were reviewed and are variable. Exacerbating diseases include chronic renal disease, diabetes and obesity. She has no history of nephrotic syndrome. Associated symptoms include shortness of breath. Pertinent negatives include no chest pain. Current antihyperlipidemic treatment includes statins. The current treatment provides significant improvement of lipids. Risk factors for coronary artery disease include diabetes mellitus, obesity, dyslipidemia, a sedentary lifestyle, stress and hypertension.   Seizures   This is a chronic problem. The current episode started more than 1 week ago. The problem has not changed since onset.Pertinent negatives  include no sleepiness, no confusion, no headaches, no speech difficulty, no visual disturbance, no neck stiffness, no sore throat, no chest pain, no cough, no nausea, no vomiting, no diarrhea and no muscle weakness. There has been no fever.   Chronic Kidney Disease  This is a chronic problem. The current episode started more than 1 year ago. The problem occurs constantly. The problem has been unchanged. Associated symptoms include arthralgias, fatigue, numbness and weakness. Pertinent negatives include no abdominal pain, chest pain, coughing, headaches, nausea, sore throat or vomiting. Nothing aggravates the symptoms. She has tried nothing for the symptoms. The treatment provided no relief.   Heartburn  She reports no abdominal pain, no belching, no chest pain, no choking, no coughing, no early satiety, no globus sensation, no heartburn, no hoarse voice, no nausea, no sore throat, no stridor, no tooth decay, no water brash or no wheezing. This is a chronic problem. The current episode started more than 1 month ago. The problem occurs constantly. The problem has been unchanged. Nothing aggravates the symptoms. Associated symptoms include fatigue. She has tried a PPI for the symptoms.   Obesity   This is a chronic problem. The current episode started more than 1 year ago. The problem occurs constantly. The problem has been unchanged. Associated symptoms include arthralgias, fatigue, headaches, numbness and weakness. Pertinent negatives include no abdominal pain, anorexia, change in bowel habit, chest pain, chills, congestion, coughing, fever, joint swelling, myalgias, nausea, neck pain, rash, sore throat, swollen glands, urinary symptoms, vertigo, visual change or vomiting. Nothing aggravates the symptoms. She has tried nothing for the symptoms.   Hypertension   This is a chronic problem. The current episode started more than 1 year ago. The problem is controlled  Associated symptoms include headaches. Pertinent  negatives include no anxiety, blurred vision, chest pain, malaise/fatigue, neck pain, orthopnea, palpitations, peripheral edema, PND or shortness of breath. Risk factors for coronary artery disease include obesity, sedentary lifestyle, smoking/tobacco exposure and stress. Past treatments include ACE inhibitors, beta blockers and diuretics. There are no compliance problems.  There is no history of angina, kidney disease, CAD/MI, CVA, heart failure, left ventricular hypertrophy, PVD or retinopathy. There is no history of chronic renal disease, coarctation of the aorta, hyperaldosteronism, hypercortisolism, hyperparathyroidism, a hypertension causing med, pheochromocytoma, renovascular disease, sleep apnea or a thyroid problem.   Diabetes   She presents for her followupdiabetic visit. She has type 2 diabetes mellitus. Her disease course has been waxing and waning  Hypoglycemia symptoms include confusion, headaches, nervousness/anxiousness, seizures and sleepiness. Associated symptoms include fatigue and weakness. Pertinent negatives for diabetes include no blurred vision, no chest pain and no visual change. Pertinent negatives for diabetic complications include no CVA, PVD or retinopathy. Risk factors for coronary artery disease include dyslipidemia, obesity and post-menopausal. She has not had a previous visit with a dietitian. There is no change in her home blood glucose trend. An ACE inhibitor/angiotensin II receptor blocker is not being taken. She does not see a podiatrist.Eye exam is not current. teratments include steglastro insulin and trulicity        Review of Systems  Review of Systems   Constitutional: Positive for activity change and fatigue. Negative for appetite change, chills, diaphoresis and fever.   HENT: Negative for congestion, postnasal drip, rhinorrhea, sinus pressure, sinus pain, sneezing, sore throat, trouble swallowing and voice change.    Respiratory: Positive for shortness of breath. Negative  for cough, choking, chest tightness, wheezing and stridor.    Cardiovascular: Negative for chest pain.   Gastrointestinal: Negative for diarrhea, nausea and vomiting.   Musculoskeletal: Positive for arthralgias.   Neurological: Positive for seizures, weakness and numbness. Negative for headaches.   Psychiatric/Behavioral: Positive for agitation and behavioral problems. The patient is nervous/anxious.         Depressed mood        Patient Active Problem List   Diagnosis   • Seizure disorder ([facility])   • Morbid obesity ([facility])   • Tobacco abuse counseling   • Multiple joint pain   • Dysuria   • History of positive hepatitis C   • Essential hypertension   • Uncontrolled type 2 diabetes mellitus with hyperglycemia ([facility])   • Vitamin D deficiency   • Urinary tract infection without hematuria   • Chronic hepatitis C without hepatic coma ([facility])   • Dyspnea on exertion   • Cigarette nicotine dependence, uncomplicated   • JACQUE (obstructive sleep apnea)   • Gastroesophageal reflux disease with esophagitis   • Left upper quadrant pain   • Nausea and vomiting   • Weight gain, abnormal   • Change in bowel habits   • Hepatic fibrosis   • Non-compliance   • Schizoaffective disorder, depressive type ([facility])   • Schizophrenia, paranoid type ([facility])   • Gastritis without bleeding   • Tobacco user   • Pre-operative cardiovascular examination   • Scoliosis   • History of drug use   • Stage 2 chronic kidney disease   • Diabetic neuropathy ([facility])   • Mixed hyperlipidemia   • Chronic bilateral low back pain with bilateral sciatica   • Type 2 diabetes mellitus with hyperglycemia, with long-term current use of insulin ([facility])   • Auditory hallucination   • Class 3 severe obesity due to excess calories with serious comorbidity and body mass index (BMI) of 45.0 to 49.9 in adult ([facility])   • Pain in wrist   • Schizophrenia, simple, chronic ([facility])   • Carpal tunnel syndrome   • Status post carpal tunnel release   • Asthma, persistent not controlled   • Weight  loss counseling, encounter for   • Encounter for pre-bariatric surgery counseling and education     Past Surgical History:   Procedure Laterality Date   • APPENDECTOMY     • BACK SURGERY     • BREAST SURGERY     • CARPAL TUNNEL RELEASE      right hand   • CARPAL TUNNEL RELEASE Left 10/19/2021    Procedure: LEFT CARPAL TUNNEL RELEASE;  Surgeon: Kevin Carlson MD;  Location: [facility] OR;  Service: Orthopedics;  Laterality: Left;   • CHOLECYSTECTOMY     • COLONOSCOPY     • COLONOSCOPY N/A 2019    Procedure: COLONOSCOPY;  Surgeon: Joni Delacruz MD;  Location: [facility] ENDOSCOPY;  Service: Gastroenterology   • DENTAL PROCEDURE     • ENDOSCOPY N/A 2019    Procedure: ESOPHAGOGASTRODUODENOSCOPY--poss dilation;  Surgeon: Joni Delacruz MD;  Location: [facility] ENDOSCOPY;  Service: Gastroenterology   • HYSTERECTOMY     • LIPOMA EXCISION      9   • LIVER BIOPSY     • UPPER GASTROINTESTINAL ENDOSCOPY  2019     Social History     Socioeconomic History   • Marital status:    Tobacco Use   • Smoking status: Former     Packs/day: 1.00     Years: 35.00     Pack years: 35.00     Types: Cigarettes     Start date:      Quit date: 2022     Years since quittin.3   • Smokeless tobacco: Never   • Tobacco comments:     on patch trying to quit    Vaping Use   • Vaping Use: Never used   Substance and Sexual Activity   • Alcohol use: Not Currently   • Drug use: Not Currently     Types: Amphetamines, Heroin, Methamphetamines, Morphine, Oxycodone     Comment: QUIT 3 YEARS AGO   • Sexual activity: Not Currently     Birth control/protection: Surgical     Family History   Problem Relation Age of Onset   • Hypertension Other    • Diabetes Other    • Stroke Other    • Cancer Other    • Kidney disease Other    • Lung disease Other    • Other Other    • Malone's esophagus Other    • Colon polyps Other    • Heart disease Other    • Ulcers Other    • Cholelithiasis Other    • Allergy (severe) Other    • Schizophrenia  Father    • Alcohol abuse Father    • Drug abuse Paternal Grandfather      Office Visit on 10/13/2022   Component Date Value Ref Range Status   • QT Interval 10/13/2022 354  ms Final   • QTC Interval 10/13/2022 408  ms Final   Lab on 07/08/2022   Component Date Value Ref Range Status   • Glucose 07/08/2022 106 (H)  65 - 99 mg/dL Final   • BUN 07/08/2022 16  6 - 20 mg/dL Final   • Creatinine 07/08/2022 0.82  0.57 - 1.00 mg/dL Final   • Sodium 07/08/2022 139  136 - 145 mmol/L Final   • Potassium 07/08/2022 4.7  3.5 - 5.2 mmol/L Final   • Chloride 07/08/2022 106  98 - 107 mmol/L Final   • CO2 07/08/2022 21.0 (L)  22.0 - 29.0 mmol/L Final   • Calcium 07/08/2022 9.4  8.6 - 10.5 mg/dL Final   • Total Protein 07/08/2022 7.0  6.0 - 8.5 g/dL Final   • Albumin 07/08/2022 3.80  3.50 - 5.20 g/dL Final   • ALT (SGPT) 07/08/2022 37 (H)  1 - 33 U/L Final   • AST (SGOT) 07/08/2022 42 (H)  1 - 32 U/L Final   • Alkaline Phosphatase 07/08/2022 93  39 - 117 U/L Final   • Total Bilirubin 07/08/2022 0.2  0.0 - 1.2 mg/dL Final   • Globulin 07/08/2022 3.2  gm/dL Final   • A/G Ratio 07/08/2022 1.2  g/dL Final   • BUN/Creatinine Ratio 07/08/2022 19.5  7.0 - 25.0 Final   • Anion Gap 07/08/2022 12.0  5.0 - 15.0 mmol/L Final   • eGFR 07/08/2022 88.4  >60.0 mL/min/1.73 Final    National Kidney Foundation and American Society of Nephrology (ASN) Task Force recommended calculation based on the Chronic Kidney Disease Epidemiology Collaboration (CKD-EPI) equation refit without adjustment for race.   • Hemoglobin A1C 07/08/2022 8.70 (H)  4.80 - 5.60 % Final   • Total Cholesterol 07/08/2022 119  0 - 200 mg/dL Final   • Triglycerides 07/08/2022 152 (H)  0 - 150 mg/dL Final   • HDL Cholesterol 07/08/2022 43  40 - 60 mg/dL Final   • LDL Cholesterol  07/08/2022 50  0 - 100 mg/dL Final   • VLDL Cholesterol 07/08/2022 26  5 - 40 mg/dL Final   • LDL/HDL Ratio 07/08/2022 1.06   Final   • Microalbumin/Creatinine Ratio 07/08/2022    Final    Unable to calculate    • Creatinine, Urine 07/08/2022 80.0  mg/dL Final   • Microalbumin, Urine 07/08/2022 <1.2  mg/dL Final   • TSH 07/08/2022 2.290  0.270 - 4.200 uIU/mL Final   • 25 Hydroxy, Vitamin D 07/08/2022 34.9  30.0 - 100.0 ng/ml Final   • WBC 07/08/2022 8.79  3.40 - 10.80 10*3/mm3 Final   • RBC 07/08/2022 4.87  3.77 - 5.28 10*6/mm3 Final   • Hemoglobin 07/08/2022 13.6  12.0 - 15.9 g/dL Final   • Hematocrit 07/08/2022 41.3  34.0 - 46.6 % Final   • MCV 07/08/2022 84.8  79.0 - 97.0 fL Final   • MCH 07/08/2022 27.9  26.6 - 33.0 pg Final   • MCHC 07/08/2022 32.9  31.5 - 35.7 g/dL Final   • RDW 07/08/2022 14.6  12.3 - 15.4 % Final   • RDW-SD 07/08/2022 44.4  37.0 - 54.0 fl Final   • MPV 07/08/2022 11.0  6.0 - 12.0 fL Final   • Platelets 07/08/2022 176  140 - 450 10*3/mm3 Final   • Neutrophil % 07/08/2022 55.8  42.7 - 76.0 % Final   • Lymphocyte % 07/08/2022 36.3  19.6 - 45.3 % Final   • Monocyte % 07/08/2022 4.4 (L)  5.0 - 12.0 % Final   • Eosinophil % 07/08/2022 2.3  0.3 - 6.2 % Final   • Basophil % 07/08/2022 0.7  0.0 - 1.5 % Final   • Immature Grans % 07/08/2022 0.5  0.0 - 0.5 % Final   • Neutrophils, Absolute 07/08/2022 4.91  1.70 - 7.00 10*3/mm3 Final   • Lymphocytes, Absolute 07/08/2022 3.19 (H)  0.70 - 3.10 10*3/mm3 Final   • Monocytes, Absolute 07/08/2022 0.39  0.10 - 0.90 10*3/mm3 Final   • Eosinophils, Absolute 07/08/2022 0.20  0.00 - 0.40 10*3/mm3 Final   • Basophils, Absolute 07/08/2022 0.06  0.00 - 0.20 10*3/mm3 Final   • Immature Grans, Absolute 07/08/2022 0.04  0.00 - 0.05 10*3/mm3 Final   • nRBC 07/08/2022 0.0  0.0 - 0.2 /100 WBC Final   Lab on 04/22/2022   Component Date Value Ref Range Status   • Hemoglobin A1C 04/22/2022 9.50 (H)  4.80 - 5.60 % Final   • Total Cholesterol 04/22/2022 169  0 - 200 mg/dL Final   • Triglycerides 04/22/2022 144  0 - 150 mg/dL Final   • HDL Cholesterol 04/22/2022 44  40 - 60 mg/dL Final   • LDL Cholesterol  04/22/2022 100  0 - 100 mg/dL Final   • VLDL Cholesterol 04/22/2022 25  5 - 40  mg/dL Final   • LDL/HDL Ratio 04/22/2022 2.19   Final   • 25 Hydroxy, Vitamin D 04/22/2022 23.9 (L)  30.0 - 100.0 ng/ml Final   • Vitamin B-12 04/22/2022 493  211 - 946 pg/mL Final   • Microalbumin/Creatinine Ratio 04/22/2022 32.4  mg/g Final   • Creatinine, Urine 04/22/2022 77.1  mg/dL Final   • Microalbumin, Urine 04/22/2022 2.5  mg/dL Final   • Glucose 04/22/2022 211 (H)  65 - 99 mg/dL Final   • BUN 04/22/2022 12  6 - 20 mg/dL Final   • Creatinine 04/22/2022 0.77  0.57 - 1.00 mg/dL Final   • Sodium 04/22/2022 136  136 - 145 mmol/L Final   • Potassium 04/22/2022 4.6  3.5 - 5.2 mmol/L Final   • Chloride 04/22/2022 102  98 - 107 mmol/L Final   • CO2 04/22/2022 24.0  22.0 - 29.0 mmol/L Final   • Calcium 04/22/2022 9.4  8.6 - 10.5 mg/dL Final   • Total Protein 04/22/2022 6.8  6.0 - 8.5 g/dL Final   • Albumin 04/22/2022 3.80  3.50 - 5.20 g/dL Final   • ALT (SGPT) 04/22/2022 39 (H)  1 - 33 U/L Final   • AST (SGOT) 04/22/2022 34 (H)  1 - 32 U/L Final   • Alkaline Phosphatase 04/22/2022 96  39 - 117 U/L Final   • Total Bilirubin 04/22/2022 0.2  0.0 - 1.2 mg/dL Final   • Globulin 04/22/2022 3.0  gm/dL Final   • A/G Ratio 04/22/2022 1.3  g/dL Final   • BUN/Creatinine Ratio 04/22/2022 15.6  7.0 - 25.0 Final   • Anion Gap 04/22/2022 10.0  5.0 - 15.0 mmol/L Final   • eGFR 04/22/2022 95.3  >60.0 mL/min/1.73 Final    National Kidney Foundation and American Society of Nephrology (ASN) Task Force recommended calculation based on the Chronic Kidney Disease Epidemiology Collaboration (CKD-EPI) equation refit without adjustment for race.   • Protein/Creatinine Ratio, Urine 04/22/2022 140.1  0.0 - 200.0 mg/G Crea Final   • Creatinine, Urine 04/22/2022 77.1  mg/dL Final   • Total Protein, Urine 04/22/2022 10.8  mg/dL Final   • TSH 04/22/2022 1.740  0.270 - 4.200 uIU/mL Final   • Magnesium 04/22/2022 1.8  1.6 - 2.6 mg/dL Final   • WBC 04/22/2022 8.45  3.40 - 10.80 10*3/mm3 Final   • RBC 04/22/2022 4.90  3.77 - 5.28 10*6/mm3 Final   •  Hemoglobin 04/22/2022 13.3  12.0 - 15.9 g/dL Final   • Hematocrit 04/22/2022 41.0  34.0 - 46.6 % Final   • MCV 04/22/2022 83.7  79.0 - 97.0 fL Final   • MCH 04/22/2022 27.1  26.6 - 33.0 pg Final   • MCHC 04/22/2022 32.4  31.5 - 35.7 g/dL Final   • RDW 04/22/2022 15.0  12.3 - 15.4 % Final   • RDW-SD 04/22/2022 45.5  37.0 - 54.0 fl Final   • MPV 04/22/2022 11.1  6.0 - 12.0 fL Final   • Platelets 04/22/2022 169  140 - 450 10*3/mm3 Final   • Neutrophil % 04/22/2022 66.8  42.7 - 76.0 % Final   • Lymphocyte % 04/22/2022 25.4  19.6 - 45.3 % Final   • Monocyte % 04/22/2022 5.1  5.0 - 12.0 % Final   • Eosinophil % 04/22/2022 1.5  0.3 - 6.2 % Final   • Basophil % 04/22/2022 0.7  0.0 - 1.5 % Final   • Immature Grans % 04/22/2022 0.5  0.0 - 0.5 % Final   • Neutrophils, Absolute 04/22/2022 5.64  1.70 - 7.00 10*3/mm3 Final   • Lymphocytes, Absolute 04/22/2022 2.15  0.70 - 3.10 10*3/mm3 Final   • Monocytes, Absolute 04/22/2022 0.43  0.10 - 0.90 10*3/mm3 Final   • Eosinophils, Absolute 04/22/2022 0.13  0.00 - 0.40 10*3/mm3 Final   • Basophils, Absolute 04/22/2022 0.06  0.00 - 0.20 10*3/mm3 Final   • Immature Grans, Absolute 04/22/2022 0.04  0.00 - 0.05 10*3/mm3 Final   • nRBC 04/22/2022 0.0  0.0 - 0.2 /100 WBC Final      CT Chest Low Dose Cancer Screening WO  Narrative: PROCEDURE:  CT LUNG SCREENING    CLINICAL HISTORY:  Lung cancer screening (Age 18-49y), Z72.0  Tobacco use.  High risk patient population group (with smoking  history)    COMPARISON EXAMINATIONS:   none        TECHNIQUE:  Low dose CT protocol.  This exam was performed  according to our departmental dose optimization program, which  includes automated exposure control, adjustment of the mA and/or  KV according to patient size and/or use of iterative  reconstruction technique.    FINDINGS:         PULMONARY NODULES: Abutting the pleura of the left lower and  right middle lobe are tiny, 0.3 cm noncalcified nodular opacities  (series 3, image 186 along the left lower  "lobe, image 170 along  the right middle lobe).    No focal airspace consolidation. Mild linear atelectasis/scar in  the lingula. No pleural effusion or pneumothorax. The central  airways are patent. No bronchiectasis.    No thoracic lymphadenopathy.          Impression: There is a tiny, pleural-based 0.3 cm nodule along the left lower  and right middle lobe.             Follow-up recommendation(s):  Continue annual screening with low  dose CT in 12 months        ACR Lung-RADS assessment score:  Category 2: BENIGN APPEARANCE or  behavior       References:      ---------------    NCCN for patients: (patient handbook)   https://nccn.org/patients/guidelines/lung_screening/index.html       NCCN for physicians:   http://www.nccn.org/professionals/physician_gls/pdf/lung_screenin  .pdf    ACR classification and management suggestions:  http://www.acr.org/~/media/ACR/Documents/PDF/QualitySafety/Reso  rces/LungRADS/AssessmentCategories.pdf?              Electronically signed by:  Too Campbell MD  8/6/2022 7:26 AM CDT  Workstation: [id number]    [email or web address]  Immunization History   Administered Date(s) Administered   • COVID-19 (MODERNA) 1st, 2nd, 3rd Dose Only 04/06/2021, 04/08/2021, 04/08/2021, 05/06/2021, 05/06/2021, 12/10/2021   • FluLaval/Fluzone >6mos 09/17/2020, 10/28/2021   • Hepatitis A 06/12/2018   • Hepatitis B Vaccine Adult IM 06/14/2022   • Pneumococcal Polysaccharide (PPSV23) 12/13/2018   • Tdap 09/17/2020       The following portions of the patient's history were reviewed and updated as appropriate: allergies, current medications, past family history, past medical history, past social history, past surgical history and problem list.        Physical Exam  /84 (BP Location: Right arm, Patient Position: Sitting, Cuff Size: Large Adult)   Pulse 80   Temp 97.4 °F (36.3 °C)   Ht 167.6 cm (66\")   Wt (!) 152 kg (335 lb 9.6 oz)   SpO2 95%   BMI 54.17 kg/m²      Physical Exam  Vitals and nursing note " reviewed.   Constitutional:       Appearance: She is well-developed. She is not diaphoretic.   HENT:      Head: Normocephalic and atraumatic.      Right Ear: External ear normal.   Eyes:      Conjunctiva/sclera: Conjunctivae normal.      Pupils: Pupils are equal, round, and reactive to light.   Cardiovascular:      Rate and Rhythm: Normal rate and regular rhythm.      Heart sounds: Normal heart sounds. No murmur heard.  Pulmonary:      Effort: Pulmonary effort is normal. No respiratory distress.      Breath sounds: Normal breath sounds.   Abdominal:      General: Bowel sounds are normal. There is no distension.      Palpations: Abdomen is soft.      Tenderness: There is no abdominal tenderness.      Comments: Obese abdomen    Musculoskeletal:         General: Tenderness present. No deformity. Normal range of motion.      Cervical back: Normal range of motion and neck supple.   Skin:     General: Skin is warm.      Coloration: Skin is not pale.      Findings: No erythema or rash.   Neurological:      Mental Status: She is alert and oriented to person, place, and time.      Cranial Nerves: No cranial nerve deficit.   Psychiatric:         Behavior: Behavior normal.         [unfilled]   Diagnosis Plan   1. Encounter for pre-bariatric surgery counseling and education        2. Uncontrolled type 2 diabetes mellitus with hyperglycemia (HCC)        3. Essential hypertension        4. Seizure disorder (HCC)        5. Morbid obesity (HCC)        6. Schizophrenia, paranoid type (HCC)        7. Stage 2 chronic kidney disease        8. Mixed hyperlipidemia        9. History of drug use        10. JACQUE (obstructive sleep apnea)        11. Weight loss counseling, encounter for                -recommend labowork   -recommend diabetic eye exam   -recommend influenza vaccination -given today.    -recommend hep B vaccination - given today   -recommend COVID-19 vaccination  -elevated liver enzymes -suspect fatty liver will gt US  of liver   -GERD with esophagitis  - on prilosec 20 mg daiy. Consider another EGD if not improving. GI following   -tobacco user - Counseled quit smoking >5 minutes.  recommend 1 800 QUIT NOW and nicotine patches   -ckd stage 2 - stable continue to monitor. Stressed importance of diabetes control and BP control   -JACQUE - refer back to sleep apnea  -hyperlipdemia - on  simvastatin 40 mg PO qhs. ASCVD risk high. Now on red yeast rice. On fish oil supplement  -chronic back pain/DDD lumbar spine/arthritis lumbar spine  - on neurontin 100 mg PO tID on flexeril 5 mg PO TID PRN on Norco 7.5/325 mg every 8 hours MA. PM following. She will be referred to another PM in [place] that will take her insurance   -dyspnea/mild asthma/pulmonary nodule   -Pulmonlogy following on breo now and abluterol PRN.   Albuterol nebulizer and treatment every 6 hours PRN.  repeat CT of chest in 1 year   -DM type 2- Endocrinology following. Currently on metformin 1000 gm PO BID, steglatro 15 mg daily. trulicity 4.5 mg now on insulin pump   -vitamin D  defciency - vitamin D once a week   -schizophrenia/major depression.JD  -  [facility]hab[facility] health following no on lamictal 150 mg PO q daily and vraylar 1.5 mg daily. risperdal stopped  on minipress 2 mg at bedtime. On vistaril 25 mg PO TID PRN   -seizure disorder - Neurology following stable on Keppra 1000 mg Po BID. Neurology following in . She is now on tileptal 600 mg daily.    -Morbid obesity - counseled weight loss >5 minutes BMI at 54.88  Will refer to Dr. Benavides for weight loss surgery along with Dr. García with Cardiology for preoperative clearance and Sleep medicine for sleep apnea. Has echo pending    Will treat this at month 4 of 6. She also had weigh ins in June and August.    -hypertension -  on lisinopril  40 mg dialy.  lopressor 50 mg PO BID  on nifedipine 30 mg daily.    -advised pt to be safe and call with any questions or concerns. All questions addressed today  -advised pt to followup  with specialist and referrals  -advised pt to go to ER or call 911 if symptoms worrisome or severe  -advised pt to be safe during COVID-19 pandemic  I spent 30  minutes caring for Sudhakar on this date of service. This time includes time spent by me in the following activities: preparing for the visit, reviewing tests, obtaining and/or reviewing a separately obtained history, performing a medically appropriate examination and/or evaluation, counseling and educating the patient/family/caregiver, ordering medications, tests, or procedures, referring and communicating with other health care professionals, documenting information in the medical record, independently interpreting results and communicating that information with the patient/family/caregiver and care coordination  -recheck  In 1 month         This document has been electronically signed by Jermaine Ferro MD on October 21, 2022 14:15 CDT

## 2022-10-13 ENCOUNTER — OFFICE VISIT (OUTPATIENT)
Dept: CARDIOLOGY | Facility: CLINIC | Age: 47
End: 2022-10-13

## 2022-10-13 VITALS
BODY MASS INDEX: 47.09 KG/M2 | OXYGEN SATURATION: 97 % | HEIGHT: 66 IN | HEART RATE: 80 BPM | SYSTOLIC BLOOD PRESSURE: 134 MMHG | DIASTOLIC BLOOD PRESSURE: 86 MMHG | WEIGHT: 293 LBS

## 2022-10-13 DIAGNOSIS — E78.2 MIXED HYPERLIPIDEMIA: ICD-10-CM

## 2022-10-13 DIAGNOSIS — Z01.818 PRE-OPERATIVE CLEARANCE: Primary | ICD-10-CM

## 2022-10-13 DIAGNOSIS — Z79.4 TYPE 2 DIABETES MELLITUS WITH HYPERGLYCEMIA, WITH LONG-TERM CURRENT USE OF INSULIN: ICD-10-CM

## 2022-10-13 DIAGNOSIS — G47.33 OSA (OBSTRUCTIVE SLEEP APNEA): ICD-10-CM

## 2022-10-13 DIAGNOSIS — Z01.810 PRE-OPERATIVE CARDIOVASCULAR EXAMINATION: ICD-10-CM

## 2022-10-13 DIAGNOSIS — I10 ESSENTIAL HYPERTENSION: ICD-10-CM

## 2022-10-13 DIAGNOSIS — E11.65 TYPE 2 DIABETES MELLITUS WITH HYPERGLYCEMIA, WITH LONG-TERM CURRENT USE OF INSULIN: ICD-10-CM

## 2022-10-13 DIAGNOSIS — E66.01 CLASS 3 SEVERE OBESITY DUE TO EXCESS CALORIES WITH SERIOUS COMORBIDITY AND BODY MASS INDEX (BMI) OF 45.0 TO 49.9 IN ADULT: ICD-10-CM

## 2022-10-13 DIAGNOSIS — R06.09 DYSPNEA ON EXERTION: ICD-10-CM

## 2022-10-13 LAB
QT INTERVAL: 354 MS
QTC INTERVAL: 408 MS

## 2022-10-13 PROCEDURE — 99244 OFF/OP CNSLTJ NEW/EST MOD 40: CPT | Performed by: INTERNAL MEDICINE

## 2022-10-13 PROCEDURE — 93000 ELECTROCARDIOGRAM COMPLETE: CPT | Performed by: INTERNAL MEDICINE

## 2022-10-13 NOTE — PROGRESS NOTES
Sudhakarximena Saul  46 y.o. female    10/13/2022  1. Pre-operative clearance    2. Pre-operative cardiovascular examination    3. Class 3 severe obesity due to excess calories with serious comorbidity and body mass index (BMI) of 45.0 to 49.9 in adult ([facility])    4. Essential hypertension    5. Type 2 diabetes mellitus with hyperglycemia, with long-term current use of insulin ([facility])    6. JACQUE (obstructive sleep apnea)    7. Mixed hyperlipidemia        History of Present Illness  Sudhakar Saul is a 46-year-old female who was referred by  for a preoperative cardiac evaluation.  She has been considered for gastric sleeve surgery for weight loss.  She has multiple medical issues and her history is remarkable for morbid obesity with a BMI of 55, diabetes mellitus type 2, hypertension, hyperlipidemia, history of GERD/gastritis/esophagitis/diverticulosis/colon polyps, schizophrenia, seizure disorder.  She has had history of depression, polysubstance abuse including tobacco use abuse in the past, DJD, hepatitis C,    She indicates that she does not use any drugs for more than 3 years.  Patient denies any chest pain but has occasional NYHA class II dyspnea on exertion with no PND or orthopnea.  She has no previous documented coronary artery disease, valvular heart disease.  She has been compliant with her medications and her blood pressure has been in the normal range.    EKG showed sinus rhythm with heart rate of 80 bpm.  Low voltage complexes.  Nonspecific T wave changes.    SUBJECTIVE    Allergies   Allergen Reactions   • Betadine [Povidone Iodine] Anaphylaxis   • Contrast Dye Anaphylaxis   • Iodine Anaphylaxis   • Lipitor  [Atorvastatin Calcium] Shortness Of Breath   • Shellfish-Derived Products Anaphylaxis   • Adhesive Tape Unknown (See Comments)     Blister           Past Medical History:   Diagnosis Date   • Allergic    • Anemia    • Angina pectoris (HCC)    • Anxiety    • Arthritis    • Asthma    • Back pain     • CAD (coronary artery disease)    • Cancer (HCC) 1997    endometrial- surgery, no chemo/radiation   • Chronic kidney disease    • Degeneration macular    • Depression    • Diabetes mellitus (HCC)    • GERD (gastroesophageal reflux disease)    • Head injury    • Hepatitis c    • Hyperlipidemia    • Hypertension    • Injury of back    • Injury of neck    • Migraine    • Multiple personality disorder (HCC)    • Neuropathy    • Paranoid schizophrenia (HCC)    • Schizoaffective disorder (HCC)    • Seizures (HCC)     07/01/2020: last seizure was the first of the year   • Self-injurious behavior     teen years   • Shortness of breath    • Sleep apnea    • Stage 2 chronic kidney disease    • Substance abuse (HCC)    • Suicide attempt (HCC)    • Urinary tract infection          Past Surgical History:   Procedure Laterality Date   • APPENDECTOMY     • BACK SURGERY     • BREAST SURGERY     • CARPAL TUNNEL RELEASE      right hand   • CARPAL TUNNEL RELEASE Left 10/19/2021    Procedure: LEFT CARPAL TUNNEL RELEASE;  Surgeon: Kevin Carlson MD;  Location: [facility] OR;  Service: Orthopedics;  Laterality: Left;   • CHOLECYSTECTOMY     • COLONOSCOPY     • COLONOSCOPY N/A 2/8/2019    Procedure: COLONOSCOPY;  Surgeon: Joni Delacruz MD;  Location: [facility] ENDOSCOPY;  Service: Gastroenterology   • DENTAL PROCEDURE     • ENDOSCOPY N/A 2/8/2019    Procedure: ESOPHAGOGASTRODUODENOSCOPY--poss dilation;  Surgeon: Joni Delacruz MD;  Location: [facility] ENDOSCOPY;  Service: Gastroenterology   • HYSTERECTOMY     • LIPOMA EXCISION      9   • LIVER BIOPSY     • UPPER GASTROINTESTINAL ENDOSCOPY  02/08/2019         Family History   Problem Relation Age of Onset   • Hypertension Other    • Diabetes Other    • Stroke Other    • Cancer Other    • Kidney disease Other    • Lung disease Other    • Other Other    • Malone's esophagus Other    • Colon polyps Other    • Heart disease Other    • Ulcers Other    • Cholelithiasis Other    • Allergy  (severe) Other    • Schizophrenia Father    • Alcohol abuse Father    • Drug abuse Paternal Grandfather          Social History     Socioeconomic History   • Marital status:    Tobacco Use   • Smoking status: Former     Packs/day: 1.00     Years: 35.00     Pack years: 35.00     Types: Cigarettes     Start date:      Quit date: 2022     Years since quittin.2   • Smokeless tobacco: Never   • Tobacco comments:     on patch trying to quit    Vaping Use   • Vaping Use: Never used   Substance and Sexual Activity   • Alcohol use: Not Currently   • Drug use: Not Currently     Types: Amphetamines, Heroin, Methamphetamines, Morphine, Oxycodone     Comment: QUIT 3 YEARS AGO   • Sexual activity: Not Currently     Birth control/protection: Surgical         Current Outpatient Medications   Medication Sig Dispense Refill   • albuterol (ACCUNEB) 1.25 MG/3ML nebulizer solution Take 3 mL by nebulization Every 6 (Six) Hours As Needed for Wheezing or Shortness of Air. 9 mL 3   • albuterol sulfate  (90 Base) MCG/ACT inhaler Inhale 2 puffs Every 6 (Six) Hours As Needed for Wheezing or Shortness of Air. 6.7 g 11   • aspirin 81 MG EC tablet Take 81 mg by mouth Daily.     • Cariprazine HCl 4.5 MG capsule Take 4.5 mg by mouth Daily. 90 capsule 0   • Continuous Blood Gluc  (Dexcom G6 ) device USE FOR 6 MONTHS     • Continuous Blood Gluc Sensor (Dexcom G6 Sensor) USE AS DIRECTED EVERY  10  DAYS 9 each 0   • Continuous Blood Gluc Transmit (Dexcom G6 Transmitter) misc USE 1 TRANSMITTER FOR 3 MONTHS 1 each 0   • cyclobenzaprine (FLEXERIL) 10 MG tablet Take 10 mg by mouth 3 (Three) Times a Day As Needed.     • Dulaglutide (Trulicity) 4.5 MG/0.5ML solution pen-injector Inject 4.5 mg under the skin into the appropriate area as directed 1 (One) Time Per Week. 4 pen 11   • DULoxetine (CYMBALTA) 30 MG capsule Take 3 capsules by mouth Daily. 270 capsule 0   • Ertugliflozin L-PyroglutamicAc (Steglatro) 15 MG  tablet Take 1 tablet by mouth Every Morning. 90 tablet 1   • Fluticasone Furoate-Vilanterol (Breo Ellipta) 200-25 MCG/INH inhaler Inhale 1 puff Daily. 28 each 11   • folic acid (FOLVITE) 1 MG tablet Take 1,000 mcg by mouth Daily.     • gabapentin (NEURONTIN) 300 MG capsule Take 1 capsule by mouth 3 (Three) Times a Day. (Patient taking differently: Take 100 mg by mouth 3 (Three) Times a Day.) 90 capsule 2   • glucose blood test strip Testing 4 times daily, E11.9 120 each 12   • HYDROcodone-acetaminophen (NORCO) 7.5-325 MG per tablet Take 1 tablet by mouth 3 (Three) Times a Day.     • hydrOXYzine (ATARAX) 25 MG tablet Take 0.5-1 tablets by mouth 3 (Three) Times a Day As Needed for Anxiety. 270 tablet 0   • Insulin Disposable Pump (OmniPod Dash 5 Pack Pods) misc      • insulin lispro (Admelog) 100 UNIT/ML injection 180  units daily through pump 90 mL 11   • lamoTRIgine (LaMICtal) 150 MG tablet Take 1 tablet by mouth Daily. 90 tablet 0   • levETIRAcetam (KEPPRA) 1000 MG tablet Take 1 tablet by mouth 2 (Two) Times a Day. 60 tablet 3   • lisinopril (PRINIVIL,ZESTRIL) 40 MG tablet Take 1 tablet by mouth Daily. 30 tablet 3   • metFORMIN (GLUCOPHAGE) 500 MG tablet TAKE 1 TABLET BY MOUTH TWICE DAILY WITH MEALS 60 tablet 0   • metoprolol tartrate (LOPRESSOR) 50 MG tablet Take 1 tablet by mouth 2 (Two) Times a Day. 60 tablet 3   • Nebulizer device 1 application Every 6 (Six) Hours As Needed (dyspnea). 1 each 3   • nicotine (NICODERM CQ) 21 MG/24HR patch Place 1 patch on the skin as directed by provider Daily. 28 patch 3   • NIFEdipine XL (PROCARDIA XL) 30 MG 24 hr tablet Take 1 tablet by mouth Daily. 30 tablet 3   • omeprazole (priLOSEC) 40 MG capsule Take 1 capsule by mouth Daily. 30 capsule 3   • OXcarbazepine (TRILEPTAL) 600 MG tablet Take 600 mg by mouth 2 (Two) Times a Day. PT TAKES  MG TAB IN MORNING AND  MG IN EVENING     • prazosin (MINIPRESS) 2 MG capsule Take 1-2 capsules by mouth Every Night. 180  "capsule 0   • simvastatin (ZOCOR) 40 MG tablet Take 1 tablet by mouth Every Night. 30 tablet 3   • vitamin D (ERGOCALCIFEROL) 1.25 MG (39626 UT) capsule capsule Take 2 tablets by mouth weekly 8 capsule 3     No current facility-administered medications for this visit.         OBJECTIVE    /86 (BP Location: Left arm, Patient Position: Sitting, Cuff Size: Adult)   Pulse 80   Ht 167.6 cm (66\")   Wt (!) 154 kg (340 lb)   SpO2 97%   BMI 54.88 kg/m²       Review of Systems     Constitutional:  Denies recent weight loss, weight gain, fever or chills, no change in exercise tolerance     HENT:  Denies any hearing loss, epistaxis, hoarseness, or difficulty speaking.     Eyes: Wears eyeglasses or contact lenses     Respiratory:  Dyspnea with exertion, no cough, wheezing, or hemoptysis.     Cardiovascular: Negative for palpitations, chest pain, orthopnea, PND    Gastrointestinal:  Denies change in bowel habits, dyspepsia, ulcer disease, hematochezia, or melena.     Endocrine: Negative for cold intolerance, heat intolerance, polydipsia, polyphagia and polyuria.     Genitourinary: Negative.      Musculoskeletal: DJD    Skin:  Denies any change in hair or nails, rashes, or skin lesions.     Allergic/Immunologic: Negative.  Negative for environmental allergies, food allergies and/or immunocompromised state.     Neurological:  Denies any history of recurrent headaches, strokes, TIA, or seizure disorder.     Hematological: Denies any food allergies, seasonal allergies, bleeding disorders, or lymphadenopathy.     Psychiatric/Behavioral: Denies any history of depression, substance abuse, or change in cognitive function.       Physical Exam     Constitutional: Cooperative, alert and oriented, in no acute distress.     HENT:   Head: Normocephalic, normal hair patterns, no masses or tenderness.  Ears, Nose, and Throat: No gross abnormalities. No pallor or cyanosis. Dentition good.   Eyes: EOMS intact, PERRL, conjunctivae and " lids unremarkable. Fundoscopic exam and visual fields not performed.   Neck: No palpable masses or adenopathy, no thyromegaly, no JVD, carotid pulses are full and equal bilaterally and without bruit.     Cardiovascular: Regular rhythm, S1 and S2 normal, no S3 or S4.  No murmurs, gallops, or rubs detected.     Pulmonary/Chest: Chest: normal symmetry, normal respiratory excursion, no intercostal retraction, no use of accessory muscles.     Pulmonary: Normal breath sounds. No rales or rhonchi.    Abdominal: Abdomen soft, bowel sounds normoactive, no masses, no hepatosplenomegaly, nontender, no bruit.     Musculoskeletal: No deformities, clubbing, cyanosis, erythema, or edema observed.     Neurological: No gross motor or sensory deficits noted, affect appropriate, oriented to time, person, place.     Skin: Warm and dry to the touch, no apparent skin lesion or mass noted.     Psychiatric: She has a normal mood and affect. Her behavior is normal. Judgment and thought content normal.         Procedures      Lab Results   Component Value Date    WBC 8.79 07/08/2022    HGB 13.6 07/08/2022    HCT 41.3 07/08/2022    MCV 84.8 07/08/2022     07/08/2022     Lab Results   Component Value Date    GLUCOSE 106 (H) 07/08/2022    BUN 16 07/08/2022    CREATININE 0.82 07/08/2022    EGFRIFNONA 83 10/12/2021    BCR 19.5 07/08/2022    CO2 21.0 (L) 07/08/2022    CALCIUM 9.4 07/08/2022    ALBUMIN 3.80 07/08/2022    AST 42 (H) 07/08/2022    ALT 37 (H) 07/08/2022     Lab Results   Component Value Date    CHOL 119 07/08/2022    CHOL 169 04/22/2022    CHOL 123 07/20/2021     Lab Results   Component Value Date    TRIG 152 (H) 07/08/2022    TRIG 144 04/22/2022    TRIG 114 07/20/2021     Lab Results   Component Value Date    HDL 43 07/08/2022    HDL 44 04/22/2022    HDL 43 07/20/2021     No components found for: LDLCALC  Lab Results   Component Value Date    LDL 50 07/08/2022     04/22/2022    LDL 59 07/20/2021     No results found  for: HDLLDLRATIO  No components found for: CHOLHDL  Lab Results   Component Value Date    HGBA1C 8.70 (H) 07/08/2022     Lab Results   Component Value Date    TSH 2.290 07/08/2022           ASSESSMENT AND PLAN  Sudhakar Saul is a 46-year-old female with multiple medical issues as discussed in detail in the history of present illness who is being considered for gastric sleeve procedure.  She has no clinical evidence of angina, arrhythmia or congestive heart failure.  Her dyspnea is most likely multifactorial and obesity/sleep apnea is contributing to it.  She does not use CPAP at the present time but will be undergoing a sleep study in the near future.    The plan would be to proceed with an echocardiogram to assess left ventricular and valvular function and if this is within normal limits, she will be low to moderate risk for perioperative/procedure cardiac events.  Suspicion for any ongoing ischemia is low.    I have continued her current medicines including antiplatelet therapy with aspirin, antihypertensive therapy with lisinopril, metoprolol tartrate, nifedipine XL and lipid-lowering therapy with simvastatin.  Further recommendations will follow.  Thank you for asked me to see this patient.    Diagnoses and all orders for this visit:    1. Pre-operative clearance (Primary)  -     ECG 12 Lead    2. Pre-operative cardiovascular examination    3. Class 3 severe obesity due to excess calories with serious comorbidity and body mass index (BMI) of 45.0 to 49.9 in adult ([facility])    4. Essential hypertension    5. Type 2 diabetes mellitus with hyperglycemia, with long-term current use of insulin ([facility])    6. JACQUE (obstructive sleep apnea)    7. Mixed hyperlipidemia        Class 3 Severe Obesity (BMI >=40). Obesity-related health conditions include the following: obstructive sleep apnea, hypertension, diabetes mellitus, dyslipidemias and osteoarthritis. Obesity is unchanged. BMI is is above average; BMI management plan is  completed. We discussed portion control and increasing exercise.      Sudhakar Saul  reports that she quit smoking about 3 months ago. Her smoking use included cigarettes. She started smoking about 32 years ago. She has a 35.00 pack-year smoking history. She has never used smokeless tobacco.    Echocardiogram showed;  •  Left ventricular systolic function is normal. Calculated left ventricular EF = 54% Left ventricular ejection fraction appears to be 51 - 55%.  •  Left ventricular diastolic function is consistent with (grade I) impaired relaxation.  •  Definity image enhancer in order to optimize the study.  •  RVSP less than 35 mmHg    Patient will be low to moderate risk for perioperative cardiac events.        Gracie Crowe MD  10/13/2022  09:42 CDT

## 2022-10-19 RX ORDER — ALBUTEROL SULFATE 90 UG/1
2 AEROSOL, METERED RESPIRATORY (INHALATION) EVERY 4 HOURS PRN
Qty: 6.7 G | Refills: 11 | Status: SHIPPED | OUTPATIENT
Start: 2022-10-19

## 2022-10-21 ENCOUNTER — OFFICE VISIT (OUTPATIENT)
Dept: FAMILY MEDICINE CLINIC | Facility: CLINIC | Age: 47
End: 2022-10-21

## 2022-10-21 VITALS
OXYGEN SATURATION: 95 % | TEMPERATURE: 97.4 F | DIASTOLIC BLOOD PRESSURE: 84 MMHG | BODY MASS INDEX: 47.09 KG/M2 | HEART RATE: 80 BPM | SYSTOLIC BLOOD PRESSURE: 146 MMHG | WEIGHT: 293 LBS | HEIGHT: 66 IN

## 2022-10-21 DIAGNOSIS — E78.2 MIXED HYPERLIPIDEMIA: ICD-10-CM

## 2022-10-21 DIAGNOSIS — G47.33 OSA (OBSTRUCTIVE SLEEP APNEA): ICD-10-CM

## 2022-10-21 DIAGNOSIS — Z71.89 ENCOUNTER FOR PRE-BARIATRIC SURGERY COUNSELING AND EDUCATION: Primary | ICD-10-CM

## 2022-10-21 DIAGNOSIS — F19.91 HISTORY OF DRUG USE: ICD-10-CM

## 2022-10-21 DIAGNOSIS — N18.2 STAGE 2 CHRONIC KIDNEY DISEASE: ICD-10-CM

## 2022-10-21 DIAGNOSIS — I10 ESSENTIAL HYPERTENSION: ICD-10-CM

## 2022-10-21 DIAGNOSIS — E11.65 UNCONTROLLED TYPE 2 DIABETES MELLITUS WITH HYPERGLYCEMIA: ICD-10-CM

## 2022-10-21 DIAGNOSIS — F20.0 SCHIZOPHRENIA, PARANOID TYPE: ICD-10-CM

## 2022-10-21 DIAGNOSIS — G40.909 SEIZURE DISORDER: ICD-10-CM

## 2022-10-21 DIAGNOSIS — Z71.3 WEIGHT LOSS COUNSELING, ENCOUNTER FOR: ICD-10-CM

## 2022-10-21 DIAGNOSIS — E66.01 MORBID OBESITY: ICD-10-CM

## 2022-10-21 PROCEDURE — 90472 IMMUNIZATION ADMIN EACH ADD: CPT | Performed by: FAMILY MEDICINE

## 2022-10-21 PROCEDURE — 90471 IMMUNIZATION ADMIN: CPT | Performed by: FAMILY MEDICINE

## 2022-10-21 PROCEDURE — 90746 HEPB VACCINE 3 DOSE ADULT IM: CPT | Performed by: FAMILY MEDICINE

## 2022-10-21 PROCEDURE — 90686 IIV4 VACC NO PRSV 0.5 ML IM: CPT | Performed by: FAMILY MEDICINE

## 2022-10-21 PROCEDURE — 99214 OFFICE O/P EST MOD 30 MIN: CPT | Performed by: FAMILY MEDICINE

## 2022-10-21 RX ORDER — ERGOCALCIFEROL 1.25 MG/1
CAPSULE ORAL
Qty: 8 CAPSULE | Refills: 3 | Status: SHIPPED | OUTPATIENT
Start: 2022-10-21 | End: 2023-03-07 | Stop reason: SDUPTHER

## 2022-10-21 RX ORDER — SIMVASTATIN 40 MG
40 TABLET ORAL NIGHTLY
Qty: 30 TABLET | Refills: 3 | Status: SHIPPED | OUTPATIENT
Start: 2022-10-21 | End: 2023-03-07 | Stop reason: SDUPTHER

## 2022-10-21 RX ORDER — LISINOPRIL 40 MG/1
40 TABLET ORAL DAILY
Qty: 30 TABLET | Refills: 3 | Status: SHIPPED | OUTPATIENT
Start: 2022-10-21 | End: 2023-02-28

## 2022-10-21 RX ORDER — METOPROLOL TARTRATE 50 MG/1
50 TABLET, FILM COATED ORAL 2 TIMES DAILY
Qty: 60 TABLET | Refills: 3 | Status: SHIPPED | OUTPATIENT
Start: 2022-10-21 | End: 2023-03-07 | Stop reason: SDUPTHER

## 2022-10-21 RX ORDER — OMEPRAZOLE 40 MG/1
40 CAPSULE, DELAYED RELEASE ORAL DAILY
Qty: 30 CAPSULE | Refills: 3 | Status: SHIPPED | OUTPATIENT
Start: 2022-10-21 | End: 2023-03-07 | Stop reason: SDUPTHER

## 2022-10-21 RX ORDER — NIFEDIPINE 30 MG/1
30 TABLET, EXTENDED RELEASE ORAL DAILY
Qty: 30 TABLET | Refills: 3 | Status: SHIPPED | OUTPATIENT
Start: 2022-10-21 | End: 2023-03-07 | Stop reason: SDUPTHER

## 2022-10-21 NOTE — PROGRESS NOTES
Injection  Injection performed in Right and Left Deltoid by Asha Louie MA. Patient tolerated the procedure well without complications.  10/21/22   Asha Louie MA

## 2022-10-21 NOTE — PATIENT INSTRUCTIONS
Recheck in 1 month    Monitor Blood pressure at home and bring readings next visit     Goal <130/90

## 2022-11-01 ENCOUNTER — TELEMEDICINE (OUTPATIENT)
Dept: PSYCHIATRY | Facility: CLINIC | Age: 47
End: 2022-11-01

## 2022-11-01 DIAGNOSIS — F25.1 SCHIZOAFFECTIVE DISORDER, DEPRESSIVE TYPE: Primary | ICD-10-CM

## 2022-11-01 DIAGNOSIS — F51.5 NIGHTMARES: ICD-10-CM

## 2022-11-01 DIAGNOSIS — F41.1 GENERALIZED ANXIETY DISORDER: ICD-10-CM

## 2022-11-01 PROCEDURE — 99214 OFFICE O/P EST MOD 30 MIN: CPT | Performed by: NURSE PRACTITIONER

## 2022-11-01 RX ORDER — PRAZOSIN HYDROCHLORIDE 2 MG/1
2-4 CAPSULE ORAL NIGHTLY
Qty: 180 CAPSULE | Refills: 0 | Status: SHIPPED | OUTPATIENT
Start: 2022-11-01 | End: 2022-12-28 | Stop reason: SDUPTHER

## 2022-11-01 RX ORDER — LAMOTRIGINE 150 MG/1
150 TABLET ORAL DAILY
Qty: 90 TABLET | Refills: 0 | Status: SHIPPED | OUTPATIENT
Start: 2022-11-01 | End: 2022-12-28 | Stop reason: SDUPTHER

## 2022-11-01 RX ORDER — DULOXETIN HYDROCHLORIDE 30 MG/1
90 CAPSULE, DELAYED RELEASE ORAL DAILY
Qty: 270 CAPSULE | Refills: 0 | Status: SHIPPED | OUTPATIENT
Start: 2022-11-01 | End: 2022-12-28 | Stop reason: SDUPTHER

## 2022-11-01 NOTE — PROGRESS NOTES
This provider is located at [facility], [address] [place] [address].The Patient is seen remotely at home, [address] [facility] [place] [address] via [place].  Patient is being seen via telehealth and confirm that they are in a secure environment for this session. The patient's condition being diagnosed/treated is appropriate for telemedicine. The provider identified himself/herself: herself as well as her credentials.   The patient gave consent to be seen remotely, and when consent is given they understand that the consent allows for patient identifiable information to be sent to a third party as needed.   They may refuse to be seen remotely at any time. The electronic data is encrypted and password protected, and the patient has been advised of the potential risks to privacy not withstanding such measures.    You have chosen to receive care through a telehealth visit.  Do you consent to use a video/audio connection for your medical care today? Yes      Chief Complaint  Follow-for schizoaffective depressed type    Subjective    Sudhakar More Saul presents to [facility] for medication management.       History of Present Illness   Patient presents today reporting she is doing okay.  She notes a few weeks ago she had some depressive and anxiety symptoms as her uncle fell and had to go to rehab so she stayed with her aunt for a short period of time.  Patient states that she had to get a new ID so she missed pain management so she has been without her medications for 1 week which has affected her sleep.  She notes that she is sleeping 4 to 5 hours at night.  Patient reports that her appetite is still down and she is trying not to overeat.  Patient states other than feeling down a few weeks ago her mood has been better.  Rates her anxiety a 5 on a scale of 0-10 with 10 being the worst.  Denies any paranoia or delusions or side effects to medications.   Denies any hypomanic or manic episodes.  Patient denies any visual hallucinations.  Admits to 1 auditory hallucination in which it was a child telling her not to do something which could be related to her past traumas as she is continue to work in therapy.  Denies any SI or HI.  Patient states that she follows up with cardiology in December for an echo.     Objective   Vital Signs:   There were no vitals taken for this visit.  Due to the remote nature of this encounter (virtual encounter), vitals were unable to be obtained.  Height stated at 66 inches.  Weight stated at 335 pounds.        PHQ-9 Score:   PHQ-9 Total Score:     Patient screened positive for depression based on a PHQ-9 score of  on . Follow-up recommendations include: Patient has schizophrenia and is currently being managed with medication..    Mental Status Exam:   Hygiene:   good  Cooperation:  Cooperative  Eye Contact:  Good  Psychomotor Behavior:  Appropriate  Affect:  Appropriate  Mood: normal and anxious  Speech:  Normal  Thought Process:  Goal directed and Linear  Thought Content:  Normal  Suicidal:  None  Homicidal:  None  Hallucinations:  Auditory and Not demonstrated today  Delusion:  None  Memory:  Intact  Orientation:  Person, Place, Time and Situation  Reliability:  good  Insight:  Good and Fair  Judgement:  Good and Fair  Impulse Control:  Good and Fair  Physical/Medical Issues:  Yes Dm. HTN, CKD stage 2, hep C+, JACQUE, hx substance abuse     Current Medications:   Current Outpatient Medications   Medication Sig Dispense Refill   • Cariprazine HCl 4.5 MG capsule Take 4.5 mg by mouth Daily. 90 capsule 0   • DULoxetine (CYMBALTA) 30 MG capsule Take 3 capsules by mouth Daily. 270 capsule 0   • lamoTRIgine (LaMICtal) 150 MG tablet Take 1 tablet by mouth Daily. 90 tablet 0   • prazosin (MINIPRESS) 2 MG capsule Take 1-2 capsules by mouth Every Night. 180 capsule 0   • albuterol (ACCUNEB) 1.25 MG/3ML nebulizer solution Take 3 mL by nebulization  Every 6 (Six) Hours As Needed for Wheezing or Shortness of Air. 9 mL 3   • aspirin 81 MG EC tablet Take 81 mg by mouth Daily.     • Continuous Blood Gluc  (Dexcom G6 ) device USE FOR 6 MONTHS     • Continuous Blood Gluc Sensor (Dexcom G6 Sensor) USE AS DIRECTED EVERY  10  DAYS 9 each 0   • Continuous Blood Gluc Transmit (Dexcom G6 Transmitter) misc USE 1 TRANSMITTER FOR 3 MONTHS 1 each 0   • cyclobenzaprine (FLEXERIL) 10 MG tablet Take 10 mg by mouth 3 (Three) Times a Day As Needed.     • Dulaglutide (Trulicity) 4.5 MG/0.5ML solution pen-injector Inject 4.5 mg under the skin into the appropriate area as directed 1 (One) Time Per Week. 4 pen 11   • Ertugliflozin L-PyroglutamicAc (Steglatro) 15 MG tablet Take 1 tablet by mouth Every Morning. 90 tablet 1   • Fluticasone Furoate-Vilanterol (Breo Ellipta) 200-25 MCG/INH inhaler Inhale 1 puff Daily. 28 each 11   • folic acid (FOLVITE) 1 MG tablet Take 1,000 mcg by mouth Daily.     • gabapentin (NEURONTIN) 300 MG capsule Take 1 capsule by mouth 3 (Three) Times a Day. (Patient taking differently: Take 100 mg by mouth 3 (Three) Times a Day.) 90 capsule 2   • glucose blood test strip Testing 4 times daily, E11.9 120 each 12   • HYDROcodone-acetaminophen (NORCO) 7.5-325 MG per tablet Take 1 tablet by mouth 3 (Three) Times a Day.     • hydrOXYzine (ATARAX) 25 MG tablet Take 0.5-1 tablets by mouth 3 (Three) Times a Day As Needed for Anxiety. 270 tablet 0   • Insulin Disposable Pump (OmniPod Dash 5 Pack Pods) misc      • insulin lispro (Admelog) 100 UNIT/ML injection 180  units daily through pump 90 mL 11   • levETIRAcetam (KEPPRA) 1000 MG tablet Take 1 tablet by mouth 2 (Two) Times a Day. 60 tablet 3   • lisinopril (PRINIVIL,ZESTRIL) 40 MG tablet Take 1 tablet by mouth Daily. 30 tablet 3   • metFORMIN (GLUCOPHAGE) 500 MG tablet TAKE 1 TABLET BY MOUTH TWICE DAILY WITH MEALS 60 tablet 0   • metoprolol tartrate (LOPRESSOR) 50 MG tablet Take 1 tablet by mouth 2 (Two)  Times a Day. 60 tablet 3   • Nebulizer device 1 application Every 6 (Six) Hours As Needed (dyspnea). 1 each 3   • nicotine (NICODERM CQ) 21 MG/24HR patch Place 1 patch on the skin as directed by provider Daily. 28 patch 3   • NIFEdipine XL (PROCARDIA XL) 30 MG 24 hr tablet Take 1 tablet by mouth Daily. 30 tablet 3   • omeprazole (priLOSEC) 40 MG capsule Take 1 capsule by mouth Daily. 30 capsule 3   • OXcarbazepine (TRILEPTAL) 600 MG tablet Take 600 mg by mouth 2 (Two) Times a Day. PT TAKES  MG TAB IN MORNING AND  MG IN EVENING     • simvastatin (ZOCOR) 40 MG tablet Take 1 tablet by mouth Every Night. 30 tablet 3   • Ventolin  (90 Base) MCG/ACT inhaler Inhale 2 puffs Every 4 (Four) Hours As Needed for Wheezing. 6.7 g 11   • vitamin D (ERGOCALCIFEROL) 1.25 MG (94600 UT) capsule capsule Take 2 tablets by mouth weekly 8 capsule 3     No current facility-administered medications for this visit.       Physical Exam  Nursing note reviewed.   Constitutional:       Appearance: Normal appearance.   Neurological:      Mental Status: She is alert.   Psychiatric:         Attention and Perception: Attention normal. She does not perceive auditory or visual hallucinations.         Mood and Affect: Affect normal. Mood is anxious. Mood is not depressed.         Speech: Speech normal.         Behavior: Behavior is cooperative.         Thought Content: Thought content normal.         Cognition and Memory: Cognition and memory normal.        Result Review :     The following data was reviewed by: ROD Spencer on 02/03/2021:  Common labs    Common Labs 4/22/22 4/22/22 4/22/22 4/22/22 4/22/22 7/8/22 7/8/22 7/8/22 7/8/22 7/8/22    0808 0808 0808 0808 0808 0822 0822 0822 0822 0822   Glucose  211 (A)     106 (A)      BUN  12     16      Creatinine  0.77     0.82      Sodium  136     139      Potassium  4.6     4.7      Chloride  102     106      Calcium  9.4     9.4      Albumin  3.80     3.80      Total  Bilirubin  0.2     0.2      Alkaline Phosphatase  96     93      AST (SGOT)  34 (A)     42 (A)      ALT (SGPT)  39 (A)     37 (A)      WBC 8.45     8.79       Hemoglobin 13.3     13.6       Hematocrit 41.0     41.3       Platelets 169     176       Total Cholesterol   169     119     Triglycerides   144     152 (A)     HDL Cholesterol   44     43     LDL Cholesterol    100     50     Hemoglobin A1C    9.50 (A)     8.70 (A)    Microalbumin, Urine     2.5     <1.2   (A) Abnormal value            CMP    CMP 4/22/22 7/8/22   Glucose 211 (A) 106 (A)   BUN 12 16   Creatinine 0.77 0.82   Sodium 136 139   Potassium 4.6 4.7   Chloride 102 106   Calcium 9.4 9.4   Albumin 3.80 3.80   Total Bilirubin 0.2 0.2   Alkaline Phosphatase 96 93   AST (SGOT) 34 (A) 42 (A)   ALT (SGPT) 39 (A) 37 (A)   (A) Abnormal value            CBC    CBC 4/22/22 7/8/22   WBC 8.45 8.79   RBC 4.90 4.87   Hemoglobin 13.3 13.6   Hematocrit 41.0 41.3   MCV 83.7 84.8   MCH 27.1 27.9   MCHC 32.4 32.9   RDW 15.0 14.6   Platelets 169 176           CBC w/diff    CBC w/Diff 6/10/20 9/9/20 1/26/21   WBC 8.89 9.63 9.04   RBC 4.85 5.07 5.10   Hemoglobin 14.1 14.9 14.4   Hematocrit 42.8 44.3 45.0   MCV 88.2 87.4 88.2   MCH 29.1 29.4 28.2   MCHC 32.9 33.6 32.0   RDW 13.6 13.3 13.0   Platelets 157 180 174   Neutrophil Rel % 63.8 67.2    Immature Granulocyte Rel % 0.6 (A) 0.5    Lymphocyte Rel % 26.4 21.6    Monocyte Rel % 4.5 (A) 4.8 (A)    Eosinophil Rel % 3.9 5.3    Basophil Rel % 0.8 0.6    (A) Abnormal value            Lipid Panel    Lipid Panel 4/22/22 7/8/22   Total Cholesterol 169 119   Triglycerides 144 152 (A)   HDL Cholesterol 44 43   VLDL Cholesterol 25 26   LDL Cholesterol  100 50   LDL/HDL Ratio 2.19 1.06   (A) Abnormal value            TSH    TSH 4/22/22 7/8/22   TSH 1.740 2.290           BMP    BMP 4/22/22 7/8/22   BUN 12 16   Creatinine 0.77 0.82   Sodium 136 139   Potassium 4.6 4.7   Chloride 102 106   CO2 24.0 21.0 (A)   Calcium 9.4 9.4   (A)  Abnormal value            HgB    HGB 4/22/22 7/8/22   Hemoglobin 13.3 13.6             Data reviewed: PCP notes        Assessment and Plan    Problem List Items Addressed This Visit        Mental Health    Schizoaffective disorder, depressive type (HCC) - Primary    Relevant Medications    lamoTRIgine (LaMICtal) 150 MG tablet    DULoxetine (CYMBALTA) 30 MG capsule    Cariprazine HCl 4.5 MG capsule   Other Visit Diagnoses     Nightmares        Relevant Medications    prazosin (MINIPRESS) 2 MG capsule    DULoxetine (CYMBALTA) 30 MG capsule    Cariprazine HCl 4.5 MG capsule    Generalized anxiety disorder        Relevant Medications    DULoxetine (CYMBALTA) 30 MG capsule    Cariprazine HCl 4.5 MG capsule            TREATMENT PLAN/GOALS: Continue supportive psychotherapy efforts and medications as indicated. Treatment and medication options discussed during today's visit. Patient ackowledged and verbally consented to continue with current treatment plan and was educated on the importance of compliance with treatment and follow-up appointments.    MEDICATION ISSUES:  We discussed risks, benefits, and side effects of the above medications and the patient was agreeable with the plan. Patient was educated on the importance of compliance with treatment and follow-up appointments.  Patient is agreeable to call the office with any worsening of symptoms or onset of side effects. Patient is agreeable to call 911 or go to the nearest ER should he/she begin having SI/HI. Lengthy discussion with patient on the possible side effects of antipsychotic medications including increased cholesterol, increased blood sugar, and possibility of weight gain.  Also discussed the need to monitor lab work associated with this.  The risk of muscle movement disorders with this class of medication was also discussed. We will continue Lamictal in an effort to stabilize mood.  The patient was reminded to immediately come to the hospital should there  be any loss of control.  Explanation was given to her regarding Lamictal and the potential for Foster Shon syndrome and significant rash.  Patient was encouraged to check skin prior to beginning.  Patient was encouraged to report any rash and to immediately stop medication.      -Continue lamotrigine 150 mg daily for schizoaffective disorder.  -Continue minipress 2-4mg at night for nightmares.  -Continue Vraylar  4.5 mg daily for schizoaffective disorder depressed type.  -Continue hydroxyzine 12.5 to 25 mg up to 3 times a day as needed for anxiety and panic.  - Continue Cymbalta 90mg daily for depression and pain and will further evaluate.  -Continue psychotherapy to help with past trauma as well as depressive and anxiety symptoms.  -Highly encourage patient since she is now on Trileptal to be aware of side effects of multiple mood stabilizers and if she were to have any rash to contact the clinic as we may have to look at tapering off the lamotrigine or talking with neurologist to reevaluate Trileptal.  Patient verbalized understanding and denied any side effects at this time.      Patient has now failed and tried Haldol, aripiprazole, Latuda and now risperidone which has caused weight gain and due to diabetes and kidney disease would benefit from Vraylar as patient is still having auditory and visual hallucinations.     Counseled patient regarding multimodal approach with healthy nutrition, healthy sleep, regular physical activity, social activities, counseling, and medications.      Coping skills reviewed and encouraged positive framing of thoughts     Assisted patient in processing above session content; acknowledged and normalized patient’s thoughts, feelings, and concerns.  Applied  positive coping skills and behavior management in session.  Allowed patient to freely discuss issues without interruption or judgment. Provided safe, confidential environment to facilitate the development of positive therapeutic  relationship and encourage open, honest communication. Assisted patient in identifying risk factors which would indicate the need for higher level of care including thoughts to harm self or others and/or self-harming behavior and encouraged patient to contact this office, call 911, or present to the nearest emergency room should any of these events occur. Discussed crisis intervention services and means to access.     MEDS ORDERED DURING VISIT:  New Medications Ordered This Visit   Medications   • prazosin (MINIPRESS) 2 MG capsule     Sig: Take 1-2 capsules by mouth Every Night.     Dispense:  180 capsule     Refill:  0   • lamoTRIgine (LaMICtal) 150 MG tablet     Sig: Take 1 tablet by mouth Daily.     Dispense:  90 tablet     Refill:  0   • DULoxetine (CYMBALTA) 30 MG capsule     Sig: Take 3 capsules by mouth Daily.     Dispense:  270 capsule     Refill:  0   • Cariprazine HCl 4.5 MG capsule     Sig: Take 4.5 mg by mouth Daily.     Dispense:  90 capsule     Refill:  0       Follow Up   Return in about 8 weeks (around 12/27/2022), or if symptoms worsen or fail to improve, for Recheck.      Patient was given instructions and counseling regarding her condition or for health maintenance advice. Please see specific information pulled into the AVS if appropriate.     Some of the data in this electronic note has been brought forward from a previous encounter, any necessary changes have been made, it has been reviewed by this APRN, and it is accurate.      This document has been electronically signed by ROD Spencer  November 1, 2022 08:47 EDT    Part of this note may be an electronic transcription/translation of spoken language to printed text using the Dragon Dictation System.

## 2022-11-03 ENCOUNTER — LAB (OUTPATIENT)
Dept: LAB | Facility: HOSPITAL | Age: 47
End: 2022-11-03

## 2022-11-03 DIAGNOSIS — E11.65 TYPE 2 DIABETES MELLITUS WITH HYPERGLYCEMIA, WITH LONG-TERM CURRENT USE OF INSULIN: ICD-10-CM

## 2022-11-03 DIAGNOSIS — E11.49 OTHER DIABETIC NEUROLOGICAL COMPLICATION ASSOCIATED WITH TYPE 2 DIABETES MELLITUS: ICD-10-CM

## 2022-11-03 DIAGNOSIS — E78.2 MIXED HYPERLIPIDEMIA: ICD-10-CM

## 2022-11-03 DIAGNOSIS — E55.9 VITAMIN D DEFICIENCY: ICD-10-CM

## 2022-11-03 DIAGNOSIS — I10 ESSENTIAL HYPERTENSION: ICD-10-CM

## 2022-11-03 DIAGNOSIS — Z79.4 TYPE 2 DIABETES MELLITUS WITH HYPERGLYCEMIA, WITH LONG-TERM CURRENT USE OF INSULIN: ICD-10-CM

## 2022-11-03 LAB
25(OH)D3 SERPL-MCNC: 46.1 NG/ML (ref 30–100)
ALBUMIN SERPL-MCNC: 3.7 G/DL (ref 3.5–5.2)
ALBUMIN UR-MCNC: <1.2 MG/DL
ALBUMIN/GLOB SERPL: 1.1 G/DL
ALP SERPL-CCNC: 104 U/L (ref 39–117)
ALT SERPL W P-5'-P-CCNC: 34 U/L (ref 1–33)
ANION GAP SERPL CALCULATED.3IONS-SCNC: 11 MMOL/L (ref 5–15)
AST SERPL-CCNC: 34 U/L (ref 1–32)
BASOPHILS # BLD AUTO: 0.05 10*3/MM3 (ref 0–0.2)
BASOPHILS NFR BLD AUTO: 0.6 % (ref 0–1.5)
BILIRUB SERPL-MCNC: 0.2 MG/DL (ref 0–1.2)
BUN SERPL-MCNC: 15 MG/DL (ref 6–20)
BUN/CREAT SERPL: 18.8 (ref 7–25)
CALCIUM SPEC-SCNC: 9.2 MG/DL (ref 8.6–10.5)
CHLORIDE SERPL-SCNC: 103 MMOL/L (ref 98–107)
CHOLEST SERPL-MCNC: 129 MG/DL (ref 0–200)
CO2 SERPL-SCNC: 23 MMOL/L (ref 22–29)
CREAT SERPL-MCNC: 0.8 MG/DL (ref 0.57–1)
CREAT UR-MCNC: 42.5 MG/DL
DEPRECATED RDW RBC AUTO: 44 FL (ref 37–54)
EGFRCR SERPLBLD CKD-EPI 2021: 91 ML/MIN/1.73
EOSINOPHIL # BLD AUTO: 0.05 10*3/MM3 (ref 0–0.4)
EOSINOPHIL NFR BLD AUTO: 0.6 % (ref 0.3–6.2)
ERYTHROCYTE [DISTWIDTH] IN BLOOD BY AUTOMATED COUNT: 14.6 % (ref 12.3–15.4)
GLOBULIN UR ELPH-MCNC: 3.3 GM/DL
GLUCOSE SERPL-MCNC: 196 MG/DL (ref 65–99)
HBA1C MFR BLD: 9.2 % (ref 4.8–5.6)
HCT VFR BLD AUTO: 43.5 % (ref 34–46.6)
HDLC SERPL-MCNC: 52 MG/DL (ref 40–60)
HGB BLD-MCNC: 13.6 G/DL (ref 12–15.9)
IMM GRANULOCYTES # BLD AUTO: 0.02 10*3/MM3 (ref 0–0.05)
IMM GRANULOCYTES NFR BLD AUTO: 0.3 % (ref 0–0.5)
LDLC SERPL CALC-MCNC: 60 MG/DL (ref 0–100)
LDLC/HDLC SERPL: 1.14 {RATIO}
LYMPHOCYTES # BLD AUTO: 2.43 10*3/MM3 (ref 0.7–3.1)
LYMPHOCYTES NFR BLD AUTO: 31.6 % (ref 19.6–45.3)
MCH RBC QN AUTO: 26.3 PG (ref 26.6–33)
MCHC RBC AUTO-ENTMCNC: 31.3 G/DL (ref 31.5–35.7)
MCV RBC AUTO: 84 FL (ref 79–97)
MICROALBUMIN/CREAT UR: NORMAL MG/G{CREAT}
MONOCYTES # BLD AUTO: 0.31 10*3/MM3 (ref 0.1–0.9)
MONOCYTES NFR BLD AUTO: 4 % (ref 5–12)
NEUTROPHILS NFR BLD AUTO: 4.84 10*3/MM3 (ref 1.7–7)
NEUTROPHILS NFR BLD AUTO: 62.9 % (ref 42.7–76)
NRBC BLD AUTO-RTO: 0 /100 WBC (ref 0–0.2)
PLATELET # BLD AUTO: 199 10*3/MM3 (ref 140–450)
PMV BLD AUTO: 11.2 FL (ref 6–12)
POTASSIUM SERPL-SCNC: 4.9 MMOL/L (ref 3.5–5.2)
PROT SERPL-MCNC: 7 G/DL (ref 6–8.5)
RBC # BLD AUTO: 5.18 10*6/MM3 (ref 3.77–5.28)
SODIUM SERPL-SCNC: 137 MMOL/L (ref 136–145)
TRIGL SERPL-MCNC: 88 MG/DL (ref 0–150)
TSH SERPL DL<=0.05 MIU/L-ACNC: 1.92 UIU/ML (ref 0.27–4.2)
VIT B12 BLD-MCNC: 446 PG/ML (ref 211–946)
VLDLC SERPL-MCNC: 17 MG/DL (ref 5–40)
WBC NRBC COR # BLD: 7.7 10*3/MM3 (ref 3.4–10.8)

## 2022-11-03 PROCEDURE — 82043 UR ALBUMIN QUANTITATIVE: CPT

## 2022-11-03 PROCEDURE — 80061 LIPID PANEL: CPT

## 2022-11-03 PROCEDURE — 82570 ASSAY OF URINE CREATININE: CPT

## 2022-11-03 PROCEDURE — 82607 VITAMIN B-12: CPT

## 2022-11-03 PROCEDURE — 84443 ASSAY THYROID STIM HORMONE: CPT

## 2022-11-03 PROCEDURE — 83036 HEMOGLOBIN GLYCOSYLATED A1C: CPT

## 2022-11-03 PROCEDURE — 82306 VITAMIN D 25 HYDROXY: CPT

## 2022-11-03 PROCEDURE — 85025 COMPLETE CBC W/AUTO DIFF WBC: CPT

## 2022-11-03 PROCEDURE — 80053 COMPREHEN METABOLIC PANEL: CPT

## 2022-11-03 NOTE — PROGRESS NOTES
Subjective:  Sudhakar Saul is a 46 y.o. female who presents for       Patient Active Problem List   Diagnosis   • Seizure disorder (HCC)   • Morbid obesity (HCC)   • Tobacco abuse counseling   • Multiple joint pain   • Dysuria   • History of positive hepatitis C   • Essential hypertension   • Uncontrolled type 2 diabetes mellitus with hyperglycemia (HCC)   • Vitamin D deficiency   • Urinary tract infection without hematuria   • Chronic hepatitis C without hepatic coma (HCC)   • Dyspnea on exertion   • Cigarette nicotine dependence, uncomplicated   • JACQUE (obstructive sleep apnea)   • Gastroesophageal reflux disease with esophagitis   • Left upper quadrant pain   • Nausea and vomiting   • Weight gain, abnormal   • Change in bowel habits   • Hepatic fibrosis   • Non-compliance   • Schizoaffective disorder, depressive type (HCC)   • Schizophrenia, paranoid type (HCC)   • Gastritis without bleeding   • Tobacco user   • Pre-operative cardiovascular examination   • Scoliosis   • History of drug use   • Stage 2 chronic kidney disease   • Diabetic neuropathy (HCC)   • Mixed hyperlipidemia   • Chronic bilateral low back pain with bilateral sciatica   • Type 2 diabetes mellitus with hyperglycemia, with long-term current use of insulin (HCC)   • Auditory hallucination   • Class 3 severe obesity due to excess calories with serious comorbidity and body mass index (BMI) of 45.0 to 49.9 in adult (HCC)   • Pain in wrist   • Schizophrenia, simple, chronic (HCC)   • Carpal tunnel syndrome   • Status post carpal tunnel release   • Asthma, persistent not controlled   • Weight loss counseling, encounter for   • Encounter for pre-bariatric surgery counseling and education           Current Outpatient Medications:   •  albuterol (ACCUNEB) 1.25 MG/3ML nebulizer solution, Take 3 mL by nebulization Every 6 (Six) Hours As Needed for Wheezing or Shortness of Air., Disp: 9 mL, Rfl: 3  •  aspirin 81 MG EC tablet, Take 81 mg by mouth  Daily., Disp: , Rfl:   •  Cariprazine HCl 4.5 MG capsule, Take 4.5 mg by mouth Daily., Disp: 90 capsule, Rfl: 0  •  Continuous Blood Gluc  (Dexcom G6 ) device, USE FOR 6 MONTHS, Disp: , Rfl:   •  Continuous Blood Gluc Sensor (Dexcom G6 Sensor), USE AS DIRECTED EVERY  10  DAYS, Disp: 9 each, Rfl: 0  •  Continuous Blood Gluc Transmit (Dexcom G6 Transmitter) misc, USE 1 TRANSMITTER FOR 3 MONTHS, Disp: 1 each, Rfl: 0  •  cyclobenzaprine (FLEXERIL) 10 MG tablet, Take 10 mg by mouth Daily., Disp: , Rfl:   •  Dulaglutide (Trulicity) 4.5 MG/0.5ML solution pen-injector, Inject 4.5 mg under the skin into the appropriate area as directed 1 (One) Time Per Week., Disp: 4 pen, Rfl: 11  •  DULoxetine (CYMBALTA) 30 MG capsule, Take 3 capsules by mouth Daily. (Patient taking differently: Take 150 mg by mouth Daily.), Disp: 270 capsule, Rfl: 0  •  DULoxetine (CYMBALTA) 60 MG capsule, Take 60 mg by mouth Daily., Disp: , Rfl:   •  Ertugliflozin L-PyroglutamicAc (Steglatro) 15 MG tablet, Take 1 tablet by mouth Every Morning., Disp: 90 tablet, Rfl: 1  •  Fluticasone Furoate-Vilanterol (Breo Ellipta) 200-25 MCG/INH inhaler, Inhale 1 puff Daily., Disp: 28 each, Rfl: 11  •  folic acid (FOLVITE) 1 MG tablet, Take 1,000 mcg by mouth Daily., Disp: , Rfl:   •  gabapentin (NEURONTIN) 600 MG tablet, Take 600 mg by mouth 3 (Three) Times a Day., Disp: , Rfl:   •  glucose blood test strip, Testing 4 times daily, E11.9, Disp: 120 each, Rfl: 12  •  hydrOXYzine (ATARAX) 25 MG tablet, Take 0.5-1 tablets by mouth 3 (Three) Times a Day As Needed for Anxiety., Disp: 270 tablet, Rfl: 0  •  Insulin Disposable Pump (OmniPod Dash 5 Pack Pods) misc, , Disp: , Rfl:   •  insulin lispro (Admelog) 100 UNIT/ML injection, 180  units daily through pump, Disp: 90 mL, Rfl: 11  •  lamoTRIgine (LaMICtal) 150 MG tablet, Take 1 tablet by mouth Daily., Disp: 90 tablet, Rfl: 0  •  levETIRAcetam (KEPPRA) 1000 MG tablet, Take 1 tablet by mouth 2 (Two) Times a  Day., Disp: 60 tablet, Rfl: 3  •  lisinopril (PRINIVIL,ZESTRIL) 40 MG tablet, Take 1 tablet by mouth Daily., Disp: 30 tablet, Rfl: 3  •  metFORMIN (GLUCOPHAGE) 500 MG tablet, Take 1 tablet by mouth 2 (Two) Times a Day With Meals., Disp: 60 tablet, Rfl: 11  •  metoprolol tartrate (LOPRESSOR) 50 MG tablet, Take 1 tablet by mouth 2 (Two) Times a Day., Disp: 60 tablet, Rfl: 3  •  Nebulizer device, 1 application Every 6 (Six) Hours As Needed (dyspnea)., Disp: 1 each, Rfl: 3  •  nicotine (NICODERM CQ) 21 MG/24HR patch, Place 1 patch on the skin as directed by provider Daily., Disp: 28 patch, Rfl: 3  •  NIFEdipine XL (PROCARDIA XL) 30 MG 24 hr tablet, Take 1 tablet by mouth Daily., Disp: 30 tablet, Rfl: 3  •  omeprazole (priLOSEC) 40 MG capsule, Take 1 capsule by mouth Daily., Disp: 30 capsule, Rfl: 3  •  OXcarbazepine (TRILEPTAL) 600 MG tablet, Take 600 mg by mouth 2 (Two) Times a Day. PT TAKES  MG TAB IN MORNING AND  MG IN EVENING, Disp: , Rfl:   •  prazosin (MINIPRESS) 2 MG capsule, Take 1-2 capsules by mouth Every Night., Disp: 180 capsule, Rfl: 0  •  simvastatin (ZOCOR) 40 MG tablet, Take 1 tablet by mouth Every Night., Disp: 30 tablet, Rfl: 3  •  Ventolin  (90 Base) MCG/ACT inhaler, Inhale 2 puffs Every 4 (Four) Hours As Needed for Wheezing., Disp: 6.7 g, Rfl: 11  •  vitamin D (ERGOCALCIFEROL) 1.25 MG (37391 UT) capsule capsule, Take 2 tablets by mouth weekly, Disp: 8 capsule, Rfl: 3    HPI        Pt is 47 yo female with management  of morbid obesity, IDDM Type 2 now on insulin pump , HTN, HLP, vitamin D deficiency, tobacco user, GERD, gastritis, esophagitis, diverticulosis, colonic polyp in sigmoid colon,  paranoid schizophrenia,  Seizure disorder, history of Illicit drug abuse, asthma , JACQUE, chronic hepatitis C , major depression, JD, sp appendectomy, sp cholecystectomy sp right carpal tunnel release, sp back surgery, sp breast surgery, sp lipoma excision, chronic back pain (moderate L4-L5 and  L5-S1 DDD changes, mild bilateral L4-L5 moderate bilateral L5-S1 facet arthritic change, mild leveoscoliosis), CKD stage 2, de Quervain's syndrome of left wrist ,sp left carpal tunnel release. 0.3 cm nodule along left lower and right middle lobe      10/21/22 in office visit for recheck. Pt has been seen by Cardiology for preoperative clearance for bariatric surgeyr and echo was ordered. Pt was referred to Dr. Benavides for bariatric weight loss surgery. This is month 4 of weight check and weight last visit was 340 lbs and today is is 335 lbs. BP slighlty up today but at home stable. Sugars are stable. No chest pain no dizziness. She has been tobacco free for 2 months.      11/22/22 in office visit for recheck. Pt had appt with  on 1/1/22 she canceled her appt with Pulmonlogy on 11/8/22 and Sleep Medicine on 11/4/22. Saw Endocrinology on 11/15/22. She continues using an  insulin pump omni pod with basal at 2.95 along with trulicity metformin and steglatro. Pt lost 12 lbs since her last visit and now is at 323 lbs.  Pt is now in a relationship. She continues to refrain from smoking. She is doing better mentally.       Hyperlipidemia  The current episode started more than 1 year ago. The problem is controlled. Recent lipid tests were reviewed and are variable. Exacerbating diseases include chronic renal disease, diabetes and obesity. She has no history of nephrotic syndrome. Associated symptoms include shortness of breath. Pertinent negatives include no chest pain. Current antihyperlipidemic treatment includes statins. The current treatment provides significant improvement of lipids. Risk factors for coronary artery disease include diabetes mellitus, obesity, dyslipidemia, a sedentary lifestyle, stress and hypertension.   Seizures   This is a chronic problem. The current episode started more than 1 week ago. The problem has not changed since onset.Pertinent negatives include no sleepiness, no confusion, no headaches, no  speech difficulty, no visual disturbance, no neck stiffness, no sore throat, no chest pain, no cough, no nausea, no vomiting, no diarrhea and no muscle weakness. There has been no fever.   Chronic Kidney Disease  This is a chronic problem. The current episode started more than 1 year ago. The problem occurs constantly. The problem has been unchanged. Associated symptoms include arthralgias, fatigue, numbness and weakness. Pertinent negatives include no abdominal pain, chest pain, coughing, headaches, nausea, sore throat or vomiting. Nothing aggravates the symptoms. She has tried nothing for the symptoms. The treatment provided no relief.   Heartburn  She reports no abdominal pain, no belching, no chest pain, no choking, no coughing, no early satiety, no globus sensation, no heartburn, no hoarse voice, no nausea, no sore throat, no stridor, no tooth decay, no water brash or no wheezing. This is a chronic problem. The current episode started more than 1 month ago. The problem occurs constantly. The problem has been unchanged. Nothing aggravates the symptoms. Associated symptoms include fatigue. She has tried a PPI for the symptoms.   Obesity   This is a chronic problem. The current episode started more than 1 year ago. The problem occurs constantly. The problem has been unchanged. Associated symptoms include arthralgias, fatigue, headaches, numbness and weakness. Pertinent negatives include no abdominal pain, anorexia, change in bowel habit, chest pain, chills, congestion, coughing, fever, joint swelling, myalgias, nausea, neck pain, rash, sore throat, swollen glands, urinary symptoms, vertigo, visual change or vomiting. Nothing aggravates the symptoms. She has tried nothing for the symptoms.   Hypertension   This is a chronic problem. The current episode started more than 1 year ago. The problem is controlled  Associated symptoms include headaches. Pertinent negatives include no anxiety, blurred vision, chest  pain, malaise/fatigue, neck pain, orthopnea, palpitations, peripheral edema, PND or shortness of breath. Risk factors for coronary artery disease include obesity, sedentary lifestyle, smoking/tobacco exposure and stress. Past treatments include ACE inhibitors, beta blockers and diuretics. There are no compliance problems.  There is no history of angina, kidney disease, CAD/MI, CVA, heart failure, left ventricular hypertrophy, PVD or retinopathy. There is no history of chronic renal disease, coarctation of the aorta, hyperaldosteronism, hypercortisolism, hyperparathyroidism, a hypertension causing med, pheochromocytoma, renovascular disease, sleep apnea or a thyroid problem.   Diabetes   She presents for her followupdiabetic visit. She has type 2 diabetes mellitus. Her disease course has been waxing and waning  Hypoglycemia symptoms include confusion, headaches, nervousness/anxiousness, seizures and sleepiness. Associated symptoms include fatigue and weakness. Pertinent negatives for diabetes include no blurred vision, no chest pain and no visual change. Pertinent negatives for diabetic complications include no CVA, PVD or retinopathy. Risk factors for coronary artery disease include dyslipidemia, obesity and post-menopausal. She has not had a previous visit with a dietitian. There is no change in her home blood glucose trend. An ACE inhibitor/angiotensin II receptor blocker is not being taken. She does not see a podiatrist.Eye exam is not current. teratments include steglastro insulin and trulicity        Review of Systems  Review of Systems   Constitutional: Positive for activity change and fatigue. Negative for appetite change, chills, diaphoresis and fever.   HENT: Negative for congestion, postnasal drip, rhinorrhea, sinus pressure, sinus pain, sneezing, sore throat, trouble swallowing and voice change.    Respiratory: Negative for cough, choking, chest tightness, shortness of breath, wheezing and stridor.     Cardiovascular: Negative for chest pain.   Gastrointestinal: Positive for abdominal pain. Negative for diarrhea, nausea and vomiting.   Musculoskeletal: Positive for arthralgias and back pain.   Neurological: Positive for seizures, weakness and numbness. Negative for headaches.   Psychiatric/Behavioral: Positive for behavioral problems and hallucinations. The patient is nervous/anxious.         Depressed mood        Patient Active Problem List   Diagnosis   • Seizure disorder (HCC)   • Morbid obesity (HCC)   • Tobacco abuse counseling   • Multiple joint pain   • Dysuria   • History of positive hepatitis C   • Essential hypertension   • Uncontrolled type 2 diabetes mellitus with hyperglycemia ([facility])   • Vitamin D deficiency   • Urinary tract infection without hematuria   • Chronic hepatitis C without hepatic coma ([facility])   • Dyspnea on exertion   • Cigarette nicotine dependence, uncomplicated   • JACQUE (obstructive sleep apnea)   • Gastroesophageal reflux disease with esophagitis   • Left upper quadrant pain   • Nausea and vomiting   • Weight gain, abnormal   • Change in bowel habits   • Hepatic fibrosis   • Non-compliance   • Schizoaffective disorder, depressive type ([facility])   • Schizophrenia, paranoid type ([facility])   • Gastritis without bleeding   • Tobacco user   • Pre-operative cardiovascular examination   • Scoliosis   • History of drug use   • Stage 2 chronic kidney disease   • Diabetic neuropathy ([facility])   • Mixed hyperlipidemia   • Chronic bilateral low back pain with bilateral sciatica   • Type 2 diabetes mellitus with hyperglycemia, with long-term current use of insulin ([facility])   • Auditory hallucination   • Class 3 severe obesity due to excess calories with serious comorbidity and body mass index (BMI) of 45.0 to 49.9 in adult ([facility])   • Pain in wrist   • Schizophrenia, simple, chronic ([facility])   • Carpal tunnel syndrome   • Status post carpal tunnel release   • Asthma, persistent not controlled   • Weight loss  counseling, encounter for   • Encounter for pre-bariatric surgery counseling and education     Past Surgical History:   Procedure Laterality Date   • APPENDECTOMY     • BACK SURGERY     • BREAST SURGERY     • CARPAL TUNNEL RELEASE      right hand   • CARPAL TUNNEL RELEASE Left 10/19/2021    Procedure: LEFT CARPAL TUNNEL RELEASE;  Surgeon: Kevin Carlson MD;  Location: [facility] OR;  Service: Orthopedics;  Laterality: Left;   • CHOLECYSTECTOMY     • COLONOSCOPY     • COLONOSCOPY N/A 2019    Procedure: COLONOSCOPY;  Surgeon: Joni Delacruz MD;  Location: [facility] ENDOSCOPY;  Service: Gastroenterology   • DENTAL PROCEDURE     • ENDOSCOPY N/A 2019    Procedure: ESOPHAGOGASTRODUODENOSCOPY--poss dilation;  Surgeon: Joni Delacruz MD;  Location: [facility] ENDOSCOPY;  Service: Gastroenterology   • HYSTERECTOMY     • LIPOMA EXCISION      9   • LIVER BIOPSY     • UPPER GASTROINTESTINAL ENDOSCOPY  2019     Social History     Socioeconomic History   • Marital status:    Tobacco Use   • Smoking status: Former     Packs/day: 1.00     Years: 35.00     Pack years: 35.00     Types: Cigarettes     Start date:      Quit date: 2022     Years since quittin.3   • Smokeless tobacco: Never   • Tobacco comments:     on patch trying to quit    Vaping Use   • Vaping Use: Never used   Substance and Sexual Activity   • Alcohol use: Not Currently   • Drug use: Not Currently     Types: Amphetamines, Heroin, Methamphetamines, Morphine, Oxycodone     Comment: QUIT 3 YEARS AGO   • Sexual activity: Not Currently     Birth control/protection: Surgical     Family History   Problem Relation Age of Onset   • Hypertension Other    • Diabetes Other    • Stroke Other    • Cancer Other    • Kidney disease Other    • Lung disease Other    • Other Other    • Malone's esophagus Other    • Colon polyps Other    • Heart disease Other    • Ulcers Other    • Cholelithiasis Other    • Allergy (severe) Other    • Schizophrenia  Father    • Alcohol abuse Father    • Drug abuse Paternal Grandfather      Lab on 11/03/2022   Component Date Value Ref Range Status   • Glucose 11/03/2022 196 (H)  65 - 99 mg/dL Final   • BUN 11/03/2022 15  6 - 20 mg/dL Final   • Creatinine 11/03/2022 0.80  0.57 - 1.00 mg/dL Final   • Sodium 11/03/2022 137  136 - 145 mmol/L Final   • Potassium 11/03/2022 4.9  3.5 - 5.2 mmol/L Final   • Chloride 11/03/2022 103  98 - 107 mmol/L Final   • CO2 11/03/2022 23.0  22.0 - 29.0 mmol/L Final   • Calcium 11/03/2022 9.2  8.6 - 10.5 mg/dL Final   • Total Protein 11/03/2022 7.0  6.0 - 8.5 g/dL Final   • Albumin 11/03/2022 3.70  3.50 - 5.20 g/dL Final   • ALT (SGPT) 11/03/2022 34 (H)  1 - 33 U/L Final   • AST (SGOT) 11/03/2022 34 (H)  1 - 32 U/L Final   • Alkaline Phosphatase 11/03/2022 104  39 - 117 U/L Final   • Total Bilirubin 11/03/2022 0.2  0.0 - 1.2 mg/dL Final   • Globulin 11/03/2022 3.3  gm/dL Final   • A/G Ratio 11/03/2022 1.1  g/dL Final   • BUN/Creatinine Ratio 11/03/2022 18.8  7.0 - 25.0 Final   • Anion Gap 11/03/2022 11.0  5.0 - 15.0 mmol/L Final   • eGFR 11/03/2022 91.0  >60.0 mL/min/1.73 Final    National Kidney Foundation and American Society of Nephrology (ASN) Task Force recommended calculation based on the Chronic Kidney Disease Epidemiology Collaboration (CKD-EPI) equation refit without adjustment for race.   • Hemoglobin A1C 11/03/2022 9.20 (H)  4.80 - 5.60 % Final   • Total Cholesterol 11/03/2022 129  0 - 200 mg/dL Final   • Triglycerides 11/03/2022 88  0 - 150 mg/dL Final   • HDL Cholesterol 11/03/2022 52  40 - 60 mg/dL Final   • LDL Cholesterol  11/03/2022 60  0 - 100 mg/dL Final   • VLDL Cholesterol 11/03/2022 17  5 - 40 mg/dL Final   • LDL/HDL Ratio 11/03/2022 1.14   Final   • Microalbumin/Creatinine Ratio 11/03/2022    Final    Unable to calculate   • Creatinine, Urine 11/03/2022 42.5  mg/dL Final   • Microalbumin, Urine 11/03/2022 <1.2  mg/dL Final   • TSH 11/03/2022 1.920  0.270 - 4.200 uIU/mL Final    • Vitamin B-12 11/03/2022 446  211 - 946 pg/mL Final   • 25 Hydroxy, Vitamin D 11/03/2022 46.1  30.0 - 100.0 ng/ml Final   • WBC 11/03/2022 7.70  3.40 - 10.80 10*3/mm3 Final   • RBC 11/03/2022 5.18  3.77 - 5.28 10*6/mm3 Final   • Hemoglobin 11/03/2022 13.6  12.0 - 15.9 g/dL Final   • Hematocrit 11/03/2022 43.5  34.0 - 46.6 % Final   • MCV 11/03/2022 84.0  79.0 - 97.0 fL Final   • MCH 11/03/2022 26.3 (L)  26.6 - 33.0 pg Final   • MCHC 11/03/2022 31.3 (L)  31.5 - 35.7 g/dL Final   • RDW 11/03/2022 14.6  12.3 - 15.4 % Final   • RDW-SD 11/03/2022 44.0  37.0 - 54.0 fl Final   • MPV 11/03/2022 11.2  6.0 - 12.0 fL Final   • Platelets 11/03/2022 199  140 - 450 10*3/mm3 Final   • Neutrophil % 11/03/2022 62.9  42.7 - 76.0 % Final   • Lymphocyte % 11/03/2022 31.6  19.6 - 45.3 % Final   • Monocyte % 11/03/2022 4.0 (L)  5.0 - 12.0 % Final   • Eosinophil % 11/03/2022 0.6  0.3 - 6.2 % Final   • Basophil % 11/03/2022 0.6  0.0 - 1.5 % Final   • Immature Grans % 11/03/2022 0.3  0.0 - 0.5 % Final   • Neutrophils, Absolute 11/03/2022 4.84  1.70 - 7.00 10*3/mm3 Final   • Lymphocytes, Absolute 11/03/2022 2.43  0.70 - 3.10 10*3/mm3 Final   • Monocytes, Absolute 11/03/2022 0.31  0.10 - 0.90 10*3/mm3 Final   • Eosinophils, Absolute 11/03/2022 0.05  0.00 - 0.40 10*3/mm3 Final   • Basophils, Absolute 11/03/2022 0.05  0.00 - 0.20 10*3/mm3 Final   • Immature Grans, Absolute 11/03/2022 0.02  0.00 - 0.05 10*3/mm3 Final   • nRBC 11/03/2022 0.0  0.0 - 0.2 /100 WBC Final   Office Visit on 10/13/2022   Component Date Value Ref Range Status   • QT Interval 10/13/2022 354  ms Final   • QTC Interval 10/13/2022 408  ms Final   Lab on 07/08/2022   Component Date Value Ref Range Status   • Glucose 07/08/2022 106 (H)  65 - 99 mg/dL Final   • BUN 07/08/2022 16  6 - 20 mg/dL Final   • Creatinine 07/08/2022 0.82  0.57 - 1.00 mg/dL Final   • Sodium 07/08/2022 139  136 - 145 mmol/L Final   • Potassium 07/08/2022 4.7  3.5 - 5.2 mmol/L Final   • Chloride  07/08/2022 106  98 - 107 mmol/L Final   • CO2 07/08/2022 21.0 (L)  22.0 - 29.0 mmol/L Final   • Calcium 07/08/2022 9.4  8.6 - 10.5 mg/dL Final   • Total Protein 07/08/2022 7.0  6.0 - 8.5 g/dL Final   • Albumin 07/08/2022 3.80  3.50 - 5.20 g/dL Final   • ALT (SGPT) 07/08/2022 37 (H)  1 - 33 U/L Final   • AST (SGOT) 07/08/2022 42 (H)  1 - 32 U/L Final   • Alkaline Phosphatase 07/08/2022 93  39 - 117 U/L Final   • Total Bilirubin 07/08/2022 0.2  0.0 - 1.2 mg/dL Final   • Globulin 07/08/2022 3.2  gm/dL Final   • A/G Ratio 07/08/2022 1.2  g/dL Final   • BUN/Creatinine Ratio 07/08/2022 19.5  7.0 - 25.0 Final   • Anion Gap 07/08/2022 12.0  5.0 - 15.0 mmol/L Final   • eGFR 07/08/2022 88.4  >60.0 mL/min/1.73 Final    National Kidney Foundation and American Society of Nephrology (ASN) Task Force recommended calculation based on the Chronic Kidney Disease Epidemiology Collaboration (CKD-EPI) equation refit without adjustment for race.   • Hemoglobin A1C 07/08/2022 8.70 (H)  4.80 - 5.60 % Final   • Total Cholesterol 07/08/2022 119  0 - 200 mg/dL Final   • Triglycerides 07/08/2022 152 (H)  0 - 150 mg/dL Final   • HDL Cholesterol 07/08/2022 43  40 - 60 mg/dL Final   • LDL Cholesterol  07/08/2022 50  0 - 100 mg/dL Final   • VLDL Cholesterol 07/08/2022 26  5 - 40 mg/dL Final   • LDL/HDL Ratio 07/08/2022 1.06   Final   • Microalbumin/Creatinine Ratio 07/08/2022    Final    Unable to calculate   • Creatinine, Urine 07/08/2022 80.0  mg/dL Final   • Microalbumin, Urine 07/08/2022 <1.2  mg/dL Final   • TSH 07/08/2022 2.290  0.270 - 4.200 uIU/mL Final   • 25 Hydroxy, Vitamin D 07/08/2022 34.9  30.0 - 100.0 ng/ml Final   • WBC 07/08/2022 8.79  3.40 - 10.80 10*3/mm3 Final   • RBC 07/08/2022 4.87  3.77 - 5.28 10*6/mm3 Final   • Hemoglobin 07/08/2022 13.6  12.0 - 15.9 g/dL Final   • Hematocrit 07/08/2022 41.3  34.0 - 46.6 % Final   • MCV 07/08/2022 84.8  79.0 - 97.0 fL Final   • MCH 07/08/2022 27.9  26.6 - 33.0 pg Final   • MCHC 07/08/2022  32.9  31.5 - 35.7 g/dL Final   • RDW 07/08/2022 14.6  12.3 - 15.4 % Final   • RDW-SD 07/08/2022 44.4  37.0 - 54.0 fl Final   • MPV 07/08/2022 11.0  6.0 - 12.0 fL Final   • Platelets 07/08/2022 176  140 - 450 10*3/mm3 Final   • Neutrophil % 07/08/2022 55.8  42.7 - 76.0 % Final   • Lymphocyte % 07/08/2022 36.3  19.6 - 45.3 % Final   • Monocyte % 07/08/2022 4.4 (L)  5.0 - 12.0 % Final   • Eosinophil % 07/08/2022 2.3  0.3 - 6.2 % Final   • Basophil % 07/08/2022 0.7  0.0 - 1.5 % Final   • Immature Grans % 07/08/2022 0.5  0.0 - 0.5 % Final   • Neutrophils, Absolute 07/08/2022 4.91  1.70 - 7.00 10*3/mm3 Final   • Lymphocytes, Absolute 07/08/2022 3.19 (H)  0.70 - 3.10 10*3/mm3 Final   • Monocytes, Absolute 07/08/2022 0.39  0.10 - 0.90 10*3/mm3 Final   • Eosinophils, Absolute 07/08/2022 0.20  0.00 - 0.40 10*3/mm3 Final   • Basophils, Absolute 07/08/2022 0.06  0.00 - 0.20 10*3/mm3 Final   • Immature Grans, Absolute 07/08/2022 0.04  0.00 - 0.05 10*3/mm3 Final   • nRBC 07/08/2022 0.0  0.0 - 0.2 /100 WBC Final      CT Chest Low Dose Cancer Screening WO  Narrative: PROCEDURE:  CT LUNG SCREENING    CLINICAL HISTORY:  Lung cancer screening (Age 18-49y), Z72.0  Tobacco use.  High risk patient population group (with smoking  history)    COMPARISON EXAMINATIONS:   none        TECHNIQUE:  Low dose CT protocol.  This exam was performed  according to our departmental dose optimization program, which  includes automated exposure control, adjustment of the mA and/or  KV according to patient size and/or use of iterative  reconstruction technique.    FINDINGS:         PULMONARY NODULES: Abutting the pleura of the left lower and  right middle lobe are tiny, 0.3 cm noncalcified nodular opacities  (series 3, image 186 along the left lower lobe, image 170 along  the right middle lobe).    No focal airspace consolidation. Mild linear atelectasis/scar in  the lingula. No pleural effusion or pneumothorax. The central  airways are patent. No  "bronchiectasis.    No thoracic lymphadenopathy.          Impression: There is a tiny, pleural-based 0.3 cm nodule along the left lower  and right middle lobe.             Follow-up recommendation(s):  Continue annual screening with low  dose CT in 12 months        ACR Lung-RADS assessment score:  Category 2: BENIGN APPEARANCE or  behavior       References:      ---------------    NCCN for patients: (patient handbook)   https://nccn.org/patients/guidelines/lung_screening/index.html       NCCN for physicians:   http://www.nccn.org/professionals/physician_gls/pdf/lung_screenin  .pdf    ACR classification and management suggestions:  http://www.acr.org/~/media/ACR/Documents/PDF/QualitySafety/Reso  rces/LungRADS/AssessmentCategories.pdf?              Electronically signed by:  Too Campbell MD  8/6/2022 7:26 AM CDT  Workstation: [id number]    [email or web address]  Immunization History   Administered Date(s) Administered   • COVID-19 (MODERNA) 1st, 2nd, 3rd Dose Only 04/06/2021, 04/08/2021, 04/08/2021, 05/06/2021, 05/06/2021, 12/10/2021   • FluLaval/Fluzone >6mos 09/17/2020, 10/28/2021, 10/21/2022   • Hepatitis A 06/12/2018   • Hepatitis B 10/21/2022   • Hepatitis B Vaccine Adult IM 06/14/2022, 10/21/2022   • Influenza, Unspecified 10/21/2022   • Pneumococcal Polysaccharide (PPSV23) 12/13/2018   • Tdap 09/17/2020       The following portions of the patient's history were reviewed and updated as appropriate: allergies, current medications, past family history, past medical history, past social history, past surgical history and problem list.        Physical Exam  /74 (BP Location: Left arm, Patient Position: Sitting, Cuff Size: Large Adult)   Pulse 84   Temp 97.3 °F (36.3 °C)   Ht 167.6 cm (66\")   Wt (!) 147 kg (323 lb)   SpO2 94%   BMI 52.13 kg/m²     Physical Exam  Vitals and nursing note reviewed.   Constitutional:       Appearance: She is well-developed. She is not diaphoretic.   HENT:      Head: Normocephalic and " atraumatic.      Right Ear: External ear normal.   Eyes:      Conjunctiva/sclera: Conjunctivae normal.      Pupils: Pupils are equal, round, and reactive to light.   Cardiovascular:      Rate and Rhythm: Normal rate and regular rhythm.      Heart sounds: Normal heart sounds. No murmur heard.  Pulmonary:      Effort: Pulmonary effort is normal. No respiratory distress.      Breath sounds: Normal breath sounds.   Abdominal:      General: Bowel sounds are normal. There is no distension.      Palpations: Abdomen is soft.      Tenderness: There is no abdominal tenderness.      Comments: Obese abdomen    Musculoskeletal:         General: Tenderness present. No deformity. Normal range of motion.      Cervical back: Normal range of motion and neck supple.   Skin:     General: Skin is warm.      Coloration: Skin is not pale.      Findings: No erythema or rash.   Neurological:      Mental Status: She is alert and oriented to person, place, and time.      Cranial Nerves: No cranial nerve deficit.   Psychiatric:         Behavior: Behavior normal.         [unfilled]   Diagnosis Plan   1. Tobacco user        2. Uncontrolled type 2 diabetes mellitus with hyperglycemia (HCC)        3. Vitamin D deficiency        4. Stage 2 chronic kidney disease        5. Schizophrenia, paranoid type (HCC)        6. Mixed hyperlipidemia        7. Morbid obesity (HCC)        8. Essential hypertension                -recommend labowork   -recommend diabetic eye exam   -recommend influenza vaccination -given today.    -recommend hep B vaccination - given today   -recommend COVID-19 vaccination  -advised pt to make appt with sleep medicine and pulmonology   -elevated liver enzymes -suspect fatty liver will gt US of liver   -GERD with esophagitis  - on prilosec 20 mg daiy. Consider another EGD if not improving. GI following   -tobacco user - Counseled quit smoking >5 minutes.  recommend 1 800 QUIT NOW and nicotine patches   -ckd stage  2 - stable continue to monitor. Stressed importance of diabetes control and BP control   -JACQUE - referred back to sleep apnea she will need to make an appt   -hyperlipdemia - on  simvastatin 40 mg PO qhs. ASCVD risk high. Now on red yeast rice. On fish oil supplement  -chronic back pain/DDD lumbar spine/arthritis lumbar spine  - on neurontin 100 mg PO tID on flexeril 5 mg PO TID PRN on Norco 7.5/325 mg every 8 hours MT. PM following. She will be referred to another PM in [place] that will take her insurance   -dyspnea/mild asthma/pulmonary nodule   -Pulmonlogy following on breo now and abluterol PRN.   Albuterol nebulizer and treatment every 6 hours PRN.  repeat CT of chest in 1 year   -DM type 2- Endocrinology following. Currently on metformin 1000 gm PO BID, steglatro 15 mg daily. trulicity 4.5 mg now on insulin pump.    -vitamin D  defciency - vitamin D once a week   -schizophrenia/major depression.JD  -  Behabvioral health following no on lamictal 150 mg PO q daily and vraylar 1.5 mg daily. risperdal stopped  on minipress 2 mg at bedtime. On vistaril 25 mg PO TID PRN   -seizure disorder - Neurology following stable on Keppra 1000 mg Po BID. Neurology following in . She is now on tileptal 600 mg daily.    -Morbid obesity - counseled weight loss >5 minutes BMI at 52.13   Will refer to Dr. Benavides for weight loss surgery along with Dr. García with Cardiology for preoperative clearance and Sleep medicine for sleep apnea. Has echo pending    Will treat this at month 5 of 6. She also had weigh ins in June and August.    -hypertension -  on lisinopril  40 mg dialy.  lopressor 50 mg PO BID  on nifedipine 30 mg daily.    -advised pt to be safe and call with any questions or concerns. All questions addressed today  -advised pt to followup with specialist and referrals  -advised pt to go to ER or call 911 if symptoms worrisome or severe  -advised pt to be safe during COVID-19 pandemic  I spent 30  minutes caring for Sudhakar on  this date of service. This time includes time spent by me in the following activities: preparing for the visit, reviewing tests, obtaining and/or reviewing a separately obtained history, performing a medically appropriate examination and/or evaluation, counseling and educating the patient/family/caregiver, ordering medications, tests, or procedures, referring and communicating with other health care professionals, documenting information in the medical record, independently interpreting results and communicating that information with the patient/family/caregiver and care coordination  -recheck in 1 month         This document has been electronically signed by Jermaine Ferro MD on November 22, 2022 11:08 CST                  Answers for HPI/ROS submitted by the patient on 11/22/2022  What is the primary reason for your visit?: High Blood Pressure

## 2022-11-15 ENCOUNTER — OFFICE VISIT (OUTPATIENT)
Dept: ENDOCRINOLOGY | Facility: CLINIC | Age: 47
End: 2022-11-15

## 2022-11-15 VITALS
WEIGHT: 293 LBS | OXYGEN SATURATION: 95 % | HEIGHT: 66 IN | SYSTOLIC BLOOD PRESSURE: 170 MMHG | HEART RATE: 95 BPM | BODY MASS INDEX: 47.09 KG/M2 | DIASTOLIC BLOOD PRESSURE: 110 MMHG

## 2022-11-15 DIAGNOSIS — I10 ESSENTIAL HYPERTENSION: Primary | ICD-10-CM

## 2022-11-15 DIAGNOSIS — E11.49 OTHER DIABETIC NEUROLOGICAL COMPLICATION ASSOCIATED WITH TYPE 2 DIABETES MELLITUS: ICD-10-CM

## 2022-11-15 DIAGNOSIS — Z79.4 TYPE 2 DIABETES MELLITUS WITH HYPERGLYCEMIA, WITH LONG-TERM CURRENT USE OF INSULIN: ICD-10-CM

## 2022-11-15 DIAGNOSIS — E55.9 VITAMIN D DEFICIENCY: ICD-10-CM

## 2022-11-15 DIAGNOSIS — E78.2 MIXED HYPERLIPIDEMIA: ICD-10-CM

## 2022-11-15 DIAGNOSIS — E11.65 TYPE 2 DIABETES MELLITUS WITH HYPERGLYCEMIA, WITH LONG-TERM CURRENT USE OF INSULIN: ICD-10-CM

## 2022-11-15 PROCEDURE — 95251 CONT GLUC MNTR ANALYSIS I&R: CPT | Performed by: NURSE PRACTITIONER

## 2022-11-15 PROCEDURE — 99214 OFFICE O/P EST MOD 30 MIN: CPT | Performed by: NURSE PRACTITIONER

## 2022-11-15 NOTE — PROGRESS NOTES
"Chief Complaint  Diabetes    Subjective          Sudhakar Saul presents to [facility] END[facility]  History of Present Illness         In office vist    46 year old female presents for follow up         Diagnosed in her 20s     Reason diabetes mellitus type 2     Timing constant      Quality not controlled     Severity high        Macrovascular-- no CAD, no PAD, no CVA         Microvascular--- neuropathy, mild diabetic retinopathy , CKD stage II        hepatitis C                      Diabetes Regimen     Insulin through pump          omni pod --has been out of pods and has to do injections    She does have pods in mail today            Blood Glucose Readings     Checking 4 times daily      Uses dexcom      See below     Review of Systems - General ROS: negative                  Objective   Vital Signs:   BP (!) 170/110   Pulse 95   Ht 167.6 cm (66\")   Wt (!) 148 kg (327 lb 1.6 oz)   SpO2 95%   BMI 52.80 kg/m²     Physical Exam  Constitutional:       Appearance: Normal appearance.   Cardiovascular:      Rate and Rhythm: Regular rhythm.      Heart sounds: Normal heart sounds.   Pulmonary:      Breath sounds: Normal breath sounds.   Musculoskeletal:      Cervical back: Normal range of motion.   Neurological:      Mental Status: She is alert.        Result Review :   The following data was reviewed by: ROD Chawla on 04/29/2022:  Common labs    Common Labs 4/22/22 4/22/22 4/22/22 4/22/22 4/22/22 7/8/22 7/8/22 7/8/22 7/8/22 7/8/22 11/3/22 11/3/22 11/3/22 11/3/22 11/3/22    0808 0808 0808 0808 0808 0822 0822 0822 0822 0822 0800 0800 0800 0800 0800   Glucose  211 (A)     106 (A)     196 (A)      BUN  12     16     15      Creatinine  0.77     0.82     0.80      Sodium  136     139     137      Potassium  4.6     4.7     4.9      Chloride  102     106     103      Calcium  9.4     9.4     9.2      Albumin  3.80     3.80     3.70      Total Bilirubin  0.2     0.2     " 0.2      Alkaline Phosphatase  96     93     104      AST (SGOT)  34 (A)     42 (A)     34 (A)      ALT (SGPT)  39 (A)     37 (A)     34 (A)      WBC 8.45     8.79     7.70       Hemoglobin 13.3     13.6     13.6       Hematocrit 41.0     41.3     43.5       Platelets 169     176     199       Total Cholesterol   169     119       129   Triglycerides   144     152 (A)       88   HDL Cholesterol   44     43       52   LDL Cholesterol    100     50       60   Hemoglobin A1C    9.50 (A)     8.70 (A)     9.20 (A)    Microalbumin, Urine     2.5     <1.2   <1.2     (A) Abnormal value                        Assessment and Plan    Diagnoses and all orders for this visit:    1. Essential hypertension (Primary)  -     CBC & Differential; Future  -     Comprehensive Metabolic Panel; Future  -     Hemoglobin A1c; Future  -     Lipid Panel; Future  -     Microalbumin / Creatinine Urine Ratio - Urine, Clean Catch; Future  -     TSH; Future    2. Type 2 diabetes mellitus with hyperglycemia, with long-term current use of insulin (HCC)  -     CBC & Differential; Future  -     Comprehensive Metabolic Panel; Future  -     Hemoglobin A1c; Future  -     Lipid Panel; Future  -     Microalbumin / Creatinine Urine Ratio - Urine, Clean Catch; Future  -     TSH; Future    3. Vitamin D deficiency  -     CBC & Differential; Future  -     Comprehensive Metabolic Panel; Future  -     Hemoglobin A1c; Future  -     Lipid Panel; Future  -     Microalbumin / Creatinine Urine Ratio - Urine, Clean Catch; Future  -     TSH; Future    4. Mixed hyperlipidemia  -     CBC & Differential; Future  -     Comprehensive Metabolic Panel; Future  -     Hemoglobin A1c; Future  -     Lipid Panel; Future  -     Microalbumin / Creatinine Urine Ratio - Urine, Clean Catch; Future  -     TSH; Future    5. Other diabetic neurological complication associated with type 2 diabetes mellitus (HCC)  -     CBC & Differential; Future  -     Comprehensive Metabolic Panel;  Future  -     Hemoglobin A1c; Future  -     Lipid Panel; Future  -     Microalbumin / Creatinine Urine Ratio - Urine, Clean Catch; Future  -     TSH; Future    Other orders  -     metFORMIN (GLUCOPHAGE) 500 MG tablet; Take 1 tablet by mouth 2 (Two) Times a Day With Meals.  Dispense: 60 tablet; Refill: 11          Glycemic Management     Diabetes mellitus type 2 not controlled      Lab Results   Component Value Date    HGBA1C 9.20 (H) 11/03/2022           Dexcom G6      Downloaded and reviewed      Dated from Nov. 2 to Nov. 15, 2022     Average bg 216     Time in target 19.8%     High 80.2%     Very high 22.5%     Low 0%     Very low 0%       Hyperglycemia all the time     She is on injections but will start pump today    She did not have pump so I could to adjust      She will call if still high once wearing pump           Adjust basal        Omni pod         Basal      MN -  2.95         Carb ratio       3      Correction         14     Target  bg 120               Taking trulicity 4.5  mg weekly      Taking Metformin 1000 mg po BID      Taking Steglatro 15 mg one daily         Previous regimen      basaglar taking 63 units ---stop        admelog--stop      Taking about 30 units            Aim for 45 grams of Carbohydrate for meals      Aim for 15 grams of Carbohydrate for snack                        Lipid Management           Taking Simvastatin 40 mg          Total Cholesterol   Date Value Ref Range Status   11/03/2022 129 0 - 200 mg/dL Final     Triglycerides   Date Value Ref Range Status   11/03/2022 88 0 - 150 mg/dL Final     HDL Cholesterol   Date Value Ref Range Status   11/03/2022 52 40 - 60 mg/dL Final     LDL Cholesterol    Date Value Ref Range Status   11/03/2022 60 0 - 100 mg/dL Final           Blood Pressure Management           Taking Lisinopril 40 mg daily               Microvascular Complication Monitoring        Last eye exam   May 2022 , mild DR            Neuropathy        Taking Gabapentin 300  mg tid        Component      Latest Ref Rng & Units 7/8/2022   Microalbumin/Creatinine Ratio          Creatinine, Urine      mg/dL 80.0   Microalbumin, Urine      mg/dL <1.2           Bone Health      vitamin d def       taking vitamin d daily              Other Diabetes Related Aspects        Lab Results   Component Value Date    [id number] 446 11/03/2022                   Thyroid health         Lab Results   Component Value Date    TSH 1.920 11/03/2022                            Follow Up   Return in about 3 months (around 2/15/2023) for Recheck.  Patient was given instructions and counseling regarding her condition or for health maintenance advice. Please see specific information pulled into the AVS if appropriate.         This document has been electronically signed by ROD Chawla on November 15, 2022 11:13 CST.

## 2022-11-22 ENCOUNTER — OFFICE VISIT (OUTPATIENT)
Dept: FAMILY MEDICINE CLINIC | Facility: CLINIC | Age: 47
End: 2022-11-22

## 2022-11-22 VITALS
DIASTOLIC BLOOD PRESSURE: 74 MMHG | HEIGHT: 66 IN | WEIGHT: 293 LBS | TEMPERATURE: 97.3 F | HEART RATE: 84 BPM | SYSTOLIC BLOOD PRESSURE: 124 MMHG | BODY MASS INDEX: 47.09 KG/M2 | OXYGEN SATURATION: 94 %

## 2022-11-22 DIAGNOSIS — I10 ESSENTIAL HYPERTENSION: ICD-10-CM

## 2022-11-22 DIAGNOSIS — E66.01 MORBID OBESITY: ICD-10-CM

## 2022-11-22 DIAGNOSIS — E55.9 VITAMIN D DEFICIENCY: ICD-10-CM

## 2022-11-22 DIAGNOSIS — Z72.0 TOBACCO USER: Primary | ICD-10-CM

## 2022-11-22 DIAGNOSIS — F20.0 SCHIZOPHRENIA, PARANOID TYPE: ICD-10-CM

## 2022-11-22 DIAGNOSIS — E78.2 MIXED HYPERLIPIDEMIA: ICD-10-CM

## 2022-11-22 DIAGNOSIS — E11.65 UNCONTROLLED TYPE 2 DIABETES MELLITUS WITH HYPERGLYCEMIA: ICD-10-CM

## 2022-11-22 DIAGNOSIS — N18.2 STAGE 2 CHRONIC KIDNEY DISEASE: ICD-10-CM

## 2022-11-22 PROCEDURE — 99214 OFFICE O/P EST MOD 30 MIN: CPT | Performed by: FAMILY MEDICINE

## 2022-11-22 RX ORDER — DULOXETIN HYDROCHLORIDE 60 MG/1
60 CAPSULE, DELAYED RELEASE ORAL DAILY
COMMUNITY
Start: 2022-11-04 | End: 2023-03-07 | Stop reason: SDUPTHER

## 2022-11-22 RX ORDER — GABAPENTIN 600 MG/1
600 TABLET ORAL 3 TIMES DAILY
COMMUNITY
Start: 2022-11-04 | End: 2023-02-28

## 2022-12-01 ENCOUNTER — TELEMEDICINE (OUTPATIENT)
Dept: PSYCHIATRY | Facility: CLINIC | Age: 47
End: 2022-12-01

## 2022-12-01 DIAGNOSIS — F41.1 GENERALIZED ANXIETY DISORDER: Primary | ICD-10-CM

## 2022-12-01 PROCEDURE — 90834 PSYTX W PT 45 MINUTES: CPT | Performed by: COUNSELOR

## 2022-12-01 NOTE — PROGRESS NOTES
Date: December 1, 2022  Time In: 8:35 AM   Time Out: 9:19 AM   This provider is located at the [facility]), [address], [place] [address] using a secure [employer] Video Visit through [employer]. Patient is being seen remotely via telehealth at home address in [place] and stated they are in a secure environment for this session. The patient's condition being diagnosed/treated is appropriate for telemedicine. The provider identified herself as well as her credentials. The patient, and/or patients guardian, consent to be seen remotely, and when consent is given they understand that the consent allows for patient identifiable information to be sent to a third party as needed. They may refuse to be seen remotely at any time. The electronic data is encrypted and password protected, and the patient and/or guardian has been advised of the potential risks to privacy not withstanding such measures.     You have chosen to receive care through a telehealth visit.  Do you consent to use a video/audio connection for your medical care today? Yes    PROGRESS NOTE  Data:  Sudhakar Saul is a 46 y.o. female who presents today for follow up. Patient states that she has been in [place] helping her aunt who broke her hip and needed help as the aunt's daughter refused to help her mother and the patient saw the need for her mother Patient states that she has resumed a relationship with a man whom she dated several years ago. Patient states that she received a call from her father who talked mainly about his health and his artwork. Patient states that she has lost 35lbs and is working through getting the clearances from her doctors for her bariatric surgery. Patient states that she is feeling better about herself and states that she is rarely hearing voices or seeing shadow movements. Discussed PTSD symptoms with the patient and how she feels that her symptoms are often  more relatable to PTSD than schizoaffective disorder which was a diagnosis that she was given during her years of active addiction.Discussed the patient's feelings of anxiety in relation to getting her surgery re Patient states that she is hoping that she can have the surgery and start doing more and would really like to be involved with substance abuse recovery in some form. Patient states that she and her mother are still trying to decide who will have their surgery first as her mother is in need of a knee replacement and the patient is preparing herself for a gastric sleeve surgery. Patient states that she needs a letter of clearance from this office before the 23rd to send to her PCP who will be working on her referral for the surgery; explained to the patient that this provider would talk with the patient's PMHNP about getting this letter completed for her.     Chief Complaint: decreased feelings of depression, nightmares, increased feelings of anxiety     History of Present Illness: Patient has struggled with feelings of anxiety, depression, and PTSD for several years      Clinical Maneuvering/Intervention: CBT    (Scales based on 0 - 10 with 10 being the worst)  Depression: 2 Anxiety: 6       Assisted patient in processing above session content; acknowledged and normalized patient’s thoughts, feelings, and concerns.  Rationalized patient thought process regarding changes in her life.  Discussed triggers associated with patient's triggers to anxiety such as frustrations with the situation with her aunt after a few weeks, waiting to talk to her PCP about her gastric surgery and continued concern for her mother and others.  Also discussed coping skills for patient to implement such as continuing to think positively, writing down thoughts and nightmares that involve past content in order to process them in a later session and maintaining appropriate boundaries with others.    Allowed patient to freely discuss  issues without interruption or judgment. Provided safe, confidential environment to facilitate the development of positive therapeutic relationship and encourage open, honest communication. Assisted patient in identifying risk factors which would indicate the need for higher level of care including thoughts to harm self or others and/or self-harming behavior and encouraged patient to contact this office, call 911, or present to the nearest emergency room should any of these events occur. Discussed crisis intervention services and means to access. Patient adamantly and convincingly denies current suicidal or homicidal ideation or perceptual disturbance.    Assessment:   Assessment   Patient appears to maintain relative stability as compared to their baseline.  However, patient continues to struggle with anxiety which continues to cause impairment in important areas of functioning.  A result, they can be reasonably expected to continue to benefit from treatment and would likely be at increased risk for decompensation otherwise.    Mental Status Exam:   Hygiene:   good  Cooperation:  Cooperative  Eye Contact:  Good  Psychomotor Behavior:  Appropriate  Affect:  Appropriate  Mood: normal  Speech:  Normal  Thought Process:  Goal directed  Thought Content:  Normal  Suicidal:  None  Homicidal:  None  Hallucinations:  None  Delusion:  None  Memory:  Deficits  Orientation:  Person, Place, Time and Situation  Reliability:  good  Insight:  Good  Judgement:  Good  Impulse Control:  Good  Physical/Medical Issues:  Yes taken off Norco and increased Cymbalta and Gabbapentin for pain       Patient's Support Network Includes:  significant other, mother, extended family and friends    Functional Status: Moderate impairment     Progress toward goal: Not at goal; showing progress    Prognosis: Good with Ongoing Treatment          Plan:    Patient will continue in individual outpatient therapy with focus on improved functioning and  coping skills, maintaining stability, and avoiding decompensation and the need for higher level of care.    Patient will adhere to medication regimen as prescribed and report any side effects. Patient will contact this office, call 911 or present to the nearest emergency room should suicidal or homicidal ideations occur. Provide Cognitive Behavioral Therapy and Solution Focused Therapy to improve functioning, maintain stability, and avoid decompensation and the need for higher level of care.     Return in about 2 weeks, or earlier if symptoms worsen or fail to improve.           VISIT DIAGNOSIS:     ICD-10-CM ICD-9-CM   1. Generalized anxiety disorder  F41.1 300.02             This document has been electronically signed by DREAD Vo  December 1, 2022 10:55 EST      Part of this note may be an electronic transcription/translation of spoken language to printed text using the Dragon Dictation System.

## 2022-12-13 RX ORDER — PROCHLORPERAZINE 25 MG/1
SUPPOSITORY RECTAL
Qty: 1 EACH | Refills: 0 | Status: SHIPPED | OUTPATIENT
Start: 2022-12-13 | End: 2023-03-20

## 2022-12-15 ENCOUNTER — TELEMEDICINE (OUTPATIENT)
Dept: PSYCHIATRY | Facility: CLINIC | Age: 47
End: 2022-12-15
Payer: COMMERCIAL

## 2022-12-15 DIAGNOSIS — F25.1 SCHIZOAFFECTIVE DISORDER, DEPRESSIVE TYPE: Primary | ICD-10-CM

## 2022-12-15 PROCEDURE — 90834 PSYTX W PT 45 MINUTES: CPT | Performed by: COUNSELOR

## 2022-12-15 NOTE — PROGRESS NOTES
"Date: January 19, 2023  Time In: 8:34 AM   Time Out: 9:24 AM   This provider is located at the [facility] (through [facility]), [address] [place] [address] using a secure [employer] Video Visit through [employer]. Patient is being seen remotely via telehealth at home address in [place] and stated they are in a secure environment for this session. The patient's condition being diagnosed/treated is appropriate for telemedicine. The provider identified herself as well as her credentials. The patient, and/or patients guardian, consent to be seen remotely, and when consent is given they understand that the consent allows for patient identifiable information to be sent to a third party as needed. They may refuse to be seen remotely at any time. The electronic data is encrypted and password protected, and the patient and/or guardian has been advised of the potential risks to privacy not withstanding such measures.     You have chosen to receive care through a telehealth visit.  Do you consent to use a video/audio connection for your medical care today? Yes    PROGRESS NOTE  Data:  Sudhakar Saul is a 47 y.o. female who presents today for follow up.  Patient states that her dating relationship is going well. Patient is worried about her echocardiogram that is coming up on the 22nd. Patient states that she was told that \"a little blip\" was found on her EKG and is a little concerned that it could be something that would prevent her from having the bariatric surgery. Discussed the letter that the patient requested from her PMHNP and how that it has been sent directly to the doctor. Discussed the progress that the patient has made in the last several months and that this therapist is also on board with the patient having the surgery as well. Patient states that she is still struggling with her mother thinking that she is going to leave her now that she has a new relationship that " is going well. Patient states that she has tried to reassure her mother that she will not ever leave her when she needs her as in her knee surgery when she has it. Patient states that she had a good conversation with her father and he was actually able to have a decent conversation with her and discuss some family issues. Patient states that she has not seen her father and step mother in over 10 years and states that she was given a number that she can text her step mother on and told that this was the best way to reach them. Patient would like to get to the point where she can have a much better relationship with her father and discuss the things from her past that still hang over her head.     Chief Complaint: feeling of depression and anxiety related to health issues and family concerns    History of Present Illness: patient has struggled with anxiety, depression and symptoms of schizoaffective disorder      Clinical Maneuvering/Intervention:  CBT    (Scales based on 0 - 10 with 10 being the worst)  Depression: 3 Anxiety: 6       Assisted patient in processing above session content; acknowledged and normalized patient’s thoughts, feelings, and concerns.  Rationalized patient thought process regarding her family and health situation.  Discussed triggers associated with patient's feelings of anxiety and depression which is related to the patient's upcoming bariatric surgery and the process of getting to that.  Also discussed coping skills for patient to implement such as taking one appointment at a time and not letting herself get overwhelmed, enjoying as much time as possible with her new boyfriend and encouraging her mother by reflecting on the patient's current mental status and her desire to have a life of her own again.    Allowed patient to freely discuss issues without interruption or judgment. Provided safe, confidential environment to facilitate the development of positive therapeutic relationship and  "encourage open, honest communication. Assisted patient in identifying risk factors which would indicate the need for higher level of care including thoughts to harm self or others and/or self-harming behavior and encouraged patient to contact this office, call 911, or present to the nearest emergency room should any of these events occur. Discussed crisis intervention services and means to access. Patient adamantly and convincingly denies current suicidal or homicidal ideation or perceptual disturbance.    Assessment:   Assessment   Patient appears to maintain relative stability as compared to their baseline.  However, patient continues to struggle with feelings of anxiety and depression which continues to cause impairment in important areas of functioning.  A result, they can be reasonably expected to continue to benefit from treatment and would likely be at increased risk for decompensation otherwise.    Mental Status Exam:   Hygiene:   good  Cooperation:  Cooperative  Eye Contact:  Good  Psychomotor Behavior:  Appropriate  Affect:  Appropriate  Mood: happy  Speech:  Normal  Thought Process:  Goal directed  Thought Content:  Normal  Suicidal:  None  Homicidal:  None  Hallucinations:  None  Delusion:  None  Memory:  Deficits  Orientation:  Person, Place, Time and Situation  Reliability:  good  Insight:  Good  Judgement:  Good  Impulse Control:  Good  Physical/Medical Issues:  Yes \"blip\" on EKG; decrease in pain meds       Patient's Support Network Includes:  significant other, mother, extended family and friends    Functional Status: Moderate impairment     Progress toward goal: Not at goal; showing progress    Prognosis: Good with Ongoing Treatment          Plan:    Patient will continue in individual outpatient therapy with focus on improved functioning and coping skills, maintaining stability, and avoiding decompensation and the need for higher level of care.    Patient will adhere to medication regimen as " prescribed and report any side effects. Patient will contact this office, call 911 or present to the nearest emergency room should suicidal or homicidal ideations occur. Provide Cognitive Behavioral Therapy and Solution Focused Therapy to improve functioning, maintain stability, and avoid decompensation and the need for higher level of care.     Return in about 2 weeks, or earlier if symptoms worsen or fail to improve.           VISIT DIAGNOSIS:     ICD-10-CM ICD-9-CM   1. Schizoaffective disorder, depressive type (HCC)  F25.1 295.70             This document has been electronically signed by DREAD Vo  January 19, 2023 08:38 EST      Part of this note may be an electronic transcription/translation of spoken language to printed text using the Dragon Dictation System.

## 2022-12-22 NOTE — PROGRESS NOTES
Subjective:  Sudhakar Saul is a 47 y.o. female who presents for       Patient Active Problem List   Diagnosis   • Seizure disorder (HCC)   • Morbid obesity (HCC)   • Tobacco abuse counseling   • Multiple joint pain   • Dysuria   • History of positive hepatitis C   • Essential hypertension   • Uncontrolled type 2 diabetes mellitus with hyperglycemia (HCC)   • Vitamin D deficiency   • Urinary tract infection without hematuria   • Chronic hepatitis C without hepatic coma (HCC)   • Dyspnea on exertion   • Cigarette nicotine dependence, uncomplicated   • JACQUE (obstructive sleep apnea)   • Gastroesophageal reflux disease with esophagitis   • Left upper quadrant pain   • Nausea and vomiting   • Weight gain, abnormal   • Change in bowel habits   • Hepatic fibrosis   • Non-compliance   • Schizoaffective disorder, depressive type (HCC)   • Schizophrenia, paranoid type ([facility])   • Gastritis without bleeding   • Tobacco user   • Pre-operative cardiovascular examination   • Scoliosis   • History of drug use   • Stage 2 chronic kidney disease   • Diabetic neuropathy (HCC)   • Mixed hyperlipidemia   • Chronic bilateral low back pain with bilateral sciatica   • Type 2 diabetes mellitus with hyperglycemia, with long-term current use of insulin ([facility])   • Auditory hallucination   • Class 3 severe obesity due to excess calories with serious comorbidity and body mass index (BMI) of 45.0 to 49.9 in adult ([facility])   • Pain in wrist   • Schizophrenia, simple, chronic (HCC)   • Carpal tunnel syndrome   • Status post carpal tunnel release   • Asthma, persistent not controlled   • Weight loss counseling, encounter for   • Encounter for pre-bariatric surgery counseling and education   • Generalized anxiety disorder           Current Outpatient Medications:   •  albuterol (ACCUNEB) 1.25 MG/3ML nebulizer solution, Take 3 mL by nebulization Every 6 (Six) Hours As Needed for Wheezing or Shortness of Air., Disp: 9 mL, Rfl: 3  •  aspirin 81 MG EC  tablet, Take 1 tablet by mouth Daily., Disp: 30 tablet, Rfl: 3  •  Cariprazine HCl 4.5 MG capsule, Take 4.5 mg by mouth Daily., Disp: 90 capsule, Rfl: 0  •  Continuous Blood Gluc  (Dexcom G6 ) device, USE FOR 6 MONTHS, Disp: , Rfl:   •  Continuous Blood Gluc Sensor (Dexcom G6 Sensor), USE AS DIRECTED EVERY  10  DAYS, Disp: 9 each, Rfl: 0  •  Continuous Blood Gluc Transmit (Dexcom G6 Transmitter) misc, USE 1 TRANSMITTER EVERY 3 MONTHS, Disp: 1 each, Rfl: 0  •  cyclobenzaprine (FLEXERIL) 10 MG tablet, Take 10 mg by mouth Daily., Disp: , Rfl:   •  Dulaglutide (Trulicity) 4.5 MG/0.5ML solution pen-injector, Inject 4.5 mg under the skin into the appropriate area as directed 1 (One) Time Per Week., Disp: 4 pen, Rfl: 11  •  DULoxetine (CYMBALTA) 30 MG capsule, Take 3 capsules by mouth Daily., Disp: 270 capsule, Rfl: 0  •  DULoxetine (CYMBALTA) 60 MG capsule, Take 60 mg by mouth Daily., Disp: , Rfl:   •  Ertugliflozin L-PyroglutamicAc (Steglatro) 15 MG tablet, Take 1 tablet by mouth Every Morning., Disp: 90 tablet, Rfl: 1  •  Fluticasone Furoate-Vilanterol (Breo Ellipta) 200-25 MCG/INH inhaler, Inhale 1 puff Daily., Disp: 28 each, Rfl: 11  •  folic acid (FOLVITE) 1 MG tablet, Take 1,000 mcg by mouth Daily., Disp: , Rfl:   •  gabapentin (NEURONTIN) 600 MG tablet, Take 600 mg by mouth 3 (Three) Times a Day., Disp: , Rfl:   •  glucose blood test strip, Testing 4 times daily, E11.9, Disp: 120 each, Rfl: 12  •  hydrOXYzine (ATARAX) 25 MG tablet, Take 0.5-1 tablets by mouth 3 (Three) Times a Day As Needed for Anxiety., Disp: 270 tablet, Rfl: 0  •  Insulin Disposable Pump (OmniPod Dash 5 Pack Pods) misc, , Disp: , Rfl:   •  insulin lispro (Admelog) 100 UNIT/ML injection, 180  units daily through pump, Disp: 90 mL, Rfl: 11  •  lamoTRIgine (LaMICtal) 150 MG tablet, Take 1 tablet by mouth Daily., Disp: 90 tablet, Rfl: 0  •  levETIRAcetam (KEPPRA) 1000 MG tablet, Take 1 tablet by mouth 2 (Two) Times a Day., Disp: 60  tablet, Rfl: 3  •  lisinopril (PRINIVIL,ZESTRIL) 40 MG tablet, Take 1 tablet by mouth Daily., Disp: 30 tablet, Rfl: 3  •  metFORMIN (GLUCOPHAGE) 500 MG tablet, Take 1 tablet by mouth 2 (Two) Times a Day With Meals., Disp: 60 tablet, Rfl: 11  •  metoprolol tartrate (LOPRESSOR) 50 MG tablet, Take 1 tablet by mouth 2 (Two) Times a Day., Disp: 60 tablet, Rfl: 3  •  Nebulizer device, 1 application Every 6 (Six) Hours As Needed (dyspnea)., Disp: 1 each, Rfl: 3  •  nicotine (NICODERM CQ) 21 MG/24HR patch, Place 1 patch on the skin as directed by provider Daily., Disp: 28 patch, Rfl: 3  •  NIFEdipine XL (PROCARDIA XL) 30 MG 24 hr tablet, Take 1 tablet by mouth Daily., Disp: 30 tablet, Rfl: 3  •  omeprazole (priLOSEC) 40 MG capsule, Take 1 capsule by mouth Daily., Disp: 30 capsule, Rfl: 3  •  OXcarbazepine (TRILEPTAL) 600 MG tablet, Take 600 mg by mouth 2 (Two) Times a Day. PT TAKES  MG TAB IN MORNING AND  MG IN EVENING, Disp: , Rfl:   •  prazosin (MINIPRESS) 2 MG capsule, Take 1-2 capsules by mouth Every Night., Disp: 180 capsule, Rfl: 0  •  simvastatin (ZOCOR) 40 MG tablet, Take 1 tablet by mouth Every Night., Disp: 30 tablet, Rfl: 3  •  Ventolin  (90 Base) MCG/ACT inhaler, Inhale 2 puffs Every 4 (Four) Hours As Needed for Wheezing., Disp: 6.7 g, Rfl: 11  •  vitamin D (ERGOCALCIFEROL) 1.25 MG (49542 UT) capsule capsule, Take 2 tablets by mouth weekly, Disp: 8 capsule, Rfl: 3  •  fluconazole (Diflucan) 150 MG tablet, Take 1 tablet by mouth 1 (One) Time for 1 dose. Take 1 tablet now and in 72 hours, Disp: 2 tablet, Rfl: 0    HPI     Pt is 47 yo female with management  of morbid obesity, IDDM Type 2 now on insulin pump , HTN, HLP, vitamin D deficiency, tobacco user, GERD, gastritis, esophagitis, diverticulosis, colonic polyp in sigmoid colon,  paranoid schizophrenia,  Seizure disorder, history of Illicit drug abuse, asthma , JACQUE, chronic hepatitis C , major depression, JD, sp appendectomy, sp  cholecystectomy sp right carpal tunnel release, sp back surgery, sp breast surgery, sp lipoma excision, chronic back pain (moderate L4-L5 and L5-S1 DDD changes, mild bilateral L4-L5 moderate bilateral L5-S1 facet arthritic change, mild leveoscoliosis), CKD stage 2, de Quervain's syndrome of left wrist ,sp left carpal tunnel release. 0.3 cm nodule along left lower and right middle lobe     11/22/22 in office visit for recheck. Pt had appt with  on 1/1/22 she canceled her appt with Pulmonlogy on 11/8/22 and Sleep Medicine on 11/4/22. Saw Endocrinology on 11/15/22. She continues using an  insulin pump omni pod with basal at 2.95 along with trulicity metformin and steglatro. Pt lost 12 lbs since her last visit and now is at 323 lbs.  Pt is now in a relationship. She continues to refrain from smoking. She is doing better mentally.    1/5/23  in office visit for recheck. Pt is here for weight check. She has yet to get labwork ordered on 11/15/22. She continue her telehealth visits with . Her last weight in November 2022 was 323 lbs and today iit is 305 lbs with a BMI of 49.29.pt has pending echocardiogram scheduled along with sleep lab has upcoming appt on 2/6/23. Her echo is on 2/23/23.  Pt reports no chest pain no dizziness. Sugars have been erratic.      Hyperlipidemia  The current episode started more than 1 year ago. The problem is controlled. Recent lipid tests were reviewed and are variable. Exacerbating diseases include chronic renal disease, diabetes and obesity. She has no history of nephrotic syndrome. Associated symptoms include shortness of breath. Pertinent negatives include no chest pain. Current antihyperlipidemic treatment includes statins. The current treatment provides significant improvement of lipids. Risk factors for coronary artery disease include diabetes mellitus, obesity, dyslipidemia, a sedentary lifestyle, stress and hypertension.   Seizures   This is a chronic problem. The current episode  started more than 1 week ago. The problem has not changed since onset.Pertinent negatives include no sleepiness, no confusion, no headaches, no speech difficulty, no visual disturbance, no neck stiffness, no sore throat, no chest pain, no cough, no nausea, no vomiting, no diarrhea and no muscle weakness. There has been no fever.   Chronic Kidney Disease  This is a chronic problem. The current episode started more than 1 year ago. The problem occurs constantly. The problem has been unchanged. Associated symptoms include arthralgias, fatigue, numbness and weakness. Pertinent negatives include no abdominal pain, chest pain, coughing, headaches, nausea, sore throat or vomiting. Nothing aggravates the symptoms. She has tried nothing for the symptoms. The treatment provided no relief.   Heartburn  She reports no abdominal pain, no belching, no chest pain, no choking, no coughing, no early satiety, no globus sensation, no heartburn, no hoarse voice, no nausea, no sore throat, no stridor, no tooth decay, no water brash or no wheezing. This is a chronic problem. The current episode started more than 1 month ago. The problem occurs constantly. The problem has been unchanged. Nothing aggravates the symptoms. Associated symptoms include fatigue. She has tried a PPI for the symptoms.   Obesity   This is a chronic problem. The current episode started more than 1 year ago. The problem occurs constantly. The problem has been improving . Associated symptoms include arthralgias, fatigue, headaches, numbness and weakness. Pertinent negatives include no abdominal pain, anorexia, change in bowel habit, chest pain, chills, congestion, coughing, fever, joint swelling, myalgias, nausea, neck pain, rash, sore throat, swollen glands, urinary symptoms, vertigo, visual change or vomiting. Nothing aggravates the symptoms. She has tried nothing for the symptoms.   Hypertension   This is a chronic problem. The current episode started more than 1  year ago. The problem is controlled  Associated symptoms include headaches. Pertinent negatives include no anxiety, blurred vision, chest pain, malaise/fatigue, neck pain, orthopnea, palpitations, peripheral edema, PND or shortness of breath. Risk factors for coronary artery disease include obesity, sedentary lifestyle, smoking/tobacco exposure and stress. Past treatments include ACE inhibitors, beta blockers and diuretics. There are no compliance problems.  There is no history of angina, kidney disease, CAD/MI, CVA, heart failure, left ventricular hypertrophy, PVD or retinopathy. There is no history of chronic renal disease, coarctation of the aorta, hyperaldosteronism, hypercortisolism, hyperparathyroidism, a hypertension causing med, pheochromocytoma, renovascular disease, sleep apnea or a thyroid problem.   Diabetes   She presents for her followupdiabetic visit. She has type 2 diabetes mellitus. Her disease course has been waxing and waning  Hypoglycemia symptoms include confusion, headaches, nervousness/anxiousness, seizures and sleepiness. Associated symptoms include fatigue and weakness. Pertinent negatives for diabetes include no blurred vision, no chest pain and no visual change. Pertinent negatives for diabetic complications include no CVA, PVD or retinopathy. Risk factors for coronary artery disease include dyslipidemia, obesity and post-menopausal. She has not had a previous visit with a dietitian. There is no change in her home blood glucose trend. An ACE inhibitor/angiotensin II receptor blocker is not being taken. She does not see a podiatrist.Eye exam is not current. teratments include steglastro insulin and trulicity        Review of Systems  Review of Systems   Constitutional: Positive for activity change and fatigue. Negative for appetite change, chills, diaphoresis and fever.   HENT: Negative for congestion, postnasal drip, rhinorrhea, sinus pressure, sinus pain, sneezing, sore throat, trouble  swallowing and voice change.    Respiratory: Negative for cough, choking, chest tightness, shortness of breath, wheezing and stridor.    Cardiovascular: Negative for chest pain.   Gastrointestinal: Positive for abdominal pain. Negative for diarrhea, nausea and vomiting.   Musculoskeletal: Positive for arthralgias and back pain.   Neurological: Positive for seizures, weakness and numbness. Negative for headaches.   Psychiatric/Behavioral: Positive for behavioral problems and hallucinations. The patient is nervous/anxious.         Depressed mood        Patient Active Problem List   Diagnosis   • Seizure disorder ([facility])   • Morbid obesity ([facility])   • Tobacco abuse counseling   • Multiple joint pain   • Dysuria   • History of positive hepatitis C   • Essential hypertension   • Uncontrolled type 2 diabetes mellitus with hyperglycemia ([facility])   • Vitamin D deficiency   • Urinary tract infection without hematuria   • Chronic hepatitis C without hepatic coma ([facility])   • Dyspnea on exertion   • Cigarette nicotine dependence, uncomplicated   • JACQUE (obstructive sleep apnea)   • Gastroesophageal reflux disease with esophagitis   • Left upper quadrant pain   • Nausea and vomiting   • Weight gain, abnormal   • Change in bowel habits   • Hepatic fibrosis   • Non-compliance   • Schizoaffective disorder, depressive type ([facility])   • Schizophrenia, paranoid type ([facility])   • Gastritis without bleeding   • Tobacco user   • Pre-operative cardiovascular examination   • Scoliosis   • History of drug use   • Stage 2 chronic kidney disease   • Diabetic neuropathy ([facility])   • Mixed hyperlipidemia   • Chronic bilateral low back pain with bilateral sciatica   • Type 2 diabetes mellitus with hyperglycemia, with long-term current use of insulin ([facility])   • Auditory hallucination   • Class 3 severe obesity due to excess calories with serious comorbidity and body mass index (BMI) of 45.0 to 49.9 in adult ([facility])   • Pain in wrist   • Schizophrenia, simple, chronic  (HCC)   • Carpal tunnel syndrome   • Status post carpal tunnel release   • Asthma, persistent not controlled   • Weight loss counseling, encounter for   • Encounter for pre-bariatric surgery counseling and education   • Generalized anxiety disorder     Past Surgical History:   Procedure Laterality Date   • APPENDECTOMY     • BACK SURGERY     • BREAST SURGERY     • CARPAL TUNNEL RELEASE      right hand   • CARPAL TUNNEL RELEASE Left 10/19/2021    Procedure: LEFT CARPAL TUNNEL RELEASE;  Surgeon: Kevin Carlson MD;  Location: [facility] OR;  Service: Orthopedics;  Laterality: Left;   • CHOLECYSTECTOMY     • COLONOSCOPY     • COLONOSCOPY N/A 2019    Procedure: COLONOSCOPY;  Surgeon: Joni Delacruz MD;  Location: [facility] ENDOSCOPY;  Service: Gastroenterology   • DENTAL PROCEDURE     • ENDOSCOPY N/A 2019    Procedure: ESOPHAGOGASTRODUODENOSCOPY--poss dilation;  Surgeon: Joni Delacruz MD;  Location: [facility] ENDOSCOPY;  Service: Gastroenterology   • HYSTERECTOMY     • LIPOMA EXCISION      9   • LIVER BIOPSY     • UPPER GASTROINTESTINAL ENDOSCOPY  2019     Social History     Socioeconomic History   • Marital status:    Tobacco Use   • Smoking status: Former     Packs/day: 1.00     Years: 35.00     Pack years: 35.00     Types: Cigarettes     Start date:      Quit date: 2022     Years since quittin.5   • Smokeless tobacco: Never   • Tobacco comments:     on patch trying to quit    Vaping Use   • Vaping Use: Never used   Substance and Sexual Activity   • Alcohol use: Not Currently   • Drug use: Not Currently     Types: Amphetamines, Heroin, Methamphetamines, Morphine, Oxycodone     Comment: QUIT 3 YEARS AGO   • Sexual activity: Not Currently     Birth control/protection: Surgical     Family History   Problem Relation Age of Onset   • Hypertension Other    • Diabetes Other    • Stroke Other    • Cancer Other    • Kidney disease Other    • Lung disease Other    • Other Other    • Malone's  esophagus Other    • Colon polyps Other    • Heart disease Other    • Ulcers Other    • Cholelithiasis Other    • Allergy (severe) Other    • Schizophrenia Father    • Alcohol abuse Father    • Drug abuse Paternal Grandfather      Lab on 11/03/2022   Component Date Value Ref Range Status   • Glucose 11/03/2022 196 (H)  65 - 99 mg/dL Final   • BUN 11/03/2022 15  6 - 20 mg/dL Final   • Creatinine 11/03/2022 0.80  0.57 - 1.00 mg/dL Final   • Sodium 11/03/2022 137  136 - 145 mmol/L Final   • Potassium 11/03/2022 4.9  3.5 - 5.2 mmol/L Final   • Chloride 11/03/2022 103  98 - 107 mmol/L Final   • CO2 11/03/2022 23.0  22.0 - 29.0 mmol/L Final   • Calcium 11/03/2022 9.2  8.6 - 10.5 mg/dL Final   • Total Protein 11/03/2022 7.0  6.0 - 8.5 g/dL Final   • Albumin 11/03/2022 3.70  3.50 - 5.20 g/dL Final   • ALT (SGPT) 11/03/2022 34 (H)  1 - 33 U/L Final   • AST (SGOT) 11/03/2022 34 (H)  1 - 32 U/L Final   • Alkaline Phosphatase 11/03/2022 104  39 - 117 U/L Final   • Total Bilirubin 11/03/2022 0.2  0.0 - 1.2 mg/dL Final   • Globulin 11/03/2022 3.3  gm/dL Final   • A/G Ratio 11/03/2022 1.1  g/dL Final   • BUN/Creatinine Ratio 11/03/2022 18.8  7.0 - 25.0 Final   • Anion Gap 11/03/2022 11.0  5.0 - 15.0 mmol/L Final   • eGFR 11/03/2022 91.0  >60.0 mL/min/1.73 Final    National Kidney Foundation and American Society of Nephrology (ASN) Task Force recommended calculation based on the Chronic Kidney Disease Epidemiology Collaboration (CKD-EPI) equation refit without adjustment for race.   • Hemoglobin A1C 11/03/2022 9.20 (H)  4.80 - 5.60 % Final   • Total Cholesterol 11/03/2022 129  0 - 200 mg/dL Final   • Triglycerides 11/03/2022 88  0 - 150 mg/dL Final   • HDL Cholesterol 11/03/2022 52  40 - 60 mg/dL Final   • LDL Cholesterol  11/03/2022 60  0 - 100 mg/dL Final   • VLDL Cholesterol 11/03/2022 17  5 - 40 mg/dL Final   • LDL/HDL Ratio 11/03/2022 1.14   Final   • Microalbumin/Creatinine Ratio 11/03/2022    Final    Unable to calculate   •  Creatinine, Urine 11/03/2022 42.5  mg/dL Final   • Microalbumin, Urine 11/03/2022 <1.2  mg/dL Final   • TSH 11/03/2022 1.920  0.270 - 4.200 uIU/mL Final   • Vitamin B-12 11/03/2022 446  211 - 946 pg/mL Final   • 25 Hydroxy, Vitamin D 11/03/2022 46.1  30.0 - 100.0 ng/ml Final   • WBC 11/03/2022 7.70  3.40 - 10.80 10*3/mm3 Final   • RBC 11/03/2022 5.18  3.77 - 5.28 10*6/mm3 Final   • Hemoglobin 11/03/2022 13.6  12.0 - 15.9 g/dL Final   • Hematocrit 11/03/2022 43.5  34.0 - 46.6 % Final   • MCV 11/03/2022 84.0  79.0 - 97.0 fL Final   • MCH 11/03/2022 26.3 (L)  26.6 - 33.0 pg Final   • MCHC 11/03/2022 31.3 (L)  31.5 - 35.7 g/dL Final   • RDW 11/03/2022 14.6  12.3 - 15.4 % Final   • RDW-SD 11/03/2022 44.0  37.0 - 54.0 fl Final   • MPV 11/03/2022 11.2  6.0 - 12.0 fL Final   • Platelets 11/03/2022 199  140 - 450 10*3/mm3 Final   • Neutrophil % 11/03/2022 62.9  42.7 - 76.0 % Final   • Lymphocyte % 11/03/2022 31.6  19.6 - 45.3 % Final   • Monocyte % 11/03/2022 4.0 (L)  5.0 - 12.0 % Final   • Eosinophil % 11/03/2022 0.6  0.3 - 6.2 % Final   • Basophil % 11/03/2022 0.6  0.0 - 1.5 % Final   • Immature Grans % 11/03/2022 0.3  0.0 - 0.5 % Final   • Neutrophils, Absolute 11/03/2022 4.84  1.70 - 7.00 10*3/mm3 Final   • Lymphocytes, Absolute 11/03/2022 2.43  0.70 - 3.10 10*3/mm3 Final   • Monocytes, Absolute 11/03/2022 0.31  0.10 - 0.90 10*3/mm3 Final   • Eosinophils, Absolute 11/03/2022 0.05  0.00 - 0.40 10*3/mm3 Final   • Basophils, Absolute 11/03/2022 0.05  0.00 - 0.20 10*3/mm3 Final   • Immature Grans, Absolute 11/03/2022 0.02  0.00 - 0.05 10*3/mm3 Final   • nRBC 11/03/2022 0.0  0.0 - 0.2 /100 WBC Final   Office Visit on 10/13/2022   Component Date Value Ref Range Status   • QT Interval 10/13/2022 354  ms Final   • QTC Interval 10/13/2022 408  ms Final   Lab on 07/08/2022   Component Date Value Ref Range Status   • Glucose 07/08/2022 106 (H)  65 - 99 mg/dL Final   • BUN 07/08/2022 16  6 - 20 mg/dL Final   • Creatinine 07/08/2022  0.82  0.57 - 1.00 mg/dL Final   • Sodium 07/08/2022 139  136 - 145 mmol/L Final   • Potassium 07/08/2022 4.7  3.5 - 5.2 mmol/L Final   • Chloride 07/08/2022 106  98 - 107 mmol/L Final   • CO2 07/08/2022 21.0 (L)  22.0 - 29.0 mmol/L Final   • Calcium 07/08/2022 9.4  8.6 - 10.5 mg/dL Final   • Total Protein 07/08/2022 7.0  6.0 - 8.5 g/dL Final   • Albumin 07/08/2022 3.80  3.50 - 5.20 g/dL Final   • ALT (SGPT) 07/08/2022 37 (H)  1 - 33 U/L Final   • AST (SGOT) 07/08/2022 42 (H)  1 - 32 U/L Final   • Alkaline Phosphatase 07/08/2022 93  39 - 117 U/L Final   • Total Bilirubin 07/08/2022 0.2  0.0 - 1.2 mg/dL Final   • Globulin 07/08/2022 3.2  gm/dL Final   • A/G Ratio 07/08/2022 1.2  g/dL Final   • BUN/Creatinine Ratio 07/08/2022 19.5  7.0 - 25.0 Final   • Anion Gap 07/08/2022 12.0  5.0 - 15.0 mmol/L Final   • eGFR 07/08/2022 88.4  >60.0 mL/min/1.73 Final    National Kidney Foundation and American Society of Nephrology (ASN) Task Force recommended calculation based on the Chronic Kidney Disease Epidemiology Collaboration (CKD-EPI) equation refit without adjustment for race.   • Hemoglobin A1C 07/08/2022 8.70 (H)  4.80 - 5.60 % Final   • Total Cholesterol 07/08/2022 119  0 - 200 mg/dL Final   • Triglycerides 07/08/2022 152 (H)  0 - 150 mg/dL Final   • HDL Cholesterol 07/08/2022 43  40 - 60 mg/dL Final   • LDL Cholesterol  07/08/2022 50  0 - 100 mg/dL Final   • VLDL Cholesterol 07/08/2022 26  5 - 40 mg/dL Final   • LDL/HDL Ratio 07/08/2022 1.06   Final   • Microalbumin/Creatinine Ratio 07/08/2022    Final    Unable to calculate   • Creatinine, Urine 07/08/2022 80.0  mg/dL Final   • Microalbumin, Urine 07/08/2022 <1.2  mg/dL Final   • TSH 07/08/2022 2.290  0.270 - 4.200 uIU/mL Final   • 25 Hydroxy, Vitamin D 07/08/2022 34.9  30.0 - 100.0 ng/ml Final   • WBC 07/08/2022 8.79  3.40 - 10.80 10*3/mm3 Final   • RBC 07/08/2022 4.87  3.77 - 5.28 10*6/mm3 Final   • Hemoglobin 07/08/2022 13.6  12.0 - 15.9 g/dL Final   • Hematocrit  07/08/2022 41.3  34.0 - 46.6 % Final   • MCV 07/08/2022 84.8  79.0 - 97.0 fL Final   • MCH 07/08/2022 27.9  26.6 - 33.0 pg Final   • MCHC 07/08/2022 32.9  31.5 - 35.7 g/dL Final   • RDW 07/08/2022 14.6  12.3 - 15.4 % Final   • RDW-SD 07/08/2022 44.4  37.0 - 54.0 fl Final   • MPV 07/08/2022 11.0  6.0 - 12.0 fL Final   • Platelets 07/08/2022 176  140 - 450 10*3/mm3 Final   • Neutrophil % 07/08/2022 55.8  42.7 - 76.0 % Final   • Lymphocyte % 07/08/2022 36.3  19.6 - 45.3 % Final   • Monocyte % 07/08/2022 4.4 (L)  5.0 - 12.0 % Final   • Eosinophil % 07/08/2022 2.3  0.3 - 6.2 % Final   • Basophil % 07/08/2022 0.7  0.0 - 1.5 % Final   • Immature Grans % 07/08/2022 0.5  0.0 - 0.5 % Final   • Neutrophils, Absolute 07/08/2022 4.91  1.70 - 7.00 10*3/mm3 Final   • Lymphocytes, Absolute 07/08/2022 3.19 (H)  0.70 - 3.10 10*3/mm3 Final   • Monocytes, Absolute 07/08/2022 0.39  0.10 - 0.90 10*3/mm3 Final   • Eosinophils, Absolute 07/08/2022 0.20  0.00 - 0.40 10*3/mm3 Final   • Basophils, Absolute 07/08/2022 0.06  0.00 - 0.20 10*3/mm3 Final   • Immature Grans, Absolute 07/08/2022 0.04  0.00 - 0.05 10*3/mm3 Final   • nRBC 07/08/2022 0.0  0.0 - 0.2 /100 WBC Final      CT Chest Low Dose Cancer Screening WO  Narrative: PROCEDURE:  CT LUNG SCREENING    CLINICAL HISTORY:  Lung cancer screening (Age 18-49y), Z72.0  Tobacco use.  High risk patient population group (with smoking  history)    COMPARISON EXAMINATIONS:   none        TECHNIQUE:  Low dose CT protocol.  This exam was performed  according to our departmental dose optimization program, which  includes automated exposure control, adjustment of the mA and/or  KV according to patient size and/or use of iterative  reconstruction technique.    FINDINGS:         PULMONARY NODULES: Abutting the pleura of the left lower and  right middle lobe are tiny, 0.3 cm noncalcified nodular opacities  (series 3, image 186 along the left lower lobe, image 170 along  the right middle lobe).    No focal  airspace consolidation. Mild linear atelectasis/scar in  the lingula. No pleural effusion or pneumothorax. The central  airways are patent. No bronchiectasis.    No thoracic lymphadenopathy.          Impression: There is a tiny, pleural-based 0.3 cm nodule along the left lower  and right middle lobe.             Follow-up recommendation(s):  Continue annual screening with low  dose CT in 12 months        ACR Lung-RADS assessment score:  Category 2: BENIGN APPEARANCE or  behavior       References:      ---------------    NCCN for patients: (patient handbook)   https://nccn.org/patients/guidelines/lung_screening/index.html       NCCN for physicians:   http://www.nccn.org/professionals/physician_gls/pdf/lung_screenin  .pdf    ACR classification and management suggestions:  http://www.acr.org/~/media/ACR/Documents/PDF/QualitySafety/Reso  rces/LungRADS/AssessmentCategories.pdf?              Electronically signed by:  Too Campbell MD  8/6/2022 7:26 AM CDT  Workstation: [id number]    [email or web address]  Immunization History   Administered Date(s) Administered   • COVID-19 (MODERNA) 1st, 2nd, 3rd Dose Only 04/06/2021, 04/08/2021, 04/08/2021, 05/06/2021, 05/06/2021, 12/10/2021   • FluLaval/Fluzone >6mos 09/17/2020, 10/28/2021, 10/21/2022   • Hepatitis A 06/12/2018   • Hepatitis B 10/21/2022   • Hepatitis B Vaccine Adult IM 06/14/2022, 10/21/2022   • Influenza, Unspecified 10/21/2022   • Pneumococcal Polysaccharide (PPSV23) 12/13/2018   • Tdap 09/17/2020       The following portions of the patient's history were reviewed and updated as appropriate: allergies, current medications, past family history, past medical history, past social history, past surgical history and problem list.        Physical Exam  /66 (BP Location: Left arm, Patient Position: Sitting, Cuff Size: Adult)   Pulse 96   Temp 97.6 °F (36.4 °C)   Ht 167.6 cm (66\")   Wt (!) 139 kg (305 lb 6.4 oz)   SpO2 97%   BMI 49.29 kg/m²     /66 (BP Location:  Left arm, Patient Position: Sitting, Cuff Size: Adult)   Pulse 96   Temp 97.6 °F (36.4 °C)   Ht 167.6 cm (66\")   Wt (!) 139 kg (305 lb 6.4 oz)   SpO2 97%   BMI 49.29 kg/m²     Physical Exam  Vitals and nursing note reviewed.   Constitutional:       Appearance: She is well-developed. She is not diaphoretic.   HENT:      Head: Normocephalic and atraumatic.      Right Ear: External ear normal.   Eyes:      Conjunctiva/sclera: Conjunctivae normal.      Pupils: Pupils are equal, round, and reactive to light.   Cardiovascular:      Rate and Rhythm: Normal rate and regular rhythm.      Heart sounds: Normal heart sounds. No murmur heard.  Pulmonary:      Effort: Pulmonary effort is normal. No respiratory distress.      Breath sounds: Normal breath sounds.   Abdominal:      General: Bowel sounds are normal. There is no distension.      Palpations: Abdomen is soft.      Tenderness: There is no abdominal tenderness.      Comments: Obese abdomen    Musculoskeletal:         General: Tenderness present. No deformity. Normal range of motion.      Cervical back: Normal range of motion and neck supple.   Skin:     General: Skin is warm.      Coloration: Skin is not pale.      Findings: No erythema or rash.   Neurological:      Mental Status: She is alert and oriented to person, place, and time.      Cranial Nerves: No cranial nerve deficit.   Psychiatric:         Behavior: Behavior normal.         [unfilled]   Diagnosis Plan   1. Morbid obesity (HCC)        2. Essential hypertension        3. Mixed hyperlipidemia        4. Uncontrolled type 2 diabetes mellitus with hyperglycemia (HCC)        5. Weight loss counseling, encounter for        6. Schizophrenia, paranoid type (HCC)        7. History of drug use        8. Seizure disorder (HCC)        9. Tobacco user        10. Stage 2 chronic kidney disease        11. Gastroesophageal reflux disease with esophagitis without hemorrhage        12. Bariatric surgery status                 -recommend labowork   -recommend diabetic eye exam   -recommend hep B vaccination -  -recommend COVID-19 vaccination  -advised pt to make appt with sleep medicine and pulmonology   -elevated liver enzymes -suspect fatty liver will gt US of liver   -GERD with esophagitis  - on prilosec 20 mg daiy. Consider another EGD if not improving. GI following   -tobacco user - Counseled quit smoking >5 minutes.  recommend 1 800 QUIT NOW and nicotine patches   -ckd stage 2 - stable continue to monitor. Stressed importance of diabetes control and BP control   -JACQUE - referred back to sleep apnea.   -hyperlipdemia - on  simvastatin 40 mg PO qhs. ASCVD risk high. Now on red yeast rice. On fish oil supplement  -chronic back pain/DDD lumbar spine/arthritis lumbar spine  - on neurontin 100 mg PO tID on flexeril 5 mg PO TID PRN on Norco 7.5/325 mg every 8 hours TN. PM following. She will be referred to another PM in [place] that will take her insurance   -dyspnea/mild asthma/pulmonary nodule   -Pulmonlogy following on breo now and abluterol PRN.   Albuterol nebulizer and treatment every 6 hours PRN.  repeat CT of chest in 1 year   -DM type 2- Endocrinology following. Currently on metformin 1000 gm PO BID, steglatro 15 mg daily. trulicity 4.5 mg now on insulin pump.    -vitamin D  defciency - vitamin D once a week   -schizophrenia/major depression.JD  -  [facility] following no on lamictal 150 mg PO q daily and vraylar 1.5 mg daily. risperdal stopped  on minipress 2 mg at bedtime. On vistaril 25 mg PO TID PRN   -seizure disorder - Neurology following stable on Keppra 1000 mg Po BID. Neurology following in [place]. She is now on tileptal 600 mg daily.    -Morbid obesity - counseled weight loss >5 minutes BMI at 49.29    Will refer to Dr. Benavides for weight loss surgery along with Dr. García with Cardiology for preoperative clearance and Sleep medicine for sleep apnea. Has echo pending    Will treat this at month 6 of 6.. she has  pending surgery until cleared by Cardiologist.    -hypertension -  on lisinopril  40 mg dialy.  lopressor 50 mg PO BID  on nifedipine 30 mg daily.    -advised pt to be safe and call with any questions or concerns. All questions addressed today  -advised pt to followup with specialist and referrals  -advised pt to go to ER or call 911 if symptoms worrisome or severe  -advised pt to be safe during COVID-19 pandemic  I spent 30  minutes caring for Sudhakar on this date of service. This time includes time spent by me in the following activities: preparing for the visit, reviewing tests, obtaining and/or reviewing a separately obtained history, performing a medically appropriate examination and/or evaluation, counseling and educating the patient/family/caregiver, ordering medications, tests, or procedures, referring and communicating with other health care professionals, documenting information in the medical record, independently interpreting results and communicating that information with the patient/family/caregiver and care coordination  -recheck in 2 months         This document has been electronically signed by Jermaine Ferro MD on January 5, 2023 13:15 CST          Answers for HPI/ROS submitted by the patient on 1/5/2023  Please describe your symptoms.: 6 month weight loss consult  Have you had these symptoms before?: Yes  How long have you been having these symptoms?: Greater than 2 weeks  What is the primary reason for your visit?: Other

## 2022-12-28 ENCOUNTER — TELEMEDICINE (OUTPATIENT)
Dept: PSYCHIATRY | Facility: CLINIC | Age: 47
End: 2022-12-28

## 2022-12-28 DIAGNOSIS — F25.1 SCHIZOAFFECTIVE DISORDER, DEPRESSIVE TYPE: Primary | ICD-10-CM

## 2022-12-28 DIAGNOSIS — F41.1 GENERALIZED ANXIETY DISORDER: ICD-10-CM

## 2022-12-28 DIAGNOSIS — F51.5 NIGHTMARES: ICD-10-CM

## 2022-12-28 PROCEDURE — 99214 OFFICE O/P EST MOD 30 MIN: CPT | Performed by: NURSE PRACTITIONER

## 2022-12-28 RX ORDER — DULOXETIN HYDROCHLORIDE 30 MG/1
90 CAPSULE, DELAYED RELEASE ORAL DAILY
Qty: 270 CAPSULE | Refills: 0 | Status: SHIPPED | OUTPATIENT
Start: 2022-12-28 | End: 2023-02-28 | Stop reason: SDUPTHER

## 2022-12-28 RX ORDER — PRAZOSIN HYDROCHLORIDE 2 MG/1
2-4 CAPSULE ORAL NIGHTLY
Qty: 180 CAPSULE | Refills: 0 | Status: SHIPPED | OUTPATIENT
Start: 2022-12-28 | End: 2023-02-28 | Stop reason: SDUPTHER

## 2022-12-28 RX ORDER — LAMOTRIGINE 150 MG/1
150 TABLET ORAL DAILY
Qty: 90 TABLET | Refills: 0 | Status: SHIPPED | OUTPATIENT
Start: 2022-12-28 | End: 2023-02-28 | Stop reason: SDUPTHER

## 2022-12-28 NOTE — PROGRESS NOTES
"This provider is located at [facility], [address] [place] [address].The Patient is seen remotely at home, [address] [place] [address] via [employer].  Patient is being seen via telehealth and confirm that they are in a secure environment for this session. The patient's condition being diagnosed/treated is appropriate for telemedicine. The provider identified himself/herself: herself as well as her credentials.   The patient gave consent to be seen remotely, and when consent is given they understand that the consent allows for patient identifiable information to be sent to a third party as needed.   They may refuse to be seen remotely at any time. The electronic data is encrypted and password protected, and the patient has been advised of the potential risks to privacy not withstanding such measures.    You have chosen to receive care through a telehealth visit.  Do you consent to use a video/audio connection for your medical care today? Yes      Chief Complaint  Follow-for schizoaffective depressed type    Subjective    Sudhakar Aguero Kg presents to [facility] for medication management.       History of Present Illness   Patient presents today reporting that she has been doing \"really good\".  She states that she currently is in a new relationship that she has had with a past man previously as they reconnected due to him moving back in the area.  Patient reports that she is sleeping roughly 5 hours at night.  She states that she is doing well with her appetite as she has lost 35 pounds in discussing her weight loss and following up in January with Dr. Ferro.  Patient notes that she has been slightly anxious and feeling sad around [place] and the holidays with families but otherwise feeling as if things have been manageable.  Denied any hypomanic or manic episodes.  Denied any auditory or visual hallucinations.  Patient states overall " she is doing well and denied any side effects to the medications.  She reports her pain management doctor changed her medications as she is on gabapentin and he increased her duloxetine and added additional 60 mg on top of her 90 mg which she has been taking this pass month.  Patient states that it is help with some of the pain except for bone pain.  She states that she follows up with her sleep study and her echo in February and is hoping if all is clear she can obtain clearance for weight loss surgery.  Patient denies any SI or HI.  Denies any concerns or issues at this time.      Objective   Vital Signs:   There were no vitals taken for this visit.  Due to the remote nature of this encounter (virtual encounter), vitals were unable to be obtained.  Height stated at 66 inches.  Weight stated at 323 pounds.        PHQ-9 Score:   PHQ-9 Total Score:     Patient screened positive for depression based on a PHQ-9 score of  on . Follow-up recommendations include: Patient has schizophrenia and is currently being managed with medication..    Mental Status Exam:   Hygiene:   good  Cooperation:  Cooperative  Eye Contact:  Good  Psychomotor Behavior:  Appropriate  Affect:  Appropriate  Mood: normal and anxious  Speech:  Normal  Thought Process:  Goal directed and Linear  Thought Content:  Normal  Suicidal:  None  Homicidal:  None  Hallucinations:  None  Delusion:  None  Memory:  Intact  Orientation:  Person, Place, Time and Situation  Reliability:  good  Insight:  Good and Fair  Judgement:  Good and Fair  Impulse Control:  Good and Fair  Physical/Medical Issues:  Yes Dm. HTN, CKD stage 2, hep C+, JACQUE, hx substance abuse     Current Medications:   Current Outpatient Medications   Medication Sig Dispense Refill   • albuterol (ACCUNEB) 1.25 MG/3ML nebulizer solution Take 3 mL by nebulization Every 6 (Six) Hours As Needed for Wheezing or Shortness of Air. 9 mL 3   • aspirin 81 MG EC tablet Take 81 mg by mouth Daily.     •  Cariprazine HCl 4.5 MG capsule Take 4.5 mg by mouth Daily. 90 capsule 0   • Continuous Blood Gluc  (Dexcom G6 ) device USE FOR 6 MONTHS     • Continuous Blood Gluc Sensor (Dexcom G6 Sensor) USE AS DIRECTED EVERY  10  DAYS 9 each 0   • Continuous Blood Gluc Transmit (Dexcom G6 Transmitter) misc USE 1 TRANSMITTER EVERY 3 MONTHS 1 each 0   • cyclobenzaprine (FLEXERIL) 10 MG tablet Take 10 mg by mouth Daily.     • Dulaglutide (Trulicity) 4.5 MG/0.5ML solution pen-injector Inject 4.5 mg under the skin into the appropriate area as directed 1 (One) Time Per Week. 4 pen 11   • DULoxetine (CYMBALTA) 30 MG capsule Take 3 capsules by mouth Daily. 270 capsule 0   • DULoxetine (CYMBALTA) 60 MG capsule Take 60 mg by mouth Daily.     • Fluticasone Furoate-Vilanterol (Breo Ellipta) 200-25 MCG/INH inhaler Inhale 1 puff Daily. 28 each 11   • folic acid (FOLVITE) 1 MG tablet Take 1,000 mcg by mouth Daily.     • gabapentin (NEURONTIN) 600 MG tablet Take 600 mg by mouth 3 (Three) Times a Day.     • glucose blood test strip Testing 4 times daily, E11.9 120 each 12   • hydrOXYzine (ATARAX) 25 MG tablet Take 0.5-1 tablets by mouth 3 (Three) Times a Day As Needed for Anxiety. 270 tablet 0   • Insulin Disposable Pump (OmniPod Dash 5 Pack Pods) misc      • insulin lispro (Admelog) 100 UNIT/ML injection 180  units daily through pump 90 mL 11   • lamoTRIgine (LaMICtal) 150 MG tablet Take 1 tablet by mouth Daily. 90 tablet 0   • levETIRAcetam (KEPPRA) 1000 MG tablet Take 1 tablet by mouth 2 (Two) Times a Day. 60 tablet 3   • lisinopril (PRINIVIL,ZESTRIL) 40 MG tablet Take 1 tablet by mouth Daily. 30 tablet 3   • metFORMIN (GLUCOPHAGE) 500 MG tablet Take 1 tablet by mouth 2 (Two) Times a Day With Meals. 60 tablet 11   • metoprolol tartrate (LOPRESSOR) 50 MG tablet Take 1 tablet by mouth 2 (Two) Times a Day. 60 tablet 3   • Nebulizer device 1 application Every 6 (Six) Hours As Needed (dyspnea). 1 each 3   • nicotine (NICODERM CQ)  21 MG/24HR patch Place 1 patch on the skin as directed by provider Daily. 28 patch 3   • NIFEdipine XL (PROCARDIA XL) 30 MG 24 hr tablet Take 1 tablet by mouth Daily. 30 tablet 3   • omeprazole (priLOSEC) 40 MG capsule Take 1 capsule by mouth Daily. 30 capsule 3   • OXcarbazepine (TRILEPTAL) 600 MG tablet Take 600 mg by mouth 2 (Two) Times a Day. PT TAKES  MG TAB IN MORNING AND  MG IN EVENING     • prazosin (MINIPRESS) 2 MG capsule Take 1-2 capsules by mouth Every Night. 180 capsule 0   • simvastatin (ZOCOR) 40 MG tablet Take 1 tablet by mouth Every Night. 30 tablet 3   • Ventolin  (90 Base) MCG/ACT inhaler Inhale 2 puffs Every 4 (Four) Hours As Needed for Wheezing. 6.7 g 11   • vitamin D (ERGOCALCIFEROL) 1.25 MG (03063 UT) capsule capsule Take 2 tablets by mouth weekly 8 capsule 3   • Ertugliflozin L-PyroglutamicAc (Steglatro) 15 MG tablet Take 1 tablet by mouth Every Morning. 90 tablet 1     No current facility-administered medications for this visit.       Physical Exam  Nursing note reviewed.   Constitutional:       Appearance: Normal appearance.   Neurological:      Mental Status: She is alert.   Psychiatric:         Attention and Perception: Attention normal. She does not perceive auditory or visual hallucinations.         Mood and Affect: Affect normal. Mood is anxious. Mood is not depressed.         Speech: Speech normal.         Behavior: Behavior is cooperative.         Thought Content: Thought content normal.         Cognition and Memory: Cognition and memory normal.        Result Review :     The following data was reviewed by: ROD Spencer on 02/03/2021:  Common labs    Common Labs 4/22/22 4/22/22 4/22/22 4/22/22 4/22/22 7/8/22 7/8/22 7/8/22 7/8/22 7/8/22 11/3/22 11/3/22 11/3/22 11/3/22 11/3/22    0808 0808 0808 0808 0808 0822 0822 0822 0822 0822 0800 0800 0800 0800 0800   Glucose  211 (A)     106 (A)     196 (A)      BUN  12     16     15      Creatinine  0.77     0.82      0.80      Sodium  136     139     137      Potassium  4.6     4.7     4.9      Chloride  102     106     103      Calcium  9.4     9.4     9.2      Albumin  3.80     3.80     3.70      Total Bilirubin  0.2     0.2     0.2      Alkaline Phosphatase  96     93     104      AST (SGOT)  34 (A)     42 (A)     34 (A)      ALT (SGPT)  39 (A)     37 (A)     34 (A)      WBC 8.45     8.79     7.70       Hemoglobin 13.3     13.6     13.6       Hematocrit 41.0     41.3     43.5       Platelets 169     176     199       Total Cholesterol   169     119       129   Triglycerides   144     152 (A)       88   HDL Cholesterol   44     43       52   LDL Cholesterol    100     50       60   Hemoglobin A1C    9.50 (A)     8.70 (A)     9.20 (A)    Microalbumin, Urine     2.5     <1.2   <1.2     (A) Abnormal value            CMP    CMP 4/22/22 7/8/22 11/3/22   Glucose 211 (A) 106 (A) 196 (A)   BUN 12 16 15   Creatinine 0.77 0.82 0.80   eGFR 95.3 88.4 91.0   Sodium 136 139 137   Potassium 4.6 4.7 4.9   Chloride 102 106 103   Calcium 9.4 9.4 9.2   Total Protein 6.8 7.0 7.0   Albumin 3.80 3.80 3.70   Globulin 3.0 3.2 3.3   Total Bilirubin 0.2 0.2 0.2   Alkaline Phosphatase 96 93 104   AST (SGOT) 34 (A) 42 (A) 34 (A)   ALT (SGPT) 39 (A) 37 (A) 34 (A)   Albumin/Globulin Ratio 1.3 1.2 1.1   BUN/Creatinine Ratio 15.6 19.5 18.8   Anion Gap 10.0 12.0 11.0   (A) Abnormal value       Comments are available for some flowsheets but are not being displayed.           CBC    CBC 4/22/22 7/8/22 11/3/22   WBC 8.45 8.79 7.70   RBC 4.90 4.87 5.18   Hemoglobin 13.3 13.6 13.6   Hematocrit 41.0 41.3 43.5   MCV 83.7 84.8 84.0   MCH 27.1 27.9 26.3 (A)   MCHC 32.4 32.9 31.3 (A)   RDW 15.0 14.6 14.6   Platelets 169 176 199   (A) Abnormal value            CBC w/diff    CBC w/Diff 6/10/20 9/9/20 1/26/21   WBC 8.89 9.63 9.04   RBC 4.85 5.07 5.10   Hemoglobin 14.1 14.9 14.4   Hematocrit 42.8 44.3 45.0   MCV 88.2 87.4 88.2   MCH 29.1 29.4 28.2   MCHC 32.9 33.6 32.0    RDW 13.6 13.3 13.0   Platelets 157 180 174   Neutrophil Rel % 63.8 67.2    Immature Granulocyte Rel % 0.6 (A) 0.5    Lymphocyte Rel % 26.4 21.6    Monocyte Rel % 4.5 (A) 4.8 (A)    Eosinophil Rel % 3.9 5.3    Basophil Rel % 0.8 0.6    (A) Abnormal value            Lipid Panel    Lipid Panel 4/22/22 7/8/22 11/3/22   Total Cholesterol 169 119 129   Triglycerides 144 152 (A) 88   HDL Cholesterol 44 43 52   VLDL Cholesterol 25 26 17   LDL Cholesterol  100 50 60   LDL/HDL Ratio 2.19 1.06 1.14   (A) Abnormal value            TSH    TSH 4/22/22 7/8/22 11/3/22   TSH 1.740 2.290 1.920           BMP    BMP 4/22/22 7/8/22 11/3/22   BUN 12 16 15   Creatinine 0.77 0.82 0.80   Sodium 136 139 137   Potassium 4.6 4.7 4.9   Chloride 102 106 103   CO2 24.0 21.0 (A) 23.0   Calcium 9.4 9.4 9.2   (A) Abnormal value            HgB    HGB 4/22/22 7/8/22 11/3/22   Hemoglobin 13.3 13.6 13.6             Data reviewed: PCP notes        Assessment and Plan    Problem List Items Addressed This Visit        Mental Health    Schizoaffective disorder, depressive type (HCC) - Primary    Relevant Medications    Cariprazine HCl 4.5 MG capsule    DULoxetine (CYMBALTA) 30 MG capsule    lamoTRIgine (LaMICtal) 150 MG tablet    Generalized anxiety disorder    Relevant Medications    Cariprazine HCl 4.5 MG capsule    DULoxetine (CYMBALTA) 30 MG capsule   Other Visit Diagnoses     Nightmares        Relevant Medications    Cariprazine HCl 4.5 MG capsule    DULoxetine (CYMBALTA) 30 MG capsule    prazosin (MINIPRESS) 2 MG capsule            TREATMENT PLAN/GOALS: Continue supportive psychotherapy efforts and medications as indicated. Treatment and medication options discussed during today's visit. Patient ackowledged and verbally consented to continue with current treatment plan and was educated on the importance of compliance with treatment and follow-up appointments.    MEDICATION ISSUES:  We discussed risks, benefits, and side effects of the above  medications and the patient was agreeable with the plan. Patient was educated on the importance of compliance with treatment and follow-up appointments.  Patient is agreeable to call the office with any worsening of symptoms or onset of side effects. Patient is agreeable to call 911 or go to the nearest ER should he/she begin having SI/HI. Lengthy discussion with patient on the possible side effects of antipsychotic medications including increased cholesterol, increased blood sugar, and possibility of weight gain.  Also discussed the need to monitor lab work associated with this.  The risk of muscle movement disorders with this class of medication was also discussed. We will continue Lamictal in an effort to stabilize mood.  The patient was reminded to immediately come to the hospital should there be any loss of control.  Explanation was given to her regarding Lamictal and the potential for Foster Shon syndrome and significant rash.  Patient was encouraged to check skin prior to beginning.  Patient was encouraged to report any rash and to immediately stop medication.      -Continue lamotrigine 150 mg daily for schizoaffective disorder.  -Continue minipress 2-4mg at night for nightmares.  -Continue Vraylar  4.5 mg daily for schizoaffective disorder depressed type.  -Continue hydroxyzine 12.5 to 25 mg up to 3 times a day as needed for anxiety and panic.  - Continue Cymbalta 90mg daily for depression.  -Continue psychotherapy to help with past trauma as well as depressive and anxiety symptoms.  -Highly encourage patient since she is now on Trileptal to be aware of side effects of multiple mood stabilizers and if she were to have any rash to contact the clinic as we may have to look at tapering off the lamotrigine or talking with neurologist to reevaluate Trileptal.  Patient verbalized understanding and denied any side effects at this time.      Patient has now failed and tried Haldol, aripiprazole, Latuda and now  risperidone which has caused weight gain and due to diabetes and kidney disease would benefit from Vraylar as patient is still having auditory and visual hallucinations.     Counseled patient regarding multimodal approach with healthy nutrition, healthy sleep, regular physical activity, social activities, counseling, and medications.      Coping skills reviewed and encouraged positive framing of thoughts     Assisted patient in processing above session content; acknowledged and normalized patient’s thoughts, feelings, and concerns.  Applied  positive coping skills and behavior management in session.  Allowed patient to freely discuss issues without interruption or judgment. Provided safe, confidential environment to facilitate the development of positive therapeutic relationship and encourage open, honest communication. Assisted patient in identifying risk factors which would indicate the need for higher level of care including thoughts to harm self or others and/or self-harming behavior and encouraged patient to contact this office, call 911, or present to the nearest emergency room should any of these events occur. Discussed crisis intervention services and means to access.     MEDS ORDERED DURING VISIT:  New Medications Ordered This Visit   Medications   • Cariprazine HCl 4.5 MG capsule     Sig: Take 4.5 mg by mouth Daily.     Dispense:  90 capsule     Refill:  0   • DULoxetine (CYMBALTA) 30 MG capsule     Sig: Take 3 capsules by mouth Daily.     Dispense:  270 capsule     Refill:  0   • lamoTRIgine (LaMICtal) 150 MG tablet     Sig: Take 1 tablet by mouth Daily.     Dispense:  90 tablet     Refill:  0   • prazosin (MINIPRESS) 2 MG capsule     Sig: Take 1-2 capsules by mouth Every Night.     Dispense:  180 capsule     Refill:  0       Follow Up   Return in about 8 weeks (around 2/22/2023), or if symptoms worsen or fail to improve, for Recheck.      Patient was given instructions and counseling regarding her  condition or for health maintenance advice. Please see specific information pulled into the AVS if appropriate.     Some of the data in this electronic note has been brought forward from a previous encounter, any necessary changes have been made, it has been reviewed by this APRN, and it is accurate.      This document has been electronically signed by ROD Spencer  December 28, 2022 09:04 EST    Part of this note may be an electronic transcription/translation of spoken language to printed text using the Dragon Dictation System.

## 2023-01-05 ENCOUNTER — OFFICE VISIT (OUTPATIENT)
Dept: FAMILY MEDICINE CLINIC | Facility: CLINIC | Age: 48
End: 2023-01-05
Payer: COMMERCIAL

## 2023-01-05 VITALS
DIASTOLIC BLOOD PRESSURE: 66 MMHG | HEIGHT: 66 IN | BODY MASS INDEX: 47.09 KG/M2 | SYSTOLIC BLOOD PRESSURE: 124 MMHG | TEMPERATURE: 97.6 F | OXYGEN SATURATION: 97 % | WEIGHT: 293 LBS | HEART RATE: 96 BPM

## 2023-01-05 DIAGNOSIS — N18.2 STAGE 2 CHRONIC KIDNEY DISEASE: ICD-10-CM

## 2023-01-05 DIAGNOSIS — Z71.3 WEIGHT LOSS COUNSELING, ENCOUNTER FOR: ICD-10-CM

## 2023-01-05 DIAGNOSIS — K21.00 GASTROESOPHAGEAL REFLUX DISEASE WITH ESOPHAGITIS WITHOUT HEMORRHAGE: ICD-10-CM

## 2023-01-05 DIAGNOSIS — E66.01 MORBID OBESITY: Primary | ICD-10-CM

## 2023-01-05 DIAGNOSIS — F19.91 HISTORY OF DRUG USE: ICD-10-CM

## 2023-01-05 DIAGNOSIS — G40.909 SEIZURE DISORDER: ICD-10-CM

## 2023-01-05 DIAGNOSIS — F20.0 SCHIZOPHRENIA, PARANOID TYPE: ICD-10-CM

## 2023-01-05 DIAGNOSIS — E11.65 UNCONTROLLED TYPE 2 DIABETES MELLITUS WITH HYPERGLYCEMIA: ICD-10-CM

## 2023-01-05 DIAGNOSIS — Z72.0 TOBACCO USER: ICD-10-CM

## 2023-01-05 DIAGNOSIS — I10 ESSENTIAL HYPERTENSION: ICD-10-CM

## 2023-01-05 DIAGNOSIS — Z98.84 BARIATRIC SURGERY STATUS: ICD-10-CM

## 2023-01-05 DIAGNOSIS — E78.2 MIXED HYPERLIPIDEMIA: ICD-10-CM

## 2023-01-05 PROCEDURE — 99214 OFFICE O/P EST MOD 30 MIN: CPT | Performed by: FAMILY MEDICINE

## 2023-01-05 RX ORDER — ASPIRIN 81 MG/1
81 TABLET ORAL DAILY
Qty: 30 TABLET | Refills: 3 | Status: SHIPPED | OUTPATIENT
Start: 2023-01-05

## 2023-01-05 RX ORDER — FLUCONAZOLE 150 MG/1
150 TABLET ORAL ONCE
Qty: 2 TABLET | Refills: 0 | Status: SHIPPED | OUTPATIENT
Start: 2023-01-05 | End: 2023-01-05

## 2023-01-09 RX ORDER — PROCHLORPERAZINE 25 MG/1
SUPPOSITORY RECTAL
Qty: 9 EACH | Refills: 0 | Status: SHIPPED | OUTPATIENT
Start: 2023-01-09

## 2023-02-06 ENCOUNTER — OFFICE VISIT (OUTPATIENT)
Dept: SLEEP MEDICINE | Facility: CLINIC | Age: 48
End: 2023-02-06
Payer: COMMERCIAL

## 2023-02-06 VITALS
HEIGHT: 66 IN | DIASTOLIC BLOOD PRESSURE: 82 MMHG | OXYGEN SATURATION: 97 % | SYSTOLIC BLOOD PRESSURE: 138 MMHG | HEART RATE: 85 BPM | BODY MASS INDEX: 47.09 KG/M2 | WEIGHT: 293 LBS

## 2023-02-06 DIAGNOSIS — R06.83 SNORING: Primary | ICD-10-CM

## 2023-02-06 DIAGNOSIS — E66.01 MORBID OBESITY: ICD-10-CM

## 2023-02-06 DIAGNOSIS — G47.19 EXCESSIVE DAYTIME SLEEPINESS: ICD-10-CM

## 2023-02-06 DIAGNOSIS — G47.00 FREQUENT NOCTURNAL AWAKENING: ICD-10-CM

## 2023-02-06 PROCEDURE — 99214 OFFICE O/P EST MOD 30 MIN: CPT | Performed by: NURSE PRACTITIONER

## 2023-02-06 NOTE — PROGRESS NOTES
New Patient Sleep Medicine Consultation    Encounter Date: 2/6/2023         Patient's Primary Care Provider: Jermaine Ferro MD  Referring Provider: Jermaine Ferro MD  Reason for consultation/chief complaint: snoring, excessive daytime sleepiness and unrefreshing sleep    Sudhakar Saul is a 47 y.o. female who admits to snoring, unrestful sleep, high blood pressure, sleeping less than 6 hours per night, disturbed or restless sleep, up to bathroom at night, abnormal or violent dreams and difficulty staying asleep.    She denies cataplexy, sleep paralysis, or hypnagogic hallucinations. Her bedtime is ~ 6771-7698. She  falls asleep after 15-30 minutes, and is up 1-3 times per night. She wakes up ~ 0300. She endorses 4-5 hours of sleep. She drinks 3 cups of coffee, 6 teas, and 1-2 sodas per day. She drinks 0 alcoholic beverages per week. She is not a current smoker. She does not take sedatives or hypnotics. She has no sleepiness with driving. She naps every day for ~1 hour.     Patient was diagnosed with JACQUE ~10 years ago and was on CPAP for a short period of time. She was unable to tolerate it due to using a nasal mask and dropping her mouth open, so she would awaken gasping for air even worse while using the machine.   She is now undergoing evaluation for bariatric surgery, so she is required to repeat testing and have JACQUE treatment prior to approval from the patient's understanding.     Patient has a history of seizures - had a seizure 1 week ago.    [place] - 8  [place] Sleepiness Scale  Sitting and reading: Slight chance of dozing  Watching TV: Moderate chance of dozing  Sitting, inactive in a public place (e.g. a theatre or a meeting): Would never doze  As a passenger in a car for an hour without a break: Moderate chance of dozing  Lying down to rest in the afternoon when circumstances permit: High chance of dozing  Sitting and talking to someone: Would never doze  Sitting quietly after a lunch without  alcohol: Would never doze  In a car, while stopped for a few minutes in traffic: Would never doze  Total score: 8    Prior Sleep Testin. PSG ~ 10 years ago, AHI of ? ([place]) - was on CPAP for short time    Past Medical History:   Diagnosis Date   • Allergic    • Anemia    • Angina pectoris ([facility])    • Anxiety    • Arthritis    • Asthma    • Back pain    • CAD (coronary artery disease)    • Cancer ([facility])     endometrial- surgery, no chemo/radiation   • Chronic kidney disease    • Degeneration macular    • Depression    • Diabetes mellitus ([facility])    • GERD (gastroesophageal reflux disease)    • Head injury    • Hepatitis c    • Hyperlipidemia    • Hypertension    • Injury of back    • Injury of neck    • Migraine    • Multiple personality disorder ([facility])    • Neuropathy    • Paranoid schizophrenia ([facility])    • Schizoaffective disorder ([facility])    • Seizures ([facility])     2020: last seizure was the first of the year   • Self-injurious behavior     teen years   • Shortness of breath    • Sleep apnea    • Stage 2 chronic kidney disease    • Substance abuse ([facility])    • Suicide attempt ([facility])    • Urinary tract infection      Social History     Socioeconomic History   • Marital status:    Tobacco Use   • Smoking status: Former     Packs/day: 1.00     Years: 35.00     Pack years: 35.00     Types: Cigarettes     Start date:      Quit date: 2022     Years since quittin.6   • Smokeless tobacco: Never   • Tobacco comments:     on patch trying to quit    Vaping Use   • Vaping Use: Never used   Substance and Sexual Activity   • Alcohol use: Not Currently   • Drug use: Not Currently     Types: Amphetamines, Heroin, Methamphetamines, Morphine, Oxycodone     Comment: QUIT 3 YEARS AGO   • Sexual activity: Not Currently     Birth control/protection: Surgical     Family History   Problem Relation Age of Onset   • Hypertension Other    • Diabetes Other    • Stroke Other    • Cancer Other    • Kidney disease  "Other    • Lung disease Other    • Other Other    • Malone's esophagus Other    • Colon polyps Other    • Heart disease Other    • Ulcers Other    • Cholelithiasis Other    • Allergy (severe) Other    • Schizophrenia Father    • Alcohol abuse Father    • Drug abuse Paternal Grandfather      Prior T&A, UPPP, maxillofacial, or bariatric surgery: None  Family history of sleep disorders: Mother - JACQUE  Other family history + for: As above  Occupation: Disabled  Marital status: Umarried  Children: 0  Has 0 brothers and 0 sisters  Smoking history: smoked 1 ppds from age 14 until 46 (~6 months ago)      Review of Systems:  Constitutional: positive for fatigue  Ears, nose, mouth, throat, and face: positive for snoring  Respiratory: positive for asthma and dyspnea on exertion  Cardiovascular: positive for dyspnea and fatigue  Gastrointestinal: negative  Genitourinary:negative  Musculoskeletal:positive for arthralgias and back pain  Neurological: positive for dizziness, gait problems and seizures  Behavioral/Psych: positive for fatigue and sleep disturbance  Patient advised to discuss any positive ROS with PCP.      Vitals:    02/06/23 1101   BP: 138/82   Pulse: 85   SpO2: 97%           02/06/23  1101   Weight: (!) 138 kg (305 lb)       Body mass index is 49.25 kg/m². Class 3 Severe Obesity (BMI >=40). Obesity-related health conditions include the following: obstructive sleep apnea, hypertension, diabetes mellitus, dyslipidemias and GERD. Obesity is improving with lifestyle modifications. BMI is is above average; BMI management plan is completed. I recommend portion control and increasing exercise.    Tobacco Use: Medium Risk   • Smoking Tobacco Use: Former   • Smokeless Tobacco Use: Never   • Passive Exposure: Not on file         Physical Exam:        General: Alert. Cooperative. Well developed. No acute distress.   Head/Neck:  Normocephalic. Symmetrical. Atraumatic.     Neck circumference: 18\"             Eyes: Sclera " clear. No icterus. PERRLA. Normal EOM.             Ears: No deformities. Normal hearing.             Nose: No septal deviation. No drainage.          Throat: No oral lesions. No thrush. Moist mucous membranes. Trachea midline.    Tongue is enlarged     Dentition is fair       Pharynx: Posterior pharyngeal pillars are narrow    Mallampati score of III (soft and hard palate and base of uvula visible)    Pharynx is normal with unrermarkable tonsils   Chest Wall:  Normal shape. Symmetric expansion with respiration. No tenderness.          Lungs:  Clear to auscultation bilaterally. No wheezes. No rhonchi. No rales. Respirations regular, even and unlabored.            Heart:  Regular rhythm and normal rate. Normal S1 and S2. No murmur.     Abdomen:  Soft, non-tender and non-distended. Normal bowel sounds. No masses.  Extremities:  Moves all extremities well. No edema.           Pulses: Pulses palpable and equal bilaterally.               Skin: Dry. Intact. No bleeding. No rash.           Neuro: Moves all 4 extremities and cranial nerves grossly intact.  Psychiatric: Normal mood and affect.      Current Outpatient Medications:   •  albuterol (ACCUNEB) 1.25 MG/3ML nebulizer solution, Take 3 mL by nebulization Every 6 (Six) Hours As Needed for Wheezing or Shortness of Air., Disp: 9 mL, Rfl: 3  •  aspirin 81 MG EC tablet, Take 1 tablet by mouth Daily., Disp: 30 tablet, Rfl: 3  •  Cariprazine HCl 4.5 MG capsule, Take 4.5 mg by mouth Daily., Disp: 90 capsule, Rfl: 0  •  Continuous Blood Gluc  (Dexcom G6 ) device, USE FOR 6 MONTHS, Disp: , Rfl:   •  Continuous Blood Gluc Sensor (Dexcom G6 Sensor), USE ONE SENSOR EVERY 10 DAYS, Disp: 9 each, Rfl: 0  •  Continuous Blood Gluc Transmit (Dexcom G6 Transmitter) misc, USE 1 TRANSMITTER EVERY 3 MONTHS, Disp: 1 each, Rfl: 0  •  cyclobenzaprine (FLEXERIL) 10 MG tablet, Take 10 mg by mouth Daily., Disp: , Rfl:   •  Dulaglutide (Trulicity) 4.5 MG/0.5ML solution pen-injector,  Inject 4.5 mg under the skin into the appropriate area as directed 1 (One) Time Per Week., Disp: 4 pen, Rfl: 11  •  DULoxetine (CYMBALTA) 30 MG capsule, Take 3 capsules by mouth Daily., Disp: 270 capsule, Rfl: 0  •  DULoxetine (CYMBALTA) 60 MG capsule, Take 60 mg by mouth Daily., Disp: , Rfl:   •  Ertugliflozin L-PyroglutamicAc (Steglatro) 15 MG tablet, Take 1 tablet by mouth Every Morning., Disp: 90 tablet, Rfl: 1  •  Fluticasone Furoate-Vilanterol (Breo Ellipta) 200-25 MCG/INH inhaler, Inhale 1 puff Daily., Disp: 28 each, Rfl: 11  •  folic acid (FOLVITE) 1 MG tablet, Take 1,000 mcg by mouth Daily., Disp: , Rfl:   •  gabapentin (NEURONTIN) 600 MG tablet, Take 600 mg by mouth 3 (Three) Times a Day., Disp: , Rfl:   •  glucose blood test strip, Testing 4 times daily, E11.9, Disp: 120 each, Rfl: 12  •  hydrOXYzine (ATARAX) 25 MG tablet, Take 0.5-1 tablets by mouth 3 (Three) Times a Day As Needed for Anxiety., Disp: 270 tablet, Rfl: 0  •  Insulin Disposable Pump (OmniPod Dash 5 Pack Pods) misc, , Disp: , Rfl:   •  insulin lispro (Admelog) 100 UNIT/ML injection, 180  units daily through pump, Disp: 90 mL, Rfl: 11  •  lamoTRIgine (LaMICtal) 150 MG tablet, Take 1 tablet by mouth Daily., Disp: 90 tablet, Rfl: 0  •  levETIRAcetam (KEPPRA) 1000 MG tablet, Take 1 tablet by mouth 2 (Two) Times a Day., Disp: 60 tablet, Rfl: 3  •  lisinopril (PRINIVIL,ZESTRIL) 40 MG tablet, Take 1 tablet by mouth Daily., Disp: 30 tablet, Rfl: 3  •  metFORMIN (GLUCOPHAGE) 500 MG tablet, Take 1 tablet by mouth 2 (Two) Times a Day With Meals., Disp: 60 tablet, Rfl: 11  •  metoprolol tartrate (LOPRESSOR) 50 MG tablet, Take 1 tablet by mouth 2 (Two) Times a Day., Disp: 60 tablet, Rfl: 3  •  Nebulizer device, 1 application Every 6 (Six) Hours As Needed (dyspnea)., Disp: 1 each, Rfl: 3  •  nicotine (NICODERM CQ) 21 MG/24HR patch, Place 1 patch on the skin as directed by provider Daily., Disp: 28 patch, Rfl: 3  •  NIFEdipine XL (PROCARDIA XL) 30 MG 24 hr  tablet, Take 1 tablet by mouth Daily., Disp: 30 tablet, Rfl: 3  •  omeprazole (priLOSEC) 40 MG capsule, Take 1 capsule by mouth Daily., Disp: 30 capsule, Rfl: 3  •  OXcarbazepine (TRILEPTAL) 600 MG tablet, Take 600 mg by mouth 2 (Two) Times a Day. PT TAKES  MG TAB IN MORNING AND  MG IN EVENING, Disp: , Rfl:   •  prazosin (MINIPRESS) 2 MG capsule, Take 1-2 capsules by mouth Every Night., Disp: 180 capsule, Rfl: 0  •  simvastatin (ZOCOR) 40 MG tablet, Take 1 tablet by mouth Every Night., Disp: 30 tablet, Rfl: 3  •  Ventolin  (90 Base) MCG/ACT inhaler, Inhale 2 puffs Every 4 (Four) Hours As Needed for Wheezing., Disp: 6.7 g, Rfl: 11  •  vitamin D (ERGOCALCIFEROL) 1.25 MG (34925 UT) capsule capsule, Take 2 tablets by mouth weekly, Disp: 8 capsule, Rfl: 3    WBC   Date Value Ref Range Status   11/03/2022 7.70 3.40 - 10.80 10*3/mm3 Final     RBC   Date Value Ref Range Status   11/03/2022 5.18 3.77 - 5.28 10*6/mm3 Final     Hemoglobin   Date Value Ref Range Status   11/03/2022 13.6 12.0 - 15.9 g/dL Final     Hematocrit   Date Value Ref Range Status   11/03/2022 43.5 34.0 - 46.6 % Final     MCV   Date Value Ref Range Status   11/03/2022 84.0 79.0 - 97.0 fL Final     MCH   Date Value Ref Range Status   11/03/2022 26.3 (L) 26.6 - 33.0 pg Final     MCHC   Date Value Ref Range Status   11/03/2022 31.3 (L) 31.5 - 35.7 g/dL Final     RDW   Date Value Ref Range Status   11/03/2022 14.6 12.3 - 15.4 % Final     RDW-SD   Date Value Ref Range Status   11/03/2022 44.0 37.0 - 54.0 fl Final     MPV   Date Value Ref Range Status   11/03/2022 11.2 6.0 - 12.0 fL Final     Platelets   Date Value Ref Range Status   11/03/2022 199 140 - 450 10*3/mm3 Final     Neutrophil %   Date Value Ref Range Status   11/03/2022 62.9 42.7 - 76.0 % Final     Lymphocyte %   Date Value Ref Range Status   11/03/2022 31.6 19.6 - 45.3 % Final     Monocyte %   Date Value Ref Range Status   11/03/2022 4.0 (L) 5.0 - 12.0 % Final     Eosinophil %    Date Value Ref Range Status   11/03/2022 0.6 0.3 - 6.2 % Final     Basophil %   Date Value Ref Range Status   11/03/2022 0.6 0.0 - 1.5 % Final     Immature Grans %   Date Value Ref Range Status   11/03/2022 0.3 0.0 - 0.5 % Final     Neutrophils, Absolute   Date Value Ref Range Status   11/03/2022 4.84 1.70 - 7.00 10*3/mm3 Final     Lymphocytes, Absolute   Date Value Ref Range Status   11/03/2022 2.43 0.70 - 3.10 10*3/mm3 Final     Monocytes, Absolute   Date Value Ref Range Status   11/03/2022 0.31 0.10 - 0.90 10*3/mm3 Final     Eosinophils, Absolute   Date Value Ref Range Status   11/03/2022 0.05 0.00 - 0.40 10*3/mm3 Final     Basophils, Absolute   Date Value Ref Range Status   11/03/2022 0.05 0.00 - 0.20 10*3/mm3 Final     Immature Grans, Absolute   Date Value Ref Range Status   11/03/2022 0.02 0.00 - 0.05 10*3/mm3 Final     nRBC   Date Value Ref Range Status   11/03/2022 0.0 0.0 - 0.2 /100 WBC Final     Lab Results   Component Value Date    GLUCOSE 196 (H) 11/03/2022    BUN 15 11/03/2022    CREATININE 0.80 11/03/2022    EGFRIFNONA 83 10/12/2021    BCR 18.8 11/03/2022    K 4.9 11/03/2022    CO2 23.0 11/03/2022    CALCIUM 9.2 11/03/2022    ALBUMIN 3.70 11/03/2022    AST 34 (H) 11/03/2022    ALT 34 (H) 11/03/2022       Contraindications to home sleep test: none    ASSESSMENT:  1. Excessive daytime sleepiness, presumed obstructive sleep apnea - New (to me), additional work-up planned (4)  1. Check home sleep study (disposable)  2. Follow up for results  3. Pertinent labs were reviewed as listed above  2. Snoring, presumed obstructive sleep apnea - New (to me), additional work-up planned (4)  1. As above  3. Frequent nocturnal awakenings - New (to me), additional work-up planned (4)  1. As above  4. Morbid obesity - BMI 49.3 - stable chronic illness      I spent 30 minutes caring for Sudhakar on this date of service. This time includes time spent by me in the following activities: preparing for the visit, reviewing  tests, obtaining and/or reviewing a separately obtained history, performing a medically appropriate examination and/or evaluation , counseling and educating the patient/family/caregiver, ordering medications, tests, or procedures, documenting information in the medical record and care coordination; discussing Further testing    RTC 2 weeks after testing. Patient agrees to return sooner if changes in symptoms.         This document has been electronically signed by ROD Thomas on February 6, 2023 11:16 CST          CC: Jermaine Ferro MD Jamora, David C, MD

## 2023-02-13 ENCOUNTER — HOSPITAL ENCOUNTER (OUTPATIENT)
Dept: SLEEP MEDICINE | Facility: HOSPITAL | Age: 48
Discharge: HOME | End: 2023-02-13
Admitting: NURSE PRACTITIONER
Payer: COMMERCIAL

## 2023-02-13 DIAGNOSIS — G47.00 FREQUENT NOCTURNAL AWAKENING: ICD-10-CM

## 2023-02-13 DIAGNOSIS — E66.01 MORBID OBESITY: ICD-10-CM

## 2023-02-13 DIAGNOSIS — G47.19 EXCESSIVE DAYTIME SLEEPINESS: ICD-10-CM

## 2023-02-13 DIAGNOSIS — R06.83 SNORING: ICD-10-CM

## 2023-02-13 PROCEDURE — 95800 SLP STDY UNATTENDED: CPT

## 2023-02-15 ENCOUNTER — TELEMEDICINE (OUTPATIENT)
Dept: PSYCHIATRY | Facility: CLINIC | Age: 48
End: 2023-02-15
Payer: COMMERCIAL

## 2023-02-15 DIAGNOSIS — F33.0 MILD EPISODE OF RECURRENT MAJOR DEPRESSIVE DISORDER: ICD-10-CM

## 2023-02-15 DIAGNOSIS — F41.1 GENERALIZED ANXIETY DISORDER: Primary | ICD-10-CM

## 2023-02-15 PROCEDURE — 90837 PSYTX W PT 60 MINUTES: CPT | Performed by: COUNSELOR

## 2023-02-20 ENCOUNTER — LAB (OUTPATIENT)
Dept: LAB | Facility: HOSPITAL | Age: 48
End: 2023-02-20
Payer: COMMERCIAL

## 2023-02-20 ENCOUNTER — TELEPHONE (OUTPATIENT)
Dept: ENDOCRINOLOGY | Facility: CLINIC | Age: 48
End: 2023-02-20
Payer: COMMERCIAL

## 2023-02-20 DIAGNOSIS — E11.65 TYPE 2 DIABETES MELLITUS WITH HYPERGLYCEMIA, WITH LONG-TERM CURRENT USE OF INSULIN: ICD-10-CM

## 2023-02-20 DIAGNOSIS — E78.2 MIXED HYPERLIPIDEMIA: ICD-10-CM

## 2023-02-20 DIAGNOSIS — Z79.4 TYPE 2 DIABETES MELLITUS WITH HYPERGLYCEMIA, WITH LONG-TERM CURRENT USE OF INSULIN: ICD-10-CM

## 2023-02-20 DIAGNOSIS — E11.49 OTHER DIABETIC NEUROLOGICAL COMPLICATION ASSOCIATED WITH TYPE 2 DIABETES MELLITUS: ICD-10-CM

## 2023-02-20 DIAGNOSIS — I10 ESSENTIAL HYPERTENSION: ICD-10-CM

## 2023-02-20 DIAGNOSIS — E55.9 VITAMIN D DEFICIENCY: ICD-10-CM

## 2023-02-20 LAB
ALBUMIN SERPL-MCNC: 3.7 G/DL (ref 3.5–5.2)
ALBUMIN UR-MCNC: <1.2 MG/DL
ALBUMIN/GLOB SERPL: 1.2 G/DL
ALP SERPL-CCNC: 100 U/L (ref 39–117)
ALT SERPL W P-5'-P-CCNC: 45 U/L (ref 1–33)
ANION GAP SERPL CALCULATED.3IONS-SCNC: 13 MMOL/L (ref 5–15)
AST SERPL-CCNC: 42 U/L (ref 1–32)
BASOPHILS # BLD AUTO: 0.06 10*3/MM3 (ref 0–0.2)
BASOPHILS NFR BLD AUTO: 0.7 % (ref 0–1.5)
BILIRUB SERPL-MCNC: 0.2 MG/DL (ref 0–1.2)
BUN SERPL-MCNC: 31 MG/DL (ref 6–20)
BUN/CREAT SERPL: 39.7 (ref 7–25)
CALCIUM SPEC-SCNC: 9.5 MG/DL (ref 8.6–10.5)
CHLORIDE SERPL-SCNC: 96 MMOL/L (ref 98–107)
CHOLEST SERPL-MCNC: 222 MG/DL (ref 0–200)
CO2 SERPL-SCNC: 21 MMOL/L (ref 22–29)
CREAT SERPL-MCNC: 0.78 MG/DL (ref 0.57–1)
CREAT UR-MCNC: 22.6 MG/DL
DEPRECATED RDW RBC AUTO: 45.8 FL (ref 37–54)
EGFRCR SERPLBLD CKD-EPI 2021: 93.2 ML/MIN/1.73
EOSINOPHIL # BLD AUTO: 0.12 10*3/MM3 (ref 0–0.4)
EOSINOPHIL NFR BLD AUTO: 1.4 % (ref 0.3–6.2)
ERYTHROCYTE [DISTWIDTH] IN BLOOD BY AUTOMATED COUNT: 14.6 % (ref 12.3–15.4)
GLOBULIN UR ELPH-MCNC: 3.1 GM/DL
GLUCOSE SERPL-MCNC: 486 MG/DL (ref 65–99)
HBA1C MFR BLD: 16 % (ref 4.8–5.6)
HCT VFR BLD AUTO: 42.3 % (ref 34–46.6)
HDLC SERPL-MCNC: 35 MG/DL (ref 40–60)
HGB BLD-MCNC: 14.1 G/DL (ref 12–15.9)
IMM GRANULOCYTES # BLD AUTO: 0.03 10*3/MM3 (ref 0–0.05)
IMM GRANULOCYTES NFR BLD AUTO: 0.3 % (ref 0–0.5)
LDLC SERPL CALC-MCNC: 131 MG/DL (ref 0–100)
LDLC/HDLC SERPL: 3.55 {RATIO}
LYMPHOCYTES # BLD AUTO: 2.62 10*3/MM3 (ref 0.7–3.1)
LYMPHOCYTES NFR BLD AUTO: 30.4 % (ref 19.6–45.3)
MCH RBC QN AUTO: 28.5 PG (ref 26.6–33)
MCHC RBC AUTO-ENTMCNC: 33.3 G/DL (ref 31.5–35.7)
MCV RBC AUTO: 85.6 FL (ref 79–97)
MICROALBUMIN/CREAT UR: NORMAL MG/G{CREAT}
MONOCYTES # BLD AUTO: 0.29 10*3/MM3 (ref 0.1–0.9)
MONOCYTES NFR BLD AUTO: 3.4 % (ref 5–12)
NEUTROPHILS NFR BLD AUTO: 5.51 10*3/MM3 (ref 1.7–7)
NEUTROPHILS NFR BLD AUTO: 63.8 % (ref 42.7–76)
NRBC BLD AUTO-RTO: 0 /100 WBC (ref 0–0.2)
PLATELET # BLD AUTO: 163 10*3/MM3 (ref 140–450)
PMV BLD AUTO: 12.3 FL (ref 6–12)
POTASSIUM SERPL-SCNC: 5 MMOL/L (ref 3.5–5.2)
PROT SERPL-MCNC: 6.8 G/DL (ref 6–8.5)
RBC # BLD AUTO: 4.94 10*6/MM3 (ref 3.77–5.28)
SODIUM SERPL-SCNC: 130 MMOL/L (ref 136–145)
TRIGL SERPL-MCNC: 313 MG/DL (ref 0–150)
TSH SERPL DL<=0.05 MIU/L-ACNC: 1.31 UIU/ML (ref 0.27–4.2)
VLDLC SERPL-MCNC: 56 MG/DL (ref 5–40)
WBC NRBC COR # BLD: 8.63 10*3/MM3 (ref 3.4–10.8)

## 2023-02-20 PROCEDURE — 80061 LIPID PANEL: CPT

## 2023-02-20 PROCEDURE — 84443 ASSAY THYROID STIM HORMONE: CPT

## 2023-02-20 PROCEDURE — 82043 UR ALBUMIN QUANTITATIVE: CPT

## 2023-02-20 PROCEDURE — 80053 COMPREHEN METABOLIC PANEL: CPT

## 2023-02-20 PROCEDURE — 82570 ASSAY OF URINE CREATININE: CPT

## 2023-02-20 PROCEDURE — 83036 HEMOGLOBIN GLYCOSYLATED A1C: CPT

## 2023-02-20 PROCEDURE — 85025 COMPLETE CBC W/AUTO DIFF WBC: CPT

## 2023-02-20 NOTE — TELEPHONE ENCOUNTER
Left message requesting a call back regarding her critical lab alert    Critical value from the hospital lab of a BG of 486 @ 8:57 this morning     Called to instruct patient that she needs to recheck her glucose levels and to inject her insulin accordingly

## 2023-02-22 ENCOUNTER — TELEMEDICINE (OUTPATIENT)
Dept: ENDOCRINOLOGY | Facility: CLINIC | Age: 48
End: 2023-02-22
Payer: COMMERCIAL

## 2023-02-22 DIAGNOSIS — Z79.4 TYPE 2 DIABETES MELLITUS WITH HYPERGLYCEMIA, WITH LONG-TERM CURRENT USE OF INSULIN: Primary | ICD-10-CM

## 2023-02-22 DIAGNOSIS — I10 ESSENTIAL HYPERTENSION: ICD-10-CM

## 2023-02-22 DIAGNOSIS — E78.2 MIXED HYPERLIPIDEMIA: ICD-10-CM

## 2023-02-22 DIAGNOSIS — E11.65 TYPE 2 DIABETES MELLITUS WITH HYPERGLYCEMIA, WITH LONG-TERM CURRENT USE OF INSULIN: Primary | ICD-10-CM

## 2023-02-22 DIAGNOSIS — E11.42 DIABETIC POLYNEUROPATHY ASSOCIATED WITH TYPE 2 DIABETES MELLITUS: ICD-10-CM

## 2023-02-22 DIAGNOSIS — E55.9 VITAMIN D DEFICIENCY: ICD-10-CM

## 2023-02-22 PROCEDURE — 99214 OFFICE O/P EST MOD 30 MIN: CPT | Performed by: NURSE PRACTITIONER

## 2023-02-22 PROCEDURE — 3046F HEMOGLOBIN A1C LEVEL >9.0%: CPT | Performed by: NURSE PRACTITIONER

## 2023-02-22 NOTE — PROGRESS NOTES
Chief Complaint  Diabetes    Subjective          Sudhakar Saul presents to [facility]  Diabetes         You have chosen to receive care through a telehealth visit.  Do you consent to use a video/audio connection for your medical care today? Yes            TELEHEALTH VIDEO VISIT     This a video visit due to [facility] current guidelines for social distancing due to the COVID 19 pandemic        Mode of Visit: Video  Location of patient: home  You have chosen to receive care through a telehealth visit.  Does the patient consent to use a video/audio connection for your medical care today? Yes  The visit included audio and video interaction. No technical issues occurred during this visit.         47 year old female presents for follow up      Diagnosed in her 20s     Reason diabetes mellitus type 2     Timing constant      Quality not controlled     Severity high        Macrovascular-- no CAD, no PAD, no CVA         Microvascular--- neuropathy, mild diabetic retinopathy , CKD stage II        hepatitis C             she states she has lost control because she is not doing what she should    She was not wearing her pump or taking insulin             Diabetes Regimen     Insulin through pump          omni pod --she now has the pods back     She restarted the pump                Blood Glucose Readings     Checking 4 times daily      Uses dexcom      She is using but we could not download       Review of Systems - General ROS: negative               Objective   Vital Signs:   There were no vitals taken for this visit.    Physical Exam  Constitutional:       Appearance: Normal appearance.   Cardiovascular:      Rate and Rhythm: Regular rhythm.      Heart sounds: Normal heart sounds.   Pulmonary:      Breath sounds: Normal breath sounds.   Musculoskeletal:      Cervical back: Normal range of motion.   Neurological:      Mental Status: She is alert.        Result Review :   The following  data was reviewed by: ROD Chawla on 04/29/2022:  Common labs    Common Labs 7/8/22 7/8/22 7/8/22 7/8/22 7/8/22 11/3/22 11/3/22 11/3/22 11/3/22 11/3/22 2/20/23 2/20/23 2/20/23 2/20/23 2/20/23    0822 0822 0822 0822 0822 0800 0800 0800 0800 0800 0857 0857 0857 0857 0857   Glucose  106 (A)     196 (A)     486 (A)      BUN  16     15     31 (A)      Creatinine  0.82     0.80     0.78      Sodium  139     137     130 (A)      Potassium  4.7     4.9     5.0      Chloride  106     103     96 (A)      Calcium  9.4     9.2     9.5      Albumin  3.80     3.70     3.7      Total Bilirubin  0.2     0.2     0.2      Alkaline Phosphatase  93     104     100      AST (SGOT)  42 (A)     34 (A)     42 (A)      ALT (SGPT)  37 (A)     34 (A)     45 (A)      WBC 8.79     7.70     8.63       Hemoglobin 13.6     13.6     14.1       Hematocrit 41.3     43.5     42.3       Platelets 176     199     163       Total Cholesterol   119       129     222 (A)   Triglycerides   152 (A)       88     313 (A)   HDL Cholesterol   43       52     35 (A)   LDL Cholesterol    50       60     131 (A)   Hemoglobin A1C    8.70 (A)     9.20 (A)     16.00 (A)    Microalbumin, Urine     <1.2   <1.2     <1.2     (A) Abnormal value                        Assessment and Plan    Diagnoses and all orders for this visit:    1. Type 2 diabetes mellitus with hyperglycemia, with long-term current use of insulin (HCC) (Primary)  -     CBC & Differential; Future  -     Comprehensive Metabolic Panel; Future  -     Hemoglobin A1c; Future  -     Lipid Panel; Future  -     Microalbumin / Creatinine Urine Ratio - Urine, Clean Catch; Future  -     TSH; Future  -     Vitamin D,25-Hydroxy; Future  -     Vitamin B12; Future    2. Essential hypertension  -     CBC & Differential; Future  -     Comprehensive Metabolic Panel; Future  -     Hemoglobin A1c; Future  -     Lipid Panel; Future  -     Microalbumin / Creatinine Urine Ratio - Urine, Clean Catch; Future  -      TSH; Future  -     Vitamin D,25-Hydroxy; Future  -     Vitamin B12; Future    3. Vitamin D deficiency  -     CBC & Differential; Future  -     Comprehensive Metabolic Panel; Future  -     Hemoglobin A1c; Future  -     Lipid Panel; Future  -     Microalbumin / Creatinine Urine Ratio - Urine, Clean Catch; Future  -     TSH; Future  -     Vitamin D,25-Hydroxy; Future  -     Vitamin B12; Future    4. Mixed hyperlipidemia  -     CBC & Differential; Future  -     Comprehensive Metabolic Panel; Future  -     Hemoglobin A1c; Future  -     Lipid Panel; Future  -     Microalbumin / Creatinine Urine Ratio - Urine, Clean Catch; Future  -     TSH; Future  -     Vitamin D,25-Hydroxy; Future  -     Vitamin B12; Future    5. Diabetic polyneuropathy associated with type 2 diabetes mellitus (HCC)          Glycemic Management     Diabetes mellitus type 2 not controlled      Lab Results   Component Value Date    HGBA1C 16.00 (H) 02/20/2023           Dexcom G6          She is restarting medications , she is  Now seeing therapist and on medication        Adjust basal        Omni pod         Basal      MN -  2.95         Carb ratio       3      Correction         14     Target  bg 120               Taking trulicity 4.5  mg weekly      Taking Metformin 1000 mg po BID      Taking Steglatro 15 mg one daily         Previous regimen      basaglar taking 63 units ---stop        admelog--stop      Taking about 30 units            Aim for 45 grams of Carbohydrate for meals      Aim for 15 grams of Carbohydrate for snack                        Lipid Management           Taking Simvastatin 40 mg          Total Cholesterol   Date Value Ref Range Status   02/20/2023 222 (H) 0 - 200 mg/dL Final     Triglycerides   Date Value Ref Range Status   02/20/2023 313 (H) 0 - 150 mg/dL Final     HDL Cholesterol   Date Value Ref Range Status   02/20/2023 35 (L) 40 - 60 mg/dL Final     LDL Cholesterol    Date Value Ref Range Status   02/20/2023 131 (H) 0 - 100  mg/dL Final           Blood Pressure Management           Taking Lisinopril 40 mg daily               Microvascular Complication Monitoring        Last eye exam   May 2022 , mild DR            Neuropathy        Taking Gabapentin 300 mg tid        Component      Latest Ref Rng & Units 2/20/2023   Microalbumin/Creatinine Ratio          Creatinine, Urine      mg/dL 22.6   Microalbumin, Urine      mg/dL <1.2        Bone Health      vitamin d def       taking vitamin d daily              Other Diabetes Related Aspects        Lab Results   Component Value Date    [id number] 446 11/03/2022                   Thyroid health         Lab Results   Component Value Date    TSH 1.310 02/20/2023                            Follow Up   No follow-ups on file.  Patient was given instructions and counseling regarding her condition or for health maintenance advice. Please see specific information pulled into the AVS if appropriate.         This document has been electronically signed by ROD Chawla on February 22, 2023 09:24 CST.

## 2023-02-28 ENCOUNTER — TELEMEDICINE (OUTPATIENT)
Dept: PSYCHIATRY | Facility: CLINIC | Age: 48
End: 2023-02-28
Payer: COMMERCIAL

## 2023-02-28 DIAGNOSIS — F51.5 NIGHTMARES: ICD-10-CM

## 2023-02-28 DIAGNOSIS — F41.1 GENERALIZED ANXIETY DISORDER: ICD-10-CM

## 2023-02-28 DIAGNOSIS — F25.1 SCHIZOAFFECTIVE DISORDER, DEPRESSIVE TYPE: Primary | ICD-10-CM

## 2023-02-28 PROCEDURE — 95800 SLP STDY UNATTENDED: CPT | Performed by: PSYCHIATRY & NEUROLOGY

## 2023-02-28 PROCEDURE — 99214 OFFICE O/P EST MOD 30 MIN: CPT | Performed by: NURSE PRACTITIONER

## 2023-02-28 RX ORDER — LAMOTRIGINE 150 MG/1
150 TABLET ORAL DAILY
Qty: 90 TABLET | Refills: 0 | Status: SHIPPED | OUTPATIENT
Start: 2023-02-28

## 2023-02-28 RX ORDER — LISINOPRIL 40 MG/1
TABLET ORAL
Qty: 30 TABLET | Refills: 0 | Status: SHIPPED | OUTPATIENT
Start: 2023-02-28 | End: 2023-03-07 | Stop reason: SDUPTHER

## 2023-02-28 RX ORDER — DULOXETIN HYDROCHLORIDE 30 MG/1
90 CAPSULE, DELAYED RELEASE ORAL DAILY
Qty: 270 CAPSULE | Refills: 0 | Status: SHIPPED | OUTPATIENT
Start: 2023-02-28

## 2023-02-28 RX ORDER — PRAZOSIN HYDROCHLORIDE 2 MG/1
2-4 CAPSULE ORAL NIGHTLY
Qty: 180 CAPSULE | Refills: 0 | Status: SHIPPED | OUTPATIENT
Start: 2023-02-28

## 2023-02-28 RX ORDER — HYDROXYZINE HYDROCHLORIDE 25 MG/1
12.5-25 TABLET, FILM COATED ORAL 3 TIMES DAILY PRN
Qty: 270 TABLET | Refills: 0 | Status: SHIPPED | OUTPATIENT
Start: 2023-02-28

## 2023-02-28 NOTE — PROGRESS NOTES
This provider is located at [facility], [address] [place] [address].The Patient is seen remotely at home, [address] [place] [address] via [employer].  Patient is being seen via telehealth and confirm that they are in a secure environment for this session. The patient's condition being diagnosed/treated is appropriate for telemedicine. The provider identified himself/herself: herself as well as her credentials.   The patient gave consent to be seen remotely, and when consent is given they understand that the consent allows for patient identifiable information to be sent to a third party as needed.   They may refuse to be seen remotely at any time. The electronic data is encrypted and password protected, and the patient has been advised of the potential risks to privacy not withstanding such measures.    You have chosen to receive care through a telehealth visit.  Do you consent to use a video/audio connection for your medical care today? Yes      Chief Complaint  Follow-for schizoaffective depressed type    Subjective    Sudhakar More Saul presents to [facility] for medication management.       History of Present Illness   Patient presents today reporting that she has been doing good.  Denies any major depressive symptoms or feeling hopeless and helpless.  Patient states her depression may be at a 4 or 5 as she notes she has been worried about her echo as well as her mother's health that has made her feel down occasionally but reports overall her mood has been good.  Denies any auditory or visual hallucinations.  Patient feels as if her mood has been stable and denies any significant irritability or agitation.  She notes that her anxiety was up slightly yesterday due to getting her echocardiogram done as this is the final stage to obtain bariatric surgery.  Patient rates her anxiety as 6 on a scale of 0-10 with 10 being the worst noting  that her mother had a bad fall and questionably stroke so she has been worried about her overall health and taking care of her.  Patient notes however she seems to be functioning well and things are going good in all of her relationships.  She states that she did get a sleep study and is waiting on the results for what she needs to have regarding settings for her CPAP.  She states she averages 6 hours a night but when she wakes up it is usually due to pain.  She reports she is no longer in the pain clinic as they were not agreeable with her plan so she stopped taking Norco as well as gabapentin.  Patient states that her appetite has been good she notes that she got off track with her diet and eating but states she is getting back on track now.  Denies any side effects to the medicine.  Aims score 0.  Denies any SI/HI/AH/VH.    Objective   Vital Signs:   There were no vitals taken for this visit.  Due to the remote nature of this encounter (virtual encounter), vitals were unable to be obtained.  Height stated at 66 inches.  Weight stated at 304 pounds.        PHQ-9 Score:   PHQ-9 Total Score:     Patient screened positive for depression based on a PHQ-9 score of  on . Follow-up recommendations include: Patient has schizophrenia and is currently being managed with medication..    Mental Status Exam:   Hygiene:   good  Cooperation:  Cooperative  Eye Contact:  Good  Psychomotor Behavior:  Appropriate  Affect:  Appropriate  Mood: normal and anxious  Speech:  Normal  Thought Process:  Goal directed and Linear  Thought Content:  Normal  Suicidal:  None  Homicidal:  None  Hallucinations:  None  Delusion:  None  Memory:  Intact  Orientation:  Person, Place, Time and Situation  Reliability:  good  Insight:  Good and Fair  Judgement:  Good and Fair  Impulse Control:  Good and Fair  Physical/Medical Issues:  Yes Dm. HTN, CKD stage 2, hep C+, JACQUE, hx substance abuse     Current Medications:   Current Outpatient Medications    Medication Sig Dispense Refill   • Cariprazine HCl 4.5 MG capsule Take 4.5 mg by mouth Daily. 90 capsule 0   • DULoxetine (CYMBALTA) 30 MG capsule Take 3 capsules by mouth Daily. 270 capsule 0   • hydrOXYzine (ATARAX) 25 MG tablet Take 0.5-1 tablets by mouth 3 (Three) Times a Day As Needed for Anxiety. 270 tablet 0   • lamoTRIgine (LaMICtal) 150 MG tablet Take 1 tablet by mouth Daily. 90 tablet 0   • prazosin (MINIPRESS) 2 MG capsule Take 1-2 capsules by mouth Every Night. 180 capsule 0   • albuterol (ACCUNEB) 1.25 MG/3ML nebulizer solution Take 3 mL by nebulization Every 6 (Six) Hours As Needed for Wheezing or Shortness of Air. 9 mL 3   • aspirin 81 MG EC tablet Take 1 tablet by mouth Daily. 30 tablet 3   • Continuous Blood Gluc  (Dexcom G6 ) device USE FOR 6 MONTHS     • Continuous Blood Gluc Sensor (Dexcom G6 Sensor) USE ONE SENSOR EVERY 10 DAYS 9 each 0   • Continuous Blood Gluc Transmit (Dexcom G6 Transmitter) misc USE 1 TRANSMITTER EVERY 3 MONTHS 1 each 0   • cyclobenzaprine (FLEXERIL) 10 MG tablet Take 10 mg by mouth Daily.     • Dulaglutide (Trulicity) 4.5 MG/0.5ML solution pen-injector Inject 4.5 mg under the skin into the appropriate area as directed 1 (One) Time Per Week. 4 pen 11   • DULoxetine (CYMBALTA) 60 MG capsule Take 60 mg by mouth Daily.     • Ertugliflozin L-PyroglutamicAc (Steglatro) 15 MG tablet Take 1 tablet by mouth Every Morning. 90 tablet 1   • Fluticasone Furoate-Vilanterol (Breo Ellipta) 200-25 MCG/INH inhaler Inhale 1 puff Daily. 28 each 11   • folic acid (FOLVITE) 1 MG tablet Take 1,000 mcg by mouth Daily.     • glucose blood test strip Testing 4 times daily, E11.9 120 each 12   • Insulin Disposable Pump (OmniPod Dash 5 Pack Pods) misc      • insulin lispro (Admelog) 100 UNIT/ML injection 180  units daily through pump 90 mL 11   • levETIRAcetam (KEPPRA) 1000 MG tablet Take 1 tablet by mouth 2 (Two) Times a Day. 60 tablet 3   • lisinopril (PRINIVIL,ZESTRIL) 40 MG  tablet Take 1 tablet by mouth Daily. 30 tablet 3   • metFORMIN (GLUCOPHAGE) 500 MG tablet Take 1 tablet by mouth 2 (Two) Times a Day With Meals. 60 tablet 11   • metoprolol tartrate (LOPRESSOR) 50 MG tablet Take 1 tablet by mouth 2 (Two) Times a Day. 60 tablet 3   • Nebulizer device 1 application Every 6 (Six) Hours As Needed (dyspnea). 1 each 3   • nicotine (NICODERM CQ) 21 MG/24HR patch Place 1 patch on the skin as directed by provider Daily. 28 patch 3   • NIFEdipine XL (PROCARDIA XL) 30 MG 24 hr tablet Take 1 tablet by mouth Daily. 30 tablet 3   • omeprazole (priLOSEC) 40 MG capsule Take 1 capsule by mouth Daily. 30 capsule 3   • OXcarbazepine (TRILEPTAL) 600 MG tablet Take 600 mg by mouth 2 (Two) Times a Day. PT TAKES  MG TAB IN MORNING AND  MG IN EVENING     • simvastatin (ZOCOR) 40 MG tablet Take 1 tablet by mouth Every Night. 30 tablet 3   • Ventolin  (90 Base) MCG/ACT inhaler Inhale 2 puffs Every 4 (Four) Hours As Needed for Wheezing. 6.7 g 11   • vitamin D (ERGOCALCIFEROL) 1.25 MG (19056 UT) capsule capsule Take 2 tablets by mouth weekly 8 capsule 3     No current facility-administered medications for this visit.       Physical Exam  Nursing note reviewed.   Constitutional:       Appearance: Normal appearance.   Neurological:      Mental Status: She is alert.   Psychiatric:         Attention and Perception: Attention normal. She does not perceive auditory or visual hallucinations.         Mood and Affect: Affect normal. Mood is anxious. Mood is not depressed.         Speech: Speech normal.         Behavior: Behavior is cooperative.         Thought Content: Thought content normal.         Cognition and Memory: Cognition and memory normal.        Result Review :     The following data was reviewed by: ROD Spencer on 02/03/2021:  Common labs    Common Labs 7/8/22 7/8/22 7/8/22 7/8/22 7/8/22 11/3/22 11/3/22 11/3/22 11/3/22 11/3/22 2/20/23 2/20/23 2/20/23 2/20/23 2/20/23    0822  0822 0822 0822 0822 0800 0800 0800 0800 0800 0857 0857 0857 0857 0857   Glucose  106 (A)     196 (A)     486 (A)      BUN  16     15     31 (A)      Creatinine  0.82     0.80     0.78      Sodium  139     137     130 (A)      Potassium  4.7     4.9     5.0      Chloride  106     103     96 (A)      Calcium  9.4     9.2     9.5      Albumin  3.80     3.70     3.7      Total Bilirubin  0.2     0.2     0.2      Alkaline Phosphatase  93     104     100      AST (SGOT)  42 (A)     34 (A)     42 (A)      ALT (SGPT)  37 (A)     34 (A)     45 (A)      WBC 8.79     7.70     8.63       Hemoglobin 13.6     13.6     14.1       Hematocrit 41.3     43.5     42.3       Platelets 176     199     163       Total Cholesterol   119       129     222 (A)   Triglycerides   152 (A)       88     313 (A)   HDL Cholesterol   43       52     35 (A)   LDL Cholesterol    50       60     131 (A)   Hemoglobin A1C    8.70 (A)     9.20 (A)     16.00 (A)    Microalbumin, Urine     <1.2   <1.2     <1.2     (A) Abnormal value            CMP    CMP 7/8/22 11/3/22 2/20/23   Glucose 106 (A) 196 (A) 486 (A)   BUN 16 15 31 (A)   Creatinine 0.82 0.80 0.78   eGFR 88.4 91.0 93.2   Sodium 139 137 130 (A)   Potassium 4.7 4.9 5.0   Chloride 106 103 96 (A)   Calcium 9.4 9.2 9.5   Total Protein 7.0 7.0 6.8   Albumin 3.80 3.70 3.7   Globulin 3.2 3.3 3.1   Total Bilirubin 0.2 0.2 0.2   Alkaline Phosphatase 93 104 100   AST (SGOT) 42 (A) 34 (A) 42 (A)   ALT (SGPT) 37 (A) 34 (A) 45 (A)   Albumin/Globulin Ratio 1.2 1.1 1.2   BUN/Creatinine Ratio 19.5 18.8 39.7 (A)   Anion Gap 12.0 11.0 13.0   (A) Abnormal value       Comments are available for some flowsheets but are not being displayed.           CBC    CBC 7/8/22 11/3/22 2/20/23   WBC 8.79 7.70 8.63   RBC 4.87 5.18 4.94   Hemoglobin 13.6 13.6 14.1   Hematocrit 41.3 43.5 42.3   MCV 84.8 84.0 85.6   MCH 27.9 26.3 (A) 28.5   MCHC 32.9 31.3 (A) 33.3   RDW 14.6 14.6 14.6   Platelets 176 199 163   (A) Abnormal value             CBC w/diff    CBC w/Diff 6/10/20 9/9/20 1/26/21   WBC 8.89 9.63 9.04   RBC 4.85 5.07 5.10   Hemoglobin 14.1 14.9 14.4   Hematocrit 42.8 44.3 45.0   MCV 88.2 87.4 88.2   MCH 29.1 29.4 28.2   MCHC 32.9 33.6 32.0   RDW 13.6 13.3 13.0   Platelets 157 180 174   Neutrophil Rel % 63.8 67.2    Immature Granulocyte Rel % 0.6 (A) 0.5    Lymphocyte Rel % 26.4 21.6    Monocyte Rel % 4.5 (A) 4.8 (A)    Eosinophil Rel % 3.9 5.3    Basophil Rel % 0.8 0.6    (A) Abnormal value            Lipid Panel    Lipid Panel 7/8/22 11/3/22 2/20/23   Total Cholesterol 119 129 222 (A)   Triglycerides 152 (A) 88 313 (A)   HDL Cholesterol 43 52 35 (A)   VLDL Cholesterol 26 17 56 (A)   LDL Cholesterol  50 60 131 (A)   LDL/HDL Ratio 1.06 1.14 3.55   (A) Abnormal value            TSH    TSH 7/8/22 11/3/22 2/20/23   TSH 2.290 1.920 1.310           BMP    BMP 7/8/22 11/3/22 2/20/23   BUN 16 15 31 (A)   Creatinine 0.82 0.80 0.78   Sodium 139 137 130 (A)   Potassium 4.7 4.9 5.0   Chloride 106 103 96 (A)   CO2 21.0 (A) 23.0 21.0 (A)   Calcium 9.4 9.2 9.5   (A) Abnormal value            HgB    HGB 7/8/22 11/3/22 2/20/23   Hemoglobin 13.6 13.6 14.1             Data reviewed: PCP notes        Assessment and Plan    Problem List Items Addressed This Visit        Mental Health    Schizoaffective disorder, depressive type (HCC) - Primary    Relevant Medications    DULoxetine (CYMBALTA) 30 MG capsule    Cariprazine HCl 4.5 MG capsule    hydrOXYzine (ATARAX) 25 MG tablet    lamoTRIgine (LaMICtal) 150 MG tablet    Generalized anxiety disorder    Relevant Medications    DULoxetine (CYMBALTA) 30 MG capsule    Cariprazine HCl 4.5 MG capsule    hydrOXYzine (ATARAX) 25 MG tablet   Other Visit Diagnoses     Nightmares        Relevant Medications    DULoxetine (CYMBALTA) 30 MG capsule    Cariprazine HCl 4.5 MG capsule    hydrOXYzine (ATARAX) 25 MG tablet    prazosin (MINIPRESS) 2 MG capsule            TREATMENT PLAN/GOALS: Continue supportive psychotherapy  efforts and medications as indicated. Treatment and medication options discussed during today's visit. Patient ackowledged and verbally consented to continue with current treatment plan and was educated on the importance of compliance with treatment and follow-up appointments.    MEDICATION ISSUES:  We discussed risks, benefits, and side effects of the above medications and the patient was agreeable with the plan. Patient was educated on the importance of compliance with treatment and follow-up appointments.  Patient is agreeable to call the office with any worsening of symptoms or onset of side effects. Patient is agreeable to call 911 or go to the nearest ER should he/she begin having SI/HI. Lengthy discussion with patient on the possible side effects of antipsychotic medications including increased cholesterol, increased blood sugar, and possibility of weight gain.  Also discussed the need to monitor lab work associated with this.  The risk of muscle movement disorders with this class of medication was also discussed. We will continue Lamictal in an effort to stabilize mood.  The patient was reminded to immediately come to the hospital should there be any loss of control.  Explanation was given to her regarding Lamictal and the potential for Foster Shon syndrome and significant rash.  Patient was encouraged to check skin prior to beginning.  Patient was encouraged to report any rash and to immediately stop medication.      -Continue lamotrigine 150 mg daily for schizoaffective disorder.  -Continue minipress 2-4mg at night for nightmares.  -Continue Vraylar  4.5 mg daily for schizoaffective disorder depressed type.  -Continue hydroxyzine 12.5 to 25 mg up to 3 times a day as needed for anxiety and panic.  - Continue Cymbalta 90mg daily for depression.  -Informed patient that I would not write for the 60 mg as that was related to pain not for her depression or her mood but she could contact her PCP or see other  pain management to prescribe this as she is stated she does not have any side effects and tolerated well as this is over the recommended dose for depression so I will not continue.  -Continue psychotherapy to help with past trauma as well as depressive and anxiety symptoms.  -Highly encourage patient since she is now on Trileptal to be aware of side effects of multiple mood stabilizers and if she were to have any rash to contact the clinic as we may have to look at tapering off the lamotrigine or talking with neurologist to reevaluate Trileptal.  Patient verbalized understanding and denied any side effects at this time.  -Reviewed patient's labs as she states she is aware of her blood sugar and hemoglobin A1c as she states she is started doing better and her blood sugars are now in the 200s.    Patient has now failed and tried Haldol, aripiprazole, Latuda and now risperidone which has caused weight gain and due to diabetes and kidney disease would benefit from Vraylar as patient is still having auditory and visual hallucinations.     Counseled patient regarding multimodal approach with healthy nutrition, healthy sleep, regular physical activity, social activities, counseling, and medications.      Coping skills reviewed and encouraged positive framing of thoughts     Assisted patient in processing above session content; acknowledged and normalized patient’s thoughts, feelings, and concerns.  Applied  positive coping skills and behavior management in session.  Allowed patient to freely discuss issues without interruption or judgment. Provided safe, confidential environment to facilitate the development of positive therapeutic relationship and encourage open, honest communication. Assisted patient in identifying risk factors which would indicate the need for higher level of care including thoughts to harm self or others and/or self-harming behavior and encouraged patient to contact this office, call 911, or present to  the nearest emergency room should any of these events occur. Discussed crisis intervention services and means to access.     MEDS ORDERED DURING VISIT:  New Medications Ordered This Visit   Medications   • DULoxetine (CYMBALTA) 30 MG capsule     Sig: Take 3 capsules by mouth Daily.     Dispense:  270 capsule     Refill:  0   • Cariprazine HCl 4.5 MG capsule     Sig: Take 4.5 mg by mouth Daily.     Dispense:  90 capsule     Refill:  0   • hydrOXYzine (ATARAX) 25 MG tablet     Sig: Take 0.5-1 tablets by mouth 3 (Three) Times a Day As Needed for Anxiety.     Dispense:  270 tablet     Refill:  0   • lamoTRIgine (LaMICtal) 150 MG tablet     Sig: Take 1 tablet by mouth Daily.     Dispense:  90 tablet     Refill:  0   • prazosin (MINIPRESS) 2 MG capsule     Sig: Take 1-2 capsules by mouth Every Night.     Dispense:  180 capsule     Refill:  0       Follow Up   Return in about 8 weeks (around 4/25/2023), or if symptoms worsen or fail to improve, for Recheck.      Patient was given instructions and counseling regarding her condition or for health maintenance advice. Please see specific information pulled into the AVS if appropriate.     Some of the data in this electronic note has been brought forward from a previous encounter, any necessary changes have been made, it has been reviewed by this APRN, and it is accurate.      This document has been electronically signed by ROD Spencer  February 28, 2023 09:52 EST    Part of this note may be an electronic transcription/translation of spoken language to printed text using the Dragon Dictation System.

## 2023-03-01 PROBLEM — Z71.3 ENCOUNTER FOR WEIGHT LOSS COUNSELING: Status: ACTIVE | Noted: 2023-03-01

## 2023-03-01 PROBLEM — Z98.84 BARIATRIC SURGERY STATUS: Status: ACTIVE | Noted: 2023-03-01

## 2023-03-06 ENCOUNTER — TELEPHONE (OUTPATIENT)
Dept: CARDIOLOGY | Facility: CLINIC | Age: 48
End: 2023-03-06
Payer: COMMERCIAL

## 2023-03-06 ENCOUNTER — OFFICE VISIT (OUTPATIENT)
Dept: SLEEP MEDICINE | Facility: CLINIC | Age: 48
End: 2023-03-06
Payer: COMMERCIAL

## 2023-03-06 VITALS
HEIGHT: 66 IN | OXYGEN SATURATION: 99 % | WEIGHT: 293 LBS | DIASTOLIC BLOOD PRESSURE: 64 MMHG | SYSTOLIC BLOOD PRESSURE: 140 MMHG | HEART RATE: 84 BPM | BODY MASS INDEX: 47.09 KG/M2

## 2023-03-06 DIAGNOSIS — G47.33 OBSTRUCTIVE SLEEP APNEA: Primary | ICD-10-CM

## 2023-03-06 DIAGNOSIS — E66.01 MORBID OBESITY: ICD-10-CM

## 2023-03-06 DIAGNOSIS — G47.34 NOCTURNAL HYPOXIA: ICD-10-CM

## 2023-03-06 PROCEDURE — 99213 OFFICE O/P EST LOW 20 MIN: CPT | Performed by: NURSE PRACTITIONER

## 2023-03-06 NOTE — PROGRESS NOTES
Sleep Clinic Follow Up - Sleep Study Results    Date: 3/6/2023  Primary Care Provider: Jermaine Ferro MD  Chief complaint/Reason for visit: follow up sleep testing    Last office visit: 02/06/2023    HPI:  The patient is a 47 y.o. female who underwent home sleep testing on 02/13/2023.  The AHI/ISABEL was 50, the RDI was 70. Pulse range of  bpm. O2 james of 72% with no sustained hypoxia and 78 minutes total sleep time SpO2 </=88%.      INTERVAL MEDICAL HISTORY: No change since last office visit in regard to the patient's bedtime routine, medications, or diagnosis.      Review of Systems:  Denies chest pain, shortness of breath, leg swelling, cough, fever, chills, abdominal pain, N/V/D.    MEDICATIONS:   Current Outpatient Medications:   •  albuterol (ACCUNEB) 1.25 MG/3ML nebulizer solution, Take 3 mL by nebulization Every 6 (Six) Hours As Needed for Wheezing or Shortness of Air., Disp: 9 mL, Rfl: 3  •  aspirin 81 MG EC tablet, Take 1 tablet by mouth Daily., Disp: 30 tablet, Rfl: 3  •  Cariprazine HCl 4.5 MG capsule, Take 4.5 mg by mouth Daily., Disp: 90 capsule, Rfl: 0  •  Continuous Blood Gluc  (Dexcom G6 ) device, USE FOR 6 MONTHS, Disp: , Rfl:   •  Continuous Blood Gluc Sensor (Dexcom G6 Sensor), USE ONE SENSOR EVERY 10 DAYS, Disp: 9 each, Rfl: 0  •  Continuous Blood Gluc Transmit (Dexcom G6 Transmitter) misc, USE 1 TRANSMITTER EVERY 3 MONTHS, Disp: 1 each, Rfl: 0  •  cyclobenzaprine (FLEXERIL) 10 MG tablet, Take 1 tablet by mouth Daily., Disp: , Rfl:   •  Dulaglutide (Trulicity) 4.5 MG/0.5ML solution pen-injector, Inject 4.5 mg under the skin into the appropriate area as directed 1 (One) Time Per Week., Disp: 4 pen, Rfl: 11  •  DULoxetine (CYMBALTA) 30 MG capsule, Take 3 capsules by mouth Daily., Disp: 270 capsule, Rfl: 0  •  DULoxetine (CYMBALTA) 60 MG capsule, Take 1 capsule by mouth Daily., Disp: , Rfl:   •  Ertugliflozin L-PyroglutamicAc (Steglatro) 15 MG tablet, Take 1 tablet by mouth  Every Morning., Disp: 90 tablet, Rfl: 1  •  Fluticasone Furoate-Vilanterol (Breo Ellipta) 200-25 MCG/INH inhaler, Inhale 1 puff Daily., Disp: 28 each, Rfl: 11  •  folic acid (FOLVITE) 1 MG tablet, Take 1 tablet by mouth Daily., Disp: , Rfl:   •  glucose blood test strip, Testing 4 times daily, E11.9, Disp: 120 each, Rfl: 12  •  hydrOXYzine (ATARAX) 25 MG tablet, Take 0.5-1 tablets by mouth 3 (Three) Times a Day As Needed for Anxiety., Disp: 270 tablet, Rfl: 0  •  Insulin Disposable Pump (OmniPod Dash 5 Pack Pods) misc, , Disp: , Rfl:   •  insulin lispro (Admelog) 100 UNIT/ML injection, 180  units daily through pump, Disp: 90 mL, Rfl: 11  •  lamoTRIgine (LaMICtal) 150 MG tablet, Take 1 tablet by mouth Daily., Disp: 90 tablet, Rfl: 0  •  levETIRAcetam (KEPPRA) 1000 MG tablet, Take 1 tablet by mouth 2 (Two) Times a Day., Disp: 60 tablet, Rfl: 3  •  lisinopril (PRINIVIL,ZESTRIL) 40 MG tablet, Take 1 tablet by mouth once daily, Disp: 30 tablet, Rfl: 0  •  metFORMIN (GLUCOPHAGE) 500 MG tablet, Take 1 tablet by mouth 2 (Two) Times a Day With Meals., Disp: 60 tablet, Rfl: 11  •  metoprolol tartrate (LOPRESSOR) 50 MG tablet, Take 1 tablet by mouth 2 (Two) Times a Day., Disp: 60 tablet, Rfl: 3  •  Nebulizer device, 1 application Every 6 (Six) Hours As Needed (dyspnea)., Disp: 1 each, Rfl: 3  •  NIFEdipine XL (PROCARDIA XL) 30 MG 24 hr tablet, Take 1 tablet by mouth Daily., Disp: 30 tablet, Rfl: 3  •  omeprazole (priLOSEC) 40 MG capsule, Take 1 capsule by mouth Daily., Disp: 30 capsule, Rfl: 3  •  OXcarbazepine (TRILEPTAL) 600 MG tablet, Take 1 tablet by mouth 2 (Two) Times a Day. PT TAKES  MG TAB IN MORNING AND  MG IN EVENING, Disp: , Rfl:   •  prazosin (MINIPRESS) 2 MG capsule, Take 1-2 capsules by mouth Every Night., Disp: 180 capsule, Rfl: 0  •  simvastatin (ZOCOR) 40 MG tablet, Take 1 tablet by mouth Every Night., Disp: 30 tablet, Rfl: 3  •  Ventolin  (90 Base) MCG/ACT inhaler, Inhale 2 puffs Every 4  (Four) Hours As Needed for Wheezing., Disp: 6.7 g, Rfl: 11  •  vitamin D (ERGOCALCIFEROL) 1.25 MG (80758 UT) capsule capsule, Take 2 tablets by mouth weekly, Disp: 8 capsule, Rfl: 3  •  nicotine (NICODERM CQ) 21 MG/24HR patch, Place 1 patch on the skin as directed by provider Daily., Disp: 28 patch, Rfl: 3    PHYSICAL EXAM:    Vitals:    03/06/23 1325   BP: 140/64   Pulse: 84   SpO2: 99%         03/06/23  1325   Weight: (!) 138 kg (304 lb)     Body mass index is 49.09 kg/m².  Class 3 Severe Obesity (BMI >=40). Obesity-related health conditions include the following: obstructive sleep apnea, hypertension, diabetes mellitus, dyslipidemias and GERD. Obesity is unchanged. BMI is is above average; BMI management plan is completed. I recommend portion control and increasing exercise.    Gen:                No distress, conversant, pleasant, appears stated age, alert, oriented  Eyes:               Anicteric sclera, moist conjunctiva, no lid lag                           PERRL, EOMI   Heent:             NC/AT                          Oropharynx clear                          Normal hearing  Lungs:             Normal effort, non-labored breathing         CV:                  Normal S1/S2                          No lower extremity edema  ABD:               Soft, rounded, non-distended                  Psych:             Appropriate affect  Neuro:             CN 2-12 appear intact      WBC   Date Value Ref Range Status   02/20/2023 8.63 3.40 - 10.80 10*3/mm3 Final     RBC   Date Value Ref Range Status   02/20/2023 4.94 3.77 - 5.28 10*6/mm3 Final     Hemoglobin   Date Value Ref Range Status   02/20/2023 14.1 12.0 - 15.9 g/dL Final     Hematocrit   Date Value Ref Range Status   02/20/2023 42.3 34.0 - 46.6 % Final     MCV   Date Value Ref Range Status   02/20/2023 85.6 79.0 - 97.0 fL Final     MCH   Date Value Ref Range Status   02/20/2023 28.5 26.6 - 33.0 pg Final     MCHC   Date Value Ref Range Status   02/20/2023 33.3 31.5 -  35.7 g/dL Final     RDW   Date Value Ref Range Status   02/20/2023 14.6 12.3 - 15.4 % Final     RDW-SD   Date Value Ref Range Status   02/20/2023 45.8 37.0 - 54.0 fl Final     MPV   Date Value Ref Range Status   02/20/2023 12.3 (H) 6.0 - 12.0 fL Final     Platelets   Date Value Ref Range Status   02/20/2023 163 140 - 450 10*3/mm3 Final     Neutrophil %   Date Value Ref Range Status   02/20/2023 63.8 42.7 - 76.0 % Final     Lymphocyte %   Date Value Ref Range Status   02/20/2023 30.4 19.6 - 45.3 % Final     Monocyte %   Date Value Ref Range Status   02/20/2023 3.4 (L) 5.0 - 12.0 % Final     Eosinophil %   Date Value Ref Range Status   02/20/2023 1.4 0.3 - 6.2 % Final     Basophil %   Date Value Ref Range Status   02/20/2023 0.7 0.0 - 1.5 % Final     Immature Grans %   Date Value Ref Range Status   02/20/2023 0.3 0.0 - 0.5 % Final     Neutrophils, Absolute   Date Value Ref Range Status   02/20/2023 5.51 1.70 - 7.00 10*3/mm3 Final     Lymphocytes, Absolute   Date Value Ref Range Status   02/20/2023 2.62 0.70 - 3.10 10*3/mm3 Final     Monocytes, Absolute   Date Value Ref Range Status   02/20/2023 0.29 0.10 - 0.90 10*3/mm3 Final     Eosinophils, Absolute   Date Value Ref Range Status   02/20/2023 0.12 0.00 - 0.40 10*3/mm3 Final     Basophils, Absolute   Date Value Ref Range Status   02/20/2023 0.06 0.00 - 0.20 10*3/mm3 Final     Immature Grans, Absolute   Date Value Ref Range Status   02/20/2023 0.03 0.00 - 0.05 10*3/mm3 Final     nRBC   Date Value Ref Range Status   02/20/2023 0.0 0.0 - 0.2 /100 WBC Final       Lab Results   Component Value Date    GLUCOSE 486 (C) 02/20/2023    BUN 31 (H) 02/20/2023    CREATININE 0.78 02/20/2023    EGFR 93.2 02/20/2023    BCR 39.7 (H) 02/20/2023    K 5.0 02/20/2023    CO2 21.0 (L) 02/20/2023    CALCIUM 9.5 02/20/2023    ALBUMIN 3.7 02/20/2023    AST 42 (H) 02/20/2023    ALT 45 (H) 02/20/2023         Assessment and Plan:    1. Obstructive sleep apnea - New (to me), no additional work-up  planned (3)  1. The sleep study results were discussed in detail with the patient. The risks of untreated sleep apnea were reviewed. Treatment options for sleep apnea were discussed. I counseled patient on sleep hygiene, including regular sleep wake schedule and stimulus control therapy, the importance of weight reduction, and abstaining from smoking and alcohol consumption  2. Proceed with autoCPAP 8-20 cm H2O with mask per patient choice (Advanced)  3. Pertinent labs were reviewed as listed above  4. Follow up in 30-90 days with compliance report, or sooner if trouble with PAP adaptation  5. JACQUE/CPAP education provided in AVS  2. Morbid obesity - BMI 49.1 - stable chronic illness      All of the patient's questions were answered. She states understanding and agreement with my assessment and plan as above.     I spent 20 minutes caring for Sudhakar on this date of service. This time includes time spent by me in the following activities: preparing for the visit, reviewing tests, obtaining and/or reviewing a separately obtained history, performing a medically appropriate examination and/or evaluation , counseling and educating the patient/family/caregiver, ordering medications, tests, or procedures, documenting information in the medical record and care coordination; discussing PAP therapy, PAP compliance, PAP maintenance and Study results    Patient to follow up in 31-90 days with compliance report. Patient agrees to return sooner if changes in symptoms.          This document has been electronically signed by ROD Thomas on March 6, 2023 13:41 CST            CC: Jermaine Ferro MD          No ref. provider found

## 2023-03-06 NOTE — TELEPHONE ENCOUNTER
Spoke with pt and gave echo results and it was sent to her Mychart 3/1/2023. And notes was sent to Dr Benavides in [place]  ----- Message from Houston Burnett sent at 3/6/2023  9:55 AM CST -----  Regarding: callback  Contact: [phone]  She would like a callback at [phone] with her Echo results. Thanks

## 2023-03-07 ENCOUNTER — OFFICE VISIT (OUTPATIENT)
Dept: FAMILY MEDICINE CLINIC | Facility: CLINIC | Age: 48
End: 2023-03-07
Payer: COMMERCIAL

## 2023-03-07 VITALS
BODY MASS INDEX: 47.09 KG/M2 | TEMPERATURE: 97.5 F | SYSTOLIC BLOOD PRESSURE: 118 MMHG | OXYGEN SATURATION: 94 % | DIASTOLIC BLOOD PRESSURE: 76 MMHG | WEIGHT: 293 LBS | HEART RATE: 80 BPM | HEIGHT: 66 IN

## 2023-03-07 DIAGNOSIS — E78.2 MIXED HYPERLIPIDEMIA: ICD-10-CM

## 2023-03-07 DIAGNOSIS — E55.9 VITAMIN D DEFICIENCY: ICD-10-CM

## 2023-03-07 DIAGNOSIS — F41.1 GENERALIZED ANXIETY DISORDER: ICD-10-CM

## 2023-03-07 DIAGNOSIS — I10 ESSENTIAL HYPERTENSION: ICD-10-CM

## 2023-03-07 DIAGNOSIS — K21.00 GASTROESOPHAGEAL REFLUX DISEASE WITH ESOPHAGITIS WITHOUT HEMORRHAGE: ICD-10-CM

## 2023-03-07 DIAGNOSIS — G40.909 SEIZURE DISORDER: ICD-10-CM

## 2023-03-07 DIAGNOSIS — E11.65 UNCONTROLLED TYPE 2 DIABETES MELLITUS WITH HYPERGLYCEMIA: Primary | ICD-10-CM

## 2023-03-07 DIAGNOSIS — G47.33 OSA (OBSTRUCTIVE SLEEP APNEA): ICD-10-CM

## 2023-03-07 DIAGNOSIS — N18.2 STAGE 2 CHRONIC KIDNEY DISEASE: ICD-10-CM

## 2023-03-07 DIAGNOSIS — F25.1 SCHIZOAFFECTIVE DISORDER, DEPRESSIVE TYPE: ICD-10-CM

## 2023-03-07 DIAGNOSIS — Z71.3 ENCOUNTER FOR WEIGHT LOSS COUNSELING: ICD-10-CM

## 2023-03-07 DIAGNOSIS — Z98.84 BARIATRIC SURGERY STATUS: ICD-10-CM

## 2023-03-07 DIAGNOSIS — K29.70 GASTRITIS WITHOUT BLEEDING, UNSPECIFIED CHRONICITY, UNSPECIFIED GASTRITIS TYPE: ICD-10-CM

## 2023-03-07 DIAGNOSIS — E66.01 MORBID OBESITY: ICD-10-CM

## 2023-03-07 PROCEDURE — 3046F HEMOGLOBIN A1C LEVEL >9.0%: CPT | Performed by: FAMILY MEDICINE

## 2023-03-07 PROCEDURE — 99214 OFFICE O/P EST MOD 30 MIN: CPT | Performed by: FAMILY MEDICINE

## 2023-03-07 RX ORDER — OMEPRAZOLE 40 MG/1
40 CAPSULE, DELAYED RELEASE ORAL DAILY
Qty: 30 CAPSULE | Refills: 3 | Status: SHIPPED | OUTPATIENT
Start: 2023-03-07

## 2023-03-07 RX ORDER — NIFEDIPINE 30 MG/1
30 TABLET, EXTENDED RELEASE ORAL DAILY
Qty: 30 TABLET | Refills: 3 | Status: SHIPPED | OUTPATIENT
Start: 2023-03-07

## 2023-03-07 RX ORDER — METOPROLOL TARTRATE 50 MG/1
50 TABLET, FILM COATED ORAL 2 TIMES DAILY
Qty: 60 TABLET | Refills: 3 | Status: SHIPPED | OUTPATIENT
Start: 2023-03-07

## 2023-03-07 RX ORDER — DULOXETIN HYDROCHLORIDE 60 MG/1
60 CAPSULE, DELAYED RELEASE ORAL DAILY
Qty: 30 CAPSULE | Refills: 3 | Status: SHIPPED | OUTPATIENT
Start: 2023-03-07

## 2023-03-07 RX ORDER — SIMVASTATIN 40 MG
40 TABLET ORAL NIGHTLY
Qty: 30 TABLET | Refills: 3 | Status: SHIPPED | OUTPATIENT
Start: 2023-03-07

## 2023-03-07 RX ORDER — ERGOCALCIFEROL 1.25 MG/1
CAPSULE ORAL
Qty: 8 CAPSULE | Refills: 3 | Status: SHIPPED | OUTPATIENT
Start: 2023-03-07

## 2023-03-07 RX ORDER — CYCLOBENZAPRINE HCL 10 MG
10 TABLET ORAL 3 TIMES DAILY PRN
Qty: 90 TABLET | Refills: 3 | Status: SHIPPED | OUTPATIENT
Start: 2023-03-07

## 2023-03-07 RX ORDER — LISINOPRIL 40 MG/1
40 TABLET ORAL DAILY
Qty: 30 TABLET | Refills: 3 | Status: SHIPPED | OUTPATIENT
Start: 2023-03-07

## 2023-03-17 NOTE — TELEPHONE ENCOUNTER
Incoming Refill Request      Medication requested (name and dose): Continuous Blood Gluc Transmit (Dexcom G6 Transmitter) [facility] [place]    Pharmacy where request should be sent: [facility] in [place]    Additional details provided by patient:     Best call back number: [phone]    Does the patient have less than a 3 day supply:  [x] Yes  [] No    Houston Reis Rep  03/17/23, 12:07 CDT

## 2023-03-20 RX ORDER — PROCHLORPERAZINE 25 MG/1
SUPPOSITORY RECTAL
Qty: 1 EACH | Refills: 0 | Status: SHIPPED | OUTPATIENT
Start: 2023-03-20

## 2023-04-05 ENCOUNTER — TELEPHONE (OUTPATIENT)
Dept: PULMONOLOGY | Facility: CLINIC | Age: 48
End: 2023-04-05
Payer: COMMERCIAL

## 2023-04-05 NOTE — TELEPHONE ENCOUNTER
Letter faxed to Dr. Benavides.          ----- Message from Kellie Dickinson sent at 4/5/2023  8:46 AM CDT -----  Contact: [phone]  Needs a copy of her clearance letter to be faxed to her surgeon    Dr Benavides in [place] @  [phone]

## 2023-04-07 ENCOUNTER — OFFICE VISIT (OUTPATIENT)
Dept: ENDOCRINOLOGY | Facility: CLINIC | Age: 48
End: 2023-04-07
Payer: COMMERCIAL

## 2023-04-07 DIAGNOSIS — E11.65 TYPE 2 DIABETES MELLITUS WITH HYPERGLYCEMIA, WITH LONG-TERM CURRENT USE OF INSULIN: ICD-10-CM

## 2023-04-07 DIAGNOSIS — Z79.4 TYPE 2 DIABETES MELLITUS WITH HYPERGLYCEMIA, WITH LONG-TERM CURRENT USE OF INSULIN: ICD-10-CM

## 2023-04-07 PROCEDURE — 98960 EDU&TRN PT SELF-MGMT NQHP 1: CPT | Performed by: DIETITIAN, REGISTERED

## 2023-04-07 NOTE — PROGRESS NOTES
"Sudhakar Saul is a 47 y.o. female referred for outpatient diabetes education by Sudhakar VELASCO. Patient attended individual education for 40 minutes on 04/07/2023. Topics covered included:     1. Healthy Food Choices   i. Provided patient with detailed carbohydrate counting guide.    ii. Instructed patient to eat 30-45 grams of carbohydrate with each meal and 15 grams of carb (plus protein source) for snacks. Also discussed low carb snack options (carrots/celery with pb, eggs, etc)   iii. Provided patient with list of non-starchy vegetables and foods that are low in carbohydrate for snacks and to incorporate with meals (Planning Healthy Meals Packet).    iv. Choose fruits, vegetables, whole grains, legumes, low-fat milk, fiber-rich foods, minimal saturated fats, and watch cholesterol and sodium intake.    v. Reviewed carbohydrate-containing foods, standard serving sizes, and measuring foods.   vi. Reviewed the difference between simple and complex carbohydrate. Encouraged patient to choose complex carbohydrates more often.   vii. Reviewed label reading. Discussed looking at serving size, total carbohydrates, fiber, saturated fat, sodium. Reviewed  amounts of each to hav per day/meal.      2. Pathophysiology of Diabetes   i.  Explained common conditions associated with uncontrolled diabetes (diabetic neuropathy, retinopathy, nephropathy, heart disease, skin infections, and kidney disease)     3. Hyperglycemia/Hypoglycemia   i. Discussed signs, symptoms, and difference between hypo/hyperglycemia - and the proper treatment guidelines for both. Reviewed \"rule of 15\" for treatment of low BG   ii. Unable to download Dexcom report. Pt reports FBG usually 113-125; BG during day 130-160. Pt reports an improvement in BG after \"getting back on track\" recently. Pt has experienced some lows r/t bolusing for meal and then getting \"distracted (and not eating).\"  No changes to Omnipod Dash insulin pump settings at this time " (MN 2.95, BOLUS 30 units TID). Encouraged pt to get >30 minutes/day PA for better glycemic/weight control      Provided patient with Diabetes and You book, and Planning Healthy Meals book. Provided handout of sample menu with carbs listed.      Thank you Sudhakar VELASCO for this referral.      LAILA Viera, RD, LD

## 2023-04-12 ENCOUNTER — TELEPHONE (OUTPATIENT)
Dept: PSYCHIATRY | Facility: CLINIC | Age: 48
End: 2023-04-12
Payer: COMMERCIAL

## 2023-04-12 NOTE — TELEPHONE ENCOUNTER
Pt is requesting a letter for her bariatric surgery from provider.  Letter will need to include current treating diagnosis, that she has had no manic or hyper manic episodes in the past year, no suicide attempts or hospitalizations in the past year, and no hallucinations/delusions in the past eight months.   This will need to be sent to [phone]   Attn: Radha Jay

## 2023-04-12 NOTE — TELEPHONE ENCOUNTER
IT has been sent in patient as well as you and in her mychart. Please fax to the number given. Thank you.

## 2023-04-19 RX ORDER — PROCHLORPERAZINE 25 MG/1
SUPPOSITORY RECTAL
Qty: 9 EACH | Refills: 0 | Status: SHIPPED | OUTPATIENT
Start: 2023-04-19

## 2023-04-25 ENCOUNTER — TELEMEDICINE (OUTPATIENT)
Dept: PSYCHIATRY | Facility: CLINIC | Age: 48
End: 2023-04-25
Payer: COMMERCIAL

## 2023-04-25 DIAGNOSIS — F41.1 GENERALIZED ANXIETY DISORDER: ICD-10-CM

## 2023-04-25 DIAGNOSIS — F25.9 SCHIZOAFFECTIVE DISORDER, IN REMISSION: Primary | ICD-10-CM

## 2023-04-25 DIAGNOSIS — F51.5 NIGHTMARES: ICD-10-CM

## 2023-04-25 PROCEDURE — 1160F RVW MEDS BY RX/DR IN RCRD: CPT | Performed by: NURSE PRACTITIONER

## 2023-04-25 PROCEDURE — 99214 OFFICE O/P EST MOD 30 MIN: CPT | Performed by: NURSE PRACTITIONER

## 2023-04-25 PROCEDURE — 1159F MED LIST DOCD IN RCRD: CPT | Performed by: NURSE PRACTITIONER

## 2023-04-25 RX ORDER — LAMOTRIGINE 150 MG/1
150 TABLET ORAL DAILY
Qty: 90 TABLET | Refills: 0 | Status: SHIPPED | OUTPATIENT
Start: 2023-04-25

## 2023-04-25 RX ORDER — DULAGLUTIDE 4.5 MG/.5ML
INJECTION, SOLUTION SUBCUTANEOUS
Qty: 4 ML | Refills: 0 | Status: SHIPPED | OUTPATIENT
Start: 2023-04-25

## 2023-04-25 RX ORDER — PRAZOSIN HYDROCHLORIDE 2 MG/1
2-4 CAPSULE ORAL NIGHTLY
Qty: 180 CAPSULE | Refills: 0 | Status: SHIPPED | OUTPATIENT
Start: 2023-04-25

## 2023-04-25 NOTE — PROGRESS NOTES
This provider is located at [facility], [address] [place] [address].The Patient is seen remotely at home, [address] [place] [address] via [facility].  Patient is being seen via telehealth and confirm that they are in a secure environment for this session. The patient's condition being diagnosed/treated is appropriate for telemedicine. The provider identified himself/herself: herself as well as her credentials.   The patient gave consent to be seen remotely, and when consent is given they understand that the consent allows for patient identifiable information to be sent to a third party as needed.   They may refuse to be seen remotely at any time. The electronic data is encrypted and password protected, and the patient has been advised of the potential risks to privacy not withstanding such measures.    You have chosen to receive care through a telehealth visit.  Do you consent to use a video/audio connection for your medical care today? Yes      Chief Complaint  Follow-for schizoaffective depressed type    Subjective    Sudhakar Aguero Kg presents to [facility] for medication management.       History of Present Illness   Patient presents today reporting things have been going well.  Patient states that she has her consultation date for the bariatric surgery and on Thursday and then she will be given her surgery date.  Patient states she is excited but she is also stressed and anxious with the changes coming in anticipation of what if it is going to be like.  Patient states she has been watching her appetite and taking her vitamins as well as drinking plenty of water.  She states she is getting 6 hours of sleep at night and using her CPAP and trying to be compliant with it but it is difficult when she is up and down at night.  Patient that her mood has been good as she feels it is stable with no highs or lows.  Denies any major depressive  symptoms.  Patient states she had a vivid memory in a dream but no nightmare but it was difficult roughly 2 weeks ago otherwise she is doing well.  Denies any hypomanic or manic episodes.  Denies any auditory or visual hallucinations or paranoia or delusions.  Denies any SI or HI.  Aims score 0.  Denies any side effects to medications.    Objective   Vital Signs:   There were no vitals taken for this visit.  Due to the remote nature of this encounter (virtual encounter), vitals were unable to be obtained.  Height stated at 66 inches.  Weight stated at 306 pounds.        PHQ-9 Score:   PHQ-9 Total Score:     Patient screened positive for depression based on a PHQ-9 score of  on . Follow-up recommendations include: Patient has schizophrenia and is currently being managed with medication..    Mental Status Exam:   Hygiene:   good  Cooperation:  Cooperative  Eye Contact:  Good  Psychomotor Behavior:  Appropriate  Affect:  Appropriate  Mood: normal and anxious  Speech:  Normal  Thought Process:  Goal directed and Linear  Thought Content:  Normal  Suicidal:  None  Homicidal:  None  Hallucinations:  None  Delusion:  None  Memory:  Intact  Orientation:  Person, Place, Time and Situation  Reliability:  good  Insight:  Good and Fair  Judgement:  Good and Fair  Impulse Control:  Good and Fair  Physical/Medical Issues:  Yes Dm. HTN, CKD stage 2, hep C+, JACQUE, hx substance abuse     Current Medications:   Current Outpatient Medications   Medication Sig Dispense Refill   • Cariprazine HCl (VRAYLAR) 4.5 MG capsule capsule Take 1 capsule by mouth Daily. 90 capsule 0   • lamoTRIgine (LaMICtal) 150 MG tablet Take 1 tablet by mouth Daily. 90 tablet 0   • prazosin (MINIPRESS) 2 MG capsule Take 1-2 capsules by mouth Every Night. 180 capsule 0   • albuterol (ACCUNEB) 1.25 MG/3ML nebulizer solution Take 3 mL by nebulization Every 6 (Six) Hours As Needed for Wheezing or Shortness of Air. 9 mL 3   • aspirin 81 MG EC tablet Take 1 tablet by  mouth Daily. 30 tablet 3   • Continuous Blood Gluc  (Dexcom G6 ) device USE FOR 6 MONTHS     • Continuous Blood Gluc Sensor (Dexcom G6 Sensor) USE 1 SENSOR EVERY 10 DAYS 9 each 0   • Continuous Blood Gluc Transmit (Dexcom G6 Transmitter) misc USE AS DIRECTED EVERY 3 MONTHS 1 each 0   • cyclobenzaprine (FLEXERIL) 10 MG tablet Take 1 tablet by mouth 3 (Three) Times a Day As Needed for Muscle Spasms. 90 tablet 3   • Dulaglutide (Trulicity) 4.5 MG/0.5ML solution pen-injector Inject 4.5 mg under the skin into the appropriate area as directed 1 (One) Time Per Week. 4 pen 11   • DULoxetine (CYMBALTA) 30 MG capsule Take 3 capsules by mouth Daily. 270 capsule 0   • DULoxetine (CYMBALTA) 60 MG capsule Take 1 capsule by mouth Daily. 30 capsule 3   • Ertugliflozin L-PyroglutamicAc (Steglatro) 15 MG tablet Take 1 tablet by mouth Every Morning. 90 tablet 1   • Fluticasone Furoate-Vilanterol (Breo Ellipta) 200-25 MCG/INH inhaler Inhale 1 puff Daily. 28 each 11   • folic acid (FOLVITE) 1 MG tablet Take 1 tablet by mouth Daily.     • glucose blood test strip Testing 4 times daily, E11.9 120 each 12   • hydrOXYzine (ATARAX) 25 MG tablet Take 0.5-1 tablets by mouth 3 (Three) Times a Day As Needed for Anxiety. 270 tablet 0   • Insulin Disposable Pump (OmniPod Dash 5 Pack Pods) misc      • insulin lispro (Admelog) 100 UNIT/ML injection 180  units daily through pump 90 mL 11   • levETIRAcetam (KEPPRA) 1000 MG tablet Take 1 tablet by mouth 2 (Two) Times a Day. 60 tablet 3   • lisinopril (PRINIVIL,ZESTRIL) 40 MG tablet Take 1 tablet by mouth Daily. 30 tablet 3   • metFORMIN (GLUCOPHAGE) 500 MG tablet Take 1 tablet by mouth 2 (Two) Times a Day With Meals. 60 tablet 11   • metoprolol tartrate (LOPRESSOR) 50 MG tablet Take 1 tablet by mouth 2 (Two) Times a Day. 60 tablet 3   • Nebulizer device 1 application Every 6 (Six) Hours As Needed (dyspnea). 1 each 3   • NIFEdipine XL (PROCARDIA XL) 30 MG 24 hr tablet Take 1 tablet by  mouth Daily. 30 tablet 3   • omeprazole (priLOSEC) 40 MG capsule Take 1 capsule by mouth Daily. 30 capsule 3   • OXcarbazepine (TRILEPTAL) 600 MG tablet Take 1 tablet by mouth 2 (Two) Times a Day. PT TAKES  MG TAB IN MORNING AND  MG IN EVENING     • simvastatin (ZOCOR) 40 MG tablet Take 1 tablet by mouth Every Night. 30 tablet 3   • Ventolin  (90 Base) MCG/ACT inhaler Inhale 2 puffs Every 4 (Four) Hours As Needed for Wheezing. 6.7 g 11   • vitamin D (ERGOCALCIFEROL) 1.25 MG (15587 UT) capsule capsule Take 2 tablets by mouth weekly 8 capsule 3     No current facility-administered medications for this visit.       Physical Exam  Nursing note reviewed.   Constitutional:       Appearance: Normal appearance.   Neurological:      Mental Status: She is alert.   Psychiatric:         Attention and Perception: Attention normal. She does not perceive auditory or visual hallucinations.         Mood and Affect: Affect normal. Mood is anxious. Mood is not depressed.         Speech: Speech normal.         Behavior: Behavior is cooperative.         Thought Content: Thought content normal.         Cognition and Memory: Cognition and memory normal.        Result Review :     The following data was reviewed by: ROD Spencer on 02/03/2021:  Common labs        7/8/2022    09:22 11/3/2022    09:00 2/20/2023    09:57   Common Labs   Glucose 106   196   486     BUN 16   15   31     Creatinine 0.82   0.80   0.78     Sodium 139   137   130     Potassium 4.7   4.9   5.0     Chloride 106   103   96     Calcium 9.4   9.2   9.5     Albumin 3.80   3.70   3.7     Total Bilirubin 0.2   0.2   0.2     Alkaline Phosphatase 93   104   100     AST (SGOT) 42   34   42     ALT (SGPT) 37   34   45     WBC 8.79   7.70   8.63     Hemoglobin 13.6   13.6   14.1     Hematocrit 41.3   43.5   42.3     Platelets 176   199   163     Total Cholesterol 119   129   222     Triglycerides 152   88   313     HDL Cholesterol 43   52   35      LDL Cholesterol  50   60   131     Hemoglobin A1C 8.70   9.20   16.00     Microalbumin, Urine <1.2   <1.2   <1.2       CMP        7/8/2022    09:22 11/3/2022    09:00 2/20/2023    09:57   CMP   Glucose 106   196   486     BUN 16   15   31     Creatinine 0.82   0.80   0.78     EGFR 88.4   91.0   93.2     Sodium 139   137   130     Potassium 4.7   4.9   5.0     Chloride 106   103   96     Calcium 9.4   9.2   9.5     Total Protein 7.0   7.0   6.8     Albumin 3.80   3.70   3.7     Globulin 3.2   3.3   3.1     Total Bilirubin 0.2   0.2   0.2     Alkaline Phosphatase 93   104   100     AST (SGOT) 42   34   42     ALT (SGPT) 37   34   45     Albumin/Globulin Ratio 1.2   1.1   1.2     BUN/Creatinine Ratio 19.5   18.8   39.7     Anion Gap 12.0   11.0   13.0       CBC        7/8/2022    09:22 11/3/2022    09:00 2/20/2023    09:57   CBC   WBC 8.79   7.70   8.63     RBC 4.87   5.18   4.94     Hemoglobin 13.6   13.6   14.1     Hematocrit 41.3   43.5   42.3     MCV 84.8   84.0   85.6     MCH 27.9   26.3   28.5     MCHC 32.9   31.3   33.3     RDW 14.6   14.6   14.6     Platelets 176   199   163       CBC w/diff    CBC w/Diff 6/10/20 9/9/20 1/26/21   WBC 8.89 9.63 9.04   RBC 4.85 5.07 5.10   Hemoglobin 14.1 14.9 14.4   Hematocrit 42.8 44.3 45.0   MCV 88.2 87.4 88.2   MCH 29.1 29.4 28.2   MCHC 32.9 33.6 32.0   RDW 13.6 13.3 13.0   Platelets 157 180 174   Neutrophil Rel % 63.8 67.2    Immature Granulocyte Rel % 0.6 (A) 0.5    Lymphocyte Rel % 26.4 21.6    Monocyte Rel % 4.5 (A) 4.8 (A)    Eosinophil Rel % 3.9 5.3    Basophil Rel % 0.8 0.6    (A) Abnormal value            Lipid Panel        7/8/2022    09:22 11/3/2022    09:00 2/20/2023    09:57   Lipid Panel   Total Cholesterol 119   129   222     Triglycerides 152   88   313     HDL Cholesterol 43   52   35     VLDL Cholesterol 26   17   56     LDL Cholesterol  50   60   131     LDL/HDL Ratio 1.06   1.14   3.55       TSH        7/8/2022    09:22 11/3/2022    09:00 2/20/2023     09:57   TSH   TSH 2.290   1.920   1.310       BMP        7/8/2022    09:22 11/3/2022    09:00 2/20/2023    09:57   BMP   BUN 16   15   31     Creatinine 0.82   0.80   0.78     Sodium 139   137   130     Potassium 4.7   4.9   5.0     Chloride 106   103   96     CO2 21.0   23.0   21.0     Calcium 9.4   9.2   9.5       HgB        7/8/2022    09:22 11/3/2022    09:00 2/20/2023    09:57   HGB   Hemoglobin 13.6   13.6   14.1         Data reviewed: PCP notes        Assessment and Plan    Problem List Items Addressed This Visit        Mental Health    Schizoaffective disorder, depressive type - Primary    Relevant Medications    Cariprazine HCl (VRAYLAR) 4.5 MG capsule capsule    lamoTRIgine (LaMICtal) 150 MG tablet    Generalized anxiety disorder    Relevant Medications    Cariprazine HCl (VRAYLAR) 4.5 MG capsule capsule   Other Visit Diagnoses     Nightmares        Relevant Medications    Cariprazine HCl (VRAYLAR) 4.5 MG capsule capsule    prazosin (MINIPRESS) 2 MG capsule            TREATMENT PLAN/GOALS: Continue supportive psychotherapy efforts and medications as indicated. Treatment and medication options discussed during today's visit. Patient ackowledged and verbally consented to continue with current treatment plan and was educated on the importance of compliance with treatment and follow-up appointments.    MEDICATION ISSUES:  We discussed risks, benefits, and side effects of the above medications and the patient was agreeable with the plan. Patient was educated on the importance of compliance with treatment and follow-up appointments.  Patient is agreeable to call the office with any worsening of symptoms or onset of side effects. Patient is agreeable to call 911 or go to the nearest ER should he/she begin having SI/HI. Lengthy discussion with patient on the possible side effects of antipsychotic medications including increased cholesterol, increased blood sugar, and possibility of weight gain.  Also discussed the  need to monitor lab work associated with this.  The risk of muscle movement disorders with this class of medication was also discussed. We will continue Lamictal in an effort to stabilize mood.  The patient was reminded to immediately come to the hospital should there be any loss of control.  Explanation was given to her regarding Lamictal and the potential for Foster Shon syndrome and significant rash.  Patient was encouraged to check skin prior to beginning.  Patient was encouraged to report any rash and to immediately stop medication.      -Continue lamotrigine 150 mg daily for schizoaffective disorder.  -Continue minipress 2-4mg at night for nightmares.  -Continue Vraylar  4.5 mg daily for schizoaffective disorder depressed type.  -Continue hydroxyzine 12.5 to 25 mg up to 3 times a day as needed for anxiety and panic.  - Continue Cymbalta 90mg daily for depression.  -Informed patient that I would not write for the 60 mg as that was related to pain not for her depression or her mood but she could contact her PCP or see other pain management to prescribe this as she is stated she does not have any side effects and tolerated well as this is over the recommended dose for depression so I will not continue.  -Continue psychotherapy to help with past trauma as well as depressive and anxiety symptoms.  -Highly encourage patient since she is now on Trileptal to be aware of side effects of multiple mood stabilizers and if she were to have any rash to contact the clinic as we may have to look at tapering off the lamotrigine or talking with neurologist to reevaluate Trileptal.  Patient verbalized understanding and denied any side effects at this time.  -Encouraged patient to update her lab work.    Patient has now failed and tried Haldol, aripiprazole, Latuda and now risperidone which has caused weight gain and due to diabetes and kidney disease would benefit from Vraylar as patient is still having auditory and visual  hallucinations.     Counseled patient regarding multimodal approach with healthy nutrition, healthy sleep, regular physical activity, social activities, counseling, and medications.      Coping skills reviewed and encouraged positive framing of thoughts     Assisted patient in processing above session content; acknowledged and normalized patient’s thoughts, feelings, and concerns.  Applied  positive coping skills and behavior management in session.  Allowed patient to freely discuss issues without interruption or judgment. Provided safe, confidential environment to facilitate the development of positive therapeutic relationship and encourage open, honest communication. Assisted patient in identifying risk factors which would indicate the need for higher level of care including thoughts to harm self or others and/or self-harming behavior and encouraged patient to contact this office, call 911, or present to the nearest emergency room should any of these events occur. Discussed crisis intervention services and means to access.     MEDS ORDERED DURING VISIT:  New Medications Ordered This Visit   Medications   • Cariprazine HCl (VRAYLAR) 4.5 MG capsule capsule     Sig: Take 1 capsule by mouth Daily.     Dispense:  90 capsule     Refill:  0   • lamoTRIgine (LaMICtal) 150 MG tablet     Sig: Take 1 tablet by mouth Daily.     Dispense:  90 tablet     Refill:  0   • prazosin (MINIPRESS) 2 MG capsule     Sig: Take 1-2 capsules by mouth Every Night.     Dispense:  180 capsule     Refill:  0       Follow Up   Return in about 7 weeks (around 6/13/2023), or if symptoms worsen or fail to improve, for Recheck.      Patient was given instructions and counseling regarding her condition or for health maintenance advice. Please see specific information pulled into the AVS if appropriate.     Some of the data in this electronic note has been brought forward from a previous encounter, any necessary changes have been made, it has been  reviewed by this APRN, and it is accurate.      This document has been electronically signed by ROD Spencer  April 25, 2023 08:55 EDT    Part of this note may be an electronic transcription/translation of spoken language to printed text using the Dragon Dictation System.

## 2023-04-27 ENCOUNTER — DOCUMENTATION (OUTPATIENT)
Dept: ENDOCRINOLOGY | Facility: CLINIC | Age: 48
End: 2023-04-27
Payer: COMMERCIAL

## 2023-04-27 NOTE — PROGRESS NOTES
PA FOR TRULICITY 4.5 MG SUBMITTED VIA COVER MY MEDS.    TRULICITY 4.5 MG APPROVED FROM 4/27/23 TO 4/26/24    SENT TO MED REC

## 2023-04-28 NOTE — PROGRESS NOTES
Subjective:  Sudhakar Saul is a 47 y.o. female who presents for       Patient Active Problem List   Diagnosis   • Seizure disorder   • Morbid obesity   • Tobacco abuse counseling   • Multiple joint pain   • Dysuria   • History of positive hepatitis C   • Essential hypertension   • Vitamin D deficiency   • Urinary tract infection without hematuria   • Chronic hepatitis C without hepatic coma   • Dyspnea on exertion   • Cigarette nicotine dependence, uncomplicated   • JACQUE (obstructive sleep apnea)   • Gastroesophageal reflux disease with esophagitis   • Left upper quadrant pain   • Nausea and vomiting   • Weight gain, abnormal   • Change in bowel habits   • Hepatic fibrosis   • Non-compliance   • Schizoaffective disorder, depressive type   • Schizophrenia, paranoid type   • Gastritis without bleeding   • Tobacco user   • Pre-operative cardiovascular examination   • Scoliosis   • History of drug use   • Stage 2 chronic kidney disease   • Diabetic neuropathy   • Mixed hyperlipidemia   • Chronic bilateral low back pain with bilateral sciatica   • Type 2 diabetes mellitus with hyperglycemia, with long-term current use of insulin   • Auditory hallucination   • Class 3 severe obesity due to excess calories with serious comorbidity and body mass index (BMI) of 45.0 to 49.9 in adult   • Pain in wrist   • Schizophrenia, simple, chronic   • Carpal tunnel syndrome   • Status post carpal tunnel release   • Asthma, persistent not controlled   • Weight loss counseling, encounter for   • Encounter for pre-bariatric surgery counseling and education   • Generalized anxiety disorder   • Bariatric surgery status   • Encounter for weight loss counseling   • Uncontrolled type 2 diabetes mellitus with hypoglycemia without coma           Current Outpatient Medications:   •  albuterol (ACCUNEB) 1.25 MG/3ML nebulizer solution, Take 3 mL by nebulization Every 6 (Six) Hours As Needed for Wheezing or Shortness of Air., Disp: 9 mL, Rfl:  3  •  aspirin 81 MG EC tablet, Take 1 tablet by mouth Daily., Disp: 30 tablet, Rfl: 3  •  Cariprazine HCl (VRAYLAR) 4.5 MG capsule capsule, Take 1 capsule by mouth Daily., Disp: 90 capsule, Rfl: 0  •  Continuous Blood Gluc  (Dexcom G6 ) device, USE FOR 6 MONTHS, Disp: , Rfl:   •  Continuous Blood Gluc Sensor (Dexcom G6 Sensor), USE 1 SENSOR EVERY 10 DAYS, Disp: 9 each, Rfl: 0  •  Continuous Blood Gluc Transmit (Dexcom G6 Transmitter) misc, USE AS DIRECTED EVERY 3 MONTHS, Disp: 1 each, Rfl: 0  •  cyclobenzaprine (FLEXERIL) 10 MG tablet, Take 1 tablet by mouth 3 (Three) Times a Day As Needed for Muscle Spasms., Disp: 90 tablet, Rfl: 3  •  DULoxetine (CYMBALTA) 30 MG capsule, Take 3 capsules by mouth Daily., Disp: 270 capsule, Rfl: 0  •  DULoxetine (CYMBALTA) 60 MG capsule, Take 1 capsule by mouth Daily., Disp: 30 capsule, Rfl: 3  •  Ertugliflozin L-PyroglutamicAc (Steglatro) 15 MG tablet, Take 1 tablet by mouth Every Morning., Disp: 90 tablet, Rfl: 1  •  Fluticasone Furoate-Vilanterol (Breo Ellipta) 200-25 MCG/INH inhaler, Inhale 1 puff Daily., Disp: 28 each, Rfl: 11  •  hydrOXYzine (ATARAX) 25 MG tablet, Take 0.5-1 tablets by mouth 3 (Three) Times a Day As Needed for Anxiety., Disp: 270 tablet, Rfl: 0  •  Insulin Disposable Pump (OmniPod Dash 5 Pack Pods) misc, , Disp: , Rfl:   •  insulin lispro (Admelog) 100 UNIT/ML injection, 180  units daily through pump, Disp: 90 mL, Rfl: 11  •  lamoTRIgine (LaMICtal) 150 MG tablet, Take 1 tablet by mouth Daily., Disp: 90 tablet, Rfl: 0  •  levETIRAcetam (KEPPRA) 1000 MG tablet, Take 1 tablet by mouth 2 (Two) Times a Day., Disp: 60 tablet, Rfl: 3  •  lisinopril (PRINIVIL,ZESTRIL) 40 MG tablet, Take 1 tablet by mouth Daily., Disp: 30 tablet, Rfl: 3  •  metFORMIN (GLUCOPHAGE) 500 MG tablet, Take 1 tablet by mouth 2 (Two) Times a Day With Meals., Disp: 60 tablet, Rfl: 11  •  metoprolol tartrate (LOPRESSOR) 50 MG tablet, Take 1 tablet by mouth 2 (Two) Times a Day.,  Disp: 60 tablet, Rfl: 3  •  Nebulizer device, 1 application Every 6 (Six) Hours As Needed (dyspnea)., Disp: 1 each, Rfl: 3  •  NIFEdipine XL (PROCARDIA XL) 30 MG 24 hr tablet, Take 1 tablet by mouth Daily., Disp: 30 tablet, Rfl: 3  •  omeprazole (priLOSEC) 40 MG capsule, Take 1 capsule by mouth Daily., Disp: 30 capsule, Rfl: 3  •  OXcarbazepine (TRILEPTAL) 600 MG tablet, Take 1 tablet by mouth 2 (Two) Times a Day. PT TAKES  MG TAB IN MORNING AND  MG IN EVENING, Disp: , Rfl:   •  prazosin (MINIPRESS) 2 MG capsule, Take 1-2 capsules by mouth Every Night., Disp: 180 capsule, Rfl: 0  •  simvastatin (ZOCOR) 40 MG tablet, Take 1 tablet by mouth Every Night., Disp: 30 tablet, Rfl: 3  •  Trulicity 4.5 MG/0.5ML solution pen-injector, INJECT 1 SYRINGEFUL ONCE A WEEK, Disp: 4 mL, Rfl: 0  •  Ventolin  (90 Base) MCG/ACT inhaler, Inhale 2 puffs Every 4 (Four) Hours As Needed for Wheezing., Disp: 6.7 g, Rfl: 11  •  vitamin D (ERGOCALCIFEROL) 1.25 MG (65140 UT) capsule capsule, Take 2 tablets by mouth weekly, Disp: 8 capsule, Rfl: 3    HPI     Pt is 46 yo female with management  of morbid obesity, IDDM Type 2 now on insulin pump , HTN, HLP, vitamin D deficiency, tobacco user, GERD, gastritis, esophagitis, diverticulosis, history of colonic polyp in sigmoid colon,  paranoid schizophrenia,  Seizure disorder, history of Illicit drug abuse, asthma , JACQUE on cpap , chronic hepatitis C , major depression, JD, sp appendectomy, sp cholecystectomy sp right carpal tunnel release, sp back surgery, sp breast surgery, sp lipoma excision, chronic back pain (moderate L4-L5 and L5-S1 DDD changes, mild bilateral L4-L5 moderate bilateral L5-S1 facet arthritic change, mild leveoscoliosis), CKD stage 2, de Quervain's syndrome of left wrist ,sp left carpal tunnel release. 0.3 cm nodule along left lower and right middle lobe     3/7/23 in office visit for recheck. Pt had labwork done on 20/20/23 that showed hga1c at 16.0 from 9.20.  CMP showed glucose at 486 with sodium at 130 chloride at 96 BUN at 31.  Liver enzymes were elvevated. GFR at 93.2 CBC showed stable hemoglobin and WBC.  Lipid panel showed LDL at 131 HDL at 35 triglycerides at 313 total cholesterol at 222. Pt had recent sleep study that showed sleep apnea.  She recently has been going through stress and lost her close friend.   She is back on track for her sugars.  She has new insulin pump She had recent echo that was normal. She has been recommended CPAP from sleep study and has yet to receive this.  She has been cleared by psychology cardiology and has had her weight checks.  Sugars have been stable since back on insulin pump. Has been averages around 100 before breakfast and 160-180 after meal.      5/26/23 in office visit for recheck. Pt has yet to get labwork ordered on 2/22/23. She has had recent telemedicine visit with  and saw sleep medicine on 5/15/23. She is scheduled for surgery in July 2023.with Dr. Benavides. She continues to refrain from smoking. Her sugars have been running in 180s pt reports no chest pain no dizziness. Breathing stable she is now on CPAP machine. She lost 4 lbs since her last visit          Hyperlipidemia  The current episode started more than 1 year ago. The problem is controlled. Recent lipid tests were reviewed and are variable. Exacerbating diseases include chronic renal disease, diabetes and obesity. She has no history of nephrotic syndrome. Associated symptoms include shortness of breath. Pertinent negatives include no chest pain. Current antihyperlipidemic treatment includes statins. The current treatment provides significant improvement of lipids. Risk factors for coronary artery disease include diabetes mellitus, obesity, dyslipidemia, a sedentary lifestyle, stress and hypertension.   Seizures   This is a chronic problem. The current episode started more than 1 week ago. The problem has not changed since onset.Pertinent negatives include no  sleepiness, no confusion, no headaches, no speech difficulty, no visual disturbance, no neck stiffness, no sore throat, no chest pain, no cough, no nausea, no vomiting, no diarrhea and no muscle weakness. There has been no fever.   Chronic Kidney Disease  This is a chronic problem. The current episode started more than 1 year ago. The problem occurs constantly. The problem has been unchanged. Associated symptoms include arthralgias, fatigue, numbness and weakness. Pertinent negatives include no abdominal pain, chest pain, coughing, headaches, nausea, sore throat or vomiting. Nothing aggravates the symptoms. She has tried nothing for the symptoms. The treatment provided no relief.   Heartburn  She reports no abdominal pain, no belching, no chest pain, no choking, no coughing, no early satiety, no globus sensation, no heartburn, no hoarse voice, no nausea, no sore throat, no stridor, no tooth decay, no water brash or no wheezing. This is a chronic problem. The current episode started more than 1 month ago. The problem occurs constantly. The problem has been unchanged. Nothing aggravates the symptoms. Associated symptoms include fatigue. She has tried a PPI for the symptoms.   Obesity   This is a chronic problem. The current episode started more than 1 year ago. The problem occurs constantly. The problem has been improving . Associated symptoms include arthralgias, fatigue, headaches, numbness and weakness. Pertinent negatives include no abdominal pain, anorexia, change in bowel habit, chest pain, chills, congestion, coughing, fever, joint swelling, myalgias, nausea, neck pain, rash, sore throat, swollen glands, urinary symptoms, vertigo, visual change or vomiting. Nothing aggravates the symptoms. She has tried nothing for the symptoms.   Hypertension   This is a chronic problem. The current episode started more than 1 year ago. The problem is controlled  Associated symptoms include headaches. Pertinent negatives  include no anxiety, blurred vision, chest pain, malaise/fatigue, neck pain, orthopnea, palpitations, peripheral edema, PND or shortness of breath. Risk factors for coronary artery disease include obesity, sedentary lifestyle, smoking/tobacco exposure and stress. Past treatments include ACE inhibitors, beta blockers and diuretics. There are no compliance problems.  There is no history of angina, kidney disease, CAD/MI, CVA, heart failure, left ventricular hypertrophy, PVD or retinopathy. There is no history of chronic renal disease, coarctation of the aorta, hyperaldosteronism, hypercortisolism, hyperparathyroidism, a hypertension causing med, pheochromocytoma, renovascular disease, sleep apnea or a thyroid problem.   Diabetes   She presents for her followupdiabetic visit. She has type 2 diabetes mellitus. Her disease course has been waxing and waning  Hypoglycemia symptoms include confusion, headaches, nervousness/anxiousness, seizures and sleepiness. Associated symptoms include fatigue and weakness. Pertinent negatives for diabetes include no blurred vision, no chest pain and no visual change. Pertinent negatives for diabetic complications include no CVA, PVD or retinopathy. Risk factors for coronary artery disease include dyslipidemia, obesity and post-menopausal. She has not had a previous visit with a dietitian. There is no change in her home blood glucose trend. An ACE inhibitor/angiotensin II receptor blocker is not being taken. She does not see a podiatrist.Eye exam is not current. teratments include steglastro insulin and trulicity        Review of Systems  Review of Systems   Constitutional: Positive for activity change and fatigue. Negative for appetite change, chills, diaphoresis and fever.   HENT: Negative for congestion, postnasal drip, rhinorrhea, sinus pressure, sinus pain, sneezing, sore throat, trouble swallowing and voice change.    Respiratory: Negative for cough, choking, chest tightness,  shortness of breath, wheezing and stridor.    Cardiovascular: Negative for chest pain.   Gastrointestinal: Positive for abdominal pain. Negative for diarrhea, nausea and vomiting.   Musculoskeletal: Positive for arthralgias and back pain.   Neurological: Positive for seizures, weakness and numbness. Negative for headaches.   Psychiatric/Behavioral: Positive for behavioral problems and hallucinations. The patient is nervous/anxious.         Depressed mood        Patient Active Problem List   Diagnosis   • Seizure disorder   • Morbid obesity   • Tobacco abuse counseling   • Multiple joint pain   • Dysuria   • History of positive hepatitis C   • Essential hypertension   • Vitamin D deficiency   • Urinary tract infection without hematuria   • Chronic hepatitis C without hepatic coma   • Dyspnea on exertion   • Cigarette nicotine dependence, uncomplicated   • JACQUE (obstructive sleep apnea)   • Gastroesophageal reflux disease with esophagitis   • Left upper quadrant pain   • Nausea and vomiting   • Weight gain, abnormal   • Change in bowel habits   • Hepatic fibrosis   • Non-compliance   • Schizoaffective disorder, depressive type   • Schizophrenia, paranoid type   • Gastritis without bleeding   • Tobacco user   • Pre-operative cardiovascular examination   • Scoliosis   • History of drug use   • Stage 2 chronic kidney disease   • Diabetic neuropathy   • Mixed hyperlipidemia   • Chronic bilateral low back pain with bilateral sciatica   • Type 2 diabetes mellitus with hyperglycemia, with long-term current use of insulin   • Auditory hallucination   • Class 3 severe obesity due to excess calories with serious comorbidity and body mass index (BMI) of 45.0 to 49.9 in adult   • Pain in wrist   • Schizophrenia, simple, chronic   • Carpal tunnel syndrome   • Status post carpal tunnel release   • Asthma, persistent not controlled   • Weight loss counseling, encounter for   • Encounter for pre-bariatric surgery counseling and  education   • Generalized anxiety disorder   • Bariatric surgery status   • Encounter for weight loss counseling   • Uncontrolled type 2 diabetes mellitus with hypoglycemia without coma     Past Surgical History:   Procedure Laterality Date   • APPENDECTOMY     • BACK SURGERY     • BREAST SURGERY     • CARPAL TUNNEL RELEASE      right hand   • CARPAL TUNNEL RELEASE Left 10/19/2021    Procedure: LEFT CARPAL TUNNEL RELEASE;  Surgeon: Kevin Carlson MD;  Location: [facility] OR;  Service: Orthopedics;  Laterality: Left;   • CHOLECYSTECTOMY     • COLONOSCOPY     • COLONOSCOPY N/A 2019    Procedure: COLONOSCOPY;  Surgeon: Joni Delacruz MD;  Location: [facility] ENDOSCOPY;  Service: Gastroenterology   • DENTAL PROCEDURE     • ENDOSCOPY N/A 2019    Procedure: ESOPHAGOGASTRODUODENOSCOPY--poss dilation;  Surgeon: Joni Delacruz MD;  Location: [facility] ENDOSCOPY;  Service: Gastroenterology   • HYSTERECTOMY     • LIPOMA EXCISION      9   • LIVER BIOPSY     • UPPER GASTROINTESTINAL ENDOSCOPY  2019     Social History     Socioeconomic History   • Marital status:    Tobacco Use   • Smoking status: Former     Packs/day: 1.00     Years: 35.00     Pack years: 35.00     Types: Cigarettes     Start date:      Quit date: 2022     Years since quittin.9   • Smokeless tobacco: Never   • Tobacco comments:     on patch trying to quit    Vaping Use   • Vaping Use: Never used   Substance and Sexual Activity   • Alcohol use: Not Currently   • Drug use: Not Currently     Types: Amphetamines, Heroin, Methamphetamines, Morphine, Oxycodone     Comment: QUIT 3 YEARS AGO   • Sexual activity: Not Currently     Birth control/protection: Surgical     Family History   Problem Relation Age of Onset   • Hypertension Other    • Diabetes Other    • Stroke Other    • Cancer Other    • Kidney disease Other    • Lung disease Other    • Other Other    • Malone's esophagus Other    • Colon polyps Other    • Heart disease Other     • Ulcers Other    • Cholelithiasis Other    • Allergy (severe) Other    • Schizophrenia Father    • Alcohol abuse Father    • Drug abuse Paternal Grandfather      Ancillary Procedure on 02/23/2023   Component Date Value Ref Range Status   • Target HR (85%) 02/23/2023 145  bpm Final   • Max. Pred. HR (100%) 02/23/2023 171  bpm Final   • EF(MOD-bp) 02/23/2023 54.0  % Final   • LVIDd 02/23/2023 4.3  cm Final   • LVIDs 02/23/2023 3.2  cm Final   • IVSd 02/23/2023 0.96  cm Final   • LVPWd 02/23/2023 1.03  cm Final   • FS 02/23/2023 26.0  % Final   • IVS/LVPW 02/23/2023 0.93  cm Final   • ESV(cubed) 02/23/2023 32.6  ml Final   • EDV(cubed) 02/23/2023 80.7  ml Final   • LVOT area 02/23/2023 2.8  cm2 Final   • LV mass(C)d 02/23/2023 143.0  grams Final   • LVOT diam 02/23/2023 1.89  cm Final   • EDV(MOD-sp2) 02/23/2023 85.6  ml Final   • EDV(MOD-sp4) 02/23/2023 111.0  ml Final   • ESV(MOD-sp2) 02/23/2023 35.5  ml Final   • ESV(MOD-sp4) 02/23/2023 52.4  ml Final   • SV(MOD-sp2) 02/23/2023 50.1  ml Final   • SV(MOD-sp4) 02/23/2023 58.6  ml Final   • EF(MOD-sp2) 02/23/2023 58.5  % Final   • EF(MOD-sp4) 02/23/2023 52.8  % Final   • MV E max jett 02/23/2023 69.2  cm/sec Final   • MV A max jett 02/23/2023 91.7  cm/sec Final   • MV dec time 02/23/2023 0.24  msec Final   • MV E/A 02/23/2023 0.75   Final   • Med Peak E' Jett 02/23/2023 6.2  cm/sec Final   • Lat Peak E' Jett 02/23/2023 12.8  cm/sec Final   • Avg E/e' ratio 02/23/2023 7.28   Final   • SV(LVOT) 02/23/2023 59.9  ml Final   • TAPSE (>1.6) 02/23/2023 1.81  cm Final   • LV V1 max 02/23/2023 114.3  cm/sec Final   • LV V1 max PG 02/23/2023 5.2  mmHg Final   • LV V1 mean PG 02/23/2023 3.3  mmHg Final   • LV V1 VTI 02/23/2023 21.3  cm Final   • Ao pk jett 02/23/2023 148.2  cm/sec Final   • Ao max PG 02/23/2023 8.8  mmHg Final   • Ao mean PG 02/23/2023 5.2  mmHg Final   • Ao V2 VTI 02/23/2023 24.6  cm Final   • ARMANDO(I,D) 02/23/2023 2.43  cm2 Final   • MV max PG 02/23/2023 3.9  mmHg  Final   • MV mean PG 02/23/2023 1.82  mmHg Final   • MV V2 VTI 02/23/2023 21.4  cm Final   • MVA(VTI) 02/23/2023 2.8  cm2 Final   • MV dec slope 02/23/2023 294.3  cm/sec2 Final   • TR max beau 02/23/2023 216.6  cm/sec Final   • TR max PG 02/23/2023 18.8  mmHg Final   • RVSP(TR) 02/23/2023 21.8  mmHg Final   • RAP systole 02/23/2023 3.0  mmHg Final   • ACS 02/23/2023 2.09  cm Final   • Sinus 02/23/2023 3.5  cm Final   Lab on 02/20/2023   Component Date Value Ref Range Status   • Glucose 02/20/2023 486 (C)  65 - 99 mg/dL Final   • BUN 02/20/2023 31 (H)  6 - 20 mg/dL Final   • Creatinine 02/20/2023 0.78  0.57 - 1.00 mg/dL Final   • Sodium 02/20/2023 130 (L)  136 - 145 mmol/L Final   • Potassium 02/20/2023 5.0  3.5 - 5.2 mmol/L Final   • Chloride 02/20/2023 96 (L)  98 - 107 mmol/L Final   • CO2 02/20/2023 21.0 (L)  22.0 - 29.0 mmol/L Final   • Calcium 02/20/2023 9.5  8.6 - 10.5 mg/dL Final   • Total Protein 02/20/2023 6.8  6.0 - 8.5 g/dL Final   • Albumin 02/20/2023 3.7  3.5 - 5.2 g/dL Final   • ALT (SGPT) 02/20/2023 45 (H)  1 - 33 U/L Final   • AST (SGOT) 02/20/2023 42 (H)  1 - 32 U/L Final   • Alkaline Phosphatase 02/20/2023 100  39 - 117 U/L Final   • Total Bilirubin 02/20/2023 0.2  0.0 - 1.2 mg/dL Final   • Globulin 02/20/2023 3.1  gm/dL Final   • A/G Ratio 02/20/2023 1.2  g/dL Final   • BUN/Creatinine Ratio 02/20/2023 39.7 (H)  7.0 - 25.0 Final   • Anion Gap 02/20/2023 13.0  5.0 - 15.0 mmol/L Final   • eGFR 02/20/2023 93.2  >60.0 mL/min/1.73 Final   • Hemoglobin A1C 02/20/2023 16.00 (H)  4.80 - 5.60 % Final   • Total Cholesterol 02/20/2023 222 (H)  0 - 200 mg/dL Final   • Triglycerides 02/20/2023 313 (H)  0 - 150 mg/dL Final   • HDL Cholesterol 02/20/2023 35 (L)  40 - 60 mg/dL Final   • LDL Cholesterol  02/20/2023 131 (H)  0 - 100 mg/dL Final   • VLDL Cholesterol 02/20/2023 56 (H)  5 - 40 mg/dL Final   • LDL/HDL Ratio 02/20/2023 3.55   Final   • Microalbumin/Creatinine Ratio 02/20/2023    Final    Unable to  calculate   • Creatinine, Urine 02/20/2023 22.6  mg/dL Final   • Microalbumin, Urine 02/20/2023 <1.2  mg/dL Final   • TSH 02/20/2023 1.310  0.270 - 4.200 uIU/mL Final   • WBC 02/20/2023 8.63  3.40 - 10.80 10*3/mm3 Final   • RBC 02/20/2023 4.94  3.77 - 5.28 10*6/mm3 Final   • Hemoglobin 02/20/2023 14.1  12.0 - 15.9 g/dL Final   • Hematocrit 02/20/2023 42.3  34.0 - 46.6 % Final   • MCV 02/20/2023 85.6  79.0 - 97.0 fL Final   • MCH 02/20/2023 28.5  26.6 - 33.0 pg Final   • MCHC 02/20/2023 33.3  31.5 - 35.7 g/dL Final   • RDW 02/20/2023 14.6  12.3 - 15.4 % Final   • RDW-SD 02/20/2023 45.8  37.0 - 54.0 fl Final   • MPV 02/20/2023 12.3 (H)  6.0 - 12.0 fL Final   • Platelets 02/20/2023 163  140 - 450 10*3/mm3 Final   • Neutrophil % 02/20/2023 63.8  42.7 - 76.0 % Final   • Lymphocyte % 02/20/2023 30.4  19.6 - 45.3 % Final   • Monocyte % 02/20/2023 3.4 (L)  5.0 - 12.0 % Final   • Eosinophil % 02/20/2023 1.4  0.3 - 6.2 % Final   • Basophil % 02/20/2023 0.7  0.0 - 1.5 % Final   • Immature Grans % 02/20/2023 0.3  0.0 - 0.5 % Final   • Neutrophils, Absolute 02/20/2023 5.51  1.70 - 7.00 10*3/mm3 Final   • Lymphocytes, Absolute 02/20/2023 2.62  0.70 - 3.10 10*3/mm3 Final   • Monocytes, Absolute 02/20/2023 0.29  0.10 - 0.90 10*3/mm3 Final   • Eosinophils, Absolute 02/20/2023 0.12  0.00 - 0.40 10*3/mm3 Final   • Basophils, Absolute 02/20/2023 0.06  0.00 - 0.20 10*3/mm3 Final   • Immature Grans, Absolute 02/20/2023 0.03  0.00 - 0.05 10*3/mm3 Final   • nRBC 02/20/2023 0.0  0.0 - 0.2 /100 WBC Final      Home Sleep Study  Images from the original result were not included.    [facility]  [address] [place] [address]  Phone: [phone]  Fax: [phone]    Home Sleep Study Interpretation    Patient's Name: Sudhakar Saul  YOB: 1975   Date of Study: 13 Feb 2023  Referring Provider: ROD Kat  Primary Care Provider: Jermaine Ferro  "MD    History of Presenting Illness:  Ms. Sudhakar Saul is a 47 y.o. female   with a BMI of 49.  Concurrent medical history includes Obesity, HTN, CAD,   Migraine, Unspecified Seizure d/o, multiple psychiatric diagnoses of   unclear and overlaping domains (chart lists Schizophrenia and   Schizoaffective d/o, Muliple Personality D/o, Depression, Anxiety).  Echo   w/ LVEF>45%.  PFTs reviewed.  No EEGs in chart.  Concurrent medications   include Vraylar, Lamictal, Keppra, Gabapentin, Lisinopril, Cymbalta.    Patient presents for a polysomnogram for complaints of snoring, excessive   daytime sleepiness, awakenings.  Per chart: \" diagnosed with JACQUE ~10 years   ago and was on CPAP for a short period of time. She was unable to tolerate   it due to using a nasal mask and dropping her mouth open, so she would   awaken gasping for air even worse while using the machine. She is now   undergoing evaluation for bariatric surgery, so she is required to repeat   testing and have JACQUE treatment prior to approval from the patient's   understanding. \"    Methods: Patient had home sleep testing with a WatchPat device.  It   measured: Oxygen saturation and pulse rate were recorded by a pulse   oximeter; snoring by transducer vibration; body position.  The sleep study   was scored using standard techniques.     Technical Comment: Technically adequate.     Diagnostic Analysis:  --Total Sleep Time: 07 hours, 05 min    --Respiratory Indices:  -->  pRDI (4%): 70 / hr  -->  pAHI (4%): 50 / hr  -->  JOIE: 45 / hr  -->  pAHI-Central: 00 / hr  --> % :  00 %    --Oxygen Saturation (SpO2):  --> Averaged 90% during the study.   --> Jomar of 72% during sleep.   -->  Throughout the study: 78 minutes spent with a saturation less than or   equal to 88%.    --Pulse:  -Averaged 84 bpm   -Range: 55 bpm -- 117 bpm.     --Time spent supine: 71%  --Snoring occupied 63% total sleep time.     Assessment:  Ms. Sudhakar Saul is a 47 y.o. female whose " "home sleep   test reveals:  • Severe Sleep Disordered Breathing / Obstructive Sleep Apnea with an pRDI   of 70 and pAHI of 50.   • Home sleep testing generally under estimates the severity of sleep   disordered breathing.    • Sustained hypoxia observed during the study.       Planning & Recommendations:  1. Follow-up with the Sleep Medicine Clinic for full discussion of results   and treatment planning.  2. Consider in lab PAP titration given co-morbidities.  If electing for   APAP, consider overnight pulse oximetry to ensure adequate saturations   once acclimated to PAP.    3. Call with any questions or concerns.    Gurpreet Davis II, MD  Sleep Medicine   02/28/23   16:15 CST    CC:   • Jermaine Ferro MD   • Milagro Hurley APRN    [email or web address]  Immunization History   Administered Date(s) Administered   • COVID-19 (MODERNA) 1st,2nd,3rd Dose Monovalent 04/06/2021, 04/08/2021, 04/08/2021, 05/06/2021, 05/06/2021, 12/10/2021   • FluLaval/Fluzone >6mos 09/17/2020, 10/28/2021, 10/21/2022   • Hepatitis A 06/12/2018   • Hepatitis B 06/14/2022, 10/21/2022   • Hepatitis B Adult/Adolescent IM 10/21/2022   • Influenza, Unspecified 10/21/2022   • Pneumococcal Polysaccharide (PPSV23) 12/13/2018   • Tdap 09/17/2020       The following portions of the patient's history were reviewed and updated as appropriate: allergies, current medications, past family history, past medical history, past social history, past surgical history and problem list.        Physical Exam  /72 (BP Location: Left arm, Patient Position: Sitting, Cuff Size: Large Adult)   Pulse 74   Temp 98.3 °F (36.8 °C)   Ht 167.6 cm (65.98\")   Wt (!) 137 kg (302 lb 12.8 oz)   SpO2 95%   BMI 48.90 kg/m²     Physical Exam  Vitals and nursing note reviewed.   Constitutional:       Appearance: She is well-developed. She is not diaphoretic.   HENT:      Head: Normocephalic and atraumatic.      Right Ear: External ear normal.   Eyes:      " Conjunctiva/sclera: Conjunctivae normal.      Pupils: Pupils are equal, round, and reactive to light.   Cardiovascular:      Rate and Rhythm: Normal rate and regular rhythm.      Heart sounds: Normal heart sounds. No murmur heard.  Pulmonary:      Effort: Pulmonary effort is normal. No respiratory distress.      Breath sounds: Normal breath sounds.   Abdominal:      General: Bowel sounds are normal. There is no distension.      Palpations: Abdomen is soft.      Tenderness: There is no abdominal tenderness.      Comments: Obese abdomen    Musculoskeletal:         General: Tenderness present. No deformity. Normal range of motion.      Cervical back: Normal range of motion and neck supple.   Skin:     General: Skin is warm.      Coloration: Skin is not pale.      Findings: No erythema or rash.   Neurological:      Mental Status: She is alert and oriented to person, place, and time.      Cranial Nerves: No cranial nerve deficit.   Psychiatric:         Behavior: Behavior normal.         [unfilled]   Diagnosis Plan   1. Morbid obesity        2. Mixed hyperlipidemia        3. Essential hypertension        4. Vitamin D deficiency        5. Weight loss counseling, encounter for        6. Bariatric surgery status        7. Gastroesophageal reflux disease with esophagitis without hemorrhage        8. Gastritis without bleeding, unspecified chronicity, unspecified gastritis type        9. Stage 2 chronic kidney disease        10. Schizophrenia, paranoid type        11. Tobacco user        12. JACQUE (obstructive sleep apnea)        13. Uncontrolled type 2 diabetes mellitus with hypoglycemia without coma        14. Schizoaffective disorder, depressive type  DULoxetine (CYMBALTA) 30 MG capsule      15. Generalized anxiety disorder  DULoxetine (CYMBALTA) 30 MG capsule      16. Screening mammogram, encounter for  Mammo Screening Bilateral With CAD      17. Encounter for screening colonoscopy  Ambulatory Referral to  Gastroenterology      18. History of colonic polyps  Ambulatory Referral to Gastroenterology           -recommend labwork   -recommend COVID-19 vaccination  -recommend mammogram screening-schedule at imaging center   -recommend colonoscopy screening - refer back to Gastroenterology   -elevated liver enzymes -suspect fatty liver recommend US of liver   -GERD with esophagitis  - on prilosec 20 mg daiy. Consider another EGD if not improving. GI following   -tobacco user - Counseled quit smoking >5 minutes.  recommend 1 800 QUIT NOW and nicotine patches   -ckd stage 2 - stable continue to monitor. Stressed importance of diabetes control and BP control   -JACQUE on CPAP - Sleep Medicine following  -hyperlipdemia - on  simvastatin 40 mg PO qhs. ASCVD risk high. Now on red yeast rice. On fish oil supplement  -chronic back pain/DDD lumbar spine/arthritis lumbar spine  - was on neurontin 100 mg was  on Norco 7.5/325 mg every 8 hours MS. PM following. On cymbalta 60 mg daily. On flexeril 10 mg PO tID   -dyspnea/mild asthma/pulmonary nodule   -Pulmonlogy following on breo now and abluterol PRN.   Albuterol nebulizer and treatment every 6 hours PRN.  repeat CT of chest in 1 year   -DM type 2- Endocrinology following. Currently on metformin 1000 gm PO BID, steglatro 15 mg daily. trulicity 4.5 mg now on insulin pump. .    -vitamin D  defciency - vitamin D once a week   -schizophrenia/major depression.JD  -  [facility]habvioral health following no on lamictal 150 mg PO q daily and vraylar 1.5 mg daily. risperdal stopped  on minipress 2 mg at bedtime. On vistaril 25 mg PO TID PRN on trileptal 600 mg PO BID   -seizure disorder - Neurology following stable on Keppra 1000 mg Po BID. Neurology following in [place]. She is now on tileptal 600 mg daily.    -Morbid obesity - counseled weight loss >5 minutes BMI at 48.90 Will refer to Dr. Benavides for weight loss surgery along with Dr. García with Cardiology for preoperative clearance and Sleep medicine for  sleep apnea. Has echo pending    pt has has 6 months of weight check  she has pending surgery until cleared by Cardiologist.    -hypertension -  on lisinopril  40 mg dialy.  lopressor 50 mg PO BID  on nifedipine 30 mg daily.    -advised pt to be safe and call with any questions or concerns. All questions addressed today  -advised pt to followup with specialist and referrals  -advised pt to go to ER or call 911 if symptoms worrisome or severe  -advised pt to be safe during COVID-19 pandemic  I spent 33 minutes caring for Sudhakar on this date of service. This time includes time spent by me in the following activities: preparing for the visit, reviewing tests, obtaining and/or reviewing a separately obtained history, performing a medically appropriate examination and/or evaluation, counseling and educating the patient/family/caregiver, ordering medications, tests, or procedures, referring and communicating with other health care professionals, documenting information in the medical record, independently interpreting results and communicating that information with the patient/family/caregiver and care coordination.   -recheck in 2 months         This document has been electronically signed by Jermaine Ferro MD on May 26, 2023 09:56 CDT

## 2023-05-04 ENCOUNTER — TELEMEDICINE (OUTPATIENT)
Dept: PSYCHIATRY | Facility: CLINIC | Age: 48
End: 2023-05-04
Payer: COMMERCIAL

## 2023-05-04 DIAGNOSIS — F25.1 SCHIZOAFFECTIVE DISORDER, DEPRESSIVE TYPE: Primary | ICD-10-CM

## 2023-05-04 DIAGNOSIS — F41.1 GENERALIZED ANXIETY DISORDER: ICD-10-CM

## 2023-05-04 NOTE — PROGRESS NOTES
Date: May 16, 2023  Time In: 9:19 AM   Time Out: 10:08 AM   This provider is located at the [facility] [address], [place] [address] using a secure [employer] Video Visit through [employer]. Patient is being seen remotely via telehealth at home address in [place] and stated they are in a secure environment for this session. The patient's condition being diagnosed/treated is appropriate for telemedicine. The provider identified herself as well as her credentials. The patient, and/or patients guardian, consent to be seen remotely, and when consent is given they understand that the consent allows for patient identifiable information to be sent to a third party as needed. They may refuse to be seen remotely at any time. The electronic data is encrypted and password protected, and the patient and/or guardian has been advised of the potential risks to privacy not withstanding such measures.     You have chosen to receive care through a telehealth visit.  Do you consent to use a video/audio connection for your medical care today? Yes    PROGRESS NOTE  Data:  Sudhakar Saul is a 47 y.o. female who presents today for follow up. **Struggled with audio connection at the beginning of the session so the session was completed via phone  Patient states that she has surgery date of July 3rd for her gastric sleeve. Patient states that she is working on getting the other tests done that must be done before she is able to have the surgery that she has been waiting for. Patient states that she was told by her doctor that he feels that she will be able to come off of 85% of her medications after the surgery and patient is excited about that.  Patient states that she has not needed her hydroxyzine and her prescription for prazosin was not refilled at her last visit with her PMHNP. Patient states that she is still staying with her mother through the week and is able to  stay with her boyfriend on the weekends. Patient states that she helped her boyfriend's step father get moved back to his home in [place]. Patient states her boyfriend is having a hard time with not having his step father to take care of and the patient is trying to support him through that. Patient states that she feels that her doctor for the surgery is very thorough and she is looking forward to having the surgery and getting to a better quality of life. Patient states that her mother has not opted to have gastric surgery as well to help her lose the weight she needs to in order to have the knee surgery she needs in order to help her regain some mobility. Patient states that she has not had a seizure in a month and has not experienced any panic attacks in two months. Patient states that she saw some shadow movement in the corner of her eye once in the last month but states that it has been 5-6 weeks since she had any auditory hallucinations. Patient states that she has not heard the male voice in over a year and the voice she identifies as Zulema came out a couple of months ago when the patient encountered the fiance of a friend who was rude to her and tried to correct her over something that she knew she was correct about; patient states that she was able to standup for herself and that is a big step for her. Patient states that she has experienced some ups and downs due to being very busy helping her boyfriend's step father moved and medical appointments. Patient states that she found a law firm to take her disability case to the federal level and that process has been started but patient was informed that it could take 6 months in order to get a decision. Patient states that she has experienced some flashbacks that had left her feigning for drugs but she was able to work through those issues at the time.     Chief Complaint: decreased hallucinations, decreased seizure activity, flashbacks,    History of  Present Illness: Patient has struggled with schizoaffective disorder for several years      Clinical Maneuvering/Intervention:  CBT    (Scales based on 0 - 10 with 10 being the worst)  Depression: 2 Anxiety: 3       Assisted patient in processing above session content; acknowledged and normalized patient’s thoughts, feelings, and concerns.  Rationalized patient thought process regarding regarding her upcoming weight loss surgery.  Discussed triggers associated with patient's feelings of anxiety and depression such as getting through her medical appointments, weird dreams related to her past history with drugs and concerns with her mother's health.  Also discussed coping skills for patient to implement such as continued self care, completing required appointments, and use of thought stopping techniques.    Allowed patient to freely discuss issues without interruption or judgment. Provided safe, confidential environment to facilitate the development of positive therapeutic relationship and encourage open, honest communication. Assisted patient in identifying risk factors which would indicate the need for higher level of care including thoughts to harm self or others and/or self-harming behavior and encouraged patient to contact this office, call 911, or present to the nearest emergency room should any of these events occur. Discussed crisis intervention services and means to access. Patient adamantly and convincingly denies current suicidal or homicidal ideation or perceptual disturbance.    Assessment:   Assessment   Patient appears to maintain relative stability as compared to their baseline.  However, patient continues to struggle with schizoaffective disorder and anxiety which continues to cause impairment in important areas of functioning.  A result, they can be reasonably expected to continue to benefit from treatment and would likely be at increased risk for decompensation otherwise.    Mental Status Exam: **  Visit was conducted by phone due to loss of audio on the video call  Hygiene:   unable to be seen   Cooperation:  Cooperative  Eye Contact:  unable to be seen   Psychomotor Behavior:  unable to be seen  Affect:  unable to be seen  Mood: normal  Speech:  Normal  Thought Process:  Goal directed  Thought Content:  Normal  Suicidal:  None  Homicidal:  None  Hallucinations:  Auditory and Visual- both have decreased significantly  Delusion:  None  Memory:  Deficits  Orientation:  Person, Place, Time and Situation  Reliability:  good  Insight:  Good  Judgement:  Good  Impulse Control:  Good  Physical/Medical Issues:  Yes working through test to have her gastric sleeve surgery       Patient's Support Network Includes:  significant other, mother and extended family    Functional Status: Mild impairment     Progress toward goal: Not at goal; showing progress    Prognosis: Good with Ongoing Treatment          Plan:    Patient will continue in individual outpatient therapy with focus on improved functioning and coping skills, maintaining stability, and avoiding decompensation and the need for higher level of care.    Patient will adhere to medication regimen as prescribed and report any side effects. Patient will contact this office, call 911 or present to the nearest emergency room should suicidal or homicidal ideations occur. Provide Cognitive Behavioral Therapy and Solution Focused Therapy to improve functioning, maintain stability, and avoid decompensation and the need for higher level of care.     Return in about 4 weeks, or earlier if symptoms worsen or fail to improve.           VISIT DIAGNOSIS:     ICD-10-CM ICD-9-CM   1. Schizoaffective disorder, depressive type  F25.1 295.70   2. Generalized anxiety disorder  F41.1 300.02             This document has been electronically signed by DREAD Vo  May 16, 2023 11:28 EDT      Part of this note may be an electronic transcription/translation of spoken language to  printed text using the Dragon Dictation System.

## 2023-05-15 ENCOUNTER — OFFICE VISIT (OUTPATIENT)
Dept: SLEEP MEDICINE | Facility: CLINIC | Age: 48
End: 2023-05-15
Payer: COMMERCIAL

## 2023-05-15 VITALS
OXYGEN SATURATION: 96 % | HEIGHT: 66 IN | WEIGHT: 293 LBS | HEART RATE: 91 BPM | SYSTOLIC BLOOD PRESSURE: 110 MMHG | DIASTOLIC BLOOD PRESSURE: 64 MMHG | BODY MASS INDEX: 47.09 KG/M2

## 2023-05-15 DIAGNOSIS — E66.01 MORBID OBESITY: ICD-10-CM

## 2023-05-15 DIAGNOSIS — G47.33 OBSTRUCTIVE SLEEP APNEA: Primary | ICD-10-CM

## 2023-05-15 NOTE — PROGRESS NOTES
Sleep Clinic Follow Up    Date: 5/15/2023  Primary Care Provider: Jermaine Ferro MD    Last office visit: 2023 (I reviewed this note)    CC: Follow up: JACQUE started on CPAP      Interim History:  Since the last visit:    1) severe JACQUE -  Sudhakar Saul has reportedly not completely remained compliant with CPAP. She denies mask and machine issues, dry mouth, headaches, or pressures intolerance. She denies abnormal dreams, sleep paralysis, nasal congestion, URI sx. Since starting PAP therapy, patient reports slightly more refreshing sleep, less morning headaches, and less daytime sleepiness. She does struggle with feeling as though she is not getting enough pressure when she first turns her machine on due to low ramp pressures. We are unfortunately unable to download her data card today due to technical difficulties, so she is going to take her data card to her [employer] company for download. I will also place orders for her ramp feature to be turned off of her machine.  She is currently using a full face mask but possibly wants to try a nasal mask at a later date, so she may need a sleep education appointment.    2) Patient denies RLS symptoms.     Sleep Testin. HST on 2023, AHI of 50    2. Currently on 8-20 cm H2O    PAP Data:    Unable to download  Mask type: Full face mask - modified  [facility]  Machine type: ResMed     Bed time: 2931-7072  Sleep latency: 30-60 minutes  Number of times awakens during the night: 3-4  Wake time: 0300  Estimated total sleep time at night: 4-5 hours  Caffeine intake: 2 cups of coffee, 6 cups of tea, and 0 sodas per day  Alcohol intake: 0 drinks per week  Nap time: most days, less than before   Sleepiness with Driving: none     [place] - 7 (previously 8)  [place] Sleepiness Scale  Sitting and reading: Slight chance of dozing  Watching TV: Moderate chance of dozing  Sitting, inactive in a public place (e.g. a theatre or a meeting): Would never  doze  As a passenger in a car for an hour without a break: Would never doze  Lying down to rest in the afternoon when circumstances permit: High chance of dozing  Sitting and talking to someone: Would never doze  Sitting quietly after a lunch without alcohol: Slight chance of dozing  In a car, while stopped for a few minutes in traffic: Would never doze  Total score: 7    PMHx, FH, SH reviewed and pertinent changes are: Having gastric sleeve July 3rd.      Review of Systems:   Negative for chest pain, SOA, fever, chills, cough, N/V/D, abdominal pain.    Smoking:none    Sudhakar Saul  reports that she quit smoking about 10 months ago. Her smoking use included cigarettes. She started smoking about 33 years ago. She has a 35.00 pack-year smoking history. She has never used smokeless tobacco.      Physical Exam:  Vitals:    05/15/23 1352   BP: 110/64   Pulse: 91   SpO2: 96%           05/15/23  1352   Weight: (!) 137 kg (302 lb)     Body mass index is 48.77 kg/m². Class 3 Severe Obesity (BMI >=40). Obesity-related health conditions include the following: obstructive sleep apnea, hypertension, diabetes mellitus, dyslipidemias and GERD. Obesity is unchanged. BMI is is above average; BMI management plan is completed. I recommend portion control and increasing exercise.    Gen:                No distress, conversant, pleasant, appears stated age, alert, oriented  Eyes:               Anicteric sclera, moist conjunctiva, no lid lag                           PERRL, EOMI   Heent:             NC/AT                          Oropharynx clear                          Normal hearing  Lungs:             Normal effort, non-labored breathing        CV:                  Normal S1/S2                          No lower extremity edema  ABD:               Soft, rounded, non-distended              Psych:             Appropriate affect  Neuro:             CN 2-12 appear intact    Past Medical History:   Diagnosis Date   • Allergic    •  Anemia    • Angina pectoris    • Anxiety    • Arthritis    • Asthma    • Back pain    • CAD (coronary artery disease)    • Cancer 1997    endometrial- surgery, no chemo/radiation   • Chronic kidney disease    • Degeneration macular    • Depression    • Diabetes mellitus    • GERD (gastroesophageal reflux disease)    • Head injury    • Hepatitis c    • Hyperlipidemia    • Hypertension    • Injury of back    • Injury of neck    • Migraine    • Multiple personality disorder    • Neuropathy    • Paranoid schizophrenia    • Schizoaffective disorder    • Seizures     07/01/2020: last seizure was the first of the year   • Self-injurious behavior     teen years   • Shortness of breath    • Sleep apnea    • Stage 2 chronic kidney disease    • Substance abuse    • Suicide attempt    • Urinary tract infection        Current Outpatient Medications:   •  albuterol (ACCUNEB) 1.25 MG/3ML nebulizer solution, Take 3 mL by nebulization Every 6 (Six) Hours As Needed for Wheezing or Shortness of Air., Disp: 9 mL, Rfl: 3  •  aspirin 81 MG EC tablet, Take 1 tablet by mouth Daily., Disp: 30 tablet, Rfl: 3  •  Cariprazine HCl (VRAYLAR) 4.5 MG capsule capsule, Take 1 capsule by mouth Daily., Disp: 90 capsule, Rfl: 0  •  Continuous Blood Gluc  (Dexcom G6 ) device, USE FOR 6 MONTHS, Disp: , Rfl:   •  Continuous Blood Gluc Sensor (Dexcom G6 Sensor), USE 1 SENSOR EVERY 10 DAYS, Disp: 9 each, Rfl: 0  •  Continuous Blood Gluc Transmit (Dexcom G6 Transmitter) misc, USE AS DIRECTED EVERY 3 MONTHS, Disp: 1 each, Rfl: 0  •  cyclobenzaprine (FLEXERIL) 10 MG tablet, Take 1 tablet by mouth 3 (Three) Times a Day As Needed for Muscle Spasms., Disp: 90 tablet, Rfl: 3  •  DULoxetine (CYMBALTA) 30 MG capsule, Take 3 capsules by mouth Daily., Disp: 270 capsule, Rfl: 0  •  DULoxetine (CYMBALTA) 60 MG capsule, Take 1 capsule by mouth Daily., Disp: 30 capsule, Rfl: 3  •  Ertugliflozin L-PyroglutamicAc (Steglatro) 15 MG tablet, Take 1 tablet by  mouth Every Morning., Disp: 90 tablet, Rfl: 1  •  Fluticasone Furoate-Vilanterol (Breo Ellipta) 200-25 MCG/INH inhaler, Inhale 1 puff Daily., Disp: 28 each, Rfl: 11  •  folic acid (FOLVITE) 1 MG tablet, Take 1 tablet by mouth Daily., Disp: , Rfl:   •  glucose blood test strip, Testing 4 times daily, E11.9, Disp: 120 each, Rfl: 12  •  hydrOXYzine (ATARAX) 25 MG tablet, Take 0.5-1 tablets by mouth 3 (Three) Times a Day As Needed for Anxiety., Disp: 270 tablet, Rfl: 0  •  Insulin Disposable Pump (OmniPod Dash 5 Pack Pods) misc, , Disp: , Rfl:   •  insulin lispro (Admelog) 100 UNIT/ML injection, 180  units daily through pump, Disp: 90 mL, Rfl: 11  •  lamoTRIgine (LaMICtal) 150 MG tablet, Take 1 tablet by mouth Daily., Disp: 90 tablet, Rfl: 0  •  levETIRAcetam (KEPPRA) 1000 MG tablet, Take 1 tablet by mouth 2 (Two) Times a Day., Disp: 60 tablet, Rfl: 3  •  lisinopril (PRINIVIL,ZESTRIL) 40 MG tablet, Take 1 tablet by mouth Daily., Disp: 30 tablet, Rfl: 3  •  metFORMIN (GLUCOPHAGE) 500 MG tablet, Take 1 tablet by mouth 2 (Two) Times a Day With Meals., Disp: 60 tablet, Rfl: 11  •  metoprolol tartrate (LOPRESSOR) 50 MG tablet, Take 1 tablet by mouth 2 (Two) Times a Day., Disp: 60 tablet, Rfl: 3  •  Nebulizer device, 1 application Every 6 (Six) Hours As Needed (dyspnea)., Disp: 1 each, Rfl: 3  •  NIFEdipine XL (PROCARDIA XL) 30 MG 24 hr tablet, Take 1 tablet by mouth Daily., Disp: 30 tablet, Rfl: 3  •  omeprazole (priLOSEC) 40 MG capsule, Take 1 capsule by mouth Daily., Disp: 30 capsule, Rfl: 3  •  OXcarbazepine (TRILEPTAL) 600 MG tablet, Take 1 tablet by mouth 2 (Two) Times a Day. PT TAKES  MG TAB IN MORNING AND  MG IN EVENING, Disp: , Rfl:   •  prazosin (MINIPRESS) 2 MG capsule, Take 1-2 capsules by mouth Every Night., Disp: 180 capsule, Rfl: 0  •  simvastatin (ZOCOR) 40 MG tablet, Take 1 tablet by mouth Every Night., Disp: 30 tablet, Rfl: 3  •  Trulicity 4.5 MG/0.5ML solution pen-injector, INJECT 1 SYRINGEFUL  ONCE A WEEK, Disp: 4 mL, Rfl: 0  •  Ventolin  (90 Base) MCG/ACT inhaler, Inhale 2 puffs Every 4 (Four) Hours As Needed for Wheezing., Disp: 6.7 g, Rfl: 11  •  vitamin D (ERGOCALCIFEROL) 1.25 MG (06420 UT) capsule capsule, Take 2 tablets by mouth weekly, Disp: 8 capsule, Rfl: 3    WBC   Date Value Ref Range Status   02/20/2023 8.63 3.40 - 10.80 10*3/mm3 Final     RBC   Date Value Ref Range Status   02/20/2023 4.94 3.77 - 5.28 10*6/mm3 Final     Hemoglobin   Date Value Ref Range Status   02/20/2023 14.1 12.0 - 15.9 g/dL Final     Hematocrit   Date Value Ref Range Status   02/20/2023 42.3 34.0 - 46.6 % Final     MCV   Date Value Ref Range Status   02/20/2023 85.6 79.0 - 97.0 fL Final     MCH   Date Value Ref Range Status   02/20/2023 28.5 26.6 - 33.0 pg Final     MCHC   Date Value Ref Range Status   02/20/2023 33.3 31.5 - 35.7 g/dL Final     RDW   Date Value Ref Range Status   02/20/2023 14.6 12.3 - 15.4 % Final     RDW-SD   Date Value Ref Range Status   02/20/2023 45.8 37.0 - 54.0 fl Final     MPV   Date Value Ref Range Status   02/20/2023 12.3 (H) 6.0 - 12.0 fL Final     Platelets   Date Value Ref Range Status   02/20/2023 163 140 - 450 10*3/mm3 Final     Neutrophil %   Date Value Ref Range Status   02/20/2023 63.8 42.7 - 76.0 % Final     Lymphocyte %   Date Value Ref Range Status   02/20/2023 30.4 19.6 - 45.3 % Final     Monocyte %   Date Value Ref Range Status   02/20/2023 3.4 (L) 5.0 - 12.0 % Final     Eosinophil %   Date Value Ref Range Status   02/20/2023 1.4 0.3 - 6.2 % Final     Basophil %   Date Value Ref Range Status   02/20/2023 0.7 0.0 - 1.5 % Final     Immature Grans %   Date Value Ref Range Status   02/20/2023 0.3 0.0 - 0.5 % Final     Neutrophils, Absolute   Date Value Ref Range Status   02/20/2023 5.51 1.70 - 7.00 10*3/mm3 Final     Lymphocytes, Absolute   Date Value Ref Range Status   02/20/2023 2.62 0.70 - 3.10 10*3/mm3 Final     Monocytes, Absolute   Date Value Ref Range Status   02/20/2023  0.29 0.10 - 0.90 10*3/mm3 Final     Eosinophils, Absolute   Date Value Ref Range Status   02/20/2023 0.12 0.00 - 0.40 10*3/mm3 Final     Basophils, Absolute   Date Value Ref Range Status   02/20/2023 0.06 0.00 - 0.20 10*3/mm3 Final     Immature Grans, Absolute   Date Value Ref Range Status   02/20/2023 0.03 0.00 - 0.05 10*3/mm3 Final     nRBC   Date Value Ref Range Status   02/20/2023 0.0 0.0 - 0.2 /100 WBC Final       Lab Results   Component Value Date    GLUCOSE 486 (C) 02/20/2023    BUN 31 (H) 02/20/2023    CREATININE 0.78 02/20/2023    EGFR 93.2 02/20/2023    BCR 39.7 (H) 02/20/2023    K 5.0 02/20/2023    CO2 21.0 (L) 02/20/2023    CALCIUM 9.5 02/20/2023    ALBUMIN 3.7 02/20/2023    BILITOT 0.2 02/20/2023    AST 42 (H) 02/20/2023    ALT 45 (H) 02/20/2023       Assessment and Plan:    1. Obstructive sleep apnea - Established, stable (1)  1. Reportedly suboptimally compliant with PAP therapy - encouraged increased compliance as soon as possible while in CPAP trial period  2. Continue PAP as prescribed  3. Take data card to DME company - turn off ramp feature  4. Script for PAP supplies  5. Pertinent labs were reviewed as listed above  6. Return to clinic in 1 month with compliance report unless change in symptoms in interim period  2. Morbid obesity - BMI 48.8 - stable chronic illness      I spent 20 minutes caring for Sudhakar on this date of service. This time includes time spent by me in the following activities: preparing for the visit, reviewing tests, obtaining and/or reviewing a separately obtained history, performing a medically appropriate examination and/or evaluation , counseling and educating the patient/family/caregiver, documenting information in the medical record and care coordination; discussing PAP therapy, PAP compliance and PAP maintenance    RTC in 1 month. Patient agrees to return sooner if changes in symptoms.        This document has been electronically signed by ROD Thomas on  May 15, 2023 14:20 CDT          CC: Jermaine Ferro MD          No ref. provider found

## 2023-05-20 PROBLEM — E11.65 UNCONTROLLED TYPE 2 DIABETES MELLITUS WITH HYPERGLYCEMIA: Status: RESOLVED | Noted: 2018-10-03 | Resolved: 2023-05-20

## 2023-05-20 PROBLEM — E11.649 UNCONTROLLED TYPE 2 DIABETES MELLITUS WITH HYPOGLYCEMIA WITHOUT COMA: Status: ACTIVE | Noted: 2023-05-20

## 2023-05-26 ENCOUNTER — OFFICE VISIT (OUTPATIENT)
Dept: FAMILY MEDICINE CLINIC | Facility: CLINIC | Age: 48
End: 2023-05-26
Payer: COMMERCIAL

## 2023-05-26 VITALS
HEART RATE: 74 BPM | DIASTOLIC BLOOD PRESSURE: 72 MMHG | HEIGHT: 66 IN | SYSTOLIC BLOOD PRESSURE: 118 MMHG | OXYGEN SATURATION: 95 % | WEIGHT: 293 LBS | BODY MASS INDEX: 47.09 KG/M2 | TEMPERATURE: 98.3 F

## 2023-05-26 DIAGNOSIS — E11.649 UNCONTROLLED TYPE 2 DIABETES MELLITUS WITH HYPOGLYCEMIA WITHOUT COMA: ICD-10-CM

## 2023-05-26 DIAGNOSIS — N18.2 STAGE 2 CHRONIC KIDNEY DISEASE: ICD-10-CM

## 2023-05-26 DIAGNOSIS — F20.0 SCHIZOPHRENIA, PARANOID TYPE: ICD-10-CM

## 2023-05-26 DIAGNOSIS — F41.1 GENERALIZED ANXIETY DISORDER: ICD-10-CM

## 2023-05-26 DIAGNOSIS — G47.33 OSA (OBSTRUCTIVE SLEEP APNEA): ICD-10-CM

## 2023-05-26 DIAGNOSIS — Z12.31 SCREENING MAMMOGRAM, ENCOUNTER FOR: ICD-10-CM

## 2023-05-26 DIAGNOSIS — Z98.84 BARIATRIC SURGERY STATUS: ICD-10-CM

## 2023-05-26 DIAGNOSIS — Z72.0 TOBACCO USER: ICD-10-CM

## 2023-05-26 DIAGNOSIS — Z71.3 WEIGHT LOSS COUNSELING, ENCOUNTER FOR: ICD-10-CM

## 2023-05-26 DIAGNOSIS — E78.2 MIXED HYPERLIPIDEMIA: ICD-10-CM

## 2023-05-26 DIAGNOSIS — Z86.010 HISTORY OF COLONIC POLYPS: ICD-10-CM

## 2023-05-26 DIAGNOSIS — Z12.11 ENCOUNTER FOR SCREENING COLONOSCOPY: ICD-10-CM

## 2023-05-26 DIAGNOSIS — K21.00 GASTROESOPHAGEAL REFLUX DISEASE WITH ESOPHAGITIS WITHOUT HEMORRHAGE: ICD-10-CM

## 2023-05-26 DIAGNOSIS — E55.9 VITAMIN D DEFICIENCY: ICD-10-CM

## 2023-05-26 DIAGNOSIS — K29.70 GASTRITIS WITHOUT BLEEDING, UNSPECIFIED CHRONICITY, UNSPECIFIED GASTRITIS TYPE: ICD-10-CM

## 2023-05-26 DIAGNOSIS — F25.1 SCHIZOAFFECTIVE DISORDER, DEPRESSIVE TYPE: ICD-10-CM

## 2023-05-26 DIAGNOSIS — E66.01 MORBID OBESITY: Primary | ICD-10-CM

## 2023-05-26 DIAGNOSIS — I10 ESSENTIAL HYPERTENSION: ICD-10-CM

## 2023-05-26 RX ORDER — DULOXETIN HYDROCHLORIDE 30 MG/1
90 CAPSULE, DELAYED RELEASE ORAL DAILY
Qty: 270 CAPSULE | Refills: 0 | Status: SHIPPED | OUTPATIENT
Start: 2023-05-26

## 2023-05-26 NOTE — PATIENT INSTRUCTIONS
Please get labwork fasting     Mammogram screening - schedule in August 2023     Colonoscopy screening - will refer back to ROD Mitchell

## 2023-05-31 ENCOUNTER — DOCUMENTATION (OUTPATIENT)
Dept: ENDOCRINOLOGY | Facility: CLINIC | Age: 48
End: 2023-05-31

## 2023-06-06 ENCOUNTER — TELEMEDICINE (OUTPATIENT)
Dept: PSYCHIATRY | Facility: CLINIC | Age: 48
End: 2023-06-06
Payer: COMMERCIAL

## 2023-06-06 DIAGNOSIS — F41.1 GENERALIZED ANXIETY DISORDER: ICD-10-CM

## 2023-06-06 DIAGNOSIS — F25.9 SCHIZOAFFECTIVE DISORDER, IN REMISSION: Primary | ICD-10-CM

## 2023-06-06 DIAGNOSIS — G47.9 SLEEPING DIFFICULTY: ICD-10-CM

## 2023-06-06 DIAGNOSIS — F51.5 NIGHTMARES: ICD-10-CM

## 2023-06-06 PROCEDURE — 1159F MED LIST DOCD IN RCRD: CPT | Performed by: NURSE PRACTITIONER

## 2023-06-06 PROCEDURE — 99214 OFFICE O/P EST MOD 30 MIN: CPT | Performed by: NURSE PRACTITIONER

## 2023-06-06 PROCEDURE — 1160F RVW MEDS BY RX/DR IN RCRD: CPT | Performed by: NURSE PRACTITIONER

## 2023-06-06 NOTE — PROGRESS NOTES
"This provider is located at [facility], [address] [place] [address].The Patient is seen remotely at home, [address] [facility] [place] [address] via [employer].  Patient is being seen via telehealth and confirm that they are in a secure environment for this session. The patient's condition being diagnosed/treated is appropriate for telemedicine. The provider identified himself/herself: herself as well as her credentials.   The patient gave consent to be seen remotely, and when consent is given they understand that the consent allows for patient identifiable information to be sent to a third party as needed.   They may refuse to be seen remotely at any time. The electronic data is encrypted and password protected, and the patient has been advised of the potential risks to privacy not withstanding such measures.    You have chosen to receive care through a telehealth visit.  Do you consent to use a video/audio connection for your medical care today? Yes      Chief Complaint  Follow-for schizoaffective depressed type    Subjective    Sudhakar More Saul presents to [facility] for medication management.       History of Present Illness   Patient presents today reporting that everything has been going well. She states she has a surgery date set for July 3rd. Notes they have submitted everything to insurance for coverage. She states she is anxious about the procedures but excited as well. Denies any \"highs or lows\" overall states she feels \"mood is stable\". Denies any depressive or hypomanic/manic episodes. Notes she is getting 5-6 hours of sleep at night. Appetite is good. Denies any side effects to the medications. AIMS score 0. Denies any SI/HI/AH/VH.     Objective   Vital Signs:   There were no vitals taken for this visit.  Due to the remote nature of this encounter (virtual encounter), vitals were unable to be obtained.  Height stated at 66 inches.  " Weight stated at 302 pounds.        PHQ-9 Score:   PHQ-9 Total Score:     Patient screened positive for depression based on a PHQ-9 score of  on . Follow-up recommendations include:  Patient has schizophrenia and is currently being managed with medication. .    Mental Status Exam:   Hygiene:   good  Cooperation:  Cooperative  Eye Contact:  Good  Psychomotor Behavior:  Appropriate  Affect:  Appropriate  Mood: normal and anxious  Speech:  Normal  Thought Process:  Goal directed and Linear  Thought Content:  Normal  Suicidal:  None  Homicidal:  None  Hallucinations:  None  Delusion:  None  Memory:  Intact  Orientation:  Person, Place, Time and Situation  Reliability:  good  Insight:  Good and Fair  Judgement:  Good and Fair  Impulse Control:  Good and Fair  Physical/Medical Issues:  Yes Dm. HTN, CKD stage 2, hep C+, JACQUE, hx substance abuse      Current Medications:   Current Outpatient Medications   Medication Sig Dispense Refill    albuterol (ACCUNEB) 1.25 MG/3ML nebulizer solution Take 3 mL by nebulization Every 6 (Six) Hours As Needed for Wheezing or Shortness of Air. 9 mL 3    aspirin 81 MG EC tablet Take 1 tablet by mouth Daily. 30 tablet 3    Cariprazine HCl (VRAYLAR) 4.5 MG capsule capsule Take 1 capsule by mouth Daily. 90 capsule 0    Continuous Blood Gluc  (Dexcom G6 ) device USE FOR 6 MONTHS      Continuous Blood Gluc Sensor (Dexcom G6 Sensor) USE 1 SENSOR EVERY 10 DAYS 9 each 0    Continuous Blood Gluc Transmit (Dexcom G6 Transmitter) misc USE AS DIRECTED EVERY 3 MONTHS 1 each 0    cyclobenzaprine (FLEXERIL) 10 MG tablet Take 1 tablet by mouth 3 (Three) Times a Day As Needed for Muscle Spasms. 90 tablet 3    DULoxetine (CYMBALTA) 30 MG capsule Take 3 capsules by mouth Daily. 270 capsule 0    DULoxetine (CYMBALTA) 60 MG capsule Take 1 capsule by mouth Daily. 30 capsule 3    Ertugliflozin L-PyroglutamicAc (Steglatro) 15 MG tablet Take 1 tablet by mouth Every Morning. 90 tablet 1    Fluticasone  Furoate-Vilanterol (Breo Ellipta) 200-25 MCG/INH inhaler Inhale 1 puff Daily. 28 each 11    hydrOXYzine (ATARAX) 25 MG tablet Take 0.5-1 tablets by mouth 3 (Three) Times a Day As Needed for Anxiety. 270 tablet 0    Insulin Disposable Pump (OmniPod Dash 5 Pack Pods) misc       insulin lispro (Admelog) 100 UNIT/ML injection 180  units daily through pump 90 mL 11    lamoTRIgine (LaMICtal) 150 MG tablet Take 1 tablet by mouth Daily. 90 tablet 0    levETIRAcetam (KEPPRA) 1000 MG tablet Take 1 tablet by mouth 2 (Two) Times a Day. 60 tablet 3    lisinopril (PRINIVIL,ZESTRIL) 40 MG tablet Take 1 tablet by mouth Daily. 30 tablet 3    metFORMIN (GLUCOPHAGE) 500 MG tablet Take 1 tablet by mouth 2 (Two) Times a Day With Meals. 60 tablet 11    metoprolol tartrate (LOPRESSOR) 50 MG tablet Take 1 tablet by mouth 2 (Two) Times a Day. 60 tablet 3    Nebulizer device 1 application Every 6 (Six) Hours As Needed (dyspnea). 1 each 3    NIFEdipine XL (PROCARDIA XL) 30 MG 24 hr tablet Take 1 tablet by mouth Daily. 30 tablet 3    omeprazole (priLOSEC) 40 MG capsule Take 1 capsule by mouth Daily. 30 capsule 3    OXcarbazepine (TRILEPTAL) 600 MG tablet Take 1 tablet by mouth 2 (Two) Times a Day. PT TAKES  MG TAB IN MORNING AND  MG IN EVENING      prazosin (MINIPRESS) 2 MG capsule Take 1-2 capsules by mouth Every Night. 180 capsule 0    simvastatin (ZOCOR) 40 MG tablet Take 1 tablet by mouth Every Night. 30 tablet 3    Trulicity 4.5 MG/0.5ML solution pen-injector INJECT 1 SYRINGEFUL ONCE A WEEK 4 mL 0    Ventolin  (90 Base) MCG/ACT inhaler Inhale 2 puffs Every 4 (Four) Hours As Needed for Wheezing. 6.7 g 11    vitamin D (ERGOCALCIFEROL) 1.25 MG (88984 UT) capsule capsule Take 2 tablets by mouth weekly 8 capsule 3     No current facility-administered medications for this visit.       Physical Exam  Nursing note reviewed.   Constitutional:       Appearance: Normal appearance.   Neurological:      Mental Status: She is  alert.   Psychiatric:         Attention and Perception: Attention normal. She does not perceive auditory or visual hallucinations.         Mood and Affect: Mood and affect normal. Mood is not anxious or depressed.         Speech: Speech normal.         Behavior: Behavior is cooperative.         Thought Content: Thought content normal.         Cognition and Memory: Cognition and memory normal.         Judgment: Judgment normal.      Result Review :     The following data was reviewed by: ROD Spencer on 02/03/2021:  Common labs          7/8/2022    09:22 11/3/2022    09:00 2/20/2023    09:57   Common Labs   Glucose 106  196  486    BUN 16  15  31    Creatinine 0.82  0.80  0.78    Sodium 139  137  130    Potassium 4.7  4.9  5.0    Chloride 106  103  96    Calcium 9.4  9.2  9.5    Albumin 3.80  3.70  3.7    Total Bilirubin 0.2  0.2  0.2    Alkaline Phosphatase 93  104  100    AST (SGOT) 42  34  42    ALT (SGPT) 37  34  45    WBC 8.79  7.70  8.63    Hemoglobin 13.6  13.6  14.1    Hematocrit 41.3  43.5  42.3    Platelets 176  199  163    Total Cholesterol 119  129  222    Triglycerides 152  88  313    HDL Cholesterol 43  52  35    LDL Cholesterol  50  60  131    Hemoglobin A1C 8.70  9.20  16.00    Microalbumin, Urine <1.2  <1.2  <1.2      CMP          7/8/2022    09:22 11/3/2022    09:00 2/20/2023    09:57   CMP   Glucose 106  196  486    BUN 16  15  31    Creatinine 0.82  0.80  0.78    EGFR 88.4  91.0  93.2    Sodium 139  137  130    Potassium 4.7  4.9  5.0    Chloride 106  103  96    Calcium 9.4  9.2  9.5    Total Protein 7.0  7.0  6.8    Albumin 3.80  3.70  3.7    Globulin 3.2  3.3  3.1    Total Bilirubin 0.2  0.2  0.2    Alkaline Phosphatase 93  104  100    AST (SGOT) 42  34  42    ALT (SGPT) 37  34  45    Albumin/Globulin Ratio 1.2  1.1  1.2    BUN/Creatinine Ratio 19.5  18.8  39.7    Anion Gap 12.0  11.0  13.0      CBC          7/8/2022    09:22 11/3/2022    09:00 2/20/2023    09:57   CBC   WBC 8.79  7.70   8.63    RBC 4.87  5.18  4.94    Hemoglobin 13.6  13.6  14.1    Hematocrit 41.3  43.5  42.3    MCV 84.8  84.0  85.6    MCH 27.9  26.3  28.5    MCHC 32.9  31.3  33.3    RDW 14.6  14.6  14.6    Platelets 176  199  163      CBC w/diff      CBC w/Diff 6/10/20 9/9/20 1/26/21   WBC 8.89 9.63 9.04   RBC 4.85 5.07 5.10   Hemoglobin 14.1 14.9 14.4   Hematocrit 42.8 44.3 45.0   MCV 88.2 87.4 88.2   MCH 29.1 29.4 28.2   MCHC 32.9 33.6 32.0   RDW 13.6 13.3 13.0   Platelets 157 180 174   Neutrophil Rel % 63.8 67.2    Immature Granulocyte Rel % 0.6 (A) 0.5    Lymphocyte Rel % 26.4 21.6    Monocyte Rel % 4.5 (A) 4.8 (A)    Eosinophil Rel % 3.9 5.3    Basophil Rel % 0.8 0.6    (A) Abnormal value              Lipid Panel          7/8/2022    09:22 11/3/2022    09:00 2/20/2023    09:57   Lipid Panel   Total Cholesterol 119  129  222    Triglycerides 152  88  313    HDL Cholesterol 43  52  35    VLDL Cholesterol 26  17  56    LDL Cholesterol  50  60  131    LDL/HDL Ratio 1.06  1.14  3.55      TSH          7/8/2022    09:22 11/3/2022    09:00 2/20/2023    09:57   TSH   TSH 2.290  1.920  1.310      BMP          7/8/2022    09:22 11/3/2022    09:00 2/20/2023    09:57   BMP   BUN 16  15  31    Creatinine 0.82  0.80  0.78    Sodium 139  137  130    Potassium 4.7  4.9  5.0    Chloride 106  103  96    CO2 21.0  23.0  21.0    Calcium 9.4  9.2  9.5      HgB          7/8/2022    09:22 11/3/2022    09:00 2/20/2023    09:57   HGB   Hemoglobin 13.6  13.6  14.1        Data reviewed: PCP notes         Assessment and Plan    Problem List Items Addressed This Visit          Mental Health    Generalized anxiety disorder     Other Visit Diagnoses       Schizoaffective disorder, in remission    -  Primary    Nightmares        Sleeping difficulty                TREATMENT PLAN/GOALS: Continue supportive psychotherapy efforts and medications as indicated. Treatment and medication options discussed during today's visit. Patient ackowledged and verbally  consented to continue with current treatment plan and was educated on the importance of compliance with treatment and follow-up appointments.    MEDICATION ISSUES:  We discussed risks, benefits, and side effects of the above medications and the patient was agreeable with the plan. Patient was educated on the importance of compliance with treatment and follow-up appointments.  Patient is agreeable to call the office with any worsening of symptoms or onset of side effects. Patient is agreeable to call 911 or go to the nearest ER should he/she begin having SI/HI. Lengthy discussion with patient on the possible side effects of antipsychotic medications including increased cholesterol, increased blood sugar, and possibility of weight gain.  Also discussed the need to monitor lab work associated with this.  The risk of muscle movement disorders with this class of medication was also discussed. We will continue Lamictal in an effort to stabilize mood.  The patient was reminded to immediately come to the hospital should there be any loss of control.  Explanation was given to her regarding Lamictal and the potential for Foster Shon syndrome and significant rash.  Patient was encouraged to check skin prior to beginning.  Patient was encouraged to report any rash and to immediately stop medication.      -Continue lamotrigine 150 mg daily for schizoaffective disorder.  -Continue minipress 2-4mg at night for nightmares.  -Continue Vraylar  4.5 mg daily for schizoaffective disorder depressed type.  -Continue hydroxyzine 12.5 to 25 mg up to 3 times a day as needed for anxiety and panic.  - Continue Cymbalta 90mg daily for depression.  -Informed patient that I would not write for the 60 mg as that was related to pain not for her depression or her mood but she could contact her PCP or see other pain management to prescribe this as she is stated she does not have any side effects and tolerated well as this is over the recommended  dose for depression so I will not continue.  -Continue psychotherapy to help with past trauma as well as depressive and anxiety symptoms.  -Highly encourage patient since she is now on Trileptal to be aware of side effects of multiple mood stabilizers and if she were to have any rash to contact the clinic as we may have to look at tapering off the lamotrigine or talking with neurologist to reevaluate Trileptal.  Patient verbalized understanding and denied any side effects at this time.  -Encouraged patient to update her lab work. States she is going tomorrow.     Patient has now failed and tried Haldol, aripiprazole, Latuda and now risperidone which has caused weight gain and due to diabetes and kidney disease would benefit from Vraylar as patient is still having auditory and visual hallucinations.     Counseled patient regarding multimodal approach with healthy nutrition, healthy sleep, regular physical activity, social activities, counseling, and medications.      Coping skills reviewed and encouraged positive framing of thoughts     Assisted patient in processing above session content; acknowledged and normalized patient’s thoughts, feelings, and concerns.  Applied  positive coping skills and behavior management in session.  Allowed patient to freely discuss issues without interruption or judgment. Provided safe, confidential environment to facilitate the development of positive therapeutic relationship and encourage open, honest communication. Assisted patient in identifying risk factors which would indicate the need for higher level of care including thoughts to harm self or others and/or self-harming behavior and encouraged patient to contact this office, call 911, or present to the nearest emergency room should any of these events occur. Discussed crisis intervention services and means to access.     MEDS ORDERED DURING VISIT:  No orders of the defined types were placed in this encounter.  No refills needed  at this time    Follow Up   Return in about 8 weeks (around 8/1/2023), or if symptoms worsen or fail to improve, for Recheck.  Informed patient we would be following closely after surgery to ensure any symptoms didn't return or become worse she verbalized understanding to call with any changes.     Patient was given instructions and counseling regarding her condition or for health maintenance advice. Please see specific information pulled into the AVS if appropriate.     Some of the data in this electronic note has been brought forward from a previous encounter, any necessary changes have been made, it has been reviewed by this APRN, and it is accurate.      This document has been electronically signed by ROD Spencer  June 6, 2023 08:48 EDT    Part of this note may be an electronic transcription/translation of spoken language to printed text using the Dragon Dictation System.

## 2023-06-07 ENCOUNTER — LAB (OUTPATIENT)
Dept: LAB | Facility: HOSPITAL | Age: 48
End: 2023-06-07
Payer: COMMERCIAL

## 2023-06-07 DIAGNOSIS — I10 ESSENTIAL HYPERTENSION: ICD-10-CM

## 2023-06-07 DIAGNOSIS — E55.9 VITAMIN D DEFICIENCY: ICD-10-CM

## 2023-06-07 DIAGNOSIS — E78.2 MIXED HYPERLIPIDEMIA: ICD-10-CM

## 2023-06-07 DIAGNOSIS — E11.65 TYPE 2 DIABETES MELLITUS WITH HYPERGLYCEMIA, WITH LONG-TERM CURRENT USE OF INSULIN: ICD-10-CM

## 2023-06-07 DIAGNOSIS — Z79.4 TYPE 2 DIABETES MELLITUS WITH HYPERGLYCEMIA, WITH LONG-TERM CURRENT USE OF INSULIN: ICD-10-CM

## 2023-06-07 LAB
25(OH)D3 SERPL-MCNC: 45.9 NG/ML (ref 30–100)
ALBUMIN SERPL-MCNC: 3.9 G/DL (ref 3.5–5.2)
ALBUMIN UR-MCNC: <1.2 MG/DL
ALBUMIN/GLOB SERPL: 1.1 G/DL
ALP SERPL-CCNC: 102 U/L (ref 39–117)
ALT SERPL W P-5'-P-CCNC: 41 U/L (ref 1–33)
ANION GAP SERPL CALCULATED.3IONS-SCNC: 13 MMOL/L (ref 5–15)
AST SERPL-CCNC: 27 U/L (ref 1–32)
BASOPHILS # BLD AUTO: 0.06 10*3/MM3 (ref 0–0.2)
BASOPHILS NFR BLD AUTO: 0.6 % (ref 0–1.5)
BILIRUB SERPL-MCNC: 0.2 MG/DL (ref 0–1.2)
BUN SERPL-MCNC: 23 MG/DL (ref 6–20)
BUN/CREAT SERPL: 33.3 (ref 7–25)
CALCIUM SPEC-SCNC: 9.7 MG/DL (ref 8.6–10.5)
CHLORIDE SERPL-SCNC: 100 MMOL/L (ref 98–107)
CHOLEST SERPL-MCNC: 194 MG/DL (ref 0–200)
CO2 SERPL-SCNC: 21 MMOL/L (ref 22–29)
CREAT SERPL-MCNC: 0.69 MG/DL (ref 0.57–1)
CREAT UR-MCNC: 42.9 MG/DL
DEPRECATED RDW RBC AUTO: 41.1 FL (ref 37–54)
EGFRCR SERPLBLD CKD-EPI 2021: 106.5 ML/MIN/1.73
EOSINOPHIL # BLD AUTO: 0.15 10*3/MM3 (ref 0–0.4)
EOSINOPHIL NFR BLD AUTO: 1.5 % (ref 0.3–6.2)
ERYTHROCYTE [DISTWIDTH] IN BLOOD BY AUTOMATED COUNT: 13.4 % (ref 12.3–15.4)
GLOBULIN UR ELPH-MCNC: 3.5 GM/DL
GLUCOSE SERPL-MCNC: 247 MG/DL (ref 65–99)
HBA1C MFR BLD: 10.6 % (ref 4.8–5.6)
HCT VFR BLD AUTO: 42 % (ref 34–46.6)
HDLC SERPL-MCNC: 32 MG/DL (ref 40–60)
HGB BLD-MCNC: 13.9 G/DL (ref 12–15.9)
IMM GRANULOCYTES # BLD AUTO: 0.05 10*3/MM3 (ref 0–0.05)
IMM GRANULOCYTES NFR BLD AUTO: 0.5 % (ref 0–0.5)
LDLC SERPL CALC-MCNC: 109 MG/DL (ref 0–100)
LDLC/HDLC SERPL: 3.16 {RATIO}
LYMPHOCYTES # BLD AUTO: 2.99 10*3/MM3 (ref 0.7–3.1)
LYMPHOCYTES NFR BLD AUTO: 30.2 % (ref 19.6–45.3)
MCH RBC QN AUTO: 28 PG (ref 26.6–33)
MCHC RBC AUTO-ENTMCNC: 33.1 G/DL (ref 31.5–35.7)
MCV RBC AUTO: 84.5 FL (ref 79–97)
MICROALBUMIN/CREAT UR: NORMAL MG/G{CREAT}
MONOCYTES # BLD AUTO: 0.39 10*3/MM3 (ref 0.1–0.9)
MONOCYTES NFR BLD AUTO: 3.9 % (ref 5–12)
NEUTROPHILS NFR BLD AUTO: 6.26 10*3/MM3 (ref 1.7–7)
NEUTROPHILS NFR BLD AUTO: 63.3 % (ref 42.7–76)
NRBC BLD AUTO-RTO: 0 /100 WBC (ref 0–0.2)
PLATELET # BLD AUTO: 175 10*3/MM3 (ref 140–450)
PMV BLD AUTO: 11.8 FL (ref 6–12)
POTASSIUM SERPL-SCNC: 4.4 MMOL/L (ref 3.5–5.2)
PROT SERPL-MCNC: 7.4 G/DL (ref 6–8.5)
RBC # BLD AUTO: 4.97 10*6/MM3 (ref 3.77–5.28)
SODIUM SERPL-SCNC: 134 MMOL/L (ref 136–145)
TRIGL SERPL-MCNC: 304 MG/DL (ref 0–150)
TSH SERPL DL<=0.05 MIU/L-ACNC: 1.74 UIU/ML (ref 0.27–4.2)
VIT B12 BLD-MCNC: 692 PG/ML (ref 211–946)
VLDLC SERPL-MCNC: 53 MG/DL (ref 5–40)
WBC NRBC COR # BLD: 9.9 10*3/MM3 (ref 3.4–10.8)

## 2023-06-07 PROCEDURE — 83036 HEMOGLOBIN GLYCOSYLATED A1C: CPT

## 2023-06-07 PROCEDURE — 85025 COMPLETE CBC W/AUTO DIFF WBC: CPT

## 2023-06-07 PROCEDURE — 82043 UR ALBUMIN QUANTITATIVE: CPT

## 2023-06-07 PROCEDURE — 82607 VITAMIN B-12: CPT

## 2023-06-07 PROCEDURE — 80061 LIPID PANEL: CPT

## 2023-06-07 PROCEDURE — 82570 ASSAY OF URINE CREATININE: CPT

## 2023-06-07 PROCEDURE — 82306 VITAMIN D 25 HYDROXY: CPT

## 2023-06-07 PROCEDURE — 84443 ASSAY THYROID STIM HORMONE: CPT

## 2023-06-07 PROCEDURE — 80053 COMPREHEN METABOLIC PANEL: CPT

## 2023-06-12 RX ORDER — DULAGLUTIDE 4.5 MG/.5ML
INJECTION, SOLUTION SUBCUTANEOUS
Qty: 4 ML | Refills: 0 | Status: SHIPPED | OUTPATIENT
Start: 2023-06-12

## 2023-06-12 RX ORDER — INSULIN LISPRO 100 [IU]/ML
INJECTION, SOLUTION INTRAVENOUS; SUBCUTANEOUS
Qty: 90 ML | Refills: 0 | Status: SHIPPED | OUTPATIENT
Start: 2023-06-12

## 2023-06-16 RX ORDER — ERGOCALCIFEROL 1.25 MG/1
CAPSULE ORAL
Qty: 8 CAPSULE | Refills: 3 | Status: SHIPPED | OUTPATIENT
Start: 2023-06-16

## 2023-07-28 RX ORDER — INSULIN LISPRO 100 [IU]/ML
INJECTION, SOLUTION INTRAVENOUS; SUBCUTANEOUS
Qty: 90 ML | Refills: 0 | Status: SHIPPED | OUTPATIENT
Start: 2023-07-28

## 2023-07-31 ENCOUNTER — TELEMEDICINE (OUTPATIENT)
Dept: PSYCHIATRY | Facility: CLINIC | Age: 48
End: 2023-07-31
Payer: COMMERCIAL

## 2023-07-31 DIAGNOSIS — F25.9 SCHIZOAFFECTIVE DISORDER, IN REMISSION: ICD-10-CM

## 2023-07-31 DIAGNOSIS — F51.5 NIGHTMARES: ICD-10-CM

## 2023-07-31 PROCEDURE — 99214 OFFICE O/P EST MOD 30 MIN: CPT | Performed by: NURSE PRACTITIONER

## 2023-07-31 PROCEDURE — 1160F RVW MEDS BY RX/DR IN RCRD: CPT | Performed by: NURSE PRACTITIONER

## 2023-07-31 PROCEDURE — 1159F MED LIST DOCD IN RCRD: CPT | Performed by: NURSE PRACTITIONER

## 2023-07-31 RX ORDER — LAMOTRIGINE 150 MG/1
150 TABLET ORAL DAILY
Qty: 90 TABLET | Refills: 0 | Status: SHIPPED | OUTPATIENT
Start: 2023-07-31

## 2023-07-31 RX ORDER — PRAZOSIN HYDROCHLORIDE 2 MG/1
2-4 CAPSULE ORAL NIGHTLY
Qty: 180 CAPSULE | Refills: 0 | Status: SHIPPED | OUTPATIENT
Start: 2023-07-31

## 2023-08-01 RX ORDER — DULAGLUTIDE 4.5 MG/.5ML
INJECTION, SOLUTION SUBCUTANEOUS
Qty: 4 ML | Refills: 0 | Status: SHIPPED | OUTPATIENT
Start: 2023-08-01

## 2023-08-09 RX ORDER — LISINOPRIL 40 MG/1
TABLET ORAL
Qty: 30 TABLET | Refills: 0 | Status: SHIPPED | OUTPATIENT
Start: 2023-08-09

## 2023-08-21 RX ORDER — DULAGLUTIDE 4.5 MG/.5ML
INJECTION, SOLUTION SUBCUTANEOUS
Qty: 4 ML | Refills: 0 | Status: SHIPPED | OUTPATIENT
Start: 2023-08-21

## 2023-08-22 ENCOUNTER — OFFICE VISIT (OUTPATIENT)
Dept: GASTROENTEROLOGY | Facility: CLINIC | Age: 48
End: 2023-08-22
Payer: COMMERCIAL

## 2023-08-22 VITALS
WEIGHT: 264 LBS | SYSTOLIC BLOOD PRESSURE: 125 MMHG | HEIGHT: 66 IN | DIASTOLIC BLOOD PRESSURE: 80 MMHG | HEART RATE: 84 BPM | BODY MASS INDEX: 42.43 KG/M2

## 2023-08-22 DIAGNOSIS — R63.4 WEIGHT LOSS: ICD-10-CM

## 2023-08-22 DIAGNOSIS — R79.89 ELEVATED LFTS: ICD-10-CM

## 2023-08-22 DIAGNOSIS — K76.0 FATTY LIVER: Primary | ICD-10-CM

## 2023-08-22 DIAGNOSIS — R93.2 ABNORMAL FINDING ON IMAGING OF LIVER: ICD-10-CM

## 2023-08-22 PROCEDURE — 1160F RVW MEDS BY RX/DR IN RCRD: CPT | Performed by: PHYSICIAN ASSISTANT

## 2023-08-22 PROCEDURE — 1159F MED LIST DOCD IN RCRD: CPT | Performed by: PHYSICIAN ASSISTANT

## 2023-08-22 PROCEDURE — 3074F SYST BP LT 130 MM HG: CPT | Performed by: PHYSICIAN ASSISTANT

## 2023-08-22 PROCEDURE — 3079F DIAST BP 80-89 MM HG: CPT | Performed by: PHYSICIAN ASSISTANT

## 2023-08-22 PROCEDURE — 99214 OFFICE O/P EST MOD 30 MIN: CPT | Performed by: PHYSICIAN ASSISTANT

## 2023-08-22 RX ORDER — ROPINIROLE 3 MG/1
3 TABLET, FILM COATED ORAL NIGHTLY
COMMUNITY
Start: 2023-08-02

## 2023-08-22 RX ORDER — GABAPENTIN 300 MG/1
1 CAPSULE ORAL 3 TIMES DAILY
COMMUNITY
Start: 2023-08-02

## 2023-08-22 NOTE — PROGRESS NOTES
Chief Complaint   Patient presents with    Colon Cancer Screening       ENDO PROCEDURE ORDERED:    Subjective    Sudhakar Saul is a 47 y.o. female. she is being seen for consultation today at the request of Jermaine Ferro MD    History of Present Illness    Patient was sent for evaluation for abdominal pain, nausea, GERD by Dr. Ferro, who saw the patient on 05/26/2023 with high cholesterol, chronic kidney disease, hypertension, diabetes. I last saw the patient 02/26/2019 with nausea, vomiting, GERD, fatty liver, F0/S3/N2. She had not returned for a follow-up. She had EGD/colonoscopy 02/08/2019 showing esophagitis, gastritis, hyperplastic polyp removed from the colon. She states she had gastric sleeve done 6 weeks ago by Dr. Benavides on 07/03/2023 and was told she had cirrhosis. Her weight is down 19 pounds since last visit. She states bowel movements are fairly regular, occasionally they are firm. She is mainly having back pain. GERD does well on the Prilosec 40 mg daily. Denied nausea or vomiting. Most recent laboratories 06/07/2023 showed normal B12, vitamin D, TSH, CBC. Cholesterol panel showed triglycerides 304, , A1c 10.6. CMP showed glucose 247, BUN 23, sodium 134, CO2 21, ALT 41, otherwise normal.     After the patient left, we were able to obtain previous CT scan abdomen and pelvis without contrast from [facility] 08/04/2023 showed scarring in the left lung base, post cholecystectomy, hysterectomy, hyperplasia of the adrenals, mild circumferential thickening of distal esophagitis, nonspecific, atherosclerosis, degeneration of the spine. She had an EGD by Dr. Benavides on 05/31/2023, showing esophagitis, mild localized gastritis with granularity, bile staining, duodenum appeared normal. I did not have the pathology. There was a AMADOU test done. I was not able to get a copy of her sleeve report.     A/P: Patient with elevation in liver enzymes, known fatty liver, possible early cirrhosis. I have  recommended further laboratories to include [facility], hepatitis diagnostic panel, repeat CBC, CMP, AFP, INR, may need more extensive laboratories depending on those results. Will try to get a copy of her CT scan done in July, as well as her endoscopy report. Will plan follow-up in 1 month, further pending clinical course and results of the above.          The following portions of the patient's history were reviewed and updated as appropriate:   Past Medical History:   Diagnosis Date    Allergic     Anemia     Angina pectoris     Anxiety     Arthritis     Asthma     Back pain     CAD (coronary artery disease)     Cancer 1997    endometrial- surgery, no chemo/radiation    Chronic kidney disease     Degeneration macular     Depression     Diabetes mellitus     GERD (gastroesophageal reflux disease)     Head injury     Hepatitis c     Hyperlipidemia     Hypertension     Injury of back     Injury of neck     Migraine     Multiple personality disorder     Neuropathy     Paranoid schizophrenia     Schizoaffective disorder     Seizures     07/01/2020: last seizure was the first of the year    Self-injurious behavior     teen years    Shortness of breath     Sleep apnea     Stage 2 chronic kidney disease     Substance abuse     Suicide attempt     Urinary tract infection      Past Surgical History:   Procedure Laterality Date    APPENDECTOMY      BACK SURGERY      BREAST SURGERY      CARPAL TUNNEL RELEASE      right hand    CARPAL TUNNEL RELEASE Left 10/19/2021    Procedure: LEFT CARPAL TUNNEL RELEASE;  Surgeon: Kevin Carlson MD;  Location: [facility] OR;  Service: Orthopedics;  Laterality: Left;    CHOLECYSTECTOMY      COLONOSCOPY      COLONOSCOPY N/A 2/8/2019    Procedure: COLONOSCOPY;  Surgeon: Joni Delacruz MD;  Location: [facility] ENDOSCOPY;  Service: Gastroenterology    DENTAL PROCEDURE      ENDOSCOPY N/A 2/8/2019    Procedure: ESOPHAGOGASTRODUODENOSCOPY--poss dilation;  Surgeon: Joni Delacruz MD;   Location: [facility] ENDOSCOPY;  Service: Gastroenterology    HYSTERECTOMY      LIPOMA EXCISION      9    LIVER BIOPSY      UPPER GASTROINTESTINAL ENDOSCOPY  2019     Family History   Problem Relation Age of Onset    Hypertension Other     Diabetes Other     Stroke Other     Cancer Other     Kidney disease Other     Lung disease Other     Other Other     Malone's esophagus Other     Colon polyps Other     Heart disease Other     Ulcers Other     Cholelithiasis Other     Allergy (severe) Other     Schizophrenia Father     Alcohol abuse Father     Drug abuse Paternal Grandfather      OB History    No obstetric history on file.       Allergies   Allergen Reactions    Betadine [Povidone Iodine] Anaphylaxis    Contrast Dye (Echo Or Unknown Ct/Mr) Anaphylaxis    Iodine Anaphylaxis    Lipitor  [Atorvastatin Calcium] Shortness Of Breath    Shellfish-Derived Products Anaphylaxis    Adhesive Tape Unknown (See Comments)     Blister       Social History     Socioeconomic History    Marital status:    Tobacco Use    Smoking status: Former     Packs/day: 1.00     Years: 35.00     Pack years: 35.00     Types: Cigarettes     Start date:      Quit date: 2022     Years since quittin.1    Smokeless tobacco: Never    Tobacco comments:     on patch trying to quit    Vaping Use    Vaping Use: Never used   Substance and Sexual Activity    Alcohol use: Not Currently    Drug use: Not Currently     Types: Amphetamines, Heroin, Methamphetamines, Morphine, Oxycodone     Comment: QUIT 3 YEARS AGO    Sexual activity: Not Currently     Birth control/protection: Surgical     Current Medications:  Prior to Admission medications    Medication Sig Start Date End Date Taking? Authorizing Provider   albuterol (ACCUNEB) 1.25 MG/3ML nebulizer solution Take 3 mL by nebulization Every 6 (Six) Hours As Needed for Wheezing or Shortness of Air. 10/28/21  Yes Jermaine Ferro MD   Cariprazine HCl (VRAYLAR) 4.5 MG capsule capsule Take 1  capsule by mouth Daily. 7/31/23  Yes Afsaneh Lagunas APRN   Continuous Blood Gluc  (Dexcom G6 ) device USE FOR 6 MONTHS 4/6/21  Yes Jessenia Montoya MD   Continuous Blood Gluc Sensor (Dexcom G6 Sensor) PLACE 1 SENSOR ON SKIN CHANGING EVERY 10 DAYS 7/10/23  Yes Sudhakar Will APRN   Continuous Blood Gluc Transmit (Dexcom G6 Transmitter) misc USE AS DIRECTED EVERY 3 MONTHS 7/10/23  Yes Sudhakar Will APRN   cyclobenzaprine (FLEXERIL) 10 MG tablet Take 1 tablet by mouth 3 (Three) Times a Day As Needed for Muscle Spasms. 3/7/23  Yes Jermaine Ferro MD   DULoxetine (CYMBALTA) 30 MG capsule Take 3 capsules by mouth Daily. 5/26/23  Yes Jermaine Ferro MD   DULoxetine (CYMBALTA) 60 MG capsule Take 1 capsule by mouth Daily. 3/7/23  Yes Jermaine Ferro MD   Ertugliflozin L-PyroglutamicAc (Steglatro) 15 MG tablet Take 1 tablet by mouth Every Morning. 10/21/22  Yes Jermaine Ferro MD   Fluticasone Furoate-Vilanterol (Breo Ellipta) 200-25 MCG/INH inhaler Inhale 1 puff Daily. 7/19/22  Yes Zahraa Mathews APRN   gabapentin (NEURONTIN) 300 MG capsule Take 1 capsule by mouth 3 (Three) Times a Day. 8/2/23  Yes Jessenia Montoya MD   hydrOXYzine (ATARAX) 25 MG tablet Take 0.5-1 tablets by mouth 3 (Three) Times a Day As Needed for Anxiety. 2/28/23  Yes Afsaneh Lagunas APRN   Insulin Disposable Pump (OmniPod Dash 5 Pack Pods) misc  7/23/21  Yes Jessenia Montoya MD   Insulin Lispro (humaLOG) 100 UNIT/ML injection USE AS DIRECTED THROUGH INSULIN PUMP. UP  UNITS DAILY 7/28/23  Yes Sudhakar Will APRN   lamoTRIgine (LaMICtal) 150 MG tablet Take 1 tablet by mouth Daily. 7/31/23  Yes Afsaneh Lagunas APRN   levETIRAcetam (KEPPRA) 1000 MG tablet Take 1 tablet by mouth 2 (Two) Times a Day. 10/28/21  Yes Jermaine Ferro MD   lisinopril (PRINIVIL,ZESTRIL) 40 MG tablet Take 1 tablet by mouth once daily 8/9/23  Yes Jermaine Ferro MD   metFORMIN  "(GLUCOPHAGE) 500 MG tablet Take 1 tablet by mouth 2 (Two) Times a Day With Meals. 11/15/22  Yes Sudhakar Will APRN   metoprolol tartrate (LOPRESSOR) 50 MG tablet Take 1 tablet by mouth 2 (Two) Times a Day. 3/7/23  Yes Jermaine Ferro MD   Nebulizer device 1 application Every 6 (Six) Hours As Needed (dyspnea). 6/14/22  Yes Jermaine Ferro MD   NIFEdipine XL (PROCARDIA XL) 30 MG 24 hr tablet Take 1 tablet by mouth Daily. 3/7/23  Yes Jermaine Ferro MD   omeprazole (priLOSEC) 40 MG capsule Take 1 capsule by mouth Daily. 3/7/23  Yes Jermaine Ferro MD   OXcarbazepine (TRILEPTAL) 600 MG tablet Take 1 tablet by mouth 2 (Two) Times a Day. PT TAKES  MG TAB IN MORNING AND  MG IN EVENING 7/16/21  Yes Jessenia Montoya MD   prazosin (MINIPRESS) 2 MG capsule Take 1-2 capsules by mouth Every Night. 7/31/23  Yes Afsaneh Lagunas APRN   rOPINIRole (REQUIP) 3 MG tablet Take 1 tablet by mouth Every Night. 8/2/23  Yes Jessenia Montoya MD   simvastatin (ZOCOR) 40 MG tablet Take 1 tablet by mouth Every Night. 3/7/23  Yes Jermaine Ferro MD   Trulicity 4.5 MG/0.5ML solution pen-injector INJECT 1 SYRINGE SUBCUTANEOUSLY ONCE A WEEK 8/21/23  Yes Sudhakar Will APRN   Ventolin  (90 Base) MCG/ACT inhaler Inhale 2 puffs Every 4 (Four) Hours As Needed for Wheezing. 10/19/22  Yes Zahraa Mathews APRN   vitamin D (ERGOCALCIFEROL) 1.25 MG (91996 UT) capsule capsule TAKE 2 CAPSULES BY MOUTH ONCE A WEEK 6/16/23  Yes Jermaine Ferro MD   aspirin 81 MG EC tablet Take 1 tablet by mouth Daily.  Patient not taking: Reported on 8/22/2023 1/5/23   Jermaine Ferro MD     Review of Systems  Review of Systems       Objective    /80   Pulse 84   Ht 167.6 cm (65.98\")   Wt 120 kg (264 lb)   BMI 42.64 kg/m²   Physical Exam  Vitals and nursing note reviewed.   Constitutional:       General: She is not in acute distress.     Appearance: She is well-developed. She is obese. She is " ill-appearing.   HENT:      Head: Normocephalic and atraumatic.   Eyes:      Pupils: Pupils are equal, round, and reactive to light.   Cardiovascular:      Rate and Rhythm: Normal rate and regular rhythm.      Heart sounds: Normal heart sounds.   Pulmonary:      Effort: Pulmonary effort is normal.      Breath sounds: Normal breath sounds.   Abdominal:      General: Bowel sounds are normal. There is no distension or abdominal bruit.      Palpations: Abdomen is soft. Abdomen is not rigid. There is no shifting dullness or mass.      Tenderness: There is abdominal tenderness. There is no guarding or rebound.      Hernia: No hernia is present. There is no hernia in the ventral area.   Musculoskeletal:         General: Normal range of motion.      Cervical back: Normal range of motion.   Skin:     General: Skin is warm and dry.   Neurological:      Mental Status: She is alert and oriented to person, place, and time.   Psychiatric:         Behavior: Behavior normal.         Thought Content: Thought content normal.         Judgment: Judgment normal.     Assessment & Plan      1. Fatty liver    2. Elevated LFTs    3. Weight loss    4. Abnormal finding on imaging of liver    .   Diagnoses and all orders for this visit:    1. Fatty liver (Primary)  -     CBC & Differential  -     Comprehensive Metabolic Panel  -     Protime-INR  -     Hepatitis Panel, Acute  -     [facility]  -     AFP Tumor Marker    2. Elevated LFTs  -     CBC & Differential  -     Comprehensive Metabolic Panel  -     Protime-INR  -     Hepatitis Panel, Acute  -     [facility]  -     AFP Tumor Marker    3. Weight loss  -     CBC & Differential  -     Comprehensive Metabolic Panel  -     Protime-INR  -     Hepatitis Panel, Acute  -     [facility]  -     AFP Tumor Marker    4. Abnormal finding on imaging of liver  -     CBC & Differential  -     Comprehensive Metabolic Panel  -     Protime-INR  -     Hepatitis Panel, Acute  -      [facility]  -     AFP Tumor Marker        Orders placed during this encounter include:  Orders Placed This Encounter   Procedures    Comprehensive Metabolic Panel     Order Specific Question:   Release to patient     Answer:   Routine Release [[id number]]    Protime-INR     Order Specific Question:   Release to patient     Answer:   Routine Release [[id number]]    Hepatitis Panel, Acute     Order Specific Question:   Release to patient     Answer:   Routine Release [[id number]]    [facility]     Order Specific Question:   Release to patient     Answer:   Routine Release [[id number]]    AFP Tumor Marker     Order Specific Question:   Release to patient     Answer:   Routine Release [[id number]]    CBC & Differential     Order Specific Question:   Manual Differential     Answer:   No     Order Specific Question:   Release to patient     Answer:   Routine Release [[id number]]       Medications prescribed:  No orders of the defined types were placed in this encounter.      Requested Prescriptions      No prescriptions requested or ordered in this encounter       Review and/or summary of lab tests, radiology, procedures, medications. Review and summary of old records and obtaining of history. The risks and benefits of my recommendations, as well as other treatment options were discussed with the patient today. Questions were answered.    Follow-up: Return in about 1 month (around 9/22/2023), or if symptoms worsen or fail to improve, for lab now.    * Surgery not found *      This document has been electronically signed by Jermaine Menendez PA-C on September 5, 2023 18:40 CDT      Results for orders placed or performed in visit on 06/07/23   CBC Auto Differential    Specimen: Blood   Result Value Ref Range    WBC 9.90 3.40 - 10.80 10*3/mm3    RBC 4.97 3.77 - 5.28 10*6/mm3    Hemoglobin 13.9 12.0 - 15.9 g/dL    Hematocrit 42.0 34.0 - 46.6 %    MCV 84.5 79.0 - 97.0 fL    MCH 28.0 26.6 - 33.0 pg    MCHC  33.1 31.5 - 35.7 g/dL    RDW 13.4 12.3 - 15.4 %    RDW-SD 41.1 37.0 - 54.0 fl    MPV 11.8 6.0 - 12.0 fL    Platelets 175 140 - 450 10*3/mm3    Neutrophil % 63.3 42.7 - 76.0 %    Lymphocyte % 30.2 19.6 - 45.3 %    Monocyte % 3.9 (L) 5.0 - 12.0 %    Eosinophil % 1.5 0.3 - 6.2 %    Basophil % 0.6 0.0 - 1.5 %    Immature Grans % 0.5 0.0 - 0.5 %    Neutrophils, Absolute 6.26 1.70 - 7.00 10*3/mm3    Lymphocytes, Absolute 2.99 0.70 - 3.10 10*3/mm3    Monocytes, Absolute 0.39 0.10 - 0.90 10*3/mm3    Eosinophils, Absolute 0.15 0.00 - 0.40 10*3/mm3    Basophils, Absolute 0.06 0.00 - 0.20 10*3/mm3    Immature Grans, Absolute 0.05 0.00 - 0.05 10*3/mm3    nRBC 0.0 0.0 - 0.2 /100 WBC   Microalbumin / Creatinine Urine Ratio - Urine, Clean Catch    Specimen: Urine, Clean Catch   Result Value Ref Range    Microalbumin/Creatinine Ratio      Creatinine, Urine 42.9 mg/dL    Microalbumin, Urine <1.2 mg/dL   Vitamin D,25-Hydroxy    Specimen: Blood   Result Value Ref Range    25 Hydroxy, Vitamin D 45.9 30.0 - 100.0 ng/ml   TSH    Specimen: Blood   Result Value Ref Range    TSH 1.740 0.270 - 4.200 uIU/mL   Hemoglobin A1c    Specimen: Blood   Result Value Ref Range    Hemoglobin A1C 10.60 (H) 4.80 - 5.60 %   Vitamin B12    Specimen: Blood   Result Value Ref Range    Vitamin B-12 692 211 - 946 pg/mL   Lipid Panel    Specimen: Blood   Result Value Ref Range    Total Cholesterol 194 0 - 200 mg/dL    Triglycerides 304 (H) 0 - 150 mg/dL    HDL Cholesterol 32 (L) 40 - 60 mg/dL    LDL Cholesterol  109 (H) 0 - 100 mg/dL    VLDL Cholesterol 53 (H) 5 - 40 mg/dL    LDL/HDL Ratio 3.16    Comprehensive Metabolic Panel    Specimen: Blood   Result Value Ref Range    Glucose 247 (H) 65 - 99 mg/dL    BUN 23 (H) 6 - 20 mg/dL    Creatinine 0.69 0.57 - 1.00 mg/dL    Sodium 134 (L) 136 - 145 mmol/L    Potassium 4.4 3.5 - 5.2 mmol/L    Chloride 100 98 - 107 mmol/L    CO2 21.0 (L) 22.0 - 29.0 mmol/L    Calcium 9.7 8.6 - 10.5 mg/dL    Total Protein 7.4 6.0 - 8.5  g/dL    Albumin 3.9 3.5 - 5.2 g/dL    ALT (SGPT) 41 (H) 1 - 33 U/L    AST (SGOT) 27 1 - 32 U/L    Alkaline Phosphatase 102 39 - 117 U/L    Total Bilirubin 0.2 0.0 - 1.2 mg/dL    Globulin 3.5 gm/dL    A/G Ratio 1.1 g/dL    BUN/Creatinine Ratio 33.3 (H) 7.0 - 25.0    Anion Gap 13.0 5.0 - 15.0 mmol/L    eGFR 106.5 >60.0 mL/min/1.73   Results for orders placed or performed in visit on 02/23/23   Adult Transthoracic Echo Complete w/ Color, Spectral and Contrast if Necessary Per Protocol   Result Value Ref Range    Target HR (85%) 145 bpm    Max. Pred. HR (100%) 171 bpm    EF(MOD-bp) 54.0 %    LVIDd 4.3 cm    LVIDs 3.2 cm    IVSd 0.96 cm    LVPWd 1.03 cm    FS 26.0 %    IVS/LVPW 0.93 cm    ESV(cubed) 32.6 ml    EDV(cubed) 80.7 ml    LVOT area 2.8 cm2    LV mass(C)d 143.0 grams    LVOT diam 1.89 cm    EDV(MOD-sp2) 85.6 ml    EDV(MOD-sp4) 111.0 ml    ESV(MOD-sp2) 35.5 ml    ESV(MOD-sp4) 52.4 ml    SV(MOD-sp2) 50.1 ml    SV(MOD-sp4) 58.6 ml    EF(MOD-sp2) 58.5 %    EF(MOD-sp4) 52.8 %    MV E max jett 69.2 cm/sec    MV A max jett 91.7 cm/sec    MV dec time 0.24 msec    MV E/A 0.75     Med Peak E' Jett 6.2 cm/sec    Lat Peak E' Jett 12.8 cm/sec    Avg E/e' ratio 7.28     SV(LVOT) 59.9 ml    TAPSE (>1.6) 1.81 cm    LV V1 max 114.3 cm/sec    LV V1 max PG 5.2 mmHg    LV V1 mean PG 3.3 mmHg    LV V1 VTI 21.3 cm    Ao pk jett 148.2 cm/sec    Ao max PG 8.8 mmHg    Ao mean PG 5.2 mmHg    Ao V2 VTI 24.6 cm    ARMANDO(I,D) 2.43 cm2    MV max PG 3.9 mmHg    MV mean PG 1.82 mmHg    MV V2 VTI 21.4 cm    MVA(VTI) 2.8 cm2    MV dec slope 294.3 cm/sec2    TR max jett 216.6 cm/sec    TR max PG 18.8 mmHg    RVSP(TR) 21.8 mmHg    RAP systole 3.0 mmHg    ACS 2.09 cm    Sinus 3.5 cm     *Note: Due to a large number of results and/or encounters for the requested time period, some results have not been displayed. A complete set of results can be found in Results Review.

## 2023-08-22 NOTE — PROGRESS NOTES
Subjective:  Sudhakar Saul is a 47 y.o. female who presents for       Patient Active Problem List   Diagnosis    Seizure disorder    Morbid obesity    Tobacco abuse counseling    Multiple joint pain    Dysuria    History of positive hepatitis C    Essential hypertension    Vitamin D deficiency    Urinary tract infection without hematuria    Chronic hepatitis C without hepatic coma    Dyspnea on exertion    Cigarette nicotine dependence, uncomplicated    JACQUE (obstructive sleep apnea)    Gastroesophageal reflux disease with esophagitis    Left upper quadrant pain    Nausea and vomiting    Weight gain, abnormal    Change in bowel habits    Hepatic fibrosis    Non-compliance    Schizoaffective disorder, depressive type    Schizophrenia, paranoid type    Gastritis without bleeding    Tobacco user    Pre-operative cardiovascular examination    Scoliosis    History of drug use    Stage 2 chronic kidney disease    Diabetic neuropathy    Mixed hyperlipidemia    Chronic bilateral low back pain with bilateral sciatica    Type 2 diabetes mellitus with hyperglycemia, with long-term current use of insulin    Auditory hallucination    Class 3 severe obesity due to excess calories with serious comorbidity and body mass index (BMI) of 45.0 to 49.9 in adult    Pain in wrist    Schizophrenia, simple, chronic    Carpal tunnel syndrome    Status post carpal tunnel release    Asthma, persistent not controlled    Weight loss counseling, encounter for    Encounter for pre-bariatric surgery counseling and education    Generalized anxiety disorder    Bariatric surgery status    Encounter for weight loss counseling    Uncontrolled type 2 diabetes mellitus with hypoglycemia without coma    S/P bariatric surgery    Morbid (severe) obesity due to excess calories           Current Outpatient Medications:     albuterol (ACCUNEB) 1.25 MG/3ML nebulizer solution, Take 3 mL by nebulization Every 6 (Six) Hours As Needed for Wheezing or  Shortness of Air., Disp: 9 mL, Rfl: 3    Cariprazine HCl (VRAYLAR) 4.5 MG capsule capsule, Take 1 capsule by mouth Daily., Disp: 90 capsule, Rfl: 0    Continuous Blood Gluc  (Dexcom G6 ) device, USE FOR 6 MONTHS, Disp: , Rfl:     Continuous Blood Gluc Sensor (Dexcom G6 Sensor), PLACE 1 SENSOR ON SKIN CHANGING EVERY 10 DAYS, Disp: 9 each, Rfl: 0    Continuous Blood Gluc Transmit (Dexcom G6 Transmitter) misc, USE AS DIRECTED EVERY 3 MONTHS, Disp: 1 each, Rfl: 0    cyclobenzaprine (FLEXERIL) 10 MG tablet, Take 1 tablet by mouth 3 (Three) Times a Day As Needed for Muscle Spasms., Disp: 90 tablet, Rfl: 3    DULoxetine (CYMBALTA) 30 MG capsule, Take 3 capsules by mouth Daily., Disp: 270 capsule, Rfl: 0    Ertugliflozin L-PyroglutamicAc (Steglatro) 15 MG tablet, Take 1 tablet by mouth Every Morning., Disp: 90 tablet, Rfl: 1    FeroSul 325 (65 Fe) MG tablet, , Disp: , Rfl:     Fluticasone Furoate-Vilanterol (Breo Ellipta) 200-25 MCG/INH inhaler, Inhale 1 puff Daily., Disp: 28 each, Rfl: 11    gabapentin (NEURONTIN) 300 MG capsule, Take 1 capsule by mouth 3 (Three) Times a Day., Disp: , Rfl:     hydrOXYzine (ATARAX) 25 MG tablet, Take 0.5-1 tablets by mouth 3 (Three) Times a Day As Needed for Anxiety., Disp: 270 tablet, Rfl: 0    Insulin Disposable Pump (OmniPod Dash 5 Pack Pods) misc, , Disp: , Rfl:     Insulin Lispro (humaLOG) 100 UNIT/ML injection, USE AS DIRECTED THROUGH INSULIN PUMP UPTO 180 UNITS DAILY, Disp: 90 mL, Rfl: 0    lamoTRIgine (LaMICtal) 150 MG tablet, Take 1 tablet by mouth Daily., Disp: 90 tablet, Rfl: 0    levETIRAcetam (KEPPRA) 1000 MG tablet, Take 1 tablet by mouth 2 (Two) Times a Day., Disp: 60 tablet, Rfl: 3    metFORMIN (GLUCOPHAGE) 500 MG tablet, Take 1 tablet by mouth 2 (Two) Times a Day With Meals., Disp: 60 tablet, Rfl: 11    metoprolol tartrate (LOPRESSOR) 50 MG tablet, Take 1 tablet by mouth 2 (Two) Times a Day., Disp: 60 tablet, Rfl: 3    Nebulizer device, 1 application Every 6  (Six) Hours As Needed (dyspnea)., Disp: 1 each, Rfl: 3    NIFEdipine XL (PROCARDIA XL) 30 MG 24 hr tablet, Take 1 tablet by mouth Daily., Disp: 30 tablet, Rfl: 3    omeprazole (priLOSEC) 40 MG capsule, Take 1 capsule by mouth Daily., Disp: 30 capsule, Rfl: 3    OXcarbazepine (TRILEPTAL) 600 MG tablet, Take 1 tablet by mouth 2 (Two) Times a Day. PT TAKES  MG TAB IN MORNING AND  MG IN EVENING, Disp: , Rfl:     prazosin (MINIPRESS) 2 MG capsule, Take 1-2 capsules by mouth Every Night., Disp: 180 capsule, Rfl: 0    rOPINIRole (REQUIP) 3 MG tablet, Take 1 tablet by mouth Every Night., Disp: , Rfl:     simvastatin (ZOCOR) 40 MG tablet, Take 1 tablet by mouth Every Night., Disp: 30 tablet, Rfl: 3    Trulicity 4.5 MG/0.5ML solution pen-injector, INJECT 1 SYRINGE SUBCUTANEOUSLY ONCE A WEEK, Disp: 4 mL, Rfl: 0    Ventolin  (90 Base) MCG/ACT inhaler, Inhale 2 puffs Every 4 (Four) Hours As Needed for Wheezing., Disp: 6.7 g, Rfl: 11    vitamin D (ERGOCALCIFEROL) 1.25 MG (84381 UT) capsule capsule, TAKE 2 CAPSULES BY MOUTH ONCE A WEEK, Disp: 8 capsule, Rfl: 3    lisinopril (PRINIVIL,ZESTRIL) 20 MG tablet, Take 1 tablet by mouth Daily., Disp: 30 tablet, Rfl: 3    HPI     Pt is 46 yo female with management  of morbid obesity, IDDM Type 2 now on insulin pump , HTN, HLP, vitamin D deficiency, tobacco user, GERD, gastritis, esophagitis, diverticulosis, history of colonic polyp in sigmoid colon,  paranoid schizophrenia,  Seizure disorder, history of Illicit drug abuse, asthma , JACQUE on cpap , chronic hepatitis C , major depression, JD, sp appendectomy, sp cholecystectomy sp right carpal tunnel release, sp back surgery, sp breast surgery, sp lipoma excision, chronic back pain (moderate L4-L5 and L5-S1 DDD changes, mild bilateral L4-L5 moderate bilateral L5-S1 facet arthritic change, mild leveoscoliosis), CKD stage 2, de Quervain's syndrome of left wrist ,sp left carpal tunnel release. 0.3 cm nodule along left lower  and right middle lobe, sp bariatric sugery       5/26/23 in office visit for recheck. Pt has yet to get labwork ordered on 2/22/23. She has had recent telemedicine visit with  and saw sleep medicine on 5/15/23. She is scheduled for surgery in July 2023.with Dr. Benavides. She continues to refrain from smoking. Her sugars have been running in 180s pt reports no chest pain no dizziness. Breathing stable she is now on CPAP machine. She lost 4 lbs since her last visit     9/14/23  in office visit for recheck. Pt had bariatric surgery since  last visit her last labwork on 6/7/23 showed GFR at 106.5 with liver enzymes ALT at 41. Hga1c at 10.60 from 16.00. lipid panel showed triglycerides at 304 HDL at 32 LDL at 109. Pt lost 50 lbs since her last visit  pt saw GI on 8/22/23 for her fatty liver and lawork was ordere. She is feeling better. She did have an episode of vomiting both mucous and blood a month after surgery. Pt had CT scans done. She was found to have early stage cirrhosis during surgery.  She is off her insulin pump. She is now doing insulin injections 10 units TID she ha upcoming Endocrinology.   Her BP has been better controlled but she has noticed some low readings and has had dizziness. She is taking lisionpril 40 mg dialy. Lopressor 50 mg PO BID and procardia 30 mg XL.             Hyperlipidemia  The current episode started more than 1 year ago. The problem is controlled. Recent lipid tests were reviewed and are variable. Exacerbating diseases include chronic renal disease, diabetes and obesity. She has no history of nephrotic syndrome. Associated symptoms include shortness of breath. Pertinent negatives include no chest pain. Current antihyperlipidemic treatment includes statins. The current treatment provides significant improvement of lipids. Risk factors for coronary artery disease include diabetes mellitus, obesity, dyslipidemia, a sedentary lifestyle, stress and hypertension.   Seizures   This is a  chronic problem. The current episode started more than 1 week ago. The problem has not changed since onset.Pertinent negatives include no sleepiness, no confusion, no headaches, no speech difficulty, no visual disturbance, no neck stiffness, no sore throat, no chest pain, no cough, no nausea, no vomiting, no diarrhea and no muscle weakness. There has been no fever.   Chronic Kidney Disease  This is a chronic problem. The current episode started more than 1 year ago. The problem occurs constantly. The problem has been unchanged. Associated symptoms include arthralgias, fatigue, numbness and weakness. Pertinent negatives include no abdominal pain, chest pain, coughing, headaches, nausea, sore throat or vomiting. Nothing aggravates the symptoms. She has tried nothing for the symptoms. The treatment provided no relief.   Heartburn  She reports no abdominal pain, no belching, no chest pain, no choking, no coughing, no early satiety, no globus sensation, no heartburn, no hoarse voice, no nausea, no sore throat, no stridor, no tooth decay, no water brash or no wheezing. This is a chronic problem. The current episode started more than 1 month ago. The problem occurs constantly. The problem has been unchanged. Nothing aggravates the symptoms. Associated symptoms include fatigue. She has tried a PPI for the symptoms.   Obesity   This is a chronic problem. The current episode started more than 1 year ago. The problem occurs constantly. The problem has been improving . Associated symptoms include arthralgias, fatigue, headaches, numbness and weakness. Pertinent negatives include no abdominal pain, anorexia, change in bowel habit, chest pain, chills, congestion, coughing, fever, joint swelling, myalgias, nausea, neck pain, rash, sore throat, swollen glands, urinary symptoms, vertigo, visual change or vomiting. Nothing aggravates the symptoms. She has tried nothing for the symptoms.   Hypertension   This is a chronic problem. The  current episode started more than 1 year ago. The problem is controlled  Associated symptoms include headaches. Pertinent negatives include no anxiety, blurred vision, chest pain, malaise/fatigue, neck pain, orthopnea, palpitations, peripheral edema, PND or shortness of breath. Risk factors for coronary artery disease include obesity, sedentary lifestyle, smoking/tobacco exposure and stress. Past treatments include ACE inhibitors, beta blockers and diuretics. There are no compliance problems.  There is no history of angina, kidney disease, CAD/MI, CVA, heart failure, left ventricular hypertrophy, PVD or retinopathy. There is no history of chronic renal disease, coarctation of the aorta, hyperaldosteronism, hypercortisolism, hyperparathyroidism, a hypertension causing med, pheochromocytoma, renovascular disease, sleep apnea or a thyroid problem.   Diabetes   She presents for her followupdiabetic visit. She has type 2 diabetes mellitus. Her disease course has been waxing and waning  Hypoglycemia symptoms include confusion, headaches, nervousness/anxiousness, seizures and sleepiness. Associated symptoms include fatigue and weakness. Pertinent negatives for diabetes include no blurred vision, no chest pain and no visual change. Pertinent negatives for diabetic complications include no CVA, PVD or retinopathy. Risk factors for coronary artery disease include dyslipidemia, obesity and post-menopausal. She has not had a previous visit with a dietitian. There is no change in her home blood glucose trend. An ACE inhibitor/angiotensin II receptor blocker is not being taken. She does not see a podiatrist.Eye exam is not current. teratments include steglastro insulin and trulicity        Review of Systems  Review of Systems   Constitutional:  Positive for activity change and fatigue. Negative for appetite change, chills, diaphoresis and fever.   HENT:  Negative for congestion, postnasal drip, rhinorrhea, sinus pressure, sinus  pain, sneezing, sore throat, trouble swallowing and voice change.    Respiratory:  Negative for cough, choking, chest tightness, shortness of breath, wheezing and stridor.    Cardiovascular:  Negative for chest pain.   Gastrointestinal:  Positive for abdominal pain. Negative for diarrhea, nausea and vomiting.   Musculoskeletal:  Positive for arthralgias and back pain.   Neurological:  Positive for seizures, weakness and numbness. Negative for headaches.   Psychiatric/Behavioral:  Positive for behavioral problems and hallucinations. The patient is nervous/anxious.         Depressed mood      Patient Active Problem List   Diagnosis    Seizure disorder    Morbid obesity    Tobacco abuse counseling    Multiple joint pain    Dysuria    History of positive hepatitis C    Essential hypertension    Vitamin D deficiency    Urinary tract infection without hematuria    Chronic hepatitis C without hepatic coma    Dyspnea on exertion    Cigarette nicotine dependence, uncomplicated    JACQUE (obstructive sleep apnea)    Gastroesophageal reflux disease with esophagitis    Left upper quadrant pain    Nausea and vomiting    Weight gain, abnormal    Change in bowel habits    Hepatic fibrosis    Non-compliance    Schizoaffective disorder, depressive type    Schizophrenia, paranoid type    Gastritis without bleeding    Tobacco user    Pre-operative cardiovascular examination    Scoliosis    History of drug use    Stage 2 chronic kidney disease    Diabetic neuropathy    Mixed hyperlipidemia    Chronic bilateral low back pain with bilateral sciatica    Type 2 diabetes mellitus with hyperglycemia, with long-term current use of insulin    Auditory hallucination    Class 3 severe obesity due to excess calories with serious comorbidity and body mass index (BMI) of 45.0 to 49.9 in adult    Pain in wrist    Schizophrenia, simple, chronic    Carpal tunnel syndrome    Status post carpal tunnel release    Asthma, persistent not controlled    Weight  loss counseling, encounter for    Encounter for pre-bariatric surgery counseling and education    Generalized anxiety disorder    Bariatric surgery status    Encounter for weight loss counseling    Uncontrolled type 2 diabetes mellitus with hypoglycemia without coma    S/P bariatric surgery    Morbid (severe) obesity due to excess calories     Past Surgical History:   Procedure Laterality Date    APPENDECTOMY      BACK SURGERY      BARIATRIC SURGERY      BREAST SURGERY      CARPAL TUNNEL RELEASE      right hand    CARPAL TUNNEL RELEASE Left 10/19/2021    Procedure: LEFT CARPAL TUNNEL RELEASE;  Surgeon: Kevin Carlson MD;  Location: [facility] OR;  Service: Orthopedics;  Laterality: Left;    CHOLECYSTECTOMY      COLONOSCOPY      COLONOSCOPY N/A 2019    Procedure: COLONOSCOPY;  Surgeon: Joni Delacruz MD;  Location: [facility] ENDOSCOPY;  Service: Gastroenterology    DENTAL PROCEDURE      ENDOSCOPY N/A 2019    Procedure: ESOPHAGOGASTRODUODENOSCOPY--poss dilation;  Surgeon: Joni Delacurz MD;  Location: [facility] ENDOSCOPY;  Service: Gastroenterology    GASTRIC SLEEVE LAPAROSCOPIC  2023    HYSTERECTOMY      LIPOMA EXCISION      9    LIVER BIOPSY      UPPER GASTROINTESTINAL ENDOSCOPY  2019     Social History     Socioeconomic History    Marital status:    Tobacco Use    Smoking status: Former     Packs/day: 1.00     Years: 35.00     Pack years: 35.00     Types: Cigarettes     Start date:      Quit date: 2022     Years since quittin.2    Smokeless tobacco: Never    Tobacco comments:     on patch trying to quit    Vaping Use    Vaping Use: Never used   Substance and Sexual Activity    Alcohol use: Not Currently    Drug use: Not Currently     Types: Amphetamines, Heroin, Methamphetamines, Morphine, Oxycodone     Comment: QUIT 3 YEARS AGO    Sexual activity: Not Currently     Birth control/protection: Surgical     Family History   Problem Relation Age of Onset    Hypertension Other      Diabetes Other     Stroke Other     Cancer Other     Kidney disease Other     Lung disease Other     Other Other     Malone's esophagus Other     Colon polyps Other     Heart disease Other     Ulcers Other     Cholelithiasis Other     Allergy (severe) Other     Schizophrenia Father     Alcohol abuse Father     Drug abuse Paternal Grandfather      Lab on 06/07/2023   Component Date Value Ref Range Status    Glucose 06/07/2023 247 (H)  65 - 99 mg/dL Final    BUN 06/07/2023 23 (H)  6 - 20 mg/dL Final    Creatinine 06/07/2023 0.69  0.57 - 1.00 mg/dL Final    Sodium 06/07/2023 134 (L)  136 - 145 mmol/L Final    Potassium 06/07/2023 4.4  3.5 - 5.2 mmol/L Final    Chloride 06/07/2023 100  98 - 107 mmol/L Final    CO2 06/07/2023 21.0 (L)  22.0 - 29.0 mmol/L Final    Calcium 06/07/2023 9.7  8.6 - 10.5 mg/dL Final    Total Protein 06/07/2023 7.4  6.0 - 8.5 g/dL Final    Albumin 06/07/2023 3.9  3.5 - 5.2 g/dL Final    ALT (SGPT) 06/07/2023 41 (H)  1 - 33 U/L Final    AST (SGOT) 06/07/2023 27  1 - 32 U/L Final    Alkaline Phosphatase 06/07/2023 102  39 - 117 U/L Final    Total Bilirubin 06/07/2023 0.2  0.0 - 1.2 mg/dL Final    Globulin 06/07/2023 3.5  gm/dL Final    A/G Ratio 06/07/2023 1.1  g/dL Final    BUN/Creatinine Ratio 06/07/2023 33.3 (H)  7.0 - 25.0 Final    Anion Gap 06/07/2023 13.0  5.0 - 15.0 mmol/L Final    eGFR 06/07/2023 106.5  >60.0 mL/min/1.73 Final    Hemoglobin A1C 06/07/2023 10.60 (H)  4.80 - 5.60 % Final    Total Cholesterol 06/07/2023 194  0 - 200 mg/dL Final    Triglycerides 06/07/2023 304 (H)  0 - 150 mg/dL Final    HDL Cholesterol 06/07/2023 32 (L)  40 - 60 mg/dL Final    LDL Cholesterol  06/07/2023 109 (H)  0 - 100 mg/dL Final    VLDL Cholesterol 06/07/2023 53 (H)  5 - 40 mg/dL Final    LDL/HDL Ratio 06/07/2023 3.16   Final    Microalbumin/Creatinine Ratio 06/07/2023    Final    Unable to calculate    Creatinine, Urine 06/07/2023 42.9  mg/dL Final    Microalbumin, Urine 06/07/2023 <1.2  mg/dL Final     TSH 06/07/2023 1.740  0.270 - 4.200 uIU/mL Final    25 Hydroxy, Vitamin D 06/07/2023 45.9  30.0 - 100.0 ng/ml Final    Vitamin B-12 06/07/2023 692  211 - 946 pg/mL Final    WBC 06/07/2023 9.90  3.40 - 10.80 10*3/mm3 Final    RBC 06/07/2023 4.97  3.77 - 5.28 10*6/mm3 Final    Hemoglobin 06/07/2023 13.9  12.0 - 15.9 g/dL Final    Hematocrit 06/07/2023 42.0  34.0 - 46.6 % Final    MCV 06/07/2023 84.5  79.0 - 97.0 fL Final    MCH 06/07/2023 28.0  26.6 - 33.0 pg Final    MCHC 06/07/2023 33.1  31.5 - 35.7 g/dL Final    RDW 06/07/2023 13.4  12.3 - 15.4 % Final    RDW-SD 06/07/2023 41.1  37.0 - 54.0 fl Final    MPV 06/07/2023 11.8  6.0 - 12.0 fL Final    Platelets 06/07/2023 175  140 - 450 10*3/mm3 Final    Neutrophil % 06/07/2023 63.3  42.7 - 76.0 % Final    Lymphocyte % 06/07/2023 30.2  19.6 - 45.3 % Final    Monocyte % 06/07/2023 3.9 (L)  5.0 - 12.0 % Final    Eosinophil % 06/07/2023 1.5  0.3 - 6.2 % Final    Basophil % 06/07/2023 0.6  0.0 - 1.5 % Final    Immature Grans % 06/07/2023 0.5  0.0 - 0.5 % Final    Neutrophils, Absolute 06/07/2023 6.26  1.70 - 7.00 10*3/mm3 Final    Lymphocytes, Absolute 06/07/2023 2.99  0.70 - 3.10 10*3/mm3 Final    Monocytes, Absolute 06/07/2023 0.39  0.10 - 0.90 10*3/mm3 Final    Eosinophils, Absolute 06/07/2023 0.15  0.00 - 0.40 10*3/mm3 Final    Basophils, Absolute 06/07/2023 0.06  0.00 - 0.20 10*3/mm3 Final    Immature Grans, Absolute 06/07/2023 0.05  0.00 - 0.05 10*3/mm3 Final    nRBC 06/07/2023 0.0  0.0 - 0.2 /100 WBC Final      Home Sleep Study  Images from the original result were not included.    [facility]  [address] [place] [address]  Phone: [phone]  Fax: [phone]    Home Sleep Study Interpretation    Patient's Name: Sudhakar Saul  YOB: 1975   Date of Study: 13 Feb 2023  Referring Provider: ROD Kat  Primary Care Provider: Jermaine Ferro MD    History of Presenting  "Illness:  Ms. Sudhakar Saul is a 47 y.o. female   with a BMI of 49.  Concurrent medical history includes Obesity, HTN, CAD,   Migraine, Unspecified Seizure d/o, multiple psychiatric diagnoses of   unclear and overlaping domains (chart lists Schizophrenia and   Schizoaffective d/o, Muliple Personality D/o, Depression, Anxiety).  Echo   w/ LVEF>45%.  PFTs reviewed.  No EEGs in chart.  Concurrent medications   include Vraylar, Lamictal, Keppra, Gabapentin, Lisinopril, Cymbalta.    Patient presents for a polysomnogram for complaints of snoring, excessive   daytime sleepiness, awakenings.  Per chart: \" diagnosed with JACQUE ~10 years   ago and was on CPAP for a short period of time. She was unable to tolerate   it due to using a nasal mask and dropping her mouth open, so she would   awaken gasping for air even worse while using the machine. She is now   undergoing evaluation for bariatric surgery, so she is required to repeat   testing and have JACQUE treatment prior to approval from the patient's   understanding. \"    Methods: Patient had home sleep testing with a WatchPat device.  It   measured: Oxygen saturation and pulse rate were recorded by a pulse   oximeter; snoring by transducer vibration; body position.  The sleep study   was scored using standard techniques.     Technical Comment: Technically adequate.     Diagnostic Analysis:  --Total Sleep Time: 07 hours, 05 min    --Respiratory Indices:  -->  pRDI (4%): 70 / hr  -->  pAHI (4%): 50 / hr  -->  JOIE: 45 / hr  -->  pAHI-Central: 00 / hr  --> % :  00 %    --Oxygen Saturation (SpO2):  --> Averaged 90% during the study.   --> Jomar of 72% during sleep.   -->  Throughout the study: 78 minutes spent with a saturation less than or   equal to 88%.    --Pulse:  -Averaged 84 bpm   -Range: 55 bpm -- 117 bpm.     --Time spent supine: 71%  --Snoring occupied 63% total sleep time.     Assessment:  Ms. Sudhakar Saul is a 47 y.o. female whose home sleep   test " "reveals:   Severe Sleep Disordered Breathing / Obstructive Sleep Apnea with an pRDI   of 70 and pAHI of 50.    Home sleep testing generally under estimates the severity of sleep   disordered breathing.     Sustained hypoxia observed during the study.       Planning & Recommendations:  1. Follow-up with the Sleep Medicine Clinic for full discussion of results   and treatment planning.  2. Consider in lab PAP titration given co-morbidities.  If electing for   APAP, consider overnight pulse oximetry to ensure adequate saturations   once acclimated to PAP.    3. Call with any questions or concerns.    Gurpreet Davis II, MD  Sleep Medicine   02/28/23   16:15 CST    CC:    Jermaine Ferro MD Hickerson, Ashleigh, ROD    @[email or web address]@  Immunization History   Administered Date(s) Administered    COVID-19 (MODERNA) 1st,2nd,3rd Dose Monovalent 04/06/2021, 04/08/2021, 04/08/2021, 05/06/2021, 05/06/2021, 12/10/2021    Fluzone (or Fluarix & Flulaval for VFC) >6mos 09/17/2020, 10/28/2021, 10/21/2022    Hepatitis A 06/12/2018    Hepatitis B 06/14/2022, 10/21/2022    Hepatitis B Adult/Adolescent IM 10/21/2022    Influenza, Unspecified 10/21/2022    Pneumococcal Polysaccharide (PPSV23) 12/13/2018    Tdap 09/17/2020       The following portions of the patient's history were reviewed and updated as appropriate: allergies, current medications, past family history, past medical history, past social history, past surgical history and problem list.        Physical Exam  /68 (BP Location: Left arm, Patient Position: Sitting, Cuff Size: Large Adult)   Pulse 78   Temp 97.9 °F (36.6 °C)   Ht 167.6 cm (66\")   Wt 115 kg (252 lb 9.6 oz)   SpO2 98%   BMI 40.77 kg/m²     Physical Exam  Vitals and nursing note reviewed.   Constitutional:       Appearance: She is well-developed. She is not diaphoretic.   HENT:      Head: Normocephalic and atraumatic.      Right Ear: External ear normal.   Eyes:      Conjunctiva/sclera: Conjunctivae " normal.      Pupils: Pupils are equal, round, and reactive to light.   Cardiovascular:      Rate and Rhythm: Normal rate and regular rhythm.      Heart sounds: Normal heart sounds. No murmur heard.  Pulmonary:      Effort: Pulmonary effort is normal. No respiratory distress.      Breath sounds: Normal breath sounds.   Abdominal:      General: Bowel sounds are normal. There is no distension.      Palpations: Abdomen is soft.      Tenderness: There is no abdominal tenderness.      Comments: Obese abdomen    Musculoskeletal:         General: Tenderness present. No deformity. Normal range of motion.      Cervical back: Normal range of motion and neck supple.   Skin:     General: Skin is warm.      Coloration: Skin is not pale.      Findings: No erythema or rash.   Neurological:      Mental Status: She is alert and oriented to person, place, and time.      Cranial Nerves: No cranial nerve deficit.   Psychiatric:         Behavior: Behavior normal.       [unfilled]   Diagnosis Plan   1. Encounter for screening for lung cancer  CT Chest Low Dose Wo      2. Essential hypertension  Hemoglobin A1c    Lipid Panel    Vitamin D,25-Hydroxy    Vitamin B12    TSH Rfx On Abnormal To Free T4    Iron Profile      3. Mixed hyperlipidemia  Hemoglobin A1c    Lipid Panel    Vitamin D,25-Hydroxy    Vitamin B12    TSH Rfx On Abnormal To Free T4    Iron Profile      4. Uncontrolled type 2 diabetes mellitus with hypoglycemia without coma  Hemoglobin A1c    Lipid Panel    Vitamin D,25-Hydroxy    Vitamin B12    TSH Rfx On Abnormal To Free T4    Iron Profile      5. Weight loss counseling, encounter for  Hemoglobin A1c    Lipid Panel    Vitamin D,25-Hydroxy    Vitamin B12    TSH Rfx On Abnormal To Free T4    Iron Profile      6. Schizophrenia, paranoid type  Hemoglobin A1c    Lipid Panel    Vitamin D,25-Hydroxy    Vitamin B12    TSH Rfx On Abnormal To Free T4    Iron Profile      7. Generalized anxiety disorder  DULoxetine (CYMBALTA) 30 MG  capsule    Hemoglobin A1c    Lipid Panel    Vitamin D,25-Hydroxy    Vitamin B12    TSH Rfx On Abnormal To Free T4    Iron Profile      8. Stage 2 chronic kidney disease  Hemoglobin A1c    Lipid Panel    Vitamin D,25-Hydroxy    Vitamin B12    TSH Rfx On Abnormal To Free T4    Iron Profile      9. Gastritis without bleeding, unspecified chronicity, unspecified gastritis type  Hemoglobin A1c    Lipid Panel    Vitamin D,25-Hydroxy    Vitamin B12    TSH Rfx On Abnormal To Free T4    Iron Profile      10. Gastroesophageal reflux disease with esophagitis without hemorrhage  Hemoglobin A1c    Lipid Panel    Vitamin D,25-Hydroxy    Vitamin B12    TSH Rfx On Abnormal To Free T4    Iron Profile      11. Cigarette nicotine dependence, uncomplicated  Hemoglobin A1c    Lipid Panel    Vitamin D,25-Hydroxy    Vitamin B12    TSH Rfx On Abnormal To Free T4    Iron Profile      12. Schizoaffective disorder, depressive type  DULoxetine (CYMBALTA) 30 MG capsule    Hemoglobin A1c    Lipid Panel    Vitamin D,25-Hydroxy    Vitamin B12    TSH Rfx On Abnormal To Free T4    Iron Profile      13. S/P bariatric surgery  Hemoglobin A1c    Lipid Panel    Vitamin D,25-Hydroxy    Vitamin B12    TSH Rfx On Abnormal To Free T4    Iron Profile      14. Morbid (severe) obesity due to excess calories  Hemoglobin A1c    Lipid Panel    Vitamin D,25-Hydroxy    Vitamin B12    TSH Rfx On Abnormal To Free T4    Iron Profile      15. Screening mammogram, encounter for  Mammo Screening Bilateral With CAD           -recommend labwork   -recommend COVID-19 vaccination  -recommend mammogram screening-schedule at imaging center   -recommend CT of chest for lung cancer screening   -recommend colonoscopy screening - refer back to Gastroenterology   -elevated liver enzymes -suspect fatty liver recommend US of liver   -GERD with esophagitis  - on prilosec 20 mg daiy. Consider another EGD if not improving. GI following   -tobacco user - Counseled quit smoking >5  minutes.  recommend 1 800 QUIT NOW and nicotine patches   -ckd stage 2 - stable continue to monitor. Stressed importance of diabetes control and BP control   -JACQUE on CPAP - Sleep Medicine following  -hyperlipdemia - on  simvastatin 40 mg PO qhs. ASCVD risk high. Now on red yeast rice. On fish oil supplement  -chronic back pain/DDD lumbar spine/arthritis lumbar spine  - was on neurontin 100 mg was  on Norco 7.5/325 mg every 8 hours AK. PM following. On cymbalta 60 mg daily. On flexeril 10 mg PO tID   -dyspnea/mild asthma/pulmonary nodule   -Pulmonlogy following on breo now and abluterol PRN.   Albuterol nebulizer and treatment every 6 hours PRN.  repeat CT of chest in 1 year   -DM type 2- Endocrinology following. Currently on metformin 1000 gm PO BID, steglatro 15 mg daily. trulicity 4.5 mg now on insulin pump. .    -vitamin D  defciency - vitamin D once a week   -schizophrenia/major depression.JD  -  [facility] health following no on lamictal 150 mg PO q daily and vraylar 1.5 mg daily. risperdal stopped  on minipress 2 mg at bedtime. On vistaril 25 mg PO TID PRN on trileptal 600 mg PO BID   -seizure disorder - Neurology following stable on Keppra 1000 mg Po BID. Neurology following in [place]. She is now on tileptal 600 mg daily.    -Morbid obesity - counseled weight loss >5 minutes BMI at 40.77 pt had bariatric weight loss srugery   -hypertension -  on lisinopril  40 mg dialy.  will cut back on lisinopril from 40 to 20 mg daily.  lopressor 50 mg PO BID  on nifedipine 30 mg daily.    -advised pt to be safe and call with any questions or concerns. All questions addressed today  -advised pt to followup with specialist and referrals  -advised pt to go to ER or call 911 if symptoms worrisome or severe  -advised pt to be safe during COVID-19 pandemic  I spent 34 minutes caring for Sudhakar on this date of service. This time includes time spent by me in the following activities: preparing for the visit, reviewing tests,  obtaining and/or reviewing a separately obtained history, performing a medically appropriate examination and/or evaluation, counseling and educating the patient/family/caregiver, ordering medications, tests, or procedures, referring and communicating with other health care professionals, documenting information in the medical record, independently interpreting results and communicating that information with the patient/family/caregiver and care coordination.   -recheck in 2 months         This document has been electronically signed by Jermaine Ferro MD on September 14, 2023 09:01 CDT

## 2023-08-28 ENCOUNTER — TELEPHONE (OUTPATIENT)
Dept: PSYCHIATRY | Facility: CLINIC | Age: 48
End: 2023-08-28

## 2023-08-28 NOTE — TELEPHONE ENCOUNTER
Left Patient a voicemail to followup with office after she has had 2 no shows appointments with Providers.

## 2023-09-05 RX ORDER — LISINOPRIL 40 MG/1
TABLET ORAL
Qty: 30 TABLET | Refills: 0 | Status: SHIPPED | OUTPATIENT
Start: 2023-09-05

## 2023-09-07 ENCOUNTER — TELEMEDICINE (OUTPATIENT)
Dept: PSYCHIATRY | Facility: CLINIC | Age: 48
End: 2023-09-07
Payer: COMMERCIAL

## 2023-09-07 DIAGNOSIS — F51.5 NIGHTMARES: ICD-10-CM

## 2023-09-07 DIAGNOSIS — F41.1 GENERALIZED ANXIETY DISORDER: Chronic | ICD-10-CM

## 2023-09-07 DIAGNOSIS — F25.9 SCHIZOAFFECTIVE DISORDER, IN REMISSION: Primary | Chronic | ICD-10-CM

## 2023-09-07 DIAGNOSIS — G47.9 SLEEPING DIFFICULTY: ICD-10-CM

## 2023-09-07 PROCEDURE — 1159F MED LIST DOCD IN RCRD: CPT | Performed by: NURSE PRACTITIONER

## 2023-09-07 PROCEDURE — 1160F RVW MEDS BY RX/DR IN RCRD: CPT | Performed by: NURSE PRACTITIONER

## 2023-09-07 PROCEDURE — 99214 OFFICE O/P EST MOD 30 MIN: CPT | Performed by: NURSE PRACTITIONER

## 2023-09-07 RX ORDER — FERROUS SULFATE 325(65) MG
TABLET ORAL
COMMUNITY
Start: 2023-08-24

## 2023-09-07 NOTE — PROGRESS NOTES
"This provider is located at [facility], [address] [address].The Patient is seen remotely at home, [address] [place] [address] via [employer].  Patient is being seen via telehealth and confirm that they are in a secure environment for this session. The patient's condition being diagnosed/treated is appropriate for telemedicine. The provider identified himself/herself: herself as well as her credentials.   The patient gave consent to be seen remotely, and when consent is given they understand that the consent allows for patient identifiable information to be sent to a third party as needed.   They may refuse to be seen remotely at any time. The electronic data is encrypted and password protected, and the patient has been advised of the potential risks to privacy not withstanding such measures.    You have chosen to receive care through a telehealth visit.  Do you consent to use a video/audio connection for your medical care today? Yes      Chief Complaint  Follow-for schizoaffective depressed type    Subjective    Sudhakar More Saul presents to [facility] for medication management.       History of Present Illness   Patient presents today reporting that she is doing \"pretty good\".  She states anxiety about up and her last visit due to her mother being in the ER and having heart issues.  Patient states otherwise she is doing well.  She reports that she has been going to her doctors appointments as well as trying to help her mother.  Patient denies any depressive symptoms.  Denies any hypomanic or manic episodes or any delusions or paranoia.  Patient notes that she is down to 256 pounds and 2 kimberli sizes.  Patient states that she is able to walk mostly without her cane and even walked in a grocery store which she has not been able to do.  She states that she is using her bike and staying active.  She notes that she was taking a 180 " units of insulin daily and now down 30 units.  Patient reports that she follows up with her PCP next week.  She reports that she did have to get back on gabapentin today from her apathy.  Notes she is getting 5 to 6 hours of sleep at night and appetite is good.  Patient reports that she feels happy and energetic.  Reports however she did have a level in her esophagus that the medicine now is watching how she takes her medicine and her food intake.  Denies any other medical or medicine changes.  Denies any auditory visualizations.  Denies any SI or HI.    Objective   Vital Signs:   There were no vitals taken for this visit.  Due to the remote nature of this encounter (virtual encounter), vitals were unable to be obtained.  Height stated at 66 inches.  Weight stated at 256 pounds.        PHQ-9 Score:   PHQ-9 Total Score:     Patient screened positive for depression based on a PHQ-9 score of  on . Follow-up recommendations include:  Patient has schizophrenia and is currently being managed with medication. .    Mental Status Exam:   Hygiene:   good  Cooperation:  Cooperative  Eye Contact:  Good  Psychomotor Behavior:  Appropriate  Affect:  Appropriate  Mood: normal and anxious  Speech:  Normal  Thought Process:  Goal directed and Linear  Thought Content:  Normal  Suicidal:  None  Homicidal:  None  Hallucinations:  None  Delusion:  None  Memory:  Intact  Orientation:  Person, Place, Time and Situation  Reliability:  good  Insight:  Good and Fair  Judgement:  Good and Fair  Impulse Control:  Good and Fair  Physical/Medical Issues:  Yes Dm. HTN, CKD stage 2, hep C+, JACQUE, hx substance abuse      Current Medications:   Current Outpatient Medications   Medication Sig Dispense Refill    FeroSul 325 (65 Fe) MG tablet       albuterol (ACCUNEB) 1.25 MG/3ML nebulizer solution Take 3 mL by nebulization Every 6 (Six) Hours As Needed for Wheezing or Shortness of Air. 9 mL 3    Cariprazine HCl (VRAYLAR) 4.5 MG capsule capsule Take 1  capsule by mouth Daily. 90 capsule 0    Continuous Blood Gluc  (Dexcom G6 ) device USE FOR 6 MONTHS      Continuous Blood Gluc Sensor (Dexcom G6 Sensor) PLACE 1 SENSOR ON SKIN CHANGING EVERY 10 DAYS 9 each 0    Continuous Blood Gluc Transmit (Dexcom G6 Transmitter) misc USE AS DIRECTED EVERY 3 MONTHS 1 each 0    cyclobenzaprine (FLEXERIL) 10 MG tablet Take 1 tablet by mouth 3 (Three) Times a Day As Needed for Muscle Spasms. 90 tablet 3    DULoxetine (CYMBALTA) 30 MG capsule Take 3 capsules by mouth Daily. 270 capsule 0    DULoxetine (CYMBALTA) 60 MG capsule Take 1 capsule by mouth Daily. 30 capsule 3    Ertugliflozin L-PyroglutamicAc (Steglatro) 15 MG tablet Take 1 tablet by mouth Every Morning. 90 tablet 1    Fluticasone Furoate-Vilanterol (Breo Ellipta) 200-25 MCG/INH inhaler Inhale 1 puff Daily. 28 each 11    gabapentin (NEURONTIN) 300 MG capsule Take 1 capsule by mouth 3 (Three) Times a Day.      hydrOXYzine (ATARAX) 25 MG tablet Take 0.5-1 tablets by mouth 3 (Three) Times a Day As Needed for Anxiety. 270 tablet 0    Insulin Disposable Pump (OmniPod Dash 5 Pack Pods) misc       Insulin Lispro (humaLOG) 100 UNIT/ML injection USE AS DIRECTED THROUGH INSULIN PUMP. UP  UNITS DAILY 90 mL 0    lamoTRIgine (LaMICtal) 150 MG tablet Take 1 tablet by mouth Daily. 90 tablet 0    levETIRAcetam (KEPPRA) 1000 MG tablet Take 1 tablet by mouth 2 (Two) Times a Day. 60 tablet 3    lisinopril (PRINIVIL,ZESTRIL) 40 MG tablet Take 1 tablet by mouth once daily 30 tablet 0    metFORMIN (GLUCOPHAGE) 500 MG tablet Take 1 tablet by mouth 2 (Two) Times a Day With Meals. 60 tablet 11    metoprolol tartrate (LOPRESSOR) 50 MG tablet Take 1 tablet by mouth 2 (Two) Times a Day. 60 tablet 3    Nebulizer device 1 application Every 6 (Six) Hours As Needed (dyspnea). 1 each 3    NIFEdipine XL (PROCARDIA XL) 30 MG 24 hr tablet Take 1 tablet by mouth Daily. 30 tablet 3    omeprazole (priLOSEC) 40 MG capsule Take 1 capsule by  mouth Daily. 30 capsule 3    OXcarbazepine (TRILEPTAL) 600 MG tablet Take 1 tablet by mouth 2 (Two) Times a Day. PT TAKES  MG TAB IN MORNING AND  MG IN EVENING      prazosin (MINIPRESS) 2 MG capsule Take 1-2 capsules by mouth Every Night. 180 capsule 0    rOPINIRole (REQUIP) 3 MG tablet Take 1 tablet by mouth Every Night.      simvastatin (ZOCOR) 40 MG tablet Take 1 tablet by mouth Every Night. 30 tablet 3    Trulicity 4.5 MG/0.5ML solution pen-injector INJECT 1 SYRINGE SUBCUTANEOUSLY ONCE A WEEK 4 mL 0    Ventolin  (90 Base) MCG/ACT inhaler Inhale 2 puffs Every 4 (Four) Hours As Needed for Wheezing. 6.7 g 11    vitamin D (ERGOCALCIFEROL) 1.25 MG (92189 UT) capsule capsule TAKE 2 CAPSULES BY MOUTH ONCE A WEEK 8 capsule 3     No current facility-administered medications for this visit.       Physical Exam  Nursing note reviewed.   Constitutional:       Appearance: Normal appearance.   Neurological:      Mental Status: She is alert.   Psychiatric:         Attention and Perception: Attention normal. She does not perceive auditory or visual hallucinations.         Mood and Affect: Mood and affect normal. Mood is not anxious or depressed.         Speech: Speech normal.         Behavior: Behavior is cooperative.         Thought Content: Thought content normal.         Cognition and Memory: Cognition and memory normal.         Judgment: Judgment normal.      Result Review :     The following data was reviewed by: ROD Spencer on 02/03/2021:  Common labs          11/3/2022    09:00 2/20/2023    09:57 6/7/2023    09:09   Common Labs   Glucose 196  486  247    BUN 15  31  23    Creatinine 0.80  0.78  0.69    Sodium 137  130  134    Potassium 4.9  5.0  4.4    Chloride 103  96  100    Calcium 9.2  9.5  9.7    Albumin 3.70  3.7  3.9    Total Bilirubin 0.2  0.2  0.2    Alkaline Phosphatase 104  100  102    AST (SGOT) 34  42  27    ALT (SGPT) 34  45  41    WBC 7.70  8.63  9.90    Hemoglobin 13.6   14.1  13.9    Hematocrit 43.5  42.3  42.0    Platelets 199  163  175    Total Cholesterol 129  222  194    Triglycerides 88  313  304    HDL Cholesterol 52  35  32    LDL Cholesterol  60  131  109    Hemoglobin A1C 9.20  16.00  10.60    Microalbumin, Urine <1.2  <1.2  <1.2      CMP          11/3/2022    09:00 2/20/2023    09:57 6/7/2023    09:09   CMP   Glucose 196  486  247    BUN 15  31  23    Creatinine 0.80  0.78  0.69    EGFR 91.0  93.2  106.5    Sodium 137  130  134    Potassium 4.9  5.0  4.4    Chloride 103  96  100    Calcium 9.2  9.5  9.7    Total Protein 7.0  6.8  7.4    Albumin 3.70  3.7  3.9    Globulin 3.3  3.1  3.5    Total Bilirubin 0.2  0.2  0.2    Alkaline Phosphatase 104  100  102    AST (SGOT) 34  42  27    ALT (SGPT) 34  45  41    Albumin/Globulin Ratio 1.1  1.2  1.1    BUN/Creatinine Ratio 18.8  39.7  33.3    Anion Gap 11.0  13.0  13.0      CBC          11/3/2022    09:00 2/20/2023    09:57 6/7/2023    09:09   CBC   WBC 7.70  8.63  9.90    RBC 5.18  4.94  4.97    Hemoglobin 13.6  14.1  13.9    Hematocrit 43.5  42.3  42.0    MCV 84.0  85.6  84.5    MCH 26.3  28.5  28.0    MCHC 31.3  33.3  33.1    RDW 14.6  14.6  13.4    Platelets 199  163  175      CBC w/diff      CBC w/Diff 6/10/20 9/9/20 1/26/21   WBC 8.89 9.63 9.04   RBC 4.85 5.07 5.10   Hemoglobin 14.1 14.9 14.4   Hematocrit 42.8 44.3 45.0   MCV 88.2 87.4 88.2   MCH 29.1 29.4 28.2   MCHC 32.9 33.6 32.0   RDW 13.6 13.3 13.0   Platelets 157 180 174   Neutrophil Rel % 63.8 67.2    Immature Granulocyte Rel % 0.6 (A) 0.5    Lymphocyte Rel % 26.4 21.6    Monocyte Rel % 4.5 (A) 4.8 (A)    Eosinophil Rel % 3.9 5.3    Basophil Rel % 0.8 0.6    (A) Abnormal value              Lipid Panel          11/3/2022    09:00 2/20/2023    09:57 6/7/2023    09:09   Lipid Panel   Total Cholesterol 129  222  194    Triglycerides 88  313  304    HDL Cholesterol 52  35  32    VLDL Cholesterol 17  56  53    LDL Cholesterol  60  131  109    LDL/HDL Ratio 1.14  3.55   3.16      TSH          11/3/2022    09:00 2/20/2023    09:57 6/7/2023    09:09   TSH   TSH 1.920  1.310  1.740      BMP          11/3/2022    09:00 2/20/2023    09:57 6/7/2023    09:09   BMP   BUN 15  31  23    Creatinine 0.80  0.78  0.69    Sodium 137  130  134    Potassium 4.9  5.0  4.4    Chloride 103  96  100    CO2 23.0  21.0  21.0    Calcium 9.2  9.5  9.7      HgB          11/3/2022    09:00 2/20/2023    09:57 6/7/2023    09:09   HGB   Hemoglobin 13.6  14.1  13.9      Data reviewed: PCP notes         Assessment and Plan    Problem List Items Addressed This Visit          Mental Health    Generalized anxiety disorder     Other Visit Diagnoses       Schizoaffective disorder, in remission  (Chronic)   -  Primary    Nightmares        Sleeping difficulty            TREATMENT PLAN/GOALS: Continue supportive psychotherapy efforts and medications as indicated. Treatment and medication options discussed during today's visit. Patient ackowledged and verbally consented to continue with current treatment plan and was educated on the importance of compliance with treatment and follow-up appointments.    MEDICATION ISSUES:  We discussed risks, benefits, and side effects of the above medications and the patient was agreeable with the plan. Patient was educated on the importance of compliance with treatment and follow-up appointments.  Patient is agreeable to call the office with any worsening of symptoms or onset of side effects. Patient is agreeable to call 911 or go to the nearest ER should he/she begin having SI/HI. Lengthy discussion with patient on the possible side effects of antipsychotic medications including increased cholesterol, increased blood sugar, and possibility of weight gain.  Also discussed the need to monitor lab work associated with this.  The risk of muscle movement disorders with this class of medication was also discussed. We will continue Lamictal in an effort to stabilize mood.  The patient was  reminded to immediately come to the hospital should there be any loss of control.  Explanation was given to her regarding Lamictal and the potential for Foster Shon syndrome and significant rash.  Patient was encouraged to check skin prior to beginning.  Patient was encouraged to report any rash and to immediately stop medication.      -Continue lamotrigine 150 mg daily for schizoaffective disorder.  -Continue minipress 2-4mg at night for nightmares.  -Continue Vraylar  4.5 mg daily for schizoaffective disorder depressed type.  -Continue hydroxyzine 12.5 to 25 mg up to 3 times a day as needed for anxiety and panic.  - Continue Cymbalta 90mg daily for depression.  -Informed patient that I would not write for the 60 mg as that was related to pain not for her depression or her mood but she could contact her PCP or see other pain management to prescribe this as she is stated she does not have any side effects and tolerated well as this is over the recommended dose for depression so I will not continue.  -Continue psychotherapy to help with past trauma as well as depressive and anxiety symptoms.  -Highly encourage patient since she is now on Trileptal to be aware of side effects of multiple mood stabilizers and if she were to have any rash to contact the clinic as we may have to look at tapering off the lamotrigine or talking with neurologist to reevaluate Trileptal.  Patient verbalized understanding and denied any side effects at this time.      Patient has now failed and tried Haldol, aripiprazole, Latuda and now risperidone which has caused weight gain and due to diabetes and kidney disease would benefit from Vraylar as patient is still having auditory and visual hallucinations.     Counseled patient regarding multimodal approach with healthy nutrition, healthy sleep, regular physical activity, social activities, counseling, and medications.      Coping skills reviewed and encouraged positive framing of thoughts      Assisted patient in processing above session content; acknowledged and normalized patient’s thoughts, feelings, and concerns.  Applied  positive coping skills and behavior management in session.  Allowed patient to freely discuss issues without interruption or judgment. Provided safe, confidential environment to facilitate the development of positive therapeutic relationship and encourage open, honest communication. Assisted patient in identifying risk factors which would indicate the need for higher level of care including thoughts to harm self or others and/or self-harming behavior and encouraged patient to contact this office, call 911, or present to the nearest emergency room should any of these events occur. Discussed crisis intervention services and means to access.     MEDS ORDERED DURING VISIT:  No orders of the defined types were placed in this encounter.      Follow Up   Return in about 8 weeks (around 11/2/2023), or if symptoms worsen or fail to improve, for Recheck.  Informed patient we would be following closely after surgery to ensure any symptoms didn't return or become worse she verbalized understanding to call with any changes.     Patient was given instructions and counseling regarding her condition or for health maintenance advice. Please see specific information pulled into the AVS if appropriate.     Some of the data in this electronic note has been brought forward from a previous encounter, any necessary changes have been made, it has been reviewed by this APRN, and it is accurate.      This document has been electronically signed by ROD Spencer  September 7, 2023 09:49 EDT    Part of this note may be an electronic transcription/translation of spoken language to printed text using the Dragon Dictation System.

## 2023-09-12 RX ORDER — INSULIN LISPRO 100 [IU]/ML
INJECTION, SOLUTION INTRAVENOUS; SUBCUTANEOUS
Qty: 90 ML | Refills: 0 | Status: SHIPPED | OUTPATIENT
Start: 2023-09-12

## 2023-09-14 ENCOUNTER — OFFICE VISIT (OUTPATIENT)
Dept: FAMILY MEDICINE CLINIC | Facility: CLINIC | Age: 48
End: 2023-09-14
Payer: COMMERCIAL

## 2023-09-14 VITALS
BODY MASS INDEX: 40.6 KG/M2 | OXYGEN SATURATION: 98 % | DIASTOLIC BLOOD PRESSURE: 68 MMHG | HEIGHT: 66 IN | HEART RATE: 78 BPM | SYSTOLIC BLOOD PRESSURE: 118 MMHG | TEMPERATURE: 97.9 F | WEIGHT: 252.6 LBS

## 2023-09-14 DIAGNOSIS — E11.649 UNCONTROLLED TYPE 2 DIABETES MELLITUS WITH HYPOGLYCEMIA WITHOUT COMA: ICD-10-CM

## 2023-09-14 DIAGNOSIS — Z12.2 ENCOUNTER FOR SCREENING FOR LUNG CANCER: Primary | ICD-10-CM

## 2023-09-14 DIAGNOSIS — I10 ESSENTIAL HYPERTENSION: ICD-10-CM

## 2023-09-14 DIAGNOSIS — E66.01 MORBID (SEVERE) OBESITY DUE TO EXCESS CALORIES: ICD-10-CM

## 2023-09-14 DIAGNOSIS — Z71.3 WEIGHT LOSS COUNSELING, ENCOUNTER FOR: ICD-10-CM

## 2023-09-14 DIAGNOSIS — F25.1 SCHIZOAFFECTIVE DISORDER, DEPRESSIVE TYPE: ICD-10-CM

## 2023-09-14 DIAGNOSIS — Z12.31 SCREENING MAMMOGRAM, ENCOUNTER FOR: ICD-10-CM

## 2023-09-14 DIAGNOSIS — F17.210 CIGARETTE NICOTINE DEPENDENCE, UNCOMPLICATED: ICD-10-CM

## 2023-09-14 DIAGNOSIS — N18.2 STAGE 2 CHRONIC KIDNEY DISEASE: ICD-10-CM

## 2023-09-14 DIAGNOSIS — K21.00 GASTROESOPHAGEAL REFLUX DISEASE WITH ESOPHAGITIS WITHOUT HEMORRHAGE: ICD-10-CM

## 2023-09-14 DIAGNOSIS — F41.1 GENERALIZED ANXIETY DISORDER: ICD-10-CM

## 2023-09-14 DIAGNOSIS — Z98.84 S/P BARIATRIC SURGERY: ICD-10-CM

## 2023-09-14 DIAGNOSIS — F20.0 SCHIZOPHRENIA, PARANOID TYPE: ICD-10-CM

## 2023-09-14 DIAGNOSIS — K29.70 GASTRITIS WITHOUT BLEEDING, UNSPECIFIED CHRONICITY, UNSPECIFIED GASTRITIS TYPE: ICD-10-CM

## 2023-09-14 DIAGNOSIS — E78.2 MIXED HYPERLIPIDEMIA: ICD-10-CM

## 2023-09-14 PROCEDURE — 99214 OFFICE O/P EST MOD 30 MIN: CPT | Performed by: FAMILY MEDICINE

## 2023-09-14 PROCEDURE — 3046F HEMOGLOBIN A1C LEVEL >9.0%: CPT | Performed by: FAMILY MEDICINE

## 2023-09-14 PROCEDURE — 3078F DIAST BP <80 MM HG: CPT | Performed by: FAMILY MEDICINE

## 2023-09-14 PROCEDURE — 3074F SYST BP LT 130 MM HG: CPT | Performed by: FAMILY MEDICINE

## 2023-09-14 RX ORDER — LISINOPRIL 20 MG/1
20 TABLET ORAL DAILY
Qty: 30 TABLET | Refills: 3 | Status: SHIPPED | OUTPATIENT
Start: 2023-09-14

## 2023-09-14 RX ORDER — OMEPRAZOLE 40 MG/1
40 CAPSULE, DELAYED RELEASE ORAL DAILY
Qty: 30 CAPSULE | Refills: 3 | Status: SHIPPED | OUTPATIENT
Start: 2023-09-14

## 2023-09-14 RX ORDER — DULOXETIN HYDROCHLORIDE 30 MG/1
90 CAPSULE, DELAYED RELEASE ORAL DAILY
Qty: 270 CAPSULE | Refills: 0 | Status: SHIPPED | OUTPATIENT
Start: 2023-09-14

## 2023-09-14 RX ORDER — NIFEDIPINE 30 MG/1
30 TABLET, EXTENDED RELEASE ORAL DAILY
Qty: 30 TABLET | Refills: 3 | Status: SHIPPED | OUTPATIENT
Start: 2023-09-14

## 2023-09-14 RX ORDER — CYCLOBENZAPRINE HCL 10 MG
10 TABLET ORAL 3 TIMES DAILY PRN
Qty: 90 TABLET | Refills: 3 | Status: SHIPPED | OUTPATIENT
Start: 2023-09-14

## 2023-09-14 NOTE — PATIENT INSTRUCTIONS
Get labwork from 8/22/23 and 9/14/23     Recheck in 6 weeks    Will reorder CT of chest     Will order mammogram     Cut back on lisinopril from 40 to 20 mg . Monitor BP at home. Call me if running low <100/60 consider stopping procardia

## 2023-09-18 ENCOUNTER — LAB (OUTPATIENT)
Dept: LAB | Facility: HOSPITAL | Age: 48
End: 2023-09-18
Payer: COMMERCIAL

## 2023-09-18 DIAGNOSIS — R79.89 ELEVATED LFTS: Primary | ICD-10-CM

## 2023-09-18 DIAGNOSIS — F20.0 SCHIZOPHRENIA, PARANOID TYPE: ICD-10-CM

## 2023-09-18 DIAGNOSIS — F17.210 CIGARETTE NICOTINE DEPENDENCE, UNCOMPLICATED: ICD-10-CM

## 2023-09-18 DIAGNOSIS — R76.8 HEPATITIS C ANTIBODY POSITIVE IN BLOOD: ICD-10-CM

## 2023-09-18 DIAGNOSIS — Z71.3 WEIGHT LOSS COUNSELING, ENCOUNTER FOR: ICD-10-CM

## 2023-09-18 DIAGNOSIS — N18.2 STAGE 2 CHRONIC KIDNEY DISEASE: ICD-10-CM

## 2023-09-18 DIAGNOSIS — E11.649 UNCONTROLLED TYPE 2 DIABETES MELLITUS WITH HYPOGLYCEMIA WITHOUT COMA: ICD-10-CM

## 2023-09-18 DIAGNOSIS — E78.2 MIXED HYPERLIPIDEMIA: ICD-10-CM

## 2023-09-18 DIAGNOSIS — F41.1 GENERALIZED ANXIETY DISORDER: ICD-10-CM

## 2023-09-18 DIAGNOSIS — Z98.84 S/P BARIATRIC SURGERY: ICD-10-CM

## 2023-09-18 DIAGNOSIS — E66.01 MORBID (SEVERE) OBESITY DUE TO EXCESS CALORIES: ICD-10-CM

## 2023-09-18 DIAGNOSIS — K29.70 GASTRITIS WITHOUT BLEEDING, UNSPECIFIED CHRONICITY, UNSPECIFIED GASTRITIS TYPE: ICD-10-CM

## 2023-09-18 DIAGNOSIS — F25.1 SCHIZOAFFECTIVE DISORDER, DEPRESSIVE TYPE: ICD-10-CM

## 2023-09-18 DIAGNOSIS — K21.00 GASTROESOPHAGEAL REFLUX DISEASE WITH ESOPHAGITIS WITHOUT HEMORRHAGE: ICD-10-CM

## 2023-09-18 DIAGNOSIS — I10 ESSENTIAL HYPERTENSION: ICD-10-CM

## 2023-09-18 LAB
25(OH)D3 SERPL-MCNC: 55.3 NG/ML (ref 30–100)
ALBUMIN SERPL-MCNC: 4.2 G/DL (ref 3.5–5.2)
ALBUMIN/GLOB SERPL: 1.4 G/DL
ALP SERPL-CCNC: 108 U/L (ref 39–117)
ALPHA-FETOPROTEIN: 2.73 NG/ML (ref 0–8.3)
ALT SERPL W P-5'-P-CCNC: 31 U/L (ref 1–33)
ANION GAP SERPL CALCULATED.3IONS-SCNC: 13 MMOL/L (ref 5–15)
AST SERPL-CCNC: 21 U/L (ref 1–32)
BASOPHILS # BLD AUTO: 0.06 10*3/MM3 (ref 0–0.2)
BASOPHILS NFR BLD AUTO: 0.6 % (ref 0–1.5)
BILIRUB SERPL-MCNC: 0.3 MG/DL (ref 0–1.2)
BUN SERPL-MCNC: 10 MG/DL (ref 6–20)
BUN/CREAT SERPL: 14.7 (ref 7–25)
CALCIUM SPEC-SCNC: 9.5 MG/DL (ref 8.6–10.5)
CHLORIDE SERPL-SCNC: 98 MMOL/L (ref 98–107)
CHOLEST SERPL-MCNC: 220 MG/DL (ref 0–200)
CO2 SERPL-SCNC: 23 MMOL/L (ref 22–29)
CREAT SERPL-MCNC: 0.68 MG/DL (ref 0.57–1)
DEPRECATED RDW RBC AUTO: 42.7 FL (ref 37–54)
EGFRCR SERPLBLD CKD-EPI 2021: 106.9 ML/MIN/1.73
EOSINOPHIL # BLD AUTO: 0.08 10*3/MM3 (ref 0–0.4)
EOSINOPHIL NFR BLD AUTO: 0.8 % (ref 0.3–6.2)
ERYTHROCYTE [DISTWIDTH] IN BLOOD BY AUTOMATED COUNT: 13.4 % (ref 12.3–15.4)
GLOBULIN UR ELPH-MCNC: 2.9 GM/DL
GLUCOSE SERPL-MCNC: 423 MG/DL (ref 65–99)
HAV IGM SERPL QL IA: ABNORMAL
HBA1C MFR BLD: 12.4 % (ref 4.8–5.6)
HBV CORE IGM SERPL QL IA: ABNORMAL
HBV SURFACE AG SERPL QL IA: ABNORMAL
HCT VFR BLD AUTO: 45.4 % (ref 34–46.6)
HCV AB SER DONR QL: REACTIVE
HDLC SERPL-MCNC: 31 MG/DL (ref 40–60)
HGB BLD-MCNC: 15 G/DL (ref 12–15.9)
IMM GRANULOCYTES # BLD AUTO: 0.06 10*3/MM3 (ref 0–0.05)
IMM GRANULOCYTES NFR BLD AUTO: 0.6 % (ref 0–0.5)
INR PPP: 0.98 (ref 0.8–1.2)
IRON 24H UR-MRATE: 108 MCG/DL (ref 37–145)
IRON SATN MFR SERPL: 25 % (ref 20–50)
LDLC SERPL CALC-MCNC: 122 MG/DL (ref 0–100)
LDLC/HDLC SERPL: 3.65 {RATIO}
LYMPHOCYTES # BLD AUTO: 4 10*3/MM3 (ref 0.7–3.1)
LYMPHOCYTES NFR BLD AUTO: 37.6 % (ref 19.6–45.3)
MCH RBC QN AUTO: 28.6 PG (ref 26.6–33)
MCHC RBC AUTO-ENTMCNC: 33 G/DL (ref 31.5–35.7)
MCV RBC AUTO: 86.6 FL (ref 79–97)
MONOCYTES # BLD AUTO: 0.41 10*3/MM3 (ref 0.1–0.9)
MONOCYTES NFR BLD AUTO: 3.9 % (ref 5–12)
NEUTROPHILS NFR BLD AUTO: 56.5 % (ref 42.7–76)
NEUTROPHILS NFR BLD AUTO: 6.03 10*3/MM3 (ref 1.7–7)
NRBC BLD AUTO-RTO: 0.1 /100 WBC (ref 0–0.2)
PLATELET # BLD AUTO: 174 10*3/MM3 (ref 140–450)
PMV BLD AUTO: 13 FL (ref 6–12)
POTASSIUM SERPL-SCNC: 4.4 MMOL/L (ref 3.5–5.2)
PROT SERPL-MCNC: 7.1 G/DL (ref 6–8.5)
PROTHROMBIN TIME: 13 SECONDS (ref 11.1–15.3)
RBC # BLD AUTO: 5.24 10*6/MM3 (ref 3.77–5.28)
SODIUM SERPL-SCNC: 134 MMOL/L (ref 136–145)
TIBC SERPL-MCNC: 440 MCG/DL (ref 298–536)
TRANSFERRIN SERPL-MCNC: 295 MG/DL (ref 200–360)
TRIGL SERPL-MCNC: 379 MG/DL (ref 0–150)
TSH SERPL DL<=0.05 MIU/L-ACNC: 1.22 UIU/ML (ref 0.27–4.2)
VIT B12 BLD-MCNC: 671 PG/ML (ref 211–946)
VLDLC SERPL-MCNC: 67 MG/DL (ref 5–40)
WBC NRBC COR # BLD: 10.64 10*3/MM3 (ref 3.4–10.8)

## 2023-09-18 PROCEDURE — 85610 PROTHROMBIN TIME: CPT | Performed by: PHYSICIAN ASSISTANT

## 2023-09-18 PROCEDURE — 83540 ASSAY OF IRON: CPT

## 2023-09-18 PROCEDURE — 80074 ACUTE HEPATITIS PANEL: CPT | Performed by: PHYSICIAN ASSISTANT

## 2023-09-18 PROCEDURE — 83883 ASSAY NEPHELOMETRY NOT SPEC: CPT | Performed by: PHYSICIAN ASSISTANT

## 2023-09-18 PROCEDURE — 82947 ASSAY GLUCOSE BLOOD QUANT: CPT | Performed by: PHYSICIAN ASSISTANT

## 2023-09-18 PROCEDURE — 82247 BILIRUBIN TOTAL: CPT | Performed by: PHYSICIAN ASSISTANT

## 2023-09-18 PROCEDURE — 83036 HEMOGLOBIN GLYCOSYLATED A1C: CPT

## 2023-09-18 PROCEDURE — 83010 ASSAY OF HAPTOGLOBIN QUANT: CPT | Performed by: PHYSICIAN ASSISTANT

## 2023-09-18 PROCEDURE — 84478 ASSAY OF TRIGLYCERIDES: CPT | Performed by: PHYSICIAN ASSISTANT

## 2023-09-18 PROCEDURE — 85025 COMPLETE CBC W/AUTO DIFF WBC: CPT | Performed by: PHYSICIAN ASSISTANT

## 2023-09-18 PROCEDURE — 82105 ALPHA-FETOPROTEIN SERUM: CPT | Performed by: PHYSICIAN ASSISTANT

## 2023-09-18 PROCEDURE — 80053 COMPREHEN METABOLIC PANEL: CPT | Performed by: PHYSICIAN ASSISTANT

## 2023-09-18 PROCEDURE — 84466 ASSAY OF TRANSFERRIN: CPT

## 2023-09-18 PROCEDURE — 82465 ASSAY BLD/SERUM CHOLESTEROL: CPT | Performed by: PHYSICIAN ASSISTANT

## 2023-09-18 PROCEDURE — 82977 ASSAY OF GGT: CPT | Performed by: PHYSICIAN ASSISTANT

## 2023-09-18 PROCEDURE — 82306 VITAMIN D 25 HYDROXY: CPT

## 2023-09-18 PROCEDURE — 80061 LIPID PANEL: CPT

## 2023-09-18 PROCEDURE — 82607 VITAMIN B-12: CPT

## 2023-09-18 PROCEDURE — 82172 ASSAY OF APOLIPOPROTEIN: CPT | Performed by: PHYSICIAN ASSISTANT

## 2023-09-18 PROCEDURE — 84443 ASSAY THYROID STIM HORMONE: CPT

## 2023-09-19 ENCOUNTER — OFFICE VISIT (OUTPATIENT)
Dept: GASTROENTEROLOGY | Facility: CLINIC | Age: 48
End: 2023-09-19
Payer: COMMERCIAL

## 2023-09-19 VITALS
HEIGHT: 66 IN | DIASTOLIC BLOOD PRESSURE: 84 MMHG | WEIGHT: 251 LBS | BODY MASS INDEX: 40.34 KG/M2 | SYSTOLIC BLOOD PRESSURE: 129 MMHG | HEART RATE: 86 BPM

## 2023-09-19 DIAGNOSIS — K76.0 FATTY LIVER: ICD-10-CM

## 2023-09-19 DIAGNOSIS — R79.89 ELEVATED LFTS: Primary | ICD-10-CM

## 2023-09-19 DIAGNOSIS — R76.8 HEPATITIS C ANTIBODY POSITIVE IN BLOOD: ICD-10-CM

## 2023-09-19 DIAGNOSIS — R93.2 ABNORMAL FINDING ON IMAGING OF LIVER: ICD-10-CM

## 2023-09-19 PROCEDURE — 3074F SYST BP LT 130 MM HG: CPT | Performed by: PHYSICIAN ASSISTANT

## 2023-09-19 PROCEDURE — 3079F DIAST BP 80-89 MM HG: CPT | Performed by: PHYSICIAN ASSISTANT

## 2023-09-19 PROCEDURE — 99214 OFFICE O/P EST MOD 30 MIN: CPT | Performed by: PHYSICIAN ASSISTANT

## 2023-09-19 PROCEDURE — 1159F MED LIST DOCD IN RCRD: CPT | Performed by: PHYSICIAN ASSISTANT

## 2023-09-19 PROCEDURE — 1160F RVW MEDS BY RX/DR IN RCRD: CPT | Performed by: PHYSICIAN ASSISTANT

## 2023-09-19 NOTE — PROGRESS NOTES
Chief Complaint   Patient presents with    Fatty Liver    Elevated Hepatic Enzymes    Weight Loss       ENDO PROCEDURE ORDERED:    Garry Saul is a 47 y.o. female. she is here today for follow-up.    History of Present Illness    Patient is seen on a recheck of her fatty liver, elevated liver enzymes, F0/S3/N2. Last seen on 08/22/2023. She states she was told previously that she had hepatitis C. Had a negative HCV RNA-PCR quantitative on 08/08/2019. It was again negative on 01/26/2021. She had had laboratories on 09/18/2023. Iron studies were normal. A1c was 12.4. Cholesterol 220, triglycerides 379, . Vitamin D, B12, TSH, CBC, INR, AFP were normal or negative. Hepatitis diagnostic panel was positive for hepatitis C antibody. CMP showed glucose 423, sodium 134, otherwise normal. ALFREDO FibroSURE is pending. She had had a CT scan with limited views of the liver on 08/04/2023.     The patient mainly complains of back and leg pain. GERD is doing well on the Prilosec 40 mg daily. Denied nausea, vomiting or dysphagia. Bowels are moving without blood or mucus. Weight is down 3 pounds since last visit. Last EGD by Dr. Benavides was prior to her gastric sleeve on 05/31/2023. She had gastric sleeve on 07/03/2023. Prior to that she had EGD/colonoscopy on 02/08/2019 that showed esophagitis, gastritis, hyperplastic polyp removed from the colon.    ASSESSMENT/PLAN:  Patient with steatohepatitis with hepatic fibrosis, positive hepatitis C antibody. Recommended HCV RNA by PCR quantitative. Will check for other comorbid liver disease to include FARNAZ, AMA, SMA, ceruloplasmin and alpha-1 antitrypsin, CMP. We will await the results of her ALFREDO FibroSURE. She states she is to see an endocrinologist about her blood sugars which are likely contributing to her fatty liver. We will plan follow-up in 6 weeks. We will consider further pending clinical course and the results of the above.          The following portions of  the patient's history were reviewed and updated as appropriate:   Past Medical History:   Diagnosis Date    Allergic     Anemia     Angina pectoris     Anxiety     Arthritis     Asthma     Back pain     CAD (coronary artery disease)     Cancer 1997    endometrial- surgery, no chemo/radiation    Chronic kidney disease     Degeneration macular     Depression     Diabetes mellitus     GERD (gastroesophageal reflux disease)     Head injury     Hepatitis c     Hyperlipidemia     Hypertension     Injury of back     Injury of neck     Migraine     Multiple personality disorder     Neuropathy     Paranoid schizophrenia     Schizoaffective disorder     Seizures     07/01/2020: last seizure was the first of the year    Self-injurious behavior     teen years    Shortness of breath     Sleep apnea     Stage 2 chronic kidney disease     Substance abuse     Suicide attempt     Urinary tract infection      Past Surgical History:   Procedure Laterality Date    APPENDECTOMY      BACK SURGERY      BARIATRIC SURGERY      BREAST SURGERY      CARPAL TUNNEL RELEASE      right hand    CARPAL TUNNEL RELEASE Left 10/19/2021    Procedure: LEFT CARPAL TUNNEL RELEASE;  Surgeon: Kevin Carlson MD;  Location: [facility] OR;  Service: Orthopedics;  Laterality: Left;    CHOLECYSTECTOMY      COLONOSCOPY      COLONOSCOPY N/A 02/08/2019    Procedure: COLONOSCOPY;  Surgeon: Joni Delacruz MD;  Location: [facility] ENDOSCOPY;  Service: Gastroenterology    DENTAL PROCEDURE      ENDOSCOPY N/A 02/08/2019    Procedure: ESOPHAGOGASTRODUODENOSCOPY--poss dilation;  Surgeon: Joni Delacruz MD;  Location: [facility] ENDOSCOPY;  Service: Gastroenterology    GASTRIC SLEEVE LAPAROSCOPIC  07/03/2023    HYSTERECTOMY      LIPOMA EXCISION      9    LIVER BIOPSY      UPPER GASTROINTESTINAL ENDOSCOPY  02/08/2019     Family History   Problem Relation Age of Onset    Hypertension Other     Diabetes Other     Stroke Other     Cancer Other     Kidney disease Other     Lung  disease Other     Other Other     Malone's esophagus Other     Colon polyps Other     Heart disease Other     Ulcers Other     Cholelithiasis Other     Allergy (severe) Other     Schizophrenia Father     Alcohol abuse Father     Drug abuse Paternal Grandfather      OB History    No obstetric history on file.       Allergies   Allergen Reactions    Betadine [Povidone Iodine] Anaphylaxis    Contrast Dye (Echo Or Unknown Ct/Mr) Anaphylaxis    Iodine Anaphylaxis    Lipitor  [Atorvastatin Calcium] Shortness Of Breath    Shellfish-Derived Products Anaphylaxis    Adhesive Tape Unknown (See Comments)     Blister       Social History     Socioeconomic History    Marital status:    Tobacco Use    Smoking status: Former     Packs/day: 1.00     Years: 35.00     Pack years: 35.00     Types: Cigarettes     Start date:      Quit date: 2022     Years since quittin.2    Smokeless tobacco: Never    Tobacco comments:     on patch trying to quit    Vaping Use    Vaping Use: Never used   Substance and Sexual Activity    Alcohol use: Not Currently    Drug use: Not Currently     Types: Amphetamines, Heroin, Methamphetamines, Morphine, Oxycodone     Comment: QUIT 3 YEARS AGO    Sexual activity: Not Currently     Birth control/protection: Surgical     Current Medications:  Prior to Admission medications    Medication Sig Start Date End Date Taking? Authorizing Provider   albuterol (ACCUNEB) 1.25 MG/3ML nebulizer solution Take 3 mL by nebulization Every 6 (Six) Hours As Needed for Wheezing or Shortness of Air. 10/28/21  Yes Jermaine Ferro MD   Cariprazine HCl (VRAYLAR) 4.5 MG capsule capsule Take 1 capsule by mouth Daily. 23  Yes Afsaneh Lagunas APRN   Continuous Blood Gluc  (Dexcom G6 ) device USE FOR 6 MONTHS 21  Yes ProviderJessenia MD   Continuous Blood Gluc Sensor (Dexcom G6 Sensor) PLACE 1 SENSOR ON SKIN CHANGING EVERY 10 DAYS 7/10/23  Yes Sudhakar Will APRN    Continuous Blood Gluc Transmit (Dexcom G6 Transmitter) misc USE AS DIRECTED EVERY 3 MONTHS 7/10/23  Yes Sudhakar Will APRN   cyclobenzaprine (FLEXERIL) 10 MG tablet Take 1 tablet by mouth 3 (Three) Times a Day As Needed for Muscle Spasms. 9/14/23  Yes Jermaine Ferro MD   DULoxetine (CYMBALTA) 30 MG capsule Take 3 capsules by mouth Daily. 9/14/23  Yes Jermaine Ferro MD   Ertugliflozin L-PyroglutamicAc (Steglatro) 15 MG tablet Take 1 tablet by mouth Every Morning. 10/21/22  Yes Jermaine Ferro MD   FeroSul 325 (65 Fe) MG tablet  8/24/23  Yes ProviderJessenia MD   Fluticasone Furoate-Vilanterol (Breo Ellipta) 200-25 MCG/INH inhaler Inhale 1 puff Daily. 7/19/22  Yes Zahraa Mathews APRN   gabapentin (NEURONTIN) 300 MG capsule Take 1 capsule by mouth 3 (Three) Times a Day. 8/2/23  Yes Jessenia Montoya MD   hydrOXYzine (ATARAX) 25 MG tablet Take 0.5-1 tablets by mouth 3 (Three) Times a Day As Needed for Anxiety. 2/28/23  Yes Afsaneh Lagunas APRN   Insulin Disposable Pump (OmniPod Dash 5 Pack Pods) misc  7/23/21  Yes Jessenia Montoya MD   Insulin Lispro (humaLOG) 100 UNIT/ML injection USE AS DIRECTED THROUGH INSULIN PUMP UPTO 180 UNITS DAILY 9/12/23  Yes Sudhakar Will APRN   lamoTRIgine (LaMICtal) 150 MG tablet Take 1 tablet by mouth Daily. 7/31/23  Yes Afsaneh Lagunas APRN   levETIRAcetam (KEPPRA) 1000 MG tablet Take 1 tablet by mouth 2 (Two) Times a Day. 10/28/21  Yes Jermaine Ferro MD   lisinopril (PRINIVIL,ZESTRIL) 20 MG tablet Take 1 tablet by mouth Daily. 9/14/23  Yes Jermaine Ferro MD   metFORMIN (GLUCOPHAGE) 500 MG tablet Take 1 tablet by mouth 2 (Two) Times a Day With Meals. 11/15/22  Yes Sudhakar Will APRN   metoprolol tartrate (LOPRESSOR) 50 MG tablet Take 1 tablet by mouth 2 (Two) Times a Day. 3/7/23  Yes Jermaine Ferro MD   Nebulizer device 1 application Every 6 (Six) Hours As Needed (dyspnea). 6/14/22  Yes Jermaine Ferro MD  "  NIFEdipine XL (PROCARDIA XL) 30 MG 24 hr tablet Take 1 tablet by mouth Daily. 9/14/23  Yes Jermaine Ferro MD   omeprazole (priLOSEC) 40 MG capsule Take 1 capsule by mouth Daily. 9/14/23  Yes Jermaine Ferro MD   OXcarbazepine (TRILEPTAL) 600 MG tablet Take 1 tablet by mouth 2 (Two) Times a Day. PT TAKES  MG TAB IN MORNING AND  MG IN EVENING 7/16/21  Yes ProviderJessenia MD   prazosin (MINIPRESS) 2 MG capsule Take 1-2 capsules by mouth Every Night. 7/31/23  Yes Afsaneh Lagunas APRN   rOPINIRole (REQUIP) 3 MG tablet Take 1 tablet by mouth Every Night. 8/2/23  Yes ProviderJessenia MD   simvastatin (ZOCOR) 40 MG tablet Take 1 tablet by mouth Every Night. 3/7/23  Yes Jermaine Ferro MD   Trulicity 4.5 MG/0.5ML solution pen-injector INJECT 1 SYRINGE SUBCUTANEOUSLY ONCE A WEEK 8/21/23  Yes Sudhakar Will APRN   Ventolin  (90 Base) MCG/ACT inhaler Inhale 2 puffs Every 4 (Four) Hours As Needed for Wheezing. 10/19/22  Yes Zahraa Mathews APRN   vitamin D (ERGOCALCIFEROL) 1.25 MG (68321 UT) capsule capsule TAKE 2 CAPSULES BY MOUTH ONCE A WEEK 6/16/23  Yes Jermaine Ferro MD     Review of Systems  Review of Systems       Objective    /84   Pulse 86   Ht 167.6 cm (66\")   Wt 114 kg (251 lb)   BMI 40.51 kg/m²   Physical Exam  Vitals and nursing note reviewed.   Constitutional:       General: She is not in acute distress.     Appearance: She is well-developed. She is obese. She is ill-appearing.   HENT:      Head: Normocephalic and atraumatic.   Eyes:      Pupils: Pupils are equal, round, and reactive to light.   Cardiovascular:      Rate and Rhythm: Normal rate and regular rhythm.      Heart sounds: Normal heart sounds.   Pulmonary:      Effort: Pulmonary effort is normal.      Breath sounds: Normal breath sounds.   Abdominal:      General: Bowel sounds are normal. There is no distension or abdominal bruit.      Palpations: Abdomen is soft. Abdomen is not rigid. " There is no shifting dullness or mass.      Tenderness: There is abdominal tenderness. There is no guarding or rebound.      Hernia: No hernia is present. There is no hernia in the ventral area.   Musculoskeletal:         General: Normal range of motion.      Cervical back: Normal range of motion.   Skin:     General: Skin is warm and dry.   Neurological:      Mental Status: She is alert and oriented to person, place, and time.   Psychiatric:         Behavior: Behavior normal.         Thought Content: Thought content normal.         Judgment: Judgment normal.     Assessment & Plan      1. Elevated LFTs    2. Hepatitis C antibody positive in blood    3. Fatty liver    4. Abnormal finding on imaging of liver    .   Diagnoses and all orders for this visit:    1. Elevated LFTs (Primary)  -     HCV RT-PCR,Quant(Non-Graph)  -     HCV FibroSURE  -     Mitochondrial Antibodies, M2  -     FARNAZ by IFA, Reflex to Titer and Pattern  -     Anti-Smooth Muscle Antibody Titer  -     Alpha - 1 - Antitrypsin  -     Ceruloplasmin  -     Comprehensive Metabolic Panel    2. Hepatitis C antibody positive in blood  -     HCV RT-PCR,Quant(Non-Graph)  -     HCV FibroSURE  -     Mitochondrial Antibodies, M2  -     FARNAZ by IFA, Reflex to Titer and Pattern  -     Anti-Smooth Muscle Antibody Titer  -     Alpha - 1 - Antitrypsin  -     Ceruloplasmin  -     Comprehensive Metabolic Panel    3. Fatty liver  -     HCV RT-PCR,Quant(Non-Graph)  -     HCV FibroSURE  -     Mitochondrial Antibodies, M2  -     FARNAZ by IFA, Reflex to Titer and Pattern  -     Anti-Smooth Muscle Antibody Titer  -     Alpha - 1 - Antitrypsin  -     Ceruloplasmin  -     Comprehensive Metabolic Panel    4. Abnormal finding on imaging of liver  -     HCV RT-PCR,Quant(Non-Graph)  -     HCV FibroSURE  -     Mitochondrial Antibodies, M2  -     FARNAZ by IFA, Reflex to Titer and Pattern  -     Anti-Smooth Muscle Antibody Titer  -     Alpha - 1 - Antitrypsin  -     Ceruloplasmin  -      Comprehensive Metabolic Panel        Orders placed during this encounter include:  Orders Placed This Encounter   Procedures    HCV RT-PCR,Quant(Non-Graph)     Order Specific Question:   Release to patient     Answer:   Routine Release [[id number]]    HCV FibroSURE     Order Specific Question:   Release to patient     Answer:   Routine Release [[id number]]    Mitochondrial Antibodies, M2     Order Specific Question:   Release to patient     Answer:   Routine Release [[id number]]    FARNAZ by IFA, Reflex to Titer and Pattern     Order Specific Question:   Release to patient     Answer:   Routine Release [[id number]]    Anti-Smooth Muscle Antibody Titer     Order Specific Question:   Release to patient     Answer:   Routine Release [[id number]]    Alpha - 1 - Antitrypsin     Order Specific Question:   Release to patient     Answer:   Routine Release [[id number]]    Ceruloplasmin     Order Specific Question:   Release to patient     Answer:   Routine Release [[id number]]    Comprehensive Metabolic Panel     Order Specific Question:   Release to patient     Answer:   Routine Release [[id number]]       Medications prescribed:  No orders of the defined types were placed in this encounter.      Requested Prescriptions      No prescriptions requested or ordered in this encounter       Review and/or summary of lab tests, radiology, procedures, medications. Review and summary of old records and obtaining of history. The risks and benefits of my recommendations, as well as other treatment options were discussed with the patient today. Questions were answered.    Follow-up: Return in about 6 weeks (around 10/31/2023), or if symptoms worsen or fail to improve, for lab prior.     * Surgery not found *      This document has been electronically signed by Jermaine Menendez PA-C on September 28, 2023 13:34 CDT      Results for orders placed or performed in visit on 09/18/23   TSH Rfx On Abnormal To Free T4    Specimen: Blood   Result  Value Ref Range    TSH 1.220 0.270 - 4.200 uIU/mL   Iron Profile    Specimen: Blood   Result Value Ref Range    Iron 108 37 - 145 mcg/dL    Iron Saturation (TSAT) 25 20 - 50 %    Transferrin 295 200 - 360 mg/dL    TIBC 440 298 - 536 mcg/dL   Vitamin D,25-Hydroxy    Specimen: Blood   Result Value Ref Range    25 Hydroxy, Vitamin D 55.3 30.0 - 100.0 ng/ml   Hemoglobin A1c    Specimen: Blood   Result Value Ref Range    Hemoglobin A1C 12.40 (H) 4.80 - 5.60 %   Vitamin B12    Specimen: Blood   Result Value Ref Range    Vitamin B-12 671 211 - 946 pg/mL   Lipid Panel    Specimen: Blood   Result Value Ref Range    Total Cholesterol 220 (H) 0 - 200 mg/dL    Triglycerides 379 (H) 0 - 150 mg/dL    HDL Cholesterol 31 (L) 40 - 60 mg/dL    LDL Cholesterol  122 (H) 0 - 100 mg/dL    VLDL Cholesterol 67 (H) 5 - 40 mg/dL    LDL/HDL Ratio 3.65    Results for orders placed or performed in visit on 08/22/23   ALFREDO Fibrosure Plus    Specimen: Blood   Result Value Ref Range    Fibrosis Score 0.28 (H) 0.00 - 0.21    Fibrosis Stage Comment     Steatosis Score (Reference) 0.93 (H) 0.00 - 0.40    Steatosis Grade (Reference) Comment     ALFREDO Score (Reference) 0.46 (H) 0.00 - 0.25    Alfredo Grade (Reference) Comment     Methodology: Comment     Alpha 2-Macroglobulins, Qn 305 (H) 110 - 276 mg/dL    Haptoglobin 269 42 - 296 mg/dL    Apolipoprotein A-1 104 (L) 116 - 209 mg/dL    Total Bilirubin 0.2 0.0 - 1.2 mg/dL    GGT 74 (H) 0 - 60 IU/L    ALT (SGPT) 34 0 - 40 IU/L    AST (SGOT) P5P (Reference) 25 0 - 40 IU/L    Cholesterol, Total (Reference) 219 (H) 100 - 199 mg/dL    Glucose, Serum (Reference) 422 (H) 70 - 99 mg/dL    Triglycerides 412 (H) 0 - 149 mg/dL    Interpretations: (Reference) Comment     Fibrosis Scoring: Comment     Steatosis Scoring Comment     ALFREDO Scoring Comment     Limitations: Comment     Comment Comment    CBC Auto Differential    Specimen: Blood   Result Value Ref Range    WBC 10.64 3.40 - 10.80 10*3/mm3    RBC 5.24 3.77 -  5.28 10*6/mm3    Hemoglobin 15.0 12.0 - 15.9 g/dL    Hematocrit 45.4 34.0 - 46.6 %    MCV 86.6 79.0 - 97.0 fL    MCH 28.6 26.6 - 33.0 pg    MCHC 33.0 31.5 - 35.7 g/dL    RDW 13.4 12.3 - 15.4 %    RDW-SD 42.7 37.0 - 54.0 fl    MPV 13.0 (H) 6.0 - 12.0 fL    Platelets 174 140 - 450 10*3/mm3    Neutrophil % 56.5 42.7 - 76.0 %    Lymphocyte % 37.6 19.6 - 45.3 %    Monocyte % 3.9 (L) 5.0 - 12.0 %    Eosinophil % 0.8 0.3 - 6.2 %    Basophil % 0.6 0.0 - 1.5 %    Immature Grans % 0.6 (H) 0.0 - 0.5 %    Neutrophils, Absolute 6.03 1.70 - 7.00 10*3/mm3    Lymphocytes, Absolute 4.00 (H) 0.70 - 3.10 10*3/mm3    Monocytes, Absolute 0.41 0.10 - 0.90 10*3/mm3    Eosinophils, Absolute 0.08 0.00 - 0.40 10*3/mm3    Basophils, Absolute 0.06 0.00 - 0.20 10*3/mm3    Immature Grans, Absolute 0.06 (H) 0.00 - 0.05 10*3/mm3    nRBC 0.1 0.0 - 0.2 /100 WBC   AFP Tumor Marker    Specimen: Blood   Result Value Ref Range    ALPHA-FETOPROTEIN 2.73 0 - 8.3 ng/mL   Hepatitis Panel, Acute    Specimen: Blood   Result Value Ref Range    Hepatitis B Surface Ag Non-Reactive Non-Reactive    Hep A IgM Non-Reactive Non-Reactive    Hep B C IgM Non-Reactive Non-Reactive    Hepatitis C Ab Reactive (A) Non-Reactive   Protime-INR    Specimen: Blood   Result Value Ref Range    Protime 13.0 11.1 - 15.3 Seconds    INR 0.98 0.80 - 1.20   Comprehensive Metabolic Panel    Specimen: Blood   Result Value Ref Range    Glucose 423 (C) 65 - 99 mg/dL    BUN 10 6 - 20 mg/dL    Creatinine 0.68 0.57 - 1.00 mg/dL    Sodium 134 (L) 136 - 145 mmol/L    Potassium 4.4 3.5 - 5.2 mmol/L    Chloride 98 98 - 107 mmol/L    CO2 23.0 22.0 - 29.0 mmol/L    Calcium 9.5 8.6 - 10.5 mg/dL    Total Protein 7.1 6.0 - 8.5 g/dL    Albumin 4.2 3.5 - 5.2 g/dL    ALT (SGPT) 31 1 - 33 U/L    AST (SGOT) 21 1 - 32 U/L    Alkaline Phosphatase 108 39 - 117 U/L    Total Bilirubin 0.3 0.0 - 1.2 mg/dL    Globulin 2.9 gm/dL    A/G Ratio 1.4 g/dL    BUN/Creatinine Ratio 14.7 7.0 - 25.0    Anion Gap 13.0 5.0 -  15.0 mmol/L    eGFR 106.9 >60.0 mL/min/1.73   Results for orders placed or performed in visit on 06/07/23   CBC Auto Differential    Specimen: Blood   Result Value Ref Range    WBC 9.90 3.40 - 10.80 10*3/mm3    RBC 4.97 3.77 - 5.28 10*6/mm3    Hemoglobin 13.9 12.0 - 15.9 g/dL    Hematocrit 42.0 34.0 - 46.6 %    MCV 84.5 79.0 - 97.0 fL    MCH 28.0 26.6 - 33.0 pg    MCHC 33.1 31.5 - 35.7 g/dL    RDW 13.4 12.3 - 15.4 %    RDW-SD 41.1 37.0 - 54.0 fl    MPV 11.8 6.0 - 12.0 fL    Platelets 175 140 - 450 10*3/mm3    Neutrophil % 63.3 42.7 - 76.0 %    Lymphocyte % 30.2 19.6 - 45.3 %    Monocyte % 3.9 (L) 5.0 - 12.0 %    Eosinophil % 1.5 0.3 - 6.2 %    Basophil % 0.6 0.0 - 1.5 %    Immature Grans % 0.5 0.0 - 0.5 %    Neutrophils, Absolute 6.26 1.70 - 7.00 10*3/mm3    Lymphocytes, Absolute 2.99 0.70 - 3.10 10*3/mm3    Monocytes, Absolute 0.39 0.10 - 0.90 10*3/mm3    Eosinophils, Absolute 0.15 0.00 - 0.40 10*3/mm3    Basophils, Absolute 0.06 0.00 - 0.20 10*3/mm3    Immature Grans, Absolute 0.05 0.00 - 0.05 10*3/mm3    nRBC 0.0 0.0 - 0.2 /100 WBC     *Note: Due to a large number of results and/or encounters for the requested time period, some results have not been displayed. A complete set of results can be found in Results Review.

## 2023-09-20 ENCOUNTER — TELEPHONE (OUTPATIENT)
Dept: FAMILY MEDICINE CLINIC | Facility: CLINIC | Age: 48
End: 2023-09-20
Payer: COMMERCIAL

## 2023-09-20 LAB
A2 MACROGLOB SERPL-MCNC: 305 MG/DL (ref 110–276)
ALT SERPL W P-5'-P-CCNC: 34 IU/L (ref 0–40)
APO A-I SERPL-MCNC: 104 MG/DL (ref 116–209)
AST SERPL W P-5'-P-CCNC: 25 IU/L (ref 0–40)
BILIRUB SERPL-MCNC: 0.2 MG/DL (ref 0–1.2)
CHOLEST SERPL-MCNC: 219 MG/DL (ref 100–199)
FIBROSIS SCORING:: ABNORMAL
FIBROSIS STAGE SERPL QL: ABNORMAL
GGT SERPL-CCNC: 74 IU/L (ref 0–60)
GLUCOSE SERPL-MCNC: 422 MG/DL (ref 70–99)
HAPTOGLOB SERPL-MCNC: 269 MG/DL (ref 42–296)
LABORATORY COMMENT REPORT: ABNORMAL
LIVER FIBR SCORE SERPL CALC.FIBROSURE: 0.28 (ref 0–0.21)
LIVER STEATOSIS GRADE SERPL QL: ABNORMAL
LIVER STEATOSIS SCORE SERPL: 0.93 (ref 0–0.4)
NASH GRADE SERPL QL: ABNORMAL
NASH INTERPRETATION SERPL-IMP: ABNORMAL
NASH SCORE SERPL: 0.46 (ref 0–0.25)
NASH SCORING: ABNORMAL
STEATOSIS SCORING: ABNORMAL
TEST PERFORMANCE INFO SPEC: ABNORMAL
TEST PERFORMANCE INFO SPEC: ABNORMAL
TRIGL SERPL-MCNC: 412 MG/DL (ref 0–149)

## 2023-09-20 NOTE — TELEPHONE ENCOUNTER
Called pt. No answer. Left voicemail informing I would send results through [facility] [facility] [place].

## 2023-09-20 NOTE — TELEPHONE ENCOUNTER
----- Message from Jermaine Ferro MD sent at 9/19/2023  7:24 AM CDT -----  Hga1c high at 12.40 from 10.60.  needs to see Endocrinology and go back on insulin injections    On lipid panel LDL high at 122 HDL is low triglycerides high and total cholesterol high. Is pt taking simvastatin. If she is start on zetia 10 mg PO qhs in addition to simvastatin. May cause GI upset. Recommend heart healthy diet     Rest of labwork stable    Recheck on next visit

## 2023-09-25 ENCOUNTER — TELEPHONE (OUTPATIENT)
Dept: FAMILY MEDICINE CLINIC | Facility: CLINIC | Age: 48
End: 2023-09-25

## 2023-09-25 RX ORDER — ERGOCALCIFEROL 1.25 MG/1
CAPSULE ORAL
Qty: 8 CAPSULE | Refills: 3 | Status: SHIPPED | OUTPATIENT
Start: 2023-09-25

## 2023-09-25 NOTE — TELEPHONE ENCOUNTER
Maddi with [facility] CT called stating that patient was scheduled for a CT Chest Low Dose but it is only for people over the age of 50.  Would like a return call to see how you want to proceed; please call [phone]

## 2023-09-25 NOTE — TELEPHONE ENCOUNTER
Spoke with Maddi.  She stated that pt is not old enough for the lung cancer screening.  She stated that the pt mentioned she had some nodules you were wanting to look at? She rescheduled her to 10/11/23 for a CT Chest without contrast if you wanted her to have this? She is allergic to contrast dye. If so then will need a new order put in.

## 2023-09-28 ENCOUNTER — TELEMEDICINE (OUTPATIENT)
Dept: PSYCHIATRY | Facility: CLINIC | Age: 48
End: 2023-09-28
Payer: COMMERCIAL

## 2023-09-28 DIAGNOSIS — F51.5 NIGHTMARES: ICD-10-CM

## 2023-09-28 DIAGNOSIS — F41.1 GENERALIZED ANXIETY DISORDER: Primary | ICD-10-CM
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
## 2023-09-29 ENCOUNTER — TELEMEDICINE (OUTPATIENT)
Dept: ENDOCRINOLOGY | Facility: CLINIC | Age: 48
End: 2023-09-29
Payer: COMMERCIAL

## 2023-09-29 DIAGNOSIS — E55.9 VITAMIN D DEFICIENCY: ICD-10-CM

## 2023-09-29 DIAGNOSIS — E78.2 MIXED HYPERLIPIDEMIA: ICD-10-CM

## 2023-09-29 DIAGNOSIS — E11.65 TYPE 2 DIABETES MELLITUS WITH HYPERGLYCEMIA, WITH LONG-TERM CURRENT USE OF INSULIN: Primary | ICD-10-CM

## 2023-09-29 DIAGNOSIS — I10 ESSENTIAL HYPERTENSION: ICD-10-CM

## 2023-09-29 DIAGNOSIS — Z79.4 TYPE 2 DIABETES MELLITUS WITH HYPERGLYCEMIA, WITH LONG-TERM CURRENT USE OF INSULIN: Primary | ICD-10-CM

## 2023-09-29 PROCEDURE — 3046F HEMOGLOBIN A1C LEVEL >9.0%: CPT | Performed by: NURSE PRACTITIONER

## 2023-09-29 PROCEDURE — 99214 OFFICE O/P EST MOD 30 MIN: CPT | Performed by: NURSE PRACTITIONER

## 2023-09-29 RX ORDER — PROCHLORPERAZINE 25 MG/1
SUPPOSITORY RECTAL
Qty: 1 EACH | Refills: 0 | Status: SHIPPED | OUTPATIENT
Start: 2023-09-29

## 2023-09-29 NOTE — PROGRESS NOTES
Chief Complaint  Diabetes    Subjective          Sudhakar Saul presents to [facility]  Diabetes       You have chosen to receive care through a telehealth visit.  Do you consent to use a video/audio connection for your medical care today? Yes            TELEHEALTH VIDEO VISIT     This a video visit due to [facility] current guidelines for social distancing due to the COVID 19 pandemic        Mode of Visit: Video  Location of patient: home  You have chosen to receive care through a telehealth visit.  Does the patient consent to use a video/audio connection for your medical care today? Yes  The visit included audio and video interaction. No technical issues occurred during this visit.         47 year old female presents for follow up     Reason diabetes mellitus type 2    Diagnosed in her 20s     Timing constant    Quality not controlled    Severity moderate       Complications -- neuropathy, retinopathy, CKD stage II    Hepatitis C           Diabetes Regimen     Insulin through pump       omni pod --she is using                Blood Glucose Readings     Checking 4 times daily      Uses dexcom     At goal         July 2023, Gastric sleeve        Review of Systems - General ROS: negative              Objective   Vital Signs:   There were no vitals taken for this visit.    Physical Exam  Constitutional:       Appearance: Normal appearance.   Cardiovascular:      Rate and Rhythm: Regular rhythm.      Heart sounds: Normal heart sounds.   Pulmonary:      Breath sounds: Normal breath sounds.   Musculoskeletal:      Cervical back: Normal range of motion.   Neurological:      Mental Status: She is alert.      Result Review :   The following data was reviewed by: ROD Chawla on 04/29/2022:  Common labs          2/20/2023    08:57 6/7/2023    08:09 9/18/2023    08:44   Common Labs   Glucose 486  247  423    BUN 31  23  10    Creatinine 0.78  0.69  0.68    Sodium 130  134   134    Potassium 5.0  4.4  4.4    Chloride 96  100  98    Calcium 9.5  9.7  9.5    Albumin 3.7  3.9  4.2    Total Bilirubin 0.2  0.2  0.3    Alkaline Phosphatase 100  102  108    AST (SGOT) 42  27  21    ALT (SGPT) 45  41  31    WBC 8.63  9.90  10.64    Hemoglobin 14.1  13.9  15.0    Hematocrit 42.3  42.0  45.4    Platelets 163  175  174    Total Cholesterol 222  194  220    Triglycerides 313  304  412     379    HDL Cholesterol 35  32  31    LDL Cholesterol  131  109  122    Hemoglobin A1C 16.00  10.60  12.40    Microalbumin, Urine <1.2  <1.2               Assessment and Plan    Diagnoses and all orders for this visit:    1. Type 2 diabetes mellitus with hyperglycemia, with long-term current use of insulin (Primary)  -     CBC & Differential; Future  -     Comprehensive Metabolic Panel; Future  -     Hemoglobin A1c; Future  -     Lipid Panel; Future  -     Microalbumin / Creatinine Urine Ratio - Urine, Clean Catch; Future  -     TSH; Future  -     Vitamin B12; Future  -     Vitamin D,25-Hydroxy; Future    2. Essential hypertension  -     CBC & Differential; Future  -     Comprehensive Metabolic Panel; Future  -     Hemoglobin A1c; Future  -     Lipid Panel; Future  -     Microalbumin / Creatinine Urine Ratio - Urine, Clean Catch; Future  -     TSH; Future  -     Vitamin B12; Future  -     Vitamin D,25-Hydroxy; Future    3. Vitamin D deficiency  -     CBC & Differential; Future  -     Comprehensive Metabolic Panel; Future  -     Hemoglobin A1c; Future  -     Lipid Panel; Future  -     Microalbumin / Creatinine Urine Ratio - Urine, Clean Catch; Future  -     TSH; Future  -     Vitamin B12; Future  -     Vitamin D,25-Hydroxy; Future    4. Mixed hyperlipidemia  -     CBC & Differential; Future  -     Comprehensive Metabolic Panel; Future  -     Hemoglobin A1c; Future  -     Lipid Panel; Future  -     Microalbumin / Creatinine Urine Ratio - Urine, Clean Catch; Future  -     TSH; Future  -     Vitamin B12; Future  -      Vitamin D,25-Hydroxy; Future          Glycemic Management     Diabetes mellitus type 2 not controlled      Lab Results   Component Value Date    HGBA1C 12.40 (H) 09/18/2023           Dexcom G6 - she is using ,  could not download          She is back on the pump, but was off for 2 months      Omni pod         Basal      MN -  2.95         Carb ratio       3      Correction         14     Target  bg 120               Taking trulicity 4.5  mg weekly      Taking Metformin 1000 mg po BID      Taking Steglatro 15 mg one daily         Previous regimen      basaglar taking 63 units ---stop        admelog--stop      Taking about 30 units            Aim for 45 grams of Carbohydrate for meals      Aim for 15 grams of Carbohydrate for snack                        Lipid Management           Taking Simvastatin 40 mg          Total Cholesterol   Date Value Ref Range Status   09/18/2023 220 (H) 0 - 200 mg/dL Final     Triglycerides   Date Value Ref Range Status   09/18/2023 412 (H) 0 - 149 mg/dL Final   09/18/2023 379 (H) 0 - 150 mg/dL Final     HDL Cholesterol   Date Value Ref Range Status   09/18/2023 31 (L) 40 - 60 mg/dL Final     LDL Cholesterol    Date Value Ref Range Status   09/18/2023 122 (H) 0 - 100 mg/dL Final           Blood Pressure Management           Taking Lisinopril 40 mg daily               Microvascular Complication Monitoring        Last eye exam   May 2023 , mild DR            Neuropathy        Taking Gabapentin 300 mg tid        Component      Latest Ref Rng & Units 2/20/2023   Microalbumin/Creatinine Ratio          Creatinine, Urine      mg/dL 22.6   Microalbumin, Urine      mg/dL <1.2        Bone Health      vitamin d def       taking vitamin d daily              Other Diabetes Related Aspects        Lab Results   Component Value Date    [id number] 671 09/18/2023                   Thyroid health         Lab Results   Component Value Date    TSH 1.220 09/18/2023              Weight management     Gastric  sleeve     Has lost 50 lbs                   Follow Up   No follow-ups on file.  Patient was given instructions and counseling regarding her condition or for health maintenance advice. Please see specific information pulled into the AVS if appropriate.         This document has been electronically signed by ROD Chawla on September 29, 2023 14:32 CDT.

## 2023-10-12 ENCOUNTER — TELEMEDICINE (OUTPATIENT)
Dept: PSYCHIATRY | Facility: CLINIC | Age: 48
End: 2023-10-12
Payer: COMMERCIAL

## 2023-10-12 DIAGNOSIS — F25.1 SCHIZOAFFECTIVE DISORDER, DEPRESSIVE TYPE: ICD-10-CM

## 2023-10-12 DIAGNOSIS — F41.1 GENERALIZED ANXIETY DISORDER: Primary | ICD-10-CM

## 2023-10-12 NOTE — PROGRESS NOTES
Date: October 12, 2023  Time In: 8:34 AM   Time Out: 9:22 AM   This provider is located at the [facility] [address], [place] [address] using a secure [employer] Video Visit through [employer]. Patient is being seen remotely via telehealth at home address in [place] and stated they are in a secure environment for this session. The patient's condition being diagnosed/treated is appropriate for telemedicine. The provider identified herself as well as her credentials. The patient, and/or patients guardian, consent to be seen remotely, and when consent is given they understand that the consent allows for patient identifiable information to be sent to a third party as needed. They may refuse to be seen remotely at any time. The electronic data is encrypted and password protected, and the patient and/or guardian has been advised of the potential risks to privacy not withstanding such measures.     You have chosen to receive care through a telehealth visit.  Do you consent to use a video/audio connection for your medical care today? Yes    PROGRESS NOTE  Data:  Sudhakar Saul is a 47 y.o. female who presents today for follow up and care planning. Patient states that she is a little tired for not sleeping well the last few nights. Patient states that she has been busy and still trying to help her aunt move into an assisted living facility and is still trying to help her mother who is also preparing for weight loss surgery and is also in need of a knee replacement.  Patient states that her mother is anxious about the weight loss surgery and the patient is trying to  her mother and help her prepare for the surgery which is going to be different from the surgery the patient had. Patient states that she is very satisfied with the things that she can now do after having her gastric surgery. Patient states that she wants to work on decreasing feelings of  anxiety and depression and processing her medical issues. Patient states that her last panic attack was about four weeks ago when she got overwhelmed and had to walk away from a situation. Patient states that she continues to have a few days per month where she wants to just stay in the bed. Patient is struggling with her new diagnosis of liver cirrhosis and with keeping her diabetes under control as her A1-C tends to run high. Patient states that Trulicity injection weekly to help with her A1-C but states that she will be talking to her doctor next month about a change.    Chief Complaint: feelings of anxiety and depression, lack of sleep feeling tired, medical concerns    History of Present Illness: patient has battled anxiety and depression for several years      Clinical Maneuvering/Intervention: care planning    (Scales based on 0 - 10 with 10 being the worst)  Depression: 4 Anxiety: 6-7       Assisted patient in developing a care plan to address her current needs and concerns and setting baselines for them; processing above session content; acknowledged and normalized patient's thoughts, feelings, and concerns.  Rationalized patient thought process regarding her needs and health issues.  Discussed triggers associated with patient's feelings of anxiety and depression such as being asked to do several things at once, getting overly exhausted and worrying about her health issues.  Also discussed coping skills for patient to implement such as maintaining boundaries with others, increasing her self care, and continuing to follow up with all of her medical appointments.    Allowed patient to freely discuss issues without interruption or judgment. Provided safe, confidential environment to facilitate the development of positive therapeutic relationship and encourage open, honest communication. Assisted patient in identifying risk factors which would indicate the need for higher level of care including thoughts to  harm self or others and/or self-harming behavior and encouraged patient to contact this office, call 911, or present to the nearest emergency room should any of these events occur. Discussed crisis intervention services and means to access. Patient adamantly and convincingly denies current suicidal or homicidal ideation or perceptual disturbance.    Assessment:   Assessment   Patient was able to work with therapist in order to develop a care plan in order to address her current needs and concerns and set baselines for the identified problems. Patient struggles with schizoaffective disorder, depression and  anxiety which continues to cause impairment in important areas of functioning.  A result, they can be reasonably expected to continue to benefit from treatment and would likely be at increased risk for decompensation otherwise.    Mental Status Exam:   Hygiene:   good  Cooperation:  Cooperative  Eye Contact:  Good  Psychomotor Behavior:  Appropriate  Affect:  Appropriate  Mood: normal and tired  Speech:  Normal  Thought Process:  Goal directed  Thought Content:  Normal  Suicidal:  None  Homicidal:  None  Hallucinations:   shadow movement a few nights ago but never took shape  Delusion:  None  Memory:  Deficits  Orientation:  Person, Place, Time, and Situation  Reliability:  good  Insight:  Good  Judgement:  Good  Impulse Control:  Good  Physical/Medical Issues:   none new        Patient's Support Network Includes:  significant other, parents, and extended family    Functional Status: Moderate impairment     Progress toward goal: Not at goal; goals were established today with the development of the care plan     Prognosis: Good with Ongoing Treatment          Plan:    Patient will continue in individual outpatient therapy with focus on improved functioning and coping skills, maintaining stability, and avoiding decompensation and the need for higher level of care.    Patient will adhere to medication regimen as  prescribed and report any side effects. Patient will contact this office, call 911 or present to the nearest emergency room should suicidal or homicidal ideations occur. Provide Cognitive Behavioral Therapy and Solution Focused Therapy to improve functioning, maintain stability, and avoid decompensation and the need for higher level of care.     Return in about 2 weeks, or earlier if symptoms worsen or fail to improve.           VISIT DIAGNOSIS:     ICD-10-CM ICD-9-CM   1. Generalized anxiety disorder  F41.1 300.02   2. Schizoaffective disorder, depressive type  F25.1 295.70             This document has been electronically signed by DREAD Vo  October 12, 2023 10:08 EDT      Part of this note may be an electronic transcription/translation of spoken language to printed text using the Dragon Dictation System.

## 2023-10-25 ENCOUNTER — TELEMEDICINE (OUTPATIENT)
Dept: PSYCHIATRY | Facility: CLINIC | Age: 48
End: 2023-10-25
Payer: COMMERCIAL

## 2023-10-25 DIAGNOSIS — F25.1 SCHIZOAFFECTIVE DISORDER, DEPRESSIVE TYPE: Primary | ICD-10-CM

## 2023-10-25 DIAGNOSIS — F41.1 GENERALIZED ANXIETY DISORDER: ICD-10-CM

## 2023-10-25 NOTE — PROGRESS NOTES
Date: October 28, 2023  Time In: 9:27 AM   Time Out: 10:44 AM   This provider is located at the [facility]), [address], [place] [address] using a secure [employer] Video Visit through [employer]. Patient is being seen remotely via telehealth at home address in [place] and stated they are in a secure environment for this session. The patient's condition being diagnosed/treated is appropriate for telemedicine. The provider identified herself as well as her credentials. The patient, and/or patients guardian, consent to be seen remotely, and when consent is given they understand that the consent allows for patient identifiable information to be sent to a third party as needed. They may refuse to be seen remotely at any time. The electronic data is encrypted and password protected, and the patient and/or guardian has been advised of the potential risks to privacy not withstanding such measures.     You have chosen to receive care through a telehealth visit.  Do you consent to use a video/audio connection for your medical care today? Yes    PROGRESS NOTE  Data:  Sudhakar Saul is a 47 y.o. female who presents today for follow up. Patient states that she had an endoscopy on Monday and is still recovering from the anesthesia. Patient states that she was diagnosed with esophagitis and gastritis and states that she is now having to take some medication that will coat her esophagus to help heal it.  Patient states that she had seen the doctor on Friday due to having blood show up in her vomit over the last weekend and being scared that she had done something internally and was pleased that her doctor could see her so quickly and perform the procedure quickly too. Per patient, her dosage of Lisinopril was decreased and per patient per her doctor if her blood pressure stays low he will take her off the medication completely. Patient states that she continues to  try to follow all of her doctor's orders and hopes that she will eventually be able to come off more of medications. Per patient, yesterday her neurologist doubled her dosage of Gabbapentin to 600 mg a day which is making her feel disconnected and woozy. Patient states that it takes her a few days to adjust to her new medication dosage each time the dosage of this medication is increased. Per patient the medication was increased due to her L4-L5 disc being only half there and herniated and per patient it is now bone on bone and she was told years ago that eventually she would have to have surgery. Patient states that her doctor thinks that the surgery can be pushed out 6-12 months and possibly more if she were to lose more weight.  Patient states that the stenosis is making her legs go numb and the sciatic pain can be very bad at times and thus she walks with a cane.  Patient states that she is concerned about the time frame for her mother to have gastric surgery and then a knee replacement before the patient has to have her back surgery so that is causing some anxiety in the patient as she knows that her mother needs both surgeries due to the pain she is in and the frustration that she is dealing with. Per patient, she has had a 105 lb weight loss since her gastric surgery and patient states that  she set a goal for herself to weigh 180 lbs but is very impressed with her overall mobility status. Patient states that she is looking forward to getting more mobility back as she continues to lose weight. Patient states that her boyfriend is being really supportive of her and she states that they are doing very well together.   Patient states that her aunt is adjusting to her new assisted living facility but the patient is disappointed in her cousin, the daughter of her aunt due to how her cousin treats her mother. Patient states that her cousin told the patient's mother that it was not her responsibility to care for her  mother as the patient's mother is the aunt's sister. Patient states that her mother explained to the cousin that it was not her responsibility to care for her sister especially with all of the health issues that the patient's mother has at this time. Patient states that her cousin will probably not go see her mother in the facility but may call he. Patient states that her cousin can be a bit entitled. Patient states that her aunt has also always relied on her mother to take care of her when she didn't have anyone else.  Patient states that her mother always tried to meet the needs of everyone else in the family and nobody seemed to care if they took advantage of the patient's mother.  Patient states that seeing people take from her mother, especially her family has made the patient feel worse about her situation of not being able to financially contribute to her family as this has gone on her entire life. Discussed the contributions that the patient does make to the household that are more than just a monetary value. Per patient she is working on a new disability hearing for an appeal and per patient they can submit new information as there was no vocational specialist present for the patient's first disability hearing. Patient states that she is hopeful that she will have an answer about her disability in February.     Chief Complaint: feelings of depression and anxiety, health issues, and     History of Present Illness: patient has struggled with anxiety and schizoaffective disorder for several years      Clinical Maneuvering/Intervention:  CBT    (Scales based on 0 - 10 with 10 being the worst)  Depression: 4 Anxiety: 7       Assisted patient in processing above session content; acknowledged and normalized patient’s thoughts, feelings, and concerns.  Rationalized patient thought process regarding her health issues and her mother's health.  Discussed triggers associated with patient's feelings of depression and  anxiety in regard to her health challenges and anticipating her mother's health needs and family care concerns.  Also discussed coping skills for patient to implement such as staying up with her medical appointments, working with her mother and step father on home modifications that will reduce everyone's anxieties before her mother has her surgeries, and remembering the contributions that she does bring to the family that are more valuable than a financial contribution.    Allowed patient to freely discuss issues without interruption or judgment. Provided safe, confidential environment to facilitate the development of positive therapeutic relationship and encourage open, honest communication. Assisted patient in identifying risk factors which would indicate the need for higher level of care including thoughts to harm self or others and/or self-harming behavior and encouraged patient to contact this office, call 911, or present to the nearest emergency room should any of these events occur. Discussed crisis intervention services and means to access. Patient adamantly and convincingly denies current suicidal or homicidal ideation or perceptual disturbance.    Assessment:   Assessment   Patient appears to maintain relative stability as compared to their baseline.  However, patient continues to struggle with schizoaffective disorder and anxiety which continues to cause impairment in important areas of functioning.  A result, they can be reasonably expected to continue to benefit from treatment and would likely be at increased risk for decompensation otherwise.    Mental Status Exam:   Hygiene:   good  Cooperation:  Cooperative  Eye Contact:  Good  Psychomotor Behavior:  Appropriate  Affect:  Appropriate  Mood: anxious  Speech:  Normal  Thought Process:  Circum  Thought Content:  Normal  Suicidal:  None  Homicidal:  None  Hallucinations:  Auditory- on Monday due to not being able to take medications due to medical  "procedure; heard the little girl's voice saying \"why are we here\" at the hospital.  Delusion:  None  Memory:  Deficits  Orientation:  Person, Place, Time, and Situation  Reliability:  good  Insight:  Good  Judgement:  Good  Impulse Control:  Good  Physical/Medical Issues:  Yes new diagnosis of gastritis and esophagitis        Patient's Support Network Includes:  significant other, mother, extended family, and friend    Functional Status: Moderate impairment     Progress toward goal: Not at goal    Prognosis: Good with Ongoing Treatment          Plan:    Patient will continue in individual outpatient therapy with focus on improved functioning and coping skills, maintaining stability, and avoiding decompensation and the need for higher level of care.    Patient will adhere to medication regimen as prescribed and report any side effects. Patient will contact this office, call 911 or present to the nearest emergency room should suicidal or homicidal ideations occur. Provide Cognitive Behavioral Therapy and Solution Focused Therapy to improve functioning, maintain stability, and avoid decompensation and the need for higher level of care.     Return in about 3 weeks, or earlier if symptoms worsen or fail to improve.           VISIT DIAGNOSIS:     ICD-10-CM ICD-9-CM   1. Schizoaffective disorder, depressive type  F25.1 295.70   2. Generalized anxiety disorder  F41.1 300.02             This document has been electronically signed by DREAD Vo  October 28, 2023 08:22 EDT      Part of this note may be an electronic transcription/translation of spoken language to printed text using the Dragon Dictation System.  "

## 2023-11-02 ENCOUNTER — TELEMEDICINE (OUTPATIENT)
Dept: PSYCHIATRY | Facility: CLINIC | Age: 48
End: 2023-11-02
Payer: COMMERCIAL

## 2023-11-02 DIAGNOSIS — F51.5 NIGHTMARES: ICD-10-CM

## 2023-11-02 DIAGNOSIS — F25.1 SCHIZOAFFECTIVE DISORDER, DEPRESSIVE TYPE: Primary | ICD-10-CM

## 2023-11-02 DIAGNOSIS — F41.1 GENERALIZED ANXIETY DISORDER: ICD-10-CM

## 2023-11-02 PROCEDURE — 99214 OFFICE O/P EST MOD 30 MIN: CPT | Performed by: NURSE PRACTITIONER

## 2023-11-02 RX ORDER — PRAZOSIN HYDROCHLORIDE 2 MG/1
2-4 CAPSULE ORAL NIGHTLY
Qty: 180 CAPSULE | Refills: 0 | Status: SHIPPED | OUTPATIENT
Start: 2023-11-02

## 2023-11-02 RX ORDER — HYDROXYZINE HYDROCHLORIDE 25 MG/1
12.5-25 TABLET, FILM COATED ORAL 3 TIMES DAILY PRN
Qty: 270 TABLET | Refills: 0 | Status: SHIPPED | OUTPATIENT
Start: 2023-11-02

## 2023-11-02 RX ORDER — LAMOTRIGINE 150 MG/1
150 TABLET ORAL DAILY
Qty: 90 TABLET | Refills: 0 | Status: SHIPPED | OUTPATIENT
Start: 2023-11-02

## 2023-11-02 RX ORDER — DULOXETIN HYDROCHLORIDE 30 MG/1
90 CAPSULE, DELAYED RELEASE ORAL DAILY
Qty: 270 CAPSULE | Refills: 0 | Status: SHIPPED | OUTPATIENT
Start: 2023-11-02

## 2023-11-02 NOTE — PROGRESS NOTES
This provider is located at [facility], [address] [place] [address].The Patient is seen remotely at home, [address] [facility] [place] [address] via [employer].  Patient is being seen via telehealth and confirm that they are in a secure environment for this session. The patient's condition being diagnosed/treated is appropriate for telemedicine. The provider identified himself/herself: herself as well as her credentials.   The patient gave consent to be seen remotely, and when consent is given they understand that the consent allows for patient identifiable information to be sent to a third party as needed.   They may refuse to be seen remotely at any time. The electronic data is encrypted and password protected, and the patient has been advised of the potential risks to privacy not withstanding such measures.    You have chosen to receive care through a telehealth visit.  Do you consent to use a video/audio connection for your medical care today? Yes      Chief Complaint  Follow-for schizoaffective depressed type    Subjective    Sudhakar More Saul presents to [facility] for medication management.       History of Present Illness   Patient presents today reporting that been a struggle to her overall physical health.  Patient states that she was vomiting blood so they did an endoscopy.  Patient was diagnosed with esophagitis and gastritis.  Patient states that she is now on medication and taking her medications at different times which has helped some.  Patient states on the day of her endoscopy she did have one auditory hallucination but that was because she had to take the medication later otherwise denies any auditory or visual hallucinations.  Patient states that she has been worried and anxious lately and felt on edge with all of her health issues.  She states that she has a neurosurgeon and will have back surgery eventually on her L4 and L5.   Patient reports she has only had 1 seizure 3 weeks ago from what her boyfriend told her.  She states her mood has been stabilized and evened out.  Reports she has roughly 5 hours at night.  She states her appetite is good as she is having to watch her portion sizes.  States she was feeling somewhat down and sad with taking care of her aunt who recently lost her  but otherwise doing well.  Denies any side effects to medications.  Believes things right now are just life stressors at this time.  Denies any SI/HI/AH/VH.    Objective   Vital Signs:   There were no vitals taken for this visit.  Due to the remote nature of this encounter (virtual encounter), vitals were unable to be obtained.  Height stated at 66 inches.  Weight stated at 251 pounds.        PHQ-9 Score:   PHQ-9 Total Score:     Patient screened positive for depression based on a PHQ-9 score of  on . Follow-up recommendations include:  Patient has schizophrenia and is currently being managed with medication. .    Mental Status Exam:   Hygiene:   good  Cooperation:  Cooperative  Eye Contact:  Good  Psychomotor Behavior:  Appropriate  Affect:  Appropriate  Mood: normal and anxious  Speech:  Normal  Thought Process:  Goal directed and Linear  Thought Content:  Normal  Suicidal:  None  Homicidal:  None  Hallucinations:  None  Delusion:  None  Memory:  Intact  Orientation:  Person, Place, Time and Situation  Reliability:  good  Insight:  Good and Fair  Judgement:  Good and Fair  Impulse Control:  Good and Fair  Physical/Medical Issues:  Yes Dm. HTN, CKD stage 2, hep C+, JACQUE, hx substance abuse      Current Medications:   Current Outpatient Medications   Medication Sig Dispense Refill    Cariprazine HCl (VRAYLAR) 4.5 MG capsule capsule Take 1 capsule by mouth Daily. 90 capsule 0    DULoxetine (CYMBALTA) 30 MG capsule Take 3 capsules by mouth Daily. 270 capsule 0    hydrOXYzine (ATARAX) 25 MG tablet Take 0.5-1 tablets by mouth 3 (Three) Times a Day As  Needed for Anxiety. 270 tablet 0    lamoTRIgine (LaMICtal) 150 MG tablet Take 1 tablet by mouth Daily. 90 tablet 0    prazosin (MINIPRESS) 2 MG capsule Take 1-2 capsules by mouth Every Night. 180 capsule 0    albuterol (ACCUNEB) 1.25 MG/3ML nebulizer solution Take 3 mL by nebulization Every 6 (Six) Hours As Needed for Wheezing or Shortness of Air. 9 mL 3    Continuous Blood Gluc  (Dexcom G6 ) device USE FOR 6 MONTHS      Continuous Blood Gluc Sensor (Dexcom G6 Sensor) PLACE 1 SENSOR ON SKIN CHANGING EVERY 10 DAYS 9 each 0    Continuous Blood Gluc Transmit (Dexcom G6 Transmitter) misc USE AS DIRECTED 1 each 0    cyclobenzaprine (FLEXERIL) 10 MG tablet Take 1 tablet by mouth 3 (Three) Times a Day As Needed for Muscle Spasms. 90 tablet 3    Ertugliflozin L-PyroglutamicAc (Steglatro) 15 MG tablet Take 1 tablet by mouth Every Morning. 90 tablet 1    FeroSul 325 (65 Fe) MG tablet       Fluticasone Furoate-Vilanterol (Breo Ellipta) 200-25 MCG/INH inhaler Inhale 1 puff Daily. 28 each 11    gabapentin (NEURONTIN) 300 MG capsule Take 1 capsule by mouth 3 (Three) Times a Day.      Insulin Disposable Pump (OmniPod Dash 5 Pack Pods) misc       Insulin Lispro (humaLOG) 100 UNIT/ML injection USE AS DIRECTED THROUGH INSULIN PUMP UPTO 180 UNITS DAILY 90 mL 0    levETIRAcetam (KEPPRA) 1000 MG tablet Take 1 tablet by mouth 2 (Two) Times a Day. 60 tablet 3    lisinopril (PRINIVIL,ZESTRIL) 20 MG tablet Take 1 tablet by mouth Daily. 30 tablet 3    metFORMIN (GLUCOPHAGE) 500 MG tablet Take 1 tablet by mouth 2 (Two) Times a Day With Meals. 60 tablet 11    metoprolol tartrate (LOPRESSOR) 50 MG tablet Take 1 tablet by mouth 2 (Two) Times a Day. 60 tablet 3    Nebulizer device 1 application Every 6 (Six) Hours As Needed (dyspnea). 1 each 3    NIFEdipine XL (PROCARDIA XL) 30 MG 24 hr tablet Take 1 tablet by mouth Daily. 30 tablet 3    omeprazole (priLOSEC) 40 MG capsule Take 1 capsule by mouth Daily. 30 capsule 3     OXcarbazepine (TRILEPTAL) 600 MG tablet Take 1 tablet by mouth 2 (Two) Times a Day. PT TAKES  MG TAB IN MORNING AND  MG IN EVENING      rOPINIRole (REQUIP) 3 MG tablet Take 1 tablet by mouth Every Night.      simvastatin (ZOCOR) 40 MG tablet Take 1 tablet by mouth Every Night. 30 tablet 3    Trulicity 4.5 MG/0.5ML solution pen-injector INJECT 1 SYRINGE SUBCUTANEOUSLY ONCE A WEEK 4 mL 0    Ventolin  (90 Base) MCG/ACT inhaler Inhale 2 puffs Every 4 (Four) Hours As Needed for Wheezing. 6.7 g 11    vitamin D (ERGOCALCIFEROL) 1.25 MG (58946 UT) capsule capsule TAKE 2 CAPSULES BY MOUTH ONCE A WEEK 8 capsule 3     No current facility-administered medications for this visit.       Physical Exam  Nursing note reviewed.   Constitutional:       Appearance: Normal appearance.   Neurological:      Mental Status: She is alert.   Psychiatric:         Attention and Perception: Attention normal. She does not perceive auditory or visual hallucinations.         Mood and Affect: Affect normal. Mood is anxious. Mood is not depressed.         Speech: Speech normal.         Behavior: Behavior is cooperative.         Thought Content: Thought content normal.         Cognition and Memory: Cognition and memory normal.         Judgment: Judgment normal.        Result Review :     The following data was reviewed by: ROD Spencer on 02/03/2021:  Common labs          2/20/2023    09:57 6/7/2023    09:09 9/18/2023    09:44   Common Labs   Glucose 486  247  423    BUN 31  23  10    Creatinine 0.78  0.69  0.68    Sodium 130  134  134    Potassium 5.0  4.4  4.4    Chloride 96  100  98    Calcium 9.5  9.7  9.5    Albumin 3.7  3.9  4.2    Total Bilirubin 0.2  0.2  0.3    Alkaline Phosphatase 100  102  108    AST (SGOT) 42  27  21    ALT (SGPT) 45  41  31    WBC 8.63  9.90  10.64    Hemoglobin 14.1  13.9  15.0    Hematocrit 42.3  42.0  45.4    Platelets 163  175  174    Total Cholesterol 222  194  220    Triglycerides 313   304  412     379    HDL Cholesterol 35  32  31    LDL Cholesterol  131  109  122    Hemoglobin A1C 16.00  10.60  12.40    Microalbumin, Urine <1.2  <1.2       CMP          2/20/2023    09:57 6/7/2023    09:09 9/18/2023    09:44   CMP   Glucose 486  247  423    BUN 31  23  10    Creatinine 0.78  0.69  0.68    EGFR 93.2  106.5  106.9    Sodium 130  134  134    Potassium 5.0  4.4  4.4    Chloride 96  100  98    Calcium 9.5  9.7  9.5    Total Protein 6.8  7.4  7.1    Albumin 3.7  3.9  4.2    Globulin 3.1  3.5  2.9    Total Bilirubin 0.2  0.2  0.3    Alkaline Phosphatase 100  102  108    AST (SGOT) 42  27  21    ALT (SGPT) 45  41  31    Albumin/Globulin Ratio 1.2  1.1  1.4    BUN/Creatinine Ratio 39.7  33.3  14.7    Anion Gap 13.0  13.0  13.0      CBC          2/20/2023    09:57 6/7/2023    09:09 9/18/2023    09:44   CBC   WBC 8.63  9.90  10.64    RBC 4.94  4.97  5.24    Hemoglobin 14.1  13.9  15.0    Hematocrit 42.3  42.0  45.4    MCV 85.6  84.5  86.6    MCH 28.5  28.0  28.6    MCHC 33.3  33.1  33.0    RDW 14.6  13.4  13.4    Platelets 163  175  174      CBC w/diff      CBC w/Diff 6/10/20 9/9/20 1/26/21   WBC 8.89 9.63 9.04   RBC 4.85 5.07 5.10   Hemoglobin 14.1 14.9 14.4   Hematocrit 42.8 44.3 45.0   MCV 88.2 87.4 88.2   MCH 29.1 29.4 28.2   MCHC 32.9 33.6 32.0   RDW 13.6 13.3 13.0   Platelets 157 180 174   Neutrophil Rel % 63.8 67.2    Immature Granulocyte Rel % 0.6 (A) 0.5    Lymphocyte Rel % 26.4 21.6    Monocyte Rel % 4.5 (A) 4.8 (A)    Eosinophil Rel % 3.9 5.3    Basophil Rel % 0.8 0.6    (A) Abnormal value              Lipid Panel          2/20/2023    09:57 6/7/2023    09:09 9/18/2023    09:44   Lipid Panel   Total Cholesterol 222  194  220    Triglycerides 313  304  412     379    HDL Cholesterol 35  32  31    VLDL Cholesterol 56  53  67    LDL Cholesterol  131  109  122    LDL/HDL Ratio 3.55  3.16  3.65      TSH          2/20/2023    09:57 6/7/2023    09:09 9/18/2023    09:44   TSH   TSH 1.310  1.740  1.220       BMP          2/20/2023    09:57 6/7/2023    09:09 9/18/2023    09:44   BMP   BUN 31  23  10    Creatinine 0.78  0.69  0.68    Sodium 130  134  134    Potassium 5.0  4.4  4.4    Chloride 96  100  98    CO2 21.0  21.0  23.0    Calcium 9.5  9.7  9.5      HgB          2/20/2023    09:57 6/7/2023    09:09 9/18/2023    09:44   HGB   Hemoglobin 14.1  13.9  15.0      Data reviewed: PCP notes         Assessment and Plan    Problem List Items Addressed This Visit          Mental Health    Schizoaffective disorder, depressive type - Primary    Relevant Medications    lamoTRIgine (LaMICtal) 150 MG tablet    DULoxetine (CYMBALTA) 30 MG capsule    Cariprazine HCl (VRAYLAR) 4.5 MG capsule capsule    hydrOXYzine (ATARAX) 25 MG tablet    Generalized anxiety disorder    Relevant Medications    DULoxetine (CYMBALTA) 30 MG capsule    Cariprazine HCl (VRAYLAR) 4.5 MG capsule capsule    hydrOXYzine (ATARAX) 25 MG tablet     Other Visit Diagnoses       Nightmares        Relevant Medications    prazosin (MINIPRESS) 2 MG capsule    DULoxetine (CYMBALTA) 30 MG capsule    Cariprazine HCl (VRAYLAR) 4.5 MG capsule capsule    hydrOXYzine (ATARAX) 25 MG tablet          TREATMENT PLAN/GOALS: Continue supportive psychotherapy efforts and medications as indicated. Treatment and medication options discussed during today's visit. Patient ackowledged and verbally consented to continue with current treatment plan and was educated on the importance of compliance with treatment and follow-up appointments.    MEDICATION ISSUES:  We discussed risks, benefits, and side effects of the above medications and the patient was agreeable with the plan. Patient was educated on the importance of compliance with treatment and follow-up appointments.  Patient is agreeable to call the office with any worsening of symptoms or onset of side effects. Patient is agreeable to call 911 or go to the nearest ER should he/she begin having SI/HI. Lengthy discussion with  patient on the possible side effects of antipsychotic medications including increased cholesterol, increased blood sugar, and possibility of weight gain.  Also discussed the need to monitor lab work associated with this.  The risk of muscle movement disorders with this class of medication was also discussed. We will continue Lamictal in an effort to stabilize mood.  The patient was reminded to immediately come to the hospital should there be any loss of control.  Explanation was given to her regarding Lamictal and the potential for Foster Shon syndrome and significant rash.  Patient was encouraged to check skin prior to beginning.  Patient was encouraged to report any rash and to immediately stop medication.      -Continue lamotrigine 150 mg daily for schizoaffective disorder.  -Continue minipress 2-4mg at night for nightmares.  -Continue Vraylar  4.5 mg daily for schizoaffective disorder depressed type.  -Continue hydroxyzine 12.5 to 25 mg up to 3 times a day as needed for anxiety and panic.  - Continue Cymbalta 90mg daily for depression.  -Informed patient that I would not write for the 60 mg as that was related to pain not for her depression or her mood but she could contact her PCP or see other pain management to prescribe this as she is stated she does not have any side effects and tolerated well as this is over the recommended dose for depression so I will not continue.  -Continue psychotherapy to help with past trauma as well as depressive and anxiety symptoms.  -Highly encourage patient since she is now on Trileptal to be aware of side effects of multiple mood stabilizers and if she were to have any rash to contact the clinic as we may have to look at tapering off the lamotrigine or talking with neurologist to reevaluate Trileptal.  Patient verbalized understanding and denied any side effects at this time.  -Informed patient if she had any significant injuries in mood or side effects or concerns we may  have to adjust medications and to contact clinic she verbalized understanding.      Patient has now failed and tried Haldol, aripiprazole, Latuda and now risperidone which has caused weight gain and due to diabetes and kidney disease would benefit from Vraylar as patient is still having auditory and visual hallucinations.     Counseled patient regarding multimodal approach with healthy nutrition, healthy sleep, regular physical activity, social activities, counseling, and medications.      Coping skills reviewed and encouraged positive framing of thoughts     Assisted patient in processing above session content; acknowledged and normalized patient’s thoughts, feelings, and concerns.  Applied  positive coping skills and behavior management in session.  Allowed patient to freely discuss issues without interruption or judgment. Provided safe, confidential environment to facilitate the development of positive therapeutic relationship and encourage open, honest communication. Assisted patient in identifying risk factors which would indicate the need for higher level of care including thoughts to harm self or others and/or self-harming behavior and encouraged patient to contact this office, call 911, or present to the nearest emergency room should any of these events occur. Discussed crisis intervention services and means to access.     MEDS ORDERED DURING VISIT:  New Medications Ordered This Visit   Medications    prazosin (MINIPRESS) 2 MG capsule     Sig: Take 1-2 capsules by mouth Every Night.     Dispense:  180 capsule     Refill:  0    lamoTRIgine (LaMICtal) 150 MG tablet     Sig: Take 1 tablet by mouth Daily.     Dispense:  90 tablet     Refill:  0    DULoxetine (CYMBALTA) 30 MG capsule     Sig: Take 3 capsules by mouth Daily.     Dispense:  270 capsule     Refill:  0    Cariprazine HCl (VRAYLAR) 4.5 MG capsule capsule     Sig: Take 1 capsule by mouth Daily.     Dispense:  90 capsule     Refill:  0    hydrOXYzine  (ATARAX) 25 MG tablet     Sig: Take 0.5-1 tablets by mouth 3 (Three) Times a Day As Needed for Anxiety.     Dispense:  270 tablet     Refill:  0       Follow Up   No follow-ups on file.  Informed patient we would be following closely after surgery to ensure any symptoms didn't return or become worse she verbalized understanding to call with any changes.     Patient was given instructions and counseling regarding her condition or for health maintenance advice. Please see specific information pulled into the AVS if appropriate.     Some of the data in this electronic note has been brought forward from a previous encounter, any necessary changes have been made, it has been reviewed by this APRN, and it is accurate.      This document has been electronically signed by ROD Spencer  November 2, 2023 08:59 EDT    Part of this note may be an electronic transcription/translation of spoken language to printed text using the Dragon Dictation System.

## 2023-11-09 ENCOUNTER — TELEMEDICINE (OUTPATIENT)
Dept: PSYCHIATRY | Facility: CLINIC | Age: 48
End: 2023-11-09
Payer: COMMERCIAL

## 2023-11-09 DIAGNOSIS — F41.1 GENERALIZED ANXIETY DISORDER: ICD-10-CM

## 2023-11-09 DIAGNOSIS — F25.1 SCHIZOAFFECTIVE DISORDER, DEPRESSIVE TYPE: Primary | ICD-10-CM
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
## 2023-11-27 ENCOUNTER — TELEMEDICINE (OUTPATIENT)
Dept: PSYCHIATRY | Facility: CLINIC | Age: 48
End: 2023-11-27
Payer: COMMERCIAL

## 2023-11-27 DIAGNOSIS — F25.1 SCHIZOAFFECTIVE DISORDER, DEPRESSIVE TYPE: Primary | ICD-10-CM

## 2023-11-27 DIAGNOSIS — F41.1 GENERALIZED ANXIETY DISORDER: ICD-10-CM

## 2023-11-27 PROCEDURE — 90834 PSYTX W PT 45 MINUTES: CPT | Performed by: COUNSELOR

## 2023-11-27 NOTE — PROGRESS NOTES
"Date: November 27, 2023  Time In: 8:32 AM   Time Out: 9:24 AM   This provider is located at the [facility]), [address], [place] [address] using a secure [employer] Video Visit through [employer]. Patient is being seen remotely via telehealth at home address in [place] and stated they are in a secure environment for this session. The patient's condition being diagnosed/treated is appropriate for telemedicine. The provider identified herself as well as her credentials. The patient, and/or patients guardian, consent to be seen remotely, and when consent is given they understand that the consent allows for patient identifiable information to be sent to a third party as needed. They may refuse to be seen remotely at any time. The electronic data is encrypted and password protected, and the patient and/or guardian has been advised of the potential risks to privacy not withstanding such measures.     You have chosen to receive care through a telehealth visit.  Do you consent to use a video/audio connection for your medical care today? Yes    PROGRESS NOTE  Data:  Sudhakar Saul is a 47 y.o. female who presents today for follow up. Patient states that she lost her mother last Friday after her mother had her gastric surgery and a few days later passed away due to a pulmonary embolism. Patient states that her mother slid off the bed and said a few words and went unconscious and was unable to be revived. Patient states that she is trying to do the things that she has to do and is receiving support from her boyfriend and her step father. Patient states that her mother left her money to pay for the bills for a few months before she had her surgery as the patient states that her mother was always a planner.  Patient states that her last words to her mother were \"I love you\".  Patient states that both she and her stepfather are struggling with not only grief but " some guilt for things that they were not able to do but patient states that she is trying to keep the most positive mindset possible and knows that there is nothing that she could have done to change the outcome of the situation.  Patient states that her mother had a history of blood clots and they knew that given the surgery that was a risk but they never thought that it would become a reality.  Patient states that she knew her mother's wishes and she is going to try to follow them out as much as possible.  Patient states that while she is making arrangements for her mother for a celebration of life to be held next fall she is trying to include her stepfather as much as possible but states that he will say things such as it would be better if she just made the decision.  Patient discussed that as she has not gotten her disability that there are financial issues that must be dealt with as well.  Patient states that she and her stepfather both have the option to move in with someone else to let the house go if it is no longer affordable for them but patient states that both of them are having a hard time being in the home as she states that her stepfather knows that her mother passed away there and patient states that she has not been back into the home since her mother passed away.  Discussed that the patient is also getting support from her mother's best friend who lives in [place] but she will not be there today when the patient enters the home for the first time since her mother has passed.  Patient states that her stepfather is not ready to go through her mother's things and they are both just trying to take things one day at a time.   Per patient, she has had to take more hydroxyzine than she had in the past due to her mother's death. Discussed keeping a journal of things that need to be done and memories that the patient recalls as she works on her mother's personal affairs. Patient is also concerned about  long term finances as she states that she is unsure of how things will go as she doesn't have any income at this time and is uncertain about her disability claim as it will be going to a hearing before she gets a judgement and patient states that her step father will not be able to pay the bills in the rented home on his income alone.     Chief Complaint: grief, sadness, anxiety, financial worries     History of Present Illness: Patient has struggled with anxiety and depression and schizoaffective disorder for several years.      Clinical Maneuvering/Intervention:  CBT    (Scales based on 0 - 10 with 10 being the worst)  Depression: 5 Anxiety: 8       Assisted patient in processing above session content; acknowledged and normalized patient’s thoughts, feelings, and concerns.  Rationalized patient thought process regarding the loss of her mother and decisions that she has to make at this time.  Discussed triggers associated with patient's feelings feelings of grief, anxiety and depression in regard to the loss of her mother and trying to do the things that must be done.  Also discussed coping skills for patient to implement such as writing letters to her mother, focusing on one task per day and allowing others to support and help her through these next few weeks as she tries to make final decisions in regard to her mother's affairs.    Allowed patient to freely discuss issues without interruption or judgment. Provided safe, confidential environment to facilitate the development of positive therapeutic relationship and encourage open, honest communication. Assisted patient in identifying risk factors which would indicate the need for higher level of care including thoughts to harm self or others and/or self-harming behavior and encouraged patient to contact this office, call 911, or present to the nearest emergency room should any of these events occur. Discussed crisis intervention services and means to access.  Patient adamantly and convincingly denies current suicidal or homicidal ideation or perceptual disturbance.    Assessment:   Assessment   Patient appears to maintain relative stability as compared to their baseline.  However, patient continues to struggle with anxiety, depression and  which continues to cause impairment in important areas of functioning.  A result, they can be reasonably expected to continue to benefit from treatment and would likely be at increased risk for decompensation otherwise.    Mental Status Exam:   Hygiene:   good  Cooperation:  Cooperative  Eye Contact:  Good  Psychomotor Behavior:  Appropriate  Affect:  Appropriate  Mood: sad and grieving  Speech:  Normal  Thought Process:  Circum  Thought Content:  Normal  Suicidal:  None  Homicidal:  None  Hallucinations:  Auditory- has heard the little girl crying and hollering out for her momma  Delusion:  None  Memory:  Deficits  Orientation:  Person, Place, Time, and Situation  Reliability:  good  Insight:  Good  Judgement:  Good  Impulse Control:  Good  Physical/Medical Issues:   nothing new        Patient's Support Network Includes:  significant other and extended family    Functional Status: Moderate impairment     Progress toward goal: Not at goal    Prognosis: Good with Ongoing Treatment          Plan:    Patient will continue in individual outpatient therapy with focus on improved functioning and coping skills, maintaining stability, and avoiding decompensation and the need for higher level of care.    Patient will adhere to medication regimen as prescribed and report any side effects. Patient will contact this office, call 911 or present to the nearest emergency room should suicidal or homicidal ideations occur. Provide Cognitive Behavioral Therapy and Solution Focused Therapy to improve functioning, maintain stability, and avoid decompensation and the need for higher level of care.     Return in about 1 weeks, or earlier if symptoms worsen  or fail to improve.           VISIT DIAGNOSIS:     ICD-10-CM ICD-9-CM   1. Schizoaffective disorder, depressive type  F25.1 295.70   2. Generalized anxiety disorder  F41.1 300.02             This document has been electronically signed by DREAD Vo  November 27, 2023 13:01 EST      Part of this note may be an electronic transcription/translation of spoken language to printed text using the Dragon Dictation System.

## 2023-12-06 ENCOUNTER — TELEMEDICINE (OUTPATIENT)
Dept: PSYCHIATRY | Facility: CLINIC | Age: 48
End: 2023-12-06
Payer: COMMERCIAL

## 2023-12-06 ENCOUNTER — TELEPHONE (OUTPATIENT)
Dept: PSYCHIATRY | Facility: CLINIC | Age: 48
End: 2023-12-06

## 2023-12-06 DIAGNOSIS — F25.1 SCHIZOAFFECTIVE DISORDER, DEPRESSIVE TYPE: Primary | ICD-10-CM

## 2023-12-06 DIAGNOSIS — F43.21 GRIEF: ICD-10-CM

## 2023-12-06 NOTE — TELEPHONE ENCOUNTER
Pt. Called to cancel appointment due to wifi being down at location.   After talking to provider, and .   Kriss has okayed for patient to be contacted by phone per provider.    Patient has been called, and transferred to provider.   Check in has been placed per provider.

## 2023-12-06 NOTE — PROGRESS NOTES
Date: December 6, 2023  Time In: 9:39 AM   Time Out: 10:34 AM   This provider is located at the [facility] [place] [facility]), [address], [place] [address] using a secure [employer] Video Visit through [employer]. Patient is being seen remotely via telehealth at home address in [place] and stated they are in a secure environment for this session. The patient's condition being diagnosed/treated is appropriate for telemedicine. The provider identified herself as well as her credentials. The patient, and/or patients guardian, consent to be seen remotely, and when consent is given they understand that the consent allows for patient identifiable information to be sent to a third party as needed. They may refuse to be seen remotely at any time. The electronic data is encrypted and password protected, and the patient and/or guardian has been advised of the potential risks to privacy not withstanding such measures.     You have chosen to receive care through a telehealth visit.  Do you consent to use a video/audio connection for your medical care today? Yes    PROGRESS NOTE  Data:  Sudhakar Saul is a 47 y.o. female who presents today for follow up. Patient states that she lost internet service last night and has been trying to get it back but was unable to get connected to the video portion of the call so the session was conducted fully by phone. Patient states that her step father has not been able to return to work yet since the loss of her mother. Patient states that she and her step father have both been having a hard time with sleep; patient states that she will have a dream of her mother and wake up and then will have to try to get back to sleep. Patient states that she is triggered by thoughts or little things of her mother; discussed how this could be viewed as a positive to let her know that her mother was still around.  Patient states that she is happy that she  and her mother were able to have celebrated her birthday, Thanksgiving, and prepared a lot for [place] before her mother passed. Patient states that her mother was a planner and wanted things done in advance as she knew that she wouldn't feel well enough to do all of the holiday events including the patient's birthday but the patient cannot help but that now that her mother possibly knew something was going to happen to her.  Patient states she was able to get her mother's ashes back and bringing them home gave her a sense of comfort; patient states that she has also been seeing a hard and her stepfather and mother's friend have also seen portables that have let them know that the patient's mother is still with them.  Patient states that her mother's ashes were put into 2 irons both of which had cardinals on them as she states her mother loved cardinals.  Patient plans to share some the ashes with her mother's best friend and her daughter as they will be coming in this weekend to spend time with her.  Patient states that she has been able to do a few things each time she has went back into her mother's home but it is still hard on her to be there without her mother.  Discussed with the patient staying task oriented and not overwhelming herself with trying to do too much in 1 day while she is there. Patient states that she will be going back to the home this weekend to stay for a week to help out her step father.  Patient is still slightly concerned about financial issues down the road as she states that her mother Thomas of money that they will be able to take care of everything until August and she is hopeful that she will have a decision on her disability income by them.  Patient states that her boyfriend has also offered to move closer to her mother's home or even into it and transfer his job if that would be helpful to the patient. Patient states that her father suprisingly sent her a birthday card with $500  in it for [place] and her birthday and expressed concern for her loss of her mother.  Patient states that this is not like him to do but she found it comforting that he was trying to support her.  Patient states that she was also told that her stepmother is dealing with a blood clot now and that she has had some other medical issues and the patient states that she is going to call her stepmother today and check on her. Patient states that she is continuing to lose weight and has been able to reduce some of her medications even further and has also had another reduction in her insulin level. Patient discussed pride in herself and states that while she still has pain from her arthritis, she has not been having to use her cane as much.  Patient states that in looking back she is very thankful for the last 5 years she has had with her mother and how much support they have provided for each other.  Patient states that she was trying to be supportive of her mother throughout the surgery process and is trying to make her self be at peace with knowing that her mother is no longer in pain.    Chief Complaint: Increased grief and depression, adjusting to life without her mother, feelings of anxiety    History of Present Illness: patient has struggled with schizoaffective disorder for several years      Clinical Maneuvering/Intervention:  CBT    (Scales based on 0 - 10 with 10 being the worst)  Depression: 8 Anxiety: 6       Assisted patient in processing above session content; acknowledged and normalized patient’s thoughts, feelings, and concerns.  Rationalized patient thought process regarding grief and trying to move forward from her mother's passing.  Discussed triggers associated with patient's feelings of grief, anxiety and depression such as adjusting to life without her mother, trying to get through the holidays and determining what she needs to do to continue to finalize her mother's affairs.  Also discussed coping  skills for patient to implement such as continuing to be task oriented while she is in her mother's home, allowing herself to grieve the loss of her mother and accepting that grief will come in waves and stages, and enjoying spending time with her family and friends who left her mother.    Allowed patient to freely discuss issues without interruption or judgment. Provided safe, confidential environment to facilitate the development of positive therapeutic relationship and encourage open, honest communication. Assisted patient in identifying risk factors which would indicate the need for higher level of care including thoughts to harm self or others and/or self-harming behavior and encouraged patient to contact this office, call 911, or present to the nearest emergency room should any of these events occur. Discussed crisis intervention services and means to access. Patient adamantly and convincingly denies current suicidal or homicidal ideation or perceptual disturbance.    Assessment:   Assessment   Patient appears to maintain relative stability as compared to their baseline.  However, patient continues to struggle with schizoaffective disorder and now grief which continues to cause impairment in important areas of functioning.  A result, they can be reasonably expected to continue to benefit from treatment and would likely be at increased risk for decompensation otherwise.    Mental Status Exam:  ** Due to internet issues patient has not been able to be seen**  Hygiene:    unable to be seen  Cooperation:   unable to be seen  Eye Contact:   unable to be seen  Psychomotor Behavior:   unable to be seen   Affect:   unable to be seen   Mood: sad, depressed, and anxious  Speech:  Normal  Thought Process:  Goal directed  Thought Content:  Normal  Suicidal:  None  Homicidal:  None  Hallucinations:  Auditory- heard the little girl say that she knew her mother was gone  Delusion:  None  Memory:  Deficits- forgetful at  times  Orientation:  Person, Place, Time, and Situation  Reliability:  good  Insight:  Good  Judgement:  Good  Impulse Control:  Good  Physical/Medical Issues:  No        Patient's Support Network Includes:  significant other, father, extended family, and friend    Functional Status: Moderate impairment     Progress toward goal: Not at goal    Prognosis: Good with Ongoing Treatment          Plan:    Patient will continue in individual outpatient therapy with focus on improved functioning and coping skills, maintaining stability, and avoiding decompensation and the need for higher level of care.    Patient will adhere to medication regimen as prescribed and report any side effects. Patient will contact this office, call 911 or present to the nearest emergency room should suicidal or homicidal ideations occur. Provide Cognitive Behavioral Therapy and Solution Focused Therapy to improve functioning, maintain stability, and avoid decompensation and the need for higher level of care.     Return in about 1 week, or earlier if symptoms worsen or fail to improve.           VISIT DIAGNOSIS:     ICD-10-CM ICD-9-CM   1. Schizoaffective disorder, depressive type  F25.1 295.70   2. Grief  F43.21 309.0             This document has been electronically signed by DREAD Vo  December 6, 2023 11:25 EST      Part of this note may be an electronic transcription/translation of spoken language to printed text using the Dragon Dictation System.

## 2023-12-12 ENCOUNTER — TELEMEDICINE (OUTPATIENT)
Dept: PSYCHIATRY | Facility: CLINIC | Age: 48
End: 2023-12-12
Payer: COMMERCIAL

## 2023-12-12 DIAGNOSIS — F25.1 SCHIZOAFFECTIVE DISORDER, DEPRESSIVE TYPE: ICD-10-CM

## 2023-12-12 DIAGNOSIS — F43.21 GRIEF: Primary | ICD-10-CM

## 2023-12-12 PROCEDURE — 90837 PSYTX W PT 60 MINUTES: CPT | Performed by: COUNSELOR

## 2023-12-12 NOTE — PROGRESS NOTES
Date: December 12, 2023  Time In: 8:30 AM   Time Out: 9:32 AM  This provider is located at the [facility]through [facility]), [address], [place] [address] using a secure [employer] Video Visit through [email or web address]. Patient is being seen remotely via telehealth at home address in [place] and stated they are in a secure environment for this session. The patient's condition being diagnosed/treated is appropriate for telemedicine. The provider identified herself as well as her credentials. The patient, and/or patients guardian, consent to be seen remotely, and when consent is given they understand that the consent allows for patient identifiable information to be sent to a third party as needed. They may refuse to be seen remotely at any time. The electronic data is encrypted and password protected, and the patient and/or guardian has been advised of the potential risks to privacy not withstanding such measures.     You have chosen to receive care through a telehealth visit.  Do you consent to use a video/audio connection for your medical care today? Yes    PROGRESS NOTE  Data:  Sudhakar Saul is a 48 y.o. female who presents today for follow up. Patient states that she isn't doing so good. Per patient, she is currently dealing with her aunt telling her that she is strong and that she has to be strong for her family. Patient states that she has tried to be strong in front of others but her step father has seen her break down when its just the two of them at the home. Patient states that her mother was also providing some financial support for this aunt and patient is fearful that her aunt will start asking for money and per the patient she will have to make sure the money that was left for her can take care of her and her stepfather as the patient is still waiting to hear about her disability hearing and states that she has not received her mother's death certificate which  she will need in order to see if she can qualify for adult dependent benefits from her mother's social security. Patient states that she doesn't know when the death certificate will come. Patient states that her step father tried to go back to work yesterday and states that he couldn't do it and was given the rest of the year off to grieve. Patient states that she overheard a conversation between her step father and his family in which they questioned the patient's sobriety and her ability to pay the bills. Patient discussed how her step father had stood up for her and that made her feel better and also showed his love for her.  Patient states that her stepfather's family have never really been supportive of him and that over the course of the last eight years her mother had refused to do anything with them due to how judgmental they were toward her and how they treated the patient's stepfather. Patient states that she and her step father have been working on things together and truly support one another.  Patient states that her mother's friend will be coming in to visit her this weekend to check on her and her stepfather and she is looking forward to this visit.  Patient states that she has been able to lose more weight and that has helped her to feel good about herself.  Patient also states that her dosage of lisinopril has been decreased another 10 mg and she will now be able to have a lung scan since she recently turned 48.  Patient discussed how she spent her birthday with her boyfriend and her stepfather.  Patient states that she is trying to do the best she can right now and has been back in her mother's home for a week at this time and is trying to get more comfortable with the idea of her mother not being there however patient states that she has found that she will talk to her mother's cremation box or touch it as she passes it and that is comforting to her.    Chief Complaint: grief, depression, anxiety,  broken sleep    History of Present Illness: patient has struggled with schizoaffective disorder, depression and anxiety for several years      Clinical Maneuvering/Intervention:  CBT    (Scales based on 0 - 10 with 10 being the worst)  Depression: 8 Anxiety: 4-5       Assisted patient in processing above session content; acknowledged and normalized patient’s thoughts, feelings, and concerns.  Rationalized patient thought process regarding the loss of her mother.  Discussed triggers associated with patient's feelings of depression and anxiety such as grieving the loss of her mother, worrying about financial issues, and trying to stay focused on her personal goals during this time.  Also discussed coping skills for patient to implement such as trying to ignore anyone who is not being supportive of her and her situation, maintaining boundaries with others, allowing herself to enjoy things as she did before while continuing to process through her grief.    Allowed patient to freely discuss issues without interruption or judgment. Provided safe, confidential environment to facilitate the development of positive therapeutic relationship and encourage open, honest communication. Assisted patient in identifying risk factors which would indicate the need for higher level of care including thoughts to harm self or others and/or self-harming behavior and encouraged patient to contact this office, call 911, or present to the nearest emergency room should any of these events occur. Discussed crisis intervention services and means to access. Patient adamantly and convincingly denies current suicidal or homicidal ideation or perceptual disturbance.    Assessment:   Assessment   Patient appears to maintain relative stability as compared to their baseline.  However, patient continues to struggle with depression, anxiety, and schizoaffective disorder  which continues to cause impairment in important areas of functioning.  A result,  they can be reasonably expected to continue to benefit from treatment and would likely be at increased risk for decompensation otherwise.    Mental Status Exam:   Hygiene:   good  Cooperation:  Cooperative  Eye Contact:  Good  Psychomotor Behavior:  Appropriate  Affect:  Appropriate  Mood: fluctates  Speech:  Normal  Thought Process:  Goal directed  Thought Content:  Normal  Suicidal:  None  Homicidal:  None  Hallucinations:  Auditory- little girl crying saying she wanted her momma  Delusion:  None  Memory:  Deficits  Orientation:  Person, Place, Time, and Situation  Reliability:  good  Insight:  Good  Judgement:  Good  Impulse Control:  Good  Physical/Medical Issues:   down 10 mg on Lisinopril and will be having a lung scan soon.        Patient's Support Network Includes:  significant other, extended family, and friends    Functional Status: Moderate impairment     Progress toward goal: Not at goal    Prognosis: Good with Ongoing Treatment          Plan:    Patient will continue in individual outpatient therapy with focus on improved functioning and coping skills, maintaining stability, and avoiding decompensation and the need for higher level of care.    Patient will adhere to medication regimen as prescribed and report any side effects. Patient will contact this office, call 911 or present to the nearest emergency room should suicidal or homicidal ideations occur. Provide Cognitive Behavioral Therapy and Solution Focused Therapy to improve functioning, maintain stability, and avoid decompensation and the need for higher level of care.     Return in about 2 weeks, or earlier if symptoms worsen or fail to improve.           VISIT DIAGNOSIS:     ICD-10-CM ICD-9-CM   1. Grief  F43.21 309.0   2. Schizoaffective disorder, depressive type  F25.1 295.70             This document has been electronically signed by DREAD Vo  December 12, 2023 09:48 EST      Part of this note may be an electronic  transcription/translation of spoken language to printed text using the Dragon Dictation System.

## 2023-12-19 ENCOUNTER — TELEMEDICINE (OUTPATIENT)
Dept: PSYCHIATRY | Facility: CLINIC | Age: 48
End: 2023-12-19
Payer: COMMERCIAL

## 2023-12-19 DIAGNOSIS — F51.5 NIGHTMARES: ICD-10-CM

## 2023-12-19 DIAGNOSIS — F43.21 GRIEF: Primary | ICD-10-CM

## 2023-12-19 DIAGNOSIS — F41.1 GENERALIZED ANXIETY DISORDER: ICD-10-CM

## 2023-12-19 DIAGNOSIS — F25.1 SCHIZOAFFECTIVE DISORDER, DEPRESSIVE TYPE: ICD-10-CM

## 2023-12-19 DIAGNOSIS — G47.9 SLEEPING DIFFICULTY: ICD-10-CM

## 2023-12-19 PROBLEM — M51.9 LUMBOSACRAL DISC DISEASE: Status: ACTIVE | Noted: 2023-12-19

## 2023-12-19 PROBLEM — N28.9 DISORDER OF KIDNEY: Status: ACTIVE | Noted: 2023-10-31

## 2023-12-19 PROBLEM — G40.209 COMPLEX PARTIAL SEIZURE: Status: ACTIVE | Noted: 2023-12-19

## 2023-12-19 PROBLEM — Z85.42 HISTORY OF ENDOMETRIAL CANCER: Status: ACTIVE | Noted: 2023-10-31

## 2023-12-19 PROBLEM — G62.9 POLYNEUROPATHY: Status: ACTIVE | Noted: 2023-12-19

## 2023-12-19 PROBLEM — M81.0 OSTEOPOROSIS: Status: ACTIVE | Noted: 2023-10-31

## 2023-12-19 PROBLEM — G25.81 RLS (RESTLESS LEGS SYNDROME): Status: ACTIVE | Noted: 2023-12-19

## 2023-12-19 PROBLEM — E53.8 B12 DEFICIENCY: Status: ACTIVE | Noted: 2023-12-07

## 2023-12-19 PROCEDURE — 1160F RVW MEDS BY RX/DR IN RCRD: CPT | Performed by: NURSE PRACTITIONER

## 2023-12-19 PROCEDURE — 99214 OFFICE O/P EST MOD 30 MIN: CPT | Performed by: NURSE PRACTITIONER

## 2023-12-19 PROCEDURE — 1159F MED LIST DOCD IN RCRD: CPT | Performed by: NURSE PRACTITIONER

## 2023-12-19 RX ORDER — NICOTINE 21 MG/24H
PATCH, EXTENDED RELEASE TRANSDERMAL
COMMUNITY

## 2023-12-19 RX ORDER — CYANOCOBALAMIN 1000 UG/ML
1000 INJECTION, SOLUTION INTRAMUSCULAR; SUBCUTANEOUS
COMMUNITY
Start: 2023-12-07 | End: 2024-01-07

## 2023-12-19 RX ORDER — SUCRALFATE 1 G/1
TABLET ORAL
COMMUNITY
Start: 2023-11-02

## 2023-12-19 NOTE — PROGRESS NOTES
This provider is located at [facility], [address] [place] [address].The Patient is seen remotely at home, [address] [facility] [place] [address] via [employer].  Patient is being seen via telehealth and confirm that they are in a secure environment for this session. The patient's condition being diagnosed/treated is appropriate for telemedicine. The provider identified himself/herself: herself as well as her credentials.   The patient gave consent to be seen remotely, and when consent is given they understand that the consent allows for patient identifiable information to be sent to a third party as needed.   They may refuse to be seen remotely at any time. The electronic data is encrypted and password protected, and the patient has been advised of the potential risks to privacy not withstanding such measures.    You have chosen to receive care through a telehealth visit.  Do you consent to use a video/audio connection for your medical care today? Yes      Chief Complaint  Follow-for schizoaffective depressed type and grief     Subjective    Sudhakar Sotojosef Saul presents to [facility] for medication management.       History of Present Illness   Patient presents today reporting things have not been going well as she lost her mother on November 24.  She states that she was 3 days postop from bariatric surgery and had a PE.  Patient states that she was on blood thinners so they were not sure why this occurred and it was very sudden as she coded and they were not able to get her back.  Patient states that she is taking things 1 day at a time and making sure she is in therapy with Tg on the regular basis.  Reports that she does have a good support system and is trying to do things that her mother would want her to do.  Reports that she is going to bike cookies and sweets and hand them out as gifts as her and her mother did every year.  She states  that she has her ashes with her and she knows she is in her life based on her radha which has been comforting.  She states that she is going [place] her [place] her mother [place].  Reports that she is doing [place] with her stepfather and then she is staying with her boyfriend and he is going to his sisters.  Patient states that she is LIVING only getting 3 to 4 hours at night and up and down but feels that it is related to thoughts about her mother and the depression.  She states the sadness has been the worst part about feeling down and hopeless.  Reports her appetite has been decreased but she is trying to eat because of her diabetes.  Reports that she is taking 2 Minipress did not help with nightmares.  Patient states that she has had a couple auditory hallucinations of a little girl calling out for her mother but otherwise denies any auditory or visual hallucinations.  Denies any SI or HI.  Denies any side effects to medications.  Reports that she just believes she is going through the grieving process and does not feel she needs medication changes at this time.      Objective   Vital Signs:   There were no vitals taken for this visit.  Due to the remote nature of this encounter (virtual encounter), vitals were unable to be obtained.  Height stated at 66 inches.  Weight stated at 238 pounds.        PHQ-9 Score:   PHQ-9 Total Score:     Patient screened positive for depression based on a PHQ-9 score of  on . Follow-up recommendations include:  Patient has schizophrenia and is currently being managed with medication. .  PHQ-9 Depression Screening  Little interest or pleasure in doing things? (P) 3-->nearly every day   Feeling down, depressed, or hopeless? (P) 3-->nearly every day   Trouble falling or staying asleep, or sleeping too much? (P) 3-->nearly every day   Feeling tired or having little energy? (P) 3-->nearly every day   Poor appetite or overeating? (P) 2-->more than half  the days   Feeling bad about yourself - or that you are a failure or have let yourself or your family down? (P) 0-->not at all   Trouble concentrating on things, such as reading the newspaper or watching television? (P) 1-->several days   Moving or speaking so slowly that other people could have noticed? Or the opposite - being so fidgety or restless that you have been moving around a lot more than usual? (P) 0-->not at all   Thoughts that you would be better off dead, or of hurting yourself in some way? (P) 0-->not at all   PHQ-9 Total Score (P) 15   If you checked off any problems, how difficult have these problems made it for you to do your work, take care of things at home, or get along with other people? (P) extremely difficult     PHQ-9 Total Score: (P) 15    JD-7  Feeling nervous, anxious or on edge: (P) Several days  Not being able to stop or control worrying: (P) More than half the days  Worrying too much about different things: (P) Several days  Trouble Relaxing: (P) Several days  Being so restless that it is hard to sit still: (P) Not at all  Feeling afraid as if something awful might happen: (P) Not at all  Becoming easily annoyed or irritable: (P) Several days  JD 7 Total Score: (P) 6  If you checked any problems, how difficult have these problems made it for you to do your work, take care of things at home, or get along with other people: (P) Very difficult    Mental Status Exam:   Hygiene:   good  Cooperation:  Cooperative  Eye Contact:  Good  Psychomotor Behavior:  Appropriate  Affect:  Appropriate  Mood: Sad, depressed, anxious   Speech:  Normal  Thought Process:  Goal directed and Linear  Thought Content:  Normal  Suicidal:  None  Homicidal:  None  Hallucinations:  None  Delusion:  None  Memory:  Intact  Orientation:  Person, Place, Time and Situation  Reliability:  good  Insight:  Good and Fair  Judgement:  Good and Fair  Impulse Control:  Good and Fair  Physical/Medical Issues:  Yes Dm. HTN,  CKD stage 2, hep C+, JACQUE, hx substance abuse      Current Medications:   Current Outpatient Medications   Medication Sig Dispense Refill    cholecalciferol (VITAMIN D3) 1.25 MG (92814 UT) capsule Every Week      cyanocobalamin 1000 MCG/ML injection Inject 1 mL into the appropriate muscle as directed by prescriber.      lisinopril (PRINIVIL,ZESTRIL) 20 MG tablet Take 1 tablet by mouth Daily. (Patient taking differently: Take 0.5 tablets by mouth Daily.) 30 tablet 3    sucralfate (CARAFATE) 1 g tablet TAKE 1 TABLET BY MOUTH 4 TIMES DAILY DISSOLVE TABLET IN WATER AND DRINK SLURRY      tiotropium bromide monohydrate (SPIRIVA RESPIMAT) 2.5 MCG/ACT aerosol solution inhaler Daily.      albuterol (ACCUNEB) 1.25 MG/3ML nebulizer solution Take 3 mL by nebulization Every 6 (Six) Hours As Needed for Wheezing or Shortness of Air. 9 mL 3    Cariprazine HCl (VRAYLAR) 4.5 MG capsule capsule Take 1 capsule by mouth Daily. 90 capsule 0    Continuous Blood Gluc  (Dexcom G6 ) device USE FOR 6 MONTHS      Continuous Blood Gluc Sensor (Dexcom G6 Sensor) PLACE 1 SENSOR ON SKIN CHANGING EVERY 10 DAYS 9 each 0    Continuous Blood Gluc Transmit (Dexcom G6 Transmitter) misc USE AS DIRECTED 1 each 0    cyclobenzaprine (FLEXERIL) 10 MG tablet Take 1 tablet by mouth 3 (Three) Times a Day As Needed for Muscle Spasms. 90 tablet 3    DULoxetine (CYMBALTA) 30 MG capsule Take 3 capsules by mouth Daily. 270 capsule 0    EQ Nicotine 21 MG/24HR patch APPLY 1 PATCH TOPICALLY EVERY 24 HOURS      Ertugliflozin L-PyroglutamicAc (Steglatro) 15 MG tablet Take 1 tablet by mouth Every Morning. 90 tablet 1    FeroSul 325 (65 Fe) MG tablet       Fluticasone Furoate-Vilanterol (Breo Ellipta) 200-25 MCG/INH inhaler Inhale 1 puff Daily. 28 each 11    gabapentin (NEURONTIN) 300 MG capsule Take 1 capsule by mouth 3 (Three) Times a Day.      hydrOXYzine (ATARAX) 25 MG tablet Take 0.5-1 tablets by mouth 3 (Three) Times a Day As Needed for Anxiety. 270  tablet 0    Insulin Disposable Pump (OmniPod Dash 5 Pack Pods) misc       Insulin Lispro (humaLOG) 100 UNIT/ML injection USE AS DIRECTED THROUGH INSULIN PUMP UPTO 180 UNITS DAILY 90 mL 0    lamoTRIgine (LaMICtal) 150 MG tablet Take 1 tablet by mouth Daily. 90 tablet 0    levETIRAcetam (KEPPRA) 1000 MG tablet Take 1 tablet by mouth 2 (Two) Times a Day. 60 tablet 3    metFORMIN (GLUCOPHAGE) 500 MG tablet Take 1 tablet by mouth 2 (Two) Times a Day With Meals. 60 tablet 11    metoprolol tartrate (LOPRESSOR) 50 MG tablet Take 1 tablet by mouth 2 (Two) Times a Day. 60 tablet 3    Nebulizer device 1 application Every 6 (Six) Hours As Needed (dyspnea). 1 each 3    NIFEdipine XL (PROCARDIA XL) 30 MG 24 hr tablet Take 1 tablet by mouth Daily. 30 tablet 3    omeprazole (priLOSEC) 40 MG capsule Take 1 capsule by mouth Daily. 30 capsule 3    OXcarbazepine (TRILEPTAL) 600 MG tablet Take 1 tablet by mouth 2 (Two) Times a Day. PT TAKES  MG TAB IN MORNING AND  MG IN EVENING      prazosin (MINIPRESS) 2 MG capsule Take 1-2 capsules by mouth Every Night. 180 capsule 0    rOPINIRole (REQUIP) 3 MG tablet Take 1 tablet by mouth Every Night.      simvastatin (ZOCOR) 40 MG tablet Take 1 tablet by mouth Every Night. 30 tablet 3    Trulicity 4.5 MG/0.5ML solution pen-injector INJECT 1 SYRINGE SUBCUTANEOUSLY ONCE A WEEK 4 mL 0    Ventolin  (90 Base) MCG/ACT inhaler Inhale 2 puffs Every 4 (Four) Hours As Needed for Wheezing. 6.7 g 11    vitamin D (ERGOCALCIFEROL) 1.25 MG (47948 UT) capsule capsule TAKE 2 CAPSULES BY MOUTH ONCE A WEEK 8 capsule 3     No current facility-administered medications for this visit.       Physical Exam  Nursing note reviewed.   Constitutional:       Appearance: Normal appearance.   Neurological:      Mental Status: She is alert.   Psychiatric:         Attention and Perception: Attention normal. She does not perceive auditory or visual hallucinations.         Mood and Affect: Mood is anxious and  depressed. Affect is tearful.         Speech: Speech normal.         Behavior: Behavior is cooperative.         Thought Content: Thought content normal.         Cognition and Memory: Cognition and memory normal.         Judgment: Judgment normal.        Result Review :     The following data was reviewed by: ROD Spencer on 02/03/2021:  Common labs          2/20/2023    09:57 6/7/2023    09:09 9/18/2023    09:44   Common Labs   Glucose 486  247  423    BUN 31  23  10    Creatinine 0.78  0.69  0.68    Sodium 130  134  134    Potassium 5.0  4.4  4.4    Chloride 96  100  98    Calcium 9.5  9.7  9.5    Albumin 3.7  3.9  4.2    Total Bilirubin 0.2  0.2  0.3    Alkaline Phosphatase 100  102  108    AST (SGOT) 42  27  21    ALT (SGPT) 45  41  31    WBC 8.63  9.90  10.64    Hemoglobin 14.1  13.9  15.0    Hematocrit 42.3  42.0  45.4    Platelets 163  175  174    Total Cholesterol 222  194  220    Triglycerides 313  304  412     379    HDL Cholesterol 35  32  31    LDL Cholesterol  131  109  122    Hemoglobin A1C 16.00  10.60  12.40    Microalbumin, Urine <1.2  <1.2       CMP          2/20/2023    09:57 6/7/2023    09:09 9/18/2023    09:44   CMP   Glucose 486  247  423    BUN 31  23  10    Creatinine 0.78  0.69  0.68    EGFR 93.2  106.5  106.9    Sodium 130  134  134    Potassium 5.0  4.4  4.4    Chloride 96  100  98    Calcium 9.5  9.7  9.5    Total Protein 6.8  7.4  7.1    Albumin 3.7  3.9  4.2    Globulin 3.1  3.5  2.9    Total Bilirubin 0.2  0.2  0.3    Alkaline Phosphatase 100  102  108    AST (SGOT) 42  27  21    ALT (SGPT) 45  41  31    Albumin/Globulin Ratio 1.2  1.1  1.4    BUN/Creatinine Ratio 39.7  33.3  14.7    Anion Gap 13.0  13.0  13.0      CBC          2/20/2023    09:57 6/7/2023    09:09 9/18/2023    09:44   CBC   WBC 8.63  9.90  10.64    RBC 4.94  4.97  5.24    Hemoglobin 14.1  13.9  15.0    Hematocrit 42.3  42.0  45.4    MCV 85.6  84.5  86.6    MCH 28.5  28.0  28.6    MCHC 33.3  33.1  33.0    RDW  14.6  13.4  13.4    Platelets 163  175  174      CBC w/diff      CBC w/Diff 6/10/20 9/9/20 1/26/21   WBC 8.89 9.63 9.04   RBC 4.85 5.07 5.10   Hemoglobin 14.1 14.9 14.4   Hematocrit 42.8 44.3 45.0   MCV 88.2 87.4 88.2   MCH 29.1 29.4 28.2   MCHC 32.9 33.6 32.0   RDW 13.6 13.3 13.0   Platelets 157 180 174   Neutrophil Rel % 63.8 67.2    Immature Granulocyte Rel % 0.6 (A) 0.5    Lymphocyte Rel % 26.4 21.6    Monocyte Rel % 4.5 (A) 4.8 (A)    Eosinophil Rel % 3.9 5.3    Basophil Rel % 0.8 0.6    (A) Abnormal value              Lipid Panel          2/20/2023    09:57 6/7/2023    09:09 9/18/2023    09:44   Lipid Panel   Total Cholesterol 222  194  220    Triglycerides 313  304  412     379    HDL Cholesterol 35  32  31    VLDL Cholesterol 56  53  67    LDL Cholesterol  131  109  122    LDL/HDL Ratio 3.55  3.16  3.65      TSH          2/20/2023    09:57 6/7/2023    09:09 9/18/2023    09:44   TSH   TSH 1.310  1.740  1.220      BMP          2/20/2023    09:57 6/7/2023    09:09 9/18/2023    09:44   BMP   BUN 31  23  10    Creatinine 0.78  0.69  0.68    Sodium 130  134  134    Potassium 5.0  4.4  4.4    Chloride 96  100  98    CO2 21.0  21.0  23.0    Calcium 9.5  9.7  9.5      HgB          2/20/2023    09:57 6/7/2023    09:09 9/18/2023    09:44   HGB   Hemoglobin 14.1  13.9  15.0      Data reviewed: PCP notes         Assessment and Plan    Problem List Items Addressed This Visit          Mental Health    Schizoaffective disorder, depressive type    Relevant Medications    EQ Nicotine 21 MG/24HR patch    Generalized anxiety disorder    Relevant Medications    EQ Nicotine 21 MG/24HR patch     Other Visit Diagnoses       Grief    -  Primary    Nightmares        Relevant Medications    EQ Nicotine 21 MG/24HR patch    Sleeping difficulty                TREATMENT PLAN/GOALS: Continue supportive psychotherapy efforts and medications as indicated. Treatment and medication options discussed during today's visit. Patient ackowledged  and verbally consented to continue with current treatment plan and was educated on the importance of compliance with treatment and follow-up appointments.    MEDICATION ISSUES:  We discussed risks, benefits, and side effects of the above medications and the patient was agreeable with the plan. Patient was educated on the importance of compliance with treatment and follow-up appointments.  Patient is agreeable to call the office with any worsening of symptoms or onset of side effects. Patient is agreeable to call 911 or go to the nearest ER should he/she begin having SI/HI. Lengthy discussion with patient on the possible side effects of antipsychotic medications including increased cholesterol, increased blood sugar, and possibility of weight gain.  Also discussed the need to monitor lab work associated with this.  The risk of muscle movement disorders with this class of medication was also discussed. We will continue Lamictal in an effort to stabilize mood.  The patient was reminded to immediately come to the hospital should there be any loss of control.  Explanation was given to her regarding Lamictal and the potential for Foster Shon syndrome and significant rash.  Patient was encouraged to check skin prior to beginning.  Patient was encouraged to report any rash and to immediately stop medication.      -Continue lamotrigine 150 mg daily for schizoaffective disorder.  -Continue minipress 2-4mg at night for nightmares.  -Continue Vraylar  4.5 mg daily for schizoaffective disorder depressed type.  -Continue hydroxyzine 12.5 to 25 mg up to 3 times a day as needed for anxiety and panic.  Reminded patient to use more often if needed.  - Continue Cymbalta 90mg daily for depression.  -Informed patient that I would not write for the 60 mg as that was related to pain not for her depression or her mood but she could contact her PCP or see other pain management to prescribe this as she is stated she does not have any side  effects and tolerated well as this is over the recommended dose for depression so I will not continue.  -Continue psychotherapy to help with past trauma as well as depressive and anxiety symptoms.  -Highly encourage patient since she is now on Trileptal to be aware of side effects of multiple mood stabilizers and if she were to have any rash to contact the clinic as we may have to look at tapering off the lamotrigine or talking with neurologist to reevaluate Trileptal.  Patient verbalized understanding and denied any side effects at this time.  -Informed patient if she had any significant injuries in mood or side effects or concerns we may have to adjust medications and to contact clinic she verbalized understanding.  -Encouraged patient if she started sleeping less and her daily hygiene started to be effective in worsening depression or suicidal thoughts or paranoia or auditory or visual hallucinations worsen to contact clinic as we would adjust medications.  Patient verbalized understanding stating that she felt as if this is the grieving process at this time but will call if things worsen.      Patient has now failed and tried Haldol, aripiprazole, Latuda and now risperidone which has caused weight gain and due to diabetes and kidney disease would benefit from Vraylar as patient is still having auditory and visual hallucinations.     Counseled patient regarding multimodal approach with healthy nutrition, healthy sleep, regular physical activity, social activities, counseling, and medications.      Coping skills reviewed and encouraged positive framing of thoughts     Assisted patient in processing above session content; acknowledged and normalized patient’s thoughts, feelings, and concerns.  Applied  positive coping skills and behavior management in session.  Allowed patient to freely discuss issues without interruption or judgment. Provided safe, confidential environment to facilitate the development of positive  therapeutic relationship and encourage open, honest communication. Assisted patient in identifying risk factors which would indicate the need for higher level of care including thoughts to harm self or others and/or self-harming behavior and encouraged patient to contact this office, call 911, or present to the nearest emergency room should any of these events occur. Discussed crisis intervention services and means to access.     MEDS ORDERED DURING VISIT:  No orders of the defined types were placed in this encounter.    90 day supply sent in last month.    Follow Up   Return in about 4 weeks (around 1/16/2024), or if symptoms worsen or fail to improve, for Recheck.  Informed patient we would be following closely after surgery to ensure any symptoms didn't return or become worse she verbalized understanding to call with any changes.     Patient was given instructions and counseling regarding her condition or for health maintenance advice. Please see specific information pulled into the AVS if appropriate.     Some of the data in this electronic note has been brought forward from a previous encounter, any necessary changes have been made, it has been reviewed by this APRN, and it is accurate.      This document has been electronically signed by ROD Spencer  December 19, 2023 09:00 EST    Part of this note may be an electronic transcription/translation of spoken language to printed text using the Dragon Dictation System.

## 2023-12-28 ENCOUNTER — TELEMEDICINE (OUTPATIENT)
Dept: PSYCHIATRY | Facility: CLINIC | Age: 48
End: 2023-12-28
Payer: COMMERCIAL

## 2023-12-28 DIAGNOSIS — F25.1 SCHIZOAFFECTIVE DISORDER, DEPRESSIVE TYPE: ICD-10-CM

## 2023-12-28 DIAGNOSIS — F43.21 GRIEF: Primary | ICD-10-CM

## 2023-12-28 PROCEDURE — 90837 PSYTX W PT 60 MINUTES: CPT | Performed by: COUNSELOR

## 2023-12-28 NOTE — PROGRESS NOTES
Date: January 23, 2024  Time In: 8:42 AM  Time Out: 9:35 AM  This provider is located at the [facility]), [address], [place] [address] using a secure [employer] Video Visit through [employer]. Patient is being seen remotely via telehealth at home address in [place] and stated they are in a secure environment for this session. The patient's condition being diagnosed/treated is appropriate for telemedicine. The provider identified herself as well as her credentials. The patient, and/or patients guardian, consent to be seen remotely, and when consent is given they understand that the consent allows for patient identifiable information to be sent to a third party as needed. They may refuse to be seen remotely at any time. The electronic data is encrypted and password protected, and the patient and/or guardian has been advised of the potential risks to privacy not withstanding such measures.     You have chosen to receive care through a telehealth visit.  Do you consent to use a video/audio connection for your medical care today? Yes    PROGRESS NOTE  Data:  Sudhakar Saul is a 48 y.o. female who presents today for follow up. Patient states that she is doing ok. Patient states that she found out on Christmas when she called her father to tell him Missy Gale that her step mother had passed away the Thursday before. Patient states that her step mother had a blood clot in her leg and it was swelling rapidly and it was also found that the woman was suffering from internal bleeding as well and may have had a form of cancer. Patient states that her father is recovering from a hip replacement surgery and was unable to do much and had not told the patient that her step mother was put on Hospice around the time of her birthday which was why she states that her father had not called her then. Patient states that she offered to do anything she could for her  father but he stated that he was being taken care of by neighbors and did not need anything at this time.  Patient states that he does not really show his emotions but did express concern for her due to the death of her mother.  Patient states that she can tell mother conversation that her father was very saddened by the loss of his father and therefore she is very concerned about how things will go for him in the future.  Patient states that she had to go to the ER yesterday and get a torredol shot due to pinched nerve in her neck that is caused by her other issues in her neck. Patient states that she is feeling a little better today. Patient states that she had to contact her former step father who was in the  about her mother's passing and states that it took him two weeks to respond to the message which was aggravating to her but she states that his mother acknowledged the past and of her mother.  Patient states that she had to let him know that her mother had passed so that he could and the  benefits that her mother was still receiving from him.  Patient states that she feels as though he already knew about her mother's passing before she reached out him but he only seemed to become interested when the realization was made that with the patient's mother's passing that she would no longer get a portion of his  benefits.  Patient states that when he finally responded to her he did thank her for letting him know and stated that he would handle everything else from there.  Patient states that she never really care for this man as he was not good to her mother and she felt that he could have been a little more compassionate in regard to her mother's passing.  Patient states that she is just taking things one day at a time and is doing the best she can for now.    Chief Complaint: grief, anxiety, depression     History of Present Illness: Patient has struggled with anxiety, depression and  schizoaffective disorder for several years      Clinical Maneuvering/Intervention:  CBT    (Scales based on 0 - 10 with 10 being the worst)  Depression: 7 Anxiety: 4       Assisted patient in processing above session content; acknowledged and normalized patient’s thoughts, feelings, and concerns.  Rationalized patient thought process regarding recent events and another loss in her life.  Discussed triggers associated with patient's feelings of anxiety and depression such as continued grief over her mother's passing and now another loss in the death of her stepmother in addition to financial worries and her own health issues.  Also discussed coping skills for patient to implement such as keeping in contact with her father, continuing to work with her stepfather on a budget for their home, and following up with all of her medical providers    Allowed patient to freely discuss issues without interruption or judgment. Provided safe, confidential environment to facilitate the development of positive therapeutic relationship and encourage open, honest communication. Assisted patient in identifying risk factors which would indicate the need for higher level of care including thoughts to harm self or others and/or self-harming behavior and encouraged patient to contact this office, call 911, or present to the nearest emergency room should any of these events occur. Discussed crisis intervention services and means to access. Patient adamantly and convincingly denies current suicidal or homicidal ideation or perceptual disturbance.    Assessment:   Assessment   Patient appears to maintain relative stability as compared to their baseline.  However, patient continues to struggle with anxiety, depression,  which continues to cause impairment in important areas of functioning.  A result, they can be reasonably expected to continue to benefit from treatment and would likely be at increased risk for decompensation  "otherwise.    Mental Status Exam:   Hygiene:   good  Cooperation:  Cooperative  Eye Contact:  Good  Psychomotor Behavior:  Appropriate  Affect:  Appropriate  Mood: fluctates  Speech:  Normal  Thought Process:  Goal directed  Thought Content:  Normal  Suicidal:  None  Homicidal:  None  Hallucinations:  Visual-shadow movement turning into a demonic-like creature; auditory hallucination of the little girl \"saying you shouldn't do that.\"  Delusion:  None  Memory:  Deficits  Orientation:  Person, Place, Time, and Situation  Reliability:  good  Insight:  Good  Judgement:  Good  Impulse Control:  Good  Physical/Medical Issues:  Yes pinched nerve in neck        Patient's Support Network Includes:  extended family and friends    Functional Status: Moderate impairment     Progress toward goal: Not at goal    Prognosis: Good with Ongoing Treatment          Plan:    Patient will continue in individual outpatient therapy with focus on improved functioning and coping skills, maintaining stability, and avoiding decompensation and the need for higher level of care.    Patient will adhere to medication regimen as prescribed and report any side effects. Patient will contact this office, call 911 or present to the nearest emergency room should suicidal or homicidal ideations occur. Provide Cognitive Behavioral Therapy and Solution Focused Therapy to improve functioning, maintain stability, and avoid decompensation and the need for higher level of care.     Return in about 2 weeks, or earlier if symptoms worsen or fail to improve.           VISIT DIAGNOSIS:     ICD-10-CM ICD-9-CM   1. Grief  F43.21 309.0   2. Schizoaffective disorder, depressive type  F25.1 295.70             This document has been electronically signed by DREAD Vo  January 23, 2024 08:32 EST      Part of this note may be an electronic transcription/translation of spoken language to printed text using the Dragon Dictation System.  "

## 2024-01-10 ENCOUNTER — TELEMEDICINE (OUTPATIENT)
Dept: PSYCHIATRY | Facility: CLINIC | Age: 49
End: 2024-01-10
Payer: COMMERCIAL

## 2024-01-10 DIAGNOSIS — F41.1 GENERALIZED ANXIETY DISORDER: ICD-10-CM

## 2024-01-10 DIAGNOSIS — F25.1 SCHIZOAFFECTIVE DISORDER, DEPRESSIVE TYPE: Primary | ICD-10-CM

## 2024-01-10 NOTE — PROGRESS NOTES
Date: January 10, 2024  Time In: 8:30 AM   Time Out: 9:21 AM  This provider is located at the [facility]), [address], [place] [address] using a secure [employer] Video Visit through [employer]. Patient is being seen remotely via telehealth at home address in [place] and stated they are in a secure environment for this session. The patient's condition being diagnosed/treated is appropriate for telemedicine. The provider identified herself as well as her credentials. The patient, and/or patients guardian, consent to be seen remotely, and when consent is given they understand that the consent allows for patient identifiable information to be sent to a third party as needed. They may refuse to be seen remotely at any time. The electronic data is encrypted and password protected, and the patient and/or guardian has been advised of the potential risks to privacy not withstanding such measures.     You have chosen to receive care through a telehealth visit.  Do you consent to use a video/audio connection for your medical care today? Yes    PROGRESS NOTE  Data:  Sudhakar Saul is a 48 y.o. female who presents today for follow up. Patient states that she has been sick for about a week now with a head and chest cold that she thinks she got from her boyfriend. Patient states that she talked to her father the other day and had the most honest and in depth conversation that they have ever had and discussed both his and the patient's addiction history. Patient states that he told her that he had often thought about her and that her mother was a good woman. Patient states that he also stated that he thought that if it had not been for his substance abuse issues would things have been different for them. Patient states that she was surprised that her father actually brought up this conversation. Patient states that her father asked how she was doing and she told  him that they were struggling financially and he sent her a substantial amount of money and then she was able to get her mother's life insurance money and states that she might be ok financially for about a year now. Patient states that she feels that it may take another 3 months for her to hear something about her disability claim and hopes that she can get survivor benefits from her mother from Social Security until then. Patient states that she asked her father if he wanted her to come help him and he told her that he didn't need anything right now as he had a lot of people helping him out and taking care of him. Discussed that the patient could possibly plan a trip to see him in the near future. Patient is still dealing with the pinched nerve in her neck that is causing half of her right arm to be numb. Patient states that she can't pick anything up because she can't feel her last three fingers. Patient states that she has already been told that she will need two more back surgeries. Patient states that she has also been irritated with others as she states that she is still taking things day by day and states that her aunt has been taking her mother's passing very hard and he is calling her constantly.  Patient states that the aunt has not asked for money yet but has not understood that the patient does not want to talk every day.  Patient states that she has been able to sit in her mother's chair and has been having conversations directed toward her mother's ashes.  Patient states that this has been comforting to her and that her stepfather has taken on calling her during the day as needed her mother.  Patient states that she feels in some ways that she has taken on her mom's role at the house and is using the skills that her mother taught her which in turn makes her feel closer to her mother.  Patient states that she and her stepfather can feel her mother's presence and that has been helpful to them as well.   Patient states that recently she saw shadow movements take on a demonic form and states that has not happened in a while but also states that she has not been sleeping well due to being sick and trying to get things done that need to be handled.      Chief Complaint: grief, anxiety, decrease realized that patient does not want to talk every dayd sleep, recent financial concerns    History of Present Illness: patient has struggled with anxiety, depression and schizoaffective disorder for several years    Clinical Maneuvering/Intervention:  CBT    (Scales based on 0 - 10 with 10 being the worst)  Depression: 7 Anxiety: 4       Assisted patient in processing above session content; acknowledged and normalized patient’s thoughts, feelings, and concerns.  Rationalized patient thought process regarding family circumstances.  Discussed triggers associated with patient's feelings of grief, anxiety, and depression as the patient continues to grieve the loss of her mother and her stepmother..  Also discussed coping skills for patient to implement such as paying things in advance as much as possible to alleviate her financial worries, contacting her  in order to report her new conditions, and planning visits to go see her father and her aunt who possibly check in on them when she herself is feeling better.    Allowed patient to freely discuss issues without interruption or judgment. Provided safe, confidential environment to facilitate the development of positive therapeutic relationship and encourage open, honest communication. Assisted patient in identifying risk factors which would indicate the need for higher level of care including thoughts to harm self or others and/or self-harming behavior and encouraged patient to contact this office, call 911, or present to the nearest emergency room should any of these events occur. Discussed crisis intervention services and means to access. Patient adamantly and  convincingly denies current suicidal or homicidal ideation or perceptual disturbance.    Assessment:   Assessment   Patient appears to maintain relative stability as compared to their baseline.  However, patient continues to struggle with anxiety, depression and schizoaffective disorder which continues to cause impairment in important areas of functioning.  A result, they can be reasonably expected to continue to benefit from treatment and would likely be at increased risk for decompensation otherwise.    Mental Status Exam: patient and therapist struggled with connection issues and patient   Hygiene:   good  Cooperation:  Cooperative  Eye Contact:  Good  Psychomotor Behavior:  Appropriate  Affect:  Appropriate  Mood: fluctates  Speech:  Normal  Thought Process:  Goal directed  Thought Content:  Normal  Suicidal:  None  Homicidal:  None  Hallucinations:  Visual- shadow movement took form on the side of her neighbor's home and looked demonic and moved toward her but stopped  Delusion:  None  Memory:  Deficits  Orientation:  Person, Place, Time, and Situation  Reliability:  good  Insight:  Good  Judgement:  Good  Impulse Control:  Good  Physical/Medical Issues:  Yes pinched nerve in neck        Patient's Support Network Includes:  significant other and extended family    Functional Status: Moderate impairment     Progress toward goal: Not at goal    Prognosis: Good with Ongoing Treatment          Plan:    Patient will continue in individual outpatient therapy with focus on improved functioning and coping skills, maintaining stability, and avoiding decompensation and the need for higher level of care.    Patient will adhere to medication regimen as prescribed and report any side effects. Patient will contact this office, call 911 or present to the nearest emergency room should suicidal or homicidal ideations occur. Provide Cognitive Behavioral Therapy and Solution Focused Therapy to improve functioning, maintain  stability, and avoid decompensation and the need for higher level of care.     Return in about 2 weeks, or earlier if symptoms worsen or fail to improve.           VISIT DIAGNOSIS:     ICD-10-CM ICD-9-CM   1. Schizoaffective disorder, depressive type  F25.1 295.70   2. Generalized anxiety disorder  F41.1 300.02             This document has been electronically signed by DREAD Vo  January 10, 2024 14:13 EST      Part of this note may be an electronic transcription/translation of spoken language to printed text using the Dragon Dictation System.

## 2024-01-22 ENCOUNTER — TELEMEDICINE (OUTPATIENT)
Dept: PSYCHIATRY | Facility: CLINIC | Age: 49
End: 2024-01-22
Payer: COMMERCIAL

## 2024-01-22 DIAGNOSIS — F41.1 GENERALIZED ANXIETY DISORDER: ICD-10-CM

## 2024-01-22 DIAGNOSIS — G47.00 INSOMNIA, UNSPECIFIED TYPE: ICD-10-CM

## 2024-01-22 DIAGNOSIS — F43.21 GRIEF: Primary | ICD-10-CM

## 2024-01-22 DIAGNOSIS — F51.5 NIGHTMARES: ICD-10-CM

## 2024-01-22 DIAGNOSIS — F25.1 SCHIZOAFFECTIVE DISORDER, DEPRESSIVE TYPE: ICD-10-CM

## 2024-01-22 PROBLEM — M53.9 MULTILEVEL DEGENERATIVE DISC DISEASE: Status: ACTIVE | Noted: 2023-06-22

## 2024-01-22 PROBLEM — E11.9 DM (DIABETES MELLITUS): Status: ACTIVE | Noted: 2020-05-15

## 2024-01-22 PROBLEM — Z12.2 SCREENING FOR LUNG CANCER: Status: ACTIVE | Noted: 2022-10-21

## 2024-01-22 PROBLEM — D64.9 ANEMIA: Status: ACTIVE | Noted: 2023-06-22

## 2024-01-22 PROBLEM — M54.30 SCIATICA: Status: ACTIVE | Noted: 2023-06-22

## 2024-01-22 PROBLEM — E66.9 OBESITY: Status: ACTIVE | Noted: 2020-06-19

## 2024-01-22 PROBLEM — I25.10 CAD (CORONARY ARTERY DISEASE): Status: ACTIVE | Noted: 2023-06-22

## 2024-01-22 PROBLEM — F51.4 SLEEP TERROR DISORDER: Status: ACTIVE | Noted: 2023-06-22

## 2024-01-22 PROBLEM — G62.9 NEUROPATHY: Status: ACTIVE | Noted: 2023-06-22

## 2024-01-22 PROBLEM — M48.00 SPINAL STENOSIS: Status: ACTIVE | Noted: 2023-06-22

## 2024-01-22 PROBLEM — E66.812 CLASS 2 SEVERE OBESITY DUE TO EXCESS CALORIES WITH SERIOUS COMORBIDITY AND BODY MASS INDEX (BMI) OF 38.0 TO 38.9 IN ADULT: Status: ACTIVE | Noted: 2023-09-14

## 2024-01-22 PROBLEM — G43.909 MIGRAINE: Status: ACTIVE | Noted: 2023-06-22

## 2024-01-22 PROCEDURE — 1160F RVW MEDS BY RX/DR IN RCRD: CPT | Performed by: NURSE PRACTITIONER

## 2024-01-22 PROCEDURE — 1159F MED LIST DOCD IN RCRD: CPT | Performed by: NURSE PRACTITIONER

## 2024-01-22 PROCEDURE — 99214 OFFICE O/P EST MOD 30 MIN: CPT | Performed by: NURSE PRACTITIONER

## 2024-01-22 RX ORDER — TRAZODONE HYDROCHLORIDE 50 MG/1
25-100 TABLET ORAL NIGHTLY
Qty: 60 TABLET | Refills: 1 | Status: SHIPPED | OUTPATIENT
Start: 2024-01-22

## 2024-01-22 RX ORDER — PRAZOSIN HYDROCHLORIDE 2 MG/1
2-4 CAPSULE ORAL NIGHTLY
Qty: 180 CAPSULE | Refills: 0 | Status: SHIPPED | OUTPATIENT
Start: 2024-01-22

## 2024-01-22 RX ORDER — LAMOTRIGINE 150 MG/1
150 TABLET ORAL DAILY
Qty: 90 TABLET | Refills: 0 | Status: SHIPPED | OUTPATIENT
Start: 2024-01-22

## 2024-01-22 RX ORDER — DULOXETIN HYDROCHLORIDE 30 MG/1
90 CAPSULE, DELAYED RELEASE ORAL DAILY
Qty: 270 CAPSULE | Refills: 0 | Status: SHIPPED | OUTPATIENT
Start: 2024-01-22

## 2024-01-22 RX ORDER — LISINOPRIL 10 MG/1
1 TABLET ORAL DAILY
COMMUNITY
Start: 2024-01-08

## 2024-01-22 NOTE — PROGRESS NOTES
This provider is located at [facility], [address] [place] [address].The Patient is seen remotely at home, [address] [place] [address] via [email or web address].  Patient is being seen via telehealth and confirm that they are in a secure environment for this session. The patient's condition being diagnosed/treated is appropriate for telemedicine. The provider identified himself/herself: herself as well as her credentials.   The patient gave consent to be seen remotely, and when consent is given they understand that the consent allows for patient identifiable information to be sent to a third party as needed.   They may refuse to be seen remotely at any time. The electronic data is encrypted and password protected, and the patient has been advised of the potential risks to privacy not withstanding such measures.    You have chosen to receive care through a telehealth visit.  Do you consent to use a video/audio connection for your medical care today? Yes      Chief Complaint  Follow-for schizoaffective depressed type and grief     Subjective    Sudhakar More Saul presents to [facility] for medication management.       History of Present Illness   Patient presents today reporting that the loss of her mom and the grief have been a lot lately.  She states that she is also dealing with head and chest cold.  She states that she has had a couple of auditory hallucinations with the little girl in her crying out for her mother but otherwise denies any others.  Denies any visual hallucinations or delusions or paranoia.  Patient states that she has been talking with her father who has shown more interest in her and helped financially even though she did not ask which she states has been helpful.  Patient notes however she has been feeling more hopeless and helpless and depressed mood.  She states that she is still able to take care of herself and has her daily  tasks that she has to do and working in therapy but it is still very difficult.  Patient rates anxiety at 5 depression and 8 on a scale of 0-10 with 10 being the worst.  Patient states that she recently found out she has a pinched nerve which is affecting some of her sleep.  Patient states however she is only getting 4 hours at night and waking up every 2 hours.  She states that she has been more worried and dreaming of her mother.  She states pain does affect this however she is feeling tired and exhausted which she believes will help some with the depression if she can rest.  Denies any side effects to medications.  Denies any SI/HI/VH.  Denies that the auditory hallucinations tell her to harm herself or others.      Objective   Vital Signs:   There were no vitals taken for this visit.  Due to the remote nature of this encounter (virtual encounter), vitals were unable to be obtained.  Height stated at 66 inches.  Weight stated at 232 pounds.        PHQ-9 Score:   PHQ-9 Total Score:     Patient screened positive for depression based on a PHQ-9 score of  on . Follow-up recommendations include:  Patient has schizophrenia and is currently being managed with medication. .  PHQ-9 Depression Screening  Little interest or pleasure in doing things? (P) 2-->more than half the days   Feeling down, depressed, or hopeless? (P) 3-->nearly every day   Trouble falling or staying asleep, or sleeping too much? (P) 3-->nearly every day   Feeling tired or having little energy? (P) 3-->nearly every day   Poor appetite or overeating? (P) 2-->more than half the days   Feeling bad about yourself - or that you are a failure or have let yourself or your family down? (P) 1-->several days   Trouble concentrating on things, such as reading the newspaper or watching television? (P) 3-->nearly every day   Moving or speaking so slowly that other people could have noticed? Or the opposite - being so fidgety or restless that you have been moving  around a lot more than usual? (P) 0-->not at all   Thoughts that you would be better off dead, or of hurting yourself in some way? (P) 0-->not at all   PHQ-9 Total Score (P) 17   If you checked off any problems, how difficult have these problems made it for you to do your work, take care of things at home, or get along with other people? (P) extremely difficult     PHQ-9 Total Score: (P) 17    JD-7  Feeling nervous, anxious or on edge: (P) More than half the days  Not being able to stop or control worrying: (P) Nearly every day  Worrying too much about different things: (P) Several days  Trouble Relaxing: (P) Several days  Being so restless that it is hard to sit still: (P) Not at all  Feeling afraid as if something awful might happen: (P) Not at all  Becoming easily annoyed or irritable: (P) More than half the days  JD 7 Total Score: (P) 9  If you checked any problems, how difficult have these problems made it for you to do your work, take care of things at home, or get along with other people: (P) Very difficult    Mental Status Exam:   Hygiene:   good  Cooperation:  Cooperative  Eye Contact:  Good  Psychomotor Behavior:  Appropriate  Affect:  Appropriate  Mood: Sad, depressed, anxious   Speech:  Normal  Thought Process:  Goal directed and Linear  Thought Content:  Normal  Suicidal:  None  Homicidal:  None  Hallucinations:  None  Delusion:  None  Memory:  Intact  Orientation:  Person, Place, Time and Situation  Reliability:  good  Insight:  Good and Fair  Judgement:  Good and Fair  Impulse Control:  Good and Fair  Physical/Medical Issues:  Yes Dm. HTN, CKD stage 2, hep C+, JACQUE, hx substance abuse      Current Medications:   Current Outpatient Medications   Medication Sig Dispense Refill    Cariprazine HCl (VRAYLAR) 4.5 MG capsule capsule Take 1 capsule by mouth Daily. 90 capsule 0    DULoxetine (CYMBALTA) 30 MG capsule Take 3 capsules by mouth Daily. 270 capsule 0    lamoTRIgine (LaMICtal) 150 MG tablet Take 1  tablet by mouth Daily. 90 tablet 0    lisinopril (PRINIVIL,ZESTRIL) 10 MG tablet Take 1 tablet by mouth Daily.      prazosin (MINIPRESS) 2 MG capsule Take 1-2 capsules by mouth Every Night. 180 capsule 0    albuterol (ACCUNEB) 1.25 MG/3ML nebulizer solution Take 3 mL by nebulization Every 6 (Six) Hours As Needed for Wheezing or Shortness of Air. 9 mL 3    cholecalciferol (VITAMIN D3) 1.25 MG (79899 UT) capsule Every Week      Continuous Blood Gluc  (Dexcom G6 ) device USE FOR 6 MONTHS      Continuous Blood Gluc Sensor (Dexcom G6 Sensor) PLACE 1 SENSOR ON SKIN CHANGING EVERY 10 DAYS 9 each 0    Continuous Blood Gluc Transmit (Dexcom G6 Transmitter) misc USE AS DIRECTED 1 each 0    cyclobenzaprine (FLEXERIL) 10 MG tablet Take 1 tablet by mouth 3 (Three) Times a Day As Needed for Muscle Spasms. 90 tablet 3    EQ Nicotine 21 MG/24HR patch APPLY 1 PATCH TOPICALLY EVERY 24 HOURS      Ertugliflozin L-PyroglutamicAc (Steglatro) 15 MG tablet Take 1 tablet by mouth Every Morning. 90 tablet 1    FeroSul 325 (65 Fe) MG tablet       Fluticasone Furoate-Vilanterol (Breo Ellipta) 200-25 MCG/INH inhaler Inhale 1 puff Daily. 28 each 11    gabapentin (NEURONTIN) 300 MG capsule Take 1 capsule by mouth 3 (Three) Times a Day.      hydrOXYzine (ATARAX) 25 MG tablet Take 0.5-1 tablets by mouth 3 (Three) Times a Day As Needed for Anxiety. 270 tablet 0    Insulin Disposable Pump (OmniPod Dash 5 Pack Pods) misc       Insulin Lispro (humaLOG) 100 UNIT/ML injection USE AS DIRECTED THROUGH INSULIN PUMP UPTO 180 UNITS DAILY 90 mL 0    levETIRAcetam (KEPPRA) 1000 MG tablet Take 1 tablet by mouth 2 (Two) Times a Day. 60 tablet 3    metFORMIN (GLUCOPHAGE) 500 MG tablet Take 1 tablet by mouth 2 (Two) Times a Day With Meals. 60 tablet 11    metoprolol tartrate (LOPRESSOR) 50 MG tablet Take 1 tablet by mouth 2 (Two) Times a Day. 60 tablet 3    Nebulizer device 1 application Every 6 (Six) Hours As Needed (dyspnea). 1 each 3     NIFEdipine XL (PROCARDIA XL) 30 MG 24 hr tablet Take 1 tablet by mouth Daily. 30 tablet 3    omeprazole (priLOSEC) 40 MG capsule Take 1 capsule by mouth Daily. 30 capsule 3    OXcarbazepine (TRILEPTAL) 600 MG tablet Take 1 tablet by mouth 2 (Two) Times a Day. PT TAKES  MG TAB IN MORNING AND  MG IN EVENING      rOPINIRole (REQUIP) 3 MG tablet Take 1 tablet by mouth Every Night.      simvastatin (ZOCOR) 40 MG tablet Take 1 tablet by mouth Every Night. 30 tablet 3    sucralfate (CARAFATE) 1 g tablet TAKE 1 TABLET BY MOUTH 4 TIMES DAILY DISSOLVE TABLET IN WATER AND DRINK SLURRY      tiotropium bromide monohydrate (SPIRIVA RESPIMAT) 2.5 MCG/ACT aerosol solution inhaler Daily.      traZODone (DESYREL) 50 MG tablet Take 0.5-2 tablets by mouth Every Night. 60 tablet 1    Trulicity 4.5 MG/0.5ML solution pen-injector INJECT 1 SYRINGE SUBCUTANEOUSLY ONCE A WEEK 4 mL 0    Ventolin  (90 Base) MCG/ACT inhaler Inhale 2 puffs Every 4 (Four) Hours As Needed for Wheezing. 6.7 g 11    vitamin D (ERGOCALCIFEROL) 1.25 MG (03498 UT) capsule capsule TAKE 2 CAPSULES BY MOUTH ONCE A WEEK 8 capsule 3     No current facility-administered medications for this visit.       Physical Exam  Nursing note reviewed.   Constitutional:       Appearance: Normal appearance.   Neurological:      Mental Status: She is alert.   Psychiatric:         Attention and Perception: Attention normal. She does not perceive auditory or visual hallucinations.         Mood and Affect: Mood is anxious and depressed. Affect is not tearful.         Speech: Speech normal.         Behavior: Behavior is cooperative.         Thought Content: Thought content normal.         Cognition and Memory: Cognition and memory normal.         Judgment: Judgment normal.        Result Review :     The following data was reviewed by: ROD Spencer on 02/03/2021:  Common labs          2/20/2023    09:57 6/7/2023    09:09 9/18/2023    09:44   Common Labs   Glucose  486  247  423    BUN 31  23  10    Creatinine 0.78  0.69  0.68    Sodium 130  134  134    Potassium 5.0  4.4  4.4    Chloride 96  100  98    Calcium 9.5  9.7  9.5    Albumin 3.7  3.9  4.2    Total Bilirubin 0.2  0.2  0.3    Alkaline Phosphatase 100  102  108    AST (SGOT) 42  27  21    ALT (SGPT) 45  41  31    WBC 8.63  9.90  10.64    Hemoglobin 14.1  13.9  15.0    Hematocrit 42.3  42.0  45.4    Platelets 163  175  174    Total Cholesterol 222  194  220    Triglycerides 313  304  412     379    HDL Cholesterol 35  32  31    LDL Cholesterol  131  109  122    Hemoglobin A1C 16.00  10.60  12.40    Microalbumin, Urine <1.2  <1.2       CMP          2/20/2023    09:57 6/7/2023    09:09 9/18/2023    09:44   CMP   Glucose 486  247  423    BUN 31  23  10    Creatinine 0.78  0.69  0.68    EGFR 93.2  106.5  106.9    Sodium 130  134  134    Potassium 5.0  4.4  4.4    Chloride 96  100  98    Calcium 9.5  9.7  9.5    Total Protein 6.8  7.4  7.1    Albumin 3.7  3.9  4.2    Globulin 3.1  3.5  2.9    Total Bilirubin 0.2  0.2  0.3    Alkaline Phosphatase 100  102  108    AST (SGOT) 42  27  21    ALT (SGPT) 45  41  31    Albumin/Globulin Ratio 1.2  1.1  1.4    BUN/Creatinine Ratio 39.7  33.3  14.7    Anion Gap 13.0  13.0  13.0      CBC          2/20/2023    09:57 6/7/2023    09:09 9/18/2023    09:44   CBC   WBC 8.63  9.90  10.64    RBC 4.94  4.97  5.24    Hemoglobin 14.1  13.9  15.0    Hematocrit 42.3  42.0  45.4    MCV 85.6  84.5  86.6    MCH 28.5  28.0  28.6    MCHC 33.3  33.1  33.0    RDW 14.6  13.4  13.4    Platelets 163  175  174      CBC w/diff      CBC w/Diff 6/10/20 9/9/20 1/26/21   WBC 8.89 9.63 9.04   RBC 4.85 5.07 5.10   Hemoglobin 14.1 14.9 14.4   Hematocrit 42.8 44.3 45.0   MCV 88.2 87.4 88.2   MCH 29.1 29.4 28.2   MCHC 32.9 33.6 32.0   RDW 13.6 13.3 13.0   Platelets 157 180 174   Neutrophil Rel % 63.8 67.2    Immature Granulocyte Rel % 0.6 (A) 0.5    Lymphocyte Rel % 26.4 21.6    Monocyte Rel % 4.5 (A) 4.8 (A)     Eosinophil Rel % 3.9 5.3    Basophil Rel % 0.8 0.6    (A) Abnormal value              Lipid Panel          2/20/2023    09:57 6/7/2023    09:09 9/18/2023    09:44   Lipid Panel   Total Cholesterol 222  194  220    Triglycerides 313  304  412     379    HDL Cholesterol 35  32  31    VLDL Cholesterol 56  53  67    LDL Cholesterol  131  109  122    LDL/HDL Ratio 3.55  3.16  3.65      TSH          2/20/2023    09:57 6/7/2023    09:09 9/18/2023    09:44   TSH   TSH 1.310  1.740  1.220      BMP          2/20/2023    09:57 6/7/2023    09:09 9/18/2023    09:44   BMP   BUN 31  23  10    Creatinine 0.78  0.69  0.68    Sodium 130  134  134    Potassium 5.0  4.4  4.4    Chloride 96  100  98    CO2 21.0  21.0  23.0    Calcium 9.5  9.7  9.5      HgB          2/20/2023    09:57 6/7/2023    09:09 9/18/2023    09:44   HGB   Hemoglobin 14.1  13.9  15.0      Data reviewed: PCP notes         Assessment and Plan    Problem List Items Addressed This Visit          Mental Health    Schizoaffective disorder, depressive type    Relevant Medications    traZODone (DESYREL) 50 MG tablet    Cariprazine HCl (VRAYLAR) 4.5 MG capsule capsule    DULoxetine (CYMBALTA) 30 MG capsule    lamoTRIgine (LaMICtal) 150 MG tablet    Generalized anxiety disorder    Relevant Medications    traZODone (DESYREL) 50 MG tablet    Cariprazine HCl (VRAYLAR) 4.5 MG capsule capsule    DULoxetine (CYMBALTA) 30 MG capsule     Other Visit Diagnoses       Grief    -  Primary    Nightmares        Relevant Medications    traZODone (DESYREL) 50 MG tablet    Cariprazine HCl (VRAYLAR) 4.5 MG capsule capsule    DULoxetine (CYMBALTA) 30 MG capsule    prazosin (MINIPRESS) 2 MG capsule    Insomnia, unspecified type        Relevant Medications    traZODone (DESYREL) 50 MG tablet              TREATMENT PLAN/GOALS: Continue supportive psychotherapy efforts and medications as indicated. Treatment and medication options discussed during today's visit. Patient ackowledged and verbally  consented to continue with current treatment plan and was educated on the importance of compliance with treatment and follow-up appointments.    MEDICATION ISSUES:  We discussed risks, benefits, and side effects of the above medications and the patient was agreeable with the plan. Patient was educated on the importance of compliance with treatment and follow-up appointments.  Patient is agreeable to call the office with any worsening of symptoms or onset of side effects. Patient is agreeable to call 911 or go to the nearest ER should he/she begin having SI/HI. Lengthy discussion with patient on the possible side effects of antipsychotic medications including increased cholesterol, increased blood sugar, and possibility of weight gain.  Also discussed the need to monitor lab work associated with this.  The risk of muscle movement disorders with this class of medication was also discussed. We will continue Lamictal in an effort to stabilize mood.  The patient was reminded to immediately come to the hospital should there be any loss of control.  Explanation was given to her regarding Lamictal and the potential for Foster Shon syndrome and significant rash.  Patient was encouraged to check skin prior to beginning.  Patient was encouraged to report any rash and to immediately stop medication.      -Continue lamotrigine 150 mg daily for schizoaffective disorder.  -Continue minipress 2-4mg at night for nightmares.  -Continue Vraylar  4.5 mg daily for schizoaffective disorder depressed type.  -Continue hydroxyzine 12.5 to 25 mg up to 3 times a day as needed for anxiety and panic.  Reminded patient to use more often if needed.  - Continue Cymbalta 90mg daily for depression.  -Informed patient that I would not write for the 60 mg as that was related to pain not for her depression or her mood but she could contact her PCP or see other pain management to prescribe this as she is stated she does not have any side effects and  tolerated well as this is over the recommended dose for depression so I will not continue.  -Continue psychotherapy to help with past trauma as well as depressive and anxiety symptoms.  -Highly encourage patient since she is now on Trileptal to be aware of side effects of multiple mood stabilizers and if she were to have any rash to contact the clinic as we may have to look at tapering off the lamotrigine or talking with neurologist to reevaluate Trileptal.  Patient verbalized understanding and denied any side effects at this time.  -Praised patient for continuing therapy and working through the grief process and doing her daily task.  -We will add on trazodone 25 to 100 mg at night as needed for sleep.  Highly encouraged patient if she had any side effects or worsening symptoms to discontinue and contact the clinic as we would look at a trial of mirtazapine or amitriptyline patient verbalized understanding.      Patient has now failed and tried Haldol, aripiprazole, Latuda and now risperidone which has caused weight gain and due to diabetes and kidney disease would benefit from Vraylar as patient is still having auditory and visual hallucinations.     Counseled patient regarding multimodal approach with healthy nutrition, healthy sleep, regular physical activity, social activities, counseling, and medications.      Coping skills reviewed and encouraged positive framing of thoughts     Assisted patient in processing above session content; acknowledged and normalized patient’s thoughts, feelings, and concerns.  Applied  positive coping skills and behavior management in session.  Allowed patient to freely discuss issues without interruption or judgment. Provided safe, confidential environment to facilitate the development of positive therapeutic relationship and encourage open, honest communication. Assisted patient in identifying risk factors which would indicate the need for higher level of care including thoughts  to harm self or others and/or self-harming behavior and encouraged patient to contact this office, call 911, or present to the nearest emergency room should any of these events occur. Discussed crisis intervention services and means to access.     MEDS ORDERED DURING VISIT:  New Medications Ordered This Visit   Medications    traZODone (DESYREL) 50 MG tablet     Sig: Take 0.5-2 tablets by mouth Every Night.     Dispense:  60 tablet     Refill:  1    Cariprazine HCl (VRAYLAR) 4.5 MG capsule capsule     Sig: Take 1 capsule by mouth Daily.     Dispense:  90 capsule     Refill:  0    DULoxetine (CYMBALTA) 30 MG capsule     Sig: Take 3 capsules by mouth Daily.     Dispense:  270 capsule     Refill:  0    lamoTRIgine (LaMICtal) 150 MG tablet     Sig: Take 1 tablet by mouth Daily.     Dispense:  90 tablet     Refill:  0    prazosin (MINIPRESS) 2 MG capsule     Sig: Take 1-2 capsules by mouth Every Night.     Dispense:  180 capsule     Refill:  0     90 day supply sent in last month.    Follow Up   Return in about 4 weeks (around 2/19/2024), or if symptoms worsen or fail to improve, for Recheck.  Informed patient we would be following closely after surgery to ensure any symptoms didn't return or become worse she verbalized understanding to call with any changes.     Patient was given instructions and counseling regarding her condition or for health maintenance advice. Please see specific information pulled into the AVS if appropriate.     Some of the data in this electronic note has been brought forward from a previous encounter, any necessary changes have been made, it has been reviewed by this APRN, and it is accurate.      This document has been electronically signed by ROD Spencer  January 22, 2024 09:01 EST    Part of this note may be an electronic transcription/translation of spoken language to printed text using the Dragon Dictation System.

## 2024-01-23 ENCOUNTER — TELEMEDICINE (OUTPATIENT)
Dept: PSYCHIATRY | Facility: CLINIC | Age: 49
End: 2024-01-23
Payer: COMMERCIAL

## 2024-01-23 DIAGNOSIS — F43.21 GRIEF: Primary | ICD-10-CM

## 2024-01-23 DIAGNOSIS — F25.1 SCHIZOAFFECTIVE DISORDER, DEPRESSIVE TYPE: ICD-10-CM

## 2024-01-23 DIAGNOSIS — F41.1 GENERALIZED ANXIETY DISORDER: ICD-10-CM

## 2024-01-23 PROCEDURE — 90837 PSYTX W PT 60 MINUTES: CPT | Performed by: COUNSELOR

## 2024-01-25 DIAGNOSIS — F41.1 GENERALIZED ANXIETY DISORDER: ICD-10-CM

## 2024-01-25 RX ORDER — HYDROXYZINE HYDROCHLORIDE 25 MG/1
12.5-25 TABLET, FILM COATED ORAL 3 TIMES DAILY PRN
Qty: 270 TABLET | Refills: 0 | Status: SHIPPED | OUTPATIENT
Start: 2024-01-25

## 2024-01-26 ENCOUNTER — PRIOR AUTHORIZATION (OUTPATIENT)
Dept: PSYCHIATRY | Facility: CLINIC | Age: 49
End: 2024-01-26
Payer: COMMERCIAL

## 2024-02-19 ENCOUNTER — TELEMEDICINE (OUTPATIENT)
Dept: PSYCHIATRY | Facility: CLINIC | Age: 49
End: 2024-02-19
Payer: COMMERCIAL

## 2024-02-19 DIAGNOSIS — F43.21 GRIEF: Primary | ICD-10-CM

## 2024-02-19 DIAGNOSIS — G47.00 INSOMNIA, UNSPECIFIED TYPE: Chronic | ICD-10-CM

## 2024-02-19 DIAGNOSIS — F25.9 SCHIZOAFFECTIVE DISORDER, IN REMISSION: Chronic | ICD-10-CM

## 2024-02-19 PROBLEM — M54.2 NECK PAIN: Status: ACTIVE | Noted: 2024-02-08

## 2024-02-19 PROBLEM — M54.12 CERVICAL RADICULOPATHY: Status: ACTIVE | Noted: 2024-02-08

## 2024-02-19 PROCEDURE — 99214 OFFICE O/P EST MOD 30 MIN: CPT | Performed by: NURSE PRACTITIONER

## 2024-02-19 PROCEDURE — 1159F MED LIST DOCD IN RCRD: CPT | Performed by: NURSE PRACTITIONER

## 2024-02-19 PROCEDURE — 1160F RVW MEDS BY RX/DR IN RCRD: CPT | Performed by: NURSE PRACTITIONER

## 2024-02-19 RX ORDER — HYDROCODONE BITARTRATE AND ACETAMINOPHEN 5; 325 MG/1; MG/1
1 TABLET ORAL 3 TIMES DAILY PRN
COMMUNITY

## 2024-02-19 NOTE — PROGRESS NOTES
This provider is located at [facility], [address] [place] [address].The Patient is seen remotely at home, [address] [place] [address] via [employer].  Patient is being seen via telehealth and confirm that they are in a secure environment for this session. The patient's condition being diagnosed/treated is appropriate for telemedicine. The provider identified himself/herself: herself as well as her credentials.   The patient gave consent to be seen remotely, and when consent is given they understand that the consent allows for patient identifiable information to be sent to a third party as needed.   They may refuse to be seen remotely at any time. The electronic data is encrypted and password protected, and the patient has been advised of the potential risks to privacy not withstanding such measures.    You have chosen to receive care through a telehealth visit.  Do you consent to use a video/audio connection for your medical care today? Yes      Chief Complaint  Follow-for schizoaffective depressed type and grief     Subjective    Sudhakar Sotojosef Saul presents to [facility] for medication management.       History of Present Illness   Patient presents today reporting things have been better.  She states that she has a pinched nerve and is having a lot of pain and numbness and does not follow up with neurology until the 29th.  Patient also notes she has had severe acid reflux and states her PCP tomorrow in regards to this.  Reports the trazodone has been helpful but she has not been taking at night due to the acid reflux and afraid she will not wake up so she is waiting to it is under control.  She reports she is averaging roughly 4 hours of sleep at night.  She states the pain as well as lack of sleep has caused irritability and agitation.  Patient reports she has had 1 seizure but states that this been due to stress and pain she  believes.  Patient notes that she still has depressive episodes related to the grieving process as she still states she gets sad at times as her and her mother did everything together and she misses that companionship.  Patient states her anxiety has been very high lately not only due to pain but finances and stressors as she reports she had the spend money for dentures and [employer] bills so that has been hard on her.  Patient states that she is still sticking with her diet as she is down to 230 pounds.  She states the scan is starting to affect her and get infected at times.  Patient notes that she may be looking at skin surgery but also needs back surgery as well which causes her a lot of stress.  Patient feels this is just situational at this time.  Encouraged weekly if not weekly at least twice a month therapy sessions.  Denies any side effects to medications.  Denies any SI/HI/AH/VH.  Patient stated she did have 2 times where she heard a little girl's voice saying her name but otherwise denies any auditory hallucinations.      Objective   Vital Signs:   There were no vitals taken for this visit.  Due to the remote nature of this encounter (virtual encounter), vitals were unable to be obtained.  Height stated at 66 inches.  Weight stated at 230 pounds.        PHQ-9 Score:   PHQ-9 Total Score:     Patient screened positive for depression based on a PHQ-9 score of  on . Follow-up recommendations include:  Patient has schizophrenia and is currently being managed with medication. .  PHQ-9 Depression Screening  Little interest or pleasure in doing things? (P) 1-->several days   Feeling down, depressed, or hopeless? (P) 2-->more than half the days   Trouble falling or staying asleep, or sleeping too much? (P) 3-->nearly every day   Feeling tired or having little energy? (P) 3-->nearly every day   Poor appetite or overeating? (P) 1-->several days   Feeling bad about yourself - or that you are a failure or have let yourself  or your family down? (P) 1-->several days   Trouble concentrating on things, such as reading the newspaper or watching television? (P) 2-->more than half the days   Moving or speaking so slowly that other people could have noticed? Or the opposite - being so fidgety or restless that you have been moving around a lot more than usual? (P) 0-->not at all   Thoughts that you would be better off dead, or of hurting yourself in some way? (P) 0-->not at all   PHQ-9 Total Score (P) 13   If you checked off any problems, how difficult have these problems made it for you to do your work, take care of things at home, or get along with other people? (P) very difficult     PHQ-9 Total Score: (P) 13    JD-7  Feeling nervous, anxious or on edge: (P) Nearly every day  Not being able to stop or control worrying: (P) More than half the days  Worrying too much about different things: (P) Nearly every day  Trouble Relaxing: (P) More than half the days  Being so restless that it is hard to sit still: (P) Not at all  Feeling afraid as if something awful might happen: (P) Several days  Becoming easily annoyed or irritable: (P) Several days  JD 7 Total Score: (P) 12  If you checked any problems, how difficult have these problems made it for you to do your work, take care of things at home, or get along with other people: (P) Very difficult    Mental Status Exam:   Hygiene:   good  Cooperation:  Cooperative  Eye Contact:  Good  Psychomotor Behavior:  Appropriate  Affect:  Appropriate  Mood: Sad, depressed, anxious   Speech:  Normal  Thought Process:  Goal directed and Linear  Thought Content:  Normal  Suicidal:  None  Homicidal:  None  Hallucinations:  None and Auditory  Delusion:  None  Memory:  Intact  Orientation:  Person, Place, Time and Situation  Reliability:  good  Insight:  Good and Fair  Judgement:  Good and Fair  Impulse Control:  Good and Fair  Physical/Medical Issues:  Yes Dm. HTN, CKD stage 2, hep C+, JACQUE, hx substance abuse       Current Medications:   Current Outpatient Medications   Medication Sig Dispense Refill    albuterol (ACCUNEB) 1.25 MG/3ML nebulizer solution Take 3 mL by nebulization Every 6 (Six) Hours As Needed for Wheezing or Shortness of Air. 9 mL 3    Cariprazine HCl (VRAYLAR) 4.5 MG capsule capsule Take 1 capsule by mouth Daily. 90 capsule 0    cholecalciferol (VITAMIN D3) 1.25 MG (73339 UT) capsule Every Week      Continuous Blood Gluc  (Dexcom G6 ) device USE FOR 6 MONTHS      Continuous Blood Gluc Sensor (Dexcom G6 Sensor) PLACE 1 SENSOR ON SKIN CHANGING EVERY 10 DAYS 9 each 0    Continuous Blood Gluc Transmit (Dexcom G6 Transmitter) misc USE AS DIRECTED 1 each 0    cyclobenzaprine (FLEXERIL) 10 MG tablet Take 1 tablet by mouth 3 (Three) Times a Day As Needed for Muscle Spasms. 90 tablet 3    DULoxetine (CYMBALTA) 30 MG capsule Take 3 capsules by mouth Daily. 270 capsule 0    EQ Nicotine 21 MG/24HR patch APPLY 1 PATCH TOPICALLY EVERY 24 HOURS      Ertugliflozin L-PyroglutamicAc (Steglatro) 15 MG tablet Take 1 tablet by mouth Every Morning. 90 tablet 1    FeroSul 325 (65 Fe) MG tablet       Fluticasone Furoate-Vilanterol (Breo Ellipta) 200-25 MCG/INH inhaler Inhale 1 puff Daily. 28 each 11    gabapentin (NEURONTIN) 300 MG capsule Take 1 capsule by mouth 3 (Three) Times a Day.      HYDROcodone-acetaminophen (NORCO) 5-325 MG per tablet Take 1 tablet by mouth 3 (Three) Times a Day As Needed for Moderate Pain.      hydrOXYzine (ATARAX) 25 MG tablet TAKE 1/2 TO 1 (ONE-HALF TO ONE) TABLET BY MOUTH THREE TIMES DAILY AS NEEDED FOR ANXIETY 270 tablet 0    Insulin Disposable Pump (OmniPod Dash 5 Pack Pods) misc       Insulin Lispro (humaLOG) 100 UNIT/ML injection USE AS DIRECTED THROUGH INSULIN PUMP UPTO 180 UNITS DAILY 90 mL 0    lamoTRIgine (LaMICtal) 150 MG tablet Take 1 tablet by mouth Daily. 90 tablet 0    levETIRAcetam (KEPPRA) 1000 MG tablet Take 1 tablet by mouth 2 (Two) Times a Day. 60 tablet 3     lisinopril (PRINIVIL,ZESTRIL) 10 MG tablet Take 1 tablet by mouth Daily.      metFORMIN (GLUCOPHAGE) 500 MG tablet Take 1 tablet by mouth 2 (Two) Times a Day With Meals. 60 tablet 11    metoprolol tartrate (LOPRESSOR) 50 MG tablet Take 1 tablet by mouth 2 (Two) Times a Day. 60 tablet 3    Nebulizer device 1 application Every 6 (Six) Hours As Needed (dyspnea). 1 each 3    NIFEdipine XL (PROCARDIA XL) 30 MG 24 hr tablet Take 1 tablet by mouth Daily. 30 tablet 3    omeprazole (priLOSEC) 40 MG capsule Take 1 capsule by mouth Daily. 30 capsule 3    OXcarbazepine (TRILEPTAL) 600 MG tablet Take 1 tablet by mouth 2 (Two) Times a Day. PT TAKES  MG TAB IN MORNING AND  MG IN EVENING      prazosin (MINIPRESS) 2 MG capsule Take 1-2 capsules by mouth Every Night. 180 capsule 0    rOPINIRole (REQUIP) 3 MG tablet Take 1 tablet by mouth Every Night.      simvastatin (ZOCOR) 40 MG tablet Take 1 tablet by mouth Every Night. 30 tablet 3    sucralfate (CARAFATE) 1 g tablet TAKE 1 TABLET BY MOUTH 4 TIMES DAILY DISSOLVE TABLET IN WATER AND DRINK SLURRY      tiotropium bromide monohydrate (SPIRIVA RESPIMAT) 2.5 MCG/ACT aerosol solution inhaler Daily.      traZODone (DESYREL) 50 MG tablet Take 0.5-2 tablets by mouth Every Night. 60 tablet 1    Trulicity 4.5 MG/0.5ML solution pen-injector INJECT 1 SYRINGE SUBCUTANEOUSLY ONCE A WEEK 4 mL 0    Ventolin  (90 Base) MCG/ACT inhaler Inhale 2 puffs Every 4 (Four) Hours As Needed for Wheezing. 6.7 g 11    vitamin D (ERGOCALCIFEROL) 1.25 MG (84679 UT) capsule capsule TAKE 2 CAPSULES BY MOUTH ONCE A WEEK 8 capsule 3     No current facility-administered medications for this visit.       Physical Exam  Nursing note reviewed.   Constitutional:       Appearance: Normal appearance.   Neurological:      Mental Status: She is alert.   Psychiatric:         Attention and Perception: Attention normal. She does not perceive auditory or visual hallucinations.         Mood and Affect: Mood is  anxious and depressed. Affect is not tearful.         Speech: Speech normal.         Behavior: Behavior is cooperative.         Thought Content: Thought content normal.         Cognition and Memory: Cognition and memory normal.         Judgment: Judgment normal.        Result Review :     The following data was reviewed by: ROD Spencer on 02/03/2021:  Common labs          6/7/2023    09:09 9/18/2023    09:44   Common Labs   Glucose 247  423    BUN 23  10    Creatinine 0.69  0.68    Sodium 134  134    Potassium 4.4  4.4    Chloride 100  98    Calcium 9.7  9.5    Albumin 3.9  4.2    Total Bilirubin 0.2  0.3    Alkaline Phosphatase 102  108    AST (SGOT) 27  21    ALT (SGPT) 41  31    WBC 9.90  10.64    Hemoglobin 13.9  15.0    Hematocrit 42.0  45.4    Platelets 175  174    Total Cholesterol 194  220    Triglycerides 304  412     379    HDL Cholesterol 32  31    LDL Cholesterol  109  122    Hemoglobin A1C 10.60  12.40    Microalbumin, Urine <1.2       CMP          6/7/2023    09:09 9/18/2023    09:44   CMP   Glucose 247  423    BUN 23  10    Creatinine 0.69  0.68    EGFR 106.5  106.9    Sodium 134  134    Potassium 4.4  4.4    Chloride 100  98    Calcium 9.7  9.5    Total Protein 7.4  7.1    Albumin 3.9  4.2    Globulin 3.5  2.9    Total Bilirubin 0.2  0.3    Alkaline Phosphatase 102  108    AST (SGOT) 27  21    ALT (SGPT) 41  31    Albumin/Globulin Ratio 1.1  1.4    BUN/Creatinine Ratio 33.3  14.7    Anion Gap 13.0  13.0      CBC          6/7/2023    09:09 9/18/2023    09:44   CBC   WBC 9.90  10.64    RBC 4.97  5.24    Hemoglobin 13.9  15.0    Hematocrit 42.0  45.4    MCV 84.5  86.6    MCH 28.0  28.6    MCHC 33.1  33.0    RDW 13.4  13.4    Platelets 175  174      CBC w/diff      CBC w/Diff 6/10/20 9/9/20 1/26/21   WBC 8.89 9.63 9.04   RBC 4.85 5.07 5.10   Hemoglobin 14.1 14.9 14.4   Hematocrit 42.8 44.3 45.0   MCV 88.2 87.4 88.2   MCH 29.1 29.4 28.2   MCHC 32.9 33.6 32.0   RDW 13.6 13.3 13.0   Platelets 157  180 174   Neutrophil Rel % 63.8 67.2    Immature Granulocyte Rel % 0.6 (A) 0.5    Lymphocyte Rel % 26.4 21.6    Monocyte Rel % 4.5 (A) 4.8 (A)    Eosinophil Rel % 3.9 5.3    Basophil Rel % 0.8 0.6    (A) Abnormal value              Lipid Panel          6/7/2023    09:09 9/18/2023    09:44   Lipid Panel   Total Cholesterol 194  220    Triglycerides 304  412     379    HDL Cholesterol 32  31    VLDL Cholesterol 53  67    LDL Cholesterol  109  122    LDL/HDL Ratio 3.16  3.65      TSH          6/7/2023    09:09 9/18/2023    09:44   TSH   TSH 1.740  1.220      BMP          6/7/2023    09:09 9/18/2023    09:44   BMP   BUN 23  10    Creatinine 0.69  0.68    Sodium 134  134    Potassium 4.4  4.4    Chloride 100  98    CO2 21.0  23.0    Calcium 9.7  9.5      HgB          6/7/2023    09:09 9/18/2023    09:44   HGB   Hemoglobin 13.9  15.0      Data reviewed: PCP notes         Assessment and Plan    Problem List Items Addressed This Visit    None  Visit Diagnoses       Grief    -  Primary    Schizoaffective disorder, in remission  (Chronic)       Insomnia, unspecified type  (Chronic)                   TREATMENT PLAN/GOALS: Continue supportive psychotherapy efforts and medications as indicated. Treatment and medication options discussed during today's visit. Patient ackowledged and verbally consented to continue with current treatment plan and was educated on the importance of compliance with treatment and follow-up appointments.    MEDICATION ISSUES:  We discussed risks, benefits, and side effects of the above medications and the patient was agreeable with the plan. Patient was educated on the importance of compliance with treatment and follow-up appointments.  Patient is agreeable to call the office with any worsening of symptoms or onset of side effects. Patient is agreeable to call 911 or go to the nearest ER should he/she begin having SI/HI. Lengthy discussion with patient on the possible side effects of antipsychotic  medications including increased cholesterol, increased blood sugar, and possibility of weight gain.  Also discussed the need to monitor lab work associated with this.  The risk of muscle movement disorders with this class of medication was also discussed. We will continue Lamictal in an effort to stabilize mood.  The patient was reminded to immediately come to the hospital should there be any loss of control.  Explanation was given to her regarding Lamictal and the potential for Foster Shon syndrome and significant rash.  Patient was encouraged to check skin prior to beginning.  Patient was encouraged to report any rash and to immediately stop medication.      -Continue lamotrigine 150 mg daily for schizoaffective disorder.  -Continue minipress 2-4mg at night for nightmares.  -Continue Vraylar  4.5 mg daily for schizoaffective disorder depressed type.  -Continue hydroxyzine 12.5 to 25 mg up to 3 times a day as needed for anxiety and panic.  Reminded patient to use more often if needed.  - Continue Cymbalta 90mg daily for depression.  -Informed patient that I would not write for the 60 mg as that was related to pain not for her depression or her mood but she could contact her PCP or see other pain management to prescribe this as she is stated she does not have any side effects and tolerated well as this is over the recommended dose for depression so I will not continue.  -Continue psychotherapy to help with past trauma as well as depressive and anxiety symptoms.  -Highly encourage patient since she is now on Trileptal to be aware of side effects of multiple mood stabilizers and if she were to have any rash to contact the clinic as we may have to look at tapering off the lamotrigine or talking with neurologist to reevaluate Trileptal.  Patient verbalized understanding and denied any side effects at this time.  -Praised patient for continuing therapy and working through the grief process and doing her daily  task.  -Continue trazodone 25 to 100 mg at night as needed for sleep.  Highly encouraged patient if she had any side effects or worsening symptoms to discontinue and contact the clinic as we would look at a trial of mirtazapine or amitriptyline patient verbalized understanding.      Patient has now failed and tried Haldol, aripiprazole, Latuda and now risperidone which has caused weight gain and due to diabetes and kidney disease would benefit from Vraylar as patient is still having auditory and visual hallucinations.     Counseled patient regarding multimodal approach with healthy nutrition, healthy sleep, regular physical activity, social activities, counseling, and medications.      Coping skills reviewed and encouraged positive framing of thoughts     Assisted patient in processing above session content; acknowledged and normalized patient’s thoughts, feelings, and concerns.  Applied  positive coping skills and behavior management in session.  Allowed patient to freely discuss issues without interruption or judgment. Provided safe, confidential environment to facilitate the development of positive therapeutic relationship and encourage open, honest communication. Assisted patient in identifying risk factors which would indicate the need for higher level of care including thoughts to harm self or others and/or self-harming behavior and encouraged patient to contact this office, call 911, or present to the nearest emergency room should any of these events occur. Discussed crisis intervention services and means to access.     MEDS ORDERED DURING VISIT:  No orders of the defined types were placed in this encounter.      Follow Up   Return in about 4 weeks (around 3/18/2024), or if symptoms worsen or fail to improve, for Recheck.  Informed patient we would be following closely after surgery to ensure any symptoms didn't return or become worse she verbalized understanding to call with any changes.     Patient was  given instructions and counseling regarding her condition or for health maintenance advice. Please see specific information pulled into the AVS if appropriate.     Some of the data in this electronic note has been brought forward from a previous encounter, any necessary changes have been made, it has been reviewed by this APRN, and it is accurate.      This document has been electronically signed by ROD Spencer  February 19, 2024 08:49 EST    Part of this note may be an electronic transcription/translation of spoken language to printed text using the Dragon Dictation System.

## 2024-03-11 ENCOUNTER — TELEMEDICINE (OUTPATIENT)
Dept: PSYCHIATRY | Facility: CLINIC | Age: 49
End: 2024-03-11
Payer: COMMERCIAL

## 2024-03-11 DIAGNOSIS — F25.1 SCHIZOAFFECTIVE DISORDER, DEPRESSIVE TYPE: Primary | ICD-10-CM

## 2024-03-11 DIAGNOSIS — F41.1 GENERALIZED ANXIETY DISORDER: ICD-10-CM

## 2024-03-11 PROCEDURE — 90837 PSYTX W PT 60 MINUTES: CPT | Performed by: COUNSELOR

## 2024-03-11 NOTE — PROGRESS NOTES
Date: March 11, 2024  Time In: 1:59 PM  Time Out: 2:52 PM  This provider is located at the [facility] (through [facility]), [address], [place] [address] using a secure [employer] Video Visit through [employer]. Patient is being seen remotely via telehealth at home address in [place] and stated they are in a secure environment for this session. The patient's condition being diagnosed/treated is appropriate for telemedicine. The provider identified herself as well as her credentials. The patient, and/or patients guardian, consent to be seen remotely, and when consent is given they understand that the consent allows for patient identifiable information to be sent to a third party as needed. They may refuse to be seen remotely at any time. The electronic data is encrypted and password protected, and the patient and/or guardian has been advised of the potential risks to privacy not withstanding such measures.     You have chosen to receive care through a telehealth visit.  Do you consent to use a video/audio connection for your medical care today? Yes    PROGRESS NOTE  Data:**Patient was able to be seen for a few moments but due to audio issues the patient had to finish out her session via telephone due to no audio being available despite multiple attempts on the video call**  Sudhakar Saul is a 48 y.o. female who presents today for follow up.  Patient states that she is struggling with a pinched nerve in her neck that is affecting her arm; per patient she is being referred to pain management.  Patient presented with a sling on her arm and states that she was told to use a sling in order to help keep weight off of it.  Patient will get an MRI on her brain and one on her neck, an EEG and an EMG to determine what his going on with her arm and also to follow up on her seizure activity. Patient states that she is hopeful that she will be able to get some pain relief soon.  Patient states that her boyfriend is moving to [place] in the next few weeks and the patient states that she has decided that she will be joining him in August. Patient states that will give her enough time to see if she will get her disability as her hearing is in mid May and also give her time to find new doctors. Patient states that she has decided that it is time for her to move on and she has been staying away from her mother's home and spending more time with her boyfriend before he leaves.  Patient states that if all goes as planned she will be moving to [place] with her boyfriend somewhere around the month of August.  Patient states that she is feeling stuck currently and doesn't want her step father to feel the same way as she is afraid that he will feel some sort of obligation to take care of her for the rest of her life and that will prohibit him from moving forward.  Patient states that she wants to be able to have an income of her own before she takes a major step forward such as this but states that she feels as though this will be the best decision for her as she wants to be able to live her life the most enjoyable way possible.  Patient states that she has not heard much from her father in a week and states that the last few times she has talked to him a conversation has been rather short.  Patient states that she feels as though he is grieving and is having to learn to do things for himself which could be overwhelming to him.  Patient states that she does not know if she will go visit her father at any point before she moves out of state as she states that they do not have that type of relationship.  Patient states that she feels as though her father and stepfather would both want her to move on with her life however, patient states that her stepfather was worried about her maintaining her physical and mental health needs if she moves out of the state.  Patient states that she is still  experiencing waves of grief at times in regard to her mother and states that sometimes it is hard for her to be in the home without her mother.  Patient's insulin monitor began going off at the end of the session and patient had to probably leave the session in order to administer an insulin shot to herself.    Chief Complaint: anxiety, depression, physical pain,     History of Present Illness: Patient has struggled with anxiety, depression, and schizoaffective disorder for several years       Clinical Maneuvering/Intervention:  CBT    (Scales based on 0 - 10 with 10 being the worst)  Depression: 4 Anxiety: 6       Assisted patient in processing above session content; acknowledged and normalized patient’s thoughts, feelings, and concerns.  Rationalized patient thought process regarding changes that she is making in her life in the next few months and also her physical pain and feelings of grief.  Discussed triggers associated with patient's feelings of anxiety and depression such as continuing to grieve the loss of her mother, making the decision to move out of state with her boyfriend in the coming months, and trying to navigate all of her medical appointments that are upcoming. Also discussed coping skills for patient to implement such as continuing self-care, beginning to look for medical providers in the area of [place] that she will be moving to and continuing to follow up with her medical providers.    Allowed patient to freely discuss issues without interruption or judgment. Provided safe, confidential environment to facilitate the development of positive therapeutic relationship and encourage open, honest communication. Assisted patient in identifying risk factors which would indicate the need for higher level of care including thoughts to harm self or others and/or self-harming behavior and encouraged patient to contact this office, call 911, or present to the nearest emergency room should any of these  events occur. Discussed crisis intervention services and means to access. Patient adamantly and convincingly denies current suicidal or homicidal ideation or perceptual disturbance.    Assessment:   Assessment   Patient appears to maintain relative stability as compared to their baseline.  However, patient continues to struggle with depression, anxiety and schizoaffective disorder which continues to cause impairment in important areas of functioning.  A result, they can be reasonably expected to continue to benefit from treatment and would likely be at increased risk for decompensation otherwise.    Mental Status Exam: Patient was only able to be seen for short period of time due to technology issues which caused the session to have to be finished out by phone  Hygiene:   good  Cooperation:  Cooperative  Eye Contact:   Good while patient could be seen  Psychomotor Behavior:   Patient was unable to be seen throughout the entire call due to technology issues  Affect:   Patient was unable to be seen throughout the entire call due to technology issues  Mood: fluctates  Speech:  Normal  Thought Process:  Goal directed  Thought Content:  Normal  Suicidal:  None  Homicidal:  None  Hallucinations:  Auditory  Delusion:  None  Memory:  Intact  Orientation:  Person, Place, Time, and Situation  Reliability:  good  Insight:  Good  Judgement:  Good  Impulse Control:  Good  Physical/Medical Issues:   No new concerns      Patient's Support Network Includes:  significant other and extended family    Functional Status: Moderate impairment     Progress toward goal: Not at goal    Prognosis: Good with Ongoing Treatment          Plan:    Patient will continue in individual outpatient therapy with focus on improved functioning and coping skills, maintaining stability, and avoiding decompensation and the need for higher level of care.    Patient will adhere to medication regimen as prescribed and report any side effects. Patient will  contact this office, call 911 or present to the nearest emergency room should suicidal or homicidal ideations occur. Provide Cognitive Behavioral Therapy and Solution Focused Therapy to improve functioning, maintain stability, and avoid decompensation and the need for higher level of care.     Return in about 2 weeks, or earlier if symptoms worsen or fail to improve.           VISIT DIAGNOSIS:     ICD-10-CM ICD-9-CM   1. Schizoaffective disorder, depressive type  F25.1 295.70   2. Generalized anxiety disorder  F41.1 300.02             This document has been electronically signed by DREAD Vo  March 11, 2024 15:27 EDT      Part of this note may be an electronic transcription/translation of spoken language to printed text using the Dragon Dictation System.

## 2024-03-16 DIAGNOSIS — G47.00 INSOMNIA, UNSPECIFIED TYPE: ICD-10-CM

## 2024-03-18 RX ORDER — TRAZODONE HYDROCHLORIDE 50 MG/1
25-100 TABLET ORAL NIGHTLY
Qty: 60 TABLET | Refills: 0 | Status: SHIPPED | OUTPATIENT
Start: 2024-03-18

## 2024-04-02 ENCOUNTER — TELEMEDICINE (OUTPATIENT)
Dept: PSYCHIATRY | Facility: CLINIC | Age: 49
End: 2024-04-02
Payer: COMMERCIAL

## 2024-04-02 DIAGNOSIS — F41.1 GENERALIZED ANXIETY DISORDER: Primary | ICD-10-CM

## 2024-04-02 DIAGNOSIS — F25.1 SCHIZOAFFECTIVE DISORDER, DEPRESSIVE TYPE: ICD-10-CM

## 2024-04-02 PROCEDURE — 90837 PSYTX W PT 60 MINUTES: CPT | Performed by: COUNSELOR

## 2024-04-09 ENCOUNTER — TELEMEDICINE (OUTPATIENT)
Dept: PSYCHIATRY | Facility: CLINIC | Age: 49
End: 2024-04-09
Payer: COMMERCIAL

## 2024-04-09 DIAGNOSIS — F25.1 SCHIZOAFFECTIVE DISORDER, DEPRESSIVE TYPE: Primary | Chronic | ICD-10-CM

## 2024-04-09 DIAGNOSIS — F41.1 GENERALIZED ANXIETY DISORDER: Chronic | ICD-10-CM

## 2024-04-09 DIAGNOSIS — F51.5 NIGHTMARES: ICD-10-CM

## 2024-04-09 DIAGNOSIS — G47.00 INSOMNIA, UNSPECIFIED TYPE: ICD-10-CM

## 2024-04-09 PROCEDURE — 1160F RVW MEDS BY RX/DR IN RCRD: CPT | Performed by: NURSE PRACTITIONER

## 2024-04-09 PROCEDURE — 99214 OFFICE O/P EST MOD 30 MIN: CPT | Performed by: NURSE PRACTITIONER

## 2024-04-09 PROCEDURE — 1159F MED LIST DOCD IN RCRD: CPT | Performed by: NURSE PRACTITIONER

## 2024-04-09 RX ORDER — CYANOCOBALAMIN 1000 UG/ML
INJECTION, SOLUTION INTRAMUSCULAR; SUBCUTANEOUS
COMMUNITY
Start: 2024-03-03

## 2024-04-09 RX ORDER — DULOXETIN HYDROCHLORIDE 30 MG/1
CAPSULE, DELAYED RELEASE ORAL
Qty: 45 CAPSULE | Refills: 0 | Status: SHIPPED | OUTPATIENT
Start: 2024-04-09

## 2024-04-09 RX ORDER — PRAZOSIN HYDROCHLORIDE 2 MG/1
2-4 CAPSULE ORAL NIGHTLY
Qty: 180 CAPSULE | Refills: 0 | Status: SHIPPED | OUTPATIENT
Start: 2024-04-09

## 2024-04-09 RX ORDER — TIRZEPATIDE 5 MG/.5ML
INJECTION, SOLUTION SUBCUTANEOUS
COMMUNITY
Start: 2024-04-01

## 2024-04-09 RX ORDER — DESVENLAFAXINE SUCCINATE 50 MG/1
50 TABLET, EXTENDED RELEASE ORAL DAILY
Qty: 30 TABLET | Refills: 0 | Status: SHIPPED | OUTPATIENT
Start: 2024-04-09

## 2024-04-09 RX ORDER — LEVETIRACETAM 750 MG/1
1500 TABLET ORAL 2 TIMES DAILY
COMMUNITY

## 2024-04-09 RX ORDER — TRAZODONE HYDROCHLORIDE 100 MG/1
100 TABLET ORAL NIGHTLY
Qty: 90 TABLET | Refills: 0 | Status: SHIPPED | OUTPATIENT
Start: 2024-04-09

## 2024-04-09 RX ORDER — GABAPENTIN 600 MG/1
1 TABLET ORAL 3 TIMES DAILY
COMMUNITY
Start: 2024-03-22

## 2024-04-09 RX ORDER — LAMOTRIGINE 150 MG/1
150 TABLET ORAL DAILY
Qty: 90 TABLET | Refills: 0 | Status: SHIPPED | OUTPATIENT
Start: 2024-04-09

## 2024-04-09 RX ORDER — NYSTATIN 100000 [USP'U]/G
POWDER TOPICAL
COMMUNITY
Start: 2024-02-20

## 2024-04-09 NOTE — PROGRESS NOTES
This provider is located at [facility], [address] [place] [address].The Patient is seen remotely at home, [address] [facility] [place] [address] via [employer].  Patient is being seen via telehealth and confirm that they are in a secure environment for this session. The patient's condition being diagnosed/treated is appropriate for telemedicine. The provider identified himself/herself: herself as well as her credentials.   The patient gave consent to be seen remotely, and when consent is given they understand that the consent allows for patient identifiable information to be sent to a third party as needed.   They may refuse to be seen remotely at any time. The electronic data is encrypted and password protected, and the patient has been advised of the potential risks to privacy not withstanding such measures.    You have chosen to receive care through a telehealth visit.  Do you consent to use a video/audio connection for your medical care today? Yes      Chief Complaint  Follow-for schizoaffective depressed type and grief     Subjective    Sudhakar Sotojosef Saul presents to [facility] for medication management.       History of Present Illness   Patient presents today that she has been doing okay.  She states she has been struggling with finances as her boyfriend lost his job so he has been struggling.  She states that she has been trying to help him out but just cannot.  Patient notes that she is down to 226 pounds and I will consider skin surgery when she is at 200 pounds.  Reports she has been working very hard on her appetite and weight loss.  Patient states they are adjusting her medications as her blood pressure has been in the 120s and heart rate still in the 90s.  Patient states that she feels her depression and anxiety are still the same.  She states she was able to go through her mother's close and sit in her chair which has been therapeutic  but still difficult.  Reports that she has had her acid reflux issues resolved and taking the trazodone 100 mg and getting 5 to 6 hours of sleep at night.  Patient states that she has not had any nightmares however she has had some using dreams.  Reports that she has felt hopeless and helpless and depressed mood with lack of focus.  States that she does feel nervous and anxious and on edge and irritable at times.  Notes that she feels the Cymbalta is no longer effective.  Patient states that she did hear the little girl's voice telling her that she does not need a knife when she was cutting vegetables and then visually she saw her mother sitting in her chair.  Patient denies any other auditory or visual hallucinations.  Denies any SI or HI.  Denies any side effects except for drowsiness in the morning but states it could be a combination of her medicines.    Objective   Vital Signs:   There were no vitals taken for this visit.  Due to the remote nature of this encounter (virtual encounter), vitals were unable to be obtained.  Height stated at 66 inches.  Weight stated at 226 pounds.        PHQ-9 Score:   PHQ-9 Total Score:     Patient screened positive for depression based on a PHQ-9 score of  on . Follow-up recommendations include:  Patient has schizophrenia and is currently being managed with medication. .  PHQ-9 Depression Screening  Little interest or pleasure in doing things?     Feeling down, depressed, or hopeless?     Trouble falling or staying asleep, or sleeping too much?     Feeling tired or having little energy?     Poor appetite or overeating?     Feeling bad about yourself - or that you are a failure or have let yourself or your family down?     Trouble concentrating on things, such as reading the newspaper or watching television?     Moving or speaking so slowly that other people could have noticed? Or the opposite - being so fidgety or restless that you have been moving around a lot more than usual?      Thoughts that you would be better off dead, or of hurting yourself in some way?     PHQ-9 Total Score     If you checked off any problems, how difficult have these problems made it for you to do your work, take care of things at home, or get along with other people?       PHQ-9 Total Score:      JD-7  Feeling nervous, anxious or on edge: (P) Several days  Not being able to stop or control worrying: (P) Nearly every day  Worrying too much about different things: (P) More than half the days  Trouble Relaxing: (P) More than half the days  Being so restless that it is hard to sit still: (P) Not at all  Feeling afraid as if something awful might happen: (P) Not at all  Becoming easily annoyed or irritable: (P) Several days  JD 7 Total Score: (P) 9  If you checked any problems, how difficult have these problems made it for you to do your work, take care of things at home, or get along with other people: (P) Extremely difficult    Mental Status Exam:   Hygiene:   good  Cooperation:  Cooperative  Eye Contact:  Good  Psychomotor Behavior:  Appropriate  Affect:  Appropriate  Mood: Sad, depressed, anxious   Speech:  Normal  Thought Process:  Goal directed and Linear  Thought Content:  Normal  Suicidal:  None  Homicidal:  None  Hallucinations:  None and Auditory  Delusion:  None  Memory:  Intact  Orientation:  Person, Place, Time and Situation  Reliability:  good  Insight:  Good and Fair  Judgement:  Good and Fair  Impulse Control:  Good and Fair  Physical/Medical Issues:  Yes Dm. HTN, CKD stage 2, hep C+, JACQUE, hx substance abuse      Current Medications:   Current Outpatient Medications   Medication Sig Dispense Refill    Cariprazine HCl (VRAYLAR) 4.5 MG capsule capsule Take 1 capsule by mouth Daily. 90 capsule 0    cyanocobalamin 1000 MCG/ML injection INJECT 1 ML INTRAMUSCULARLY ONCE EVERY MONTH      DULoxetine (CYMBALTA) 30 MG capsule Take 2 capsules for 2 weeks and then 1 capsule for 2 weeks and then discontinue. 45  capsule 0    gabapentin (NEURONTIN) 600 MG tablet Take 1 tablet by mouth 3 times a day.      lamoTRIgine (LaMICtal) 150 MG tablet Take 1 tablet by mouth Daily. 90 tablet 0    Mounjaro 5 MG/0.5ML solution pen-injector pen       nystatin (MYCOSTATIN) 403326 UNIT/GM powder APPLY TO THE AFFECTED AREA(S) BY TOPICAL ROUTE 2 TIMES PER DAY      prazosin (MINIPRESS) 2 MG capsule Take 1-2 capsules by mouth Every Night. 180 capsule 0    traZODone (DESYREL) 100 MG tablet Take 1 tablet by mouth Every Night. 90 tablet 0    albuterol (ACCUNEB) 1.25 MG/3ML nebulizer solution Take 3 mL by nebulization Every 6 (Six) Hours As Needed for Wheezing or Shortness of Air. 9 mL 3    cholecalciferol (VITAMIN D3) 1.25 MG (11729 UT) capsule Every Week      Continuous Blood Gluc  (Dexcom G6 ) device USE FOR 6 MONTHS      Continuous Blood Gluc Sensor (Dexcom G6 Sensor) PLACE 1 SENSOR ON SKIN CHANGING EVERY 10 DAYS 9 each 0    Continuous Blood Gluc Transmit (Dexcom G6 Transmitter) misc USE AS DIRECTED 1 each 0    cyclobenzaprine (FLEXERIL) 10 MG tablet Take 1 tablet by mouth 3 (Three) Times a Day As Needed for Muscle Spasms. 90 tablet 3    desvenlafaxine (Pristiq) 50 MG 24 hr tablet Take 1 tablet by mouth Daily. Start once on Cymbalta taper of 30mg capsule daily. 30 tablet 0    EQ Nicotine 21 MG/24HR patch APPLY 1 PATCH TOPICALLY EVERY 24 HOURS      Ertugliflozin L-PyroglutamicAc (Steglatro) 15 MG tablet Take 1 tablet by mouth Every Morning. 90 tablet 1    FeroSul 325 (65 Fe) MG tablet       Fluticasone Furoate-Vilanterol (Breo Ellipta) 200-25 MCG/INH inhaler Inhale 1 puff Daily. 28 each 11    HYDROcodone-acetaminophen (NORCO) 5-325 MG per tablet Take 1 tablet by mouth 3 (Three) Times a Day As Needed for Moderate Pain.      hydrOXYzine (ATARAX) 25 MG tablet TAKE 1/2 TO 1 (ONE-HALF TO ONE) TABLET BY MOUTH THREE TIMES DAILY AS NEEDED FOR ANXIETY 270 tablet 0    Insulin Disposable Pump (OmniPod Dash 5 Pack Pods) misc       Insulin  Lispro (humaLOG) 100 UNIT/ML injection USE AS DIRECTED THROUGH INSULIN PUMP UPTO 180 UNITS DAILY 90 mL 0    levETIRAcetam (KEPPRA) 750 MG tablet Take 2 tablets by mouth 2 (Two) Times a Day.      lisinopril (PRINIVIL,ZESTRIL) 10 MG tablet Take 1 tablet by mouth Daily.      metFORMIN (GLUCOPHAGE) 500 MG tablet Take 1 tablet by mouth 2 (Two) Times a Day With Meals. 60 tablet 11    metoprolol tartrate (LOPRESSOR) 50 MG tablet Take 1 tablet by mouth 2 (Two) Times a Day. 60 tablet 3    Nebulizer device 1 application Every 6 (Six) Hours As Needed (dyspnea). 1 each 3    NIFEdipine XL (PROCARDIA XL) 30 MG 24 hr tablet Take 1 tablet by mouth Daily. 30 tablet 3    omeprazole (priLOSEC) 40 MG capsule Take 1 capsule by mouth Daily. 30 capsule 3    OXcarbazepine (TRILEPTAL) 600 MG tablet Take 1 tablet by mouth 2 (Two) Times a Day. PT TAKES  MG TAB IN MORNING AND  MG IN EVENING      rOPINIRole (REQUIP) 3 MG tablet Take 1 tablet by mouth Every Night.      simvastatin (ZOCOR) 40 MG tablet Take 1 tablet by mouth Every Night. 30 tablet 3    sucralfate (CARAFATE) 1 g tablet TAKE 1 TABLET BY MOUTH 4 TIMES DAILY DISSOLVE TABLET IN WATER AND DRINK SLURRY      tiotropium bromide monohydrate (SPIRIVA RESPIMAT) 2.5 MCG/ACT aerosol solution inhaler Daily.      Trulicity 4.5 MG/0.5ML solution pen-injector INJECT 1 SYRINGE SUBCUTANEOUSLY ONCE A WEEK 4 mL 0    Ventolin  (90 Base) MCG/ACT inhaler Inhale 2 puffs Every 4 (Four) Hours As Needed for Wheezing. 6.7 g 11    vitamin D (ERGOCALCIFEROL) 1.25 MG (70809 UT) capsule capsule TAKE 2 CAPSULES BY MOUTH ONCE A WEEK 8 capsule 3     No current facility-administered medications for this visit.       Physical Exam  Nursing note reviewed.   Constitutional:       Appearance: Normal appearance.   Neurological:      Mental Status: She is alert.   Psychiatric:         Attention and Perception: Attention normal. She does not perceive auditory or visual hallucinations.         Mood and  Affect: Mood is anxious and depressed. Affect is not tearful.         Speech: Speech normal.         Behavior: Behavior is cooperative.         Thought Content: Thought content normal.         Cognition and Memory: Cognition and memory normal.         Judgment: Judgment normal.        Result Review :     The following data was reviewed by: ROD Spencer on 02/03/2021:  Common labs          6/7/2023    09:09 9/18/2023    09:44 3/29/2024    09:08   Common Labs   Glucose 247  423     BUN 23  10     Creatinine 0.69  0.68     Sodium 134  134     Potassium 4.4  4.4     Chloride 100  98     Calcium 9.7  9.5     Albumin 3.9  4.2     Total Bilirubin 0.2  0.3     Alkaline Phosphatase 102  108     AST (SGOT) 27  21     ALT (SGPT) 41  31     WBC 9.90  10.64  10.1       Hemoglobin 13.9  15.0  15.2       Hematocrit 42.0  45.4  44.2       Platelets 175  174  132       Total Cholesterol 194  220     Total Cholesterol   176       Triglycerides 304  412     379  149       HDL Cholesterol 32  31  44       LDL Cholesterol  109  122  102       Hemoglobin A1C 10.60  12.40  10.2       Microalbumin, Urine <1.2         Details          This result is from an external source.             CMP          6/7/2023    09:09 9/18/2023    09:44   CMP   Glucose 247  423    BUN 23  10    Creatinine 0.69  0.68    EGFR 106.5  106.9    Sodium 134  134    Potassium 4.4  4.4    Chloride 100  98    Calcium 9.7  9.5    Total Protein 7.4  7.1    Albumin 3.9  4.2    Globulin 3.5  2.9    Total Bilirubin 0.2  0.3    Alkaline Phosphatase 102  108    AST (SGOT) 27  21    ALT (SGPT) 41  31    Albumin/Globulin Ratio 1.1  1.4    BUN/Creatinine Ratio 33.3  14.7    Anion Gap 13.0  13.0      CBC          6/7/2023    09:09 9/18/2023    09:44 3/29/2024    09:08   CBC   WBC 9.90  10.64  10.1       RBC 4.97  5.24  5.15       Hemoglobin 13.9  15.0  15.2       Hematocrit 42.0  45.4  44.2       MCV 84.5  86.6  85.8       MCH 28.0  28.6  29.5       MCHC 33.1  33.0   34.4       RDW 13.4  13.4  13.4       Platelets 175  174  132          Details          This result is from an external source.             CBC w/diff      CBC w/Diff 6/10/20 9/9/20 1/26/21   WBC 8.89 9.63 9.04   RBC 4.85 5.07 5.10   Hemoglobin 14.1 14.9 14.4   Hematocrit 42.8 44.3 45.0   MCV 88.2 87.4 88.2   MCH 29.1 29.4 28.2   MCHC 32.9 33.6 32.0   RDW 13.6 13.3 13.0   Platelets 157 180 174   Neutrophil Rel % 63.8 67.2    Immature Granulocyte Rel % 0.6 (A) 0.5    Lymphocyte Rel % 26.4 21.6    Monocyte Rel % 4.5 (A) 4.8 (A)    Eosinophil Rel % 3.9 5.3    Basophil Rel % 0.8 0.6    (A) Abnormal value              Lipid Panel          6/7/2023    09:09 9/18/2023    09:44 3/29/2024    09:08   Lipid Panel   Total Cholesterol 194  220     Total Cholesterol   176       Triglycerides 304  412     379  149       HDL Cholesterol 32  31  44       VLDL Cholesterol 53  67  30       LDL Cholesterol  109  122  102       LDL/HDL Ratio 3.16  3.65  2.32          Details          This result is from an external source.             TSH          6/7/2023    09:09 9/18/2023    09:44   TSH   TSH 1.740  1.220      BMP          6/7/2023    09:09 9/18/2023    09:44   BMP   BUN 23  10    Creatinine 0.69  0.68    Sodium 134  134    Potassium 4.4  4.4    Chloride 100  98    CO2 21.0  23.0    Calcium 9.7  9.5      HgB          6/7/2023    09:09 9/18/2023    09:44 3/29/2024    09:08   HGB   Hemoglobin 13.9  15.0  15.2          Details          This result is from an external source.             Data reviewed: PCP notes         Assessment and Plan    Problem List Items Addressed This Visit          Mental Health    Schizoaffective disorder, depressive type - Primary    Relevant Medications    Cariprazine HCl (VRAYLAR) 4.5 MG capsule capsule    lamoTRIgine (LaMICtal) 150 MG tablet    traZODone (DESYREL) 100 MG tablet    DULoxetine (CYMBALTA) 30 MG capsule    desvenlafaxine (Pristiq) 50 MG 24 hr tablet    Generalized anxiety disorder    Relevant  Medications    Cariprazine HCl (VRAYLAR) 4.5 MG capsule capsule    traZODone (DESYREL) 100 MG tablet    DULoxetine (CYMBALTA) 30 MG capsule    desvenlafaxine (Pristiq) 50 MG 24 hr tablet     Other Visit Diagnoses       Nightmares        Relevant Medications    Cariprazine HCl (VRAYLAR) 4.5 MG capsule capsule    prazosin (MINIPRESS) 2 MG capsule    traZODone (DESYREL) 100 MG tablet    DULoxetine (CYMBALTA) 30 MG capsule    desvenlafaxine (Pristiq) 50 MG 24 hr tablet    Insomnia, unspecified type        Relevant Medications    traZODone (DESYREL) 100 MG tablet                  TREATMENT PLAN/GOALS: Continue supportive psychotherapy efforts and medications as indicated. Treatment and medication options discussed during today's visit. Patient ackowledged and verbally consented to continue with current treatment plan and was educated on the importance of compliance with treatment and follow-up appointments.    MEDICATION ISSUES:  We discussed risks, benefits, and side effects of the above medications and the patient was agreeable with the plan. Patient was educated on the importance of compliance with treatment and follow-up appointments.  Patient is agreeable to call the office with any worsening of symptoms or onset of side effects. Patient is agreeable to call 911 or go to the nearest ER should he/she begin having SI/HI. Lengthy discussion with patient on the possible side effects of antipsychotic medications including increased cholesterol, increased blood sugar, and possibility of weight gain.  Also discussed the need to monitor lab work associated with this.  The risk of muscle movement disorders with this class of medication was also discussed. We will continue Lamictal in an effort to stabilize mood.  The patient was reminded to immediately come to the hospital should there be any loss of control.  Explanation was given to her regarding Lamictal and the potential for Foster Shon syndrome and significant rash.   Patient was encouraged to check skin prior to beginning.  Patient was encouraged to report any rash and to immediately stop medication.      -Continue lamotrigine 150 mg daily for schizoaffective disorder.  -Continue minipress 2-4mg at night for nightmares.  -Continue Vraylar  4.5 mg daily for schizoaffective disorder depressed type.  -Continue hydroxyzine 12.5 to 25 mg up to 3 times a day as needed for anxiety and panic.  Reminded patient to use more often if needed.  -Discontinue Cymbalta is no longer effective.  Patient has failed and tried sertraline, fluoxetine, citalopram and duloxetine and cannot have Wellbutrin due to seizure disorder.  -Taper off Cymbalta to take 60 mg for 2 weeks and then 30 mg for 2 weeks then discontinue.  -Begin desvenlafaxine 50 mg daily for depression and anxiety when she starts 30 mg dose of duloxetine tapering.  -Continue psychotherapy to help with past trauma as well as depressive and anxiety symptoms.  -Highly encourage patient since she is now on Trileptal to be aware of side effects of multiple mood stabilizers and if she were to have any rash to contact the clinic as we may have to look at tapering off the lamotrigine or talking with neurologist to reevaluate Trileptal.  Patient verbalized understanding and denied any side effects at this time.  -Continue trazodone 100 mg at night as needed for sleep.  Highly encouraged patient if she had any side effects or worsening symptoms to discontinue and contact the clinic as we would look at a trial of mirtazapine or amitriptyline patient verbalized understanding.      Patient has now failed and tried Haldol, aripiprazole, Latuda and now risperidone which has caused weight gain and due to diabetes and kidney disease would benefit from Vraylar as patient is still having auditory and visual hallucinations.     Counseled patient regarding multimodal approach with healthy nutrition, healthy sleep, regular physical activity, social  activities, counseling, and medications.      Coping skills reviewed and encouraged positive framing of thoughts     Assisted patient in processing above session content; acknowledged and normalized patient’s thoughts, feelings, and concerns.  Applied  positive coping skills and behavior management in session.  Allowed patient to freely discuss issues without interruption or judgment. Provided safe, confidential environment to facilitate the development of positive therapeutic relationship and encourage open, honest communication. Assisted patient in identifying risk factors which would indicate the need for higher level of care including thoughts to harm self or others and/or self-harming behavior and encouraged patient to contact this office, call 911, or present to the nearest emergency room should any of these events occur. Discussed crisis intervention services and means to access.     MEDS ORDERED DURING VISIT:  New Medications Ordered This Visit   Medications    Cariprazine HCl (VRAYLAR) 4.5 MG capsule capsule     Sig: Take 1 capsule by mouth Daily.     Dispense:  90 capsule     Refill:  0    lamoTRIgine (LaMICtal) 150 MG tablet     Sig: Take 1 tablet by mouth Daily.     Dispense:  90 tablet     Refill:  0    prazosin (MINIPRESS) 2 MG capsule     Sig: Take 1-2 capsules by mouth Every Night.     Dispense:  180 capsule     Refill:  0    traZODone (DESYREL) 100 MG tablet     Sig: Take 1 tablet by mouth Every Night.     Dispense:  90 tablet     Refill:  0    DULoxetine (CYMBALTA) 30 MG capsule     Sig: Take 2 capsules for 2 weeks and then 1 capsule for 2 weeks and then discontinue.     Dispense:  45 capsule     Refill:  0    desvenlafaxine (Pristiq) 50 MG 24 hr tablet     Sig: Take 1 tablet by mouth Daily. Start once on Cymbalta taper of 30mg capsule daily.     Dispense:  30 tablet     Refill:  0       Follow Up   Return in about 3 weeks (around 4/30/2024), or if symptoms worsen or fail to improve, for Recheck.   Informed patient we would be following closely after surgery to ensure any symptoms didn't return or become worse she verbalized understanding to call with any changes.     Patient was given instructions and counseling regarding her condition or for health maintenance advice. Please see specific information pulled into the AVS if appropriate.     Some of the data in this electronic note has been brought forward from a previous encounter, any necessary changes have been made, it has been reviewed by this APRN, and it is accurate.      This document has been electronically signed by ROD Spencer  April 9, 2024 09:38 EDT    Part of this note may be an electronic transcription/translation of spoken language to printed text using the Dragon Dictation System.

## 2024-05-01 ENCOUNTER — TELEMEDICINE (OUTPATIENT)
Dept: PSYCHIATRY | Facility: CLINIC | Age: 49
End: 2024-05-01
Payer: COMMERCIAL

## 2024-05-01 DIAGNOSIS — F41.1 GENERALIZED ANXIETY DISORDER: Chronic | ICD-10-CM

## 2024-05-01 DIAGNOSIS — F51.5 NIGHTMARES: ICD-10-CM

## 2024-05-01 DIAGNOSIS — G47.00 INSOMNIA, UNSPECIFIED TYPE: ICD-10-CM

## 2024-05-01 DIAGNOSIS — F25.1 SCHIZOAFFECTIVE DISORDER, DEPRESSIVE TYPE: Primary | Chronic | ICD-10-CM

## 2024-05-01 PROCEDURE — 1160F RVW MEDS BY RX/DR IN RCRD: CPT | Performed by: NURSE PRACTITIONER

## 2024-05-01 PROCEDURE — 99214 OFFICE O/P EST MOD 30 MIN: CPT | Performed by: NURSE PRACTITIONER

## 2024-05-01 PROCEDURE — 1159F MED LIST DOCD IN RCRD: CPT | Performed by: NURSE PRACTITIONER

## 2024-05-01 RX ORDER — LAMOTRIGINE 150 MG/1
150 TABLET ORAL DAILY
Qty: 90 TABLET | Refills: 0 | Status: SHIPPED | OUTPATIENT
Start: 2024-05-01

## 2024-05-01 RX ORDER — DESVENLAFAXINE SUCCINATE 50 MG/1
50 TABLET, EXTENDED RELEASE ORAL DAILY
Qty: 30 TABLET | Refills: 2 | Status: SHIPPED | OUTPATIENT
Start: 2024-05-01

## 2024-05-01 RX ORDER — AZITHROMYCIN 250 MG/1
TABLET, FILM COATED ORAL SEE ADMIN INSTRUCTIONS
COMMUNITY
Start: 2024-04-28

## 2024-05-01 NOTE — PROGRESS NOTES
This provider is located at [facility], [address] [place] [address].The Patient is seen remotely at home, [address] [place] [address] via [facility].  Patient is being seen via telehealth and confirm that they are in a secure environment for this session. The patient's condition being diagnosed/treated is appropriate for telemedicine. The provider identified himself/herself: herself as well as her credentials.   The patient gave consent to be seen remotely, and when consent is given they understand that the consent allows for patient identifiable information to be sent to a third party as needed.   They may refuse to be seen remotely at any time. The electronic data is encrypted and password protected, and the patient has been advised of the potential risks to privacy not withstanding such measures.    You have chosen to receive care through a telehealth visit.  Do you consent to use a video/audio connection for your medical care today? Yes      Chief Complaint  Follow-for schizoaffective depressed type and grief     Subjective    Sudhakar More Saul presents to [facility] for medication management.       History of Present Illness   Patient presents today reporting that she has had bronchitis and a secondary lung infection for the past 2 weeks.  Patient states this has affected her sleep and appetite as well but she is hoping that the antibiotics and things are working now that it will resolve.  Patient states that she feels the depression has improved being on the new medication.  She states the first week coming off Cymbalta was slightly difficult but afterwards did well.  Patient states that she feels less depressed and hopeless however her aunt is calling her every night to talk about her mother so it does make her feel more down and grief.  Patient states that her anxiety has been much better as she rates it a 4 on a scale of 0-10  with 10 being the worst.  Patient states she has had some irritability but feels that is because she has been sick.  States prior to being sick she was getting 5 to 6 hours of sleep now it is interrupted.  Reports appetite is unchanged except for being sick and coughing often.  Denies any side effects to medications.  Denies any SI or HI.  Reports she had 1 auditory hallucination otherwise denies any visual hallucinations or anyone telling her to harm herself or others.    Objective   Vital Signs:   There were no vitals taken for this visit.  Due to the remote nature of this encounter (virtual encounter), vitals were unable to be obtained.  Height stated at 66 inches.  Weight stated at 226 pounds.        PHQ-9 Score:   PHQ-9 Total Score:     Patient screened positive for depression based on a PHQ-9 score of  on . Follow-up recommendations include:  Patient has schizophrenia and is currently being managed with medication. .  PHQ-9 Depression Screening  Little interest or pleasure in doing things? (P) 2-->more than half the days   Feeling down, depressed, or hopeless? (P) 2-->more than half the days   Trouble falling or staying asleep, or sleeping too much? (P) 3-->nearly every day   Feeling tired or having little energy? (P) 3-->nearly every day   Poor appetite or overeating? (P) 1-->several days   Feeling bad about yourself - or that you are a failure or have let yourself or your family down? (P) 2-->more than half the days   Trouble concentrating on things, such as reading the newspaper or watching television? (P) 1-->several days   Moving or speaking so slowly that other people could have noticed? Or the opposite - being so fidgety or restless that you have been moving around a lot more than usual? (P) 1-->several days   Thoughts that you would be better off dead, or of hurting yourself in some way? (P) 0-->not at all   PHQ-9 Total Score (P) 15   If you checked off any problems, how difficult have these problems  made it for you to do your work, take care of things at home, or get along with other people? (P) extremely difficult     PHQ-9 Total Score: (P) 15    JD-7  Feeling nervous, anxious or on edge: (P) Several days  Not being able to stop or control worrying: (P) More than half the days  Worrying too much about different things: (P) Several days  Trouble Relaxing: (P) Several days  Being so restless that it is hard to sit still: (P) Not at all  Feeling afraid as if something awful might happen: (P) Not at all  Becoming easily annoyed or irritable: (P) More than half the days  JD 7 Total Score: (P) 7  If you checked any problems, how difficult have these problems made it for you to do your work, take care of things at home, or get along with other people: (P) Very difficult    Mental Status Exam:   Hygiene:   good  Cooperation:  Cooperative  Eye Contact:  Good  Psychomotor Behavior:  Appropriate  Affect:  Appropriate  Mood: Sad, depressed,   Speech:  Normal  Thought Process:  Goal directed and Linear  Thought Content:  Normal  Suicidal:  None  Homicidal:  None  Hallucinations:  None and Auditory  Delusion:  None  Memory:  Intact  Orientation:  Person, Place, Time and Situation  Reliability:  good  Insight:  Good and Fair  Judgement:  Good and Fair  Impulse Control:  Good and Fair  Physical/Medical Issues:  Yes Dm. HTN, CKD stage 2, hep C+, JACQUE, hx substance abuse      Current Medications:   Current Outpatient Medications   Medication Sig Dispense Refill    azithromycin (ZITHROMAX) 250 MG tablet Take  by mouth See Admin Instructions. Take 2 tablets by mouth on day 1 then take 1 tablet by mouth once daily on days 2-5      desvenlafaxine (Pristiq) 50 MG 24 hr tablet Take 1 tablet by mouth Daily. 30 tablet 2    lamoTRIgine (LaMICtal) 150 MG tablet Take 1 tablet by mouth Daily. 90 tablet 0    Tirzepatide (Mounjaro) 5 MG/0.5ML solution pen-injector pen Inject 0.5 mL under the skin into the appropriate area as directed.       albuterol (ACCUNEB) 1.25 MG/3ML nebulizer solution Take 3 mL by nebulization Every 6 (Six) Hours As Needed for Wheezing or Shortness of Air. 9 mL 3    Cariprazine HCl (VRAYLAR) 4.5 MG capsule capsule Take 1 capsule by mouth Daily. 90 capsule 0    cholecalciferol (VITAMIN D3) 1.25 MG (03157 UT) capsule Every Week      Continuous Blood Gluc  (Dexcom G6 ) device USE FOR 6 MONTHS      Continuous Blood Gluc Sensor (Dexcom G6 Sensor) PLACE 1 SENSOR ON SKIN CHANGING EVERY 10 DAYS 9 each 0    Continuous Blood Gluc Transmit (Dexcom G6 Transmitter) misc USE AS DIRECTED 1 each 0    cyanocobalamin 1000 MCG/ML injection INJECT 1 ML INTRAMUSCULARLY ONCE EVERY MONTH      cyclobenzaprine (FLEXERIL) 10 MG tablet Take 1 tablet by mouth 3 (Three) Times a Day As Needed for Muscle Spasms. 90 tablet 3    EQ Nicotine 21 MG/24HR patch APPLY 1 PATCH TOPICALLY EVERY 24 HOURS      Ertugliflozin L-PyroglutamicAc (Steglatro) 15 MG tablet Take 1 tablet by mouth Every Morning. 90 tablet 1    FeroSul 325 (65 Fe) MG tablet       Fluticasone Furoate-Vilanterol (Breo Ellipta) 200-25 MCG/INH inhaler Inhale 1 puff Daily. 28 each 11    gabapentin (NEURONTIN) 600 MG tablet Take 1 tablet by mouth 3 times a day.      HYDROcodone-acetaminophen (NORCO) 5-325 MG per tablet Take 1 tablet by mouth 3 (Three) Times a Day As Needed for Moderate Pain.      hydrOXYzine (ATARAX) 25 MG tablet TAKE 1/2 TO 1 (ONE-HALF TO ONE) TABLET BY MOUTH THREE TIMES DAILY AS NEEDED FOR ANXIETY 270 tablet 0    Insulin Disposable Pump (OmniPod Dash 5 Pack Pods) misc       Insulin Lispro (humaLOG) 100 UNIT/ML injection USE AS DIRECTED THROUGH INSULIN PUMP UPTO 180 UNITS DAILY 90 mL 0    levETIRAcetam (KEPPRA) 750 MG tablet Take 2 tablets by mouth 2 (Two) Times a Day.      lisinopril (PRINIVIL,ZESTRIL) 10 MG tablet Take 1 tablet by mouth Daily.      metFORMIN (GLUCOPHAGE) 500 MG tablet Take 1 tablet by mouth 2 (Two) Times a Day With Meals. 60 tablet 11    metoprolol  tartrate (LOPRESSOR) 50 MG tablet Take 1 tablet by mouth 2 (Two) Times a Day. 60 tablet 3    Nebulizer device 1 application Every 6 (Six) Hours As Needed (dyspnea). 1 each 3    NIFEdipine XL (PROCARDIA XL) 30 MG 24 hr tablet Take 1 tablet by mouth Daily. 30 tablet 3    nystatin (MYCOSTATIN) 099349 UNIT/GM powder APPLY TO THE AFFECTED AREA(S) BY TOPICAL ROUTE 2 TIMES PER DAY      omeprazole (priLOSEC) 40 MG capsule Take 1 capsule by mouth Daily. 30 capsule 3    OXcarbazepine (TRILEPTAL) 600 MG tablet Take 1 tablet by mouth 2 (Two) Times a Day. PT TAKES  MG TAB IN MORNING AND  MG IN EVENING      prazosin (MINIPRESS) 2 MG capsule Take 1-2 capsules by mouth Every Night. 180 capsule 0    rOPINIRole (REQUIP) 3 MG tablet Take 1 tablet by mouth Every Night.      simvastatin (ZOCOR) 40 MG tablet Take 1 tablet by mouth Every Night. 30 tablet 3    sucralfate (CARAFATE) 1 g tablet TAKE 1 TABLET BY MOUTH 4 TIMES DAILY DISSOLVE TABLET IN WATER AND DRINK SLURRY      tiotropium bromide monohydrate (SPIRIVA RESPIMAT) 2.5 MCG/ACT aerosol solution inhaler Daily.      traZODone (DESYREL) 100 MG tablet Take 1 tablet by mouth Every Night. 90 tablet 0    Trulicity 4.5 MG/0.5ML solution pen-injector INJECT 1 SYRINGE SUBCUTANEOUSLY ONCE A WEEK 4 mL 0    Ventolin  (90 Base) MCG/ACT inhaler Inhale 2 puffs Every 4 (Four) Hours As Needed for Wheezing. 6.7 g 11    vitamin D (ERGOCALCIFEROL) 1.25 MG (44043 UT) capsule capsule TAKE 2 CAPSULES BY MOUTH ONCE A WEEK 8 capsule 3     No current facility-administered medications for this visit.       Physical Exam  Nursing note reviewed.   Constitutional:       Appearance: Normal appearance.   Neurological:      Mental Status: She is alert.   Psychiatric:         Attention and Perception: Attention normal. She does not perceive auditory or visual hallucinations.         Mood and Affect: Mood is anxious and depressed. Affect is not tearful.         Speech: Speech normal.          Behavior: Behavior is cooperative.         Thought Content: Thought content normal.         Cognition and Memory: Cognition and memory normal.         Judgment: Judgment normal.        Result Review :     The following data was reviewed by: ROD Spencer on 02/03/2021:  Common labs          6/7/2023    09:09 9/18/2023    09:44 3/29/2024    09:08   Common Labs   Glucose 247  423     BUN 23  10     Creatinine 0.69  0.68     Sodium 134  134     Potassium 4.4  4.4     Chloride 100  98     Calcium 9.7  9.5     Albumin 3.9  4.2     Total Bilirubin 0.2  0.3     Alkaline Phosphatase 102  108     AST (SGOT) 27  21     ALT (SGPT) 41  31     WBC 9.90  10.64  10.1       Hemoglobin 13.9  15.0  15.2       Hematocrit 42.0  45.4  44.2       Platelets 175  174  132       Total Cholesterol 194  220     Total Cholesterol   176       Triglycerides 304  412     379  149       HDL Cholesterol 32  31  44       LDL Cholesterol  109  122  102       Hemoglobin A1C 10.60  12.40  10.2       Microalbumin, Urine <1.2         Details          This result is from an external source.             CMP          6/7/2023    09:09 9/18/2023    09:44   CMP   Glucose 247  423    BUN 23  10    Creatinine 0.69  0.68    EGFR 106.5  106.9    Sodium 134  134    Potassium 4.4  4.4    Chloride 100  98    Calcium 9.7  9.5    Total Protein 7.4  7.1    Albumin 3.9  4.2    Globulin 3.5  2.9    Total Bilirubin 0.2  0.3    Alkaline Phosphatase 102  108    AST (SGOT) 27  21    ALT (SGPT) 41  31    Albumin/Globulin Ratio 1.1  1.4    BUN/Creatinine Ratio 33.3  14.7    Anion Gap 13.0  13.0      CBC          6/7/2023    09:09 9/18/2023    09:44 3/29/2024    09:08   CBC   WBC 9.90  10.64  10.1       RBC 4.97  5.24  5.15       Hemoglobin 13.9  15.0  15.2       Hematocrit 42.0  45.4  44.2       MCV 84.5  86.6  85.8       MCH 28.0  28.6  29.5       MCHC 33.1  33.0  34.4       RDW 13.4  13.4  13.4       Platelets 175  174  132          Details          This result is  from an external source.             CBC w/diff      CBC w/Diff 6/10/20 9/9/20 1/26/21   WBC 8.89 9.63 9.04   RBC 4.85 5.07 5.10   Hemoglobin 14.1 14.9 14.4   Hematocrit 42.8 44.3 45.0   MCV 88.2 87.4 88.2   MCH 29.1 29.4 28.2   MCHC 32.9 33.6 32.0   RDW 13.6 13.3 13.0   Platelets 157 180 174   Neutrophil Rel % 63.8 67.2    Immature Granulocyte Rel % 0.6 (A) 0.5    Lymphocyte Rel % 26.4 21.6    Monocyte Rel % 4.5 (A) 4.8 (A)    Eosinophil Rel % 3.9 5.3    Basophil Rel % 0.8 0.6    (A) Abnormal value              Lipid Panel          6/7/2023    09:09 9/18/2023    09:44 3/29/2024    09:08   Lipid Panel   Total Cholesterol 194  220     Total Cholesterol   176       Triglycerides 304  412     379  149       HDL Cholesterol 32  31  44       VLDL Cholesterol 53  67  30       LDL Cholesterol  109  122  102       LDL/HDL Ratio 3.16  3.65  2.32          Details          This result is from an external source.             TSH          6/7/2023    09:09 9/18/2023    09:44   TSH   TSH 1.740  1.220      BMP          6/7/2023    09:09 9/18/2023    09:44   BMP   BUN 23  10    Creatinine 0.69  0.68    Sodium 134  134    Potassium 4.4  4.4    Chloride 100  98    CO2 21.0  23.0    Calcium 9.7  9.5      HgB          6/7/2023    09:09 9/18/2023    09:44 3/29/2024    09:08   HGB   Hemoglobin 13.9  15.0  15.2          Details          This result is from an external source.             Data reviewed: PCP notes         Assessment and Plan    Problem List Items Addressed This Visit          Mental Health    Schizoaffective disorder, depressive type - Primary    Relevant Medications    desvenlafaxine (Pristiq) 50 MG 24 hr tablet    lamoTRIgine (LaMICtal) 150 MG tablet    Generalized anxiety disorder    Relevant Medications    desvenlafaxine (Pristiq) 50 MG 24 hr tablet     Other Visit Diagnoses       Insomnia, unspecified type        Nightmares        Relevant Medications    desvenlafaxine (Pristiq) 50 MG 24 hr tablet                     TREATMENT PLAN/GOALS: Continue supportive psychotherapy efforts and medications as indicated. Treatment and medication options discussed during today's visit. Patient ackowledged and verbally consented to continue with current treatment plan and was educated on the importance of compliance with treatment and follow-up appointments.    MEDICATION ISSUES:  We discussed risks, benefits, and side effects of the above medications and the patient was agreeable with the plan. Patient was educated on the importance of compliance with treatment and follow-up appointments.  Patient is agreeable to call the office with any worsening of symptoms or onset of side effects. Patient is agreeable to call 911 or go to the nearest ER should he/she begin having SI/HI. Lengthy discussion with patient on the possible side effects of antipsychotic medications including increased cholesterol, increased blood sugar, and possibility of weight gain.  Also discussed the need to monitor lab work associated with this.  The risk of muscle movement disorders with this class of medication was also discussed. We will continue Lamictal in an effort to stabilize mood.  The patient was reminded to immediately come to the hospital should there be any loss of control.  Explanation was given to her regarding Lamictal and the potential for Foster Shon syndrome and significant rash.  Patient was encouraged to check skin prior to beginning.  Patient was encouraged to report any rash and to immediately stop medication.      -Continue lamotrigine 150 mg daily for schizoaffective disorder.  -Continue minipress 2-4mg at night for nightmares.  -Continue Vraylar  4.5 mg daily for schizoaffective disorder depressed type.  -Continue hydroxyzine 12.5 to 25 mg up to 3 times a day as needed for anxiety and panic.  Reminded patient to use more often if needed.  -  -Continue desvenlafaxine 50 mg daily for depression and anxiety.  -Continue psychotherapy  to help with past trauma as well as depressive and anxiety symptoms.  -Highly encourage patient since she is now on Trileptal to be aware of side effects of multiple mood stabilizers and if she were to have any rash to contact the clinic as we may have to look at tapering off the lamotrigine or talking with neurologist to reevaluate Trileptal.  Patient verbalized understanding and denied any side effects at this time.  -Continue trazodone 100 mg at night as needed for sleep.  Highly encouraged patient if she had any side effects or worsening symptoms to discontinue and contact the clinic as we would look at a trial of mirtazapine or amitriptyline patient verbalized understanding.  -Instructed patient to limit the calls from her aunt to a couple days a week instead of every night.  Also encouraged her to buy Amy that Intrust her and so she can focus on that instead of going living the death of her mother what seems every night when talking with her aunt.  Patient verbalized understanding.  Will reevaluate mood and need for increase in medication in 4 weeks.      Patient has now failed and tried Haldol, aripiprazole, Latuda and now risperidone which has caused weight gain and due to diabetes and kidney disease would benefit from Vraylar as patient is still having auditory and visual hallucinations.     Counseled patient regarding multimodal approach with healthy nutrition, healthy sleep, regular physical activity, social activities, counseling, and medications.      Coping skills reviewed and encouraged positive framing of thoughts     Assisted patient in processing above session content; acknowledged and normalized patient’s thoughts, feelings, and concerns.  Applied  positive coping skills and behavior management in session.  Allowed patient to freely discuss issues without interruption or judgment. Provided safe, confidential environment to facilitate the development of positive therapeutic relationship and  encourage open, honest communication. Assisted patient in identifying risk factors which would indicate the need for higher level of care including thoughts to harm self or others and/or self-harming behavior and encouraged patient to contact this office, call 911, or present to the nearest emergency room should any of these events occur. Discussed crisis intervention services and means to access.     MEDS ORDERED DURING VISIT:  New Medications Ordered This Visit   Medications    desvenlafaxine (Pristiq) 50 MG 24 hr tablet     Sig: Take 1 tablet by mouth Daily.     Dispense:  30 tablet     Refill:  2    lamoTRIgine (LaMICtal) 150 MG tablet     Sig: Take 1 tablet by mouth Daily.     Dispense:  90 tablet     Refill:  0       Follow Up   Return in about 4 weeks (around 5/29/2024), or if symptoms worsen or fail to improve, for Recheck.  Informed patient we would be following closely after surgery to ensure any symptoms didn't return or become worse she verbalized understanding to call with any changes.     Patient was given instructions and counseling regarding her condition or for health maintenance advice. Please see specific information pulled into the AVS if appropriate.     Some of the data in this electronic note has been brought forward from a previous encounter, any necessary changes have been made, it has been reviewed by this APRN, and it is accurate.      This document has been electronically signed by ROD Spencer  May 1, 2024 10:12 EDT    Part of this note may be an electronic transcription/translation of spoken language to printed text using the Dragon Dictation System.

## 2024-05-06 ENCOUNTER — TELEMEDICINE (OUTPATIENT)
Dept: PSYCHIATRY | Facility: CLINIC | Age: 49
End: 2024-05-06
Payer: COMMERCIAL

## 2024-05-06 DIAGNOSIS — F43.21 GRIEF: ICD-10-CM

## 2024-05-06 DIAGNOSIS — F25.1 SCHIZOAFFECTIVE DISORDER, DEPRESSIVE TYPE: Primary | ICD-10-CM

## 2024-05-06 DIAGNOSIS — F41.1 GENERALIZED ANXIETY DISORDER: ICD-10-CM

## 2024-05-06 PROCEDURE — 90834 PSYTX W PT 45 MINUTES: CPT | Performed by: COUNSELOR

## 2024-06-25 ENCOUNTER — TELEMEDICINE (OUTPATIENT)
Dept: PSYCHIATRY | Facility: CLINIC | Age: 49
End: 2024-06-25
Payer: COMMERCIAL

## 2024-06-25 DIAGNOSIS — F51.5 NIGHTMARES: ICD-10-CM

## 2024-06-25 DIAGNOSIS — F25.1 SCHIZOAFFECTIVE DISORDER, DEPRESSIVE TYPE: Chronic | ICD-10-CM

## 2024-06-25 DIAGNOSIS — F41.1 GENERALIZED ANXIETY DISORDER: Chronic | ICD-10-CM

## 2024-06-25 DIAGNOSIS — G47.00 INSOMNIA, UNSPECIFIED TYPE: ICD-10-CM

## 2024-06-25 PROCEDURE — 99214 OFFICE O/P EST MOD 30 MIN: CPT | Performed by: NURSE PRACTITIONER

## 2024-06-25 PROCEDURE — 1160F RVW MEDS BY RX/DR IN RCRD: CPT | Performed by: NURSE PRACTITIONER

## 2024-06-25 PROCEDURE — 1159F MED LIST DOCD IN RCRD: CPT | Performed by: NURSE PRACTITIONER

## 2024-06-25 RX ORDER — PRAZOSIN HYDROCHLORIDE 2 MG/1
2-4 CAPSULE ORAL NIGHTLY
Qty: 180 CAPSULE | Refills: 0 | Status: SHIPPED | OUTPATIENT
Start: 2024-06-25

## 2024-06-25 RX ORDER — LAMOTRIGINE 150 MG/1
150 TABLET ORAL DAILY
Qty: 90 TABLET | Refills: 0 | Status: SHIPPED | OUTPATIENT
Start: 2024-06-25

## 2024-06-25 RX ORDER — NITROFURANTOIN 25; 75 MG/1; MG/1
CAPSULE ORAL
COMMUNITY
Start: 2024-06-24

## 2024-06-25 RX ORDER — PANTOPRAZOLE SODIUM 40 MG/1
TABLET, DELAYED RELEASE ORAL
COMMUNITY
Start: 2024-06-24

## 2024-06-25 RX ORDER — FAMOTIDINE 40 MG/1
TABLET, FILM COATED ORAL
COMMUNITY
Start: 2024-05-07

## 2024-06-25 RX ORDER — TRAZODONE HYDROCHLORIDE 100 MG/1
100 TABLET ORAL NIGHTLY
Qty: 90 TABLET | Refills: 0 | Status: SHIPPED | OUTPATIENT
Start: 2024-06-25

## 2024-06-25 RX ORDER — DESVENLAFAXINE SUCCINATE 50 MG/1
50 TABLET, EXTENDED RELEASE ORAL DAILY
Qty: 30 TABLET | Refills: 2 | Status: SHIPPED | OUTPATIENT
Start: 2024-06-25

## 2024-06-25 RX ORDER — AMANTADINE HYDROCHLORIDE 100 MG/1
TABLET ORAL
COMMUNITY
Start: 2024-06-04

## 2024-06-25 RX ORDER — PROMETHAZINE HYDROCHLORIDE 25 MG/1
TABLET ORAL
COMMUNITY
Start: 2024-06-24

## 2024-06-25 NOTE — PROGRESS NOTES
This provider is located at [facility], [address] [place] [address].The Patient is seen remotely at home, [address] [place] [address] via [employer].  Patient is being seen via telehealth and confirm that they are in a secure environment for this session. The patient's condition being diagnosed/treated is appropriate for telemedicine. The provider identified himself/herself: herself as well as her credentials.   The patient gave consent to be seen remotely, and when consent is given they understand that the consent allows for patient identifiable information to be sent to a third party as needed.   They may refuse to be seen remotely at any time. The electronic data is encrypted and password protected, and the patient has been advised of the potential risks to privacy not withstanding such measures.    You have chosen to receive care through a telehealth visit.  Do you consent to use a video/audio connection for your medical care today? Yes      Chief Complaint  Follow-for schizoaffective depressed type and grief     Subjective    Sudhakar More Saul presents to [facility] for medication management.       History of Present Illness   Patient presents today reporting that she was in the ER due to vomiting blood.  Reports that she has esophagitis and hiatal hernia and possibly an ulcer and she sees GI next week to determine when they are going to do scope or any other procedures.  Patient states she is currently on new medications.  Patient states she has had 2 seizures since she was last seen but states they were mild.  Notes that her depression has been better rating at a 4 on a scale of 0-10 with 10 being the worst.  Reports that she is only sleeping a few hours at night due to waking up in pain or vomiting.  Reports her appetite varies based on her GI issues.  Patient states that her anxiety is a 7 on a scale of 0-10 as she states she is  waiting to hear about her disability which is making her anxious as well as questionable procedures.  Reports also her boyfriend's moving father into the home next week.  Patient states she has had to auditory hallucinations in the form of a little girl talking but denies that it tells her to harm herself or others.  Patient denied any side effects other than Phenergan making her drowsy.  Denies any SI/HI/VH.    Objective   Vital Signs:   There were no vitals taken for this visit.  Due to the remote nature of this encounter (virtual encounter), vitals were unable to be obtained.  Height stated at 66 inches.  Weight stated at 226 pounds.        PHQ-9 Score:   PHQ-9 Total Score:     Patient screened positive for depression based on a PHQ-9 score of  on . Follow-up recommendations include:  Patient has schizophrenia and is currently being managed with medication. .  PHQ-9 Depression Screening  Little interest or pleasure in doing things? (P) 1-->several days   Feeling down, depressed, or hopeless? (P) 1-->several days   Trouble falling or staying asleep, or sleeping too much? (P) 3-->nearly every day   Feeling tired or having little energy? (P) 3-->nearly every day   Poor appetite or overeating? (P) 1-->several days   Feeling bad about yourself - or that you are a failure or have let yourself or your family down? (P) 1-->several days   Trouble concentrating on things, such as reading the newspaper or watching television? (P) 0-->not at all   Moving or speaking so slowly that other people could have noticed? Or the opposite - being so fidgety or restless that you have been moving around a lot more than usual? (P) 0-->not at all   Thoughts that you would be better off dead, or of hurting yourself in some way? (P) 0-->not at all   PHQ-9 Total Score (P) 10   If you checked off any problems, how difficult have these problems made it for you to do your work, take care of things at home, or get along with other people? (P)  somewhat difficult     PHQ-9 Total Score: (P) 10    JD-7  Feeling nervous, anxious or on edge: (P) More than half the days  Not being able to stop or control worrying: (P) More than half the days  Worrying too much about different things: (P) More than half the days  Trouble Relaxing: (P) Several days  Being so restless that it is hard to sit still: (P) Not at all  Feeling afraid as if something awful might happen: (P) Not at all  Becoming easily annoyed or irritable: (P) Several days  JD 7 Total Score: (P) 8  If you checked any problems, how difficult have these problems made it for you to do your work, take care of things at home, or get along with other people: (P) Very difficult    Mental Status Exam:   Hygiene:   good  Cooperation:  Cooperative  Eye Contact:  Good  Psychomotor Behavior:  Appropriate  Affect:  Appropriate  Mood: Anxious  Speech:  Normal  Thought Process:  Goal directed and Linear  Thought Content:  Normal  Suicidal:  None  Homicidal:  None  Hallucinations:  None and Auditory  Delusion:  None  Memory:  Intact  Orientation:  Person, Place, Time and Situation  Reliability:  good  Insight:  Good and Fair  Judgement:  Good and Fair  Impulse Control:  Good and Fair  Physical/Medical Issues:  Yes Dm. HTN, CKD stage 2, hep C+, JACQUE, hx substance abuse      Current Medications:   Current Outpatient Medications   Medication Sig Dispense Refill    amantadine (SYMMETREL) 100 MG tablet       Cariprazine HCl (VRAYLAR) 4.5 MG capsule capsule Take 1 capsule by mouth Daily. 90 capsule 0    desvenlafaxine (Pristiq) 50 MG 24 hr tablet Take 1 tablet by mouth Daily. 30 tablet 2    famotidine (PEPCID) 40 MG tablet TAKE 1 TABLET BY MOUTH ONCE DAILY FOR HEARTBURN      lamoTRIgine (LaMICtal) 150 MG tablet Take 1 tablet by mouth Daily. 90 tablet 0    nitrofurantoin, macrocrystal-monohydrate, (MACROBID) 100 MG capsule       pantoprazole (PROTONIX) 40 MG EC tablet       prazosin (MINIPRESS) 2 MG capsule Take 1-2 capsules  by mouth Every Night. 180 capsule 0    promethazine (PHENERGAN) 25 MG tablet       traZODone (DESYREL) 100 MG tablet Take 1 tablet by mouth Every Night. 90 tablet 0    albuterol (ACCUNEB) 1.25 MG/3ML nebulizer solution Take 3 mL by nebulization Every 6 (Six) Hours As Needed for Wheezing or Shortness of Air. 9 mL 3    azithromycin (ZITHROMAX) 250 MG tablet Take  by mouth See Admin Instructions. Take 2 tablets by mouth on day 1 then take 1 tablet by mouth once daily on days 2-5      cholecalciferol (VITAMIN D3) 1.25 MG (50427 UT) capsule Every Week      Continuous Blood Gluc  (Dexcom G6 ) device USE FOR 6 MONTHS      Continuous Blood Gluc Sensor (Dexcom G6 Sensor) PLACE 1 SENSOR ON SKIN CHANGING EVERY 10 DAYS 9 each 0    Continuous Blood Gluc Transmit (Dexcom G6 Transmitter) misc USE AS DIRECTED 1 each 0    cyanocobalamin 1000 MCG/ML injection INJECT 1 ML INTRAMUSCULARLY ONCE EVERY MONTH      cyclobenzaprine (FLEXERIL) 10 MG tablet Take 1 tablet by mouth 3 (Three) Times a Day As Needed for Muscle Spasms. 90 tablet 3    EQ Nicotine 21 MG/24HR patch APPLY 1 PATCH TOPICALLY EVERY 24 HOURS      Ertugliflozin L-PyroglutamicAc (Steglatro) 15 MG tablet Take 1 tablet by mouth Every Morning. 90 tablet 1    FeroSul 325 (65 Fe) MG tablet       Fluticasone Furoate-Vilanterol (Breo Ellipta) 200-25 MCG/INH inhaler Inhale 1 puff Daily. 28 each 11    gabapentin (NEURONTIN) 600 MG tablet Take 1 tablet by mouth 3 times a day.      HYDROcodone-acetaminophen (NORCO) 5-325 MG per tablet Take 1 tablet by mouth 3 (Three) Times a Day As Needed for Moderate Pain.      hydrOXYzine (ATARAX) 25 MG tablet TAKE 1/2 TO 1 (ONE-HALF TO ONE) TABLET BY MOUTH THREE TIMES DAILY AS NEEDED FOR ANXIETY 270 tablet 0    Insulin Disposable Pump (OmniPod Dash 5 Pack Pods) misc       Insulin Lispro (humaLOG) 100 UNIT/ML injection USE AS DIRECTED THROUGH INSULIN PUMP UPTO 180 UNITS DAILY 90 mL 0    levETIRAcetam (KEPPRA) 750 MG tablet Take 2  tablets by mouth 2 (Two) Times a Day.      lisinopril (PRINIVIL,ZESTRIL) 10 MG tablet Take 1 tablet by mouth Daily.      metFORMIN (GLUCOPHAGE) 500 MG tablet Take 1 tablet by mouth 2 (Two) Times a Day With Meals. 60 tablet 11    metoprolol tartrate (LOPRESSOR) 50 MG tablet Take 1 tablet by mouth 2 (Two) Times a Day. 60 tablet 3    Nebulizer device 1 application Every 6 (Six) Hours As Needed (dyspnea). 1 each 3    NIFEdipine XL (PROCARDIA XL) 30 MG 24 hr tablet Take 1 tablet by mouth Daily. 30 tablet 3    nystatin (MYCOSTATIN) 496623 UNIT/GM powder APPLY TO THE AFFECTED AREA(S) BY TOPICAL ROUTE 2 TIMES PER DAY      omeprazole (priLOSEC) 40 MG capsule Take 1 capsule by mouth Daily. 30 capsule 3    OXcarbazepine (TRILEPTAL) 600 MG tablet Take 1 tablet by mouth 2 (Two) Times a Day. PT TAKES  MG TAB IN MORNING AND  MG IN EVENING      rOPINIRole (REQUIP) 3 MG tablet Take 1 tablet by mouth Every Night.      simvastatin (ZOCOR) 40 MG tablet Take 1 tablet by mouth Every Night. 30 tablet 3    sucralfate (CARAFATE) 1 g tablet TAKE 1 TABLET BY MOUTH 4 TIMES DAILY DISSOLVE TABLET IN WATER AND DRINK SLURRY      tiotropium bromide monohydrate (SPIRIVA RESPIMAT) 2.5 MCG/ACT aerosol solution inhaler Daily.      Tirzepatide (Mounjaro) 5 MG/0.5ML solution pen-injector pen Inject 0.5 mL under the skin into the appropriate area as directed.      Trulicity 4.5 MG/0.5ML solution pen-injector INJECT 1 SYRINGE SUBCUTANEOUSLY ONCE A WEEK 4 mL 0    Ventolin  (90 Base) MCG/ACT inhaler Inhale 2 puffs Every 4 (Four) Hours As Needed for Wheezing. 6.7 g 11    vitamin D (ERGOCALCIFEROL) 1.25 MG (36877 UT) capsule capsule TAKE 2 CAPSULES BY MOUTH ONCE A WEEK 8 capsule 3     No current facility-administered medications for this visit.       Physical Exam  Nursing note reviewed.   Constitutional:       Appearance: Normal appearance.   Neurological:      Mental Status: She is alert.   Psychiatric:         Attention and Perception:  Attention normal. She perceives auditory hallucinations. She does not perceive visual hallucinations.         Mood and Affect: Mood is anxious. Mood is not depressed. Affect is not tearful.         Speech: Speech normal.         Behavior: Behavior is cooperative.         Thought Content: Thought content normal.         Cognition and Memory: Cognition and memory normal.         Judgment: Judgment normal.        Result Review :     The following data was reviewed by: ROD Spencer on 02/03/2021:  Common labs          9/18/2023    09:44 3/29/2024    09:08   Common Labs   Glucose 423     BUN 10     Creatinine 0.68     Sodium 134     Potassium 4.4     Chloride 98     Calcium 9.5     Albumin 4.2     Total Bilirubin 0.3     Alkaline Phosphatase 108     AST (SGOT) 21     ALT (SGPT) 31     WBC 10.64  10.1       Hemoglobin 15.0  15.2       Hematocrit 45.4  44.2       Platelets 174  132       Total Cholesterol 220     Total Cholesterol  176       Triglycerides 412     379  149       HDL Cholesterol 31  44       LDL Cholesterol  122  102       Hemoglobin A1C 12.40  10.2          Details          This result is from an external source.             CMP          9/18/2023    09:44   CMP   Glucose 423    BUN 10    Creatinine 0.68    EGFR 106.9    Sodium 134    Potassium 4.4    Chloride 98    Calcium 9.5    Total Protein 7.1    Albumin 4.2    Globulin 2.9    Total Bilirubin 0.3    Alkaline Phosphatase 108    AST (SGOT) 21    ALT (SGPT) 31    Albumin/Globulin Ratio 1.4    BUN/Creatinine Ratio 14.7    Anion Gap 13.0      CBC          9/18/2023    09:44 3/29/2024    09:08   CBC   WBC 10.64  10.1       RBC 5.24  5.15       Hemoglobin 15.0  15.2       Hematocrit 45.4  44.2       MCV 86.6  85.8       MCH 28.6  29.5       MCHC 33.0  34.4       RDW 13.4  13.4       Platelets 174  132          Details          This result is from an external source.             CBC w/diff      CBC w/Diff 6/10/20 9/9/20 1/26/21   WBC 8.89 9.63 9.04    RBC 4.85 5.07 5.10   Hemoglobin 14.1 14.9 14.4   Hematocrit 42.8 44.3 45.0   MCV 88.2 87.4 88.2   MCH 29.1 29.4 28.2   MCHC 32.9 33.6 32.0   RDW 13.6 13.3 13.0   Platelets 157 180 174   Neutrophil Rel % 63.8 67.2    Immature Granulocyte Rel % 0.6 (A) 0.5    Lymphocyte Rel % 26.4 21.6    Monocyte Rel % 4.5 (A) 4.8 (A)    Eosinophil Rel % 3.9 5.3    Basophil Rel % 0.8 0.6    (A) Abnormal value              Lipid Panel          9/18/2023    09:44 3/29/2024    09:08   Lipid Panel   Total Cholesterol 220     Total Cholesterol  176       Triglycerides 412     379  149       HDL Cholesterol 31  44       VLDL Cholesterol 67  30       LDL Cholesterol  122  102       LDL/HDL Ratio 3.65  2.32          Details          This result is from an external source.             TSH          9/18/2023    09:44   TSH   TSH 1.220      BMP          9/18/2023    09:44   BMP   BUN 10    Creatinine 0.68    Sodium 134    Potassium 4.4    Chloride 98    CO2 23.0    Calcium 9.5      HgB          9/18/2023    09:44 3/29/2024    09:08   HGB   Hemoglobin 15.0  15.2          Details          This result is from an external source.             Data reviewed: PCP notes         Assessment and Plan    Problem List Items Addressed This Visit          Mental Health    Schizoaffective disorder, depressive type    Relevant Medications    Cariprazine HCl (VRAYLAR) 4.5 MG capsule capsule    desvenlafaxine (Pristiq) 50 MG 24 hr tablet    lamoTRIgine (LaMICtal) 150 MG tablet    traZODone (DESYREL) 100 MG tablet    Generalized anxiety disorder    Relevant Medications    Cariprazine HCl (VRAYLAR) 4.5 MG capsule capsule    desvenlafaxine (Pristiq) 50 MG 24 hr tablet    traZODone (DESYREL) 100 MG tablet     Other Visit Diagnoses       Insomnia, unspecified type        Relevant Medications    traZODone (DESYREL) 100 MG tablet    Nightmares        Relevant Medications    Cariprazine HCl (VRAYLAR) 4.5 MG capsule capsule    desvenlafaxine (Pristiq) 50 MG 24 hr tablet     traZODone (DESYREL) 100 MG tablet    prazosin (MINIPRESS) 2 MG capsule                      TREATMENT PLAN/GOALS: Continue supportive psychotherapy efforts and medications as indicated. Treatment and medication options discussed during today's visit. Patient ackowledged and verbally consented to continue with current treatment plan and was educated on the importance of compliance with treatment and follow-up appointments.    MEDICATION ISSUES:  We discussed risks, benefits, and side effects of the above medications and the patient was agreeable with the plan. Patient was educated on the importance of compliance with treatment and follow-up appointments.  Patient is agreeable to call the office with any worsening of symptoms or onset of side effects. Patient is agreeable to call 911 or go to the nearest ER should he/she begin having SI/HI. Lengthy discussion with patient on the possible side effects of antipsychotic medications including increased cholesterol, increased blood sugar, and possibility of weight gain.  Also discussed the need to monitor lab work associated with this.  The risk of muscle movement disorders with this class of medication was also discussed. We will continue Lamictal in an effort to stabilize mood.  The patient was reminded to immediately come to the hospital should there be any loss of control.  Explanation was given to her regarding Lamictal and the potential for Foster Shon syndrome and significant rash.  Patient was encouraged to check skin prior to beginning.  Patient was encouraged to report any rash and to immediately stop medication.      -Continue lamotrigine 150 mg daily for schizoaffective disorder.  -Continue minipress 2-4mg at night for nightmares.  -Continue Vraylar  4.5 mg daily for schizoaffective disorder depressed type.  -Continue hydroxyzine 12.5 to 25 mg up to 3 times a day as needed for anxiety and panic.  Reminded patient to use more often if needed.  -Continue  desvenlafaxine 50 mg daily for depression and anxiety.  Will reevaluate over the next couple weeks to see if it needs to be increased and encouraged patient to call clinic if anything worsened.  -Continue psychotherapy to help with past trauma as well as depressive and anxiety symptoms.  -Highly encourage patient since she is now on Trileptal to be aware of side effects of multiple mood stabilizers and if she were to have any rash to contact the clinic as we may have to look at tapering off the lamotrigine or talking with neurologist to reevaluate Trileptal.  Patient verbalized understanding and denied any side effects at this time.  -Continue trazodone 100 mg at night as needed for sleep.  Highly encouraged patient if she had any side effects or worsening symptoms to discontinue and contact the clinic as we would look at a trial of mirtazapine or amitriptyline patient verbalized understanding.      Patient has now failed and tried Haldol, aripiprazole, Latuda and now risperidone which has caused weight gain and due to diabetes and kidney disease would benefit from Vraylar as patient is still having auditory and visual hallucinations.     Counseled patient regarding multimodal approach with healthy nutrition, healthy sleep, regular physical activity, social activities, counseling, and medications.      Coping skills reviewed and encouraged positive framing of thoughts     Assisted patient in processing above session content; acknowledged and normalized patient’s thoughts, feelings, and concerns.  Applied  positive coping skills and behavior management in session.  Allowed patient to freely discuss issues without interruption or judgment. Provided safe, confidential environment to facilitate the development of positive therapeutic relationship and encourage open, honest communication. Assisted patient in identifying risk factors which would indicate the need for higher level of care including thoughts to harm self or  others and/or self-harming behavior and encouraged patient to contact this office, call 911, or present to the nearest emergency room should any of these events occur. Discussed crisis intervention services and means to access.     MEDS ORDERED DURING VISIT:  New Medications Ordered This Visit   Medications    Cariprazine HCl (VRAYLAR) 4.5 MG capsule capsule     Sig: Take 1 capsule by mouth Daily.     Dispense:  90 capsule     Refill:  0    desvenlafaxine (Pristiq) 50 MG 24 hr tablet     Sig: Take 1 tablet by mouth Daily.     Dispense:  30 tablet     Refill:  2    lamoTRIgine (LaMICtal) 150 MG tablet     Sig: Take 1 tablet by mouth Daily.     Dispense:  90 tablet     Refill:  0    traZODone (DESYREL) 100 MG tablet     Sig: Take 1 tablet by mouth Every Night.     Dispense:  90 tablet     Refill:  0    prazosin (MINIPRESS) 2 MG capsule     Sig: Take 1-2 capsules by mouth Every Night.     Dispense:  180 capsule     Refill:  0       Follow Up   Return in about 4 weeks (around 7/23/2024), or if symptoms worsen or fail to improve, for Recheck.  Informed patient we would be following closely after surgery to ensure any symptoms didn't return or become worse she verbalized understanding to call with any changes.     Patient was given instructions and counseling regarding her condition or for health maintenance advice. Please see specific information pulled into the AVS if appropriate.     Some of the data in this electronic note has been brought forward from a previous encounter, any necessary changes have been made, it has been reviewed by this APRN, and it is accurate.      This document has been electronically signed by ROD Spencer  June 25, 2024 08:55 EDT    Part of this note may be an electronic transcription/translation of spoken language to printed text using the Dragon Dictation System.

## 2024-07-22 ENCOUNTER — TELEMEDICINE (OUTPATIENT)
Dept: PSYCHIATRY | Facility: CLINIC | Age: 49
End: 2024-07-22
Payer: COMMERCIAL

## 2024-07-22 DIAGNOSIS — F25.1 SCHIZOAFFECTIVE DISORDER, DEPRESSIVE TYPE: Primary | ICD-10-CM

## 2024-07-22 DIAGNOSIS — F41.1 GENERALIZED ANXIETY DISORDER: ICD-10-CM

## 2024-07-22 PROCEDURE — 90834 PSYTX W PT 45 MINUTES: CPT | Performed by: COUNSELOR

## 2024-07-22 NOTE — PROGRESS NOTES
Date: August 7, 2024  Time In: 9:26 AM   Time Out: 10:10 AM  This provider is located at the [facility]through [facility]), [address], [place] [address] using a secure [employer] Video Visit through [employer]. Patient is being seen remotely via telehealth at home address in [place] and stated they are in a secure environment for this session. The patient's condition being diagnosed/treated is appropriate for telemedicine. The provider identified herself as well as her credentials. The patient, and/or patients guardian, consent to be seen remotely, and when consent is given they understand that the consent allows for patient identifiable information to be sent to a third party as needed. They may refuse to be seen remotely at any time. The electronic data is encrypted and password protected, and the patient and/or guardian has been advised of the potential risks to privacy not withstanding such measures.     You have chosen to receive care through a telehealth visit.  Do you consent to use a video/audio connection for your medical care today? Yes    PROGRESS NOTE  Data:  Sudhakar Saul is a 48 y.o. female who presents today for follow up. Patient states that she forgot about the time difference is why she was running late to her appointment. Patient states that she had an endoscopy  last week and states that she may have another procedure to further stretch her esophagus more due to strictures that have been caused by esophagitis. Patient states that her doctor felt as though all of her medications have played a part in her condition. Patient states that this was also the first time she had a procedure with out her mother being there but states that her step father took good care of her before and after the procedure.  Patient states that she had been vomiting up blood and was in a lot of pain before the procedure.  Per patient, she lost 22 pounds in a month and is  "only 24 pounds away from her goal weight but states that this is not the way that she intended to lose it.  Patient states that she has been anxious in regard to her health status but also in regard to making decisions about her future.  Patient states that she has started seeing pain management again and is now taking pain medication again.  Patient states that she is also working with her stepfather to make a decision about what they want to do in regard to housing.  Patient states that the rent for the home that they live in has been increased and that the neighborhood is overran with unruly children who will trespassed onto the property of others and will break things that belong to others and will seemingly have no remorse for anything that they do.  Patient states she also noticed that her new neighbors were \"rolling blunts\" and knows that is a situation that she does not want to be around.  Patient states that she and her stepfather have been looking for housing in an area that is about 15 minutes away from where they currently live and will have to make a decision before the lease is up at the end of August.  Patient states she has been anxious lately awaiting a decision on her disability as she states that there were over 4000 documents that needed to be reviewed and that the  was ask multiple questions during the hearing and had to state multiple times that the patient's limitations would severely impact her ability to do most jobs.  Patient states that she and her  both feel that she has a good chance of getting disability this time as there was nothing that the vocational specialist could come up with that the patient could reasonably do for employment; however, per patient, it has been a month and a half and she has not received a final decision yet.  Patient states that she will be going back to pain management in an attempt to get her pain under control.  Patient states that " she has heard the voice of the little girl telling her not to do something a couple of times and has also seen a shadow figure once which is the most the patient has seen or heard in quite some time.  Patient states that she also had issues with her father making her feel like she was using the money that he had given her in a way that it was not intended for her.  Patient states he even asked for receipts at 1 point during a conversation and the patient states she was offended by that as he did not seem to want to believe that the patient had already used the majority of the money he had sent her.  Patient states that she had just called to talk to him and tell him happy birthday but he somehow got it in his mind that she was trying to ask for more money and that was not her intent at all.  Patient states that she is just going to leave him alone for a while and see how things progress as she has more pressing things that she must be taking care of right now.      Chief Complaint: anxiety, depression, physical pain and health concerns, ongoing grief    History of Present Illness: Patient has struggled with anxiety, depression and schizoaffective disorder for several years      Clinical Maneuvering/Intervention: solution focused    (Scales based on 0 - 10 with 10 being the worst)  Depression: 7 Anxiety: 7       Assisted patient in processing above session content; acknowledged and normalized patient’s thoughts, feelings, and concerns.  Rationalized patient thought process regarding her health status, issues with her father and looking at housing options.  Discussed triggers associated with patient's feelings of anxiety and depression such as worrying about the outcome of her disability hearing and also trying to help her stepfather decide on housing options in addition to a misunderstanding with her father.  Also discussed coping skills for patient to implement such as continuing to follow her doctors orders,  allowing herself to be okay with not getting everything completed that she wants to within a specific time frame and weighing out the best possible options for her and her stepfather in regard to housing to ensure for their safety and minimization of any possible triggers for the patient.    Allowed patient to freely discuss issues without interruption or judgment. Provided safe, confidential environment to facilitate the development of positive therapeutic relationship and encourage open, honest communication. Assisted patient in identifying risk factors which would indicate the need for higher level of care including thoughts to harm self or others and/or self-harming behavior and encouraged patient to contact this office, call 911, or present to the nearest emergency room should any of these events occur. Discussed crisis intervention services and means to access. Patient adamantly and convincingly denies current suicidal or homicidal ideation or perceptual disturbance.    Assessment:   Assessment   Patient appears to maintain relative stability as compared to their baseline.  However, patient continues to struggle with schizoaffective disorder which continues to cause impairment in important areas of functioning.  A result, they can be reasonably expected to continue to benefit from treatment and would likely be at increased risk for decompensation otherwise.    Mental Status Exam:   Hygiene:   good  Cooperation:  Cooperative  Eye Contact:  Good  Psychomotor Behavior:  Appropriate  Affect:  Appropriate  Mood: depressed, grumpy   Speech:  Normal  Thought Process:  Goal directed  Thought Content:  Normal  Suicidal:  None  Homicidal:  None  Hallucinations:  Auditory and Visual- heard the little girl a couple of times telling her not to do something or be a certain way or just before the procedure heard the girl say she was scared.Also saw a shadow figure one time  Delusion:  None  Memory:  Intact but some deficits  from the past  Orientation:  Person, Place, Time, and Situation  Reliability:  good  Insight:  Good  Judgement:  Good  Impulse Control:  Good  Physical/Medical Issues:  Yes, recent endoscopy      Patient's Support Network Includes:  significant other, extended family, and friends    Functional Status: Moderate impairment     Progress toward goal: Not at goal    Prognosis: Good with Ongoing Treatment          Plan:    Patient will continue in individual outpatient therapy with focus on improved functioning and coping skills, maintaining stability, and avoiding decompensation and the need for higher level of care.    Patient will adhere to medication regimen as prescribed and report any side effects. Patient will contact this office, call 911 or present to the nearest emergency room should suicidal or homicidal ideations occur. Provide Cognitive Behavioral Therapy and Solution Focused Therapy to improve functioning, maintain stability, and avoid decompensation and the need for higher level of care.     Return in about 3 weeks, or earlier if symptoms worsen or fail to improve.           VISIT DIAGNOSIS:     ICD-10-CM ICD-9-CM   1. Schizoaffective disorder, depressive type  F25.1 295.70   2. Generalized anxiety disorder  F41.1 300.02             This document has been electronically signed by DREAD Vo  August 7, 2024 07:42 EDT      Part of this note may be an electronic transcription/translation of spoken language to printed text using the Dragon Dictation System.

## 2024-10-01 ENCOUNTER — TELEMEDICINE (OUTPATIENT)
Dept: PSYCHIATRY | Facility: CLINIC | Age: 49
End: 2024-10-01
Payer: COMMERCIAL

## 2024-10-01 DIAGNOSIS — F25.1 SCHIZOAFFECTIVE DISORDER, DEPRESSIVE TYPE: ICD-10-CM

## 2024-10-01 DIAGNOSIS — F41.1 GENERALIZED ANXIETY DISORDER: Primary | ICD-10-CM

## 2024-10-01 NOTE — PROGRESS NOTES
Date: October 22, 2024  Time In: 10:20 AM   Time Out: 10:44 AM  This provider is located at the [facility] ([facility]), [address], [place] [address] using a secure [employer] Video Visit through [employer]. Patient is being seen remotely via telehealth at home address in [place] and stated they are in a secure environment for this session. The patient's condition being diagnosed/treated is appropriate for telemedicine. The provider identified herself as well as her credentials. The patient, and/or patients guardian, consent to be seen remotely, and when consent is given they understand that the consent allows for patient identifiable information to be sent to a third party as needed. They may refuse to be seen remotely at any time. The electronic data is encrypted and password protected, and the patient and/or guardian has been advised of the potential risks to privacy not withstanding such measures.     You have chosen to receive care through a telehealth visit.  Do you consent to use a video/audio connection for your medical care today? Yes    PROGRESS NOTE  Data:  Sudhakar Saul is a 48 y.o. female who presents today for follow up. Patient states that she is dealing with a stomach virus at this time. Patient states that she had an esophageal stricture that was caused by Monjaro and she had to have her esophagus stretched twice. Patient states her use of Monjaro and Trulicity was discontinued and she was put back on the insulin pump about three weeks ago and is having 6-7 extreme lows a day due to her numbers not being set correctly. Patient states that her doctor is supposed to call her back with new numbers to reset her pump. Patient states that so far she has felt better since the second stretching of her esophagus. Patient states that her PCP is wanting to order a lot of test for her physical health to check out her liver and her lungs due to the  cirrhosis that she was found last year and the nodules that were found in her lungs. Patient states that she is under 200 pounds now and is able to get around a little better now and do a little more housework than she could previously. Patient states that she still has not heard anything about her disability claim as of yet but states that they have until the end of November to give her a decision which would be the end of the 6 month deadline. Patient states that her  gets updates and relays those to her which makes her feel better about the process. Patient states that she and her step father signed another year's lease at their home as the people who were causing issues were evicted. Patient states that she has talked with her stepfather and has decided that if she gets her disability approved and back pay that she wants to move out on her own as she and her fiancé are looking at moving in together.  Patient states that her stepfather was understanding of this situation and is happy for her to go about her own life.  Patient requested to end the session early as her glucose monitor continued to go off due to a substantial low and she was going to call her doctor.  Patient states that she does have someone with her with her who could call for an ambulance or take her to the doctor if needed.      Chief Complaint: health related issues, feelings of anxiety and depression    History of Present Illness: Patient has battled with anxiety and depression and schizoaffective disorder for several years    Clinical Maneuvering/Intervention: CBT    (Scales based on 0 - 10 with 10 being the worst)  Depression: 6 Anxiety: 8       Assisted patient in processing above session content; acknowledged and normalized patient’s thoughts, feelings, and concerns.  Rationalized patient thought process regarding her recent health struggles.  Discussed triggers associated with patient's feelings of anxiety and depression which are  currently linked to her health status.  Also discussed coping skills for patient to implement such as keeping in close contact with her medical providers, keeping herself grounded into the present moment and trying to maintain her focus on the love and support she has around her and not letting herself ruminate on negative experiences and thoughts.    Allowed patient to freely discuss issues without interruption or judgment. Provided safe, confidential environment to facilitate the development of positive therapeutic relationship and encourage open, honest communication. Assisted patient in identifying risk factors which would indicate the need for higher level of care including thoughts to harm self or others and/or self-harming behavior and encouraged patient to contact this office, call 911, or present to the nearest emergency room should any of these events occur. Discussed crisis intervention services and means to access. Patient adamantly and convincingly denies current suicidal or homicidal ideation or perceptual disturbance.    Assessment:   Assessment   Patient appears to maintain relative stability as compared to their baseline.  However, patient continues to struggle with anxiety, depression and  which continues to cause impairment in important areas of functioning.  A result, they can be reasonably expected to continue to benefit from treatment and would likely be at increased risk for decompensation otherwise.    Mental Status Exam:   Hygiene:   good  Cooperation:  Cooperative  Eye Contact:  Good  Psychomotor Behavior:  Appropriate  Affect:  Appropriate  Mood: fluctates  Speech:  Normal  Thought Process:  Circum  Thought Content:  Normal  Suicidal:  None  Homicidal:  None  Hallucinations:  Auditory 4-5 of the little girl in the last few months  Delusion:  None  Memory:  Deficits  Orientation:  Person, Place, Time, and Situation  Reliability:  good  Insight:  Good  Judgement:  Good  Impulse Control:   Good  Physical/Medical Issues:  Yes patient is currently experiencing blood sugar related issues        Patient's Support Network Includes:  significant other and extended family    Functional Status: Moderate impairment     Progress toward goal: Not at goal    Prognosis: Good with Ongoing Treatment          Plan:    Patient will continue in individual outpatient therapy with focus on improved functioning and coping skills, maintaining stability, and avoiding decompensation and the need for higher level of care.    Patient will adhere to medication regimen as prescribed and report any side effects. Patient will contact this office, call 911 or present to the nearest emergency room should suicidal or homicidal ideations occur. Provide Cognitive Behavioral Therapy and Solution Focused Therapy to improve functioning, maintain stability, and avoid decompensation and the need for higher level of care.     Return in about 2 weeks, or earlier if symptoms worsen or fail to improve.           VISIT DIAGNOSIS:     ICD-10-CM ICD-9-CM   1. Generalized anxiety disorder  F41.1 300.02   2. Schizoaffective disorder, depressive type  F25.1 295.70             This document has been electronically signed by DREAD Vo  October 22, 2024 11:03 EDT      Part of this note may be an electronic transcription/translation of spoken language to printed text using the Dragon Dictation System.

## 2024-10-06 DIAGNOSIS — G47.00 INSOMNIA, UNSPECIFIED TYPE: ICD-10-CM

## 2024-10-07 RX ORDER — TRAZODONE HYDROCHLORIDE 100 MG/1
100 TABLET ORAL NIGHTLY
Qty: 90 TABLET | Refills: 0 | Status: SHIPPED | OUTPATIENT
Start: 2024-10-07

## 2024-11-07 DIAGNOSIS — F51.5 NIGHTMARES: ICD-10-CM

## 2024-11-07 RX ORDER — PRAZOSIN HYDROCHLORIDE 2 MG/1
CAPSULE ORAL
Qty: 180 CAPSULE | Refills: 0 | Status: SHIPPED | OUTPATIENT
Start: 2024-11-07

## 2024-12-01 DIAGNOSIS — F41.1 GENERALIZED ANXIETY DISORDER: Chronic | ICD-10-CM

## 2024-12-01 DIAGNOSIS — F25.1 SCHIZOAFFECTIVE DISORDER, DEPRESSIVE TYPE: Chronic | ICD-10-CM

## 2024-12-02 RX ORDER — DESVENLAFAXINE 50 MG/1
TABLET, FILM COATED, EXTENDED RELEASE ORAL DAILY
Qty: 30 TABLET | Refills: 0 | Status: SHIPPED | OUTPATIENT
Start: 2024-12-02

## 2024-12-04 ENCOUNTER — TELEMEDICINE (OUTPATIENT)
Dept: PSYCHIATRY | Facility: CLINIC | Age: 49
End: 2024-12-04
Payer: COMMERCIAL

## 2024-12-04 DIAGNOSIS — F25.1 SCHIZOAFFECTIVE DISORDER, DEPRESSIVE TYPE: Primary | ICD-10-CM

## 2024-12-04 DIAGNOSIS — F41.1 GENERALIZED ANXIETY DISORDER: ICD-10-CM

## 2024-12-04 NOTE — PROGRESS NOTES
Date: January 2, 2025  Time In: 9:46 AM   Time Out: 11:00 AM  This provider is located at a home office through the [facility] (through [facility]), [address], [place] [address] using a secure [employer] Video Visit through [email or web address]. Patient is being seen remotely via telehealth at home address in [place] and stated they are in a secure environment for this session. The patient's condition being diagnosed/treated is appropriate for telemedicine. The provider identified herself as well as her credentials. The patient, and/or patients guardian, consent to be seen remotely, and when consent is given they understand that the consent allows for patient identifiable information to be sent to a third party as needed. They may refuse to be seen remotely at any time. The electronic data is encrypted and password protected, and the patient and/or guardian has been advised of the potential risks to privacy not withstanding such measures.     You have chosen to receive care through a telehealth visit.  Do you consent to use a video/audio connection for your medical care today? Yes  Mode of Visit: Video  Location of patient: -HOME-  Location of provider: +HOME+  You have chosen to receive care through a telehealth visit.  The patient has signed the video visit consent form.  The visit included audio and video interaction. No technical issues occurred during this visit.    PROGRESS NOTE  Data:  Sudhakar Saul is a 49 y.o. female who presents today for follow up. Patient states that she has been dealing with a lot of chaos and medical appointments. Per patient, she was found to have a mass in her breast and that was anxiety provoking for her but it was found to be an inflamed milk duct that the doctors are going to continue to monitor over the next few months. Patient states that she and her step father had a rough week around Thanksgiving as the year anniversary of her mother's  passing was November 24th. Patient states that both she and her step father have had varying emotions but patient states that she is at the point of wanting her mother back and her step father is somewhat ambivalent. Per patient, her father was in a car accident that scared him into getting his affairs in order as the patient states that she had to go see him to sign documents as her father will be leaving all of his belongings to her. Patient states that she feels that he is finally trying to accept the fact that he is her father and is trying to build a relationship with her now after all this time. Patient states that she stayed with her father for a week and a half as she had to learn about all of his affairs and also taught him how to use a computer and cell phone. Patient states that she and her father would talk at night and he has accepted the fact that she has a diagnosis of schizophrenia but will not accept his own diagnosis as she states that he will acknowledge that he has a chemical imbalance due to alcohol but will say nothing further on the issue.  Patient states that she is excited but somewhat reserved to build this relationship with her father as she states that it has been a long time coming and she does not know really what to expect from him.  Discussed the patient maintaining her boundaries with her father but giving him some santy as he is trying to open up to her which is what she has wanted for a long time. Patient states that she and her boyfriend are going to celebrate their birthdays on the 9th by going to the casino and spending time together. Patient states that she and her step father went to visit her aunt in the nursing home and helped her get her room decorated for [place] as the patient states that her cousin is not taking care of things in the way that the patient would from a daughter standpoint.  Patient acknowledges that losing her mother has made her more irritable when it  comes to how her cousin is treating her mother and patient states that she would like to tell her cousin for that we will feel like when her mother is no longer alive but she states that her cousin is most often times wrapped up in herself and does not pay attention to others.  Reminded the patient that she cannot control the actions of another adult but she can make her own decisions about how she treats others and what she does for them.    Chief Complaint: medical issues, feelings of anxiety and depression, grief, relationship issues with others    History of Present Illness: Patient has struggled with anxiety, depression and schizoaffective disorder for several years    Clinical Maneuvering/Intervention: solution focused    (Scales based on 0 - 10 with 10 being the worst)  Depression: 8 Anxiety: 5       Assisted patient in processing above session content; acknowledged and normalized patient’s thoughts, feelings, and concerns.  Rationalized patient thought process regarding recent health and family issues.  Discussed triggers associated with patient's feelings of anxiety and depression such as missing her mother, health concerns, and a step forward in her relationship with her father.  Also discussed coping skills for patient to implement such as continuing to do the things that help her remember and honor her mother, continuing to follow up on all medical concerns and maintaining her boundaries with others without letting temporary feelings of guilt or obligation make her lower them.    Allowed patient to freely discuss issues without interruption or judgment. Provided safe, confidential environment to facilitate the development of positive therapeutic relationship and encourage open, honest communication. Assisted patient in identifying risk factors which would indicate the need for higher level of care including thoughts to harm self or others and/or self-harming behavior and encouraged patient to contact  "this office, call 911, or present to the nearest emergency room should any of these events occur. Discussed crisis intervention services and means to access. Patient adamantly and convincingly denies current suicidal or homicidal ideation or perceptual disturbance.    Assessment:   Assessment   Patient appears to maintain relative stability as compared to their baseline.  However, patient continues to struggle with schizoaffective disorder, depression and anxiety which continues to cause impairment in important areas of functioning.  A result, they can be reasonably expected to continue to benefit from treatment and would likely be at increased risk for decompensation otherwise.    Mental Status Exam:   Hygiene:   good  Cooperation:  Cooperative  Eye Contact:  Good  Psychomotor Behavior:  Appropriate  Affect:  Appropriate  Mood:  \"a little stressed\" per patient  Speech:  Normal  Thought Process:  Circum  Thought Content:  Normal  Suicidal:  None  Homicidal:  None  Hallucinations:   one shadow movement; hearing the little girl saying \"its going to be ok\" or crying for her mother  Delusion:  None  Memory:  Deficits  Orientation:  Person, Place, Time, and Situation  Reliability:  good  Insight:  Good  Judgement:  Good  Impulse Control:  Good  Physical/Medical Issues:  Yes a 6 mm nodule was found in her lung and she was recently diagnosed with an inflamed milk duct in her breast. Patient will be having a colonoscopy on the 27th        Patient's Support Network Includes:  significant other, father, extended family, and friends    Functional Status: Moderate impairment     Progress toward goal: Not at goal    Prognosis: Good with Ongoing Treatment          Plan:    Patient will continue in individual outpatient therapy with focus on improved functioning and coping skills, maintaining stability, and avoiding decompensation and the need for higher level of care.    Patient will adhere to medication regimen as prescribed and " report any side effects. Patient will contact this office, call 911 or present to the nearest emergency room should suicidal or homicidal ideations occur. Provide Cognitive Behavioral Therapy and Solution Focused Therapy to improve functioning, maintain stability, and avoid decompensation and the need for higher level of care.     Return in about 3 weeks, or earlier if symptoms worsen or fail to improve.           VISIT DIAGNOSIS:     ICD-10-CM ICD-9-CM   1. Schizoaffective disorder, depressive type  F25.1 295.70   2. Generalized anxiety disorder  F41.1 300.02             This document has been electronically signed by DREAD Vo  January 2, 2025 07:39 EST      Part of this note may be an electronic transcription/translation of spoken language to printed text using the Dragon Dictation System.

## 2025-01-09 DIAGNOSIS — G47.00 INSOMNIA, UNSPECIFIED TYPE: ICD-10-CM

## 2025-01-09 RX ORDER — TRAZODONE HYDROCHLORIDE 100 MG/1
100 TABLET ORAL NIGHTLY
Qty: 90 TABLET | Refills: 0 | Status: SHIPPED | OUTPATIENT
Start: 2025-01-09

## 2025-01-14 ENCOUNTER — TELEMEDICINE (OUTPATIENT)
Dept: PSYCHIATRY | Facility: CLINIC | Age: 50
End: 2025-01-14
Payer: COMMERCIAL

## 2025-01-14 DIAGNOSIS — F43.21 GRIEF: ICD-10-CM

## 2025-01-14 DIAGNOSIS — G47.00 INSOMNIA, UNSPECIFIED TYPE: ICD-10-CM

## 2025-01-14 DIAGNOSIS — F51.5 NIGHTMARES: ICD-10-CM

## 2025-01-14 DIAGNOSIS — F25.1 SCHIZOAFFECTIVE DISORDER, DEPRESSIVE TYPE: Chronic | ICD-10-CM

## 2025-01-14 DIAGNOSIS — F41.1 GENERALIZED ANXIETY DISORDER: Primary | ICD-10-CM

## 2025-01-14 PROBLEM — N63.10 MASS OF RIGHT BREAST: Status: ACTIVE | Noted: 2024-12-26

## 2025-01-14 PROBLEM — K76.9 LIVER DISEASE: Status: ACTIVE | Noted: 2024-09-18

## 2025-01-14 PROBLEM — Z87.19 HISTORY OF ESOPHAGEAL STRICTURE: Status: ACTIVE | Noted: 2024-09-18

## 2025-01-14 PROBLEM — R92.8 ABNORMAL MAMMOGRAM OF RIGHT BREAST: Status: ACTIVE | Noted: 2024-11-22

## 2025-01-14 PROBLEM — R91.8 LUNG MASS: Status: ACTIVE | Noted: 2024-12-26

## 2025-01-14 RX ORDER — MONTELUKAST SODIUM 10 MG/1
10 TABLET ORAL
COMMUNITY
Start: 2024-12-26 | End: 2025-01-26

## 2025-01-14 RX ORDER — METHOCARBAMOL 500 MG/1
500 TABLET, FILM COATED ORAL 3 TIMES DAILY PRN
COMMUNITY

## 2025-01-14 RX ORDER — TRAZODONE HYDROCHLORIDE 100 MG/1
100 TABLET ORAL NIGHTLY
Qty: 90 TABLET | Refills: 0 | Status: SHIPPED | OUTPATIENT
Start: 2025-01-14

## 2025-01-14 RX ORDER — HYDROCODONE BITARTRATE AND ACETAMINOPHEN 10; 325 MG/1; MG/1
TABLET ORAL
COMMUNITY

## 2025-01-14 RX ORDER — DEXLANSOPRAZOLE 30 MG/1
30 CAPSULE, DELAYED RELEASE ORAL
COMMUNITY
Start: 2024-12-03 | End: 2025-12-04

## 2025-01-14 RX ORDER — PRAZOSIN HYDROCHLORIDE 2 MG/1
2 CAPSULE ORAL NIGHTLY
Qty: 90 CAPSULE | Refills: 0 | Status: SHIPPED | OUTPATIENT
Start: 2025-01-14

## 2025-01-14 RX ORDER — INSULIN GLARGINE-YFGN 100 [IU]/ML
INJECTION, SOLUTION SUBCUTANEOUS
COMMUNITY
Start: 2024-12-24

## 2025-01-14 RX ORDER — PEN NEEDLE, DIABETIC 32GX 5/32"
NEEDLE, DISPOSABLE MISCELLANEOUS
COMMUNITY
Start: 2025-01-13

## 2025-01-14 RX ORDER — LAMOTRIGINE 150 MG/1
150 TABLET ORAL DAILY
Qty: 90 TABLET | Refills: 0 | Status: SHIPPED | OUTPATIENT
Start: 2025-01-14

## 2025-01-14 RX ORDER — DESVENLAFAXINE 50 MG/1
50 TABLET, FILM COATED, EXTENDED RELEASE ORAL DAILY
Qty: 90 TABLET | Refills: 0 | Status: SHIPPED | OUTPATIENT
Start: 2025-01-14

## 2025-01-14 RX ORDER — ALBUTEROL SULFATE AND BUDESONIDE 90; 80 UG/1; UG/1
2 AEROSOL, METERED RESPIRATORY (INHALATION) EVERY 6 HOURS PRN
COMMUNITY
Start: 2024-12-26 | End: 2025-01-26

## 2025-01-14 NOTE — PROGRESS NOTES
This provider is located at [facility], [address] [place] [address].The Patient is seen remotely at home, [address] [place] [address], [place] [address] via my chart.  Patient is being seen via telehealth and confirm that they are in a secure environment for this session. The patient's condition being diagnosed/treated is appropriate for telemedicine. The provider identified himself/herself: herself as well as her credentials.   The patient gave consent to be seen remotely, and when consent is given they understand that the consent allows for patient identifiable information to be sent to a third party as needed.   They may refuse to be seen remotely at any time. The electronic data is encrypted and password protected, and the patient has been advised of the potential risks to privacy not withstanding such measures.    You have chosen to receive care through a telehealth visit.  Do you consent to use a video/audio connection for your medical care today? Yes. Patient verified Name, , and address.       Chief Complaint  Mood, depression and anxiety    Subjective          Sudhakar More Saul presents to [facility] for medication management.    History of Present Illness  Patient presents today after not being seen since May 2024.  Patient states that she had an appointment but had to reschedule and then just forgot to reschedule.  Patient notes that he year anniversary of her mother's passing as well as the holidays and her birthday in November and December have been difficult.  Patient states she has had a lot of medical issues with her stomach as every time she eats she is in pain.  Reports they have done 2 dilations but notes her next option for the esophageal stricture if a stent.  Patient states she is also having back pain and joint issues.  Notes her blood pressure and A1c have been better and she has been taken off her Procardia and decreased her  insulin.  Patient feels her depression and anxiety are high just due to her mom passing on the holidays but also medical issues.  Patient states that she is tired of being in pain all the time.  Notes she gets roughly 3 to 4 hours at night but when she takes the trazodone she gets 4 to 5 hours of sleep.  Patient states she has sought a couple show movements.  Notes she has also been dealing stressors of finances as she is still waiting to hear on disability as it is in the write up phase.  Patient states she has been speaking with a nutritionist regarding her appetite.  Denies any side effects with the medications.  Patient states she has been taking all medications except the Pristiq as she has been out for a few weeks now and feels that she does need it for her depression and anxiety.  Patient states she just wants to start that back and does not feel she needs any other changes in medications as it is more seasonal and situational due to medical issues and grief.  Denies any SI or HI.  Denies any auditory hallucinations.  Admits to visual hallucinations but denies any currently.  Denies any hypomanic or manic episodes or any paranoia or delusions.      Objective   Vital Signs:   There were no vitals taken for this visit.  Due to the remote nature of this encounter (virtual encounter), vitals were unable to be obtained.  Height stated at 66 inches.  Weight stated at 251 pounds.      PHQ-9 Score:   PHQ-9 Total Score:(Patient-Rptd) 9     PHQ-9 Depression Screening  Little interest or pleasure in doing things? (Patient-Rptd) Several days   Feeling down, depressed, or hopeless? (Patient-Rptd) Several days   PHQ-2 Total Score (Patient-Rptd) 2   Trouble falling or staying asleep, or sleeping too much? (Patient-Rptd) Over half   Feeling tired or having little energy? (Patient-Rptd) Several days   Poor appetite or overeating? (Patient-Rptd) Over half   Feeling bad about yourself - or that you are a failure or have let  yourself or your family down? (Patient-Rptd) Several days   Trouble concentrating on things, such as reading the newspaper or watching television? (Patient-Rptd) Several days   Moving or speaking so slowly that other people could have noticed? Or the opposite - being so fidgety or restless that you have been moving around a lot more than usual? (Patient-Rptd) Not at all   Thoughts that you would be better off dead, or of hurting yourself in some way? (Patient-Rptd) Not at all   PHQ-9 Total Score (Patient-Rptd) 9   If you checked off any problems, how difficult have these problems made it for you to do your work, take care of things at home, or get along with other people? (Patient-Rptd) Somewhat difficult         PHQ-9 Total Score: (Patient-Rptd) 9     JD-7  Feeling nervous, anxious or on edge: (Patient-Rptd) More than half the days  Not being able to stop or control worrying: (Patient-Rptd) More than half the days  Worrying too much about different things: (Patient-Rptd) More than half the days  Trouble Relaxing: (Patient-Rptd) Several days  Being so restless that it is hard to sit still: (Patient-Rptd) Not at all  Feeling afraid as if something awful might happen: (Patient-Rptd) Not at all  Becoming easily annoyed or irritable: (Patient-Rptd) Several days  JD 7 Total Score: (Patient-Rptd) 8  If you checked any problems, how difficult have these problems made it for you to do your work, take care of things at home, or get along with other people: (Patient-Rptd) Somewhat difficult      Patient screened positive for depression based on a PHQ-9 score of 9 on 1/14/2025. Follow-up recommendations include: Prescribed antidepressant medication treatment and see notes and medication list. .        Mental Status Exam:   Hygiene:   good  Cooperation:  Cooperative  Eye Contact:  Good  Psychomotor Behavior:  Appropriate  Affect:  Appropriate  Mood: sad and anxious  Speech:  Normal  Thought Process:  Goal directed  Thought  Content:  Normal  Suicidal:  None  Homicidal:  None  Hallucinations:  Visual  Delusion:  None  Memory:  Intact  Orientation:  Person, Place, Time, and Situation  Reliability:  good  Insight:  Good and Fair  Judgement:  Good and Fair  Impulse Control:  Good and Fair  Physical/Medical Issues:  Yes see medical history.      Current Medications:   Current Outpatient Medications   Medication Sig Dispense Refill    Albuterol-Budesonide (Airsupra) 90-80 MCG/ACT aerosol Inhale 2 Inhalations Every 6 (Six) Hours As Needed.      BD Pen Needle Beverly 2nd Gen 32G X 4 MM misc       Cariprazine HCl (VRAYLAR) 4.5 MG capsule capsule Take 1 capsule by mouth Daily. 90 capsule 0    desvenlafaxine (PRISTIQ) 50 MG 24 hr tablet Take 1 tablet by mouth Daily. 90 tablet 0    dexlansoprazole (DEXILANT) 30 MG capsule Take 1 capsule by mouth.      Insulin Glargine (LANTUS SOLOSTAR) 100 UNIT/ML injection pen Indications: Type 2 Diabetes Use Up To 20 Units Daily      lamoTRIgine (LaMICtal) 150 MG tablet Take 1 tablet by mouth Daily. 90 tablet 0    montelukast (SINGULAIR) 10 MG tablet Take 1 tablet by mouth.      prazosin (MINIPRESS) 2 MG capsule Take 1 capsule by mouth Every Night. 90 capsule 0    Semglee, yfgn, 100 UNIT/ML solution pen-injector INJECT UP TO 20 UNITS DAILY      traZODone (DESYREL) 100 MG tablet Take 1 tablet by mouth Every Night. 90 tablet 0    albuterol (ACCUNEB) 1.25 MG/3ML nebulizer solution Take 3 mL by nebulization Every 6 (Six) Hours As Needed for Wheezing or Shortness of Air. 9 mL 3    amantadine (SYMMETREL) 100 MG tablet       azithromycin (ZITHROMAX) 250 MG tablet Take  by mouth See Admin Instructions. Take 2 tablets by mouth on day 1 then take 1 tablet by mouth once daily on days 2-5      cholecalciferol (VITAMIN D3) 1.25 MG (53433 UT) capsule Every Week      Continuous Blood Gluc  (Dexcom G6 ) device USE FOR 6 MONTHS      Continuous Blood Gluc Sensor (Dexcom G6 Sensor) PLACE 1 SENSOR ON SKIN CHANGING  EVERY 10 DAYS 9 each 0    Continuous Blood Gluc Transmit (Dexcom G6 Transmitter) misc USE AS DIRECTED 1 each 0    cyanocobalamin 1000 MCG/ML injection INJECT 1 ML INTRAMUSCULARLY ONCE EVERY MONTH      cyclobenzaprine (FLEXERIL) 10 MG tablet Take 1 tablet by mouth 3 (Three) Times a Day As Needed for Muscle Spasms. 90 tablet 3    EQ Nicotine 21 MG/24HR patch APPLY 1 PATCH TOPICALLY EVERY 24 HOURS      Ertugliflozin L-PyroglutamicAc (Steglatro) 15 MG tablet Take 1 tablet by mouth Every Morning. 90 tablet 1    famotidine (PEPCID) 40 MG tablet TAKE 1 TABLET BY MOUTH ONCE DAILY FOR HEARTBURN      FeroSul 325 (65 Fe) MG tablet       Fluticasone Furoate-Vilanterol (Breo Ellipta) 200-25 MCG/INH inhaler Inhale 1 puff Daily. 28 each 11    gabapentin (NEURONTIN) 600 MG tablet Take 1 tablet by mouth 3 times a day.      HYDROcodone-acetaminophen (NORCO)  MG per tablet TAKE 1 TABLET BY MOUTH THREE TIMES DAILY AS NEEDED FOR PAIN. MAY FILL 12-      hydrOXYzine (ATARAX) 25 MG tablet TAKE 1/2 TO 1 (ONE-HALF TO ONE) TABLET BY MOUTH THREE TIMES DAILY AS NEEDED FOR ANXIETY 270 tablet 0    Insulin Disposable Pump (OmniPod Dash 5 Pack Pods) misc       Insulin Lispro (humaLOG) 100 UNIT/ML injection USE AS DIRECTED THROUGH INSULIN PUMP UPTO 180 UNITS DAILY 90 mL 0    levETIRAcetam (KEPPRA) 750 MG tablet Take 2 tablets by mouth 2 (Two) Times a Day.      lisinopril (PRINIVIL,ZESTRIL) 10 MG tablet Take 1 tablet by mouth Daily.      metFORMIN (GLUCOPHAGE) 500 MG tablet Take 1 tablet by mouth 2 (Two) Times a Day With Meals. 60 tablet 11    methocarbamol (ROBAXIN) 500 MG tablet Take 1 tablet by mouth 3 (Three) Times a Day As Needed. for muscle spams      metoprolol tartrate (LOPRESSOR) 50 MG tablet Take 1 tablet by mouth 2 (Two) Times a Day. 60 tablet 3    Nebulizer device 1 application Every 6 (Six) Hours As Needed (dyspnea). 1 each 3    nitrofurantoin, macrocrystal-monohydrate, (MACROBID) 100 MG capsule       nystatin (MYCOSTATIN)  067593 UNIT/GM powder APPLY TO THE AFFECTED AREA(S) BY TOPICAL ROUTE 2 TIMES PER DAY      omeprazole (priLOSEC) 40 MG capsule Take 1 capsule by mouth Daily. 30 capsule 3    OXcarbazepine (TRILEPTAL) 600 MG tablet Take 1 tablet by mouth 2 (Two) Times a Day. PT TAKES  MG TAB IN MORNING AND  MG IN EVENING      pantoprazole (PROTONIX) 40 MG EC tablet       promethazine (PHENERGAN) 25 MG tablet       rOPINIRole (REQUIP) 3 MG tablet Take 1 tablet by mouth Every Night.      simvastatin (ZOCOR) 40 MG tablet Take 1 tablet by mouth Every Night. 30 tablet 3    sucralfate (CARAFATE) 1 g tablet TAKE 1 TABLET BY MOUTH 4 TIMES DAILY DISSOLVE TABLET IN WATER AND DRINK SLURRY      tiotropium bromide monohydrate (SPIRIVA RESPIMAT) 2.5 MCG/ACT aerosol solution inhaler Daily.      Trulicity 4.5 MG/0.5ML solution pen-injector INJECT 1 SYRINGE SUBCUTANEOUSLY ONCE A WEEK 4 mL 0    Ventolin  (90 Base) MCG/ACT inhaler Inhale 2 puffs Every 4 (Four) Hours As Needed for Wheezing. 6.7 g 11    vitamin D (ERGOCALCIFEROL) 1.25 MG (31288 UT) capsule capsule TAKE 2 CAPSULES BY MOUTH ONCE A WEEK 8 capsule 3     No current facility-administered medications for this visit.       Physical Exam  Nursing note reviewed.   Constitutional:       Appearance: Normal appearance.   Neurological:      Mental Status: She is alert.   Psychiatric:         Attention and Perception: Attention and perception normal.         Mood and Affect: Mood is anxious and depressed.         Speech: Speech normal.         Behavior: Behavior is cooperative.         Thought Content: Thought content normal.         Cognition and Memory: Cognition and memory normal.         Judgment: Judgment normal.        Result Review :     The following data was reviewed by: RDO Spencer on 01/14/2025:  Common labs          3/29/2024    09:08 8/19/2024    09:07 12/23/2024    09:07   Common Labs   WBC 10.1     13.0     9.9       Hemoglobin 15.2     15.4     14.3        Hematocrit 44.2     45.9     40.0       Platelets 132     227     148       Total Cholesterol 176     166     118       Triglycerides 149     136     98       HDL Cholesterol 44     40     43       LDL Cholesterol  102     99     55       Hemoglobin A1C 10.2     10.9     7.7          Details          This result is from an external source.               CBC          3/29/2024    09:08 8/19/2024    09:07 12/23/2024    09:07   CBC   WBC 10.1     13.0     9.9       RBC 5.15     5.31     4.70       Hemoglobin 15.2     15.4     14.3       Hematocrit 44.2     45.9     40.0       MCV 85.8     86.4     85.1       MCH 29.5     29.0     30.4       MCHC 34.4     33.6     35.8       RDW 13.4     13.3     12.7       Platelets 132     227     148          Details          This result is from an external source.             CBC w/diff          3/29/2024    09:08 8/19/2024    09:07 12/23/2024    09:07   CBC w/Diff   WBC 10.1     13.0     9.9       RBC 5.15     5.31     4.70       Hemoglobin 15.2     15.4     14.3       Hematocrit 44.2     45.9     40.0       MCV 85.8     86.4     85.1       MCH 29.5     29.0     30.4       MCHC 34.4     33.6     35.8       RDW 13.4     13.3     12.7       Platelets 132     227     148       Neutrophil Rel % 57.8     62.4     46.7       Immature Granulocyte Rel % 0.4     0.3     0.2       Lymphocyte Rel % 35.9     32.0     46.2       Monocyte Rel % 3.6     4.3     4.9       Eosinophil Rel % 1.4     0.6     1.4       Basophil Rel % 0.9     0.4     0.6          Details          This result is from an external source.             Lipid Panel          3/29/2024    09:08 8/19/2024    09:07 12/23/2024    09:07   Lipid Panel   Total Cholesterol 176     166     118       Triglycerides 149     136     98       HDL Cholesterol 44     40     43       VLDL Cholesterol 30     27     20       LDL Cholesterol  102     99     55       LDL/HDL Ratio 2.32     2.48     1.28          Details          This result is  from an external source.                     Most Recent A1C          12/23/2024    09:07   HGBA1C Most Recent   Hemoglobin A1C 7.7          Details          This result is from an external source.               Data reviewed : PCP and specialty notes         Assessment and Plan    Problem List Items Addressed This Visit       Schizoaffective disorder, depressive type    Relevant Medications    Cariprazine HCl (VRAYLAR) 4.5 MG capsule capsule    lamoTRIgine (LaMICtal) 150 MG tablet    traZODone (DESYREL) 100 MG tablet    desvenlafaxine (PRISTIQ) 50 MG 24 hr tablet    Generalized anxiety disorder - Primary    Relevant Medications    Cariprazine HCl (VRAYLAR) 4.5 MG capsule capsule    traZODone (DESYREL) 100 MG tablet    desvenlafaxine (PRISTIQ) 50 MG 24 hr tablet     Other Visit Diagnoses       Nightmares        Relevant Medications    Cariprazine HCl (VRAYLAR) 4.5 MG capsule capsule    prazosin (MINIPRESS) 2 MG capsule    traZODone (DESYREL) 100 MG tablet    desvenlafaxine (PRISTIQ) 50 MG 24 hr tablet    Insomnia, unspecified type        Relevant Medications    traZODone (DESYREL) 100 MG tablet    Grief                Encounter Diagnoses   Name Primary?    Schizoaffective disorder, depressive type     Generalized anxiety disorder Yes    Nightmares     Insomnia, unspecified type     Grief           TREATMENT PLAN/GOALS: Continue supportive psychotherapy efforts and medications as indicated. Treatment and medication options discussed during today's visit. Patient ackowledged and verbally consented to continue with current treatment plan and was educated on the importance of compliance with treatment and follow-up appointments.    MEDICATION ISSUES:  We discussed risks, benefits, and side effects of the above medications and the patient was agreeable with the plan. Patient was educated on the importance of compliance with treatment and follow-up appointments.  Patient is agreeable to call the office with any worsening  of symptoms or onset of side effects. Patient is agreeable to call 911 or go to the nearest ER should he/she begin having SI/HI. We will continue Lamictal in an effort to stabilize mood.  The patient was reminded to immediately come to the hospital should there be any loss of control.  Explanation was given to her regarding Lamictal and the potential for Foster Shon syndrome and significant rash.  Patient was encouraged to check skin prior to beginning.  Patient was encouraged to report any rash and to immediately stop medication.Lengthy discussion with patient on the possible side effects of antipsychotic medications including increased cholesterol, increased blood sugar, and possibility of weight gain.  Also discussed the need to monitor lab work associated with this.  The risk of muscle movement disorders with this class of medication was also discussed.         -Continue lamotrigine 150 mg daily for schizoaffective disorder.  -Continue minipress 2mg at night for nightmares.  -Continue Vraylar  4.5 mg daily for schizoaffective disorder depressed type.  -Continue hydroxyzine 12.5 to 25 mg up to 3 times a day as needed for anxiety and panic.  Reminded patient to use more often if needed.  -Restart desvenlafaxine 50 mg daily for depression and anxiety.  -Continue psychotherapy to help with past trauma as well as depressive and anxiety symptoms.  -Highly encourage patient since she is now on Trileptal to be aware of side effects of multiple mood stabilizers and if she were to have any rash to contact the clinic as we may have to look at tapering off the lamotrigine or talking with neurologist to reevaluate Trileptal.  Patient verbalized understanding and denied any side effects at this time.  -Continue trazodone  mg at night as needed for sleep.  Highly encouraged patient if she had any side effects or worsening symptoms to discontinue and contact the clinic as we would look at a trial of mirtazapine or  amitriptyline patient verbalized understanding.      Patient has now failed and tried Haldol, aripiprazole, Latuda and now risperidone which has caused weight gain and due to diabetes and kidney disease would benefit from Vraylar as patient is still having auditory and visual hallucinations.      Counseled patient regarding multimodal approach with healthy nutrition, healthy sleep, regular physical activity, social activities, counseling, and medications.      Coping skills reviewed and encouraged positive framing of thoughts     Assisted patient in processing above session content; acknowledged and normalized patient’s thoughts, feelings, and concerns.  Applied  positive coping skills and behavior management in session.  Allowed patient to freely discuss issues without interruption or judgment. Provided safe, confidential environment to facilitate the development of positive therapeutic relationship and encourage open, honest communication. Assisted patient in identifying risk factors which would indicate the need for higher level of care including thoughts to harm self or others and/or self-harming behavior and encouraged patient to contact this office, call 911, or present to the nearest emergency room should any of these events occur. Discussed crisis intervention services and means to access.     MEDS ORDERED DURING VISIT:  New Medications Ordered This Visit   Medications    Cariprazine HCl (VRAYLAR) 4.5 MG capsule capsule     Sig: Take 1 capsule by mouth Daily.     Dispense:  90 capsule     Refill:  0    lamoTRIgine (LaMICtal) 150 MG tablet     Sig: Take 1 tablet by mouth Daily.     Dispense:  90 tablet     Refill:  0    prazosin (MINIPRESS) 2 MG capsule     Sig: Take 1 capsule by mouth Every Night.     Dispense:  90 capsule     Refill:  0    traZODone (DESYREL) 100 MG tablet     Sig: Take 1 tablet by mouth Every Night.     Dispense:  90 tablet     Refill:  0    desvenlafaxine (PRISTIQ) 50 MG 24 hr tablet      Sig: Take 1 tablet by mouth Daily.     Dispense:  90 tablet     Refill:  0           Follow Up   Return in about 4 weeks (around 2/11/2025), or if symptoms worsen or fail to improve, for Recheck.    Patient was given instructions and counseling regarding her condition or for health maintenance advice. Please see specific information pulled into the AVS if appropriate.     Some of the data in this electronic note has been brought forward from a previous encounter, any necessary changes have been made, it has been reviewed by this APRN, and it is accurate.    This document has been electronically signed by ROD Spencer  January 14, 2025 14:22 EST    Part of this note may be an electronic transcription/translation of spoken language to printed text using the Dragon Dictation System.

## 2025-02-11 ENCOUNTER — TELEMEDICINE (OUTPATIENT)
Dept: PSYCHIATRY | Facility: CLINIC | Age: 50
End: 2025-02-11
Payer: COMMERCIAL

## 2025-02-11 DIAGNOSIS — F25.1 SCHIZOAFFECTIVE DISORDER, DEPRESSIVE TYPE: Primary | ICD-10-CM

## 2025-02-11 DIAGNOSIS — G47.00 INSOMNIA, UNSPECIFIED TYPE: ICD-10-CM

## 2025-02-11 DIAGNOSIS — F41.1 GENERALIZED ANXIETY DISORDER: ICD-10-CM

## 2025-02-11 RX ORDER — GABAPENTIN 800 MG/1
TABLET ORAL
COMMUNITY
Start: 2025-02-08

## 2025-02-11 NOTE — PROGRESS NOTES
This provider is located at [facility], [address] [place] [address].The Patient is seen remotely at home, [address] [place] [address], [place] [address] via my chart.  Patient is being seen via telehealth and confirm that they are in a secure environment for this session. The patient's condition being diagnosed/treated is appropriate for telemedicine. The provider identified himself/herself: herself as well as her credentials.   The patient gave consent to be seen remotely, and when consent is given they understand that the consent allows for patient identifiable information to be sent to a third party as needed.   They may refuse to be seen remotely at any time. The electronic data is encrypted and password protected, and the patient has been advised of the potential risks to privacy not withstanding such measures.    You have chosen to receive care through a telehealth visit.  Do you consent to use a video/audio connection for your medical care today? Yes. Patient verified Name, , and address.       Chief Complaint  Mood, depression and anxiety    Subjective    Sudhakar More Saul presents to [facility] for medication management.    History of Present Illness  Patient presents today reporting that she is doing okay.  She states the Pristiq has been helpful as she would rate her depression a 4 on a scale of 0-10 with 10 being the worst.  Patient states despite this she is having a lot of sleep issues due to her health problems.  She states her esophageal strictures are worsening and she is having a lot of pain with eating.  She states her appetite has been poor because of this and the low blood sugars.  She states they have decreased her insulin pump but she is still battling with this due to pain.  Reports that she goes for dilation on the  and questionable stent placement.  Patient states her anxiety has been up and down due to finances and her  health issues.  She states she did get approved for SSI but is waiting on the amount.  Patient denies any nightmares however she is really living past events.  Reports her A1c was recently 7.8 which is the lowest it ever been.  Denies any side effects with the medications.  Notes that she has had a couple of auditory hallucinations with a girl's voice but denies any visual hallucinations.  Denies any paranoia or delusions.  Denies any SI/HI.      Objective   Vital Signs:   There were no vitals taken for this visit.  Due to the remote nature of this encounter (virtual encounter), vitals were unable to be obtained.  Height stated at 66 inches.  Weight stated at 251 pounds.      PHQ-9 Score:   PHQ-9 Total Score:(Patient-Rptd) 10     PHQ-9 Depression Screening  Little interest or pleasure in doing things? (Patient-Rptd) Several days   Feeling down, depressed, or hopeless? (Patient-Rptd) Several days   PHQ-2 Total Score (Patient-Rptd) 2   Trouble falling or staying asleep, or sleeping too much? (Patient-Rptd) Almost all   Feeling tired or having little energy? (Patient-Rptd) Over half   Poor appetite or overeating? (Patient-Rptd) Over half   Feeling bad about yourself - or that you are a failure or have let yourself or your family down? (Patient-Rptd) Not at all   Trouble concentrating on things, such as reading the newspaper or watching television? (Patient-Rptd) Several days   Moving or speaking so slowly that other people could have noticed? Or the opposite - being so fidgety or restless that you have been moving around a lot more than usual? (Patient-Rptd) Not at all   Thoughts that you would be better off dead, or of hurting yourself in some way? (Patient-Rptd) Not at all   PHQ-9 Total Score (Patient-Rptd) 10   If you checked off any problems, how difficult have these problems made it for you to do your work, take care of things at home, or get along with other people? (Patient-Rptd) Somewhat difficult         PHQ-9  Total Score: (Patient-Rptd) 10     JD-7  Feeling nervous, anxious or on edge: (Patient-Rptd) More than half the days  Not being able to stop or control worrying: (Patient-Rptd) More than half the days  Worrying too much about different things: (Patient-Rptd) More than half the days  Trouble Relaxing: (Patient-Rptd) Several days  Being so restless that it is hard to sit still: (Patient-Rptd) Not at all  Feeling afraid as if something awful might happen: (Patient-Rptd) Not at all  Becoming easily annoyed or irritable: (Patient-Rptd) Several days  JD 7 Total Score: (Patient-Rptd) 8  If you checked any problems, how difficult have these problems made it for you to do your work, take care of things at home, or get along with other people: (Patient-Rptd) Somewhat difficult      Patient screened positive for depression based on a PHQ-9 score of 10 on 2/11/2025. Follow-up recommendations include: Prescribed antidepressant medication treatment and see notes and medication list. .        Mental Status Exam:   Hygiene:   good  Cooperation:  Cooperative  Eye Contact:  Good  Psychomotor Behavior:  Appropriate  Affect:  Appropriate  Mood: normal and anxious  Speech:  Normal  Thought Process:  Goal directed  Thought Content:  Normal  Suicidal:  None  Homicidal:  None  Hallucinations:  Visual  Delusion:  None  Memory:  Intact  Orientation:  Person, Place, Time, and Situation  Reliability:  good  Insight:  Good and Fair  Judgement:  Good and Fair  Impulse Control:  Good and Fair  Physical/Medical Issues:  Yes see medical history.      Current Medications:   Current Outpatient Medications   Medication Sig Dispense Refill    gabapentin (NEURONTIN) 800 MG tablet TAKE 1 TABLET BY MOUTH THREE TIMES DAILY FOR NERVE PAIN. MAY FILL WHEN DUE      albuterol (ACCUNEB) 1.25 MG/3ML nebulizer solution Take 3 mL by nebulization Every 6 (Six) Hours As Needed for Wheezing or Shortness of Air. 9 mL 3    amantadine (SYMMETREL) 100 MG tablet        azithromycin (ZITHROMAX) 250 MG tablet Take  by mouth See Admin Instructions. Take 2 tablets by mouth on day 1 then take 1 tablet by mouth once daily on days 2-5      BD Pen Needle Beverly 2nd Gen 32G X 4 MM misc       Cariprazine HCl (VRAYLAR) 4.5 MG capsule capsule Take 1 capsule by mouth Daily. 90 capsule 0    cholecalciferol (VITAMIN D3) 1.25 MG (85136 UT) capsule Every Week      Continuous Blood Gluc  (Dexcom G6 ) device USE FOR 6 MONTHS      Continuous Blood Gluc Sensor (Dexcom G6 Sensor) PLACE 1 SENSOR ON SKIN CHANGING EVERY 10 DAYS 9 each 0    Continuous Blood Gluc Transmit (Dexcom G6 Transmitter) misc USE AS DIRECTED 1 each 0    cyanocobalamin 1000 MCG/ML injection INJECT 1 ML INTRAMUSCULARLY ONCE EVERY MONTH      cyclobenzaprine (FLEXERIL) 10 MG tablet Take 1 tablet by mouth 3 (Three) Times a Day As Needed for Muscle Spasms. 90 tablet 3    desvenlafaxine (PRISTIQ) 50 MG 24 hr tablet Take 1 tablet by mouth Daily. 90 tablet 0    dexlansoprazole (DEXILANT) 30 MG capsule Take 1 capsule by mouth.      EQ Nicotine 21 MG/24HR patch APPLY 1 PATCH TOPICALLY EVERY 24 HOURS      Ertugliflozin L-PyroglutamicAc (Steglatro) 15 MG tablet Take 1 tablet by mouth Every Morning. 90 tablet 1    famotidine (PEPCID) 40 MG tablet TAKE 1 TABLET BY MOUTH ONCE DAILY FOR HEARTBURN      FeroSul 325 (65 Fe) MG tablet       Fluticasone Furoate-Vilanterol (Breo Ellipta) 200-25 MCG/INH inhaler Inhale 1 puff Daily. 28 each 11    HYDROcodone-acetaminophen (NORCO)  MG per tablet TAKE 1 TABLET BY MOUTH THREE TIMES DAILY AS NEEDED FOR PAIN. MAY FILL 12-      hydrOXYzine (ATARAX) 25 MG tablet TAKE 1/2 TO 1 (ONE-HALF TO ONE) TABLET BY MOUTH THREE TIMES DAILY AS NEEDED FOR ANXIETY 270 tablet 0    Insulin Disposable Pump (OmniPod Dash 5 Pack Pods) misc       Insulin Glargine (LANTUS SOLOSTAR) 100 UNIT/ML injection pen Indications: Type 2 Diabetes Use Up To 20 Units Daily      Insulin Lispro (humaLOG) 100 UNIT/ML  injection USE AS DIRECTED THROUGH INSULIN PUMP UPTO 180 UNITS DAILY 90 mL 0    lamoTRIgine (LaMICtal) 150 MG tablet Take 1 tablet by mouth Daily. 90 tablet 0    levETIRAcetam (KEPPRA) 750 MG tablet Take 2 tablets by mouth 2 (Two) Times a Day.      lisinopril (PRINIVIL,ZESTRIL) 10 MG tablet Take 1 tablet by mouth Daily.      methocarbamol (ROBAXIN) 500 MG tablet Take 1 tablet by mouth 3 (Three) Times a Day As Needed. for muscle spams      metoprolol tartrate (LOPRESSOR) 50 MG tablet Take 1 tablet by mouth 2 (Two) Times a Day. 60 tablet 3    montelukast (SINGULAIR) 10 MG tablet Take 1 tablet by mouth.      Nebulizer device 1 application Every 6 (Six) Hours As Needed (dyspnea). 1 each 3    nystatin (MYCOSTATIN) 384865 UNIT/GM powder APPLY TO THE AFFECTED AREA(S) BY TOPICAL ROUTE 2 TIMES PER DAY      omeprazole (priLOSEC) 40 MG capsule Take 1 capsule by mouth Daily. 30 capsule 3    OXcarbazepine (TRILEPTAL) 600 MG tablet Take 1 tablet by mouth 2 (Two) Times a Day. PT TAKES  MG TAB IN MORNING AND  MG IN EVENING      pantoprazole (PROTONIX) 40 MG EC tablet       prazosin (MINIPRESS) 2 MG capsule Take 1 capsule by mouth Every Night. 90 capsule 0    promethazine (PHENERGAN) 25 MG tablet       rOPINIRole (REQUIP) 3 MG tablet Take 1 tablet by mouth Every Night.      Semglee, yfgn, 100 UNIT/ML solution pen-injector INJECT UP TO 20 UNITS DAILY      simvastatin (ZOCOR) 40 MG tablet Take 1 tablet by mouth Every Night. 30 tablet 3    sucralfate (CARAFATE) 1 g tablet TAKE 1 TABLET BY MOUTH 4 TIMES DAILY DISSOLVE TABLET IN WATER AND DRINK SLURRY      tiotropium bromide monohydrate (SPIRIVA RESPIMAT) 2.5 MCG/ACT aerosol solution inhaler Daily.      traZODone (DESYREL) 100 MG tablet Take 1 tablet by mouth Every Night. 90 tablet 0    Trulicity 4.5 MG/0.5ML solution pen-injector INJECT 1 SYRINGE SUBCUTANEOUSLY ONCE A WEEK 4 mL 0    Ventolin  (90 Base) MCG/ACT inhaler Inhale 2 puffs Every 4 (Four) Hours As Needed for  Wheezing. 6.7 g 11    vitamin D (ERGOCALCIFEROL) 1.25 MG (04387 UT) capsule capsule TAKE 2 CAPSULES BY MOUTH ONCE A WEEK 8 capsule 3     No current facility-administered medications for this visit.       Physical Exam  Nursing note reviewed.   Constitutional:       Appearance: Normal appearance.   Neurological:      Mental Status: She is alert.   Psychiatric:         Attention and Perception: Attention and perception normal.         Mood and Affect: Affect normal. Mood is anxious and depressed.         Speech: Speech normal.         Behavior: Behavior is cooperative.         Thought Content: Thought content normal.         Cognition and Memory: Cognition and memory normal.         Judgment: Judgment normal.        Result Review :     The following data was reviewed by: ROD Spencer on 01/14/2025:  Common labs          3/29/2024    09:08 8/19/2024    09:07 12/23/2024    09:07   Common Labs   WBC 10.1     13.0     9.9       Hemoglobin 15.2     15.4     14.3       Hematocrit 44.2     45.9     40.0       Platelets 132     227     148       Total Cholesterol 176     166     118       Triglycerides 149     136     98       HDL Cholesterol 44     40     43       LDL Cholesterol  102     99     55       Hemoglobin A1C 10.2     10.9     7.7          Details          This result is from an external source.               CBC          3/29/2024    09:08 8/19/2024    09:07 12/23/2024    09:07   CBC   WBC 10.1     13.0     9.9       RBC 5.15     5.31     4.70       Hemoglobin 15.2     15.4     14.3       Hematocrit 44.2     45.9     40.0       MCV 85.8     86.4     85.1       MCH 29.5     29.0     30.4       MCHC 34.4     33.6     35.8       RDW 13.4     13.3     12.7       Platelets 132     227     148          Details          This result is from an external source.             CBC w/diff          3/29/2024    09:08 8/19/2024    09:07 12/23/2024    09:07   CBC w/Diff   WBC 10.1     13.0     9.9       RBC 5.15     5.31      4.70       Hemoglobin 15.2     15.4     14.3       Hematocrit 44.2     45.9     40.0       MCV 85.8     86.4     85.1       MCH 29.5     29.0     30.4       MCHC 34.4     33.6     35.8       RDW 13.4     13.3     12.7       Platelets 132     227     148       Neutrophil Rel % 57.8     62.4     46.7       Immature Granulocyte Rel % 0.4     0.3     0.2       Lymphocyte Rel % 35.9     32.0     46.2       Monocyte Rel % 3.6     4.3     4.9       Eosinophil Rel % 1.4     0.6     1.4       Basophil Rel % 0.9     0.4     0.6          Details          This result is from an external source.             Lipid Panel          3/29/2024    09:08 8/19/2024    09:07 12/23/2024    09:07   Lipid Panel   Total Cholesterol 176     166     118       Triglycerides 149     136     98       HDL Cholesterol 44     40     43       VLDL Cholesterol 30     27     20       LDL Cholesterol  102     99     55       LDL/HDL Ratio 2.32     2.48     1.28          Details          This result is from an external source.                     Most Recent A1C          12/23/2024    09:07   HGBA1C Most Recent   Hemoglobin A1C 7.7          Details          This result is from an external source.               Data reviewed : PCP and specialty notes         Assessment and Plan    Problem List Items Addressed This Visit       Schizoaffective disorder, depressive type - Primary    Generalized anxiety disorder     Other Visit Diagnoses       Insomnia, unspecified type                  Encounter Diagnoses   Name Primary?    Schizoaffective disorder, depressive type Yes    Generalized anxiety disorder     Insomnia, unspecified type             TREATMENT PLAN/GOALS: Continue supportive psychotherapy efforts and medications as indicated. Treatment and medication options discussed during today's visit. Patient ackowledged and verbally consented to continue with current treatment plan and was educated on the importance of compliance with treatment and  follow-up appointments.    MEDICATION ISSUES:  We discussed risks, benefits, and side effects of the above medications and the patient was agreeable with the plan. Patient was educated on the importance of compliance with treatment and follow-up appointments.  Patient is agreeable to call the office with any worsening of symptoms or onset of side effects. Patient is agreeable to call 911 or go to the nearest ER should he/she begin having SI/HI. We will continue Lamictal in an effort to stabilize mood.  The patient was reminded to immediately come to the hospital should there be any loss of control.  Explanation was given to her regarding Lamictal and the potential for Foster Shon syndrome and significant rash.  Patient was encouraged to check skin prior to beginning.  Patient was encouraged to report any rash and to immediately stop medication.Lengthy discussion with patient on the possible side effects of antipsychotic medications including increased cholesterol, increased blood sugar, and possibility of weight gain.  Also discussed the need to monitor lab work associated with this.  The risk of muscle movement disorders with this class of medication was also discussed.         -Continue lamotrigine 150 mg daily for schizoaffective disorder.  -Continue minipress 2mg at night for nightmares.  -Continue Vraylar  4.5 mg daily for schizoaffective disorder depressed type.  -Continue hydroxyzine 12.5 to 25 mg up to 3 times a day as needed for anxiety and panic.  Reminded patient to use more often if needed.  -Continue desvenlafaxine 50 mg daily for depression and anxiety.  -Continue psychotherapy to help with past trauma as well as depressive and anxiety symptoms.  -Highly encourage patient since she is now on Trileptal to be aware of side effects of multiple mood stabilizers and if she were to have any rash to contact the clinic as we may have to look at tapering off the lamotrigine or talking with neurologist to  reevaluate Trileptal.  Patient verbalized understanding and denied any side effects at this time.  -Continue trazodone  mg at night as needed for sleep.  Highly encouraged patient if she had any side effects or worsening symptoms to discontinue and contact the clinic as we would look at a trial of mirtazapine or amitriptyline patient verbalized understanding.  -We will reevaluate need to increase Pristiq after patient's surgery since that is a big cause of her sleep physical as well as mental symptoms.      Patient has now failed and tried Haldol, aripiprazole, Latuda and now risperidone which has caused weight gain and due to diabetes and kidney disease would benefit from Vraylar as patient is still having auditory and visual hallucinations.      Counseled patient regarding multimodal approach with healthy nutrition, healthy sleep, regular physical activity, social activities, counseling, and medications.      Coping skills reviewed and encouraged positive framing of thoughts     Assisted patient in processing above session content; acknowledged and normalized patient’s thoughts, feelings, and concerns.  Applied  positive coping skills and behavior management in session.  Allowed patient to freely discuss issues without interruption or judgment. Provided safe, confidential environment to facilitate the development of positive therapeutic relationship and encourage open, honest communication. Assisted patient in identifying risk factors which would indicate the need for higher level of care including thoughts to harm self or others and/or self-harming behavior and encouraged patient to contact this office, call 911, or present to the nearest emergency room should any of these events occur. Discussed crisis intervention services and means to access.     MEDS ORDERED DURING VISIT:  No orders of the defined types were placed in this encounter.    90-day supply sent in last visit.      Follow Up   Return in about  4 weeks (around 3/11/2025), or if symptoms worsen or fail to improve, for Recheck.    Patient was given instructions and counseling regarding her condition or for health maintenance advice. Please see specific information pulled into the AVS if appropriate.     Some of the data in this electronic note has been brought forward from a previous encounter, any necessary changes have been made, it has been reviewed by this APRN, and it is accurate.    This document has been electronically signed by ROD Spencer  February 11, 2025 09:18 EST    Part of this note may be an electronic transcription/translation of spoken language to printed text using the Dragon Dictation System.

## 2025-03-26 ENCOUNTER — TELEMEDICINE (OUTPATIENT)
Dept: PSYCHIATRY | Facility: CLINIC | Age: 50
End: 2025-03-26
Payer: COMMERCIAL

## 2025-03-26 DIAGNOSIS — F41.1 GENERALIZED ANXIETY DISORDER: Chronic | ICD-10-CM

## 2025-03-26 DIAGNOSIS — G47.00 INSOMNIA, UNSPECIFIED TYPE: ICD-10-CM

## 2025-03-26 DIAGNOSIS — F25.1 SCHIZOAFFECTIVE DISORDER, DEPRESSIVE TYPE: Chronic | ICD-10-CM

## 2025-03-26 DIAGNOSIS — F51.5 NIGHTMARES: ICD-10-CM

## 2025-03-26 RX ORDER — HYDROXYZINE HYDROCHLORIDE 25 MG/1
12.5-25 TABLET, FILM COATED ORAL 3 TIMES DAILY PRN
Qty: 270 TABLET | Refills: 0 | Status: SHIPPED | OUTPATIENT
Start: 2025-03-26

## 2025-03-26 RX ORDER — DESVENLAFAXINE 50 MG/1
50 TABLET, FILM COATED, EXTENDED RELEASE ORAL DAILY
Qty: 90 TABLET | Refills: 0 | Status: SHIPPED | OUTPATIENT
Start: 2025-03-26

## 2025-03-26 RX ORDER — DEXLANSOPRAZOLE 60 MG/1
1 CAPSULE, DELAYED RELEASE ORAL DAILY
COMMUNITY
Start: 2025-02-27

## 2025-03-26 RX ORDER — PRAZOSIN HYDROCHLORIDE 2 MG/1
2 CAPSULE ORAL NIGHTLY
Qty: 90 CAPSULE | Refills: 0 | Status: SHIPPED | OUTPATIENT
Start: 2025-03-26

## 2025-03-26 RX ORDER — TRAZODONE HYDROCHLORIDE 100 MG/1
100 TABLET ORAL NIGHTLY
Qty: 90 TABLET | Refills: 0 | Status: SHIPPED | OUTPATIENT
Start: 2025-03-26

## 2025-03-26 RX ORDER — LAMOTRIGINE 150 MG/1
150 TABLET ORAL DAILY
Qty: 90 TABLET | Refills: 0 | Status: SHIPPED | OUTPATIENT
Start: 2025-03-26

## 2025-03-26 NOTE — PROGRESS NOTES
This provider is located at [facility], [address] [place] [address].The Patient is seen remotely at home, [address] [place] [address], [place] [address] via my chart.  Patient is being seen via telehealth and confirm that they are in a secure environment for this session. The patient's condition being diagnosed/treated is appropriate for telemedicine. The provider identified himself/herself: herself as well as her credentials.   The patient gave consent to be seen remotely, and when consent is given they understand that the consent allows for patient identifiable information to be sent to a third party as needed.   They may refuse to be seen remotely at any time. The electronic data is encrypted and password protected, and the patient has been advised of the potential risks to privacy not withstanding such measures.    You have chosen to receive care through a telehealth visit.  Do you consent to use a video/audio connection for your medical care today? Yes. Patient verified Name, , and address.  No changes noted      Chief Complaint  Mood, depression and anxiety    Subjective    Sudhakar Sotojosef Saul presents to [facility] for medication management.    History of Present Illness  Patient presents today reporting that she has been battling some sinus infection and sickness so that has been difficult.  She states she is only getting 4 to 5 hours of sleep where she is not able to lay down due to drainage but states prior to that she was getting 6 hours.  Notes she had esophagitis and had an MRI and a swallow study and they placed her on Dexilant.  Patient states that has been helpful if it was not they were going to have to do a gastric bypass.  Patient states so far she has been able to eat without any pain which has been helpful.  Patient states she is looking for another place to live with her boyfriend and his father so that has been stressful.  She  also notes that things have been stressful due to finances as she does not get her back pay until next year.  Patient feels if it was not for the move situation her anxiety would be lessened.  Patient notes that she has been somewhat irritable lately because of the sickness but states it was not there before.  Patient reports she has been down when thinking about missing her mother but otherwise her mood has been okay.  Patient feels it is still situational with anxiety at this time and does not feel she needs any changes with medicine.  Reports she is planning a trip in April to see her aunt.  Denies any SI or HI.  Admits to hearing the little girl's voice occasionally and show movements but states they do not tell her to harm herself or others.      Objective   Vital Signs:   There were no vitals taken for this visit.  Due to the remote nature of this encounter (virtual encounter), vitals were unable to be obtained.  Height stated at 66 inches.  Weight stated at 220 pounds.      PHQ-9 Score:   PHQ-9 Total Score:(Patient-Rptd) 12     PHQ-9 Depression Screening  Little interest or pleasure in doing things? (Patient-Rptd) Several days   Feeling down, depressed, or hopeless? (Patient-Rptd) Several days   PHQ-2 Total Score (Patient-Rptd) 2   Trouble falling or staying asleep, or sleeping too much? (Patient-Rptd) Almost all   Feeling tired or having little energy? (Patient-Rptd) Almost all   Poor appetite or overeating? (Patient-Rptd) Over half   Feeling bad about yourself - or that you are a failure or have let yourself or your family down? (Patient-Rptd) Several days   Trouble concentrating on things, such as reading the newspaper or watching television? (Patient-Rptd) Several days   Moving or speaking so slowly that other people could have noticed? Or the opposite - being so fidgety or restless that you have been moving around a lot more than usual? (Patient-Rptd) Not at all   Thoughts that you would be better off  dead, or of hurting yourself in some way? (Patient-Rptd) Not at all   PHQ-9 Total Score (Patient-Rptd) 12   If you checked off any problems, how difficult have these problems made it for you to do your work, take care of things at home, or get along with other people? (Patient-Rptd) Very difficult         PHQ-9 Total Score: (Patient-Rptd) 12     JD-7  Feeling nervous, anxious or on edge: (Patient-Rptd) More than half the days  Not being able to stop or control worrying: (Patient-Rptd) Nearly every day  Worrying too much about different things: (Patient-Rptd) More than half the days  Trouble Relaxing: (Patient-Rptd) Several days  Being so restless that it is hard to sit still: (Patient-Rptd) Not at all  Feeling afraid as if something awful might happen: (Patient-Rptd) Not at all  Becoming easily annoyed or irritable: (Patient-Rptd) Several days  JD 7 Total Score: (Patient-Rptd) 9  If you checked any problems, how difficult have these problems made it for you to do your work, take care of things at home, or get along with other people: (Patient-Rptd) Very difficult      Patient screened positive for depression based on a PHQ-9 score of 12 on 3/26/2025. Follow-up recommendations include: Prescribed antidepressant medication treatment and see notes and medication list. .        Mental Status Exam:   Hygiene:   good  Cooperation:  Cooperative  Eye Contact:  Good  Psychomotor Behavior:  Appropriate  Affect:  Appropriate  Mood: normal and anxious  Speech:  Normal  Thought Process:  Goal directed  Thought Content:  Normal  Suicidal:  None  Homicidal:  None  Hallucinations:  Visual  Delusion:  None  Memory:  Intact  Orientation:  Person, Place, Time, and Situation  Reliability:  good  Insight:  Good and Fair  Judgement:  Good and Fair  Impulse Control:  Good and Fair  Physical/Medical Issues:  Yes see medical history.      Current Medications:   Current Outpatient Medications   Medication Sig Dispense Refill    Cariprazine  HCl (VRAYLAR) 4.5 MG capsule capsule Take 1 capsule by mouth Daily. 90 capsule 0    desvenlafaxine (PRISTIQ) 50 MG 24 hr tablet Take 1 tablet by mouth Daily. 90 tablet 0    dexlansoprazole (DEXILANT) 60 MG capsule Take 1 capsule by mouth Daily.      hydrOXYzine (ATARAX) 25 MG tablet Take 0.5-1 tablets by mouth 3 (Three) Times a Day As Needed for Anxiety. 270 tablet 0    lamoTRIgine (LaMICtal) 150 MG tablet Take 1 tablet by mouth Daily. 90 tablet 0    prazosin (MINIPRESS) 2 MG capsule Take 1 capsule by mouth Every Night. 90 capsule 0    traZODone (DESYREL) 100 MG tablet Take 1 tablet by mouth Every Night. 90 tablet 0    albuterol (ACCUNEB) 1.25 MG/3ML nebulizer solution Take 3 mL by nebulization Every 6 (Six) Hours As Needed for Wheezing or Shortness of Air. 9 mL 3    azithromycin (ZITHROMAX) 250 MG tablet Take  by mouth See Admin Instructions. Take 2 tablets by mouth on day 1 then take 1 tablet by mouth once daily on days 2-5      BD Pen Needle Beverly 2nd Gen 32G X 4 MM misc       cholecalciferol (VITAMIN D3) 1.25 MG (43243 UT) capsule Every Week      Continuous Blood Gluc  (Dexcom G6 ) device USE FOR 6 MONTHS      Continuous Blood Gluc Sensor (Dexcom G6 Sensor) PLACE 1 SENSOR ON SKIN CHANGING EVERY 10 DAYS 9 each 0    Continuous Blood Gluc Transmit (Dexcom G6 Transmitter) misc USE AS DIRECTED 1 each 0    cyanocobalamin 1000 MCG/ML injection INJECT 1 ML INTRAMUSCULARLY ONCE EVERY MONTH      cyclobenzaprine (FLEXERIL) 10 MG tablet Take 1 tablet by mouth 3 (Three) Times a Day As Needed for Muscle Spasms. 90 tablet 3    EQ Nicotine 21 MG/24HR patch APPLY 1 PATCH TOPICALLY EVERY 24 HOURS      Ertugliflozin L-PyroglutamicAc (Steglatro) 15 MG tablet Take 1 tablet by mouth Every Morning. 90 tablet 1    FeroSul 325 (65 Fe) MG tablet       Fluticasone Furoate-Vilanterol (Breo Ellipta) 200-25 MCG/INH inhaler Inhale 1 puff Daily. 28 each 11    gabapentin (NEURONTIN) 800 MG tablet TAKE 1 TABLET BY MOUTH THREE  TIMES DAILY FOR NERVE PAIN. MAY FILL WHEN DUE      HYDROcodone-acetaminophen (NORCO)  MG per tablet TAKE 1 TABLET BY MOUTH THREE TIMES DAILY AS NEEDED FOR PAIN. MAY FILL 12-      Insulin Disposable Pump (OmniPod Dash 5 Pack Pods) misc       Insulin Glargine (LANTUS SOLOSTAR) 100 UNIT/ML injection pen Indications: Type 2 Diabetes Use Up To 20 Units Daily      Insulin Lispro (humaLOG) 100 UNIT/ML injection USE AS DIRECTED THROUGH INSULIN PUMP UPTO 180 UNITS DAILY 90 mL 0    levETIRAcetam (KEPPRA) 750 MG tablet Take 2 tablets by mouth 2 (Two) Times a Day.      lisinopril (PRINIVIL,ZESTRIL) 10 MG tablet Take 1 tablet by mouth Daily.      methocarbamol (ROBAXIN) 500 MG tablet Take 1 tablet by mouth 3 (Three) Times a Day As Needed. for muscle spams      metoprolol tartrate (LOPRESSOR) 50 MG tablet Take 1 tablet by mouth 2 (Two) Times a Day. 60 tablet 3    montelukast (SINGULAIR) 10 MG tablet Take 1 tablet by mouth.      Nebulizer device 1 application Every 6 (Six) Hours As Needed (dyspnea). 1 each 3    nystatin (MYCOSTATIN) 311773 UNIT/GM powder APPLY TO THE AFFECTED AREA(S) BY TOPICAL ROUTE 2 TIMES PER DAY      OXcarbazepine (TRILEPTAL) 600 MG tablet Take 1 tablet by mouth 2 (Two) Times a Day. PT TAKES  MG TAB IN MORNING AND  MG IN EVENING      pantoprazole (PROTONIX) 40 MG EC tablet       promethazine (PHENERGAN) 25 MG tablet       rOPINIRole (REQUIP) 3 MG tablet Take 1 tablet by mouth Every Night.      Semglee, yfgn, 100 UNIT/ML solution pen-injector INJECT UP TO 20 UNITS DAILY      simvastatin (ZOCOR) 40 MG tablet Take 1 tablet by mouth Every Night. 30 tablet 3    sucralfate (CARAFATE) 1 g tablet TAKE 1 TABLET BY MOUTH 4 TIMES DAILY DISSOLVE TABLET IN WATER AND DRINK SLURRY      tiotropium bromide monohydrate (SPIRIVA RESPIMAT) 2.5 MCG/ACT aerosol solution inhaler Daily.      Trulicity 4.5 MG/0.5ML solution pen-injector INJECT 1 SYRINGE SUBCUTANEOUSLY ONCE A WEEK 4 mL 0    Ventolin   (90 Base) MCG/ACT inhaler Inhale 2 puffs Every 4 (Four) Hours As Needed for Wheezing. 6.7 g 11    vitamin D (ERGOCALCIFEROL) 1.25 MG (12409 UT) capsule capsule TAKE 2 CAPSULES BY MOUTH ONCE A WEEK 8 capsule 3     No current facility-administered medications for this visit.       Physical Exam  Nursing note reviewed.   Constitutional:       Appearance: Normal appearance.   Neurological:      Mental Status: She is alert.   Psychiatric:         Attention and Perception: Attention and perception normal.         Mood and Affect: Affect normal. Mood is anxious and depressed.         Speech: Speech normal.         Behavior: Behavior normal. Behavior is cooperative.         Thought Content: Thought content normal.         Cognition and Memory: Cognition and memory normal.         Judgment: Judgment normal.        Result Review :     The following data was reviewed by: ROD Spencer on 01/14/2025:  Common labs          8/19/2024    09:07 12/23/2024    09:07   Common Labs   WBC 13.0     9.9       Hemoglobin 15.4     14.3       Hematocrit 45.9     40.0       Platelets 227     148       Total Cholesterol 166     118       Triglycerides 136     98       HDL Cholesterol 40     43       LDL Cholesterol  99     55       Hemoglobin A1C 10.9     7.7          Details          This result is from an external source.               CBC          8/19/2024    09:07 12/23/2024    09:07   CBC   WBC 13.0     9.9       RBC 5.31     4.70       Hemoglobin 15.4     14.3       Hematocrit 45.9     40.0       MCV 86.4     85.1       MCH 29.0     30.4       MCHC 33.6     35.8       RDW 13.3     12.7       Platelets 227     148          Details          This result is from an external source.             CBC w/diff          3/29/2024    09:08 8/19/2024    09:07 12/23/2024    09:07   CBC w/Diff   WBC 10.1     13.0     9.9       RBC 5.15     5.31     4.70       Hemoglobin 15.2     15.4     14.3       Hematocrit 44.2     45.9     40.0       MCV  85.8     86.4     85.1       MCH 29.5     29.0     30.4       MCHC 34.4     33.6     35.8       RDW 13.4     13.3     12.7       Platelets 132     227     148       Neutrophil Rel % 57.8     62.4     46.7       Immature Granulocyte Rel % 0.4     0.3     0.2       Lymphocyte Rel % 35.9     32.0     46.2       Monocyte Rel % 3.6     4.3     4.9       Eosinophil Rel % 1.4     0.6     1.4       Basophil Rel % 0.9     0.4     0.6          Details          This result is from an external source.             Lipid Panel          8/19/2024    09:07 12/23/2024    09:07   Lipid Panel   Total Cholesterol 166     118       Triglycerides 136     98       HDL Cholesterol 40     43       VLDL Cholesterol 27     20       LDL Cholesterol  99     55       LDL/HDL Ratio 2.48     1.28          Details          This result is from an external source.                     Most Recent A1C          12/23/2024    09:07   HGBA1C Most Recent   Hemoglobin A1C 7.7          Details          This result is from an external source.               Data reviewed : PCP and specialty notes         Assessment and Plan    Problem List Items Addressed This Visit       Schizoaffective disorder, depressive type    Relevant Medications    Cariprazine HCl (VRAYLAR) 4.5 MG capsule capsule    desvenlafaxine (PRISTIQ) 50 MG 24 hr tablet    lamoTRIgine (LaMICtal) 150 MG tablet    traZODone (DESYREL) 100 MG tablet    hydrOXYzine (ATARAX) 25 MG tablet    Generalized anxiety disorder    Relevant Medications    Cariprazine HCl (VRAYLAR) 4.5 MG capsule capsule    desvenlafaxine (PRISTIQ) 50 MG 24 hr tablet    traZODone (DESYREL) 100 MG tablet    hydrOXYzine (ATARAX) 25 MG tablet     Other Visit Diagnoses         Nightmares        Relevant Medications    prazosin (MINIPRESS) 2 MG capsule      Insomnia, unspecified type        Relevant Medications    traZODone (DESYREL) 100 MG tablet                Encounter Diagnoses   Name Primary?    Schizoaffective disorder,  depressive type     Generalized anxiety disorder     Nightmares     Insomnia, unspecified type         I have reviewed the patient and the results are consistent with the previously documented exam by myself.  I have reviewed the following portions of the patient's ROS and PFSH and confirmed them as accurate.  The HPI has been updated, chief of complaint, ROS and subjective have been reviewed and up-to-date.  The following portions of the patient's notes were reviewed, confirmed and/or updated this visit as appropriate: History of present illness/Interval history, physical examination, assessment and plan, allergies, current medications, past family medical history, past medical history, past social history, past surgical history and problem list.        TREATMENT PLAN/GOALS: Continue supportive psychotherapy efforts and medications as indicated. Treatment and medication options discussed during today's visit. Patient ackowledged and verbally consented to continue with current treatment plan and was educated on the importance of compliance with treatment and follow-up appointments.    MEDICATION ISSUES:  We discussed risks, benefits, and side effects of the above medications and the patient was agreeable with the plan. Patient was educated on the importance of compliance with treatment and follow-up appointments.  Patient is agreeable to call the office with any worsening of symptoms or onset of side effects. Patient is agreeable to call 911 or go to the nearest ER should he/she begin having SI/HI. We will continue Lamictal in an effort to stabilize mood.  The patient was reminded to immediately come to the hospital should there be any loss of control.  Explanation was given to her regarding Lamictal and the potential for Foster Shon syndrome and significant rash.  Patient was encouraged to check skin prior to beginning.  Patient was encouraged to report any rash and to immediately stop medication.Lengthy  discussion with patient on the possible side effects of antipsychotic medications including increased cholesterol, increased blood sugar, and possibility of weight gain.  Also discussed the need to monitor lab work associated with this.  The risk of muscle movement disorders with this class of medication was also discussed.         -Continue lamotrigine 150 mg daily for schizoaffective disorder.  -Continue minipress 2mg at night for nightmares.  -Continue Vraylar  4.5 mg daily for schizoaffective disorder depressed type.  -Continue hydroxyzine 12.5 to 25 mg up to 3 times a day as needed for anxiety and panic.  Reminded patient to use more often if needed.  -Continue desvenlafaxine 50 mg daily for depression and anxiety.  -Continue psychotherapy to help with past trauma as well as depressive and anxiety symptoms.  -Highly encourage patient since she is now on Trileptal to be aware of side effects of multiple mood stabilizers and if she were to have any rash to contact the clinic as we may have to look at tapering off the lamotrigine or talking with neurologist to reevaluate Trileptal.  Patient verbalized understanding and denied any side effects at this time.  -Continue trazodone  mg at night as needed for sleep.  Highly encouraged patient if she had any side effects or worsening symptoms to discontinue and contact the clinic as we would look at a trial of mirtazapine or amitriptyline patient verbalized understanding.  -Patient denied any side effects with the medication and denied any side effects related to serotonin syndrome and was educated extensively.  Encouraged patient if any symptoms worsen to contact clinic for sooner appointment and/or go to nearest ER she verbalized understanding.        Patient has now failed and tried Haldol, aripiprazole, Latuda and now risperidone which has caused weight gain and due to diabetes and kidney disease would benefit from Vraylar as patient is still having auditory  and visual hallucinations.      Counseled patient regarding multimodal approach with healthy nutrition, healthy sleep, regular physical activity, social activities, counseling, and medications.      Coping skills reviewed and encouraged positive framing of thoughts     Assisted patient in processing above session content; acknowledged and normalized patient’s thoughts, feelings, and concerns.  Applied  positive coping skills and behavior management in session.  Allowed patient to freely discuss issues without interruption or judgment. Provided safe, confidential environment to facilitate the development of positive therapeutic relationship and encourage open, honest communication. Assisted patient in identifying risk factors which would indicate the need for higher level of care including thoughts to harm self or others and/or self-harming behavior and encouraged patient to contact this office, call 911, or present to the nearest emergency room should any of these events occur. Discussed crisis intervention services and means to access.     MEDS ORDERED DURING VISIT:  New Medications Ordered This Visit   Medications    Cariprazine HCl (VRAYLAR) 4.5 MG capsule capsule     Sig: Take 1 capsule by mouth Daily.     Dispense:  90 capsule     Refill:  0    desvenlafaxine (PRISTIQ) 50 MG 24 hr tablet     Sig: Take 1 tablet by mouth Daily.     Dispense:  90 tablet     Refill:  0    lamoTRIgine (LaMICtal) 150 MG tablet     Sig: Take 1 tablet by mouth Daily.     Dispense:  90 tablet     Refill:  0    prazosin (MINIPRESS) 2 MG capsule     Sig: Take 1 capsule by mouth Every Night.     Dispense:  90 capsule     Refill:  0    traZODone (DESYREL) 100 MG tablet     Sig: Take 1 tablet by mouth Every Night.     Dispense:  90 tablet     Refill:  0    hydrOXYzine (ATARAX) 25 MG tablet     Sig: Take 0.5-1 tablets by mouth 3 (Three) Times a Day As Needed for Anxiety.     Dispense:  270 tablet     Refill:  0     90-day supply sent in last  visit.      Follow Up   Return in about 6 weeks (around 5/7/2025), or if symptoms worsen or fail to improve, for Recheck.    Patient was given instructions and counseling regarding her condition or for health maintenance advice. Please see specific information pulled into the AVS if appropriate.     Some of the data in this electronic note has been brought forward from a previous encounter, any necessary changes have been made, it has been reviewed by this APRN, and it is accurate.    This document has been electronically signed by ROD Spencer  March 26, 2025 08:47 EDT    Part of this note may be an electronic transcription/translation of spoken language to printed text using the Dragon Dictation System.

## 2025-05-07 ENCOUNTER — TELEMEDICINE (OUTPATIENT)
Dept: PSYCHIATRY | Facility: CLINIC | Age: 50
End: 2025-05-07
Payer: COMMERCIAL

## 2025-05-07 DIAGNOSIS — F25.1 SCHIZOAFFECTIVE DISORDER, DEPRESSIVE TYPE: Chronic | ICD-10-CM

## 2025-05-07 DIAGNOSIS — G47.00 INSOMNIA, UNSPECIFIED TYPE: ICD-10-CM

## 2025-05-07 DIAGNOSIS — F41.1 GENERALIZED ANXIETY DISORDER: Chronic | ICD-10-CM

## 2025-05-07 DIAGNOSIS — F51.5 NIGHTMARES: ICD-10-CM

## 2025-05-07 RX ORDER — DESVENLAFAXINE 100 MG/1
100 TABLET, EXTENDED RELEASE ORAL DAILY
Qty: 90 TABLET | Refills: 0 | Status: SHIPPED | OUTPATIENT
Start: 2025-05-07

## 2025-05-07 RX ORDER — AMANTADINE HYDROCHLORIDE 100 MG/1
TABLET ORAL
COMMUNITY

## 2025-05-07 RX ORDER — PRAZOSIN HYDROCHLORIDE 2 MG/1
2 CAPSULE ORAL NIGHTLY
Qty: 90 CAPSULE | Refills: 0 | Status: SHIPPED | OUTPATIENT
Start: 2025-05-07

## 2025-05-07 RX ORDER — HYDROXYZINE HYDROCHLORIDE 25 MG/1
12.5-25 TABLET, FILM COATED ORAL 3 TIMES DAILY PRN
Qty: 270 TABLET | Refills: 0 | Status: SHIPPED | OUTPATIENT
Start: 2025-05-07

## 2025-05-07 RX ORDER — TRAZODONE HYDROCHLORIDE 100 MG/1
100 TABLET ORAL NIGHTLY
Qty: 90 TABLET | Refills: 0 | Status: SHIPPED | OUTPATIENT
Start: 2025-05-07

## 2025-05-07 RX ORDER — LAMOTRIGINE 150 MG/1
150 TABLET ORAL DAILY
Qty: 90 TABLET | Refills: 0 | Status: SHIPPED | OUTPATIENT
Start: 2025-05-07

## 2025-05-07 NOTE — PROGRESS NOTES
This provider is located at [facility], [address] [place] [address].The Patient is seen remotely at home, [address] [place] [address], [place] [address] via my chart.  Patient is being seen via telehealth and confirm that they are in a secure environment for this session. The patient's condition being diagnosed/treated is appropriate for telemedicine. The provider identified himself/herself: herself as well as her credentials.   The patient gave consent to be seen remotely, and when consent is given they understand that the consent allows for patient identifiable information to be sent to a third party as needed.   They may refuse to be seen remotely at any time. The electronic data is encrypted and password protected, and the patient has been advised of the potential risks to privacy not withstanding such measures.    You have chosen to receive care through a telehealth visit.  Do you consent to use a video/audio connection for your medical care today? Yes. Patient verified Name, , and address.  No changes noted.       Chief Complaint  Mood, depression and anxiety    Subjective    Sudhakar More Saul presents to [facility] for medication management.    History of Present Illness  Patient presents today reporting that things have been very stressful lately.  She states that financially things have been difficult as she is still looking for a home with her boyfriend and his father.  Patient notes she had a seizure and fell and hurt her back and has an MRI scheduled but possibly surgery.  Patient states that she is able to eat better but still dealing with the acid reflux.  Notes that if she loses 21 pounds and quit smoking for 6 weeks she can have skin removal surgery.  Patient states that she did get her Social Security but back pain he takes about a year and a half.  Patient notes she has felt down and hopeless and depressed mood with everything  "going on.  States she is only sleeping 3 to 4 hours at night.  Reports her anxiety has been up and she has been more worried anxious and on edge and she feels that is affecting her sleep.  Notes she has had some shadow movements and heard the voice of \"Alisha\".  Denies any hypomanic or manic episodes.  Denies any SI/HI/AH/VH.      Objective   Vital Signs:   There were no vitals taken for this visit.  Due to the remote nature of this encounter (virtual encounter), vitals were unable to be obtained.  Height stated at 66 inches.  Weight stated at 220 pounds.      PHQ-9 Score:   PHQ-9 Total Score:(Patient-Rptd) 13     PHQ-9 Depression Screening  Little interest or pleasure in doing things? (Patient-Rptd) Several days   Feeling down, depressed, or hopeless? (Patient-Rptd) Over half   PHQ-2 Total Score (Patient-Rptd) 3   Trouble falling or staying asleep, or sleeping too much? (Patient-Rptd) Almost all   Feeling tired or having little energy? (Patient-Rptd) Almost all   Poor appetite or overeating? (Patient-Rptd) Over half   Feeling bad about yourself - or that you are a failure or have let yourself or your family down? (Patient-Rptd) Several days   Trouble concentrating on things, such as reading the newspaper or watching television? (Patient-Rptd) Several days   Moving or speaking so slowly that other people could have noticed? Or the opposite - being so fidgety or restless that you have been moving around a lot more than usual? (Patient-Rptd) Not at all   Thoughts that you would be better off dead, or of hurting yourself in some way? (Patient-Rptd) Not at all   PHQ-9 Total Score (Patient-Rptd) 13   If you checked off any problems, how difficult have these problems made it for you to do your work, take care of things at home, or get along with other people? (Patient-Rptd) Very difficult         PHQ-9 Total Score: (Patient-Rptd) 13     JD-7  Feeling nervous, anxious or on edge: (Patient-Rptd) More than half the " days  Not being able to stop or control worrying: (Patient-Rptd) Nearly every day  Worrying too much about different things: (Patient-Rptd) Nearly every day  Trouble Relaxing: (Patient-Rptd) Nearly every day  Being so restless that it is hard to sit still: (Patient-Rptd) More than half the days  Feeling afraid as if something awful might happen: (Patient-Rptd) Not at all  Becoming easily annoyed or irritable: (Patient-Rptd) Several days  JD 7 Total Score: (Patient-Rptd) 14  If you checked any problems, how difficult have these problems made it for you to do your work, take care of things at home, or get along with other people: (Patient-Rptd) Very difficult      Patient screened positive for depression based on a PHQ-9 score of 13 on 5/7/2025. Follow-up recommendations include: Prescribed antidepressant medication treatment and see notes and medication list. .        Mental Status Exam:   Hygiene:   good  Cooperation:  Cooperative  Eye Contact:  Good  Psychomotor Behavior:  Appropriate  Affect:  Appropriate  Mood: depressed and anxious  Speech:  Normal  Thought Process:  Goal directed  Thought Content:  Normal  Suicidal:  None  Homicidal:  None  Hallucinations:  Visual  Delusion:  None  Memory:  Intact  Orientation:  Person, Place, Time, and Situation  Reliability:  good  Insight:  Good and Fair  Judgement:  Good and Fair  Impulse Control:  Good and Fair  Physical/Medical Issues:  Yes see medical history.      Current Medications:   Current Outpatient Medications   Medication Sig Dispense Refill    Cariprazine HCl (VRAYLAR) 4.5 MG capsule capsule Take 1 capsule by mouth Daily. 90 capsule 0    desvenlafaxine (PRISTIQ) 100 MG 24 hr tablet Take 1 tablet by mouth Daily. 90 tablet 0    hydrOXYzine (ATARAX) 25 MG tablet Take 0.5-1 tablets by mouth 3 (Three) Times a Day As Needed for Anxiety. 270 tablet 0    lamoTRIgine (LaMICtal) 150 MG tablet Take 1 tablet by mouth Daily. 90 tablet 0    prazosin (MINIPRESS) 2 MG capsule  Take 1 capsule by mouth Every Night. 90 capsule 0    traZODone (DESYREL) 100 MG tablet Take 1 tablet by mouth Every Night. 90 tablet 0    albuterol (ACCUNEB) 1.25 MG/3ML nebulizer solution Take 3 mL by nebulization Every 6 (Six) Hours As Needed for Wheezing or Shortness of Air. 9 mL 3    amantadine (SYMMETREL) 100 MG tablet       azithromycin (ZITHROMAX) 250 MG tablet Take  by mouth See Admin Instructions. Take 2 tablets by mouth on day 1 then take 1 tablet by mouth once daily on days 2-5      BD Pen Needle Beverly 2nd Gen 32G X 4 MM misc       cholecalciferol (VITAMIN D3) 1.25 MG (75229 UT) capsule Every Week      Continuous Blood Gluc  (Dexcom G6 ) device USE FOR 6 MONTHS      Continuous Blood Gluc Sensor (Dexcom G6 Sensor) PLACE 1 SENSOR ON SKIN CHANGING EVERY 10 DAYS 9 each 0    Continuous Blood Gluc Transmit (Dexcom G6 Transmitter) misc USE AS DIRECTED 1 each 0    cyanocobalamin 1000 MCG/ML injection INJECT 1 ML INTRAMUSCULARLY ONCE EVERY MONTH      cyclobenzaprine (FLEXERIL) 10 MG tablet Take 1 tablet by mouth 3 (Three) Times a Day As Needed for Muscle Spasms. 90 tablet 3    dexlansoprazole (DEXILANT) 60 MG capsule Take 1 capsule by mouth Daily.      EQ Nicotine 21 MG/24HR patch APPLY 1 PATCH TOPICALLY EVERY 24 HOURS      Ertugliflozin L-PyroglutamicAc (Steglatro) 15 MG tablet Take 1 tablet by mouth Every Morning. 90 tablet 1    FeroSul 325 (65 Fe) MG tablet       Fluticasone Furoate-Vilanterol (Breo Ellipta) 200-25 MCG/INH inhaler Inhale 1 puff Daily. 28 each 11    gabapentin (NEURONTIN) 800 MG tablet TAKE 1 TABLET BY MOUTH THREE TIMES DAILY FOR NERVE PAIN. MAY FILL WHEN DUE      HYDROcodone-acetaminophen (NORCO)  MG per tablet TAKE 1 TABLET BY MOUTH THREE TIMES DAILY AS NEEDED FOR PAIN. MAY FILL 12-      Insulin Disposable Pump (OmniPod Dash 5 Pack Pods) misc       Insulin Glargine (LANTUS SOLOSTAR) 100 UNIT/ML injection pen Indications: Type 2 Diabetes Use Up To 20 Units Daily       Insulin Lispro (humaLOG) 100 UNIT/ML injection USE AS DIRECTED THROUGH INSULIN PUMP UPTO 180 UNITS DAILY 90 mL 0    levETIRAcetam (KEPPRA) 750 MG tablet Take 2 tablets by mouth 2 (Two) Times a Day.      lisinopril (PRINIVIL,ZESTRIL) 10 MG tablet Take 1 tablet by mouth Daily.      methocarbamol (ROBAXIN) 500 MG tablet Take 1 tablet by mouth 3 (Three) Times a Day As Needed. for muscle spams      metoprolol tartrate (LOPRESSOR) 50 MG tablet Take 1 tablet by mouth 2 (Two) Times a Day. 60 tablet 3    montelukast (SINGULAIR) 10 MG tablet Take 1 tablet by mouth.      Nebulizer device 1 application Every 6 (Six) Hours As Needed (dyspnea). 1 each 3    nystatin (MYCOSTATIN) 195033 UNIT/GM powder APPLY TO THE AFFECTED AREA(S) BY TOPICAL ROUTE 2 TIMES PER DAY      OXcarbazepine (TRILEPTAL) 600 MG tablet Take 1 tablet by mouth 2 (Two) Times a Day. PT TAKES  MG TAB IN MORNING AND  MG IN EVENING      pantoprazole (PROTONIX) 40 MG EC tablet       promethazine (PHENERGAN) 25 MG tablet       rOPINIRole (REQUIP) 3 MG tablet Take 1 tablet by mouth Every Night.      Semglee, yfgn, 100 UNIT/ML solution pen-injector INJECT UP TO 20 UNITS DAILY      simvastatin (ZOCOR) 40 MG tablet Take 1 tablet by mouth Every Night. 30 tablet 3    sucralfate (CARAFATE) 1 g tablet TAKE 1 TABLET BY MOUTH 4 TIMES DAILY DISSOLVE TABLET IN WATER AND DRINK SLURRY      tiotropium bromide monohydrate (SPIRIVA RESPIMAT) 2.5 MCG/ACT aerosol solution inhaler Daily.      Trulicity 4.5 MG/0.5ML solution pen-injector INJECT 1 SYRINGE SUBCUTANEOUSLY ONCE A WEEK 4 mL 0    Ventolin  (90 Base) MCG/ACT inhaler Inhale 2 puffs Every 4 (Four) Hours As Needed for Wheezing. 6.7 g 11    vitamin D (ERGOCALCIFEROL) 1.25 MG (57201 UT) capsule capsule TAKE 2 CAPSULES BY MOUTH ONCE A WEEK 8 capsule 3     No current facility-administered medications for this visit.       Physical Exam  Nursing note reviewed.   Constitutional:       Appearance: Normal appearance.    Neurological:      Mental Status: She is alert.   Psychiatric:         Attention and Perception: Attention and perception normal.         Mood and Affect: Affect normal. Mood is anxious and depressed.         Speech: Speech normal.         Behavior: Behavior normal. Behavior is cooperative.         Thought Content: Thought content normal.         Cognition and Memory: Cognition and memory normal.         Judgment: Judgment normal.        Result Review :     The following data was reviewed by: ROD Spencer on 01/14/2025:  Common labs          8/19/2024    09:07 12/23/2024    09:07   Common Labs   WBC 13.0     9.9       Hemoglobin 15.4     14.3       Hematocrit 45.9     40.0       Platelets 227     148       Total Cholesterol 166     118       Triglycerides 136     98       HDL Cholesterol 40     43       LDL Cholesterol  99     55       Hemoglobin A1C 10.9     7.7          Details          This result is from an external source.               CBC          8/19/2024    09:07 12/23/2024    09:07   CBC   WBC 13.0     9.9       RBC 5.31     4.70       Hemoglobin 15.4     14.3       Hematocrit 45.9     40.0       MCV 86.4     85.1       MCH 29.0     30.4       MCHC 33.6     35.8       RDW 13.3     12.7       Platelets 227     148          Details          This result is from an external source.             CBC w/diff          3/29/2024    09:08 8/19/2024    09:07 12/23/2024    09:07   CBC w/Diff   WBC 10.1     13.0     9.9       RBC 5.15     5.31     4.70       Hemoglobin 15.2     15.4     14.3       Hematocrit 44.2     45.9     40.0       MCV 85.8     86.4     85.1       MCH 29.5     29.0     30.4       MCHC 34.4     33.6     35.8       RDW 13.4     13.3     12.7       Platelets 132     227     148       Neutrophil Rel % 57.8     62.4     46.7       Immature Granulocyte Rel % 0.4     0.3     0.2       Lymphocyte Rel % 35.9     32.0     46.2       Monocyte Rel % 3.6     4.3     4.9       Eosinophil Rel % 1.4      0.6     1.4       Basophil Rel % 0.9     0.4     0.6          Details          This result is from an external source.             Lipid Panel          8/19/2024    09:07 12/23/2024    09:07   Lipid Panel   Total Cholesterol 166     118       Triglycerides 136     98       HDL Cholesterol 40     43       VLDL Cholesterol 27     20       LDL Cholesterol  99     55       LDL/HDL Ratio 2.48     1.28          Details          This result is from an external source.                     Most Recent A1C          12/23/2024    09:07   HGBA1C Most Recent   Hemoglobin A1C 7.7          Details          This result is from an external source.               Data reviewed : PCP and specialty notes         Assessment and Plan    Problem List Items Addressed This Visit       Schizoaffective disorder, depressive type    Relevant Medications    Cariprazine HCl (VRAYLAR) 4.5 MG capsule capsule    desvenlafaxine (PRISTIQ) 100 MG 24 hr tablet    hydrOXYzine (ATARAX) 25 MG tablet    lamoTRIgine (LaMICtal) 150 MG tablet    traZODone (DESYREL) 100 MG tablet    Generalized anxiety disorder    Relevant Medications    Cariprazine HCl (VRAYLAR) 4.5 MG capsule capsule    desvenlafaxine (PRISTIQ) 100 MG 24 hr tablet    hydrOXYzine (ATARAX) 25 MG tablet    traZODone (DESYREL) 100 MG tablet     Other Visit Diagnoses         Nightmares        Relevant Medications    prazosin (MINIPRESS) 2 MG capsule      Insomnia, unspecified type        Relevant Medications    traZODone (DESYREL) 100 MG tablet                  Encounter Diagnoses   Name Primary?    Schizoaffective disorder, depressive type     Generalized anxiety disorder     Nightmares     Insomnia, unspecified type           I have reviewed the patient and the results are consistent with the previously documented exam by myself.  I have reviewed the following portions of the patient's ROS and PFSH and confirmed them as accurate.  The HPI has been updated, chief of complaint, ROS and subjective  have been reviewed and up-to-date.  The following portions of the patient's notes were reviewed, confirmed and/or updated this visit as appropriate: History of present illness/Interval history, physical examination, assessment and plan, allergies, current medications, past family medical history, past medical history, past social history, past surgical history and problem list.        TREATMENT PLAN/GOALS: Continue supportive psychotherapy efforts and medications as indicated. Treatment and medication options discussed during today's visit. Patient ackowledged and verbally consented to continue with current treatment plan and was educated on the importance of compliance with treatment and follow-up appointments.    MEDICATION ISSUES:  We discussed risks, benefits, and side effects of the above medications and the patient was agreeable with the plan. Patient was educated on the importance of compliance with treatment and follow-up appointments.  Patient is agreeable to call the office with any worsening of symptoms or onset of side effects. Patient is agreeable to call 911 or go to the nearest ER should he/she begin having SI/HI. We will continue Lamictal in an effort to stabilize mood.  The patient was reminded to immediately come to the hospital should there be any loss of control.  Explanation was given to her regarding Lamictal and the potential for Foster Shon syndrome and significant rash.  Patient was encouraged to check skin prior to beginning.  Patient was encouraged to report any rash and to immediately stop medication.Lengthy discussion with patient on the possible side effects of antipsychotic medications including increased cholesterol, increased blood sugar, and possibility of weight gain.  Also discussed the need to monitor lab work associated with this.  The risk of muscle movement disorders with this class of medication was also discussed.         -Continue lamotrigine 150 mg daily for  schizoaffective disorder.  -Continue minipress 2mg at night for nightmares.  -Continue Vraylar  4.5 mg daily for schizoaffective disorder depressed type.  -Continue hydroxyzine 12.5 to 25 mg up to 3 times a day as needed for anxiety and panic.  Reminded patient to use more often if needed.  -Increased dose venlafaxine 200 mg daily for depression and anxiety symptoms.  Highly encouraged the patient if she had any worsening symptoms or concerns to contact the clinic for sooner appointment she verbalized understanding.  -Continue psychotherapy to help with past trauma as well as depressive and anxiety symptoms.  -Highly encourage patient since she is now on Trileptal to be aware of side effects of multiple mood stabilizers and if she were to have any rash to contact the clinic as we may have to look at tapering off the lamotrigine or talking with neurologist to reevaluate Trileptal.  Patient verbalized understanding and denied any side effects at this time.  -Continue trazodone  mg at night as needed for sleep.  Highly encouraged patient if she had any side effects or worsening symptoms to discontinue and contact the clinic as we would look at a trial of mirtazapine or amitriptyline patient verbalized understanding.  -Patient denied any side effects with the medication and denied any side effects related to serotonin syndrome and was educated extensively.  Encouraged patient if any symptoms worsen to contact clinic for sooner appointment and/or go to nearest ER she verbalized understanding.        Patient has now failed and tried Haldol, aripiprazole, Latuda and now risperidone which has caused weight gain and due to diabetes and kidney disease would benefit from Vraylar as patient is still having auditory and visual hallucinations.      Counseled patient regarding multimodal approach with healthy nutrition, healthy sleep, regular physical activity, social activities, counseling, and medications.      Coping  skills reviewed and encouraged positive framing of thoughts     Assisted patient in processing above session content; acknowledged and normalized patient’s thoughts, feelings, and concerns.  Applied  positive coping skills and behavior management in session.  Allowed patient to freely discuss issues without interruption or judgment. Provided safe, confidential environment to facilitate the development of positive therapeutic relationship and encourage open, honest communication. Assisted patient in identifying risk factors which would indicate the need for higher level of care including thoughts to harm self or others and/or self-harming behavior and encouraged patient to contact this office, call 911, or present to the nearest emergency room should any of these events occur. Discussed crisis intervention services and means to access.     MEDS ORDERED DURING VISIT:  New Medications Ordered This Visit   Medications    Cariprazine HCl (VRAYLAR) 4.5 MG capsule capsule     Sig: Take 1 capsule by mouth Daily.     Dispense:  90 capsule     Refill:  0    desvenlafaxine (PRISTIQ) 100 MG 24 hr tablet     Sig: Take 1 tablet by mouth Daily.     Dispense:  90 tablet     Refill:  0    hydrOXYzine (ATARAX) 25 MG tablet     Sig: Take 0.5-1 tablets by mouth 3 (Three) Times a Day As Needed for Anxiety.     Dispense:  270 tablet     Refill:  0    lamoTRIgine (LaMICtal) 150 MG tablet     Sig: Take 1 tablet by mouth Daily.     Dispense:  90 tablet     Refill:  0    prazosin (MINIPRESS) 2 MG capsule     Sig: Take 1 capsule by mouth Every Night.     Dispense:  90 capsule     Refill:  0    traZODone (DESYREL) 100 MG tablet     Sig: Take 1 tablet by mouth Every Night.     Dispense:  90 tablet     Refill:  0     90-day supply sent in last visit.      Follow Up   Return in about 4 weeks (around 6/4/2025), or if symptoms worsen or fail to improve, for Recheck.    Patient was given instructions and counseling regarding her condition or for  health maintenance advice. Please see specific information pulled into the AVS if appropriate.     Some of the data in this electronic note has been brought forward from a previous encounter, any necessary changes have been made, it has been reviewed by this APRN, and it is accurate.    This document has been electronically signed by ROD Spencer  May 7, 2025 09:00 EDT    Part of this note may be an electronic transcription/translation of spoken language to printed text using the Dragon Dictation System.

## 2025-05-13 ENCOUNTER — TELEMEDICINE (OUTPATIENT)
Dept: PSYCHIATRY | Facility: CLINIC | Age: 50
End: 2025-05-13
Payer: COMMERCIAL

## 2025-05-13 DIAGNOSIS — F41.1 GENERALIZED ANXIETY DISORDER: ICD-10-CM

## 2025-05-13 DIAGNOSIS — F25.1 SCHIZOAFFECTIVE DISORDER, DEPRESSIVE TYPE: Primary | ICD-10-CM

## 2025-05-13 PROCEDURE — 90837 PSYTX W PT 60 MINUTES: CPT | Performed by: COUNSELOR

## 2025-05-13 NOTE — PROGRESS NOTES
Date: May 27, 2025  Time In: 10:20 AM  Time Out: 11:21 AM  This provider is located at the [facility] [address], [place] [address] using a secure [employer] Video Visit through [employer]. Patient is being seen remotely via telehealth at home address in [place] and stated they are in a secure environment for this session. The patient's condition being diagnosed/treated is appropriate for telemedicine. The provider identified herself as well as her credentials. The patient, and/or patients guardian, consent to be seen remotely, and when consent is given they understand that the consent allows for patient identifiable information to be sent to a third party as needed. They may refuse to be seen remotely at any time. The electronic data is encrypted and password protected, and the patient and/or guardian has been advised of the potential risks to privacy not withstanding such measures.   You have chosen to receive care through a telehealth visit.  Do you consent to use a video/audio connection for your medical care today? Yes  Mode of Visit: Video  Location of patient: -HOME-  Location of provider: +HOME+  You have chosen to receive care through a telehealth visit.  The patient has signed the video visit consent form.  The visit included audio and video interaction.   PROGRESS NOTE  Data:  Sudhakar Saul is a 49 y.o. female who presents today for follow up. Patient has not been seen in a few months. Patient states that she was awarded SSI but it wasn't as much as she thought it would be and she is now looking at a $100 rental increase that is set to happen in the next month so she is trying to make the decision of where she wants to live as an if she should stay with her stepfather and pay the 100 all her increase in rent or find a place with her boyfriend where they can move forward with their lives.  Patient reported that she has started smoking  again which is presenting a problem for her as she states that she will have to refrain from the use of all forms of nicotine and lives on the 21 pounds in order to have skin removal surgery.  Patient states that she believes that the skin removal surgery will be very beneficial to her as she states that she believes that it would help her with her overall skin condition and may also help with her mobility.  Patient stated that unfortunately there is no skin removal surgery for her arms which have been an issue for her as she states that there is a lot of skin that now hangs to the underside of her arm and caused some issues with the patient being self-conscious.  Patient states that she is also trying to deal with financial concerns as she states that she is trying to ensure that all of her bills get paid and she is having a lot of medical issues that will have to be addressed.  Patient stated that she has not talked to her father in quite some time and states that he did not like the last couple messages that she had to him and she has just kind of let things go from there.  Patient states that she assumes that someone would contact her if something were to happen to him but states that she also does not know if both people who are supposed to be helping him know how to get in touch with her if something were to happen.  Discussed the patient following up with her father and may be looking to visit him the next time she goes to see her aunt in [place].  Patient states that she would like to know what is going on with her father but understands that he can be very reclusive at times.    Chief Complaint: physical illness, financial concerns, concerns     History of Present Illness: Patient has battled with schizoaffective disorder, anxiety and depression for several years      Clinical Maneuvering/Intervention: solution focused    (Scales based on 0 - 10 with 10 being the worst)  Depression: 6 Anxiety: 8        Assisted patient in processing above session content; acknowledged and normalized patient’s thoughts, feelings, and concerns.  Rationalized patient thought process regarding recent medical issues.  Discussed triggers associated with patient's feelings and emotions which are related to medical and financial concerns in addition to making decisions for her future.  Also discussed coping skills for patient to implement such as attempting to plan a visit to see her father and Elena to determine why he may not be answering calls and messages, working on a budgeting plan for the amount of disability that she has been awarded and continuing to follow the recommendations of her medical providers in order to optimize her overall physical health.    Allowed patient to freely discuss issues without interruption or judgment. Provided safe, confidential environment to facilitate the development of positive therapeutic relationship and encourage open, honest communication. Assisted patient in identifying risk factors which would indicate the need for higher level of care including thoughts to harm self or others and/or self-harming behavior and encouraged patient to contact this office, call 911, or present to the nearest emergency room should any of these events occur. Discussed crisis intervention services and means to access. Patient adamantly and convincingly denies current suicidal or homicidal ideation or perceptual disturbance.    Assessment:   Assessment   Patient appears to maintain relative stability as compared to their baseline.  However, patient continues to struggle with schizoaffective disorder and  which continues to cause impairment in important areas of functioning.  A result, they can be reasonably expected to continue to benefit from treatment and would likely be at increased risk for decompensation otherwise.    Mental Status Exam:   Hygiene:   good  Cooperation:  Cooperative  Eye Contact:   Good  Psychomotor Behavior:  Appropriate  Affect:  Appropriate  Mood: depressed due to physical pain   Speech:  Normal  Thought Process:  Circum  Thought Content:  Normal  Suicidal:  None  Homicidal:  None  Hallucinations:  Auditory and Visual-  hearing the little girl voice more often and shadow movements; child voice comes out more when the patient is overly stressed  Delusion:  None  Memory:  Intact and Deficits  Orientation:  Person, Place, Time, and Situation  Reliability:  good  Insight:  Good  Judgement:  Good  Impulse Control:  Good  Physical/Medical Issues:  Yes recent fall due to seizure now having more back pain and has a MRI scheduled for Friday to assess for any damage        Patient's Support Network Includes:  significant other, extended family, and friends    Functional Status: Moderate impairment     Progress toward goal: Not at goal    Prognosis: Good with Ongoing Treatment          Plan:    Patient will continue in individual outpatient therapy with focus on improved functioning and coping skills, maintaining stability, and avoiding decompensation and the need for higher level of care.    Patient will adhere to medication regimen as prescribed and report any side effects. Patient will contact this office, call 911 or present to the nearest emergency room should suicidal or homicidal ideations occur. Provide Cognitive Behavioral Therapy and Solution Focused Therapy to improve functioning, maintain stability, and avoid decompensation and the need for higher level of care.     Return in about 3 weeks, or earlier if symptoms worsen or fail to improve.           VISIT DIAGNOSIS:     ICD-10-CM ICD-9-CM   1. Schizoaffective disorder, depressive type  F25.1 295.70   2. Generalized anxiety disorder  F41.1 300.02             This document has been electronically signed by DREAD Vo  May 27, 2025 13:36 [facility] No Show Policy:  We understand unexpected  circumstances arise; however, anytime you miss your appointment we are unable to provide you appropriate care.  In addition, each appointment missed could have been used to provide care for others.  We ask that you call at least 24 hours in advance to cancel or reschedule an appointment.  We would like to take this opportunity to remind you of our policy stating patients who miss THREE or more appointments without cancelling or rescheduling 24 hours in advance of the appointment may be subject to cancellation of any further visits with our clinic and recommendation to seek in-person services/visits.    Please call [phone] to reschedule your appointment. If there are reasons that make it difficult for you to keep the appointments, please call and let us know how we can help.  Please understand that medication prescribing will not continue without seeing your provider.      BEHAVIORAL HEALTH RESOURCE CONNECTION      If you or a family member are not sure where to start, calling   the [facility] Resource Connection is   a great place to begin. The resource line is dedicated to   providing behavioral health resources to residents of [place]   and [facility], seven days a week, 7 a.m.-7 p.m.    Behavioral Health Resource Connection  [phone]      Part of this note may be an electronic transcription/translation of spoken language to printed text using the Dragon Dictation System.

## 2025-06-04 ENCOUNTER — TELEMEDICINE (OUTPATIENT)
Dept: PSYCHIATRY | Facility: CLINIC | Age: 50
End: 2025-06-04
Payer: COMMERCIAL

## 2025-06-04 DIAGNOSIS — F41.1 GENERALIZED ANXIETY DISORDER: ICD-10-CM

## 2025-06-04 DIAGNOSIS — F51.5 NIGHTMARES: ICD-10-CM

## 2025-06-04 DIAGNOSIS — F25.1 SCHIZOAFFECTIVE DISORDER, DEPRESSIVE TYPE: Primary | ICD-10-CM

## 2025-06-04 DIAGNOSIS — G47.00 INSOMNIA, UNSPECIFIED TYPE: ICD-10-CM

## 2025-06-04 PROBLEM — E11.65 TYPE 2 DIABETES MELLITUS WITH HYPERGLYCEMIA: Status: ACTIVE | Noted: 2023-05-20

## 2025-06-04 RX ORDER — ALBUTEROL SULFATE AND BUDESONIDE 90; 80 UG/1; UG/1
2 AEROSOL, METERED RESPIRATORY (INHALATION)
COMMUNITY
Start: 2025-05-14 | End: 2025-06-14

## 2025-06-04 RX ORDER — ERGOCALCIFEROL 1.25 MG/1
50000 CAPSULE, LIQUID FILLED ORAL
COMMUNITY
Start: 2025-05-14 | End: 2026-05-15

## 2025-06-04 NOTE — PROGRESS NOTES
This provider is located at [facility], [address] [place] [address].The Patient is seen remotely at home, [address] [place] [address], [place] [address] via my chart.  Patient is being seen via telehealth and confirm that they are in a secure environment for this session. The patient's condition being diagnosed/treated is appropriate for telemedicine. The provider identified himself/herself: herself as well as her credentials.   The patient gave consent to be seen remotely, and when consent is given they understand that the consent allows for patient identifiable information to be sent to a third party as needed.   They may refuse to be seen remotely at any time. The electronic data is encrypted and password protected, and the patient has been advised of the potential risks to privacy not withstanding such measures.    You have chosen to receive care through a telehealth visit.  Do you consent to use a video/audio connection for your medical care today? Yes. Patient verified Name, , and address.  No changes noted      Chief Complaint  Mood, depression and anxiety    Subjective    Sudhakar More Saul presents to [facility] for medication management.    History of Present Illness  Patient states she is doing okay.  Notes that her allergies have been difficult lately so it has messed with her voice but otherwise her physical health has been okay.  She states she goes in July to figure out what she needs to do about skin removal surgery.  She states that she has an upcoming appointment related to back surgery but she is trying to put that off.  Patient states her mood has been slightly better and she is not as depressed as previously.  Patient states she is more stressed about finding a place for her and her boyfriend and his father.  Patient states her appetite is normal.  Notes she is using the trazodone but pain keeps her up but she is still only  getting 3 to 4 hours of sleep at night.  Patient states she has had a couple auditory hallucinations otherwise she has been doing well and denies that they tell her to harm herself or others.  Patient feels the increase in medication was helpful but her anxiety is still high due to the situation and finding a place to live.  Denies any side effects to medications.  Denies any current SI/HI/AH/VH.      Objective   Vital Signs:   There were no vitals taken for this visit.  Due to the remote nature of this encounter (virtual encounter), vitals were unable to be obtained.  Height stated at 66 inches.  Weight stated at 220 pounds.      PHQ-9 Score:   PHQ-9 Total Score:(Patient-Rptd) 10     PHQ-9 Depression Screening  Little interest or pleasure in doing things? (Patient-Rptd) Several days   Feeling down, depressed, or hopeless? (Patient-Rptd) Several days   PHQ-2 Total Score (Patient-Rptd) 2   Trouble falling or staying asleep, or sleeping too much? (Patient-Rptd) Almost all   Feeling tired or having little energy? (Patient-Rptd) Over half   Poor appetite or overeating? (Patient-Rptd) Several days   Feeling bad about yourself - or that you are a failure or have let yourself or your family down? (Patient-Rptd) Several days   Trouble concentrating on things, such as reading the newspaper or watching television? (Patient-Rptd) Several days   Moving or speaking so slowly that other people could have noticed? Or the opposite - being so fidgety or restless that you have been moving around a lot more than usual? (Patient-Rptd) Not at all   Thoughts that you would be better off dead, or of hurting yourself in some way? (Patient-Rptd) Not at all   PHQ-9 Total Score (Patient-Rptd) 10   If you checked off any problems, how difficult have these problems made it for you to do your work, take care of things at home, or get along with other people? (Patient-Rptd) Very difficult         PHQ-9 Total Score: (Patient-Rptd) 10      JD-7  Feeling nervous, anxious or on edge: (Patient-Rptd) More than half the days  Not being able to stop or control worrying: (Patient-Rptd) Nearly every day  Worrying too much about different things: (Patient-Rptd) Nearly every day  Trouble Relaxing: (Patient-Rptd) More than half the days  Being so restless that it is hard to sit still: (Patient-Rptd) Not at all  Feeling afraid as if something awful might happen: (Patient-Rptd) Not at all  Becoming easily annoyed or irritable: (Patient-Rptd) Several days  JD 7 Total Score: (Patient-Rptd) 11  If you checked any problems, how difficult have these problems made it for you to do your work, take care of things at home, or get along with other people: (Patient-Rptd) Very difficult      Patient screened positive for depression based on a PHQ-9 score of 10 on 6/2/2025. Follow-up recommendations include: Prescribed antidepressant medication treatment and see notes and medication list..        Mental Status Exam:   Hygiene:   good  Cooperation:  Cooperative  Eye Contact:  Good  Psychomotor Behavior:  Appropriate  Affect:  Appropriate  Mood: depressed and anxious  Speech:  Normal  Thought Process:  Goal directed  Thought Content:  Normal  Suicidal:  None  Homicidal:  None  Hallucinations:  Visual  Delusion:  None  Memory:  Intact  Orientation:  Person, Place, Time, and Situation  Reliability:  good  Insight:  Good and Fair  Judgement:  Good and Fair  Impulse Control:  Good and Fair  Physical/Medical Issues:  Yes see medical history.     Current Medications:   Current Outpatient Medications   Medication Sig Dispense Refill    Albuterol-Budesonide (Airsupra) 90-80 MCG/ACT aerosol Inhale 2 Inhalations.      vitamin D (ERGOCALCIFEROL) 1.25 MG (21601 UT) capsule capsule Take 1 capsule by mouth.      albuterol (ACCUNEB) 1.25 MG/3ML nebulizer solution Take 3 mL by nebulization Every 6 (Six) Hours As Needed for Wheezing or Shortness of Air. 9 mL 3    amantadine (SYMMETREL) 100  MG tablet       BD Pen Needle Beverly 2nd Gen 32G X 4 MM misc       Cariprazine HCl (VRAYLAR) 4.5 MG capsule capsule Take 1 capsule by mouth Daily. 90 capsule 0    cholecalciferol (VITAMIN D3) 1.25 MG (21667 UT) capsule Every Week      Continuous Blood Gluc  (Dexcom G6 ) device USE FOR 6 MONTHS      Continuous Blood Gluc Sensor (Dexcom G6 Sensor) PLACE 1 SENSOR ON SKIN CHANGING EVERY 10 DAYS 9 each 0    Continuous Blood Gluc Transmit (Dexcom G6 Transmitter) misc USE AS DIRECTED 1 each 0    cyanocobalamin 1000 MCG/ML injection INJECT 1 ML INTRAMUSCULARLY ONCE EVERY MONTH      desvenlafaxine (PRISTIQ) 100 MG 24 hr tablet Take 1 tablet by mouth Daily. 90 tablet 0    dexlansoprazole (DEXILANT) 60 MG capsule Take 1 capsule by mouth Daily.      EQ Nicotine 21 MG/24HR patch APPLY 1 PATCH TOPICALLY EVERY 24 HOURS      Ertugliflozin L-PyroglutamicAc (Steglatro) 15 MG tablet Take 1 tablet by mouth Every Morning. 90 tablet 1    FeroSul 325 (65 Fe) MG tablet       Fluticasone Furoate-Vilanterol (Breo Ellipta) 200-25 MCG/INH inhaler Inhale 1 puff Daily. 28 each 11    gabapentin (NEURONTIN) 800 MG tablet TAKE 1 TABLET BY MOUTH THREE TIMES DAILY FOR NERVE PAIN. MAY FILL WHEN DUE      HYDROcodone-acetaminophen (NORCO)  MG per tablet TAKE 1 TABLET BY MOUTH THREE TIMES DAILY AS NEEDED FOR PAIN. MAY FILL 12-      hydrOXYzine (ATARAX) 25 MG tablet Take 0.5-1 tablets by mouth 3 (Three) Times a Day As Needed for Anxiety. 270 tablet 0    Insulin Disposable Pump (OmniPod Dash 5 Pack Pods) misc       Insulin Glargine (LANTUS SOLOSTAR) 100 UNIT/ML injection pen Indications: Type 2 Diabetes Use Up To 20 Units Daily      Insulin Lispro (humaLOG) 100 UNIT/ML injection USE AS DIRECTED THROUGH INSULIN PUMP UPTO 180 UNITS DAILY 90 mL 0    lamoTRIgine (LaMICtal) 150 MG tablet Take 1 tablet by mouth Daily. 90 tablet 0    levETIRAcetam (KEPPRA) 750 MG tablet Take 2 tablets by mouth 2 (Two) Times a Day.      lisinopril  (PRINIVIL,ZESTRIL) 10 MG tablet Take 1 tablet by mouth Daily.      methocarbamol (ROBAXIN) 500 MG tablet Take 1 tablet by mouth 3 (Three) Times a Day As Needed. for muscle spams      metoprolol tartrate (LOPRESSOR) 50 MG tablet Take 1 tablet by mouth 2 (Two) Times a Day. 60 tablet 3    montelukast (SINGULAIR) 10 MG tablet Take 1 tablet by mouth.      Nebulizer device 1 application Every 6 (Six) Hours As Needed (dyspnea). 1 each 3    nystatin (MYCOSTATIN) 975969 UNIT/GM powder APPLY TO THE AFFECTED AREA(S) BY TOPICAL ROUTE 2 TIMES PER DAY      OXcarbazepine (TRILEPTAL) 600 MG tablet Take 1 tablet by mouth 2 (Two) Times a Day. PT TAKES  MG TAB IN MORNING AND  MG IN EVENING      pantoprazole (PROTONIX) 40 MG EC tablet       prazosin (MINIPRESS) 2 MG capsule Take 1 capsule by mouth Every Night. 90 capsule 0    promethazine (PHENERGAN) 25 MG tablet       rOPINIRole (REQUIP) 3 MG tablet Take 1 tablet by mouth Every Night.      Semglee, yfgn, 100 UNIT/ML solution pen-injector INJECT UP TO 20 UNITS DAILY      simvastatin (ZOCOR) 40 MG tablet Take 1 tablet by mouth Every Night. 30 tablet 3    sucralfate (CARAFATE) 1 g tablet TAKE 1 TABLET BY MOUTH 4 TIMES DAILY DISSOLVE TABLET IN WATER AND DRINK SLURRY      tiotropium bromide monohydrate (SPIRIVA RESPIMAT) 2.5 MCG/ACT aerosol solution inhaler Daily.      traZODone (DESYREL) 100 MG tablet Take 1 tablet by mouth Every Night. 90 tablet 0    Ventolin  (90 Base) MCG/ACT inhaler Inhale 2 puffs Every 4 (Four) Hours As Needed for Wheezing. 6.7 g 11     No current facility-administered medications for this visit.       Physical Exam  Nursing note reviewed.   Constitutional:       Appearance: Normal appearance.   Neurological:      Mental Status: She is alert.   Psychiatric:         Attention and Perception: Attention normal. She perceives auditory hallucinations.         Mood and Affect: Affect normal. Mood is anxious and depressed.         Speech: Speech normal.          Behavior: Behavior normal. Behavior is cooperative.         Thought Content: Thought content normal.         Cognition and Memory: Cognition and memory normal.         Judgment: Judgment normal.        Result Review :     The following data was reviewed by: ROD Spencer on 01/14/2025:  Common labs          8/19/2024    09:07 12/23/2024    09:07 5/19/2025    09:13   Common Labs   WBC 13.0     9.9     8.7       Hemoglobin 15.4     14.3     13.7       Hematocrit 45.9     40.0     41       Platelets 227     148     206       Total Cholesterol 166     118     175       Triglycerides 136     98     107       HDL Cholesterol 40     43     47       LDL Cholesterol  99     55     107       Hemoglobin A1C 10.9     7.7     8          Details          This result is from an external source.               CBC          8/19/2024    09:07 12/23/2024    09:07 5/19/2025    09:13   CBC   WBC 13.0     9.9     8.7       RBC 5.31     4.70     4.77       Hemoglobin 15.4     14.3     13.7       Hematocrit 45.9     40.0     41       MCV 86.4     85.1     86       MCH 29.0     30.4     28.7       MCHC 33.6     35.8     33.4       RDW 13.3     12.7     13.8       Platelets 227     148     206          Details          This result is from an external source.             CBC w/diff          3/29/2024    09:08 8/19/2024    09:07 12/23/2024    09:07   CBC w/Diff   WBC 10.1     13.0     9.9       RBC 5.15     5.31     4.70       Hemoglobin 15.2     15.4     14.3       Hematocrit 44.2     45.9     40.0       MCV 85.8     86.4     85.1       MCH 29.5     29.0     30.4       MCHC 34.4     33.6     35.8       RDW 13.4     13.3     12.7       Platelets 132     227     148       Neutrophil Rel % 57.8     62.4     46.7       Immature Granulocyte Rel % 0.4     0.3     0.2       Lymphocyte Rel % 35.9     32.0     46.2       Monocyte Rel % 3.6     4.3     4.9       Eosinophil Rel % 1.4     0.6     1.4       Basophil Rel % 0.9     0.4      0.6          Details          This result is from an external source.             Lipid Panel          8/19/2024    09:07 12/23/2024    09:07 5/19/2025    09:13   Lipid Panel   Total Cholesterol 166     118     175       Triglycerides 136     98     107       HDL Cholesterol 40     43     47       VLDL Cholesterol 27     20     21       LDL Cholesterol  99     55     107       LDL/HDL Ratio 2.48     1.28     2.27          Details          This result is from an external source.                     Most Recent A1C          5/19/2025    09:13   HGBA1C Most Recent   Hemoglobin A1C 8          Details          This result is from an external source.               Data reviewed : PCP and specialty notes        Assessment and Plan    Problem List Items Addressed This Visit       Schizoaffective disorder, depressive type - Primary    Generalized anxiety disorder     Other Visit Diagnoses         Nightmares          Insomnia, unspecified type                        Encounter Diagnoses   Name Primary?    Schizoaffective disorder, depressive type Yes    Generalized anxiety disorder     Nightmares     Insomnia, unspecified type          I have reviewed the patient and the results are consistent with the previously documented exam by myself.  I have reviewed the following portions of the patient's ROS and PFSH and confirmed them as accurate.  The HPI has been updated, chief of complaint, ROS and subjective have been reviewed and up-to-date.  The following portions of the patient's notes were reviewed, confirmed and/or updated this visit as appropriate: History of present illness/Interval history, physical examination, assessment and plan, allergies, current medications, past family medical history, past medical history, past social history, past surgical history and problem list.          TREATMENT PLAN/GOALS: Continue supportive psychotherapy efforts and medications as indicated. Treatment and medication options discussed  during today's visit. Patient ackowledged and verbally consented to continue with current treatment plan and was educated on the importance of compliance with treatment and follow-up appointments.    MEDICATION ISSUES:  We discussed risks, benefits, and side effects of the above medications and the patient was agreeable with the plan. Patient was educated on the importance of compliance with treatment and follow-up appointments.  Patient is agreeable to call the office with any worsening of symptoms or onset of side effects. Patient is agreeable to call 911 or go to the nearest ER should he/she begin having SI/HI. We will continue Lamictal in an effort to stabilize mood.  The patient was reminded to immediately come to the hospital should there be any loss of control.  Explanation was given to her regarding Lamictal and the potential for Foster Shon syndrome and significant rash.  Patient was encouraged to check skin prior to beginning.  Patient was encouraged to report any rash and to immediately stop medication.Lengthy discussion with patient on the possible side effects of antipsychotic medications including increased cholesterol, increased blood sugar, and possibility of weight gain.  Also discussed the need to monitor lab work associated with this.  The risk of muscle movement disorders with this class of medication was also discussed.         -Continue lamotrigine 150 mg daily for schizoaffective disorder.  -Continue minipress 2mg at night for nightmares.  -Continue Vraylar  4.5 mg daily for schizoaffective disorder depressed type.  -Continue hydroxyzine 12.5 to 25 mg up to 3 times a day as needed for anxiety and panic.  Reminded patient to use more often if needed.  -Continue desvenlafaxine 100 mg daily for depression and anxiety symptoms.  Highly encouraged the patient if she had any worsening symptoms or concerns to contact the clinic for sooner appointment she verbalized understanding.  -Continue  psychotherapy to help with past trauma as well as depressive and anxiety symptoms.  -Highly encourage patient since she is now on Trileptal to be aware of side effects of multiple mood stabilizers and if she were to have any rash to contact the clinic as we may have to look at tapering off the lamotrigine or talking with neurologist to reevaluate Trileptal.  Patient verbalized understanding and denied any side effects at this time.  -Continue trazodone  mg at night as needed for sleep.  Highly encouraged patient if she had any side effects or worsening symptoms to discontinue and contact the clinic as we would look at a trial of mirtazapine or amitriptyline patient verbalized understanding.  -Patient denied any side effects with the medication and denied any side effects related to serotonin syndrome and was educated extensively.  Encouraged patient if any symptoms worsen to contact clinic for sooner appointment and/or go to nearest ER she verbalized understanding.  - Informed patient that she can try magnesium glycinate over-the-counter as a sleep aid 1 to 2 capsules at night.  - Also informed the patient that study show L-theanine 200-400 milligrams daily can help with depressive and anxiety symptoms as well as schizophrenia symptoms and she can get this over-the-counter as well.      Patient has now failed and tried Haldol, aripiprazole, Latuda and now risperidone which has caused weight gain and due to diabetes and kidney disease would benefit from Vraylar as patient is still having auditory and visual hallucinations.      Counseled patient regarding multimodal approach with healthy nutrition, healthy sleep, regular physical activity, social activities, counseling, and medications.      Coping skills reviewed and encouraged positive framing of thoughts     Assisted patient in processing above session content; acknowledged and normalized patient’s thoughts, feelings, and concerns.  Applied  positive coping  skills and behavior management in session.  Allowed patient to freely discuss issues without interruption or judgment. Provided safe, confidential environment to facilitate the development of positive therapeutic relationship and encourage open, honest communication. Assisted patient in identifying risk factors which would indicate the need for higher level of care including thoughts to harm self or others and/or self-harming behavior and encouraged patient to contact this office, call 911, or present to the nearest emergency room should any of these events occur. Discussed crisis intervention services and means to access.     MEDS ORDERED DURING VISIT:  No orders of the defined types were placed in this encounter.    90-day supply sent in last visit.      Follow Up   Return in about 6 weeks (around 7/16/2025), or if symptoms worsen or fail to improve, for Recheck.    Patient was given instructions and counseling regarding her condition or for health maintenance advice. Please see specific information pulled into the AVS if appropriate.     Some of the data in this electronic note has been brought forward from a previous encounter, any necessary changes have been made, it has been reviewed by this APRN, and it is accurate.    This document has been electronically signed by ROD Spencer  June 4, 2025 08:49 EDT    Part of this note may be an electronic transcription/translation of spoken language to printed text using the Dragon Dictation System.

## 2025-06-09 ENCOUNTER — TELEMEDICINE (OUTPATIENT)
Dept: PSYCHIATRY | Facility: CLINIC | Age: 50
End: 2025-06-09
Payer: COMMERCIAL

## 2025-06-09 DIAGNOSIS — F25.1 SCHIZOAFFECTIVE DISORDER, DEPRESSIVE TYPE: ICD-10-CM

## 2025-06-09 DIAGNOSIS — F41.1 GENERALIZED ANXIETY DISORDER: Primary | ICD-10-CM

## 2025-06-09 PROCEDURE — 90837 PSYTX W PT 60 MINUTES: CPT | Performed by: COUNSELOR
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
## 2025-07-09 ENCOUNTER — TELEMEDICINE (OUTPATIENT)
Dept: PSYCHIATRY | Facility: CLINIC | Age: 50
End: 2025-07-09
Payer: COMMERCIAL

## 2025-07-09 DIAGNOSIS — F41.1 GENERALIZED ANXIETY DISORDER: ICD-10-CM

## 2025-07-09 DIAGNOSIS — F25.1 SCHIZOAFFECTIVE DISORDER, DEPRESSIVE TYPE: Primary | ICD-10-CM
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
## 2025-07-14 ENCOUNTER — TELEMEDICINE (OUTPATIENT)
Dept: PSYCHIATRY | Facility: CLINIC | Age: 50
End: 2025-07-14
Payer: COMMERCIAL

## 2025-07-14 DIAGNOSIS — G47.00 INSOMNIA, UNSPECIFIED TYPE: ICD-10-CM

## 2025-07-14 DIAGNOSIS — F25.1 SCHIZOAFFECTIVE DISORDER, DEPRESSIVE TYPE: Primary | ICD-10-CM

## 2025-07-14 DIAGNOSIS — F41.1 GENERALIZED ANXIETY DISORDER: ICD-10-CM

## 2025-07-14 DIAGNOSIS — F51.5 NIGHTMARES: ICD-10-CM

## 2025-07-14 PROBLEM — C54.1 ENDOMETRIAL CARCINOMA: Status: ACTIVE | Noted: 2025-07-14

## 2025-07-14 PROBLEM — K74.60 CIRRHOSIS: Status: ACTIVE | Noted: 2019-01-15

## 2025-07-14 PROBLEM — N18.9 CKD (CHRONIC KIDNEY DISEASE): Status: ACTIVE | Noted: 2020-02-17

## 2025-07-14 RX ORDER — ALBUTEROL SULFATE AND BUDESONIDE 90; 80 UG/1; UG/1
AEROSOL, METERED RESPIRATORY (INHALATION)
COMMUNITY
Start: 2025-07-13

## 2025-07-14 NOTE — PROGRESS NOTES
This provider is located at [facility], [address] [place] [address].The Patient is seen remotely at home, [address] [place] [address], [place] [address] via my chart.  Patient is being seen via telehealth and confirm that they are in a secure environment for this session. The patient's condition being diagnosed/treated is appropriate for telemedicine. The provider identified himself/herself: herself as well as her credentials.   The patient gave consent to be seen remotely, and when consent is given they understand that the consent allows for patient identifiable information to be sent to a third party as needed.   They may refuse to be seen remotely at any time. The electronic data is encrypted and password protected, and the patient has been advised of the potential risks to privacy not withstanding such measures.    You have chosen to receive care through a telehealth visit.  Do you consent to use a video/audio connection for your medical care today? Yes. Patient verified Name, , and address.  No changes noted      Chief Complaint  Mood, depression and anxiety    Subjective    Sudhakar Sotojosef Saul presents to [facility] for medication management.    History of Present Illness  Patient presents today reporting that she has done something to her neck as she does not know if it is a pinched nerve or something else.  Reports that she went to the ER but they said there was nothing she can do even though she asked for more kim x-rays but they did give her some steroids.  Patient states she cannot get in with her primary care until .  She states that she knows she is on a lot of pain medicine and she is not seeking that she wants to figure out the problem so that can be frustrating.  Reports her depression has been better however states her anxiety is still high because of the situation with her health.  Reports however it is just situational and  does not feel any changes in medicine are needed.  Patient states she has had one auditory hallucination and no visuals with a little girl's voice but otherwise denies that anyone tells her to harm herself or others.  Patient states her sleep has been affected by the pain and if she felt those 2 issues were better her mood would be better.  Reports appetite is the same.  States things are going good at home.  Denies any side effects or rash to the medications.  Patient states it is the right side of her neck and then is radiating down to her shoulder and arm and tingling in her fingers.  Encouraged her that she might want to message her PCP to see if they want to get an x-ray or referral to her previous neurosurgeon since she has had previous issues.  Denies any current SI/HI/AH/VH.      Objective   Vital Signs:   There were no vitals taken for this visit.  Due to the remote nature of this encounter (virtual encounter), vitals were unable to be obtained.  Height stated at 66 inches.  Weight stated at 241 pounds.      PHQ-9 Score:   PHQ-9 Total Score:(Patient-Rptd) 13     PHQ-9 Depression Screening  Little interest or pleasure in doing things? (Patient-Rptd) Several days   Feeling down, depressed, or hopeless? (Patient-Rptd) Over half   PHQ-2 Total Score (Patient-Rptd) 3   Trouble falling or staying asleep, or sleeping too much? (Patient-Rptd) Almost all   Feeling tired or having little energy? (Patient-Rptd) Almost all   Poor appetite or overeating? (Patient-Rptd) Over half   Feeling bad about yourself - or that you are a failure or have let yourself or your family down? (Patient-Rptd) Several days   Trouble concentrating on things, such as reading the newspaper or watching television? (Patient-Rptd) Several days   Moving or speaking so slowly that other people could have noticed? Or the opposite - being so fidgety or restless that you have been moving around a lot more than usual? (Patient-Rptd) Not at all    Thoughts that you would be better off dead, or of hurting yourself in some way? (Patient-Rptd) Not at all   PHQ-9 Total Score (Patient-Rptd) 13   If you checked off any problems, how difficult have these problems made it for you to do your work, take care of things at home, or get along with other people? (Patient-Rptd) Very difficult         PHQ-9 Total Score: (Patient-Rptd) 13     JD-7  Feeling nervous, anxious or on edge: (Patient-Rptd) More than half the days  Not being able to stop or control worrying: (Patient-Rptd) Nearly every day  Worrying too much about different things: (Patient-Rptd) More than half the days  Trouble Relaxing: (Patient-Rptd) Several days  Being so restless that it is hard to sit still: (Patient-Rptd) Not at all  Feeling afraid as if something awful might happen: (Patient-Rptd) Not at all  Becoming easily annoyed or irritable: (Patient-Rptd) Several days  JD 7 Total Score: (Patient-Rptd) 9  If you checked any problems, how difficult have these problems made it for you to do your work, take care of things at home, or get along with other people: (Patient-Rptd) Very difficult      Patient screened positive for depression based on a PHQ-9 score of 13 on 7/8/2025. Follow-up recommendations include: Prescribed antidepressant medication treatment and see notes and medication list..        Mental Status Exam:   Hygiene:   good  Cooperation:  Cooperative  Eye Contact:  Good  Psychomotor Behavior:  Appropriate  Affect:  Appropriate  Mood: depressed and anxious  Speech:  Normal  Thought Process:  Goal directed  Thought Content:  Normal  Suicidal:  None  Homicidal:  None  Hallucinations:  Visual  Delusion:  None  Memory:  Intact  Orientation:  Person, Place, Time, and Situation  Reliability:  good  Insight:  Good and Fair  Judgement:  Good and Fair  Impulse Control:  Good and Fair  Physical/Medical Issues:  Yes see medical history.     Current Medications:   Current Outpatient Medications    Medication Sig Dispense Refill    Airsupra 90-80 MCG/ACT aerosol       albuterol (ACCUNEB) 1.25 MG/3ML nebulizer solution Take 3 mL by nebulization Every 6 (Six) Hours As Needed for Wheezing or Shortness of Air. 9 mL 3    amantadine (SYMMETREL) 100 MG tablet       BD Pen Needle Beverly 2nd Gen 32G X 4 MM misc       Cariprazine HCl (VRAYLAR) 4.5 MG capsule capsule Take 1 capsule by mouth Daily. 90 capsule 0    cholecalciferol (VITAMIN D3) 1.25 MG (50595 UT) capsule Every Week      Continuous Blood Gluc  (Dexcom G6 ) device USE FOR 6 MONTHS      Continuous Blood Gluc Sensor (Dexcom G6 Sensor) PLACE 1 SENSOR ON SKIN CHANGING EVERY 10 DAYS 9 each 0    Continuous Blood Gluc Transmit (Dexcom G6 Transmitter) misc USE AS DIRECTED 1 each 0    cyanocobalamin 1000 MCG/ML injection INJECT 1 ML INTRAMUSCULARLY ONCE EVERY MONTH      desvenlafaxine (PRISTIQ) 100 MG 24 hr tablet Take 1 tablet by mouth Daily. 90 tablet 0    dexlansoprazole (DEXILANT) 60 MG capsule Take 1 capsule by mouth Daily.      EQ Nicotine 21 MG/24HR patch APPLY 1 PATCH TOPICALLY EVERY 24 HOURS      Ertugliflozin L-PyroglutamicAc (Steglatro) 15 MG tablet Take 1 tablet by mouth Every Morning. 90 tablet 1    FeroSul 325 (65 Fe) MG tablet       Fluticasone Furoate-Vilanterol (Breo Ellipta) 200-25 MCG/INH inhaler Inhale 1 puff Daily. 28 each 11    gabapentin (NEURONTIN) 800 MG tablet TAKE 1 TABLET BY MOUTH THREE TIMES DAILY FOR NERVE PAIN. MAY FILL WHEN DUE      HYDROcodone-acetaminophen (NORCO)  MG per tablet TAKE 1 TABLET BY MOUTH THREE TIMES DAILY AS NEEDED FOR PAIN. MAY FILL 12-      hydrOXYzine (ATARAX) 25 MG tablet Take 0.5-1 tablets by mouth 3 (Three) Times a Day As Needed for Anxiety. 270 tablet 0    Insulin Disposable Pump (OmniPod Dash 5 Pack Pods) misc       Insulin Glargine (LANTUS SOLOSTAR) 100 UNIT/ML injection pen Indications: Type 2 Diabetes Use Up To 20 Units Daily      Insulin Lispro (humaLOG) 100 UNIT/ML injection USE  AS DIRECTED THROUGH INSULIN PUMP UPTO 180 UNITS DAILY 90 mL 0    lamoTRIgine (LaMICtal) 150 MG tablet Take 1 tablet by mouth Daily. 90 tablet 0    levETIRAcetam (KEPPRA) 750 MG tablet Take 2 tablets by mouth 2 (Two) Times a Day.      lisinopril (PRINIVIL,ZESTRIL) 10 MG tablet Take 1 tablet by mouth Daily.      methocarbamol (ROBAXIN) 500 MG tablet Take 1 tablet by mouth 3 (Three) Times a Day As Needed. for muscle spams      metoprolol tartrate (LOPRESSOR) 50 MG tablet Take 1 tablet by mouth 2 (Two) Times a Day. 60 tablet 3    montelukast (SINGULAIR) 10 MG tablet Take 1 tablet by mouth.      Nebulizer device 1 application Every 6 (Six) Hours As Needed (dyspnea). 1 each 3    nystatin (MYCOSTATIN) 573684 UNIT/GM powder APPLY TO THE AFFECTED AREA(S) BY TOPICAL ROUTE 2 TIMES PER DAY      OXcarbazepine (TRILEPTAL) 600 MG tablet Take 1 tablet by mouth 2 (Two) Times a Day. PT TAKES  MG TAB IN MORNING AND  MG IN EVENING      pantoprazole (PROTONIX) 40 MG EC tablet       prazosin (MINIPRESS) 2 MG capsule Take 1 capsule by mouth Every Night. 90 capsule 0    promethazine (PHENERGAN) 25 MG tablet       rOPINIRole (REQUIP) 3 MG tablet Take 1 tablet by mouth Every Night.      Semglee, yfgn, 100 UNIT/ML solution pen-injector INJECT UP TO 20 UNITS DAILY      simvastatin (ZOCOR) 40 MG tablet Take 1 tablet by mouth Every Night. 30 tablet 3    sucralfate (CARAFATE) 1 g tablet TAKE 1 TABLET BY MOUTH 4 TIMES DAILY DISSOLVE TABLET IN WATER AND DRINK SLURRY      tiotropium bromide monohydrate (SPIRIVA RESPIMAT) 2.5 MCG/ACT aerosol solution inhaler Daily.      traZODone (DESYREL) 100 MG tablet Take 1 tablet by mouth Every Night. 90 tablet 0    Ventolin  (90 Base) MCG/ACT inhaler Inhale 2 puffs Every 4 (Four) Hours As Needed for Wheezing. 6.7 g 11    vitamin D (ERGOCALCIFEROL) 1.25 MG (33694 UT) capsule capsule Take 1 capsule by mouth.       No current facility-administered medications for this visit.       Physical  Exam  Nursing note reviewed.   Constitutional:       Appearance: Normal appearance.   Neurological:      Mental Status: She is alert.   Psychiatric:         Attention and Perception: Attention normal. She perceives auditory hallucinations.         Mood and Affect: Affect normal. Mood is anxious and depressed.         Speech: Speech normal.         Behavior: Behavior normal. Behavior is cooperative.         Thought Content: Thought content normal.         Cognition and Memory: Cognition and memory normal.         Judgment: Judgment normal.        Result Review :     The following data was reviewed by: ROD Spencer on 01/14/2025:  Common labs          8/19/2024    09:07 12/23/2024    09:07 5/19/2025    09:13   Common Labs   WBC 13.0     9.9     8.7       Hemoglobin 15.4     14.3     13.7       Hematocrit 45.9     40.0     41       Platelets 227     148     206       Total Cholesterol 166     118     175       Triglycerides 136     98     107       HDL Cholesterol 40     43     47       LDL Cholesterol  99     55     107       Hemoglobin A1C 10.9     7.7     8          Details          This result is from an external source.               CBC          8/19/2024    09:07 12/23/2024    09:07 5/19/2025    09:13   CBC   WBC 13.0     9.9     8.7       RBC 5.31     4.70     4.77       Hemoglobin 15.4     14.3     13.7       Hematocrit 45.9     40.0     41       MCV 86.4     85.1     86       MCH 29.0     30.4     28.7       MCHC 33.6     35.8     33.4       RDW 13.3     12.7     13.8       Platelets 227     148     206          Details          This result is from an external source.             CBC w/diff          3/29/2024    09:08 8/19/2024    09:07 12/23/2024    09:07   CBC w/Diff   WBC 10.1     13.0     9.9       RBC 5.15     5.31     4.70       Hemoglobin 15.2     15.4     14.3       Hematocrit 44.2     45.9     40.0       MCV 85.8     86.4     85.1       MCH 29.5     29.0     30.4       MCHC 34.4     33.6      35.8       RDW 13.4     13.3     12.7       Platelets 132     227     148       Neutrophil Rel % 57.8     62.4     46.7       Immature Granulocyte Rel % 0.4     0.3     0.2       Lymphocyte Rel % 35.9     32.0     46.2       Monocyte Rel % 3.6     4.3     4.9       Eosinophil Rel % 1.4     0.6     1.4       Basophil Rel % 0.9     0.4     0.6          Details          This result is from an external source.             Lipid Panel          8/19/2024    09:07 12/23/2024    09:07 5/19/2025    09:13   Lipid Panel   Total Cholesterol 166     118     175       Triglycerides 136     98     107       HDL Cholesterol 40     43     47       VLDL Cholesterol 27     20     21       LDL Cholesterol  99     55     107       LDL/HDL Ratio 2.48     1.28     2.27          Details          This result is from an external source.                     Most Recent A1C          5/19/2025    09:13   HGBA1C Most Recent   Hemoglobin A1C 8          Details          This result is from an external source.               Data reviewed : PCP and specialty notes        Assessment and Plan    Problem List Items Addressed This Visit       Schizoaffective disorder, depressive type - Primary    Generalized anxiety disorder     Other Visit Diagnoses         Insomnia, unspecified type          Nightmares                          Encounter Diagnoses   Name Primary?    Schizoaffective disorder, depressive type Yes    Generalized anxiety disorder     Insomnia, unspecified type     Nightmares          I have reviewed the patient and the results are consistent with the previously documented exam by myself.  I have reviewed the following portions of the patient's ROS and PFSH and confirmed them as accurate.  The HPI has been updated, chief of complaint, ROS and subjective have been reviewed and up-to-date.  The following portions of the patient's notes were reviewed, confirmed and/or updated this visit as appropriate: History of present illness/Interval  history, physical examination, assessment and plan, allergies, current medications, past family medical history, past medical history, past social history, past surgical history and problem list.            TREATMENT PLAN/GOALS: Continue supportive psychotherapy efforts and medications as indicated. Treatment and medication options discussed during today's visit. Patient ackowledged and verbally consented to continue with current treatment plan and was educated on the importance of compliance with treatment and follow-up appointments.    MEDICATION ISSUES:  We discussed risks, benefits, and side effects of the above medications and the patient was agreeable with the plan. Patient was educated on the importance of compliance with treatment and follow-up appointments.  Patient is agreeable to call the office with any worsening of symptoms or onset of side effects. Patient is agreeable to call 911 or go to the nearest ER should he/she begin having SI/HI. We will continue Lamictal in an effort to stabilize mood.  The patient was reminded to immediately come to the hospital should there be any loss of control.  Explanation was given to her regarding Lamictal and the potential for Foster Shon syndrome and significant rash.  Patient was encouraged to check skin prior to beginning.  Patient was encouraged to report any rash and to immediately stop medication.Lengthy discussion with patient on the possible side effects of antipsychotic medications including increased cholesterol, increased blood sugar, and possibility of weight gain.  Also discussed the need to monitor lab work associated with this.  The risk of muscle movement disorders with this class of medication was also discussed.         -Continue lamotrigine 150 mg daily for schizoaffective disorder.  -Continue minipress 2mg at night for nightmares.  -Continue Vraylar  4.5 mg daily for schizoaffective disorder depressed type.  -Continue hydroxyzine 12.5 to 25 mg  up to 3 times a day as needed for anxiety and panic.  Reminded patient to use more often if needed.  -Continue desvenlafaxine 100 mg daily for depression and anxiety symptoms.  Highly encouraged the patient if she had any worsening symptoms or concerns to contact the clinic for sooner appointment she verbalized understanding.  -Continue psychotherapy to help with past trauma as well as depressive and anxiety symptoms.  -Highly encourage patient since she is now on Trileptal to be aware of side effects of multiple mood stabilizers and if she were to have any rash to contact the clinic as we may have to look at tapering off the lamotrigine or talking with neurologist to reevaluate Trileptal.  Patient verbalized understanding and denied any side effects at this time.  -Continue trazodone  mg at night as needed for sleep.  Highly encouraged patient if she had any side effects or worsening symptoms to discontinue and contact the clinic as we would look at a trial of mirtazapine or amitriptyline patient verbalized understanding.  -Patient denied any side effects with the medication and denied any side effects related to serotonin syndrome and was educated extensively.  Encouraged patient if any symptoms worsen to contact clinic for sooner appointment and/or go to nearest ER she verbalized understanding.  - Reminded patient that she can try magnesium glycinate over-the-counter as a sleep aid 1 to 2 capsules at night.  - Reminded patient that study show L-theanine 200-400 milligrams daily can help with depressive and anxiety symptoms as well as schizophrenia symptoms and she can get this over-the-counter as well.  - Encouraged patient to reach out to PCP for possibly another referral to the neurosurgeon or another x-ray before her visit.      Patient has now failed and tried Haldol, aripiprazole, Latuda and now risperidone which has caused weight gain and due to diabetes and kidney disease would benefit from Vraylar  as patient is still having auditory and visual hallucinations.      Counseled patient regarding multimodal approach with healthy nutrition, healthy sleep, regular physical activity, social activities, counseling, and medications.      Coping skills reviewed and encouraged positive framing of thoughts     Assisted patient in processing above session content; acknowledged and normalized patient’s thoughts, feelings, and concerns.  Applied  positive coping skills and behavior management in session.  Allowed patient to freely discuss issues without interruption or judgment. Provided safe, confidential environment to facilitate the development of positive therapeutic relationship and encourage open, honest communication. Assisted patient in identifying risk factors which would indicate the need for higher level of care including thoughts to harm self or others and/or self-harming behavior and encouraged patient to contact this office, call 911, or present to the nearest emergency room should any of these events occur. Discussed crisis intervention services and means to access.     MEDS ORDERED DURING VISIT:  No orders of the defined types were placed in this encounter.    No refills needed at this time but encouraged patient if getting low dose and refill request or call office she verbalized understanding.      Follow Up   Return in about 8 weeks (around 9/8/2025), or if symptoms worsen or fail to improve, for Recheck.    Patient was given instructions and counseling regarding her condition or for health maintenance advice. Please see specific information pulled into the AVS if appropriate.     Some of the data in this electronic note has been brought forward from a previous encounter, any necessary changes have been made, it has been reviewed by this APRN, and it is accurate.    This document has been electronically signed by ROD Spencer  July 14, 2025 13:54 EDT    Part of this note may be an electronic  transcription/translation of spoken language to printed text using the Dragon Dictation System.

## (undated) DEVICE — STERILE POLYISOPRENE POWDER-FREE SURGICAL GLOVES WITH EMOLLIENT COATING: Brand: PROTEXIS

## (undated) DEVICE — BANDAGE,GAUZE,BULKEE II,4.5"X4.1YD,STRL: Brand: MEDLINE

## (undated) DEVICE — PAD GRND REM POLYHESIVE A/ DISP

## (undated) DEVICE — PK EXTRM LF 60

## (undated) DEVICE — INTENDED FOR TISSUE SEPARATION, AND OTHER PROCEDURES THAT REQUIRE A SHARP SURGICAL BLADE TO PUNCTURE OR CUT.: Brand: BARD-PARKER ® CARBON RIB-BACK BLADES

## (undated) DEVICE — SUT ETHLN 5/0 PS2 18IN 1666G

## (undated) DEVICE — PENCL ES MEGADINE EZ/CLEAN BUTN W/HOLSTR 10FT

## (undated) DEVICE — SYR LL TP 10ML STRL

## (undated) DEVICE — GLV SURG SENSICARE PI ORTHO PF SZ7 LF STRL

## (undated) DEVICE — GLV SURG SIGNATURE ESSENTIAL PF LTX SZ7.5

## (undated) DEVICE — GLV SURG SENSICARE POLYISPRN W/ALOE PF LF 6.5 GRN STRL

## (undated) DEVICE — GAMMEX® NON-LATEX SIZE 8, STERILE NEOPRENE POWDER-FREE SURGICAL GLOVE: Brand: GAMMEX

## (undated) DEVICE — GAUZE,SPONGE,FLUFF,6"X6.75",STRL,5/TRAY: Brand: MEDLINE

## (undated) DEVICE — WEBRIL COTTON UNDERCAST PADDING: Brand: WEBRIL

## (undated) DEVICE — DISPOSABLE BIPOLAR CODE, 12' (3.66 M): Brand: CONMED

## (undated) DEVICE — BNDG ESMARK 6INX9FT STRL

## (undated) DEVICE — Device: Brand: DISPOSABLE ELECTROSURGICAL SNARE

## (undated) DEVICE — SINGLE-USE BIOPSY FORCEPS: Brand: RADIAL JAW 4

## (undated) DEVICE — BITEBLOCK ENDO W/STRAP 60F A/ LF DISP

## (undated) DEVICE — DRAPE,U/ SHT,SPLIT,PLAS,STERIL: Brand: MEDLINE

## (undated) DEVICE — NDL HYPO PRECISIONGLIDE SHRT 22G 11/2

## (undated) DEVICE — SUT CARD 2/0 30IN ETX833H

## (undated) DEVICE — DISPOSABLE TOURNIQUET CUFF SINGLE BLADDER, DUAL PORT AND QUICK CONNECT CONNECTOR: Brand: COLOR CUFF

## (undated) DEVICE — PATIENT RETURN ELECTRODE, SINGLE-USE, CONTACT QUALITY MONITORING, ADULT, WITH 9FT CORD, FOR PATIENTS WEIGING OVER 33LBS. (15KG): Brand: MEGADYNE

## (undated) DEVICE — TRAP SXN POLYP QUICKCATCH LF

## (undated) DEVICE — SPLNT ORTHOGLASS P/C 3X35IN LF

## (undated) DEVICE — DRP SURG U/DRP INVISISHIELD PA 48X71IN